# Patient Record
Sex: FEMALE | Race: WHITE | Employment: OTHER | URBAN - METROPOLITAN AREA
[De-identification: names, ages, dates, MRNs, and addresses within clinical notes are randomized per-mention and may not be internally consistent; named-entity substitution may affect disease eponyms.]

---

## 2017-03-14 DIAGNOSIS — G20.A1 PARKINSON DISEASE (H): ICD-10-CM

## 2017-03-14 RX ORDER — CARBIDOPA AND LEVODOPA 50; 200 MG/1; MG/1
TABLET, EXTENDED RELEASE ORAL
Qty: 450 TABLET | Refills: 0 | Status: SHIPPED | OUTPATIENT
Start: 2017-03-14 | End: 2017-04-24

## 2017-03-14 NOTE — TELEPHONE ENCOUNTER
Signed Prescriptions:                        Disp   Refills    carbidopa-levodopa (SINEMET CR)  MG *450 ta*0        Sig: Increase from 1 tab 4/day  At 7am, 11am, 5pm, 9:30pm           to 1 tab 5 times per day  Authorizing Provider: COLE CASAS  Ordering User: SEVERIN-BROWN, DARLA Ok until follow up next month  Darla Severin-Brown, LPN

## 2017-04-24 ENCOUNTER — OFFICE VISIT (OUTPATIENT)
Dept: NEUROLOGY | Facility: CLINIC | Age: 77
End: 2017-04-24
Payer: MEDICARE

## 2017-04-24 VITALS
WEIGHT: 193.2 LBS | DIASTOLIC BLOOD PRESSURE: 75 MMHG | OXYGEN SATURATION: 97 % | BODY MASS INDEX: 34.02 KG/M2 | SYSTOLIC BLOOD PRESSURE: 139 MMHG | HEART RATE: 65 BPM

## 2017-04-24 DIAGNOSIS — N31.9 NEUROGENIC BLADDER: Primary | ICD-10-CM

## 2017-04-24 DIAGNOSIS — G20.A1 PARALYSIS AGITANS (H): ICD-10-CM

## 2017-04-24 DIAGNOSIS — G20.A1 PARKINSON DISEASE (H): ICD-10-CM

## 2017-04-24 PROBLEM — M85.80 OSTEOPENIA: Status: ACTIVE | Noted: 2017-04-24

## 2017-04-24 PROBLEM — R32 INCONTINENCE: Status: ACTIVE | Noted: 2017-04-24

## 2017-04-24 PROBLEM — K75.81 NONALCOHOLIC STEATOHEPATITIS (NASH): Status: ACTIVE | Noted: 2017-04-24

## 2017-04-24 PROCEDURE — 99214 OFFICE O/P EST MOD 30 MIN: CPT | Performed by: PSYCHIATRY & NEUROLOGY

## 2017-04-24 RX ORDER — CARBIDOPA AND LEVODOPA 50; 200 MG/1; MG/1
TABLET, EXTENDED RELEASE ORAL
Qty: 450 TABLET | Refills: 0 | Status: SHIPPED | OUTPATIENT
Start: 2017-04-24 | End: 2017-07-03

## 2017-04-24 RX ORDER — CARBIDOPA AND LEVODOPA 25; 100 MG/1; MG/1
TABLET ORAL
Qty: 450 TABLET | Refills: 3 | Status: SHIPPED | OUTPATIENT
Start: 2017-04-24 | End: 2017-07-20

## 2017-04-24 ASSESSMENT — PAIN SCALES - GENERAL: PAINLEVEL: NO PAIN (0)

## 2017-04-24 NOTE — PROGRESS NOTES
Diagnosis/Summary/Recommendations:    PATIENT: Loan Anderson    : 1940    PATRICE: 2017    Parkinson  Having more mobility issues. She has pain on moving - right leg is worse and has pain in both legs.   She has pain in her right hip down to the knee and may go down to the right ankle. It is a shooting pain. It may be a nerve. When she goes down a step she has the pain. She has problems walking on level ground. If she stands too long it is difficult as well. It is not necessarily in the back. She has not sought out anyone for this.     Sinemet 50/200 CR 1 tab 4/day   Sinemet 25/100 1 tab 4/day   rotigotine 4mg/day  coQ10    myrbetriq - off this - did not help.   She has prominent nocturia.   She has seen a urologist in the past.     aricept  Rivastigmine    Colace  Synthroid    Asking about medical marijuana    Miralax.    She is able to walk relatively well today - she has taken her sinemet and feels relatively good.  She is managing her symptoms with acetaminophen    She brought up a new treatment for knee pain.    Not sure if it the following information    The US Food and Drug Administration (FDA) has approved an expanded efficacy claim - out to 26 weeks - for [employer]'s hyaluronic acid injection product (Gel-One  Hyaluronate, [employer], [place], [place]) for the treatment of osteoarthritis (OA) knee pain.1  Gel-One is a single, 3mL injection provided in-office by treating physicians and is recommended for the treatment of OA knee pain when patients have not responded to therapy with nonsteroidal anti-inflammatory drugs or simple analgesics.  Hyaluronic acid treatments like this aim to supplement the natural hyaluronic acid in knee joints to increase available cushioning and lubrication.   The FDA had previously approved an efficacy claim up to 13 weeks for the product.    She is traveling around [place] this summer. She returned back from [place].     PLAN  Conservative management of  her symptoms.    Consider physical therapy and heated pool therapy    May need to see orthopedics for further evaluation of her back and joints. She did not have a melissa's sign and therefore not clear she has a right hip issue but could. She is convinced she has ongoing knee problems. She did not have an obvious straight leg raise but has bilateral hamstring tightness. Discussed the differential diagnosis of this being related to parkinson and arthritis.     Elevating legs for one hour before bedtime before going to bed may reduce night time awakenings    Use as needed 25/100 sinemet during the night and this may reduce urinary frequency at night.   May see urology for an evaluation and management. She has not had significant studies done.  She had seen Dr. Warner, urologist for this in the past and has been on myrbetriq but not on other agents. She does have underlying memory problems.   She has prominent nocturia presently 5/night.     She has not tried medication for her memory - discussed rivastigmine (exelon) and donepezil (aricept) and the possible interaction between memory  Medications and bladder etc.  She is not sure she will try these. Her memory may be better off myrbetriq.    Return back in  6months.     Pedro Pitts MD     ______________________________________    Cc: 76 year old female   Issues discussed today April 24, 2017      parkinson      Over 50% of this visit was spent in patient care and care coordination.     History obtained from patient and family    Total visit time was 25 minutes      Pedro Pitts MD     ______________________________________    Last visit date and details:     Parkinson's disease  Has dyskinesias but are not troublesome but have increased from before.  Has had more tremor than before.   She has wearing off and she is shakey when she has wearing off.   She is taking   Sinemet 25/100 1 tablet 4 times per day  Sinemet 50/200 CR 1 tab 4 times per day to 5 times per day  She  is taking neupro 4mg patch daily  She has had protein interaction with sinemet.   She is fairly reliable in taking her dose of medication.   No falls  Has had problems with thinking abilities but has not changed.      Will be going to [place] in October/november. Last doctor appointment is on the 20th up [place].     Been on mirabegron 50mg for one month; she may have Combination of stress urinary incontinence and a neurogenic bladder. Dr. Warner has given her the mirabegron - not sure after one month if helped. She has not kept a bladder diary.      Discussed rivastigmine patch (exelon patch) and its pros and cons.  She is not taking donepezil and has not taken it yet.     Discussed nilotinib research.     Discussion about probiotics - daughter will look into probiotics.     PLAN  Ongoing discussion about memory medication     She will continue to review the response to mirabegron for bladder     She will need ongoing monitor of bladder function as she has had recurrent uti's  She will have family review her probiotics as she has had antibiotics for her UTI.     She will return in 6 months.     Pedro Pitts MD           ______________________________________      Patient was asked about 14 Review of systems including changes in vision (dry eyes, double vision), hearing, heart, lungs, musculoskeletal, depression, anxiety, snoring, RBD, insomnia, urinary frequency, urinary urgency, constipation, swallowing problems, hematological, ID, allergies, skin problems: seborrhea, endocrinological: thyroid, diabetes, cholesterol; balance, weight changes, and other neurological problems and these were not significant at this time except for   Patient Active Problem List   Diagnosis     Parkinson disease (H)     Constipation     Hypothyroid     Neurogenic bladder     Convergence insufficiency     Wears glasses     Diplopia     Hot flashes, menopausal     REM sleep behavior disorder     Sebaceous cyst     Hypercholesterolemia      Dysthymic disorder     Hyperglyceridemia     Hypothyroidism     Urinary incontinence     Chronic nonalcoholic liver disease     Obstructive sleep apnea     Disorder of bone and cartilage     Benign neoplasm          Allergies   Allergen Reactions     Mirapex [Pramipexole Dihydrochloride Monohydrate]      Leg swelling     Requip [Ropinirole Hydrochloride]      Leg swelling     Epinephrine Palpitations     Past Surgical History:   Procedure Laterality Date     ------------OTHER-------------  november ? 2015    lithrotripsy for renal stone     ------------OTHER-------------  november 2015    had sepsis from uti and hospitalized     BRAIN SURGERY  12 or 14 oct 2011    gene therapy enrolled study at [facility]     Past Medical History:   Diagnosis Date     Constipation 7/15/2011     Convergence insufficiency 7/15/2011     Depression 7/15/2011     Diplopia 7/18/2011     Hot flashes, menopausal 7/18/2011     Hypercholesterolemia 7/18/2011     Hypothyroid 7/15/2011     Neurogenic bladder 7/15/2011     Parkinson disease (H) 7/15/2011     REM sleep behavior disorder 7/18/2011     Sebaceous cyst 7/18/2011     Wears glasses 7/18/2011     Social History     Social History     Marital status:      Spouse name: N/A     Number of children: N/A     Years of education: N/A     Occupational History     Not on file.     Social History Main Topics     Smoking status: Never Smoker     Smokeless tobacco: Never Used     Alcohol use Yes      Comment: rare     Drug use: Not on file     Sexual activity: Not on file     Other Topics Concern     Not on file     Social History Narrative    Spouse is serenity       Drug and lactation database from the [place] National Library of Medicine:  http://toxnet.nlm.nih.gov/cgi-bin/sis/htmlgen?LACT      B/P: Data Unavailable, T: Data Unavailable, P: Data Unavailable, R: Data Unavailable 0 lbs 0 oz  There were no vitals taken for this visit., There is no height or weight on file to calculate  BMI.  Medications and Vitals not listed are documented in the cart and reviewed by me.     Current Outpatient Prescriptions   Medication Sig Dispense Refill     carbidopa-levodopa (SINEMET CR)  MG per CR tablet Increase from 1 tab 4/day  At 7am, 11am, 5pm, 9:30pm to 1 tab 5 times per day 450 tablet 0     venlafaxine (EFFEXOR-XR) 75 MG 24 hr capsule TAKE 1 TABLET(75 MG) BY MOUTH DAILY 90 capsule 11     red yeast rice 600 MG CAPS Take 600 mg by mouth daily       mirabegron (MYRBETRIQ) 50 MG 24 hr tablet Take 1 tablet (50 mg) by mouth daily       rivastigmine (EXELON) 13.3 MG/24HR Place 1 patch onto the skin daily Apply daily for one month at 4.6mg/day then increase to 9.5mg daily for one month then increase to 13.3mg daily 30 patch 11     rivastigmine (EXELON) 4.6 MG/24HR Apply daily for one month at 4.6mg/day then increase to 9.5mg daily for one month then increase to 13.3mg daily 30 patch 11     rivastigmine (EXELON) 9.5 MG/24HR Apply daily for one month at 4.6mg/day then increase to 9.5mg daily for one month then increase to 13.3mg daily 30 patch 11     polyethylene glycol (MIRALAX) powder Take 17 g (1 capful) by mouth daily as needed 119 g      Inulin 2 G CHEW 6 x 6grams per day with natur made adult gummies fiber. 30 tablet      donepezil (ARICEPT) 5 MG tablet 5mg at night for 4 weeks then 10mg at night. 60 tablet 11     vitamin  B complex with vitamin C (VITAMIN  B COMPLEX) TABS Take 1 tablet by mouth daily       rotigotine (NEUPRO) 4 MG/24HR Place 1 patch onto the skin daily 90 patch 3     carbidopa-levodopa (SINEMET)  MG per tablet Increase from 1 tab 4/day  At 7am, 11am, 5pm, 9:30pm to 1 tab 5 times per day = 5/day 450 tablet 3     levothyroxine (SYNTHROID,LEVOTHROID) 100 MCG tablet 1 tablet @ 7am 90 tablet 3     docusate sodium (COLACE) 100 MG capsule Take 1 capsule (100 mg) by mouth 2 times daily 60 capsule      Calcium-Vitamin D 600-200 MG-UNIT TABS 1 tablet at 5pm 30 tablet      atorvastatin  (LIPITOR) 10 MG tablet Take 1 tablet (10 mg) by mouth daily 30 tablet 1     NONFORMULARY macugels 2 softgels daily at 920pm       OMEGA-3 KRILL  MG CAPS Take 1 tablet by mouth daily.       Co-Enzyme Q-10 100 MG CAPS Take 400 mg by mouth daily One at 7:30AM, 12:30PM and 10:00PM       TYLENOL EXTRA STRENGTH 500 MG PO TABS 1 TABLET daily  AS NEEDED 90 Tab 3     ASPIRIN 81 MG PO TABS 1 TABLET DAILY           Pedro Pitts MD

## 2017-04-24 NOTE — LETTER
2017      RE: Loan Anderson  [address]  [place] [address]       Diagnosis/Summary/Recommendations:    PATIENT: Loan Anderson    : 1940    PATRICE: 2017    Parkinson  Having more mobility issues. She has pain on moving - right leg is worse and has pain in both legs.   She has pain in her right hip down to the knee and may go down to the right ankle. It is a shooting pain. It may be a nerve. When she goes down a step she has the pain. She has problems walking on level ground. If she stands too long it is difficult as well. It is not necessarily in the back. She has not sought out anyone for this.     Sinemet 50/200 CR 1 tab 4/day   Sinemet 25/100 1 tab 4/day   rotigotine 4mg/day  coQ10    myrbetriq - off this - did not help.   She has prominent nocturia.   She has seen a urologist in the past.     aricept  Rivastigmine    Colace  Synthroid    Asking about medical marijuana    Miralax.    She is able to walk relatively well today - she has taken her sinemet and feels relatively good.  She is managing her symptoms with acetaminophen    She brought up a new treatment for knee pain.    Not sure if it the following information    The US Food and Drug Administration (FDA) has approved an expanded efficacy claim - out to 26 weeks - for [employer]'s hyaluronic acid injection product (Gel-One  Hyaluronate, [employer], [place], [place]) for the treatment of osteoarthritis (OA) knee pain.1  Gel-One is a single, 3mL injection provided in-office by treating physicians and is recommended for the treatment of OA knee pain when patients have not responded to therapy with nonsteroidal anti-inflammatory drugs or simple analgesics.  Hyaluronic acid treatments like this aim to supplement the natural hyaluronic acid in knee joints to increase available cushioning and lubrication.   The FDA had previously approved an efficacy claim up to 13 weeks for the product.    She is traveling around  [place] this summer. She returned back from [place].     PLAN  Conservative management of her symptoms.    Consider physical therapy and heated pool therapy    May need to see orthopedics for further evaluation of her back and joints. She did not have a melissa's sign and therefore not clear she has a right hip issue but could. She is convinced she has ongoing knee problems. She did not have an obvious straight leg raise but has bilateral hamstring tightness. Discussed the differential diagnosis of this being related to parkinson and arthritis.     Elevating legs for one hour before bedtime before going to bed may reduce night time awakenings    Use as needed 25/100 sinemet during the night and this may reduce urinary frequency at night.   May see urology for an evaluation and management. She has not had significant studies done.  She had seen Dr. Warner, urologist for this in the past and has been on myrbetriq but not on other agents. She does have underlying memory problems.   She has prominent nocturia presently 5/night.     She has not tried medication for her memory - discussed rivastigmine (exelon) and donepezil (aricept) and the possible interaction between memory  Medications and bladder etc.  She is not sure she will try these. Her memory may be better off myrbetriq.    Return back in  6months.     Pedro Pitts MD     ______________________________________    Cc: 76 year old female   Issues discussed today April 24, 2017      parkinson      Over 50% of this visit was spent in patient care and care coordination.     History obtained from patient and family    Total visit time was 25 minutes      Pedro Pitts MD     ______________________________________    Last visit date and details:     Parkinson's disease  Has dyskinesias but are not troublesome but have increased from before.  Has had more tremor than before.   She has wearing off and she is shakey when she has wearing off.   She is taking   Sinemet  25/100 1 tablet 4 times per day  Sinemet 50/200 CR 1 tab 4 times per day to 5 times per day  She is taking neupro 4mg patch daily  She has had protein interaction with sinemet.   She is fairly reliable in taking her dose of medication.   No falls  Has had problems with thinking abilities but has not changed.      Will be going to [place] in October/november. Last doctor appointment is on the 20th up [place].     Been on mirabegron 50mg for one month; she may have Combination of stress urinary incontinence and a neurogenic bladder. Dr. Warner has given her the mirabegron - not sure after one month if helped. She has not kept a bladder diary.      Discussed rivastigmine patch (exelon patch) and its pros and cons.  She is not taking donepezil and has not taken it yet.     Discussed nilotinib research.     Discussion about probiotics - daughter will look into probiotics.     PLAN  Ongoing discussion about memory medication     She will continue to review the response to mirabegron for bladder     She will need ongoing monitor of bladder function as she has had recurrent uti's  She will have family review her probiotics as she has had antibiotics for her UTI.     She will return in 6 months.     Pedro Pitts MD           ______________________________________      Patient was asked about 14 Review of systems including changes in vision (dry eyes, double vision), hearing, heart, lungs, musculoskeletal, depression, anxiety, snoring, RBD, insomnia, urinary frequency, urinary urgency, constipation, swallowing problems, hematological, ID, allergies, skin problems: seborrhea, endocrinological: thyroid, diabetes, cholesterol; balance, weight changes, and other neurological problems and these were not significant at this time except for   Patient Active Problem List   Diagnosis     Parkinson disease (H)     Constipation     Hypothyroid     Neurogenic bladder     Convergence insufficiency     Wears glasses     Diplopia     Hot  flashes, menopausal     REM sleep behavior disorder     Sebaceous cyst     Hypercholesterolemia     Dysthymic disorder     Hyperglyceridemia     Hypothyroidism     Urinary incontinence     Chronic nonalcoholic liver disease     Obstructive sleep apnea     Disorder of bone and cartilage     Benign neoplasm          Allergies   Allergen Reactions     Mirapex [Pramipexole Dihydrochloride Monohydrate]      Leg swelling     Requip [Ropinirole Hydrochloride]      Leg swelling     Epinephrine Palpitations     Past Surgical History:   Procedure Laterality Date     ------------OTHER-------------  november ? 2015    lithrotripsy for renal stone     ------------OTHER-------------  november 2015    had sepsis from uti and hospitalized     BRAIN SURGERY  12 or 14 oct 2011    gene therapy enrolled study at [facility]     Past Medical History:   Diagnosis Date     Constipation 7/15/2011     Convergence insufficiency 7/15/2011     Depression 7/15/2011     Diplopia 7/18/2011     Hot flashes, menopausal 7/18/2011     Hypercholesterolemia 7/18/2011     Hypothyroid 7/15/2011     Neurogenic bladder 7/15/2011     Parkinson disease (H) 7/15/2011     REM sleep behavior disorder 7/18/2011     Sebaceous cyst 7/18/2011     Wears glasses 7/18/2011     Social History     Social History     Marital status:      Spouse name: N/A     Number of children: N/A     Years of education: N/A     Occupational History     Not on file.     Social History Main Topics     Smoking status: Never Smoker     Smokeless tobacco: Never Used     Alcohol use Yes      Comment: rare     Drug use: Not on file     Sexual activity: Not on file     Other Topics Concern     Not on file     Social History Narrative    Spouse is serenity       Drug and lactation database from the [place] [employer] of Medicine:  http://toxnet.nlm.nih.gov/cgi-bin/sis/htmlgen?LACT      B/P: Data Unavailable, T: Data Unavailable, P: Data Unavailable, R: Data Unavailable 0 lbs 0  oz  There were no vitals taken for this visit., There is no height or weight on file to calculate BMI.  Medications and Vitals not listed are documented in the cart and reviewed by me.     Current Outpatient Prescriptions   Medication Sig Dispense Refill     carbidopa-levodopa (SINEMET CR)  MG per CR tablet Increase from 1 tab 4/day  At 7am, 11am, 5pm, 9:30pm to 1 tab 5 times per day 450 tablet 0     venlafaxine (EFFEXOR-XR) 75 MG 24 hr capsule TAKE 1 TABLET(75 MG) BY MOUTH DAILY 90 capsule 11     red yeast rice 600 MG CAPS Take 600 mg by mouth daily       mirabegron (MYRBETRIQ) 50 MG 24 hr tablet Take 1 tablet (50 mg) by mouth daily       rivastigmine (EXELON) 13.3 MG/24HR Place 1 patch onto the skin daily Apply daily for one month at 4.6mg/day then increase to 9.5mg daily for one month then increase to 13.3mg daily 30 patch 11     rivastigmine (EXELON) 4.6 MG/24HR Apply daily for one month at 4.6mg/day then increase to 9.5mg daily for one month then increase to 13.3mg daily 30 patch 11     rivastigmine (EXELON) 9.5 MG/24HR Apply daily for one month at 4.6mg/day then increase to 9.5mg daily for one month then increase to 13.3mg daily 30 patch 11     polyethylene glycol (MIRALAX) powder Take 17 g (1 capful) by mouth daily as needed 119 g      Inulin 2 G CHEW 6 x 6grams per day with natur made adult gummies fiber. 30 tablet      donepezil (ARICEPT) 5 MG tablet 5mg at night for 4 weeks then 10mg at night. 60 tablet 11     vitamin  B complex with vitamin C (VITAMIN  B COMPLEX) TABS Take 1 tablet by mouth daily       rotigotine (NEUPRO) 4 MG/24HR Place 1 patch onto the skin daily 90 patch 3     carbidopa-levodopa (SINEMET)  MG per tablet Increase from 1 tab 4/day  At 7am, 11am, 5pm, 9:30pm to 1 tab 5 times per day = 5/day 450 tablet 3     levothyroxine (SYNTHROID,LEVOTHROID) 100 MCG tablet 1 tablet @ 7am 90 tablet 3     docusate sodium (COLACE) 100 MG capsule Take 1 capsule (100 mg) by mouth 2 times daily 60  capsule      Calcium-Vitamin D 600-200 MG-UNIT TABS 1 tablet at 5pm 30 tablet      atorvastatin (LIPITOR) 10 MG tablet Take 1 tablet (10 mg) by mouth daily 30 tablet 1     NONFORMULARY macugels 2 softgels daily at 920pm       OMEGA-3 KRILL  MG CAPS Take 1 tablet by mouth daily.       Co-Enzyme Q-10 100 MG CAPS Take 400 mg by mouth daily One at 7:30AM, 12:30PM and 10:00PM       TYLENOL EXTRA STRENGTH 500 MG PO TABS 1 TABLET daily  AS NEEDED 90 Tab 3     ASPIRIN 81 MG PO TABS 1 TABLET DAILY           Pedro Pitts MD

## 2017-04-24 NOTE — NURSING NOTE
"Loan Anderson's goals for this visit include: return  She requests these members of her care team be copied on today's visit information:     PCP: Opal Cardenas    Referring Provider:  No referring provider defined for this encounter.    Chief Complaint   Patient presents with     RECHECK       Initial /75 (BP Location: Left arm, Cuff Size: Adult Regular)  Pulse 65  Wt 87.6 kg (193 lb 3.2 oz)  SpO2 97%  BMI 34.02 kg/m2 Estimated body mass index is 34.02 kg/(m^2) as calculated from the following:    Height as of 10/3/16: 1.605 m (5' 3.19\").    Weight as of this encounter: 87.6 kg (193 lb 3.2 oz).  Medication Reconciliation: complete    Do you need any medication refills at today's visit? y  "

## 2017-04-24 NOTE — PATIENT INSTRUCTIONS
Diagnosis/Summary/Recommendations:    PATIENT: Loan Anderson    : 1940    PATRICE: 2017    Parkinson  Having more mobility issues. She has pain on moving - right leg is worse and has pain in both legs.   She has pain in her right hip down to the knee and may go down to the right ankle. It is a shooting pain. It may be a nerve. When she goes down a step she has the pain. She has problems walking on level ground. If she stands too long it is difficult as well. It is not necessarily in the back. She has not sought out anyone for this.     Sinemet 50/200 CR 1 tab 4/day   Sinemet 25/100 1 tab 4/day   rotigotine 4mg/day  coQ10    myrbetriq - off this - did not help.   She has prominent nocturia.   She has seen a urologist in the past.     aricept  Rivastigmine    Colace  Synthroid    Asking about medical marijuana    Miralax.    She is able to walk relatively well today - she has taken her sinemet and feels relatively good.  She is managing her symptoms with acetaminophen    She brought up a new treatment for knee pain.    Not sure if it the following information    The US Food and Drug Administration (FDA) has approved an expanded efficacy claim - out to 26 weeks - for [employer]'s hyaluronic acid injection product (Gel-One  Hyaluronate, [employer], [place], [place]) for the treatment of osteoarthritis (OA) knee pain.1  Gel-One is a single, 3mL injection provided in-office by treating physicians and is recommended for the treatment of OA knee pain when patients have not responded to therapy with nonsteroidal anti-inflammatory drugs or simple analgesics.  Hyaluronic acid treatments like this aim to supplement the natural hyaluronic acid in knee joints to increase available cushioning and lubrication.   The FDA had previously approved an efficacy claim up to 13 weeks for the product.    She is traveling around [place] this summer. She returned back from [place].     PLAN  Conservative management of  her symptoms.    Consider physical therapy and heated pool therapy    May need to see orthopedics for further evaluation of her back and joints. She did not have a melissa's sign and therefore not clear she has a right hip issue but could. She is convinced she has ongoing knee problems. She did not have an obvious straight leg raise but has bilateral hamstring tightness. Discussed the differential diagnosis of this being related to parkinson and arthritis.     Elevating legs for one hour before bedtime before going to bed may reduce night time awakenings    Use as needed 25/100 sinemet during the night and this may reduce urinary frequency at night.   May see urology for an evaluation and management. She has not had significant studies done.  She had seen Dr. Warner, urologist for this in the past and has been on myrbetriq but not on other agents. She does have underlying memory problems.   She has prominent nocturia presently 5/night.     She has not tried medication for her memory - discussed rivastigmine (exelon) and donepezil (aricept) and the possible interaction between memory  Medications and bladder etc.  She is not sure she will try these. Her memory may be better off myrbetriq.    Return back in  6months.     Pedro Pitts MD

## 2017-04-24 NOTE — MR AVS SNAPSHOT
After Visit Summary   2017    Loan Anderson    MRN: [id number]           Patient Information     Date Of Birth          1940        Visit Information        Provider Department      2017 11:00 AM Pedro Pitts MD [facility]        Today's Diagnoses     Neurogenic bladder    -  1    Parkinson disease (H)        Paralysis agitans (H)          Care Instructions    Diagnosis/Summary/Recommendations:    PATIENT: Loan Anderson    : 1940    PATRICE: 2017    Parkinson  Having more mobility issues. She has pain on moving - right leg is worse and has pain in both legs.   She has pain in her right hip down to the knee and may go down to the right ankle. It is a shooting pain. It may be a nerve. When she goes down a step she has the pain. She has problems walking on level ground. If she stands too long it is difficult as well. It is not necessarily in the back. She has not sought out anyone for this.     Sinemet 50/200 CR 1 tab 4/day   Sinemet 25/100 1 tab 4/day   rotigotine 4mg/day  coQ10    myrbetriq - off this - did not help.   She has prominent nocturia.   She has seen a urologist in the past.     aricept  Rivastigmine    Colace  Synthroid    Asking about medical marijuana    Miralax.    She is able to walk relatively well today - she has taken her sinemet and feels relatively good.  She is managing her symptoms with acetaminophen    She brought up a new treatment for knee pain.    Not sure if it the following information    The US Food and Drug Administration (FDA) has approved an expanded efficacy claim - out to 26 weeks - for [employer]'s hyaluronic acid injection product (Gel-One  Hyaluronate, [employer], [place], [place]) for the treatment of osteoarthritis (OA) knee pain.1  Gel-One is a single, 3mL injection provided in-office by treating physicians and is recommended for the treatment of OA knee pain when patients have not responded to  therapy with nonsteroidal anti-inflammatory drugs or simple analgesics.  Hyaluronic acid treatments like this aim to supplement the natural hyaluronic acid in knee joints to increase available cushioning and lubrication.   The FDA had previously approved an efficacy claim up to 13 weeks for the product.    She is traveling around [place] this summer. She returned back from [place].     PLAN  Conservative management of her symptoms.    Consider physical therapy and heated pool therapy    May need to see orthopedics for further evaluation of her back and joints. She did not have a melissa's sign and therefore not clear she has a right hip issue but could. She is convinced she has ongoing knee problems. She did not have an obvious straight leg raise but has bilateral hamstring tightness. Discussed the differential diagnosis of this being related to parkinson and arthritis.     Elevating legs for one hour before bedtime before going to bed may reduce night time awakenings    Use as needed 25/100 sinemet during the night and this may reduce urinary frequency at night.   May see urology for an evaluation and management. She has not had significant studies done.  She had seen Dr. Warner, urologist for this in the past and has been on myrbetriq but not on other agents. She does have underlying memory problems.   She has prominent nocturia presently 5/night.     She has not tried medication for her memory - discussed rivastigmine (exelon) and donepezil (aricept) and the possible interaction between memory  Medications and bladder etc.  She is not sure she will try these. Her memory may be better off myrbetriq.    Return back in  6months.     Pedro Pitts MD         Follow-ups after your visit        Additional Services     UROLOGY ADULT REFERRAL       Your provider has referred you to: urology  Dr. Thibodeaux    Please be aware that coverage of these services is subject to the terms and limitations of your health insurance plan.   Call member services at your health plan with any benefit or coverage questions.      Please bring the following with you to your appointment:    (1) Any X-Rays, CTs or MRIs which have been performed.  Contact the facility where they were done to arrange for  prior to your scheduled appointment.    (2) List of current medications  (3) This referral request   (4) Any documents/labs given to you for this referral                  Follow-up notes from your care team     Return in about 6 months (around 10/24/2017).      Your next 10 appointments already scheduled     Jun 19, 2017  9:00 AM CDT   [facility]    [address] [place] [address] [id number] [phone]            Oct 30, 2017  9:30 AM CDT   Return Visit with Pedro Pitts MD   [facility]    [address] [place] [address] [id number] [phone]              Who to contact     If you have questions or need follow up information about today's clinic visit or your schedule please contact [facility] directly at [phone].  Normal or non-critical lab and imaging results will be communicated to you by MyChart, letter or phone within 4 business days after the clinic has received the results. If you do not hear from us within 7 days, please contact the clinic through [employer] or phone. If you have a critical or abnormal lab result, we will notify you by phone as soon as possible.  Submit refill requests through [employer] or call your pharmacy and they will forward the refill request to us. Please allow 3 business days for your refill to be completed.          Additional Information About Your Visit        [employer] Information     [employer] gives you secure access to your electronic health record. If you see a primary care provider, you can also send messages to your care team and make  appointments. If you have questions, please call your primary care clinic.  If you do not have a primary care provider, please call [phone] and they will assist you.      [employer] is an electronic gateway that provides easy, online access to your medical records. With [employer], you can request a clinic appointment, read your test results, renew a prescription or communicate with your care team.     To access your existing account, please contact your [facility] or call [phone] for assistance.        Care EveryWhere ID     This is your Care EveryWhere ID. This could be used by other organizations to access your [facility] medical records  [id number]        Your Vitals Were     Pulse Pulse Oximetry BMI (Body Mass Index)             65 97% 34.02 kg/m2          Blood Pressure from Last 3 Encounters:   04/24/17 139/75   10/03/16 121/67   04/25/16 136/80    Weight from Last 3 Encounters:   04/24/17 87.6 kg (193 lb 3.2 oz)   10/03/16 85.3 kg (188 lb 1.6 oz)   04/25/16 82.6 kg (182 lb)              We Performed the Following     UROLOGY ADULT REFERRAL          Today's Medication Changes          These changes are accurate as of: 4/24/17 11:48 AM.  If you have any questions, ask your nurse or doctor.               These medicines have changed or have updated prescriptions.        Dose/Directions    * carbidopa-levodopa  MG per CR tablet   Commonly known as:  SINEMET CR   This may have changed:  additional instructions   Used for:  Parkinson disease (H)   Changed by:  Pedro Pitts MD        1 tab 4/day  At 7am, 11am, 5pm, 9:30pm and may take 5th if needed   Quantity:  450 tablet   Refills:  0       * carbidopa-levodopa  MG per tablet   Commonly known as:  SINEMET   This may have changed:  additional instructions   Used for:  Paralysis agitans (H)   Changed by:  Pedro Pitts MD        Increase from 1 tab 4/day  At 7am, 11am, 5pm, 9:30pm and may take an extra 5th  tablet   Quantity:  450 tablet   Refills:  3       * Notice:  This list has 2 medication(s) that are the same as other medications prescribed for you. Read the directions carefully, and ask your doctor or other care provider to review them with you.      Stop taking these medicines if you haven't already. Please contact your care team if you have questions.     mirabegron 50 MG 24 hr tablet   Commonly known as:  MYRBETRIQ   Stopped by:  Pedro Pitts MD                Where to get your medicines      These medications were sent to [facility] - [place] [address] [address]  AT [facility]  [address] [facility] [address]     Phone:  [phone]     carbidopa-levodopa  MG per tablet    carbidopa-levodopa  MG per CR tablet    rotigotine 4 MG/24HR 24 hr patch                Primary Care Provider Office Phone # Fax #    Opal Cardenas [phone]       [facility] [address] [facility] [address]        Thank you!     Thank you for choosing [facility]  for your care. Our goal is always to provide you with excellent care. Hearing back from our patients is one way we can continue to improve our services. Please take a few minutes to complete the written survey that you may receive in the mail after your visit with us. Thank you!             Your Updated Medication List - Protect others around you: Learn how to safely use, store and throw away your medicines at www.disposemymeds.org.          This list is accurate as of: 4/24/17 11:48 AM.  Always use your most recent med list.                   Brand Name Dispense Instructions for use    aspirin 81 MG tablet      1 TABLET DAILY       atorvastatin 10 MG tablet    LIPITOR    30 tablet    Take 1 tablet (10 mg) by mouth daily       Calcium-Vitamin D 600-200 MG-UNIT Tabs     30 tablet    1 tablet at 5pm       * carbidopa-levodopa  MG per CR tablet    SINEMET CR     450 tablet    1 tab 4/day  At 7am, 11am, 5pm, 9:30pm and may take 5th if needed       * carbidopa-levodopa  MG per tablet    SINEMET    450 tablet    Increase from 1 tab 4/day  At 7am, 11am, 5pm, 9:30pm and may take an extra 5th tablet       docusate sodium 100 MG capsule    COLACE    60 capsule    Take 1 capsule (100 mg) by mouth 2 times daily       Inulin 2 G Chew     30 tablet    6 x 6grams per day with natur made adult gummies fiber.       levothyroxine 100 MCG tablet    SYNTHROID/LEVOTHROID    90 tablet    1 tablet @ 7am       MIRALAX powder   Generic drug:  polyethylene glycol     119 g    Take 17 g (1 capful) by mouth daily as needed       NONFORMULARY      macugels 2 softgels daily at 920pm       Omega-3 Krill Oil 300 MG Caps      Take 1 tablet by mouth daily.       rotigotine 4 MG/24HR 24 hr patch    NEUPRO    90 patch    Place 1 patch onto the skin daily       TYLENOL 500 MG tablet   Generic drug:  acetaminophen     90 Tab    1 TABLET daily  AS NEEDED       venlafaxine 75 MG 24 hr capsule    EFFEXOR-XR    90 capsule    TAKE 1 TABLET(75 MG) BY MOUTH DAILY       vitamin B complex with vitamin C Tabs tablet      Take 1 tablet by mouth daily       * Notice:  This list has 2 medication(s) that are the same as other medications prescribed for you. Read the directions carefully, and ask your doctor or other care provider to review them with you.

## 2017-06-15 ENCOUNTER — PRE VISIT (OUTPATIENT)
Dept: UROLOGY | Facility: CLINIC | Age: 77
End: 2017-06-15

## 2017-06-15 NOTE — TELEPHONE ENCOUNTER
PREVISIT INFORMATION                                                    Loan Anderson scheduled for future visit at [facility] specialty clinics.    //Patient is scheduled to see Nakib on   Reason for visit: neurogenic bladder  Referring provider: Pedro Pitts MD  Has patient seen previous specialist? No  Medical Records:  Available in chart.  Patient was previously seen at a [place] or [facility] facility.     REVIEW                                                      New patient packet mailed to patient: No  Medication reconciliation complete: No      PLAN/FOLLOW-UP NEEDED                                                      Patient Reminders Given:    Informed patient to bring an updated list of allergies, medications, pharmacy details and insurance information. Directed patient to come to the 2nd floor, check-in #4 for their appointment. Informed patient to call back if appointment needs to be cancelled or rescheduled at [phone].    Reminded patient to bring any outside records regarding this appointment or have them faxed to clinic at [phone].    Reminded patient to complete and bring in urology questionnaire. Also for patient to please come with a full bladder and to ask , if early to get staff member for sample.

## 2017-07-03 DIAGNOSIS — G20.A1 PARKINSON DISEASE (H): ICD-10-CM

## 2017-07-03 RX ORDER — CARBIDOPA AND LEVODOPA 50; 200 MG/1; MG/1
TABLET, EXTENDED RELEASE ORAL
Qty: 450 TABLET | Refills: 0 | Status: SHIPPED | OUTPATIENT
Start: 2017-07-03 | End: 2017-11-06 | Stop reason: DRUGHIGH

## 2017-07-03 NOTE — TELEPHONE ENCOUNTER
Signed Prescriptions:                        Disp   Refills    carbidopa-levodopa (SINEMET CR)  MG *450 ta*0        Si tab 4/day  At 7am, 11am, 5pm, 9:30pm and may take           5th if needed  Authorizing Provider: COLE CASAS  Ordering User: SEVERIN-BROWN, DARLA Ok per refill protocol    Darla Severin-Brown, LPN

## 2017-07-17 ENCOUNTER — OFFICE VISIT (OUTPATIENT)
Dept: UROLOGY | Facility: CLINIC | Age: 77
End: 2017-07-17
Attending: PSYCHIATRY & NEUROLOGY
Payer: MEDICARE

## 2017-07-17 DIAGNOSIS — N31.9 NEUROGENIC BLADDER: Primary | ICD-10-CM

## 2017-07-17 DIAGNOSIS — R82.90 NONSPECIFIC FINDING ON EXAMINATION OF URINE: ICD-10-CM

## 2017-07-17 DIAGNOSIS — N95.2 ATROPHIC VAGINITIS: ICD-10-CM

## 2017-07-17 DIAGNOSIS — N39.3 STRESS INCONTINENCE: ICD-10-CM

## 2017-07-17 LAB
ALBUMIN UR-MCNC: 30 MG/DL
APPEARANCE UR: ABNORMAL
BACTERIA #/AREA URNS HPF: ABNORMAL /HPF
BILIRUB UR QL STRIP: NEGATIVE
CAOX CRY #/AREA URNS HPF: ABNORMAL /HPF
COLOR UR AUTO: ABNORMAL
GLUCOSE UR STRIP-MCNC: NEGATIVE MG/DL
HGB UR QL STRIP: NEGATIVE
HYALINE CASTS #/AREA URNS LPF: ABNORMAL /LPF (ref 0–2)
KETONES UR STRIP-MCNC: 10 MG/DL
LEUKOCYTE ESTERASE UR QL STRIP: ABNORMAL
MUCOUS THREADS #/AREA URNS LPF: PRESENT /LPF
NITRATE UR QL: NEGATIVE
NON-SQ EPI CELLS #/AREA URNS LPF: ABNORMAL /LPF
PH UR STRIP: 5.5 PH (ref 5–7)
RBC #/AREA URNS AUTO: ABNORMAL /HPF (ref 0–2)
SP GR UR STRIP: 1.02 (ref 1–1.03)
URN SPEC COLLECT METH UR: ABNORMAL
UROBILINOGEN UR STRIP-MCNC: NORMAL MG/DL (ref 0–2)
WBC #/AREA URNS AUTO: ABNORMAL /HPF (ref 0–2)

## 2017-07-17 PROCEDURE — 99204 OFFICE O/P NEW MOD 45 MIN: CPT | Mod: 25 | Performed by: UROLOGY

## 2017-07-17 PROCEDURE — 87086 URINE CULTURE/COLONY COUNT: CPT | Performed by: UROLOGY

## 2017-07-17 PROCEDURE — 81001 URINALYSIS AUTO W/SCOPE: CPT | Performed by: UROLOGY

## 2017-07-17 PROCEDURE — 51798 US URINE CAPACITY MEASURE: CPT | Performed by: UROLOGY

## 2017-07-17 NOTE — LETTER
Patient:  Loan Anderson  :   1940  MRN:     [id number]    Ms.Patricia TAYLOR Anderson  [address] [facility] [address]    2017  Dear ,  We are writing to inform you of your test results that are within normal results.     Resulted Orders   UA reflex to Microscopic and Culture   Result Value Ref Range    Color Urine Dark Yellow     Appearance Urine Slightly Cloudy     Glucose Urine Negative NEG mg/dL    Bilirubin Urine Negative NEG    Ketones Urine 10 (A) NEG mg/dL    Specific Gravity Urine 1.021 1.003 - 1.035    Blood Urine Negative NEG    pH Urine 5.5 5.0 - 7.0 pH    Protein Albumin Urine 30 (A) NEG mg/dL    Urobilinogen mg/dL Normal 0.0 - 2.0 mg/dL    Nitrite Urine Negative NEG    Leukocyte Esterase Urine Moderate (A) NEG    Source Midstream Urine    Urine Culture Aerobic Bacterial   Result Value Ref Range    Specimen Description Midstream Urine     Culture Micro       10,000 to 50,000 colonies/mL mixed urogenital abhijeet Susceptibility testing not   routinely done      Micro Report Status FINAL 2017    Urine Microscopic   Result Value Ref Range    WBC Urine 2-5 (A) 0 - 2 /HPF    RBC Urine O - 2 0 - 2 /HPF    Bacteria Urine Moderate (A) NEG /HPF    Squamous Epithelial /LPF Urine Few FEW /LPF    Mucous Urine Present (A) NEG /LPF    Hyaline Casts O - 2 0 - 2 /LPF    Calcium Oxalate Moderate (A) NEG /HPF       Dr. Viveros's office

## 2017-07-17 NOTE — MR AVS SNAPSHOT
After Visit Summary   7/17/2017    Loan Anderson    MRN: [id number]           Patient Information     Date Of Birth          1940        Visit Information        Provider Department      7/17/2017 9:30 AM Ivania Viveros MD [facility]        Today's Diagnoses     Neurogenic bladder    -  1    Nonspecific finding on examination of urine        Stress incontinence        Atrophic vaginitis           Follow-ups after your visit        Follow-up notes from your care team     Return for UDS and cysto on the same day..      Your next 10 appointments already scheduled     Oct 04, 2017  7:30 AM CDT   (Arrive by 7:15 AM)   Urodynamics with Keisha Beatty PA-C   [facility] ([facility])    [address] [place] [id number] [phone]            Oct 04, 2017 10:45 AM CDT   (Arrive by 10:30 AM)   Cystoscopy with Ivania Viveros MD   [facility] ([facility])    [address] [place] [id number] [phone]            Oct 30, 2017  9:30 AM CDT   [facility]    [id number] [place] [id number] [phone]              Who to contact     If you have questions or need follow up information about today's clinic visit or your schedule please contact [facility] [[email or web address]] directly at [phone].  Normal or non-critical lab and imaging results will be communicated to you by MyChart, letter or phone within 4 business days after the clinic has received the results. If you do not hear from us within 7 days, please contact the clinic through MyChart or phone. If you have a critical or abnormal lab result, we will notify you by phone as soon as possible.  Submit refill  requests through [employer] or call your pharmacy and they will forward the refill request to us. Please allow 3 business days for your refill to be completed.          Additional Information About Your Visit        [employer] Information     [employer] gives you secure access to your electronic health record. If you see a primary care provider, you can also send messages to your care team and make appointments. If you have questions, please call your primary care clinic.  If you do not have a primary care provider, please call [phone] and they will assist you.      [employer] is an electronic gateway that provides easy, online access to your medical records. With [employer], you can request a clinic appointment, read your test results, renew a prescription or communicate with your care team.     To access your existing account, please contact your [facility] or call [phone] for assistance.        Care EveryWhere ID     This is your Care EveryWhere ID. This could be used by other organizations to access your [facility] medical records  [id number]         Blood Pressure from Last 3 Encounters:   04/24/17 139/75   10/03/16 121/67   04/25/16 136/80    Weight from Last 3 Encounters:   04/24/17 87.6 kg (193 lb 3.2 oz)   10/03/16 85.3 kg (188 lb 1.6 oz)   04/25/16 82.6 kg (182 lb)              We Performed the Following     MEASURE POST-VOID RESIDUAL URINE/BLADDER CAPACITY, US NON-IMAGING     UA reflex to Microscopic and Culture     Urine Culture Aerobic Bacterial     Urine Microscopic          Today's Medication Changes          These changes are accurate as of: 7/17/17 10:29 AM.  If you have any questions, ask your nurse or doctor.               Start taking these medicines.        Dose/Directions    COMPOUND - PHARMACY TO MIX COMPOUNDED MEDICATION   Commonly known as:  CMPD RX   Used for:  Atrophic vaginitis   Started by:  Ivania Viveros MD        Estriol 1mg/1gram. Place 1 gram vaginally  daily for 2 weeks, then vaginally twice weekly after.   Quantity:  30 g   Refills:  6            Where to get your medicines      Some of these will need a paper prescription and others can be bought over the counter.  Ask your nurse if you have questions.     Bring a paper prescription for each of these medications     COMPOUND - PHARMACY TO MIX COMPOUNDED MEDICATION                Primary Care Provider Office Phone # Fax #    Opal Cardenas [phone]       [facility] [address]  [facility] [address]        Equal Access to Services     OVI WILLIS : Hadii aad ku hadasho Soomaali, waaxda luqadaha, qaybta kaalmada adeegyada, waxay idiin hayaan adesimin khjama laomi . [facility] [place] [phone].    ATENCIÓN: Si habla español, tiene a adam disposición servicios gratuitos de asistencia lingüística. [facility] [phone].    We comply with applicable federal civil rights laws and [place] laws. We do not discriminate on the basis of race, color, national origin, age, disability sex, sexual orientation or gender identity.            Thank you!     Thank you for choosing [facility]  for your care. Our goal is always to provide you with excellent care. Hearing back from our patients is one way we can continue to improve our services. Please take a few minutes to complete the written survey that you may receive in the mail after your visit with us. Thank you!             Your Updated Medication List - Protect others around you: Learn how to safely use, store and throw away your medicines at www.disposemymeds.org.          This list is accurate as of: 7/17/17 10:29 AM.  Always use your most recent med list.                   Brand Name Dispense Instructions for use Diagnosis    aspirin 81 MG tablet      1 TABLET DAILY        atorvastatin 10 MG tablet    LIPITOR    30 tablet    Take 1 tablet (10 mg) by mouth daily        Calcium-Vitamin D 600-200 MG-UNIT Tabs     30 tablet    1  tablet at 5pm        * carbidopa-levodopa  MG per tablet    SINEMET    450 tablet    Increase from 1 tab 4/day  At 7am, 11am, 5pm, 9:30pm and may take an extra 5th tablet    Paralysis agitans (H)       * carbidopa-levodopa  MG per CR tablet    SINEMET CR    450 tablet    1 tab 4/day  At 7am, 11am, 5pm, 9:30pm and may take 5th if needed    Parkinson disease (H)       COMPOUND - PHARMACY TO MIX COMPOUNDED MEDICATION    CMPD RX    30 g    Estriol 1mg/1gram. Place 1 gram vaginally daily for 2 weeks, then vaginally twice weekly after.    Atrophic vaginitis       docusate sodium 100 MG capsule    COLACE    60 capsule    Take 1 capsule (100 mg) by mouth 2 times daily        Inulin 2 G Chew     30 tablet    6 x 6grams per day with natur made adult gummies fiber.        levothyroxine 100 MCG tablet    SYNTHROID/LEVOTHROID    90 tablet    1 tablet @ 7am        MIRALAX powder   Generic drug:  polyethylene glycol     119 g    Take 17 g (1 capful) by mouth daily as needed        NONFORMULARY      macugels 2 softgels daily at 920pm        Omega-3 Krill Oil 300 MG Caps      Take 1 tablet by mouth daily.        rotigotine 4 MG/24HR 24 hr patch    NEUPRO    90 patch    Place 1 patch onto the skin daily    Paralysis agitans (H)       TYLENOL 500 MG tablet   Generic drug:  acetaminophen     90 Tab    1 TABLET daily  AS NEEDED    Parkinson's disease (H)       venlafaxine 75 MG 24 hr capsule    EFFEXOR-XR    90 capsule    TAKE 1 TABLET(75 MG) BY MOUTH DAILY    Paralysis agitans (H)       vitamin B complex with vitamin C Tabs tablet      Take 1 tablet by mouth daily        * Notice:  This list has 2 medication(s) that are the same as other medications prescribed for you. Read the directions carefully, and ask your doctor or other care provider to review them with you.

## 2017-07-17 NOTE — NURSING NOTE
"Loan Anderson's goals for this visit include:   Chief Complaint   Patient presents with     Consult     neurogenic bladder      She requests these members of her care team be copied on today's visit information: YES -     Referring Provider:  Pedro Pitts MD  [id number]  [place] [address]    Initial There were no vitals taken for this visit. Estimated body mass index is 34.02 kg/(m^2) as calculated from the following:    Height as of 10/3/16: 1.605 m (5' 3.19\").    Weight as of 4/24/17: 87.6 kg (193 lb 3.2 oz).  BP completed using cuff size: not taken    post void residual - 74cc  "

## 2017-07-17 NOTE — PATIENT INSTRUCTIONS
URODYNAMIC TESTING      Where should I go for this test?  o The procedure is performed at:     [facility]   [address] [place] [id number]   [address]    o If you have questions about your test, please call our nurse triage line at [phone], option #2. If you need to cancel or reschedule your test for any reason, please notify us as soon as possible.    o Please check in approximately 15 minutes prior to your procedure time.        What is urodynamic testing?   o Urodynamic testing refers to a group of tests used to assess bladder function by measuring various aspects of urine storage and emptying. The test takes about 75 minutes. For most patients, the test is not painful.       How should I get ready for this test?  o Please do not drink anything for 2 hours prior to your test. If you need to take medication it is okay to have sips of water with your medicines.    o Please try to arrive with a comfortably full bladder if possible because you will be asked to try and urinate prior to your study.  o If you received a bladder diary, please complete this prior to your urodynamic test and bring it with you to your appointment. A bladder diary measures how much fluid you are drinking and how often and how much you are urinating. You can also record any urinary leakage that may have occurred and what you were doing when you leaked.    o If you have chronic constipation, please take stool softeners for two days before your test.      What happens during the test?  o You will first be asked to empty your bladder in a private restroom into a special toilet called a uroflow machine which measures the rate of urine flow.  o A nurse will then place a very small tube (called a catheter) into your bladder. This drains any urine left over after urinating and also measures the pressures inside of your bladder during your test. Another small catheter will then be  placed into your rectum to measure abdominal pressures. Two small sticky patches will be placed on the skin near your anus to measure pelvic floor function.   o We will then instill contrast dye into your bladder through the bladder catheter. The contrast is very dense and will allow us to take x-ray pictures of your bladder intermittently during your test.  o You will be asked to tell us when you first start to feel like your bladder is filling up, when you have moderate urgency to urinate, when you have very strong urgency to urinate and finally when you feel that your bladder is full. Once you feel full you will be asked to try and urinate.    o You may be asked to cough or bear down several times during your procedure. The provider running your study will be looking for urine leakage during this time.        What happens after the test?  o The provider running your urodynamic study will share the results of your test on the day of your procedure or very soon after.  o After your test, you may go about your day as normal. You may notice some blood in your urine for a couple of days which should clear up on its own. You may also feel a more urgent need to use the toilet or you may need to go more often - this is due to having a catheter placed and should resolve on its own in a few days.    Cystoscopy    What is a Cystoscopy?  This is a procedure done to check for problems inside the bladder. Problems may include polyps (growths), tumors, inflammation (swelling and redness) and other concerns.    The doctor inserts a thin tube (called a cystoscope) into the bladder. The tube is about the size of a pencil. We will clean the area with special soap to remove bacteria and prevent post-procedure infection. We will give you numbing medicine (Lidocaine jelly) to reduce the pain or discomfort you may feel.    The tube allows the doctor to:  The doctor will be able to see inside the bladder by filling the bladder with  water. The water makes it easier to see any problems that may be present.    If needed, the doctor may use the tube to:  The doctor is able to take tissue samples (biopsies). Samples are sent to the lab for testing.  The doctor can also burn off any small growths or tumors that are found. This is call fulguration.    How should I get ready for the exam?  There is no special preparation you need to do for this exam. Staff may ask for a urine sample prior to rooming you. You may eat and drink a normal diet before and after the exam. Medications may be taken as usual unless otherwise directed by the physician.    Please tell your doctor if:  You have a history of urinary tract infections.  You know that you have a tumor in your bladder.  You have bleeding problems.  You have any allergies.  You are or may be pregnant.    What happens after the exam?  You may go back to your normal diet and activity as you feel ready, unless your doctor tells you not to.    For the next two days, you may notice:  Some blood in your urine.  Some burning when you urinate (use the toilet).  An urge to urinate more often.  Bladder spasms.    These are normal after the procedure. They should go away on their own after a day or two.      You can help to relieve the above listed symptoms by:  Drinking 6 to 8 large glasses of water each day (includes drinks at meals).  This will help clear the urine.  Take warm baths to relieve pain and bladder spasms.  Do not add anything to the bath water.  Your doctor may prescribe pain medicine.  You may also take Tylenol (acetaminophen) for pain.    When should I call my doctor?  A fever over 100.0 F (38 C) for more than a day.  (Before you call the doctor, check your temperature under your tongue.)  Chills.  Failure to urinate: No urine comes out when you try to use the toilet.  (Try soaking in a bathtub full of warm water.  If still no urine, call your doctor.)  A lot of blood in the urine or blood clots  larger than a nickel.  Pain in the back or abdomen (belly / stomach area).  Pain or spasms that are not relieved by warm tub baths and pain medicine.  Severe pain, burning or other problems while passing urine.  Pain that gets worse after two days.

## 2017-07-17 NOTE — PROGRESS NOTES
CC: Urinary incontinence.     HPI:  Loan Anderson is a 76 year old female asked to be seen in consultation by Dr. Pitts for the above.  This problem has been going on for several years and has been getting worse.  The patient has Parkinson's disease and her symptoms are  associated with both urgency and straining but reports the the stress incontinence is worse.  She has about 3 episodes of incontinence per day and has been using pads pads daily just as a precaution.  She has tried vesicare and myrbetric for at least 6 months to a year but they did not help and it affected her memory.  The patient voids qfew hours, 4-5 X, nocturia X 3-4.  She drinks mainly water and some cranberry juice.  She denies any dysuria, hematuria, hesitancy, intermittency, feeling of incomplete emptying, or any recent hx of UTI's or stones.  But her last one was in  of  and before that she was having several infections.  Also she has had lithotripsy for stones in .    The patient has some constipation but now that she takes fiber supplements.  She is sexually active and denies any dyspareunia or pelvic pain.   She denies any vaginal bulge.  She has lost her sense of smell, she has some freezing and shuffling from her Parkinson's.       Obstetric Hx:  She is .  The weight of her largest baby was 7 lbs 2oz.  Babies were delivered vaginally.  Menopause after total hysterctomy at age 50 for endometriosis and heavy bleeding, HRT: for 6 months.    Past Medical History:   Diagnosis Date     Constipation 7/15/2011     Convergence insufficiency 7/15/2011     Depression 7/15/2011     Diplopia 2011     Hot flashes, menopausal 2011     Hypercholesterolemia 2011     Hypothyroid 7/15/2011     Neurogenic bladder 7/15/2011     Parkinson disease (H) 7/15/2011     REM sleep behavior disorder 2011     Sebaceous cyst 2011     Wears glasses 2011       Past Surgical History:   Procedure Laterality Date      ------------OTHER-------------  november ? 2015    lithrotripsy for renal stone     ------------OTHER-------------  november 2015    had sepsis from uti and hospitalized     BRAIN SURGERY  12 or 14 oct 2011    gene therapy enrolled study at [place]       Meds, Allergies, FHx and SHx reviewed per nurse's intake note.    ROS:.  All other positive and pertinent information is mentioned in the HPI ow negative on remainder of the 12 point scale.    PEx:   There were no vitals taken for this visit.  Data Unavailable, There is no height or weight on file to calculate BMI., 0 lbs 0 oz  Gen appearance:  Well groomed  HEENT:  EOMI, AT NC  Psych:  Normal Affect  Neuro:  A/O X 3  Skin:  Warm to touch  Resp:  No increased respiratory effort  Vasc:  RRR  lymph:  No LE edema  Abd:  Soft/NT, ND, no palpable masses  Musk:  Full ROM in extremities  :  deferred    RU: 74 cc on bladder scan by nursing.    UA:  30 protein, 10 ketones, and moderate LE    Historic Results on 05/31/2005   Component Date Value Ref Range Status     Specimen Description 05/31/2005 Midstream Urine   Final     Culture Micro 05/31/2005 10 to 50,000 colonies/mL Mixed gram positive abhijeet   Final    Multiple species present, probable perineal contamination.     Micro Report Status 05/31/2005 FINAL [id number]   Final     WBC Urine 05/31/2005 <1  0 - 2 /HPF Final     RBC Urine 05/31/2005 1  0 - 2 /HPF Final     Squamous Epithelial /HPF Urine 05/31/2005 4* 0 - 1 /HPF Final     Bacteria Urine 05/31/2005 Moderate* NEG /HPF Final     Source 05/31/2005 Unspecified Urine   Final     Color Urine 05/31/2005 Yellow   Final     Appearance Urine 05/31/2005 Slightly Cloudy   Final     Glucose Urine 05/31/2005 Negative  NEG mg/dL Final     Bilirubin Urine 05/31/2005 Negative  NEG Final     Ketones Urine 05/31/2005 Negative  NEG mg/dL Final     Specific Gravity Urine 05/31/2005 1.017  1.001 - 1.035 Final     Blood Urine 05/31/2005 Negative  NEG Final     pH Urine 05/31/2005 5.0   5.0 - 7.0 pH Final     Protein Albumin Urine 05/31/2005 Negative  NEG mg/dL Final     Urobilinogen Urine 05/31/2005 0.2  0.2 - 1.0 EU/dL Final     Nitrite Urine 05/31/2005 Negative  NEG Final     Leukocyte Esterase Urine 05/31/2005 Negative  NEG Final       ASSESSMENT and PLAN:  This is a 76 year old female with what appears to be mixed incontinence.  Different management options were discussed with the patient including observation, other medication, and surgery, however we really should proceed first with further testing.     -schedule UDS and f/u with me on the same day for cystoscopy.    Thank you for allowing me to participate in Ms. Anderson's care.  I will keep you updated on her progress.    Ivania Viveros MD

## 2017-07-19 LAB
BACTERIA SPEC CULT: NORMAL
MICRO REPORT STATUS: NORMAL
SPECIMEN SOURCE: NORMAL

## 2017-07-21 ENCOUNTER — TELEPHONE (OUTPATIENT)
Dept: NEUROLOGY | Facility: CLINIC | Age: 77
End: 2017-07-21

## 2017-07-21 NOTE — TELEPHONE ENCOUNTER
[facility] Call Center    Phone Message    Name of Caller: Surjit    Phone Number: [phone]  Best time to return call: Any    May a detailed message be left on voicemail: yes    Relation to patient: Other Name: Surjit  Relationship: Spouse  Is there legal documentation in chart to discuss information with this person: Yes 7/11/17      Reason for Call: Surjit called to confirm that he is supposed to be cutting the carbidopa-levodopa (SINEMET CR)  MG per CR tablet in half.  Thank you.    Action Taken: Message routed to:  Adult Clinics: Neurology p [phone]

## 2017-07-21 NOTE — TELEPHONE ENCOUNTER
Writer clarified that he should not be cutting the SInemet CR 50-200mg tabs. She should be taking 1.5 tabs four times daily of the regular release Sinemet 50-100mg. He verbalized understanding and had no further questions.  Kimberly Hubbard RN

## 2017-09-13 DIAGNOSIS — G20.A1 PARKINSON DISEASE (H): ICD-10-CM

## 2017-09-14 RX ORDER — CARBIDOPA AND LEVODOPA 50; 200 MG/1; MG/1
TABLET, EXTENDED RELEASE ORAL
Qty: 450 TABLET | Refills: 0 | Status: CANCELLED | OUTPATIENT
Start: 2017-09-14

## 2017-09-14 NOTE — TELEPHONE ENCOUNTER
SUBJECTIVE/OBJECTIVE:                                                    Refill request receive for: Carbidopa/levodopa    Last refill per Epic: 7/20/17 new script sent- pt says dose has increased.   Last refill per fax: 7/7/17  Date of LOV r/t Medication: 4/24/17  Future appt scheduled? Yes: 10/30/17   Date faxed to clinic: 9/12/17    RECENT LABS/VITALS                                                      No results for input(s): CHOL, HDL, LDL, TRIG, CHOLHDLRATIO in the last 44051 hours.  No results found for: AST  No results found for: ALT  No results found for: A1C  No results found for: CR]  No results found for: POTASSIUM]  No results found for: TSH  BP Readings from Last 4 Encounters:   04/24/17 139/75   10/03/16 121/67   04/25/16 136/80   10/19/15 94/53       PLAN:                                                      Medication refilled per protocol : Routing to pool to be assessed by lpn or RN.     Amnadeem Lan, CMA

## 2017-09-14 NOTE — TELEPHONE ENCOUNTER
Denied refill request for pt. Spoke with [facility] in [place] and they still have refills on file for pt. 1 year supply of Sinemet  sent July 2017. Felicita Rogers RN

## 2017-09-21 ENCOUNTER — PRE VISIT (OUTPATIENT)
Dept: UROLOGY | Facility: CLINIC | Age: 77
End: 2017-09-21

## 2017-09-26 ENCOUNTER — PRE VISIT (OUTPATIENT)
Dept: UROLOGY | Facility: CLINIC | Age: 77
End: 2017-09-26

## 2017-09-26 NOTE — TELEPHONE ENCOUNTER
Patient with history of mixed incontinence coming in for cystoscopy and UDS review. Patient chart reviewed, no need for call, all records available and ready for appointment.

## 2017-10-21 ENCOUNTER — OFFICE VISIT (OUTPATIENT)
Dept: URGENT CARE | Facility: URGENT CARE | Age: 77
End: 2017-10-21
Payer: MEDICARE

## 2017-10-21 ENCOUNTER — HOSPITAL ENCOUNTER (INPATIENT)
Facility: CLINIC | Age: 77
LOS: 6 days | Discharge: NURSING, REHAB OR LONG-TERM CARE | DRG: 871 | End: 2017-10-27
Attending: EMERGENCY MEDICINE | Admitting: INTERNAL MEDICINE
Payer: MEDICARE

## 2017-10-21 VITALS
SYSTOLIC BLOOD PRESSURE: 115 MMHG | RESPIRATION RATE: 20 BRPM | BODY MASS INDEX: 34.02 KG/M2 | WEIGHT: 193.2 LBS | DIASTOLIC BLOOD PRESSURE: 64 MMHG | OXYGEN SATURATION: 95 % | TEMPERATURE: 97.8 F | HEART RATE: 72 BPM

## 2017-10-21 DIAGNOSIS — R50.9 FEVER AND CHILLS: Primary | ICD-10-CM

## 2017-10-21 DIAGNOSIS — R41.82 ALTERED MENTAL STATUS, UNSPECIFIED ALTERED MENTAL STATUS TYPE: ICD-10-CM

## 2017-10-21 DIAGNOSIS — D72.829 LEUKOCYTOSIS, UNSPECIFIED TYPE: ICD-10-CM

## 2017-10-21 DIAGNOSIS — N17.9 ACUTE RENAL FAILURE, UNSPECIFIED ACUTE RENAL FAILURE TYPE (H): ICD-10-CM

## 2017-10-21 DIAGNOSIS — N10 ACUTE PYELONEPHRITIS: ICD-10-CM

## 2017-10-21 DIAGNOSIS — I10 BENIGN ESSENTIAL HYPERTENSION: Primary | ICD-10-CM

## 2017-10-21 DIAGNOSIS — E87.6 HYPOKALEMIA: ICD-10-CM

## 2017-10-21 LAB
ALBUMIN SERPL-MCNC: 2.9 G/DL (ref 3.4–5)
ALBUMIN UR-MCNC: 100 MG/DL
ALP SERPL-CCNC: 258 U/L (ref 40–150)
ALT SERPL W P-5'-P-CCNC: 13 U/L (ref 0–50)
ANION GAP SERPL CALCULATED.3IONS-SCNC: 11 MMOL/L (ref 3–14)
ANION GAP SERPL CALCULATED.3IONS-SCNC: 14 MMOL/L (ref 3–14)
APPEARANCE UR: ABNORMAL
AST SERPL W P-5'-P-CCNC: 61 U/L (ref 0–45)
BACTERIA #/AREA URNS HPF: ABNORMAL /HPF
BASOPHILS # BLD AUTO: 0 10E9/L (ref 0–0.2)
BASOPHILS NFR BLD AUTO: 0.1 %
BILIRUB SERPL-MCNC: 0.8 MG/DL (ref 0.2–1.3)
BILIRUB UR QL STRIP: NEGATIVE
BUN SERPL-MCNC: 51 MG/DL (ref 7–30)
BUN SERPL-MCNC: 51 MG/DL (ref 7–30)
CALCIUM SERPL-MCNC: 8.1 MG/DL (ref 8.5–10.1)
CALCIUM SERPL-MCNC: 8.4 MG/DL (ref 8.5–10.1)
CHLORIDE SERPL-SCNC: 97 MMOL/L (ref 94–109)
CHLORIDE SERPL-SCNC: 97 MMOL/L (ref 94–109)
CO2 SERPL-SCNC: 23 MMOL/L (ref 20–32)
CO2 SERPL-SCNC: 24 MMOL/L (ref 20–32)
COLOR UR AUTO: YELLOW
CREAT SERPL-MCNC: 2.74 MG/DL (ref 0.52–1.04)
CREAT SERPL-MCNC: 2.82 MG/DL (ref 0.52–1.04)
DIFFERENTIAL METHOD BLD: ABNORMAL
EOSINOPHIL # BLD AUTO: 0.1 10E9/L (ref 0–0.7)
EOSINOPHIL NFR BLD AUTO: 0.8 %
ERYTHROCYTE [DISTWIDTH] IN BLOOD BY AUTOMATED COUNT: 14.5 % (ref 10–15)
GFR SERPL CREATININE-BSD FRML MDRD: 16 ML/MIN/1.7M2
GFR SERPL CREATININE-BSD FRML MDRD: 17 ML/MIN/1.7M2
GLUCOSE SERPL-MCNC: 137 MG/DL (ref 70–99)
GLUCOSE SERPL-MCNC: 140 MG/DL (ref 70–99)
GLUCOSE UR STRIP-MCNC: NEGATIVE MG/DL
HCT VFR BLD AUTO: 41.5 % (ref 35–47)
HGB BLD-MCNC: 14.4 G/DL (ref 11.7–15.7)
HGB UR QL STRIP: ABNORMAL
KETONES UR STRIP-MCNC: NEGATIVE MG/DL
LEUKOCYTE ESTERASE UR QL STRIP: ABNORMAL
LYMPHOCYTES # BLD AUTO: 0.7 10E9/L (ref 0.8–5.3)
LYMPHOCYTES NFR BLD AUTO: 4.7 %
MCH RBC QN AUTO: 31.4 PG (ref 26.5–33)
MCHC RBC AUTO-ENTMCNC: 34.7 G/DL (ref 31.5–36.5)
MCV RBC AUTO: 91 FL (ref 78–100)
MONOCYTES # BLD AUTO: 0.7 10E9/L (ref 0–1.3)
MONOCYTES NFR BLD AUTO: 4.4 %
NEUTROPHILS # BLD AUTO: 13.9 10E9/L (ref 1.6–8.3)
NEUTROPHILS NFR BLD AUTO: 90 %
NITRATE UR QL: NEGATIVE
PH UR STRIP: 6 PH (ref 5–7)
PLATELET # BLD AUTO: 152 10E9/L (ref 150–450)
POTASSIUM SERPL-SCNC: 2.7 MMOL/L (ref 3.4–5.3)
POTASSIUM SERPL-SCNC: 3 MMOL/L (ref 3.4–5.3)
PROT SERPL-MCNC: 7.7 G/DL (ref 6.8–8.8)
RBC # BLD AUTO: 4.58 10E12/L (ref 3.8–5.2)
RBC #/AREA URNS AUTO: 6 /HPF (ref 0–2)
SODIUM SERPL-SCNC: 131 MMOL/L (ref 133–144)
SODIUM SERPL-SCNC: 135 MMOL/L (ref 133–144)
SOURCE: ABNORMAL
SP GR UR STRIP: 1.01 (ref 1–1.03)
UROBILINOGEN UR STRIP-MCNC: 2 MG/DL (ref 0–2)
WBC # BLD AUTO: 15.4 10E9/L (ref 4–11)
WBC #/AREA URNS AUTO: >182 /HPF (ref 0–2)

## 2017-10-21 PROCEDURE — 36415 COLL VENOUS BLD VENIPUNCTURE: CPT | Performed by: FAMILY MEDICINE

## 2017-10-21 PROCEDURE — 25000132 ZZH RX MED GY IP 250 OP 250 PS 637: Mod: GY | Performed by: EMERGENCY MEDICINE

## 2017-10-21 PROCEDURE — 25000128 H RX IP 250 OP 636

## 2017-10-21 PROCEDURE — 94640 AIRWAY INHALATION TREATMENT: CPT

## 2017-10-21 PROCEDURE — 87088 URINE BACTERIA CULTURE: CPT | Performed by: EMERGENCY MEDICINE

## 2017-10-21 PROCEDURE — 99285 EMERGENCY DEPT VISIT HI MDM: CPT | Mod: 25

## 2017-10-21 PROCEDURE — 25000125 ZZHC RX 250: Performed by: EMERGENCY MEDICINE

## 2017-10-21 PROCEDURE — 25000128 H RX IP 250 OP 636: Performed by: EMERGENCY MEDICINE

## 2017-10-21 PROCEDURE — 99223 1ST HOSP IP/OBS HIGH 75: CPT | Mod: AI | Performed by: INTERNAL MEDICINE

## 2017-10-21 PROCEDURE — 25000128 H RX IP 250 OP 636: Performed by: INTERNAL MEDICINE

## 2017-10-21 PROCEDURE — 84300 ASSAY OF URINE SODIUM: CPT | Performed by: INTERNAL MEDICINE

## 2017-10-21 PROCEDURE — 99214 OFFICE O/P EST MOD 30 MIN: CPT | Performed by: FAMILY MEDICINE

## 2017-10-21 PROCEDURE — 85025 COMPLETE CBC W/AUTO DIFF WBC: CPT | Performed by: FAMILY MEDICINE

## 2017-10-21 PROCEDURE — 87077 CULTURE AEROBIC IDENTIFY: CPT | Performed by: EMERGENCY MEDICINE

## 2017-10-21 PROCEDURE — 87186 SC STD MICRODIL/AGAR DIL: CPT | Performed by: EMERGENCY MEDICINE

## 2017-10-21 PROCEDURE — 82570 ASSAY OF URINE CREATININE: CPT | Performed by: INTERNAL MEDICINE

## 2017-10-21 PROCEDURE — 96365 THER/PROPH/DIAG IV INF INIT: CPT

## 2017-10-21 PROCEDURE — 36415 COLL VENOUS BLD VENIPUNCTURE: CPT

## 2017-10-21 PROCEDURE — 96367 TX/PROPH/DG ADDL SEQ IV INF: CPT

## 2017-10-21 PROCEDURE — 87086 URINE CULTURE/COLONY COUNT: CPT | Performed by: EMERGENCY MEDICINE

## 2017-10-21 PROCEDURE — 87800 DETECT AGNT MULT DNA DIREC: CPT | Performed by: EMERGENCY MEDICINE

## 2017-10-21 PROCEDURE — 80048 BASIC METABOLIC PNL TOTAL CA: CPT | Performed by: EMERGENCY MEDICINE

## 2017-10-21 PROCEDURE — 96375 TX/PRO/DX INJ NEW DRUG ADDON: CPT

## 2017-10-21 PROCEDURE — 87040 BLOOD CULTURE FOR BACTERIA: CPT | Performed by: EMERGENCY MEDICINE

## 2017-10-21 PROCEDURE — 80053 COMPREHEN METABOLIC PANEL: CPT | Performed by: FAMILY MEDICINE

## 2017-10-21 PROCEDURE — 12000000 ZZH R&B MED SURG/OB

## 2017-10-21 PROCEDURE — A9270 NON-COVERED ITEM OR SERVICE: HCPCS | Mod: GY | Performed by: EMERGENCY MEDICINE

## 2017-10-21 PROCEDURE — 81001 URINALYSIS AUTO W/SCOPE: CPT | Performed by: EMERGENCY MEDICINE

## 2017-10-21 RX ORDER — METHYLPREDNISOLONE SODIUM SUCCINATE 125 MG/2ML
125 INJECTION, POWDER, LYOPHILIZED, FOR SOLUTION INTRAMUSCULAR; INTRAVENOUS ONCE
Status: COMPLETED | OUTPATIENT
Start: 2017-10-21 | End: 2017-10-21

## 2017-10-21 RX ORDER — CEFTRIAXONE 2 G/1
2 INJECTION, POWDER, FOR SOLUTION INTRAMUSCULAR; INTRAVENOUS ONCE
Status: COMPLETED | OUTPATIENT
Start: 2017-10-21 | End: 2017-10-21

## 2017-10-21 RX ORDER — IPRATROPIUM BROMIDE AND ALBUTEROL SULFATE 2.5; .5 MG/3ML; MG/3ML
3 SOLUTION RESPIRATORY (INHALATION) ONCE
Status: COMPLETED | OUTPATIENT
Start: 2017-10-21 | End: 2017-10-21

## 2017-10-21 RX ORDER — POTASSIUM CHLORIDE 1.5 G/1.58G
40 POWDER, FOR SOLUTION ORAL ONCE
Status: COMPLETED | OUTPATIENT
Start: 2017-10-21 | End: 2017-10-21

## 2017-10-21 RX ORDER — SODIUM CHLORIDE AND POTASSIUM CHLORIDE 150; 900 MG/100ML; MG/100ML
INJECTION, SOLUTION INTRAVENOUS CONTINUOUS
Status: CANCELLED | OUTPATIENT
Start: 2017-10-21

## 2017-10-21 RX ORDER — DIPHENHYDRAMINE HYDROCHLORIDE 50 MG/ML
INJECTION INTRAMUSCULAR; INTRAVENOUS
Status: COMPLETED
Start: 2017-10-21 | End: 2017-10-21

## 2017-10-21 RX ORDER — POTASSIUM CHLORIDE 7.45 MG/ML
10 INJECTION INTRAVENOUS
Status: CANCELLED | OUTPATIENT
Start: 2017-10-21

## 2017-10-21 RX ORDER — POTASSIUM CHLORIDE 29.8 MG/ML
20 INJECTION INTRAVENOUS
Status: CANCELLED | OUTPATIENT
Start: 2017-10-21

## 2017-10-21 RX ORDER — POTASSIUM CL/LIDO/0.9 % NACL 10MEQ/0.1L
10 INTRAVENOUS SOLUTION, PIGGYBACK (ML) INTRAVENOUS
Status: CANCELLED | OUTPATIENT
Start: 2017-10-21

## 2017-10-21 RX ORDER — POTASSIUM CHLORIDE 7.45 MG/ML
10 INJECTION INTRAVENOUS ONCE
Status: COMPLETED | OUTPATIENT
Start: 2017-10-21 | End: 2017-10-21

## 2017-10-21 RX ORDER — SODIUM CHLORIDE 9 MG/ML
INJECTION, SOLUTION INTRAVENOUS CONTINUOUS
Status: DISCONTINUED | OUTPATIENT
Start: 2017-10-21 | End: 2017-10-21

## 2017-10-21 RX ADMIN — SODIUM CHLORIDE: 9 INJECTION, SOLUTION INTRAVENOUS at 23:56

## 2017-10-21 RX ADMIN — METHYLPREDNISOLONE SODIUM SUCCINATE 125 MG: 125 INJECTION, POWDER, FOR SOLUTION INTRAMUSCULAR; INTRAVENOUS at 23:04

## 2017-10-21 RX ADMIN — DIPHENHYDRAMINE HYDROCHLORIDE 25 MG: 50 INJECTION, SOLUTION INTRAMUSCULAR; INTRAVENOUS at 22:14

## 2017-10-21 RX ADMIN — IPRATROPIUM BROMIDE AND ALBUTEROL SULFATE 3 ML: .5; 3 SOLUTION RESPIRATORY (INHALATION) at 23:00

## 2017-10-21 RX ADMIN — POTASSIUM CHLORIDE 40 MEQ: 1.5 POWDER, FOR SOLUTION ORAL at 21:30

## 2017-10-21 RX ADMIN — CEFTRIAXONE 2 G: 2 INJECTION, POWDER, FOR SOLUTION INTRAMUSCULAR; INTRAVENOUS at 23:11

## 2017-10-21 RX ADMIN — SODIUM CHLORIDE 500 ML: 9 INJECTION, SOLUTION INTRAVENOUS at 21:33

## 2017-10-21 RX ADMIN — POTASSIUM CHLORIDE 10 MEQ: 7.46 INJECTION, SOLUTION INTRAVENOUS at 21:38

## 2017-10-21 ASSESSMENT — ENCOUNTER SYMPTOMS
FEVER: 1
ABDOMINAL PAIN: 0
CONFUSION: 1
DIARRHEA: 0
VOMITING: 0
SHORTNESS OF BREATH: 1
NUMBNESS: 0

## 2017-10-21 NOTE — IP AVS SNAPSHOT
"` `           [place] [address] ONCOLOGY: [phone]                                              INTERAGENCY TRANSFER FORM - NURSING   10/21/2017                    Hospital Admission Date: 10/21/2017  PIYUSH MONTENEGRO   : 1940  Sex: Female        Attending Provider: Maxx Hopper III, MD     Allergies:  Mirapex [Pramipexole Dihydrochloride Monohydrate], Potassium Chloride, Requip [Ropinirole Hydrochloride], Epinephrine    Infection:  None   Service:  HOSPITALIST    Ht:  1.6 m (5' 3\")   Wt:  91.6 kg (201 lb 14.4 oz)   Admission Wt:  87.5 kg (193 lb)    BMI:  35.76 kg/m 2   BSA:  2.02 m 2            Patient PCP Information     Provider PCP Type    [facility]als[facility]      Current Code Status     Date Active Code Status Order ID Comments User Context       10/22/2017 12:27 AM Full Code [id number]  Maxx Hopper III, MD Inpatient       Code Status History     Date Active Date Inactive Code Status Order ID Comments User Context    This patient has a current code status but no historical code status.      Advance Directives        Does patient have a scanned Advance Directive/ACP document in EPIC?           No        Hospital Problems as of 10/27/2017              Priority Class Noted POA    Parkinson disease (H) Medium  7/15/2011 Yes    Constipation Medium  7/15/2011 Yes    Hypothyroid Medium  7/15/2011 Yes    Hypercholesterolemia Medium  2011 Yes    Obstructive sleep apnea Medium  2014 Yes    Chronic nonalcoholic liver disease Medium  2015 Yes    E coli bacteremia High Acute 10/22/2017 Yes    Acute kidney failure (H) High Acute 10/22/2017 Yes    Acute pyelonephritis High Acute 10/22/2017 Yes    * (Principal)Sepsis (H) Medium  10/22/2017 Yes    Hyponatremia Medium  10/22/2017 Yes    Hypokalemia Medium  10/22/2017 Yes      Non-Hospital Problems as of 10/27/2017              Priority Class Noted    Neurogenic bladder Medium  7/15/2011    Convergence " insufficiency Medium  7/15/2011    Wears glasses Medium  7/18/2011    Diplopia Medium  7/18/2011    Hot flashes, menopausal Medium  7/18/2011    REM sleep behavior disorder Medium  7/18/2011    Sebaceous cyst Medium  7/18/2011    Dysthymic disorder Medium  6/26/2013    Obstructive sleep apnea syndrome Medium  6/30/2014    Hyperglyceridemia Medium  4/27/2015    Hypothyroidism Medium  4/27/2015    Urinary incontinence Medium  4/27/2015    Disorder of bone and cartilage Medium  4/27/2015    Benign neoplasm Medium  4/27/2015    Systemic infection (H) Medium  11/16/2015    Incontinence Medium  4/24/2017    Nonalcoholic steatohepatitis (COHEN) Medium  4/24/2017    Osteopenia Medium  4/24/2017    Parkinson's disease (H) Medium  4/24/2017      Immunizations     None         END      ASSESSMENT     Discharge Profile Flowsheet     EXPECTED DISCHARGE     Inspection of bony prominences  Full 10/27/17 0028    Expected Discharge Date  10/27/17 (tcu) 10/26/17 1154   Skin WDL  ex 10/27/17 0844    DISCHARGE NEEDS ASSESSMENT     Skin Color/Characteristics  bruised (ecchymotic) 10/27/17 0844    Equipment Currently Used at Home  walker, rolling;shower chair;raised toilet 10/23/17 0832   Skin Temperature  warm 10/27/17 0028    Transportation Available  family or friend will provide 10/23/17 0832   Skin Moisture  moist 10/27/17 0028    GASTROINTESTINAL (ADULT,PEDIATRIC,OB)     Skin Integrity  bruise(s) 10/27/17 0028    GI WDL  WDL 10/27/17 0841   Inspection under devices  Full 10/27/17 0028    Last Bowel Movement  10/26/17 10/27/17 0028   Skin Elasticity  quick return to original state 10/26/17 1852    Passing flatus  yes 10/27/17 0028   SAFETY      COMMUNICATION ASSESSMENT     Safety WDL  WDL 10/27/17 0844    Patient's communication style  spoken language (English or Bilingual) 10/21/17 2030   All Alarms  alarm(s) activated and audible 10/27/17 0028    SKIN                        Assessment WDL (Within Defined Limits) Definitions          "  Safety WDL     Effective: 09/28/15    Row Information: <b>WDL Definition:</b> Bed in low position, wheels locked; call light in reach; upper side rails up x 2; ID band on<br> <font color=\"gray\"><i>Item=AS safety wdl>>List=AS safety wdl>>Version=F14</i></font>      Skin WDL     Effective: 09/28/15    Row Information: <b>WDL Definition:</b> Warm; dry; intact; elastic; without discoloration; pressure points without redness<br> <font color=\"gray\"><i>Item=AS skin wdl>>List=AS skin wdl>>Version=F14</i></font>      Vitals     Vital Signs Flowsheet     VITAL SIGNS     Weight Method  Bed scale 10/25/17 0635    Temp  98  F (36.7  C) 10/27/17 0826   Bed Scale  Standard (fitted sheet, draw sheet/ pad, cover/flat sheet, blanket, two pillows);Pillow (add comment for number);Draw sheet/ pad (add comment for number);Cover/flat sheet (add comment for number);[place] (add comment for number) 10/25/17 0635    Temp src  Oral 10/27/17 0826   BSA (Calculated - sq m)  1.97 10/21/17 2036    Resp  16 10/27/17 0826   BMI (Calculated)  34.26 10/21/17 2036    Pulse  64 10/26/17 0804   POSITIONING      Heart Rate  65 10/27/17 0826   Body Position  independently positioning 10/27/17 0844    Pulse/Heart Rate Source  Monitor 10/27/17 0826   Head of Bed (HOB)  HOB at 20-30 degrees 10/27/17 0203    BP  125/70 10/27/17 0811   Positioning/Transfer Devices  pillows 10/27/17 0203    BP Location  Left arm 10/26/17 2346   DAILY CARE      OXYGEN THERAPY     Activity Management  bedrest with commode;up in chair 10/27/17 0844    SpO2  92 % 10/27/17 0826   Activity Assistance Provided  assistance, 1 person 10/27/17 0844    O2 Device  None (Room air) 10/27/17 0826   EKG MONITORING      Oxygen Delivery  2 LPM 10/22/17 0028   Cardiac Regularity  Regular 10/21/17 2350    PAIN/COMFORT     Cardiac Rhythm  NSR 10/21/17 2350    Patient Currently in Pain  denies 10/27/17 1109   DANDRE COMA SCALE      0-10 Pain Scale  0 10/25/17 2002   Best Eye Response  " "3-->(E3) to speech 10/22/17 0005    HEIGHT AND WEIGHT     Best Motor Response  6-->(M6) obeys commands 10/22/17 0005    Height  1.6 m (5' 3\") 10/21/17 2036   Best Verbal Response  4-->(V4) confused 10/22/17 0005    Weight  91.6 kg (201 lb 14.4 oz) 10/26/17 0538   [place] Coma Scale Score  13 10/22/17 0005            Patient Lines/Drains/Airways Status    Active LINES/DRAINS/AIRWAYS     None            Patient Lines/Drains/Airways Status    Active PICC/CVC     None            Intake/Output Detail Report     Date Intake     Output Net    Shift P.O. I.V. IV Piggyback Total Urine Total       Noc 10/25/17 2300 - 10/26/17 0659 -- -- -- -- 625 625 -625    Day 10/26/17 0700 - 10/26/17 1459 120 -- -- 120 900 900 -780    Jayda 10/26/17 1500 - 10/26/17 2259 -- -- -- -- 900 900 -900    Noc 10/26/17 2300 - 10/27/17 0659 -- -- -- -- 800 800 -800    Day 10/27/17 0700 - 10/27/17 1459 240 -- -- 240 200 200 40      Last Void/BM       Most Recent Value    Urine Occurrence 1 at 10/27/2017 0323    Stool Occurrence 1 at 10/26/2017 2016      Case Management/Discharge Planning     Case Management/Discharge Planning Flowsheet     REFERRAL INFORMATION     Spouse's Name  -- (Surjit) 10/22/17 1542    Did the Initial Social Work Assessment result in a Social Work Case?  Yes 10/22/17 1542   Description of Support System  Supportive;Involved 10/22/17 1542    Admission Type  inpatient 10/22/17 1542   Support Assessment  Adequate family and caregiver support;Adequate social supports;Caregiver experiencing high stress 10/22/17 1542    Arrived From  admitted as an inpatient 10/22/17 1542   Quality of Family Relationships  supportive;involved;evident 10/22/17 1542    Referral Source  family 10/22/17 1542   COPING/STRESS      Reason For Consult  discharge planning 10/22/17 1542   Major Change/Loss/Stressor  none 10/22/17 0201    Record Reviewed  clinical discipline documentation;history and physical;medical record;patient profile;plan of care 10/22/17 " 1542   EXPECTED DISCHARGE      CTS Assigned to Case  -- (Anupama) 10/22/17 1542   Expected Discharge Date  10/27/17 (tcu) 10/26/17 1154    Primary Care MD Name  -- (Opal Cardenas) 10/22/17 1542   DISCHARGE PLANNING      LIVING ENVIRONMENT     Transportation Available  family or friend will provide 10/23/17 0832    Lives With  spouse 10/23/17 0832   FINAL RESOURCES      Living Arrangements  house 10/23/17 0832   Equipment Currently Used at Home  walker, rolling;shower chair;raised toilet 10/23/17 0832    Provides Primary Care For  no one, unable/limited ability to care for self 10/22/17 1542   ABUSE RISK SCREEN      Primary Care Provided By  spouse/significant other 10/22/17 1542   QUESTION TO PATIENT:  Has a member of your family or a partner(now or in the past) intimidated, hurt, manipulated, or controlled you in any way?  no 10/21/17 2031    Caregiving Concerns  -- (High stress) 10/22/17 1542   QUESTION TO PATIENT: Do you feel safe going back to the place where you are living?  yes 10/21/17 2031    Quality Of Family Relationships  supportive;helpful;involved 10/22/17 1542   OBSERVATION: Is there reason to believe there has been maltreatment of a vulnerable adult (ie. Physical/Sexual/Emotional abuse, self neglect, lack of adequate food, shelter, medical care, or financial exploitation)?  no 10/21/17 2031    Able to Return to Prior Living Arrangements  no 10/22/17 1542   (R) MENTAL HEALTH SUICIDE RISK      Living Arrangement Comments  -- (question need for higher level of care) 10/22/17 1542   Are you depressed or being treated for depression?  Yes 10/22/17 0205    ASSESSMENT OF FAMILY/SOCIAL SUPPORT     HOMICIDE RISK      Marital Status   10/22/17 1542   Feels Like Hurting Others  no 10/21/17 2031    Who is your support system?  Wife;Children 10/22/17 1542

## 2017-10-21 NOTE — IP AVS SNAPSHOT
"[place] [address] ONCOLOGY: [phone]                                              INTERAGENCY TRANSFER FORM - PHYSICIAN ORDERS   10/21/2017                    Hospital Admission Date: 10/21/2017  PIYUSH MONTENEGRO   : 1940  Sex: Female        Attending Provider: Maxx Hopper III, MD     Allergies:  Mirapex [Pramipexole Dihydrochloride Monohydrate], Potassium Chloride, Requip [Ropinirole Hydrochloride], Epinephrine    Infection:  None   Service:  HOSPITALIST    Ht:  1.6 m (5' 3\")   Wt:  91.6 kg (201 lb 14.4 oz)   Admission Wt:  87.5 kg (193 lb)    BMI:  35.76 kg/m 2   BSA:  2.02 m 2            Patient PCP Information     Provider PCP Type    [facility]      ED Clinical Impression     Diagnosis Description Comment Added By Time Added    Acute pyelonephritis [N10] Acute pyelonephritis [N10]  Angelo Cormier MD 10/21/2017 10:25 PM    Altered mental status, unspecified altered mental status type [R41.82] Altered mental status, unspecified altered mental status type [R41.82]  Angelo Cormier MD 10/21/2017 10:26 PM      Hospital Problems as of 10/27/2017              Priority Class Noted POA    Parkinson disease (H) Medium  7/15/2011 Yes    Constipation Medium  7/15/2011 Yes    Hypothyroid Medium  7/15/2011 Yes    Hypercholesterolemia Medium  2011 Yes    Obstructive sleep apnea Medium  2014 Yes    Chronic nonalcoholic liver disease Medium  2015 Yes    E coli bacteremia High Acute 10/22/2017 Yes    Acute kidney failure (H) High Acute 10/22/2017 Yes    Acute pyelonephritis High Acute 10/22/2017 Yes    * (Principal)Sepsis (H) Medium  10/22/2017 Yes    Hyponatremia Medium  10/22/2017 Yes    Hypokalemia Medium  10/22/2017 Yes      Non-Hospital Problems as of 10/27/2017              Priority Class Noted    Neurogenic bladder Medium  7/15/2011    Convergence insufficiency Medium  7/15/2011    Wears glasses Medium  " 7/18/2011    Diplopia Medium  7/18/2011    Hot flashes, menopausal Medium  7/18/2011    REM sleep behavior disorder Medium  7/18/2011    Sebaceous cyst Medium  7/18/2011    Dysthymic disorder Medium  6/26/2013    Obstructive sleep apnea syndrome Medium  6/30/2014    Hyperglyceridemia Medium  4/27/2015    Hypothyroidism Medium  4/27/2015    Urinary incontinence Medium  4/27/2015    Disorder of bone and cartilage Medium  4/27/2015    Benign neoplasm Medium  4/27/2015    Systemic infection (H) Medium  11/16/2015    Incontinence Medium  4/24/2017    Nonalcoholic steatohepatitis (COHEN) Medium  4/24/2017    Osteopenia Medium  4/24/2017    Parkinson's disease (H) Medium  4/24/2017      Code Status History     Date Active Date Inactive Code Status Order ID Comments User Context    This patient has a current code status but no historical code status.         Medication Review      START taking        Dose / Directions Comments    amLODIPine 5 MG tablet   Commonly known as:  NORVASC   Used for:  Benign essential hypertension        Dose:  5 mg   Take 1 tablet (5 mg) by mouth daily   Quantity:  30 tablet   Refills:  0        ciprofloxacin 500 MG tablet   Commonly known as:  CIPRO   Indication:  Urinary Tract Infection        Dose:  500 mg   Take 1 tablet (500 mg) by mouth every 12 hours for 7 days   Quantity:  14 tablet   Refills:  0          CONTINUE these medications which have NOT CHANGED        Dose / Directions Comments    aspirin 81 MG tablet        1 TABLET DAILY   Refills:  0        ATORVASTATIN CALCIUM PO        Dose:  20 mg   Take 20 mg by mouth daily   Refills:  0        Calcium-Vitamin D 600-200 MG-UNIT Tabs        1 tablet at 5pm   Quantity:  30 tablet   Refills:  0        * carbidopa-levodopa  MG per CR tablet   Commonly known as:  SINEMET CR   Used for:  Parkinson disease (H)        1 tab 4/day  At 7am, 11am, 5pm, 9:30pm and may take 5th if needed   Quantity:  450 tablet   Refills:  0        *  carbidopa-levodopa  MG per tablet   Commonly known as:  SINEMET   Used for:  Paralysis agitans (H)        Increase to 1.5 tabs 4/day @ 7am, 11am, 5pm, 9pm   Quantity:  540 tablet   Refills:  3        COMPOUNDED NON-CONTROLLED SUBSTANCE - PHARMACY TO MIX COMPOUNDED MEDICATION   Commonly known as:  CMPD RX   Used for:  Atrophic vaginitis        Estriol 1mg/1gram. Place 1 gram vaginally daily for 2 weeks, then vaginally twice weekly after.   Quantity:  30 g   Refills:  6        docusate sodium 100 MG capsule   Commonly known as:  COLACE        Dose:  100 mg   Take 1 capsule (100 mg) by mouth 2 times daily   Quantity:  60 capsule   Refills:  0        Inulin 2 G Chew        6 x 6grams per day as needed with natur made adult gummies fiber.   Quantity:  30 tablet   Refills:  0        LEVOTHYROXINE SODIUM PO        Dose:  112 mcg   Take 112 mcg by mouth daily   Refills:  0        MIRALAX powder   Generic drug:  polyethylene glycol        Dose:  1 capful   Take 17 g (1 capful) by mouth daily as needed   Quantity:  119 g   Refills:  0        NONFORMULARY        macugels 2 softgels daily at 920pm   Refills:  0        Omega-3 Krill Oil 300 MG Caps        Dose:  1 tablet   Take 1 tablet by mouth daily.   Refills:  0        rotigotine 4 MG/24HR 24 hr patch   Commonly known as:  NEUPRO   Used for:  Paralysis agitans (H)        Dose:  1 patch   Place 1 patch onto the skin daily   Quantity:  90 patch   Refills:  3        TYLENOL 500 MG tablet   Used for:  Parkinson's disease (H)   Generic drug:  acetaminophen        1 TABLET daily  AS NEEDED   Quantity:  90 Tab   Refills:  3        venlafaxine 75 MG 24 hr capsule   Commonly known as:  EFFEXOR-XR   Used for:  Paralysis agitans (H)        TAKE 1 TABLET(75 MG) BY MOUTH DAILY   Quantity:  90 capsule   Refills:  11        vitamin B complex with vitamin C Tabs tablet        Dose:  1 tablet   Take 1 tablet by mouth daily   Refills:  0        * Notice:  This list has 2 medication(s)  that are the same as other medications prescribed for you. Read the directions carefully, and ask your doctor or other care provider to review them with you.            Summary of Visit     Reason for your hospital stay       Sepsis due to UTI             After Care     Activity - Up with nursing assistance           Advance Diet as Tolerated       Follow this diet upon discharge: Orders Placed This Encounter      Combination Diet Regular Diet Adult         Fall precautions           General info for SNF       Length of Stay Estimate: Short Term Care: Estimated # of Days <30  Condition at Discharge: Improving  Level of care:skilled   Rehabilitation Potential: Good  Admission H&P remains valid and up-to-date: Yes  Recent Chemotherapy: N/A  Use Nursing Home Standing Orders: N/A       Mantoux instructions       Give two-step Mantoux (PPD) Per Facility Policy Yes             Referrals     Occupational Therapy Adult Consult       Evaluate and treat as clinically indicated.    Reason:       Physical Therapy Adult Consult       Evaluate and treat as clinically indicated.    Reason:             Your next 10 appointments already scheduled     Oct 30, 2017  9:30 AM CDT   [facility]    [address] [place] [id number]   [phone]            Nov 15, 2017  7:30 AM CST   (Arrive by 7:15 AM)   Urodynamics with Keisha Beatty PA-C   [facility] ([facility])    [address] [place] [id number] [phone]            Nov 15, 2017  9:45 AM CST   (Arrive by 9:30 AM)   Cystoscopy with Ivania Viveros MD   [facility] ([facility])    [address] [place] [id number] [phone]              Follow-Up Appointment Instructions      Future Labs/Procedures    Follow Up and recommended labs and tests     Comments:    Follow up with [facility] physician.  The following labs/tests are recommended:      Follow-Up Appointment Instructions     Follow Up and recommended labs and tests       Follow up with [facility] physician.  The following labs/tests are recommended:             Statement of Approval     Ordered          10/27/17 1157  I have reviewed and agree with all the recommendations and orders detailed in this document.  EFFECTIVE NOW     Approved and electronically signed by:  Juan Antonio Vu MD

## 2017-10-21 NOTE — NURSING NOTE
"Chief Complaint   Patient presents with     URI     difficulty breathing, low grade fever x last night. Pt states that similar symptoms happened few weeks ago.        Initial /64  Pulse 72  Temp 97.8  F (36.6  C) (Oral)  Resp 20  Wt 193 lb 3.2 oz (87.6 kg)  SpO2 95%  BMI 34.02 kg/m2 Estimated body mass index is 34.02 kg/(m^2) as calculated from the following:    Height as of 10/3/16: 5' 3.19\" (1.605 m).    Weight as of this encounter: 193 lb 3.2 oz (87.6 kg).  Medication Reconciliation: complete    "

## 2017-10-21 NOTE — IP AVS SNAPSHOT
"    [place] [address] ONCOLOGY: [phone]                                              INTERAGENCY TRANSFER FORM - LAB / IMAGING / EKG / EMG RESULTS   10/21/2017                    Hospital Admission Date: 10/21/2017  PIYUSH MONTENEGRO   : 1940  Sex: Female        Attending Provider: Maxx Hopper III, MD     Allergies:  Mirapex [Pramipexole Dihydrochloride Monohydrate], Potassium Chloride, Requip [Ropinirole Hydrochloride], Epinephrine    Infection:  None   Service:  HOSPITALIST    Ht:  1.6 m (5' 3\")   Wt:  91.6 kg (201 lb 14.4 oz)   Admission Wt:  87.5 kg (193 lb)    BMI:  35.76 kg/m 2   BSA:  2.02 m 2            Patient PCP Information     Provider PCP Type    [facility]         Lab Results - 3 Days      Blood culture [[id number]]  Resulted: 10/27/17 1035, Result status: Preliminary result    Ordering provider: Glo Ortiz MD  10/23/17 0000 Resulting lab: [facility]    Specimen Information    Type Source Collected On   Blood  10/23/17 0745   Comment:  Right Hand          Components       Value Reference Range Flag Lab   Specimen Description Blood Right Hand      Culture Micro No growth after 4 days   225            Blood culture [[id number]]  Resulted: 10/27/17 1035, Result status: Preliminary result    Ordering provider: Glo Ortiz MD  10/23/17 0000 Resulting lab: [facility]    Specimen Information    Type Source Collected On   Blood  10/23/17 0653   Comment:  Right Hand          Components       Value Reference Range Flag Lab   Specimen Description Blood Right Hand      Culture Micro No growth after 4 days   225            Magnesium [[id number]]  Resulted: 10/27/17 0736, Result status: Final result    Ordering provider: Juan Antonio Vu MD  10/27/17 0000 Resulting lab: [facility]    Specimen Information    Type Source Collected On   Blood  10/27/17 0654          Components       Value " Reference Range Flag Lab   Magnesium 2.1 1.6 - 2.3 mg/dL  FrStHsLb            Basic metabolic panel [[id number]] (Abnormal)  Resulted: 10/27/17 0736, Result status: Final result    Ordering provider: Juan Antonio Vu MD  10/27/17 0000 Resulting lab: [facility]    Specimen Information    Type Source Collected On   Blood  10/27/17 0654          Components       Value Reference Range Flag Lab   Sodium 139 133 - 144 mmol/L  FrStHsLb   Potassium 3.5 3.4 - 5.3 mmol/L  FrStHsLb   Chloride 102 94 - 109 mmol/L  FrStHsLb   Carbon Dioxide 30 20 - 32 mmol/L  FrStHsLb   Anion Gap 7 3 - 14 mmol/L  FrStHsLb   Glucose 121 70 - 99 mg/dL H FrStHsLb   Urea Nitrogen 15 7 - 30 mg/dL  FrStHsLb   Creatinine 1.02 0.52 - 1.04 mg/dL  FrStHsLb   GFR Estimate 53 >60 mL/min/1.7m2 L FrStHsLb   Comment:  Non  GFR Calc   GFR Estimate If Black 64 >60 mL/min/1.7m2  FrStHsLb   Comment:  African American GFR Calc   Calcium 8.1 8.5 - 10.1 mg/dL L FrStHsLb            CBC with platelets [[id number]] (Abnormal)  Resulted: 10/27/17 0723, Result status: Final result    Ordering provider: Juan Antonio Vu MD  10/27/17 0000 Resulting lab: [facility]    Specimen Information    Type Source Collected On   Blood  10/27/17 0654          Components       Value Reference Range Flag Lab   WBC 15.0 4.0 - 11.0 10e9/L H FrStHsLb   RBC Count 3.80 3.8 - 5.2 10e12/L  FrStHsLb   Hemoglobin 11.8 11.7 - 15.7 g/dL  FrStHsLb   Hematocrit 34.7 35.0 - 47.0 % L FrStHsLb   MCV 91 78 - 100 fl  FrStHsLb   MCH 31.1 26.5 - 33.0 pg  FrStHsLb   MCHC 34.0 31.5 - 36.5 g/dL  FrStHsLb   RDW 15.0 10.0 - 15.0 %  FrStHsLb   Platelet Count 304 150 - 450 10e9/L  FrStHsLb            Potassium [[id number]]  Resulted: 10/26/17 0717, Result status: Final result    Ordering provider: Juan Antonio Vu MD  10/26/17 0001 Resulting lab: [facility]    Specimen Information    Type Source Collected On   Blood  10/26/17 0658           Components       Value Reference Range Flag Lab   Potassium 3.4 3.4 - 5.3 mmol/L  FrStHsLb            Magnesium [[id number]]  Resulted: 10/26/17 0717, Result status: Final result    Ordering provider: Juan Antonio Vu MD  10/26/17 0001 Resulting lab: [facility]    Specimen Information    Type Source Collected On   Blood  10/26/17 0658          Components       Value Reference Range Flag Lab   Magnesium 2.0 1.6 - 2.3 mg/dL  FrStHsLb            Potassium [[id number]]  Resulted: 10/25/17 1500, Result status: Final result    Ordering provider: Juan Antonio Vu MD  10/25/17 1021 Resulting lab: [facility]    Specimen Information    Type Source Collected On   Blood  10/25/17 1420          Components       Value Reference Range Flag Lab   Potassium 4.0 3.4 - 5.3 mmol/L  FrStHsLb            Basic metabolic panel [[id number]] (Abnormal)  Resulted: 10/25/17 0745, Result status: Final result    Ordering provider: Mariia Garza PA-C  10/25/17 0001 Resulting lab: [facility]    Specimen Information    Type Source Collected On   Blood  10/25/17 0710          Components       Value Reference Range Flag Lab   Sodium 140 133 - 144 mmol/L  FrStHsLb   Potassium 3.2 3.4 - 5.3 mmol/L L FrStHsLb   Chloride 105 94 - 109 mmol/L  FrStHsLb   Carbon Dioxide 25 20 - 32 mmol/L  FrStHsLb   Anion Gap 10 3 - 14 mmol/L  FrStHsLb   Glucose 116 70 - 99 mg/dL H FrStHsLb   Urea Nitrogen 24 7 - 30 mg/dL  FrStHsLb   Creatinine 1.24 0.52 - 1.04 mg/dL H FrStHsLb   GFR Estimate 42 >60 mL/min/1.7m2 L FrStHsLb   Comment:  Non  GFR Calc   GFR Estimate If Black 51 >60 mL/min/1.7m2 L FrStHsLb   Comment:  African American GFR Calc   Calcium 8.1 8.5 - 10.1 mg/dL L FrStHsLb            Phosphorus [[id number]]  Resulted: 10/25/17 0745, Result status: Final result    Ordering provider: Mariia Garza PA-C  10/25/17 0001 Resulting lab: [facility]    Specimen Information     Type Source Collected On   Blood  10/25/17 0710          Components       Value Reference Range Flag Lab   Phosphorus 2.9 2.5 - 4.5 mg/dL  FrStHsLb            Magnesium [[id number]]  Resulted: 10/25/17 0745, Result status: Final result    Ordering provider: Mariia Garza PA-C  10/25/17 0710 Resulting lab: [facility]    Specimen Information    Type Source Collected On     10/25/17 0710          Components       Value Reference Range Flag Lab   Magnesium 1.8 1.6 - 2.3 mg/dL  FrStHsLb            CBC with platelets differential [[id number]] (Abnormal)  Resulted: 10/25/17 0723, Result status: Final result    Ordering provider: Mariia Garza PA-C  10/25/17 0001 Resulting lab: [facility]    Specimen Information    Type Source Collected On   Blood  10/25/17 0710          Components       Value Reference Range Flag Lab   WBC 15.4 4.0 - 11.0 10e9/L H FrStHsLb   RBC Count 3.89 3.8 - 5.2 10e12/L  FrStHsLb   Hemoglobin 12.0 11.7 - 15.7 g/dL  FrStHsLb   Hematocrit 34.5 35.0 - 47.0 % L FrStHsLb   MCV 89 78 - 100 fl  FrStHsLb   MCH 30.8 26.5 - 33.0 pg  FrStHsLb   MCHC 34.8 31.5 - 36.5 g/dL  FrStHsLb   RDW 15.0 10.0 - 15.0 %  FrStHsLb   Platelet Count 229 150 - 450 10e9/L  FrStHsLb   Diff Method Automated Method   FrStHsLb   % Neutrophils 75.6 %  FrStHsLb   % Lymphocytes 10.5 %  FrStHsLb   % Monocytes 6.8 %  FrStHsLb   % Eosinophils 2.8 %  FrStHsLb   % Basophils 0.1 %  FrStHsLb   % Immature Granulocytes 4.2 %  FrStHsLb   Nucleated RBCs 0 0 /100  FrStHsLb   Absolute Neutrophil 11.6 1.6 - 8.3 10e9/L H FrStHsLb   Absolute Lymphocytes 1.6 0.8 - 5.3 10e9/L  FrStHsLb   Absolute Monocytes 1.0 0.0 - 1.3 10e9/L  FrStHsLb   Absolute Eosinophils 0.4 0.0 - 0.7 10e9/L  FrStHsLb   Absolute Basophils 0.0 0.0 - 0.2 10e9/L  FrStHsLb   Abs Immature Granulocytes 0.7 0 - 0.4 10e9/L H FrStHsLb   Absolute Nucleated RBC 0.0   FrStHsLb            Blood culture [[id number]] (Abnormal)  Resulted: 10/24/17 0749,  Result status: Final result    Ordering provider: Angelo Cormier MD  10/21/17 2227 Resulting lab: [facility]    Specimen Information    Type Source Collected On   Blood Arm, Left 10/21/17 2245   Comment:  Left Arm          Components       Value Reference Range Flag Lab   Specimen Description Blood Left Arm      Special Requests Aerobic and anaerobic bottles received   FrStHsLb   Culture Micro --  A 225   Result:         Cultured on the 1st day of incubation:  Escherichia coli     Culture Micro --   225   Result:         Critical Value/Significant Value, preliminary result only, called to and read back by  Eloisa Cheng RN SH88 @ 1132.cg 10/22/17     Culture Micro --   225   Result:         (Note)  POSITIVE for E. COLI by Verigene multiplex nucleic acid test. Final  identification and antimicrobial susceptibility testing will be  verified by standard methods.    Specimen tested with Verigene multiplex, gram-negative blood culture  nucleic acid test for the following targets: Acinetobacter sp.,  Citrobacter sp., Enterobacter sp., Proteus sp., E. coli, K.  pneumoniae/oxytoca, P. aeruginosa, and the following resistance  markers: CTXM, KPC, NDM, VIM, IMP and OXA.    Critical Value/Significant Value called to and read back by Eloisa Cheng RN SH88 @ 1350.cg 10/22/17                Renal panel [[id number]] (Abnormal)  Resulted: 10/24/17 0749, Result status: Final result    Ordering provider: Gregg Melendez MD  10/24/17 0000 Resulting lab: [facility] [place]    Specimen Information    Type Source Collected On   Blood  10/24/17 0700          Components       Value Reference Range Flag Lab   Sodium 142 133 - 144 mmol/L  FrStHsLb   Potassium 3.5 3.4 - 5.3 mmol/L  FrStHsLb   Chloride 112 94 - 109 mmol/L H FrStHsLb   Carbon Dioxide 21 20 - 32 mmol/L  FrStHsLb   Anion Gap 9 3 - 14 mmol/L  FrStHsLb   Glucose 111 70 - 99 mg/dL H FrStHsLb   Urea Nitrogen 30 7 - 30  mg/dL  FrStHsLb   Creatinine 1.50 0.52 - 1.04 mg/dL H FrStHsLb   GFR Estimate 34 >60 mL/min/1.7m2 L FrStHsLb   Comment:  Non  GFR Calc   GFR Estimate If Black 41 >60 mL/min/1.7m2 L FrStHsLb   Comment:  African American GFR Calc   Calcium 8.1 8.5 - 10.1 mg/dL L FrStHsLb   Phosphorus 2.4 2.5 - 4.5 mg/dL L FrStHsLb   Albumin 2.3 3.4 - 5.0 g/dL L FrStHsLb            Blood culture [[id number]] (Abnormal)  Resulted: 10/24/17 0747, Result status: Final result    Ordering provider: Angelo Cormier MD  10/21/17 2227 Resulting lab: [facility]    Specimen Information    Type Source Collected On   Blood Arm, Left 10/21/17 2225   Comment:  Left Arm          Components       Value Reference Range Flag Lab   Specimen Description Blood Left Arm      Special Requests Aerobic and anaerobic bottles received   75   Culture Micro --  A 75   Result:         Cultured on the 1st day of incubation:  Escherichia coli  Susceptibility testing done on previous specimen     Culture Micro --   75   Result:         Critical Value/Significant Value, preliminary result only, called to and read back by  Eloisa Cheng RN SH88 @ 1226.cg 10/22/17              WBC and differential [[id number]] (Abnormal)  Resulted: 10/24/17 0722, Result status: Final result    Ordering provider: Gregg Melendez MD  10/24/17 0000 Resulting lab: [facility]    Specimen Information    Type Source Collected On   Blood  10/24/17 0700          Components       Value Reference Range Flag Lab   WBC 13.7 4.0 - 11.0 10e9/L H FrStHsLb   Diff Method Automated Method   FrStHsLb   % Neutrophils 77.8 %  FrStHsLb   % Lymphocytes 9.7 %  FrStHsLb   % Monocytes 8.8 %  FrStHsLb   % Eosinophils 1.9 %  FrStHsLb   % Basophils 0.1 %  FrStHsLb   % Immature Granulocytes 1.7 %  FrStHsLb   Nucleated RBCs 0 0 /100  FrStHsLb   Absolute Neutrophil 10.6 1.6 - 8.3 10e9/L H FrStHsLb   Absolute Lymphocytes 1.3 0.8 - 5.3  "10e9/L  FrStHsLb   Absolute Monocytes 1.2 0.0 - 1.3 10e9/L  FrStHsLb   Absolute Eosinophils 0.3 0.0 - 0.7 10e9/L  FrStHsLb   Absolute Basophils 0.0 0.0 - 0.2 10e9/L  FrStHsLb   Abs Immature Granulocytes 0.2 0 - 0.4 10e9/L  FrStHsLb   Absolute Nucleated RBC 0.0   FrStHsLb            Glucose by meter [[id number]]  Resulted: 10/24/17 0621, Result status: Final result    Ordering provider: Maxx Hopper III, MD  10/24/17 0551 Resulting lab: POINT OF CARE TEST, GLUCOSE    Specimen Information    Type Source Collected On     10/24/17 0551          Components       Value Reference Range Flag Lab   Glucose 87 70 - 99 mg/dL  170            Testing Performed By     Lab - Abbreviation Name Director Address Valid Date Range    14 - FrStHsLb [facility] Unknown [address]  [facility] [address] 05/08/15 1057 - Present    75 - Unknown [facility] Unknown [address] [facility] [address] 01/15/15 1019 - Present    170 - Unknown POINT OF CARE TEST, GLUCOSE Unknown Unknown 10/31/11 1114 - Present    225 - Unknown [facility] Unknown [address] [facility] [address] 12/19/14 0954 - Present            Unresulted Labs (24h ago through future)    Start       Ordered    Unscheduled  Potassium  (Potassium Replacement - \"Standard\" - For K levels less than 3.4 mmol/L - UU,UR,UA,RH,SH,PH,WY )  CONDITIONAL (SPECIFY),   Routine     Comments:  Obtain Potassium Level for these conditions:  *IF no potassium result within 24 hours before initiation of order set, draw potassium level with next lab collect.    *2 HOURS AFTER last IV potassium replacement dose and 4 hours after an oral replacement dose.  *Next morning after potassium dose.     Repeat Potassium Replacement if necessary.    10/22/17 0855    Unscheduled  Magnesium  (Magnesium Replacement - Adult - \"High\" - Replacement for all levels less than or equal to 2 mg/dL)  CONDITIONAL (SPECIFY),   " "Routine     Comments:  Obtain Magnesium Level for these conditions:  *IF no magnesium result within 24 hrs before initiation of order set, draw magnesium level with next lab collect.    *2 HOURS AFTER last magnesium replacement dose when magnesium replacement given for level less than 1.6  *Next morning after magnesium dose.     Repeat Magnesium Replacement if necessary.    10/22/17 0855    Unscheduled  Phosphorus  (or    SODIUM Phosphate - \"Standard\" - Replacement for levels less than or equal to 2.4 mg/dL )  CONDITIONAL (SPECIFY),   Routine     Comments:  *IF no phosphorus result within 24 hours before initiation of order set, draw phosphorus level with next lab collect.    *2 HOURS AFTER last phosphorus replacement dose for levels less than 2.0.  *Next morning after phosphorus dose.     Repeat Phosphorus Replacement if necessary.    10/24/17 1623         Imaging Results - 3 Days      XR Chest Port 1 View [[id number]]  Resulted: 10/27/17 0950, Result status: Final result    Ordering provider: Juan Antonio Vu MD  10/27/17 0918 Resulted by: Jolanta Graves MD    Performed: 10/27/17 0922 - 10/27/17 0940 Resulting lab: RADIOLOGY RESULTS    Narrative:       XR CHEST PORT 1 VW 10/27/2017 9:40 AM    COMPARISON: None.    HISTORY: Cough.      Impression:       IMPRESSION: Mildly enlarged cardiac silhouette. Mild bibasilar  atelectasis. No pleural effusion or pneumothorax.    JOLANTA GRAVES MD      CT Abdomen Pelvis w/o Contrast [[id number]]  Resulted: 10/24/17 1525, Result status: Final result    Ordering provider: Mariia Garza PA-C  10/24/17 1426 Resulted by: Gurpreet Kapoor MD    Performed: 10/24/17 1445 - 10/24/17 1456 Resulting lab: RADIOLOGY RESULTS    Narrative:       CT ABDOMEN AND PELVIS WITHOUT CONTRAST   10/24/2017 2:56 PM     HISTORY: Look for kidney stone.    TECHNIQUe: Noncontrast images of the abdomen and pelvis. Radiation  dose for this scan was reduced using automated exposure " control,  adjustment of the mA and/or kV according to patient size, or iterative  reconstruction technique.    COMPARISON: None.    FINDINGS: There is no hydronephrosis or hydroureter. In the posterior  interpolar right kidney, there is a nonobstructing 5 mm calculus.  Another nonobstructing 1 mm calculus is noted at the upper pole of the  right kidney. A nonobstructing 3 mm calcification is noted in the  anterior left kidney. There is a lobular contour to the relatively  atrophic left kidney.    Mild bilateral pleural thickening is noted. Within the limits of an  unenhanced examination, the liver, gallbladder, spleen, pancreas, and  adrenal glands are unremarkable. No abdominal or retroperitoneal  adenopathy. No evidence of bowel obstruction, free air, or ascites.  The uterus has been removed. No pelvic adenopathy or mass.      Impression:       IMPRESSION:  1. Nonobstructing nephrolithiasis.  2. Lobular contour of the left kidney may be related to prior  infection.  3. Mild bilateral pleural thickening noted at the lung bases.    ZACK ESPOSITO MD      Testing Performed By     Lab - Abbreviation Name Director Address Valid Date Range    104 - Rad Rslts RADIOLOGY RESULTS Unknown Unknown 02/16/05 1553 - Present            Encounter-Level Documents:     There are no encounter-level documents.      Order-Level Documents:     There are no order-level documents.

## 2017-10-21 NOTE — MR AVS SNAPSHOT
After Visit Summary   10/21/2017    Loan Anderson    MRN: [id number]           Patient Information     Date Of Birth          1940        Visit Information        Provider Department      10/21/2017 5:15 PM Abraham Portillo DO [facility]        Today's Diagnoses     Fever and chills    -  1    Hypokalemia        Acute renal failure, unspecified acute renal failure type (H)        Leukocytosis, unspecified type           Follow-ups after your visit        Your next 10 appointments already scheduled     Oct 30, 2017  9:30 AM CDT   [facility] with Pedro Pitts MD   [facility]    [address] [place] [id number]   [phone]            Nov 15, 2017  7:30 AM CST   (Arrive by 7:15 AM)   Urodynamics with Keisha Beatty PA-C   [facility] ([facility])    [address] [place] [address] [phone]            Nov 15, 2017  9:45 AM CST   (Arrive by 9:30 AM)   Cystoscopy with Ivania Viverso MD   [facility] ([facility])    [address] [place] [address] [phone]              Who to contact     If you have questions or need follow up information about today's clinic visit or your schedule please contact [facility] directly at [phone].  Normal or non-critical lab and imaging results will be communicated to you by MyChart, letter or phone within 4 business days after the clinic has received the results. If you do not hear from us within 7 days, please contact the clinic through MyChart or phone. If you have a critical or abnormal lab result, we will notify you by phone as soon as possible.  Submit refill requests through [employer] or call your pharmacy and  they will forward the refill request to us. Please allow 3 business days for your refill to be completed.          Additional Information About Your Visit        [employer] Information     [employer] gives you secure access to your electronic health record. If you see a primary care provider, you can also send messages to your care team and make appointments. If you have questions, please call your primary care clinic.  If you do not have a primary care provider, please call [phone] and they will assist you.        Care EveryWhere ID     This is your Care EveryWhere ID. This could be used by other organizations to access your [place] medical records  [id number]        Your Vitals Were     Pulse Temperature Respirations Pulse Oximetry BMI (Body Mass Index)       72 97.8  F (36.6  C) (Oral) 20 95% 34.02 kg/m2        Blood Pressure from Last 3 Encounters:   10/21/17 115/64   04/24/17 139/75   10/03/16 121/67    Weight from Last 3 Encounters:   10/21/17 193 lb 3.2 oz (87.6 kg)   04/24/17 193 lb 3.2 oz (87.6 kg)   10/03/16 188 lb 1.6 oz (85.3 kg)              We Performed the Following     CBC with platelets differential     Comprehensive metabolic panel     UA with Microscopic        Primary Care Provider Office Phone # Fax #    Opal Cardenas [phone]       [facility] [address]  [place] [address]        Equal Access to Services     SARAH BETH WILLIS : Hadii nyla Nunez, waaxda luneida, qaybta kaalmargy crawford, amber mcdonald . So [facility] [phone].    ATENCIÓN: Si habla español, tiene a adam disposición servicios gratuitos de asistencia lingüística. Ross al [phone].    We comply with applicable federal civil rights laws and [place] laws. We do not discriminate on the basis of race, color, national origin, age, disability, sex, sexual orientation, or gender identity.            Thank you!     Thank you for choosing [facility]  [facility]  for your care. Our goal is always to provide you with excellent care. Hearing back from our patients is one way we can continue to improve our services. Please take a few minutes to complete the written survey that you may receive in the mail after your visit with us. Thank you!             Your Updated Medication List - Protect others around you: Learn how to safely use, store and throw away your medicines at www.disposemymeds.org.          This list is accurate as of: 10/21/17  7:50 PM.  Always use your most recent med list.                   Brand Name Dispense Instructions for use Diagnosis    aspirin 81 MG tablet      1 TABLET DAILY        atorvastatin 10 MG tablet    LIPITOR    30 tablet    Take 1 tablet (10 mg) by mouth daily        Calcium-Vitamin D 600-200 MG-UNIT Tabs     30 tablet    1 tablet at 5pm        * carbidopa-levodopa  MG per CR tablet    SINEMET CR    450 tablet    1 tab 4/day  At 7am, 11am, 5pm, 9:30pm and may take 5th if needed    Parkinson disease (H)       * carbidopa-levodopa  MG per tablet    SINEMET    540 tablet    Increase to 1.5 tabs 4/day @ 7am, 11am, 5pm, 9pm    Paralysis agitans (H)       COMPOUNDED NON-CONTROLLED SUBSTANCE - PHARMACY TO MIX COMPOUNDED MEDICATION    CMPD RX    30 g    Estriol 1mg/1gram. Place 1 gram vaginally daily for 2 weeks, then vaginally twice weekly after.    Atrophic vaginitis       docusate sodium 100 MG capsule    COLACE    60 capsule    Take 1 capsule (100 mg) by mouth 2 times daily        Inulin 2 G Chew     30 tablet    6 x 6grams per day with natur made adult gummies fiber.        levothyroxine 100 MCG tablet    SYNTHROID/LEVOTHROID    90 tablet    1 tablet @ 7am        MIRALAX powder   Generic drug:  polyethylene glycol     119 g    Take 17 g (1 capful) by mouth daily as needed        NONFORMULARY      macugels 2 softgels daily at 920pm        Omega-3 Krill Oil 300 MG Caps      Take 1 tablet by mouth daily.         rotigotine 4 MG/24HR 24 hr patch    NEUPRO    90 patch    Place 1 patch onto the skin daily    Paralysis agitans (H)       TYLENOL 500 MG tablet   Generic drug:  acetaminophen     90 Tab    1 TABLET daily  AS NEEDED    Parkinson's disease (H)       venlafaxine 75 MG 24 hr capsule    EFFEXOR-XR    90 capsule    TAKE 1 TABLET(75 MG) BY MOUTH DAILY    Paralysis agitans (H)       vitamin B complex with vitamin C Tabs tablet      Take 1 tablet by mouth daily        * Notice:  This list has 2 medication(s) that are the same as other medications prescribed for you. Read the directions carefully, and ask your doctor or other care provider to review them with you.

## 2017-10-21 NOTE — IP AVS SNAPSHOT
` ` Patient Information     Patient Name Sex     Loan Anderson ([id number]) Female 1940       Room Bed    [employer] 8804-      Patient Demographics     Address Phone E-mail Address    [address] [place] [address] [phone] (Home)  [phone] (Mobile) *Preferred* [email or web address]      Patient Ethnicity & Race     Ethnic Group Patient Race    American White      Emergency Contact(s)     Name Relation Home Work Mobile    Surjit Anderson Spouse [phone]     Lara Saxena Daughter [phone]    Therese Monroy Daughter [phone]      Documents on File        Status Date Received Description       Documents for the Patient    Insurance Card Received 05/17/10     Face Sheet Received () 05/17/10     External Medication Information Consent Accepted () 05/17/10     Privacy Notice - [place] Received 05/17/10     Patient ID Received () 14     Consent for Services - Hospital/Clinic Received () 11     Insurance Card Received 11     Insurance Card Received 11     Insurance Card Received 11     Insurance Card Received 11     Other Received 11 bcbs cob    External Medication Information Consent Accepted () 11     HIM INDIGO Authorization - File Only   11 [facility] Released Records to [facility] Dept. of Neurology     Insurance Card Received 12 Medicare    External Medication Information Consent Accepted () 12     Privacy Notice - [place] Received 12     Consent for Services - Hospital/Clinic Received () 12     Consent for EHR Access  13 Copied from existing Consent for services - C/HOD collected on 2012    [facility] Specified Other       Insurance Card Received 13 bcbs    Insurance Card Received 13 medicare    Consent for Services - Hospital/Clinic Received () 13     External  Medication Information Consent Accepted 13     Insurance Card   BCBS    Insurance Card Received 10/06/14 BCBS    Consent for Services - Hospital/Clinic Received () 10/06/14     HIM INDIGO Authorization - File Only  04/28/15 MYCHART ACCESS 4/27/15    Consent for Services - Hospital/Clinic Received () 10/19/15     Consent for Services/Privacy Notice - Hospital/Clinic Received () 16     Insurance Card Received 10/03/16 bc supplement    Insurance Card Received 10/03/16 medicare    Patient ID Scan Refused 10/03/16     Consent to Communicate  17 AUTHORIZATION TO DISCUSS PROTECTED HEALTH INFORMATION    HIM INDIGO Authorization - File Only  17 [facility]    Patient ID Scan Refused 17     Consent for Services/Privacy Notice - Hospital/Clinic Received 17     HIM INDIGO Authorization - File Only  17 [facility]    Care Everywhere Prospective Auth Received 10/21/17     Patient ID Received 10/21/17     Insurance Card Received (Deleted) 17        Documents for the Encounter    CMS IM for Patient Signature Received 10/23/17 1MM    CMS IM for Patient Signature Received 10/27/17 2MM      Admission Information     Attending Provider Admitting Provider Admission Type Admission Date/Time    Maxx Hopper III, MD Heckert, George William III, MD Emergency 10/21/17  2032    Discharge Date Hospital Service Auth/Cert Status Service Area     Hospitalist Incomplete [facility] SERVICES    Unit Room/Bed Admission Status        88 ONCOLOGY 8804/8804-01 Admission (Confirmed)       Admission     Complaint    Sepsis (H)      Hospital Account     Name Acct ID Class Status Primary Coverage    Loan Anderson [id number] Inpatient Open MEDICARE - MEDICARE            Guarantor Account (for Hospital Account #[id number])     Name Relation to Pt Service Area Active? Acct Type    Loan Anderson  FCS Yes Personal/Family    Address Phone          [address]  [place] [address] [phone](H)              Coverage Information (for Hospital Account #[id number])     1. MEDICARE/MEDICARE     F/O Payor/Plan Precert #    MEDICARE/MEDICARE     Subscriber Subscriber #    Loan Anderson [id number]    Address Phone    [employer]  [address]  [place] [address] [phone]          2. [place]     F/O Payor/Plan Precert #    [place]     Subscriber Subscriber #    Loan Anderson [id number]    Address Phone    [address] [address]  [place] [address] [phone]

## 2017-10-21 NOTE — IP AVS SNAPSHOT
` [place] [address] ONCOLOGY: [phone]            Medication Administration Report for Loan Anderson as of 10/27/17 1445   Legend:    Given Hold Not Given Due Canceled Entry Other Actions    Time Time (Time) Time  Time-Action       Inactive    Active    Linked        Medications 10/21/17 10/22/17 10/23/17 10/24/17 10/25/17 10/26/17 10/27/17    acetaminophen (TYLENOL) tablet 650 mg  Dose: 650 mg Freq: EVERY 4 HOURS PRN Route: PO  PRN Reason: mild pain  Start: 10/22/17 0027   Admin Instructions: Alternate ibuprofen (if ordered) with acetaminophen.  Maximum acetaminophen dose from all sources = 75 mg/kg/day not to exceed 4 grams/day.       1619 (650 mg)-Given               amLODIPine (NORVASC) tablet 5 mg  Dose: 5 mg Freq: DAILY Route: PO  Start: 10/24/17 1430   Admin Instructions: Hold for SBP < 100        1801 (5 mg)-Given        0817 (5 mg)-Given        0838 (5 mg)-Given        0808 (5 mg)-Given           aspirin EC EC tablet 81 mg  Dose: 81 mg Freq: DAILY Route: PO  Start: 10/22/17 0900     0912 (81 mg)-Given [C]        0637 (81 mg)-Given               0709 (81 mg)-Given               0725 (81 mg)-Given               0839 (81 mg)-Given        (0809)-Not Given           atorvastatin (LIPITOR) tablet 20 mg  Dose: 20 mg Freq: DAILY Route: PO  Start: 10/23/17 0900      0637 (20 mg)-Given               0707 (20 mg)-Given               0725 (20 mg)-Given               0707 (20 mg)-Given               0700 (20 mg)-Given           carbidopa-levodopa (SINEMET CR)  MG per CR tablet 1 tablet  Dose: 1 tablet Freq: 4 times per day Route: PO  Start: 10/22/17 1100   Admin Instructions: DO NOT CRUSH.      1149 (1 tablet)-Given [C]       1700 (1 tablet)-Given       2129 (1 tablet)-Given        0636 (1 tablet)-Given       1104 (1 tablet)-Given       1621 (1 tablet)-Given       2158 (1 tablet)-Given        0708 (1 tablet)-Given       1130 (1 tablet)-Given       1717 (1 tablet)-Given       2129 (1  tablet)-Given        0724 (1 tablet)-Given       1107 (1 tablet)-Given       1715 (1 tablet)-Given       2135 (1 tablet)-Given        0706 (1 tablet)-Given       1124 (1 tablet)-Given       1816 (1 tablet)-Given       2121 (1 tablet)-Given        0700 (1 tablet)-Given [C]       1209 (1 tablet)-Given       [ ] 1700       [ ] 2130           carbidopa-levodopa (SINEMET)  MG per tablet 1.5 tablet  Dose: 1.5 tablet Freq: 4 TIMES DAILY Route: PO  Start: 10/22/17 0700   Admin Instructions: OK to use home meds      0650 (1.5 tablet)-Given by Patient/Family [C]       1149 (1.5 tablet)-Given [C]       1700 (1.5 tablet)-Given       2129 (1.5 tablet)-Given        0636 (1.5 tablet)-Given       1104 (1.5 tablet)-Given       1622 (1.5 tablet)-Given       2158 (1.5 tablet)-Given        0708 (1.5 tablet)-Given       1130 (1.5 tablet)-Given       1717 (1.5 tablet)-Given       2129 (1.5 tablet)-Given        0724 (1.5 tablet)-Given       1107 (1.5 tablet)-Given       1715 (1.5 tablet)-Given       2135 (1.5 tablet)-Given        0706 (1.5 tablet)-Given       1124 (1.5 tablet)-Given       1816 (1.5 tablet)-Given       2121 (1.5 tablet)-Given        0700 (1.5 tablet)-Given       1100 (1.5 tablet)-Given       [ ] 1700       [ ] 2100           ciprofloxacin (CIPRO) tablet 500 mg  Dose: 500 mg Freq: EVERY 12 HOURS SCHEDULED Route: PO  Indications of Use: URINARY TRACT INFECTION  Start: 10/26/17 0800         1013 (500 mg)-Given       2012 (500 mg)-Given        0808 (500 mg)-Given       [ ] 2000           docusate sodium (COLACE) capsule 100 mg  Dose: 100 mg Freq: 2 TIMES DAILY Route: PO  Start: 10/22/17 0900     (0911)-Not Given       2131 (100 mg)-Negative [C]        1104 (100 mg)-Given       2207 (100 mg)-Given        1132 (100 mg)-Given [C]       2228 (100 mg)-Given [C]        1107 (100 mg)-Given       2136 (100 mg)-Given        (1449)-Not Given       2123 (100 mg)-Given        (1341)-Not Given       [ ] 2200           hydrALAZINE  (APRESOLINE) injection 10 mg  Dose: 10 mg Freq: EVERY 4 HOURS PRN Route: IV  PRN Reason: high blood pressure  PRN Comment: give for SBP > 180  Start: 10/24/17 1423   Admin Instructions: Give IV Push undiluted over 1 minute up to 40 mg.               ipratropium - albuterol 0.5 mg/2.5 mg/3 mL (DUONEB) neb solution 3 mL  Dose: 3 mL Freq: EVERY 4 HOURS PRN Route: NEBULIZATION  PRN Reasons: wheezing,shortness of breath / dyspnea  Start: 10/22/17 0027      1350 (3 mL)-Given               levothyroxine (SYNTHROID/LEVOTHROID) tablet 112 mcg  Dose: 112 mcg Freq: EVERY MORNING BEFORE BREAKFAST Route: PO  Start: 10/23/17 0730      0636 (112 mcg)-Given        0709 (112 mcg)-Given        0724 (112 mcg)-Given        0707 (112 mcg)-Given        0700 (112 mcg)-Given           magnesium sulfate 2 g in NS intermittent infusion (PharMEDium or [facility] Cmpd)  Dose: 2 g Freq: DAILY PRN Route: IV  PRN Reason: magnesium supplementation  Start: 10/22/17 0854   Admin Instructions: For Serum Mg++ 1.6 - 2 mg/dL  Give 2 g and recheck magnesium level next AM.         0959 (2 g)-New Bag        1013 (2 g)-New Bag            magnesium sulfate 4 g in 100 mL sterile water (premade)  Dose: 4 g Freq: EVERY 4 HOURS PRN Route: IV  PRN Reason: magnesium supplementation  Start: 10/22/17 0854   Admin Instructions: For serum Mg++ less than 1.6 mg/dL  Give 4 g and recheck magnesium level 2 hours after dose, and next AM.               naloxone (NARCAN) injection 0.1-0.4 mg  Dose: 0.1-0.4 mg Freq: EVERY 2 MIN PRN Route: IV  PRN Reason: opioid reversal  Start: 10/22/17 0027   Admin Instructions: For respiratory rate LESS than or EQUAL to 8.  Partial reversal dose:  0.1 mg titrated q 2 minutes for Analgesia Side Effects Monitoring Sedation Level of 3 (frequently drowsy, arousable, drifts to sleep during conversation).Full reversal dose:  0.4 mg bolus for Analgesia Side Effects Monitoring Sedation Level of 4 (somnolent, minimal or no response to stimulation).  Up to  2mg may be given IV Push undiluted each 0.4mg over 15 seconds in emergency situations. For non-emergent situations further dilute in 9mL of NS to facilitate titration of response.               ondansetron (ZOFRAN-ODT) ODT tab 4 mg  Dose: 4 mg Freq: EVERY 6 HOURS PRN Route: PO  PRN Reasons: nausea,vomiting  Start: 10/22/17 0027   Admin Instructions: This is Step 1 of nausea and vomiting management.  If nausea not resolved in 15 minutes, go to Step 2 prochlorperazine (COMPAZINE). Do not push through foil backing. Peel back foil and gently remove. Place on tongue immediately. Administration with liquid unnecessary              Or  ondansetron (ZOFRAN) injection 4 mg  Dose: 4 mg Freq: EVERY 6 HOURS PRN Route: IV  PRN Reasons: nausea,vomiting  Start: 10/22/17 0027   Admin Instructions: This is Step 1 of nausea and vomiting management.  If nausea not resolved in 15 minutes, go to Step 2 prochlorperazine (COMPAZINE).  Irritant. Up to 4 mg may be given IV Push undiluted over 2-5 minutes.               Patient is already receiving mechanical prophylaxis  Freq: CONTINUOUS PRN Route: XX  Start: 10/22/17 0027              polyethylene glycol (MIRALAX/GLYCOLAX) Packet 17 g  Dose: 17 g Freq: DAILY PRN Route: PO  PRN Reason: constipation  Start: 10/22/17 0038   Admin Instructions: 1 Packet = 17 grams. Mixed prescribed dose in 8 ounces of water.  1 Packet = 17 grams. Mixed prescribed dose in 8 ounces of water. Follow with 8 oz. of water.               potassium chloride (KLOR-CON) Packet 20-40 mEq  Dose: 20-40 mEq Freq: EVERY 2 HOURS PRN Route: ORAL OR FEED  PRN Reason: potassium supplementation  Start: 10/22/17 0854   Admin Instructions: Use if unable to tolerate tablets.  If Serum K+ 3.0-3.3, dose = 60 mEq po total dose (40 mEq x1 followed in 2 hours by 20 mEq x1). Recheck K+ level 4 hours after dose and the next AM.  If Serum K+ 2.5-2.9, dose = 80 mEq po total dose (40 mEq Q2H x2). Recheck K+ level 4 hours after dose and the  next AM.  If Serum K+ less than 2.5, See IV order.  Dissolve packet contents in 4-8 ounces of cold water or juice.      0919 (40 mEq)-Given       1114 (20 mEq)-Given          0817 (40 mEq)-Given       0959 (20 mEq)-Given             potassium chloride SA (K-DUR/KLOR-CON M) CR tablet 20-40 mEq  Dose: 20-40 mEq Freq: EVERY 2 HOURS PRN Route: PO  PRN Reason: potassium supplementation  Start: 10/22/17 0854   Admin Instructions: Use if able to take PO.   If Serum K+ 3.0-3.3, dose = 60 mEq po total dose (40 mEq x1 followed in 2 hours by 20 mEq x1). Recheck K+ level 4 hours after dose and the next AM.  If Serum K+ 2.5-2.9, dose = 80 mEq po total dose (40 mEq Q2H x2). Recheck K+ level 4 hours after dose and the next AM.  If Serum K+ less than 2.5, See IV order.  DO NOT CRUSH               QUEtiapine (SEROquel) half-tab 12.5 mg  Dose: 12.5 mg Freq: EVERY 6 HOURS PRN Route: PO  PRN Comment: Agitation  Start: 10/22/17 0027              rotigotine (NEUPRO) 4 MG/24HR 24 hr patch 1 patch  Dose: 1 patch Freq: DAILY Route: TD  Start: 10/22/17 0900     (0913)-Not Given [C]       2133 (1 patch)-Given               2158 (1 patch)-Given        2129 (1 patch)-Given [C]        2136 (1 patch)-Given [C]        2121 (1 patch)-Given [C]        [ ] 2100           rotigotine (NEUPRO) Patch in Place  Freq: EVERY 8 HOURS Route: TD  Start: 10/22/17 0900   Admin Instructions: Chart every shift, confirming that patch is still in place on patient (no barode scan needed). See patch order for dose information.      0913 ( )-Patch Removed       1615 ( )-Patch Removed [C]        0048 ( )-Patch in Place [C]       0916 ( )-Patch in Place       1624 ( )-Patch in Place        0234 ( )-Patch in Place              1717 ( )-Patch in Place        0820 ( )-Patch in Place       0900 ( )-Patch in Place       1715 ( )-Patch in Place        0108 ( )-Patch in Place       1010 ( )-Patch in Place       1816 ( )-Patch in Place        0029 ( )-Patch in Place       0808 (  )-Patch in Place       [ ] 1700           rotigotine (NEUPRO) patch REMOVAL  Freq: DAILY Route: TD  Start: 10/22/17 0056   Admin Instructions: Remove patch when new patch is applied or patch is discontinued.      0913 ( )-Patch Removed               2159 ( )-Patch Removed        2131 ( )-Patch Removed        2136 ( )-Patch Removed        2123 ( )-Patch Removed        [ ] 2100           senna-docusate (SENOKOT-S;PERICOLACE) 8.6-50 MG per tablet 1-2 tablet  Dose: 1-2 tablet Freq: 2 TIMES DAILY PRN Route: PO  PRN Comment: constipation   Start: 10/22/17 0027   Admin Instructions: If no bowel movement in 24 hours, increase to 2 tablets PO BID.  Hold for loose stools.   This is the first step of a three step constipation treatment protocol.               venlafaxine (EFFEXOR-XR) 24 hr capsule 75 mg  Dose: 75 mg Freq: DAILY WITH BREAKFAST Route: PO  Start: 10/22/17 0900   Admin Instructions: DO NOT CRUSH.      0910 (75 mg)-Given [C]        0637 (75 mg)-Given               0708 (75 mg)-Given               0725 (75 mg)-Given               0707 (75 mg)-Given               0808 (75 mg)-Given          Completed Medications  Medications 10/21/17 10/22/17 10/23/17 10/24/17 10/25/17 10/26/17 10/27/17         Dose: 2 g Freq: EVERY 24 HOURS Route: IV  Indications of Use: PYELONEPHRITIS,SEPSIS,URINARY TRACT INFECTION  Start: 10/25/17 2315   End: 10/25/17 2255        2225 (2 g)-New Bag               Dose: 500 mg Freq: ONCE Route: PO  Start: 10/24/17 1800   End: 10/24/17 1802   Admin Instructions: 250 mg phosphorus per tablet.        1802 (500 mg)-Given             Discontinued Medications  Medications 10/21/17 10/22/17 10/23/17 10/24/17 10/25/17 10/26/17 10/27/17         Rate: 50 mL/hr Freq: CONTINUOUS Route: IV  Last Dose: Stopped (10/24/17 0949)  Start: 10/22/17 0030   End: 10/24/17 1624     0043 ( )-New Bag       0627 ( )-New Bag       1401 ( )-Rate/Dose Change       1428 ( )-New Bag        0257 ( )-New Bag       1502 ( )-Rate/Dose  Change        0949-Stopped       1624-Med Discontinued            Dose: 2 g Freq: EVERY 24 HOURS Route: IV  Indications of Use: PYELONEPHRITIS,SEPSIS,URINARY TRACT INFECTION  Start: 10/22/17 2313   End: 10/25/17 1437     2328 (2 g)-New Bag        2207 (2 g)-New Bag               2227 (2 g)-New Bag        1437-Med Discontinued           Dose: 15 mmol Freq: DAILY PRN Route: IV  PRN Reason: phosphorous supplementation  Start: 10/24/17 1623   End: 10/24/17 1725   Admin Instructions: For serum phosphorus level 2-2.4  Do not infuse Phosphorus in the same line as TPN.   Give 15 mmol and recheck phosphorus level next AM.        1725-Med Discontinued            Dose: 20 mmol Freq: EVERY 6 HOURS PRN Route: IV  PRN Reason: phosphorous supplementation  Start: 10/24/17 1623   End: 10/24/17 1725   Admin Instructions: For serum phosphorus level 1.1-1.9  Do not infuse Phosphorus in the same line as TPN.   Give 20 mmol and recheck phosphorus level 2 hours after last dose and next AM.        1725-Med Discontinued            Dose: 25 mmol Freq: EVERY 8 HOURS PRN Route: IV  PRN Reason: phosphorous supplementation  Start: 10/24/17 1623   End: 10/24/17 1725   Admin Instructions: For serum phosphorus level less than 1.1  Do not infuse Phosphorus in the same line as TPN.   Give 25 mmol and recheck phosphorus level 2 hours after last dose and next AM.        1725-Med Discontinued       Medications 10/21/17 10/22/17 10/23/17 10/24/17 10/25/17 10/26/17 10/27/17

## 2017-10-21 NOTE — IP AVS SNAPSHOT
MRN:[id number]                      After Visit Summary   10/21/2017    Loan Anderson    MRN: [id number]           Thank you!     Thank you for choosing [facility] for your care. Our goal is always to provide you with excellent care. Hearing back from our patients is one way we can continue to improve our services. Please take a few minutes to complete the written survey that you may receive in the mail after you visit with us. Thank you!        Patient Information     Date Of Birth          1940        Designated Caregiver       Most Recent Value    Caregiver    Will someone help with your care after discharge? yes    Name of designated caregiver Surjit    Phone number of caregiver [phone]    Caregiver address [address] [place] [address]      About your hospital stay     You were admitted on:  October 21, 2017 You last received care in the:  [facility] [address] [facility]    You were discharged on:  October 27, 2017        Reason for your hospital stay       Sepsis due to UTI                  Who to Call     For medical emergencies, please call 911.  For non-urgent questions about your medical care, please call your primary care provider or clinic, [phone]          Attending Provider     Provider Specialty    Angelo Cormier MD Emergency Medicine    [facility]       Primary Care Provider Office Phone # Fax #    Opal Cardenas [phone]      After Care Instructions     Activity - Up with nursing assistance           Advance Diet as Tolerated       Follow this diet upon discharge: Orders Placed This Encounter      Combination Diet Regular Diet Adult              Fall precautions           General info for SNF       Length of Stay Estimate: Short Term Care: Estimated # of Days <30  Condition at Discharge: Improving  Level of care:skilled   Rehabilitation Potential: Good  Admission H&P remains  valid and up-to-date: Yes  Recent Chemotherapy: N/A  Use Nursing Home Standing Orders: N/A            Mantoux instructions       Give two-step Mantoux (PPD) Per Facility Policy Yes                  Follow-up Appointments     Follow Up and recommended labs and tests       Follow up with detention physician.  The following labs/tests are recommended:                  Your next 10 appointments already scheduled     Oct 30, 2017  9:30 AM CDT   Return Visit with Pedro Pitts MD   [facility]    [address] [place] [id number]   [phone]            Nov 15, 2017  7:30 AM CST   (Arrive by 7:15 AM)   Urodynamics with Keisha Beatty PA-C   [facility] ([facility])    [address] [place] [id number] [phone]            Nov 15, 2017  9:45 AM CST   (Arrive by 9:30 AM)   Cystoscopy with Ivania Viveros MD   [facility] ([facility])    [address] [place] [id number] [phone]              Additional Services     Occupational Therapy Adult Consult       Evaluate and treat as clinically indicated.    Reason:            Physical Therapy Adult Consult       Evaluate and treat as clinically indicated.    Reason:                  Pending Results     Date and Time Order Name Status Description    10/23/2017 0000 Blood culture Preliminary     10/23/2017 0000 Blood culture Preliminary             Statement of Approval     Ordered          10/27/17 1157  I have reviewed and agree with all the recommendations and orders detailed in this document.  EFFECTIVE NOW     Approved and electronically signed by:  Juan Antonio Vu MD             Admission Information     Date & Time Provider Department Dept. Phone    10/21/2017 Maxx Hopper III, MD  "[place] [address] [facility] [phone]      Your Vitals Were     Blood Pressure Pulse Temperature Respirations Height Weight    125/70 64 98  F (36.7  C) (Oral) 16 1.6 m (5' 3\") 91.6 kg (201 lb 14.4 oz)    Pulse Oximetry BMI (Body Mass Index)                92% 35.76 kg/m2          [employer] Information     [employer] gives you secure access to your electronic health record. If you see a primary care provider, you can also send messages to your care team and make appointments. If you have questions, please call your primary care clinic.  If you do not have a primary care provider, please call [phone] and they will assist you.        Care EveryWhere ID     This is your Care EveryWhere ID. This could be used by other organizations to access your [facility] medical records  [id number]        Equal Access to Services     SARAH BETH WILLIS : Karolina Nunez, meagan shen, amber noriega. So [facility] [phone].    ATENCIÓN: Si habla español, tiene a adam disposición servicios gratuitos de asistencia lingüística. Ross al [phone].    We comply with applicable federal civil rights laws and [place] laws. We do not discriminate on the basis of race, color, national origin, age, disability, sex, sexual orientation, or gender identity.               Review of your medicines      START taking        Dose / Directions    amLODIPine 5 MG tablet   Commonly known as:  NORVASC   Used for:  Benign essential hypertension        Dose:  5 mg   Take 1 tablet (5 mg) by mouth daily   Quantity:  30 tablet   Refills:  0       ciprofloxacin 500 MG tablet   Commonly known as:  CIPRO   Indication:  Urinary Tract Infection        Dose:  500 mg   Take 1 tablet (500 mg) by mouth every 12 hours for 7 days   Quantity:  14 tablet   Refills:  0         CONTINUE these medicines which have NOT CHANGED        Dose / Directions    aspirin 81 MG tablet        1 TABLET DAILY "   Refills:  0       ATORVASTATIN CALCIUM PO        Dose:  20 mg   Take 20 mg by mouth daily   Refills:  0       Calcium-Vitamin D 600-200 MG-UNIT Tabs        1 tablet at 5pm   Quantity:  30 tablet   Refills:  0       * carbidopa-levodopa  MG per CR tablet   Commonly known as:  SINEMET CR   Used for:  Parkinson disease (H)        1 tab 4/day  At 7am, 11am, 5pm, 9:30pm and may take 5th if needed   Quantity:  450 tablet   Refills:  0       * carbidopa-levodopa  MG per tablet   Commonly known as:  SINEMET   Used for:  Paralysis agitans (H)        Increase to 1.5 tabs 4/day @ 7am, 11am, 5pm, 9pm   Quantity:  540 tablet   Refills:  3       COMPOUNDED NON-CONTROLLED SUBSTANCE - PHARMACY TO MIX COMPOUNDED MEDICATION   Commonly known as:  CMPD RX   Used for:  Atrophic vaginitis        Estriol 1mg/1gram. Place 1 gram vaginally daily for 2 weeks, then vaginally twice weekly after.   Quantity:  30 g   Refills:  6       docusate sodium 100 MG capsule   Commonly known as:  COLACE        Dose:  100 mg   Take 1 capsule (100 mg) by mouth 2 times daily   Quantity:  60 capsule   Refills:  0       Inulin 2 G Chew        6 x 6grams per day as needed with natur made adult gummies fiber.   Quantity:  30 tablet   Refills:  0       LEVOTHYROXINE SODIUM PO        Dose:  112 mcg   Take 112 mcg by mouth daily   Refills:  0       MIRALAX powder   Generic drug:  polyethylene glycol        Dose:  1 capful   Take 17 g (1 capful) by mouth daily as needed   Quantity:  119 g   Refills:  0       NONFORMULARY        macugels 2 softgels daily at 920pm   Refills:  0       Omega-3 Krill Oil 300 MG Caps        Dose:  1 tablet   Take 1 tablet by mouth daily.   Refills:  0       rotigotine 4 MG/24HR 24 hr patch   Commonly known as:  NEUPRO   Used for:  Paralysis agitans (H)        Dose:  1 patch   Place 1 patch onto the skin daily   Quantity:  90 patch   Refills:  3       TYLENOL 500 MG tablet   Used for:  Parkinson's disease (H)   Generic drug:   acetaminophen        1 TABLET daily  AS NEEDED   Quantity:  90 Tab   Refills:  3       venlafaxine 75 MG 24 hr capsule   Commonly known as:  EFFEXOR-XR   Used for:  Paralysis agitans (H)        TAKE 1 TABLET(75 MG) BY MOUTH DAILY   Quantity:  90 capsule   Refills:  11       vitamin B complex with vitamin C Tabs tablet        Dose:  1 tablet   Take 1 tablet by mouth daily   Refills:  0       * Notice:  This list has 2 medication(s) that are the same as other medications prescribed for you. Read the directions carefully, and ask your doctor or other care provider to review them with you.         Where to get your medicines      Some of these will need a paper prescription and others can be bought over the counter. Ask your nurse if you have questions.     You don't need a prescription for these medications     amLODIPine 5 MG tablet    ciprofloxacin 500 MG tablet               ANTIBIOTIC INSTRUCTION     You've Been Prescribed an Antibiotic - Now What?  Your healthcare team thinks that you or your loved one might have an infection. Some infections can be treated with antibiotics, which are powerful, life-saving drugs. Like all medications, antibiotics have side effects and should only be used when necessary. There are some important things you should know about your antibiotic treatment.      Your healthcare team may run tests before you start taking an antibiotic.    Your team may take samples (e.g., from your blood, urine or other areas) to run tests to look for bacteria. These test can be important to determine if you need an antibiotic at all and, if you do, which antibiotic will work best.      Within a few days, your healthcare team might change or even stop your antibiotic.    Your team may start you on an antibiotic while they are working to find out what is making you sick.    Your team might change your antibiotic because test results show that a different antibiotic would be better to treat your  infection.    In some cases, once your team has more information, they learn that you do not need an antibiotic at all. They may find out that you don't have an infection, or that the antibiotic you're taking won't work against your infection. For example, an infection caused by a virus can't be treated with antibiotics. Staying on an antibiotic when you don't need it is more likely to be harmful than helpful.      You may experience side effects from your antibiotic.    Like all medications, antibiotics have side effects. Some of these can be serious.    Let you healthcare team know if you have any known allergies when you are admitted to the hospital.    One significant side effect of nearly all antibiotics is the risk of severe and sometimes deadly diarrhea caused by Clostridium difficile (C. Difficile). This occurs when a person takes antibiotics because some good germs are destroyed. Antibiotic use allows C. diificile to take over, putting patients at high risk for this serious infection.    As a patient or caregiver, it is important to understand your or your loved one's antibiotic treatment. It is especially important for caregivers to speak up when patients can't speak for themselves. Here are some important questions to ask your healthcare team.    What infection is this antibiotic treating and how do you know I have that infection?    What side effects might occur from this antibiotic?    How long will I need to take this antibiotic?    Is it safe to take this antibiotic with other medications or supplements (e.g., vitamins) that I am taking?     Are there any special directions I need to know about taking this antibiotic? For example, should I take it with food?    How will I be monitored to know whether my infection is responding to the antibiotic?    What tests may help to make sure the right antibiotic is prescribed for me?      Information provided by:  www.cdc.gov/getsmart  U.S. Department of Health  and Human Services  Centers for disease Control and Prevention  National Center for Emerging and Zoonotic Infectious Diseases  Division of Healthcare Quality Promotion         Protect others around you: Learn how to safely use, store and throw away your medicines at www.disposemymeds.org.             Medication List: This is a list of all your medications and when to take them. Check marks below indicate your daily home schedule. Keep this list as a reference.      Medications           Morning Afternoon Evening Bedtime As Needed    amLODIPine 5 MG tablet   Commonly known as:  NORVASC   Take 1 tablet (5 mg) by mouth daily   Last time this was given:  5 mg on 10/27/2017  8:08 AM                                aspirin 81 MG tablet   1 TABLET DAILY                                ATORVASTATIN CALCIUM PO   Take 20 mg by mouth daily   Last time this was given:  20 mg on 10/27/2017  7:00 AM                                Calcium-Vitamin D 600-200 MG-UNIT Tabs   1 tablet at 5pm                                * carbidopa-levodopa  MG per CR tablet   Commonly known as:  SINEMET CR   1 tab 4/day  At 7am, 11am, 5pm, 9:30pm and may take 5th if needed   Last time this was given:  1 tablet on 10/27/2017 12:09 PM                                * carbidopa-levodopa  MG per tablet   Commonly known as:  SINEMET   Increase to 1.5 tabs 4/day @ 7am, 11am, 5pm, 9pm   Last time this was given:  1.5 tablets on 10/27/2017 11:00 AM                                ciprofloxacin 500 MG tablet   Commonly known as:  CIPRO   Take 1 tablet (500 mg) by mouth every 12 hours for 7 days   Last time this was given:  500 mg on 10/27/2017  8:08 AM                                COMPOUNDED NON-CONTROLLED SUBSTANCE - PHARMACY TO MIX COMPOUNDED MEDICATION   Commonly known as:  CMPD RX   Estriol 1mg/1gram. Place 1 gram vaginally daily for 2 weeks, then vaginally twice weekly after.                                docusate sodium 100 MG capsule    Commonly known as:  COLACE   Take 1 capsule (100 mg) by mouth 2 times daily   Last time this was given:  100 mg on 10/26/2017  9:23 PM                                Inulin 2 G Chew   6 x 6grams per day as needed with natur made adult gummies fiber.                                LEVOTHYROXINE SODIUM PO   Take 112 mcg by mouth daily   Last time this was given:  112 mcg on 10/27/2017  7:00 AM                                MIRALAX powder   Take 17 g (1 capful) by mouth daily as needed   Generic drug:  polyethylene glycol                                NONFORMULARY   macugels 2 softgels daily at 920pm                                Omega-3 Krill Oil 300 MG Caps   Take 1 tablet by mouth daily.                                rotigotine 4 MG/24HR 24 hr patch   Commonly known as:  NEUPRO   Place 1 patch onto the skin daily   Last time this was given:  1 patch on 10/26/2017  9:21 PM                                TYLENOL 500 MG tablet   1 TABLET daily  AS NEEDED   Last time this was given:  650 mg on 10/23/2017  4:19 PM   Generic drug:  acetaminophen                                venlafaxine 75 MG 24 hr capsule   Commonly known as:  EFFEXOR-XR   TAKE 1 TABLET(75 MG) BY MOUTH DAILY   Last time this was given:  75 mg on 10/27/2017  8:08 AM                                vitamin B complex with vitamin C Tabs tablet   Take 1 tablet by mouth daily                                * Notice:  This list has 2 medication(s) that are the same as other medications prescribed for you. Read the directions carefully, and ask your doctor or other care provider to review them with you.

## 2017-10-21 NOTE — IP AVS SNAPSHOT
[address], [address]    [facility] [address]    Phone:  [phone]                                       After Visit Summary   10/21/2017    Loan Anderson    MRN: [id number]           After Visit Summary Signature Page     I have received my discharge instructions, and my questions have been answered. I have discussed any challenges I see with this plan with the nurse or doctor.    ..........................................................................................................................................  Patient/Patient Representative Signature      ..........................................................................................................................................  Patient Representative Print Name and Relationship to Patient    ..................................................               ................................................  Date                                            Time    ..........................................................................................................................................  Reviewed by Signature/Title    ...................................................              ..............................................  Date                                                            Time

## 2017-10-21 NOTE — IP AVS SNAPSHOT
Loan Anderson #[id number] (CSN: [id number])  (77 year old F)  (Adm: 10/21/17)     [id number]               [place] [address] ONCOLOGY: [phone]            Patient Demographics     Patient Name Sex          Age SSN Address Phone    Angela Andersonrictiffanie VAZQUEZ Female 1940 (77 year old) [id number] [facility] [address] [phone] (Home)  [phone] (Mobile) *Preferred*      Emergency Contact(s)     Name Relation Home Work Mobile    Surjit Anderson Spouse [phone]     Lara Saxena Daughter [phone]    Therese Monroy Daughter [phone]      Admission Information     Attending Provider Admitting Provider Admission Type Admission Date/Time    Maxx Hopper III, MD Heckert, George William III, MD Emergency 10/21/17  2032    Discharge Date Hospital Service Auth/Cert Status Service Area     Hospitalist [facility]    Unit Room/Bed Admission Status       [facility] ONCOLOGY [id number]/[id number] Admission (Confirmed)       Admission     Complaint    Sepsis (H)      Hospital Account     Name Acct ID Class Status Primary Coverage    Loan Andesron [id number] Inpatient Open MEDICARE - MEDICARE            Guarantor Account (for Hospital Account #[id number])     Name Relation to Pt Service Area Active? Acct Type    Loan Anderson  FCS Yes Personal/Family    Address Phone          [address] [place] [id number] [phone](H)              Coverage Information (for Hospital Account #[id number])     1. MEDICARE/MEDICARE     F/O Payor/Plan Precert #    MEDICARE/MEDICARE     Subscriber Subscriber #    Loan Anderson [id number]    Address Phone    [employer] CLAIM[employer]  [address]  [place] [address] [phone]          2. [place]     F/[place] Payor/Plan Precert #    [place]     Subscriber Subscriber #    Loan Anderson [id number]    Address Phone  "   [address] [address]  [place] [address] [phone]                                                      INTERAGENCY TRANSFER FORM - PHYSICIAN ORDERS   10/21/2017                       [place] [address] ONCOLOGY: [phone]            Attending Provider: Maxx Hopper III, MD     Allergies:  Mirapex [Pramipexole Dihydrochloride Monohydrate], Potassium Chloride, Requip [Ropinirole Hydrochloride], Epinephrine    Infection:  None   Service:  HOSPITALIST    Ht:  1.6 m (5' 3\")   Wt:  91.6 kg (201 lb 14.4 oz)   Admission Wt:  87.5 kg (193 lb)    BMI:  35.76 kg/m 2   BSA:  2.02 m 2            ED Clinical Impression     Diagnosis Description Comment Added By Time Added    Acute pyelonephritis [N10] Acute pyelonephritis [N10]  Angelo Cormier MD 10/21/2017 10:25 PM    Altered mental status, unspecified altered mental status type [R41.82] Altered mental status, unspecified altered mental status type [R41.82]  Angelo Cormier MD 10/21/2017 10:26 PM      Hospital Problems as of 10/27/2017              Priority Class Noted POA    Parkinson disease (H) Medium  7/15/2011 Yes    Constipation Medium  7/15/2011 Yes    Hypothyroid Medium  7/15/2011 Yes    Hypercholesterolemia Medium  7/18/2011 Yes    Obstructive sleep apnea Medium  6/30/2014 Yes    Chronic nonalcoholic liver disease Medium  4/27/2015 Yes    E coli bacteremia High Acute 10/22/2017 Yes    Acute kidney failure (H) High Acute 10/22/2017 Yes    Acute pyelonephritis High Acute 10/22/2017 Yes    * (Principal)Sepsis (H) Medium  10/22/2017 Yes    Hyponatremia Medium  10/22/2017 Yes    Hypokalemia Medium  10/22/2017 Yes      Non-Hospital Problems as of 10/27/2017              Priority Class Noted    Neurogenic bladder Medium  7/15/2011    Convergence insufficiency Medium  7/15/2011    Wears glasses Medium  7/18/2011    Diplopia Medium  7/18/2011    Hot flashes, menopausal Medium  7/18/2011    REM sleep behavior disorder " Medium  7/18/2011    Sebaceous cyst Medium  7/18/2011    Dysthymic disorder Medium  6/26/2013    Obstructive sleep apnea syndrome Medium  6/30/2014    Hyperglyceridemia Medium  4/27/2015    Hypothyroidism Medium  4/27/2015    Urinary incontinence Medium  4/27/2015    Disorder of bone and cartilage Medium  4/27/2015    Benign neoplasm Medium  4/27/2015    Systemic infection (H) Medium  11/16/2015    Incontinence Medium  4/24/2017    Nonalcoholic steatohepatitis (COHEN) Medium  4/24/2017    Osteopenia Medium  4/24/2017    Parkinson's disease (H) Medium  4/24/2017      Code Status History     Date Active Date Inactive Code Status Order ID Comments User Context    This patient has a current code status but no historical code status.      Current Code Status     Date Active Code Status Order ID Comments User Context       10/22/2017 12:27 AM Full Code [id number]  Maxx Hopper III, MD Inpatient       Summary of Visit     Reason for your hospital stay       Sepsis due to UTI                Medication Review      START taking        Dose / Directions Comments    amLODIPine 5 MG tablet   Commonly known as:  NORVASC   Used for:  Benign essential hypertension        Dose:  5 mg   Take 1 tablet (5 mg) by mouth daily   Quantity:  30 tablet   Refills:  0        ciprofloxacin 500 MG tablet   Commonly known as:  CIPRO   Indication:  Urinary Tract Infection        Dose:  500 mg   Take 1 tablet (500 mg) by mouth every 12 hours for 7 days   Quantity:  14 tablet   Refills:  0          CONTINUE these medications which have NOT CHANGED        Dose / Directions Comments    aspirin 81 MG tablet        1 TABLET DAILY   Refills:  0        ATORVASTATIN CALCIUM PO        Dose:  20 mg   Take 20 mg by mouth daily   Refills:  0        Calcium-Vitamin D 600-200 MG-UNIT Tabs        1 tablet at 5pm   Quantity:  30 tablet   Refills:  0        * carbidopa-levodopa  MG per CR tablet   Commonly known as:  SINEMET CR   Used for:  Parkinson  disease (H)        1 tab 4/day  At 7am, 11am, 5pm, 9:30pm and may take 5th if needed   Quantity:  450 tablet   Refills:  0        * carbidopa-levodopa  MG per tablet   Commonly known as:  SINEMET   Used for:  Paralysis agitans (H)        Increase to 1.5 tabs 4/day @ 7am, 11am, 5pm, 9pm   Quantity:  540 tablet   Refills:  3        COMPOUNDED NON-CONTROLLED SUBSTANCE - PHARMACY TO MIX COMPOUNDED MEDICATION   Commonly known as:  CMPD RX   Used for:  Atrophic vaginitis        Estriol 1mg/1gram. Place 1 gram vaginally daily for 2 weeks, then vaginally twice weekly after.   Quantity:  30 g   Refills:  6        docusate sodium 100 MG capsule   Commonly known as:  COLACE        Dose:  100 mg   Take 1 capsule (100 mg) by mouth 2 times daily   Quantity:  60 capsule   Refills:  0        Inulin 2 G Chew        6 x 6grams per day as needed with natur made adult gummies fiber.   Quantity:  30 tablet   Refills:  0        LEVOTHYROXINE SODIUM PO        Dose:  112 mcg   Take 112 mcg by mouth daily   Refills:  0        MIRALAX powder   Generic drug:  polyethylene glycol        Dose:  1 capful   Take 17 g (1 capful) by mouth daily as needed   Quantity:  119 g   Refills:  0        NONFORMULARY        macugels 2 softgels daily at 920pm   Refills:  0        Omega-3 Krill Oil 300 MG Caps        Dose:  1 tablet   Take 1 tablet by mouth daily.   Refills:  0        rotigotine 4 MG/24HR 24 hr patch   Commonly known as:  NEUPRO   Used for:  Paralysis agitans (H)        Dose:  1 patch   Place 1 patch onto the skin daily   Quantity:  90 patch   Refills:  3        TYLENOL 500 MG tablet   Used for:  Parkinson's disease (H)   Generic drug:  acetaminophen        1 TABLET daily  AS NEEDED   Quantity:  90 Tab   Refills:  3        venlafaxine 75 MG 24 hr capsule   Commonly known as:  EFFEXOR-XR   Used for:  Paralysis agitans (H)        TAKE 1 TABLET(75 MG) BY MOUTH DAILY   Quantity:  90 capsule   Refills:  11        vitamin B complex with vitamin  C Tabs tablet        Dose:  1 tablet   Take 1 tablet by mouth daily   Refills:  0        * Notice:  This list has 2 medication(s) that are the same as other medications prescribed for you. Read the directions carefully, and ask your doctor or other care provider to review them with you.            After Care     Activity - Up with nursing assistance           Advance Diet as Tolerated       Follow this diet upon discharge: Orders Placed This Encounter      Combination Diet Regular Diet Adult         Fall precautions           General info for SNF       Length of Stay Estimate: Short Term Care: Estimated # of Days <30  Condition at Discharge: Improving  Level of care:skilled   Rehabilitation Potential: Good  Admission H&P remains valid and up-to-date: Yes  Recent Chemotherapy: N/A  Use Nursing Home Standing Orders: N/A       Mantoux instructions       Give two-step Mantoux (PPD) Per Facility Policy Yes             Referrals     Occupational Therapy Adult Consult       Evaluate and treat as clinically indicated.    Reason:       Physical Therapy Adult Consult       Evaluate and treat as clinically indicated.    Reason:             Follow-Up Appointment Instructions     Follow Up and recommended labs and tests       Follow up with shelter physician.  The following labs/tests are recommended:             Your next 10 appointments already scheduled     Oct 30, 2017  9:30 AM CDT   [facility] with Pedro Pitts MD   [facility]    [address] [place] [id number] [phone]            Nov 15, 2017  7:30 AM CST   (Arrive by 7:15 AM)   Urodynamics with Keisha Beatty PA-C   [facility] ([facility])    [address] [place] [address]  [address] [facility] [id number] [phone]            Nov 15, 2017  9:45 AM CST   (Arrive by 9:30 AM)   Cystoscopy with Ivania Viveros MD  "  [facility] ([facility])    [address] [facility]  4th Floor  [facility] [address] [phone]              Statement of Approval     Ordered          10/27/17 1157  I have reviewed and agree with all the recommendations and orders detailed in this document.  EFFECTIVE NOW     Approved and electronically signed by:  Juan Antonio Vu MD                                                 INTERAGENCY TRANSFER FORM - NURSING   10/21/2017                       [place] [address] ONCOLOGY: [phone]            Attending Provider: Maxx Hopper III, MD     Allergies:  Mirapex [Pramipexole Dihydrochloride Monohydrate], Potassium Chloride, Requip [Ropinirole Hydrochloride], Epinephrine    Infection:  None   Service:  HOSPITALIST    Ht:  1.6 m (5' 3\")   Wt:  91.6 kg (201 lb 14.4 oz)   Admission Wt:  87.5 kg (193 lb)    BMI:  35.76 kg/m 2   BSA:  2.02 m 2            Advance Directives        Does patient have a scanned Advance Directive/ACP document in EPIC?           No        Immunizations     None      ASSESSMENT     Discharge Profile Flowsheet     EXPECTED DISCHARGE     Inspection of bony prominences  Full 10/27/17 0028    Expected Discharge Date  10/27/17 (tcu) 10/26/17 1154   Skin WDL  ex 10/27/17 0844    DISCHARGE NEEDS ASSESSMENT     Skin Color/Characteristics  bruised (ecchymotic) 10/27/17 0844    Equipment Currently Used at Home  walker, rolling;shower chair;raised toilet 10/23/17 0832   Skin Temperature  warm 10/27/17 0028    Transportation Available  family or friend will provide 10/23/17 0832   Skin Moisture  moist 10/27/17 0028    GASTROINTESTINAL (ADULT,PEDIATRIC,OB)     Skin Integrity  bruise(s) 10/27/17 0028    GI WDL  WDL 10/27/17 0841   Inspection under devices  Full 10/27/17 0028    Last Bowel Movement  10/26/17 10/27/17 0028   Skin Elasticity  quick return to original state 10/26/17 1852    Passing flatus  yes " "10/27/17 0028   SAFETY      COMMUNICATION ASSESSMENT     Safety WDL  WDL 10/27/17 0844    Patient's communication style  spoken language (English or Bilingual) 10/21/17 2030   All Alarms  alarm(s) activated and audible 10/27/17 0028    SKIN                        Assessment WDL (Within Defined Limits) Definitions           Safety WDL     Effective: 09/28/15    Row Information: <b>WDL Definition:</b> Bed in low position, wheels locked; call light in reach; upper side rails up x 2; ID band on<br> <font color=\"gray\"><i>Item=AS safety wdl>>List=AS safety wdl>>Version=F14</i></font>      Skin WDL     Effective: 09/28/15    Row Information: <b>WDL Definition:</b> Warm; dry; intact; elastic; without discoloration; pressure points without redness<br> <font color=\"gray\"><i>Item=AS skin wdl>>List=AS skin wdl>>Version=F14</i></font>      Vitals     Vital Signs Flowsheet     VITAL SIGNS     Weight Method  Bed scale 10/25/17 0635    Temp  98  F (36.7  C) 10/27/17 0826   Bed Scale  Standard (fitted sheet, draw sheet/ pad, cover/flat sheet, blanket, two pillows);Pillow (add comment for number);Draw sheet/ pad (add comment for number);Cover/flat sheet (add comment for number);[place] (add comment for number) 10/25/17 0635    Temp src  Oral 10/27/17 0826   BSA (Calculated - sq m)  1.97 10/21/17 2036    Resp  16 10/27/17 0826   BMI (Calculated)  34.26 10/21/17 2036    Pulse  64 10/26/17 0804   POSITIONING      Heart Rate  65 10/27/17 0826   Body Position  independently positioning 10/27/17 0844    Pulse/Heart Rate Source  Monitor 10/27/17 0826   Head of Bed (HOB)  HOB at 20-30 degrees 10/27/17 0203    BP  125/70 10/27/17 0811   Positioning/Transfer Devices  pillows 10/27/17 0203    BP Location  Left arm 10/26/17 0886   DAILY CARE      OXYGEN THERAPY     Activity Management  bedrest with commode;up in chair 10/27/17 0844    SpO2  92 % 10/27/17 0826   Activity Assistance Provided  assistance, 1 person 10/27/17 0844    O2 " "Device  None (Room air) 10/27/17 0826   EKG MONITORING      Oxygen Delivery  2 LPM 10/22/17 0028   Cardiac Regularity  Regular 10/21/17 2350    PAIN/COMFORT     Cardiac Rhythm  NSR 10/21/17 2350    Patient Currently in Pain  denies 10/27/17 1109   DANDRE COMA SCALE      0-10 Pain Scale  0 10/25/17 2002   Best Eye Response  3-->(E3) to speech 10/22/17 0005    HEIGHT AND WEIGHT     Best Motor Response  6-->(M6) obeys commands 10/22/17 0005    Height  1.6 m (5' 3\") 10/21/17 2036   Best Verbal Response  4-->(V4) confused 10/22/17 0005    Weight  91.6 kg (201 lb 14.4 oz) 10/26/17 0538   Dandre Coma Scale Score  13 10/22/17 0005            Patient Lines/Drains/Airways Status    Active LINES/DRAINS/AIRWAYS     None            Patient Lines/Drains/Airways Status    Active PICC/CVC     None            Intake/Output Detail Report     Date Intake     Output Net    Shift P.O. I.V. IV Piggyback Total Urine Total       Noc 10/25/17 2300 - 10/26/17 0659 -- -- -- -- 625 625 -625    Day 10/26/17 0700 - 10/26/17 1459 120 -- -- 120 900 900 -780    Jayda 10/26/17 1500 - 10/26/17 2259 -- -- -- -- 900 900 -900    Noc 10/26/17 2300 - 10/27/17 0659 -- -- -- -- 800 800 -800    Day 10/27/17 0700 - 10/27/17 1459 240 -- -- 240 200 200 40      Last Void/BM       Most Recent Value    Urine Occurrence 1 at 10/27/2017 0323    Stool Occurrence 1 at 10/26/2017 2016      Case Management/Discharge Planning     Case Management/Discharge Planning Flowsheet     REFERRAL INFORMATION     Spouse's Name  -- (Surjit) 10/22/17 1542    Did the Initial Social Work Assessment result in a Social Work Case?  Yes 10/22/17 1542   Description of Support System  Supportive;Involved 10/22/17 1542    Admission Type  inpatient 10/22/17 1542   Support Assessment  Adequate family and caregiver support;Adequate social supports;Caregiver experiencing high stress 10/22/17 1542    Arrived From  admitted as an inpatient 10/22/17 1542   Quality of Family Relationships  " supportive;involved;evident 10/22/17 1542    Referral Source  family 10/22/17 1542   COPING/STRESS      Reason For Consult  discharge planning 10/22/17 1542   Major Change/Loss/Stressor  none 10/22/17 0201    Record Reviewed  clinical discipline documentation;history and physical;medical record;patient profile;plan of care 10/22/17 1542   EXPECTED DISCHARGE      CTS Assigned to Case  -- (Anupama) 10/22/17 1542   Expected Discharge Date  10/27/17 (tcu) 10/26/17 1154    Primary Care MD Name  -- (Opalsherley Cardenas) 10/22/17 1542   DISCHARGE PLANNING      LIVING ENVIRONMENT     Transportation Available  family or friend will provide 10/23/17 0832    Lives With  spouse 10/23/17 0832   FINAL RESOURCES      Living Arrangements  house 10/23/17 0832   Equipment Currently Used at Home  walker, rolling;shower chair;raised toilet 10/23/17 0832    Provides Primary Care For  no one, unable/limited ability to care for self 10/22/17 1542   ABUSE RISK SCREEN      Primary Care Provided By  spouse/significant other 10/22/17 1542   QUESTION TO PATIENT:  Has a member of your family or a partner(now or in the past) intimidated, hurt, manipulated, or controlled you in any way?  no 10/21/17 2031    Caregiving Concerns  -- (High stress) 10/22/17 1542   QUESTION TO PATIENT: Do you feel safe going back to the place where you are living?  yes 10/21/17 2031    Quality Of Family Relationships  supportive;helpful;involved 10/22/17 1542   OBSERVATION: Is there reason to believe there has been maltreatment of a vulnerable adult (ie. Physical/Sexual/Emotional abuse, self neglect, lack of adequate food, shelter, medical care, or financial exploitation)?  no 10/21/17 2031    Able to Return to Prior Living Arrangements  no 10/22/17 1542   (R) MENTAL HEALTH SUICIDE RISK      Living Arrangement Comments  -- (question need for higher level of care) 10/22/17 1542   Are you depressed or being treated for depression?  Yes 10/22/17 0205    ASSESSMENT OF  FAMILY/SOCIAL SUPPORT     HOMICIDE RISK      Marital Status   10/22/17 1542   Feels Like Hurting Others  no 10/21/17 2031    Who is your support system?  Wife;Children 10/22/17 1542                       [place] [address] ONCOLOGY: [phone]            Medication Administration Report for Loan Anderson as of 10/27/17 1445   Legend:    Given Hold Not Given Due Canceled Entry Other Actions    Time Time (Time) Time  Time-Action       Inactive    Active    Linked        Medications 10/21/17 10/22/17 10/23/17 10/24/17 10/25/17 10/26/17 10/27/17    acetaminophen (TYLENOL) tablet 650 mg  Dose: 650 mg Freq: EVERY 4 HOURS PRN Route: PO  PRN Reason: mild pain  Start: 10/22/17 0027   Admin Instructions: Alternate ibuprofen (if ordered) with acetaminophen.  Maximum acetaminophen dose from all sources = 75 mg/kg/day not to exceed 4 grams/day.       1619 (650 mg)-Given               amLODIPine (NORVASC) tablet 5 mg  Dose: 5 mg Freq: DAILY Route: PO  Start: 10/24/17 1430   Admin Instructions: Hold for SBP < 100        1801 (5 mg)-Given        0817 (5 mg)-Given        0838 (5 mg)-Given        0808 (5 mg)-Given           aspirin EC EC tablet 81 mg  Dose: 81 mg Freq: DAILY Route: PO  Start: 10/22/17 0900     0912 (81 mg)-Given [C]        0637 (81 mg)-Given               0709 (81 mg)-Given               0725 (81 mg)-Given               0839 (81 mg)-Given        (0809)-Not Given           atorvastatin (LIPITOR) tablet 20 mg  Dose: 20 mg Freq: DAILY Route: PO  Start: 10/23/17 0900      0637 (20 mg)-Given               0707 (20 mg)-Given               0725 (20 mg)-Given               0707 (20 mg)-Given               0700 (20 mg)-Given           carbidopa-levodopa (SINEMET CR)  MG per CR tablet 1 tablet  Dose: 1 tablet Freq: 4 times per day Route: PO  Start: 10/22/17 1100   Admin Instructions: DO NOT CRUSH.      1149 (1 tablet)-Given [C]       1700 (1 tablet)-Given       2129 (1 tablet)-Given        0636 (1  tablet)-Given       1104 (1 tablet)-Given       1621 (1 tablet)-Given       2158 (1 tablet)-Given        0708 (1 tablet)-Given       1130 (1 tablet)-Given       1717 (1 tablet)-Given       2129 (1 tablet)-Given        0724 (1 tablet)-Given       1107 (1 tablet)-Given       1715 (1 tablet)-Given       2135 (1 tablet)-Given        0706 (1 tablet)-Given       1124 (1 tablet)-Given       1816 (1 tablet)-Given       2121 (1 tablet)-Given        0700 (1 tablet)-Given [C]       1209 (1 tablet)-Given       [ ] 1700       [ ] 2130           carbidopa-levodopa (SINEMET)  MG per tablet 1.5 tablet  Dose: 1.5 tablet Freq: 4 TIMES DAILY Route: PO  Start: 10/22/17 0700   Admin Instructions: OK to use home meds      0650 (1.5 tablet)-Given by Patient/Family [C]       1149 (1.5 tablet)-Given [C]       1700 (1.5 tablet)-Given       2129 (1.5 tablet)-Given        0636 (1.5 tablet)-Given       1104 (1.5 tablet)-Given       1622 (1.5 tablet)-Given       2158 (1.5 tablet)-Given        0708 (1.5 tablet)-Given       1130 (1.5 tablet)-Given       1717 (1.5 tablet)-Given       2129 (1.5 tablet)-Given        0724 (1.5 tablet)-Given       1107 (1.5 tablet)-Given       1715 (1.5 tablet)-Given       2135 (1.5 tablet)-Given        0706 (1.5 tablet)-Given       1124 (1.5 tablet)-Given       1816 (1.5 tablet)-Given       2121 (1.5 tablet)-Given        0700 (1.5 tablet)-Given       1100 (1.5 tablet)-Given       [ ] 1700       [ ] 2100           ciprofloxacin (CIPRO) tablet 500 mg  Dose: 500 mg Freq: EVERY 12 HOURS SCHEDULED Route: PO  Indications of Use: URINARY TRACT INFECTION  Start: 10/26/17 0800         1013 (500 mg)-Given       2012 (500 mg)-Given        0808 (500 mg)-Given       [ ] 2000           docusate sodium (COLACE) capsule 100 mg  Dose: 100 mg Freq: 2 TIMES DAILY Route: PO  Start: 10/22/17 0900     (0911)-Not Given       2131 (100 mg)-Negative [C]        1104 (100 mg)-Given       2207 (100 mg)-Given        1132 (100 mg)-Given  [C]       2228 (100 mg)-Given [C]        1107 (100 mg)-Given       2136 (100 mg)-Given        (1449)-Not Given       2123 (100 mg)-Given        (1341)-Not Given       [ ] 2200           hydrALAZINE (APRESOLINE) injection 10 mg  Dose: 10 mg Freq: EVERY 4 HOURS PRN Route: IV  PRN Reason: high blood pressure  PRN Comment: give for SBP > 180  Start: 10/24/17 1423   Admin Instructions: Give IV Push undiluted over 1 minute up to 40 mg.               ipratropium - albuterol 0.5 mg/2.5 mg/3 mL (DUONEB) neb solution 3 mL  Dose: 3 mL Freq: EVERY 4 HOURS PRN Route: NEBULIZATION  PRN Reasons: wheezing,shortness of breath / dyspnea  Start: 10/22/17 0027      1350 (3 mL)-Given               levothyroxine (SYNTHROID/LEVOTHROID) tablet 112 mcg  Dose: 112 mcg Freq: EVERY MORNING BEFORE BREAKFAST Route: PO  Start: 10/23/17 0730      0636 (112 mcg)-Given        0709 (112 mcg)-Given        0724 (112 mcg)-Given        0707 (112 mcg)-Given        0700 (112 mcg)-Given           magnesium sulfate 2 g in NS intermittent infusion ([facility] or [facility])  Dose: 2 g Freq: DAILY PRN Route: IV  PRN Reason: magnesium supplementation  Start: 10/22/17 0854   Admin Instructions: For Serum Mg++ 1.6 - 2 mg/dL  Give 2 g and recheck magnesium level next AM.         0959 (2 g)-New Bag        1013 (2 g)-New Bag            magnesium sulfate 4 g in 100 mL sterile water (premade)  Dose: 4 g Freq: EVERY 4 HOURS PRN Route: IV  PRN Reason: magnesium supplementation  Start: 10/22/17 0854   Admin Instructions: For serum Mg++ less than 1.6 mg/dL  Give 4 g and recheck magnesium level 2 hours after dose, and next AM.               naloxone (NARCAN) injection 0.1-0.4 mg  Dose: 0.1-0.4 mg Freq: EVERY 2 MIN PRN Route: IV  PRN Reason: opioid reversal  Start: 10/22/17 0027   Admin Instructions: For respiratory rate LESS than or EQUAL to 8.  Partial reversal dose:  0.1 mg titrated q 2 minutes for Analgesia Side Effects Monitoring Sedation Level of 3 (frequently drowsy,  arousable, drifts to sleep during conversation).Full reversal dose:  0.4 mg bolus for Analgesia Side Effects Monitoring Sedation Level of 4 (somnolent, minimal or no response to stimulation).  Up to 2mg may be given IV Push undiluted each 0.4mg over 15 seconds in emergency situations. For non-emergent situations further dilute in 9mL of NS to facilitate titration of response.               ondansetron (ZOFRAN-ODT) ODT tab 4 mg  Dose: 4 mg Freq: EVERY 6 HOURS PRN Route: PO  PRN Reasons: nausea,vomiting  Start: 10/22/17 0027   Admin Instructions: This is Step 1 of nausea and vomiting management.  If nausea not resolved in 15 minutes, go to Step 2 prochlorperazine (COMPAZINE). Do not push through foil backing. Peel back foil and gently remove. Place on tongue immediately. Administration with liquid unnecessary              Or  ondansetron (ZOFRAN) injection 4 mg  Dose: 4 mg Freq: EVERY 6 HOURS PRN Route: IV  PRN Reasons: nausea,vomiting  Start: 10/22/17 0027   Admin Instructions: This is Step 1 of nausea and vomiting management.  If nausea not resolved in 15 minutes, go to Step 2 prochlorperazine (COMPAZINE).  Irritant. Up to 4 mg may be given IV Push undiluted over 2-5 minutes.               Patient is already receiving mechanical prophylaxis  Freq: CONTINUOUS PRN Route: XX  Start: 10/22/17 0027              polyethylene glycol (MIRALAX/GLYCOLAX) Packet 17 g  Dose: 17 g Freq: DAILY PRN Route: PO  PRN Reason: constipation  Start: 10/22/17 0038   Admin Instructions: 1 Packet = 17 grams. Mixed prescribed dose in 8 ounces of water.  1 Packet = 17 grams. Mixed prescribed dose in 8 ounces of water. Follow with 8 oz. of water.               potassium chloride (KLOR-CON) Packet 20-40 mEq  Dose: 20-40 mEq Freq: EVERY 2 HOURS PRN Route: ORAL OR FEED  PRN Reason: potassium supplementation  Start: 10/22/17 0854   Admin Instructions: Use if unable to tolerate tablets.  If Serum K+ 3.0-3.3, dose = 60 mEq po total dose (40 mEq x1  followed in 2 hours by 20 mEq x1). Recheck K+ level 4 hours after dose and the next AM.  If Serum K+ 2.5-2.9, dose = 80 mEq po total dose (40 mEq Q2H x2). Recheck K+ level 4 hours after dose and the next AM.  If Serum K+ less than 2.5, See IV order.  Dissolve packet contents in 4-8 ounces of cold water or juice.      0919 (40 mEq)-Given       1114 (20 mEq)-Given          0817 (40 mEq)-Given       0959 (20 mEq)-Given             potassium chloride SA (K-DUR/KLOR-CON M) CR tablet 20-40 mEq  Dose: 20-40 mEq Freq: EVERY 2 HOURS PRN Route: PO  PRN Reason: potassium supplementation  Start: 10/22/17 0854   Admin Instructions: Use if able to take PO.   If Serum K+ 3.0-3.3, dose = 60 mEq po total dose (40 mEq x1 followed in 2 hours by 20 mEq x1). Recheck K+ level 4 hours after dose and the next AM.  If Serum K+ 2.5-2.9, dose = 80 mEq po total dose (40 mEq Q2H x2). Recheck K+ level 4 hours after dose and the next AM.  If Serum K+ less than 2.5, See IV order.  DO NOT CRUSH               QUEtiapine (SEROquel) half-tab 12.5 mg  Dose: 12.5 mg Freq: EVERY 6 HOURS PRN Route: PO  PRN Comment: Agitation  Start: 10/22/17 0027              rotigotine (NEUPRO) 4 MG/24HR 24 hr patch 1 patch  Dose: 1 patch Freq: DAILY Route: TD  Start: 10/22/17 0900     (0913)-Not Given [C]       2133 (1 patch)-Given               2158 (1 patch)-Given        2129 (1 patch)-Given [C]        2136 (1 patch)-Given [C]        2121 (1 patch)-Given [C]        [ ] 2100           rotigotine (NEUPRO) Patch in Place  Freq: EVERY 8 HOURS Route: TD  Start: 10/22/17 0900   Admin Instructions: Chart every shift, confirming that patch is still in place on patient (no barode scan needed). See patch order for dose information.      0913 ( )-Patch Removed       1615 ( )-Patch Removed [C]        0048 ( )-Patch in Place [C]       0916 ( )-Patch in Place       1624 ( )-Patch in Place        0234 ( )-Patch in Place              1717 ( )-Patch in Place        0820 ( )-Patch in  Place       0900 ( )-Patch in Place       1715 ( )-Patch in Place        0108 ( )-Patch in Place       1010 ( )-Patch in Place       1816 ( )-Patch in Place        0029 ( )-Patch in Place       0808 ( )-Patch in Place       [ ] 1700           rotigotine (NEUPRO) patch REMOVAL  Freq: DAILY Route: TD  Start: 10/22/17 0056   Admin Instructions: Remove patch when new patch is applied or patch is discontinued.      0913 ( )-Patch Removed               2159 ( )-Patch Removed        2131 ( )-Patch Removed        2136 ( )-Patch Removed        2123 ( )-Patch Removed        [ ] 2100           senna-docusate (SENOKOT-S;PERICOLACE) 8.6-50 MG per tablet 1-2 tablet  Dose: 1-2 tablet Freq: 2 TIMES DAILY PRN Route: PO  PRN Comment: constipation   Start: 10/22/17 0027   Admin Instructions: If no bowel movement in 24 hours, increase to 2 tablets PO BID.  Hold for loose stools.   This is the first step of a three step constipation treatment protocol.               venlafaxine (EFFEXOR-XR) 24 hr capsule 75 mg  Dose: 75 mg Freq: DAILY WITH BREAKFAST Route: PO  Start: 10/22/17 0900   Admin Instructions: DO NOT CRUSH.      0910 (75 mg)-Given [C]        0637 (75 mg)-Given               0708 (75 mg)-Given               0725 (75 mg)-Given               0707 (75 mg)-Given               0808 (75 mg)-Given          Completed Medications  Medications 10/21/17 10/22/17 10/23/17 10/24/17 10/25/17 10/26/17 10/27/17         Dose: 2 g Freq: EVERY 24 HOURS Route: IV  Indications of Use: PYELONEPHRITIS,SEPSIS,URINARY TRACT INFECTION  Start: 10/25/17 2315   End: 10/25/17 2255        2225 (2 g)-New Bag               Dose: 500 mg Freq: ONCE Route: PO  Start: 10/24/17 1800   End: 10/24/17 1802   Admin Instructions: 250 mg phosphorus per tablet.        1802 (500 mg)-Given             Discontinued Medications  Medications 10/21/17 10/22/17 10/23/17 10/24/17 10/25/17 10/26/17 10/27/17         Rate: 50 mL/hr Freq: CONTINUOUS Route: IV  Last Dose: Stopped  (10/24/17 0949)  Start: 10/22/17 0030   End: 10/24/17 1624     0043 ( )-New Bag       0627 ( )-New Bag       1401 ( )-Rate/Dose Change       1428 ( )-New Bag        0257 ( )-New Bag       1502 ( )-Rate/Dose Change        0949-Stopped       1624-Med Discontinued            Dose: 2 g Freq: EVERY 24 HOURS Route: IV  Indications of Use: PYELONEPHRITIS,SEPSIS,URINARY TRACT INFECTION  Start: 10/22/17 2313   End: 10/25/17 1437     2328 (2 g)-New Bag        2207 (2 g)-New Bag               2227 (2 g)-New Bag        1437-Med Discontinued           Dose: 15 mmol Freq: DAILY PRN Route: IV  PRN Reason: phosphorous supplementation  Start: 10/24/17 1623   End: 10/24/17 1725   Admin Instructions: For serum phosphorus level 2-2.4  Do not infuse Phosphorus in the same line as TPN.   Give 15 mmol and recheck phosphorus level next AM.        1725-Med Discontinued            Dose: 20 mmol Freq: EVERY 6 HOURS PRN Route: IV  PRN Reason: phosphorous supplementation  Start: 10/24/17 1623   End: 10/24/17 1725   Admin Instructions: For serum phosphorus level 1.1-1.9  Do not infuse Phosphorus in the same line as TPN.   Give 20 mmol and recheck phosphorus level 2 hours after last dose and next AM.        1725-Med Discontinued            Dose: 25 mmol Freq: EVERY 8 HOURS PRN Route: IV  PRN Reason: phosphorous supplementation  Start: 10/24/17 1623   End: 10/24/17 1725   Admin Instructions: For serum phosphorus level less than 1.1  Do not infuse Phosphorus in the same line as TPN.   Give 25 mmol and recheck phosphorus level 2 hours after last dose and next AM.        1725-Med Discontinued       Medications 10/21/17 10/22/17 10/23/17 10/24/17 10/25/17 10/26/17 10/27/17               INTERAGENCY TRANSFER FORM - NOTES (H&P, Discharge Summary, Consults, Procedures, Therapies)   10/21/2017                       [place] [address] ONCOLOGY: [phone]               History & Physicals      H&P by Maxx Hopper III, MD at 10/21/2017 11:14  PM     Author:  Maxx Hopper III, MD Service:  Hospitalist Author Type:  Physician    Filed:  10/21/2017 11:33 PM Date of Service:  10/21/2017 11:14 PM Creation Time:  10/21/2017 11:14 PM    Status:  Signed :  Maxx Hopper III, MD (Physician)         [facility]    History and Physical  Hospitalist       Date of Admission:  10/21/2017[GH1.1]    Assessment & Plan[GH1.2]   Loan Anderson is a 77 year old female who presents with confusion.    She has a PMH of Parkinson's Disease, CKD, Hypothyroidism, Depression, Renal Stone s/p lithotripsy and UTI/Sepsis (2015).    According to the patient's daughters, she has been acting a little confused for the last 2-3 days. Yes[place] in [place] the patient was particularly abnormal, she was dyspneic and quite confused and exhibited some flailing movements that bystanders were concerned may be seizures. These did not result in a loss of consciousness, incontinence or tongue biting. She has had some headache today and feels generally ill, with a temperature of 101.4 at home, but denies nausea, vomiting, diarrhea, constipation or dysuria. She did have difficulty voiding in the ED and had to be catheterized for 400cc.    In the ED she had a temp of 99.9 and was awake but a little confused. WBC count was 15k and her UA was grossly positive for infection. Creatinine was significantly elevated at 2.7.    She had a potassium of 2.7 on arrival. She received IV repletion which led to pain at the infusion site followed by flushing of her face, neck and chest. She transiently dropped her BP at this time to the 80's systolic and exhibited some wheeze. This is prior to receiving any antibiotics. Her BP and wheezing improved prior to any intervention and she then received benadryl, albuterol and solumedrol. She reports that previously she had a local reaction to IV potassium which involved significant local swelling, but at  "that time it was felt to be local irritation rather than allergy. She has had oral potassium without issue in the past and again today in the ED.    UTI and Pyelonephritis with Sepsis  - Admit to general medicine  - Continue IVF with Normal Saline  - Ceftriaxone  - f/u culture and sensitivity  - She does have a history of \"neurogenic bladder\" listed but does not usually need catheterization    Reaction to IV Potassium Chloride  - This seems to be related to the IV form of potassium so suspect it has to do with a preservative, though still a real allergy and I would avoid IV potassium products  - OK to continue with oral potassium repletion  - Do not believe she needs to continue steroids beyond initial dose    YASMIN on CKD  - Baseline creatinine looks to be about 1 - 1.3  - Creatinine 2.7 on arrival  - Brisk IV Fluids    Confusion  Flailing/Shaking Movements  Metabolic Encephalopathy  - Given context this seems more akin to metabolic encephalopathy and delirium rather than seizure  - Treat underlying infection    Parkinson's Disease  - Continue Sinemet, Neupro  - Pt will continue her supplements from home, family to provide  - Avoid antihistamines and haldol  - Low dose seroquel PRN if sundowning      DVT Prophylaxis: Pneumatic Compression Devices  Code Status: DNR    Disposition: Expected discharge in 3-5 days[GH1.1]    Maxx Hopper III[GH1.2], MD[GH1.1]    Primary Care Physician   Opal Cardenas    Chief Complaint[GH1.2]   Confusion    History is obtained from the patient, her adult daughters, ED physician and review of records.[GH1.1]    History of Present Illness[GH1.2]   Loan Anderson is a 77 year old female who presents with confusion.    She has a PMH of Parkinson's Disease, CKD, Hypothyroidism, Depression, Renal Stone s/p lithotripsy and UTI/Sepsis (2015).    According to the patient's daughters, she has been acting a little confused for the last 2-3 days. Yesterday in [place] after a [place] in [place] the patient was particularly abnormal, she was dyspneic and quite confused and exhibited some flailing movements that bystanders were concerned may be seizures. These did not result in a loss of consciousness, incontinence or tongue biting. She has had some headache today and feels generally ill, with a temperature of 101.4 at home, but denies nausea, vomiting, diarrhea, constipation or dysuria. She did have difficulty voiding in the ED and had to be catheterized for 400cc.[GH1.1]    Past Medical History[GH1.2]    I have reviewed this patient's medical history and updated it with pertinent information if needed.[GH1.1]   Past Medical History:   Diagnosis Date     Constipation 7/15/2011     Convergence insufficiency 7/15/2011     Depression 7/15/2011     Diplopia 2011     Hot flashes, menopausal 2011     Hypercholesterolemia 2011     Hypothyroid 7/15/2011     Neurogenic bladder 7/15/2011     Parkinson disease (H) 7/15/2011     REM sleep behavior disorder 2011     Sebaceous cyst 2011     Wears glasses 2011[GH1.3]       Past Surgical History[GH1.2]   I have reviewed this patient's surgical history and updated it with pertinent information if needed.[GH1.1]  Past Surgical History:   Procedure Laterality Date     ------------OTHER-------------  november ?     lithrotripsy for renal stone     ------------OTHER-------------  2015    had sepsis from uti and hospitalized     BRAIN SURGERY   or 14 oct 2011    gene therapy enrolled study at [facility][GH1.3]       Prior to Admission Medications   Prior to Admission Medications   Prescriptions Last Dose Informant Patient Reported? Taking?   ASPIRIN 81 MG PO TABS 10/21/2017 at Unknown time Daughter Yes Yes   Si TABLET DAILY   COMPOUND (CMPD RX) - PHARMACY TO MIX COMPOUNDED MEDICATION  Daughter No Yes   Sig: Estriol 1mg/1gram. Place 1 gram vaginally daily for 2 weeks, then vaginally twice weekly after.   Calcium-Vitamin D 600-200  MG-UNIT TABS 10/21/2017 at Unknown time Daughter Yes Yes   Si tablet at 5pm   Inulin 2 G CHEW  Daughter Yes No   Si x 6grams per day with natur made adult gummies fiber.   NONFORMULARY 10/21/2017 at Unknown time Daughter Yes Yes   Sig: macugels 2 softgels daily at 920pm   OMEGA-3 KRILL  MG CAPS 10/21/2017 at Unknown time Daughter Yes Yes   Sig: Take 1 tablet by mouth daily.   TYLENOL EXTRA STRENGTH 500 MG PO TABS  Daughter No Yes   Si TABLET daily  AS NEEDED   atorvastatin (LIPITOR) 10 MG tablet 10/21/2017 at Unknown time Daughter Yes Yes   Sig: Take 1 tablet (10 mg) by mouth daily   carbidopa-levodopa (SINEMET CR)  MG per CR tablet 10/21/2017 at x4 Daughter No Yes   Si tab 4/day  At 7am, 11am, 5pm, 9:30pm and may take 5th if needed   carbidopa-levodopa (SINEMET)  MG per tablet 10/21/2017 at x4 Daughter No Yes   Sig: Increase to 1.5 tabs 4/day @ 7am, 11am, 5pm, 9pm   docusate sodium (COLACE) 100 MG capsule 10/21/2017 at Unknown time Daughter Yes Yes   Sig: Take 1 capsule (100 mg) by mouth 2 times daily   levothyroxine (SYNTHROID,LEVOTHROID) 100 MCG tablet 10/21/2017 at Unknown time Daughter Yes Yes   Si tablet @ 7am   polyethylene glycol (MIRALAX) powder  Daughter Yes Yes   Sig: Take 17 g (1 capful) by mouth daily as needed   rotigotine (NEUPRO) 4 MG/24HR 24 hr patch 10/21/2017 at Unknown time Daughter No Yes   Sig: Place 1 patch onto the skin daily   venlafaxine (EFFEXOR-XR) 75 MG 24 hr capsule 10/21/2017 at Unknown time Daughter No Yes   Sig: TAKE 1 TABLET(75 MG) BY MOUTH DAILY   vitamin  B complex with vitamin C (VITAMIN  B COMPLEX) TABS 10/21/2017 at Unknown time Daughter Yes Yes   Sig: Take 1 tablet by mouth daily      Facility-Administered Medications: None     Allergies   Allergies   Allergen Reactions     Mirapex [Pramipexole Dihydrochloride Monohydrate]      Leg swelling     Requip [Ropinirole Hydrochloride]      Leg swelling     Epinephrine Palpitations       Social  History[GH1.2]   I have reviewed this patient's social history and updated it with pertinent information if needed. Loan Anderson[GH1.1]  reports that she has never smoked. She has never used smokeless tobacco. She reports that she drinks alcohol.[GH1.3]    Family History[GH1.2]   Family history reviewed with patient and is noncontributory.[GH1.1]    Review of Systems[GH1.2]   The 10 point Review of Systems is negative other than noted in the HPI or here.[GH1.1]     Physical Exam   Temp: 99.9  F (37.7  C) Temp src: Oral BP: 120/64 Pulse: 79 Heart Rate: 79 Resp: 19 SpO2: 98 % O2 Device: None (Room air)[GH1.2]    Vital Signs with Ranges[GH1.1]  Temp:  [97.8  F (36.6  C)-99.9  F (37.7  C)] 99.9  F (37.7  C)  Pulse:  [72-79] 79  Heart Rate:  [75-79] 79  Resp:  [15-24] 19  BP: (115-128)/(58-64) 120/64  SpO2:  [95 %-98 %] 98 %  193 lbs 0 oz[GH1.2]    Constitutional: NAD. Comfortable, well-developed. Flushed.  Eyes: Anicteric, no conjunctival injection  ENT: Atraumatic, membranes moist   Neck: Supple, trachea midline  Respiratory: My initial exam had some faint end-expiratory wheeze which resolved prior to the end of my interview  Cardiovascular: S1S2, no edema  GI: Abdomen soft, non-tender  Skin: Warm, pink, dry  Neurologic: Alert and oriented. CN II - XII grossly intact. Some confusion/forgetfulness  Psychiatric: Pleasant, cooperative[GH1.1]    Data[GH1.2]   Data reviewed today:  I personally reviewed no images or EKG's today.[GH1.1]    Recent Labs  Lab 10/21/17  2115 10/21/17  1751   WBC  --  15.4*   HGB  --  14.4   MCV  --  91   PLT  --  152   * 135   POTASSIUM 3.0* 2.7*   CHLORIDE 97 97   CO2 23 24   BUN 51* 51*   CR 2.74* 2.82*   ANIONGAP 11 14   NIKKIE 8.1* 8.4*   * 137*   ALBUMIN  --  2.9*   PROTTOTAL  --  7.7   BILITOTAL  --  0.8   ALKPHOS  --  258*   ALT  --  13   AST  --  61*[GH1.2]       Imaging:[GH1.1]  No results found for this or any previous visit (from the past 24 hour(s)).[GH1.2]      Revision History        User Key Date/Time User Provider Type Action    > GH1.3 10/21/2017 11:33 PM Maxx Hopper III, MD Physician Sign     GH1.2 10/21/2017 11:15 PM Maxx Hopper III, MD Physician      GH1.1 10/21/2017 11:14 PM Maxx Hopper III, MD Physician                   Discharge Summaries     No notes of this type exist for this encounter.         Consult Notes      Consults signed by Roshan Leija MD at 10/27/2017  8:34 AM      Author:  Roshan Leija MD Service:  Urology Author Type:  Physician    Filed:  10/27/2017  8:34 AM Date of Service:  10/26/2017  3:24 PM Creation Time:  10/26/2017  4:19 PM    Status:  Signed :  Roshan Leija MD (Physician)         UROLOGY CONSULTATION      HISTORY OF PRESENT ILLNESS:  It was a pleasure to be asked to see Loan Anderson, a very pleasant 77-year-old lady, in initial consultation today at the request of the inpatient medical service.  She lives close to [place].  She was en route to a wedding in [place] and had stopped here in the [facility] where various family members live.  She had presented with what seemed like a seizure and was seen in the emergency room with what seemed to be some evidence of dyspnea and confusion.  Initially had some difficulty voiding and was catheterized for 400 mL.  Subsequently, she was found to have an elevated white count.  A urinalysis was grossly positive for infection with 182 white cells being seen and the serum creatinine had risen to 2.7.  Furthermore, the temperature before admission had been as high as 101.5.  Subsequently, urine cultures had grown a heavy growth of E. coli and she has been initiated on treatment.  A CT scan had shown evidence of stones; however, the stones were quite small.  There was a 5 mm stone in the lower pole pippa of the right kidney and 3 mm stone in the left kidney and another 1 mm stone in the upper pole of the right kidney;  however, there was no hydronephrosis and no evidence of stones in the ureters.  The only other feature was some irregularity of the left kidney which is possibly consistent with some mild renal atrophy or previous scarring.  She does have a previous history of urinary stone disease and had shock wave treatment 2 years ago in [place] for stones at that time.  The CT scan had shown no other significant features there.  Another thing includes what appears to be a history of Parkinson's disease.  Serum creatinine which was 2.7 at admission after treatment with antibiotics here has now declined such as that today the serum creatinine is 1.24.  The white count, however, is still elevated at 15,400 and she is continuing with antibiotic therapy.      PAST MEDICAL HISTORY:  Hypothyroidism, history of depression, parkinsonism, urinary stone disease, urinary tract infection and  of incontinence.      PAST SURGICAL HISTORY:  Includes lithotripsy for urinary stone disease.      MEDICATIONS:  Prior to admission include the following, carbidopa/levodopa, levothyroxine, Effexor.      ALLERGIES:  Mirapex and Requip, although this is leg swelling and is probably not an allergic reaction.      SOCIAL HISTORY:  The patient, who is 77, retired, never smoked cigarettes.  She does drink alcohol on occasions.  She lives at [place], which is about 12 miles from [place], with her .      FAMILY HISTORY:  There is none related.      SYMPTOM REVIEW:  At this time, other than the symptoms already outlined, there are no other significant symptoms in 10-system review.      PHYSICAL EXAMINATION:   VITAL SIGNS:  At this time, show the following -- She is afebrile with a blood pressure of 128/62 and a pulse rate of 71.   GENERAL:  A pleasant lady who does not appear to be in pain, no distress, is quite conversational, but seems to become a little confused, but is otherwise well oriented in time, place and person.   MENTAL STATUS:   Otherwise is unremarkable.   HEENT:  There is no clinical evidence of jaundice.  The mucous membranes are normal.   SKIN:  Otherwise normal.   RESPIRATORY:  The cycle is normal.  Lungs are normal to both percussion and auscultation.   CARDIOVASCULAR:  Heart sounds normal, no murmurs are heard and no evidence of peripheral edema.   EXTREMITIES:  No other remarkable features.   ABDOMEN:  Soft and nontender.  No hernias are detected.   LYMPHATIC:  Lymphadenopathy not detected.   NEUROLOGIC:  There is evidence of tremor today, although it does not seem typically parkinsonian.  There is no evidence at the moment of dysdiadochokinesis.  There are no other focal abnormal neurologic signs in the central or peripheral nervous systems.      LABORATORY DATA:  Summary of her lab studies was already discussed.  The overall impression therefore is this patient has urinary tract infection with significant symptoms which are now resolving with therapy.  The renal insufficiency is improving, with decline in serum creatinine from 2.7 on admission to 1.24.  CT has shown urinary stones, but these are small and located in calices in the kidney.  They are none in the ureter and there was no hydronephrosis.      ASSESSMENT AND PLAN:  Finally, she does have Parkinson's, does have incontinence and my recommendations therefore will be as follows:   1.  There is no indication to treat the stones in the kidney at this time and there may not be in the future.  I would, however, recommend that she be followed by a urologist in her hometown probably in about 3 months at least with a KUB.   2.  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      I would be happy to see the patient again if  further problems arise.         TAMMI LEIJA MD             D: 10/26/2017 15:24   T: 10/26/2017 16:18   MT: TS      Name:     LOAN MONTENEGRO   MRN:      [id number]        Account:       [id number]   :      1940           Consult Date:  10/26/2017      Document: [id number][JH1.1]         Revision History        User Key Date/Time User Provider Type Action    > JH1.1 10/27/2017  8:34 AM Tammi Leija MD Physician Sign     [N/A] 10/26/2017  4:19 PM Tammi Leija MD Physician Edit                     Progress Notes - Physician (Notes for yesterday and today)      Progress Notes by Juan Antonio Vu MD at 10/26/2017 10:17 AM     Author:  Juan Antonio Vu MD Service:  Hospitalist Author Type:  Physician    Filed:  10/26/2017  2:41 PM Date of Service:  10/26/2017 10:17 AM Creation Time:  10/26/2017 10:17 AM    Status:  Signed :  Juan Antonio Vu MD (Physician)         [facility]    Hospitalist Progress Note[NB1.1]    Assessment & Plan[NB1.2]   Loan Montenegro is a 77 year old female with history of Parkinson's disease, CKD, neurogenic bladder, hypothyroidism, and  nephrolithiasis who was admitted on 10/21/2017 after presenting with confusion.       Severe sepsis secondary to acute pyelonephritis with E. Coli bacteremia.[NB1.1]   -[NB1.3]Patient presented with hypotension, fever, leukocytosis, and acute renal failure.[NB1.1]   -[NB1.3]UA positive for large LE, hematuria, and significant pyuria.[NB1.1]  -Urine and blood culture positive for Escherichia coli   -Surveillance blood culture negative  -C[NB1.3]T shows multiple non-obstructing kidney stones, related to recurrent UTI?  -[NB1.1]Awaiting urology consult  -Received[NB1.3] IV Rocephin,[NB1.1] for five days, transitioned to p.o. Cipro 10/26[NB1.3]    [NB1.1]  Acute[NB1.3] Metabolic encephalopathy, improving.[NB1.1]   -[NB1.3]Suspect secondary to urinary tract infection[NB1.1]/sepsis[NB1.3].[NB1.1]    -[NB1.3]Possible some component of hypercarbia as patient has not used CPAP for quite some time, seems to be worse for a while after she wakes up.[NB1.1]   -Close to baseline[NB1.3]  -avoid benzos, narcotics, anticholinergics  -recommend repeating outpatient sleep study post discharge      Elevated blood pressure.[NB1.1]   -[NB1.3]No hx HTN, normally runs 110s systolic per family and prior clinic notes. She has been persistently hypertensive over the past few days.   -started Norvasc 5mg patricia[NB1.1]y  -Blood pressure slightly better, continue Norvasc[NB1.3]      Acute[NB1.1] kidney injury on[NB1.3] CKD[NB1.1]-III:  -[NB1.3]Baseline Cr 1-1.3.[NB1.1]   -[NB1.3]Initial creatinine elevated 2.74.[NB1.1]   -[NB1.3]Continues to improve daily, back to baseline 1.24.   -avoid nephrotoxic agents   -encourage po intake       Parkinson's disease.  [NB1.1]  -[NB1.3]Continue sinemet and Neupro.[NB1.1]   -[NB1.3]low dose seroquel if needed for agitation       Dysphagia. Family and patient note concerns for aspiration. SLP following and has noted mild-moderate dysphagia. Recommend safe swallow strategies as well as video swallow. Discussed with patient and family[NB1.1];[NB1.3] regardless of the result of a video swallow patient would not be willing to modify her diet at this time. Will not pursue.   -continue regular diet per patient request for now      Chronic/Stable medical conditions include:  HLD  Chronic nonalcoholic liver disease  Hypothyroidism    [NB1.1]  D[NB1.3]/W: RN[NB1.1], pt and daughter at bedside[NB1.3]  DVT Prophylaxis: Pneumatic Compression Devices  Code Status:[NB1.1] Full Code[NB1.2]    Disposition: Expected discharge[NB1.1] 10/27; if no further workup planned by urology[NB1.3]    Juan Antonio Vu[NB1.2], MD[NB1.1]    Interval History[NB1.2]   Afebrile.  Blood pressure better controlled today.  Denies abdominal pain/ nausea or vomiting.  No dysuria    -Data reviewed today: I reviewed all new labs and imaging  results over the last 24 hours. I personally reviewed no images or EKG's today.[NB1.1]    Physical Exam   Temp: 98  F (36.7  C) Temp src: Oral BP: 150/75 (manual cuff) Pulse: 64 Heart Rate: 78 Resp: 18 SpO2: 94 % O2 Device: None (Room air)    Vitals:    10/24/17 0532 10/25/17 0515 10/26/17 0537   Weight: 88.1 kg (194 lb 3.2 oz) 88.8 kg (195 lb 12.8 oz) 91.6 kg (201 lb 14.4 oz)[NB1.2]     Vital Signs with Ranges[NB1.1]  Temp:  [96.2  F (35.7  C)-98.1  F (36.7  C)] 98  F (36.7  C)  Pulse:  [64-81] 64  Heart Rate:  [71-78] 78  Resp:  [16-20] 18  BP: (128-150)/() 150/75  SpO2:  [93 %-95 %] 94 %  I/O last 3 completed shifts:  In: -   Out: 2350 [Urine:2350][NB1.2]    Constitutional: AAOX[NB1.1]3[NB1.3], NAD, Appears comfortable  HEENT: Moist oral mucosa, no oral lesions, No pallor or icterus  Respiratory:  No crackles, No wheezes, CTA B/L, Normal WOB  Cardiovascular: RRR, No murmur  GI: Soft, Non- tender, BS- normoactive, No Guarding/rebound/rigidity  Skin/Integument: Warm and dry, no rashes  MSK: No joint deformity or swelling, no edema  Neuro: CN- grossly intact[NB1.1], tremors+[NB1.3]      Medications     - MEDICATION INSTRUCTIONS -         ciprofloxacin  500 mg Oral Q12H BURKE     amLODIPine  5 mg Oral Daily     aspirin EC  81 mg Oral Daily     docusate sodium  100 mg Oral BID     rotigotine  1 patch Transdermal Daily     rotigotine   Transdermal Daily     rotigotine   Transdermal Q8H     carbidopa-levodopa  1.5 tablet Oral 4x Daily     venlafaxine  75 mg Oral Daily with breakfast     carbidopa-levodopa  1 tablet Oral 4 times per day     levothyroxine (SYNTHROID/LEVOTHROID) tablet 112 mcg  112 mcg Oral QAM AC     atorvastatin (LIPITOR) tablet 20 mg  20 mg Oral Daily       Data     Recent Labs  Lab 10/26/17  0658 10/25/17  1420 10/25/17  0710 10/24/17  0700 10/23/17  0653  10/22/17  0800  10/21/17  2789   WBC  --   --  15.4* 13.7* 18.0*  --  10.7  --  15.4*   HGB  --   --  12.0  --  12.2  --  12.1  --  14.4   MCV   "--   --  89  --  89  --  88  --  91   PLT  --   --  229  --  198  --  157  --  152   NA  --   --  140 142 140  --  138  < > 135   POTASSIUM 3.4 4.0 3.2* 3.5 4.1  < > 3.2*  < > 2.7*   CHLORIDE  --   --  105 112* 112*  --  106  < > 97   CO2  --   --  25 21 20  --  21  < > 24   BUN  --   --  24 30 43*  --  42*  < > 51*   CR  --   --  1.24* 1.50* 1.68*  --  2.18*  < > 2.82*   ANIONGAP  --   --  10 9 8  --  11  < > 14   NIKKIE  --   --  8.1* 8.1* 8.2*  --  8.0*  < > 8.4*   GLC  --   --  116* 111* 182*  --  222*  < > 137*   ALBUMIN  --   --   --  2.3* 2.3*  --   --   --  2.9*   PROTTOTAL  --   --   --   --  6.7*  --   --   --  7.7   BILITOTAL  --   --   --   --  0.4  --   --   --  0.8   ALKPHOS  --   --   --   --  218*  --   --   --  258*   ALT  --   --   --   --  10  --   --   --  13   AST  --   --   --   --  38  --   --   --  61*   < > = values in this interval not displayed.    No results found for this or any previous visit (from the past 24 hour(s)).[NB1.2]     Revision History        User Key Date/Time User Provider Type Action    > NB1.3 10/26/2017  2:41 PM Juan Antonio Vu MD Physician Sign     NB1.2 10/26/2017 10:18 AM Juan Antonio Vu MD Physician      NB1.1 10/26/2017 10:17 AM Juan Antonio Vu MD Physician                   Procedure Notes     No notes of this type exist for this encounter.      Progress Notes - Therapies (Notes from 10/24/17 through 10/27/17)     No notes of this type exist for this encounter.                                          INTERAGENCY TRANSFER FORM - LAB / IMAGING / EKG / EMG RESULTS   10/21/2017                       [place] [address] ONCOLOGY: [phone]            Unresulted Labs (24h ago through future)    Start       Ordered    Unscheduled  Potassium  (Potassium Replacement - \"Standard\" - For K levels less than 3.4 mmol/L - UU,UR,UA,RH,SH,PH,WY )  CONDITIONAL (SPECIFY),   Routine     Comments:  Obtain Potassium Level for these conditions:  *IF no potassium result " "within 24 hours before initiation of order set, draw potassium level with next lab collect.    *2 HOURS AFTER last IV potassium replacement dose and 4 hours after an oral replacement dose.  *Next morning after potassium dose.     Repeat Potassium Replacement if necessary.    10/22/17 0855    Unscheduled  Magnesium  (Magnesium Replacement - Adult - \"High\" - Replacement for all levels less than or equal to 2 mg/dL)  CONDITIONAL (SPECIFY),   Routine     Comments:  Obtain Magnesium Level for these conditions:  *IF no magnesium result within 24 hrs before initiation of order set, draw magnesium level with next lab collect.    *2 HOURS AFTER last magnesium replacement dose when magnesium replacement given for level less than 1.6  *Next morning after magnesium dose.     Repeat Magnesium Replacement if necessary.    10/22/17 0855    Unscheduled  Phosphorus  (or    SODIUM Phosphate - \"Standard\" - Replacement for levels less than or equal to 2.4 mg/dL )  CONDITIONAL (SPECIFY),   Routine     Comments:  *IF no phosphorus result within 24 hours before initiation of order set, draw phosphorus level with next lab collect.    *2 HOURS AFTER last phosphorus replacement dose for levels less than 2.0.  *Next morning after phosphorus dose.     Repeat Phosphorus Replacement if necessary.    10/24/17 1623         Lab Results - 3 Days      Blood culture [[id number]]  Resulted: 10/27/17 1035, Result status: Preliminary result    Ordering provider: Glo Ortiz MD  10/23/17 0000 Resulting lab: [facility]    Specimen Information    Type Source Collected On   Blood  10/23/17 0745   Comment:  Right Hand          Components       Value Reference Range Flag Lab   Specimen Description Blood Right Hand      Culture Micro No growth after 4 days   225            Blood culture [[id number]]  Resulted: 10/27/17 1035, Result status: Preliminary result    Ordering provider: Glo Ortiz MD  10/23/17 0000 Resulting lab: " [facility]    Specimen Information    Type Source Collected On   Blood  10/23/17 0653   Comment:  Right Hand          Components       Value Reference Range Flag Lab   Specimen Description Blood Right Hand      Culture Micro No growth after 4 days   225            Magnesium [[id number]]  Resulted: 10/27/17 0736, Result status: Final result    Ordering provider: Juan Antonio Vu MD  10/27/17 0000 Resulting lab: [facility]    Specimen Information    Type Source Collected On   Blood  10/27/17 0654          Components       Value Reference Range Flag Lab   Magnesium 2.1 1.6 - 2.3 mg/dL  FrStHsLb            Basic metabolic panel [[id number]] (Abnormal)  Resulted: 10/27/17 0736, Result status: Final result    Ordering provider: Juan Antonio Vu MD  10/27/17 0000 Resulting lab: [facility]    Specimen Information    Type Source Collected On   Blood  10/27/17 0654          Components       Value Reference Range Flag Lab   Sodium 139 133 - 144 mmol/L  FrStHsLb   Potassium 3.5 3.4 - 5.3 mmol/L  FrStHsLb   Chloride 102 94 - 109 mmol/L  FrStHsLb   Carbon Dioxide 30 20 - 32 mmol/L  FrStHsLb   Anion Gap 7 3 - 14 mmol/L  FrStHsLb   Glucose 121 70 - 99 mg/dL H FrStHsLb   Urea Nitrogen 15 7 - 30 mg/dL  FrStHsLb   Creatinine 1.02 0.52 - 1.04 mg/dL  FrStHsLb   GFR Estimate 53 >60 mL/min/1.7m2 L FrStHsLb   Comment:  Non  GFR Calc   GFR Estimate If Black 64 >60 mL/min/1.7m2  FrStHsLb   Comment:  African American GFR Calc   Calcium 8.1 8.5 - 10.1 mg/dL L FrStHsLb            CBC with platelets [[id number]] (Abnormal)  Resulted: 10/27/17 0723, Result status: Final result    Ordering provider: Juan Antonio Vu MD  10/27/17 0000 Resulting lab: [facility]    Specimen Information    Type Source Collected On   Blood  10/27/17 0654          Components       Value Reference Range Flag Lab   WBC 15.0 4.0 - 11.0 10e9/L H FrStHsLb   RBC Count 3.80 3.8  - 5.2 10e12/L  FrStHsLb   Hemoglobin 11.8 11.7 - 15.7 g/dL  FrStHsLb   Hematocrit 34.7 35.0 - 47.0 % L FrStHsLb   MCV 91 78 - 100 fl  FrStHsLb   MCH 31.1 26.5 - 33.0 pg  FrStHsLb   MCHC 34.0 31.5 - 36.5 g/dL  FrStHsLb   RDW 15.0 10.0 - 15.0 %  FrStHsLb   Platelet Count 304 150 - 450 10e9/L  FrStHsLb            Potassium [[id number]]  Resulted: 10/26/17 0717, Result status: Final result    Ordering provider: Juan Antonio Vu MD  10/26/17 0001 Resulting lab: [facility]    Specimen Information    Type Source Collected On   Blood  10/26/17 0658          Components       Value Reference Range Flag Lab   Potassium 3.4 3.4 - 5.3 mmol/L  FrStHsLb            Magnesium [[id number]]  Resulted: 10/26/17 0717, Result status: Final result    Ordering provider: Juan Antonio Vu MD  10/26/17 0001 Resulting lab: [facility]    Specimen Information    Type Source Collected On   Blood  10/26/17 0658          Components       Value Reference Range Flag Lab   Magnesium 2.0 1.6 - 2.3 mg/dL  FrStHsLb            Potassium [[id number]]  Resulted: 10/25/17 1500, Result status: Final result    Ordering provider: Juan Antonio Vu MD  10/25/17 1021 Resulting lab: [facility]    Specimen Information    Type Source Collected On   Blood  10/25/17 1420          Components       Value Reference Range Flag Lab   Potassium 4.0 3.4 - 5.3 mmol/L  FrStHsLb            Basic metabolic panel [[id number]] (Abnormal)  Resulted: 10/25/17 0745, Result status: Final result    Ordering provider: Mariia Garza PA-C  10/25/17 0001 Resulting lab: [facility]    Specimen Information    Type Source Collected On   Blood  10/25/17 0710          Components       Value Reference Range Flag Lab   Sodium 140 133 - 144 mmol/L  FrStHsLb   Potassium 3.2 3.4 - 5.3 mmol/L L FrStHsLb   Chloride 105 94 - 109 mmol/L  FrStHsLb   Carbon Dioxide 25 20 - 32 mmol/L  FrStHsLb   Anion Gap 10 3 - 14 mmol/L  FrStHsLb    Glucose 116 70 - 99 mg/dL H FrStHsLb   Urea Nitrogen 24 7 - 30 mg/dL  FrStHsLb   Creatinine 1.24 0.52 - 1.04 mg/dL H FrStHsLb   GFR Estimate 42 >60 mL/min/1.7m2 L FrStHsLb   Comment:  Non  GFR Calc   GFR Estimate If Black 51 >60 mL/min/1.7m2 L FrStHsLb   Comment:  African American GFR Calc   Calcium 8.1 8.5 - 10.1 mg/dL L FrStHsLb            Phosphorus [[id number]]  Resulted: 10/25/17 0745, Result status: Final result    Ordering provider: Mariia Garza PA-C  10/25/17 0001 Resulting lab: [facility]    Specimen Information    Type Source Collected On   Blood  10/25/17 0710          Components       Value Reference Range Flag Lab   Phosphorus 2.9 2.5 - 4.5 mg/dL  FrStHsLb            Magnesium [[id number]]  Resulted: 10/25/17 0745, Result status: Final result    Ordering provider: Mariia Garza PA-C  10/25/17 0710 Resulting lab: [facility]    Specimen Information    Type Source Collected On     10/25/17 0710          Components       Value Reference Range Flag Lab   Magnesium 1.8 1.6 - 2.3 mg/dL  FrStHsLb            CBC with platelets differential [[id number]] (Abnormal)  Resulted: 10/25/17 0723, Result status: Final result    Ordering provider: Mariia Garza PA-C  10/25/17 0001 Resulting lab: [facility]    Specimen Information    Type Source Collected On   Blood  10/25/17 0710          Components       Value Reference Range Flag Lab   WBC 15.4 4.0 - 11.0 10e9/L H FrStHsLb   RBC Count 3.89 3.8 - 5.2 10e12/L  FrStHsLb   Hemoglobin 12.0 11.7 - 15.7 g/dL  FrStHsLb   Hematocrit 34.5 35.0 - 47.0 % L FrStHsLb   MCV 89 78 - 100 fl  FrStHsLb   MCH 30.8 26.5 - 33.0 pg  FrStHsLb   MCHC 34.8 31.5 - 36.5 g/dL  FrStHsLb   RDW 15.0 10.0 - 15.0 %  FrStHsLb   Platelet Count 229 150 - 450 10e9/L  FrStHsLb   Diff Method Automated Method   FrStHsLb   % Neutrophils 75.6 %  FrStHsLb   % Lymphocytes 10.5 %  FrStHsLb   % Monocytes 6.8 %  FrStHsLb   %  Eosinophils 2.8 %  FrStHsLb   % Basophils 0.1 %  FrStHsLb   % Immature Granulocytes 4.2 %  FrStHsLb   Nucleated RBCs 0 0 /100  FrStHsLb   Absolute Neutrophil 11.6 1.6 - 8.3 10e9/L H FrStHsLb   Absolute Lymphocytes 1.6 0.8 - 5.3 10e9/L  FrStHsLb   Absolute Monocytes 1.0 0.0 - 1.3 10e9/L  FrStHsLb   Absolute Eosinophils 0.4 0.0 - 0.7 10e9/L  FrStHsLb   Absolute Basophils 0.0 0.0 - 0.2 10e9/L  FrStHsLb   Abs Immature Granulocytes 0.7 0 - 0.4 10e9/L H FrStHsLb   Absolute Nucleated RBC 0.0   FrStHsLb            Blood culture [[id number]] (Abnormal)  Resulted: 10/24/17 0749, Result status: Final result    Ordering provider: Angelo Cormier MD  10/21/17 222 Resulting lab: [facility]    Specimen Information    Type Source Collected On   Blood Arm, Left 10/21/17 2246   Comment:  Left Arm          Components       Value Reference Range Flag Lab   Specimen Description Blood Left Arm      Special Requests Aerobic and anaerobic bottles received   FrStHsLb   Culture Micro --  A 225   Result:         Cultured on the 1st day of incubation:  Escherichia coli     Culture Micro --   225   Result:         Critical Value/Significant Value, preliminary result only, called to and read back by  Eloisa Cheng RN SH88 @ 1132.cg 10/22/17     Culture Micro --   225   Result:         (Note)  POSITIVE for E. COLI by Verigene multiplex nucleic acid test. Final  identification and antimicrobial susceptibility testing will be  verified by standard methods.    Specimen tested with Verigene multiplex, gram-negative blood culture  nucleic acid test for the following targets: Acinetobacter sp.,  Citrobacter sp., Enterobacter sp., Proteus sp., E. coli, K.  pneumoniae/oxytoca, P. aeruginosa, and the following resistance  markers: CTXM, KPC, NDM, VIM, IMP and OXA.    Critical Value/Significant Value called to and read back by Eloisa Cheng RN SH88 @ 1350.cg 10/22/17                Renal panel  [[id number]] (Abnormal)  Resulted: 10/24/17 0749, Result status: Final result    Ordering provider: Gregg Melendez MD  10/24/17 0000 Resulting lab: [facility]    Specimen Information    Type Source Collected On   Blood  10/24/17 0700          Components       Value Reference Range Flag Lab   Sodium 142 133 - 144 mmol/L  FrStHsLb   Potassium 3.5 3.4 - 5.3 mmol/L  FrStHsLb   Chloride 112 94 - 109 mmol/L H FrStHsLb   Carbon Dioxide 21 20 - 32 mmol/L  FrStHsLb   Anion Gap 9 3 - 14 mmol/L  FrStHsLb   Glucose 111 70 - 99 mg/dL H FrStHsLb   Urea Nitrogen 30 7 - 30 mg/dL  FrStHsLb   Creatinine 1.50 0.52 - 1.04 mg/dL H FrStHsLb   GFR Estimate 34 >60 mL/min/1.7m2 L FrStHsLb   Comment:  Non  GFR Calc   GFR Estimate If Black 41 >60 mL/min/1.7m2 L FrStHsLb   Comment:  African American GFR Calc   Calcium 8.1 8.5 - 10.1 mg/dL L FrStHsLb   Phosphorus 2.4 2.5 - 4.5 mg/dL L FrStHsLb   Albumin 2.3 3.4 - 5.0 g/dL L FrStHsLb            Blood culture [[id number]] (Abnormal)  Resulted: 10/24/17 0747, Result status: Final result    Ordering provider: Angelo Cormier MD  10/21/17 2227 Resulting lab: [facility]    Specimen Information    Type Source Collected On   Blood Arm, Left 10/21/17 2225   Comment:  Left Arm          Components       Value Reference Range Flag Lab   Specimen Description Blood Left Arm      Special Requests Aerobic and anaerobic bottles received   75   Culture Micro --  A 75   Result:         Cultured on the 1st day of incubation:  Escherichia coli  Susceptibility testing done on previous specimen     Culture Micro --   75   Result:         Critical Value/Significant Value, preliminary result only, called to and read back by  Eloisa Cheng RN SH88 @ 1226.cg 10/22/17              WBC and differential [[id number]] (Abnormal)  Resulted: 10/24/17 0722, Result status: Final result    Ordering provider: Gregg Melendez MD  10/24/17 0000  Resulting lab: [facility]    Specimen Information    Type Source Collected On   Blood  10/24/17 0700          Components       Value Reference Range Flag Lab   WBC 13.7 4.0 - 11.0 10e9/L H FrStHsLb   Diff Method Automated Method   FrStHsLb   % Neutrophils 77.8 %  FrStHsLb   % Lymphocytes 9.7 %  FrStHsLb   % Monocytes 8.8 %  FrStHsLb   % Eosinophils 1.9 %  FrStHsLb   % Basophils 0.1 %  FrStHsLb   % Immature Granulocytes 1.7 %  FrStHsLb   Nucleated RBCs 0 0 /100  FrStHsLb   Absolute Neutrophil 10.6 1.6 - 8.3 10e9/L H FrStHsLb   Absolute Lymphocytes 1.3 0.8 - 5.3 10e9/L  FrStHsLb   Absolute Monocytes 1.2 0.0 - 1.3 10e9/L  FrStHsLb   Absolute Eosinophils 0.3 0.0 - 0.7 10e9/L  FrStHsLb   Absolute Basophils 0.0 0.0 - 0.2 10e9/L  FrStHsLb   Abs Immature Granulocytes 0.2 0 - 0.4 10e9/L  FrStHsLb   Absolute Nucleated RBC 0.0   FrStHsLb            Glucose by meter [[id number]]  Resulted: 10/24/17 0621, Result status: Final result    Ordering provider: Maxx Hopper III, MD  10/24/17 0551 Resulting lab: POINT OF CARE TEST, GLUCOSE    Specimen Information    Type Source Collected On     10/24/17 0551          Components       Value Reference Range Flag Lab   Glucose 87 70 - 99 mg/dL  170            Testing Performed By     Lab - Abbreviation Name Director Address Valid Date Range    14 - FrStHsLb [facility] Unknown [address] [place] [address] 05/08/15 1057 - Present    75 - Unknown [facility] Unknown [address] [facility] [address] 01/15/15 1019 - Present    170 - Unknown POINT OF CARE TEST, GLUCOSE Unknown Unknown 10/31/11 1114 - Present    225 - Unknown [facility] Unknown [address] [facility] [address] 12/19/14 0954 - Present               Imaging Results - 3 Days      XR Chest Port 1 View [[id number]]  Resulted: 10/27/17 0950, Result status: Final result    Ordering provider: Juan Antonio Vu MD   10/27/17 0918 Resulted by: Roger Marley MD    Performed: 10/27/17 0922 - 10/27/17 0940 Resulting lab: RADIOLOGY RESULTS    Narrative:       XR CHEST PORT 1 VW 10/27/2017 9:40 AM    COMPARISON: None.    HISTORY: Cough.      Impression:       IMPRESSION: Mildly enlarged cardiac silhouette. Mild bibasilar  atelectasis. No pleural effusion or pneumothorax.    ROGER MARLEY MD      CT Abdomen Pelvis w/o Contrast [[id number]]  Resulted: 10/24/17 1525, Result status: Final result    Ordering provider: Mariia Garza PA-C  10/24/17 1426 Resulted by: Gurpreet Kapoor MD    Performed: 10/24/17 1445 - 10/24/17 1456 Resulting lab: RADIOLOGY RESULTS    Narrative:       CT ABDOMEN AND PELVIS WITHOUT CONTRAST   10/24/2017 2:56 PM     HISTORY: Look for kidney stone.    TECHNIQUe: Noncontrast images of the abdomen and pelvis. Radiation  dose for this scan was reduced using automated exposure control,  adjustment of the mA and/or kV according to patient size, or iterative  reconstruction technique.    COMPARISON: None.    FINDINGS: There is no hydronephrosis or hydroureter. In the posterior  interpolar right kidney, there is a nonobstructing 5 mm calculus.  Another nonobstructing 1 mm calculus is noted at the upper pole of the  right kidney. A nonobstructing 3 mm calcification is noted in the  anterior left kidney. There is a lobular contour to the relatively  atrophic left kidney.    Mild bilateral pleural thickening is noted. Within the limits of an  unenhanced examination, the liver, gallbladder, spleen, pancreas, and  adrenal glands are unremarkable. No abdominal or retroperitoneal  adenopathy. No evidence of bowel obstruction, free air, or ascites.  The uterus has been removed. No pelvic adenopathy or mass.      Impression:       IMPRESSION:  1. Nonobstructing nephrolithiasis.  2. Lobular contour of the left kidney may be related to prior  infection.  3. Mild bilateral pleural thickening noted at the lung  bases.    ZACK ESPOSITO MD      Testing Performed By     Lab - Abbreviation Name Director Address Valid Date Range    104 - Rad Rslts RADIOLOGY RESULTS Unknown Unknown 02/16/05 1553 - Present            Encounter-Level Documents:     There are no encounter-level documents.      Order-Level Documents:     There are no order-level documents.

## 2017-10-21 NOTE — IP AVS SNAPSHOT
` `     [place] [address] ONCOLOGY: [phone]                 INTERAGENCY TRANSFER FORM - NOTES (H&P, Discharge Summary, Consults, Procedures, Therapies)   10/21/2017                    Hospital Admission Date: 10/21/2017  LOAN MONTENEGRO   : 1940  Sex: Female        Patient PCP Information     Provider PCP Type    [facility]         History & Physicals      H&P by Maxx Hopper III, MD at 10/21/2017 11:14 PM     Author:  Maxx Hopper III, MD Service:  Hospitalist Author Type:  Physician    Filed:  10/21/2017 11:33 PM Date of Service:  10/21/2017 11:14 PM Creation Time:  10/21/2017 11:14 PM    Status:  Signed :  Maxx Hopper III, MD (Physician)         [facility]    History and Physical  Hospitalist       Date of Admission:  10/21/2017[GH1.1]    Assessment & Plan[GH1.2]   Loan Montenegro is a 77 year old female who presents with confusion.    She has a PMH of Parkinson's Disease, CKD, Hypothyroidism, Depression, Renal Stone s/p lithotripsy and UTI/Sepsis ().    According to the patient's daughters, she has been acting a little confused for the last 2-3 days. Yesterday in [place] the patient was particularly abnormal, she was dyspneic and quite confused and exhibited some flailing movements that bystanders were concerned may be seizures. These did not result in a loss of consciousness, incontinence or tongue biting. She has had some headache today and feels generally ill, with a temperature of 101.4 at home, but denies nausea, vomiting, diarrhea, constipation or dysuria. She did have difficulty voiding in the ED and had to be catheterized for 400cc.    In the ED she had a temp of 99.9 and was awake but a little confused. WBC count was 15k and her UA was grossly positive for infection. Creatinine was significantly elevated at 2.7.    She had a potassium of 2.7 on arrival. She received IV repletion  "which led to pain at the infusion site followed by flushing of her face, neck and chest. She transiently dropped her BP at this time to the 80's systolic and exhibited some wheeze. This is prior to receiving any antibiotics. Her BP and wheezing improved prior to any intervention and she then received benadryl, albuterol and solumedrol. She reports that previously she had a local reaction to IV potassium which involved significant local swelling, but at that time it was felt to be local irritation rather than allergy. She has had oral potassium without issue in the past and again today in the ED.    UTI and Pyelonephritis with Sepsis  - Admit to general medicine  - Continue IVF with Normal Saline  - Ceftriaxone  - f/u culture and sensitivity  - She does have a history of \"neurogenic bladder\" listed but does not usually need catheterization    Reaction to IV Potassium Chloride  - This seems to be related to the IV form of potassium so suspect it has to do with a preservative, though still a real allergy and I would avoid IV potassium products  - OK to continue with oral potassium repletion  - Do not believe she needs to continue steroids beyond initial dose    YASMIN on CKD  - Baseline creatinine looks to be about 1 - 1.3  - Creatinine 2.7 on arrival  - Brisk IV Fluids    Confusion  Flailing/Shaking Movements  Metabolic Encephalopathy  - Given context this seems more akin to metabolic encephalopathy and delirium rather than seizure  - Treat underlying infection    Parkinson's Disease  - Continue Sinemet, Neupro  - Pt will continue her supplements from home, family to provide  - Avoid antihistamines and haldol  - Low dose seroquel PRN if sundowning      DVT Prophylaxis: Pneumatic Compression Devices  Code Status: DNR    Disposition: Expected discharge in 3-5 days[GH1.1]    Maxx Hopper III[GH1.2], MD[GH1.1]    Primary Care Physician   pOal Cardenas    Chief Complaint[GH1.2]   Confusion    History is obtained " from the patient, her adult daughters, ED physician and review of records.[GH1.1]    History of Present Illness[GH1.2]   Loan Anderson is a 77 year old female who presents with confusion.    She has a PMH of Parkinson's Disease, CKD, Hypothyroidism, Depression, Renal Stone s/p lithotripsy and UTI/Sepsis (2015).    According to the patient's daughters, she has been acting a little confused for the last 2-3 days. Yesterday in [place] the patient was particularly abnormal, she was dyspneic and quite confused and exhibited some flailing movements that bystanders were concerned may be seizures. These did not result in a loss of consciousness, incontinence or tongue biting. She has had some headache today and feels generally ill, with a temperature of 101.4 at home, but denies nausea, vomiting, diarrhea, constipation or dysuria. She did have difficulty voiding in the ED and had to be catheterized for 400cc.[GH1.1]    Past Medical History[GH1.2]    I have reviewed this patient's medical history and updated it with pertinent information if needed.[GH1.1]   Past Medical History:   Diagnosis Date     Constipation 7/15/2011     Convergence insufficiency 7/15/2011     Depression 7/15/2011     Diplopia 7/18/2011     Hot flashes, menopausal 7/18/2011     Hypercholesterolemia 7/18/2011     Hypothyroid 7/15/2011     Neurogenic bladder 7/15/2011     Parkinson disease (H) 7/15/2011     REM sleep behavior disorder 7/18/2011     Sebaceous cyst 7/18/2011     Wears glasses 7/18/2011[GH1.3]       Past Surgical History[GH1.2]   I have reviewed this patient's surgical history and updated it with pertinent information if needed.[GH1.1]  Past Surgical History:   Procedure Laterality Date     ------------OTHER-------------  november ? 2015    lithrotripsy for renal stone     ------------OTHER-------------  november 2015    had sepsis from uti and hospitalized     BRAIN SURGERY  12 or 14 oct 2011    gene therapy  enrolled study at [facility][GH1.3]       Prior to Admission Medications   Prior to Admission Medications   Prescriptions Last Dose Informant Patient Reported? Taking?   ASPIRIN 81 MG PO TABS 10/21/2017 at Unknown time Daughter Yes Yes   Si TABLET DAILY   COMPOUND (CMPD RX) - PHARMACY TO MIX COMPOUNDED MEDICATION  Daughter No Yes   Sig: Estriol 1mg/1gram. Place 1 gram vaginally daily for 2 weeks, then vaginally twice weekly after.   Calcium-Vitamin D 600-200 MG-UNIT TABS 10/21/2017 at Unknown time Daughter Yes Yes   Si tablet at 5pm   Inulin 2 G CHEW  Daughter Yes No   Si x 6grams per day with natur made adult gummies fiber.   NONFORMULARY 10/21/2017 at Unknown time Daughter Yes Yes   Sig: macugels 2 softgels daily at 920pm   OMEGA-3 KRILL  MG CAPS 10/21/2017 at Unknown time Daughter Yes Yes   Sig: Take 1 tablet by mouth daily.   TYLENOL EXTRA STRENGTH 500 MG PO TABS  Daughter No Yes   Si TABLET daily  AS NEEDED   atorvastatin (LIPITOR) 10 MG tablet 10/21/2017 at Unknown time Daughter Yes Yes   Sig: Take 1 tablet (10 mg) by mouth daily   carbidopa-levodopa (SINEMET CR)  MG per CR tablet 10/21/2017 at x4 Daughter No Yes   Si tab 4/day  At 7am, 11am, 5pm, 9:30pm and may take 5th if needed   carbidopa-levodopa (SINEMET)  MG per tablet 10/21/2017 at x4 Daughter No Yes   Sig: Increase to 1.5 tabs 4/day @ 7am, 11am, 5pm, 9pm   docusate sodium (COLACE) 100 MG capsule 10/21/2017 at Unknown time Daughter Yes Yes   Sig: Take 1 capsule (100 mg) by mouth 2 times daily   levothyroxine (SYNTHROID,LEVOTHROID) 100 MCG tablet 10/21/2017 at Unknown time Daughter Yes Yes   Si tablet @ 7am   polyethylene glycol (MIRALAX) powder  Daughter Yes Yes   Sig: Take 17 g (1 capful) by mouth daily as needed   rotigotine (NEUPRO) 4 MG/24HR 24 hr patch 10/21/2017 at Unknown time Daughter No Yes   Sig: Place 1 patch onto the skin daily   venlafaxine (EFFEXOR-XR) 75 MG 24 hr capsule 10/21/2017 at Unknown time  Daughter No Yes   Sig: TAKE 1 TABLET(75 MG) BY MOUTH DAILY   vitamin  B complex with vitamin C (VITAMIN  B COMPLEX) TABS 10/21/2017 at Unknown time Daughter Yes Yes   Sig: Take 1 tablet by mouth daily      Facility-Administered Medications: None     Allergies   Allergies   Allergen Reactions     Mirapex [Pramipexole Dihydrochloride Monohydrate]      Leg swelling     Requip [Ropinirole Hydrochloride]      Leg swelling     Epinephrine Palpitations       Social History[GH1.2]   I have reviewed this patient's social history and updated it with pertinent information if needed. Loan Anderson[GH1.1]  reports that she has never smoked. She has never used smokeless tobacco. She reports that she drinks alcohol.[GH1.3]    Family History[GH1.2]   Family history reviewed with patient and is noncontributory.[GH1.1]    Review of Systems[GH1.2]   The 10 point Review of Systems is negative other than noted in the HPI or here.[GH1.1]     Physical Exam   Temp: 99.9  F (37.7  C) Temp src: Oral BP: 120/64 Pulse: 79 Heart Rate: 79 Resp: 19 SpO2: 98 % O2 Device: None (Room air)[GH1.2]    Vital Signs with Ranges[GH1.1]  Temp:  [97.8  F (36.6  C)-99.9  F (37.7  C)] 99.9  F (37.7  C)  Pulse:  [72-79] 79  Heart Rate:  [75-79] 79  Resp:  [15-24] 19  BP: (115-128)/(58-64) 120/64  SpO2:  [95 %-98 %] 98 %  193 lbs 0 oz[GH1.2]    Constitutional: NAD. Comfortable, well-developed. Flushed.  Eyes: Anicteric, no conjunctival injection  ENT: Atraumatic, membranes moist   Neck: Supple, trachea midline  Respiratory: My initial exam had some faint end-expiratory wheeze which resolved prior to the end of my interview  Cardiovascular: S1S2, no edema  GI: Abdomen soft, non-tender  Skin: Warm, pink, dry  Neurologic: Alert and oriented. CN II - XII grossly intact. Some confusion/forgetfulness  Psychiatric: Pleasant, cooperative[GH1.1]    Data[GH1.2]   Data reviewed today:  I personally reviewed no images or EKG's today.[GH1.1]    Recent Labs  Lab  10/21/17  2115 10/21/17  1751   WBC  --  15.4*   HGB  --  14.4   MCV  --  91   PLT  --  152   * 135   POTASSIUM 3.0* 2.7*   CHLORIDE 97 97   CO2 23 24   BUN 51* 51*   CR 2.74* 2.82*   ANIONGAP 11 14   NIKKIE 8.1* 8.4*   * 137*   ALBUMIN  --  2.9*   PROTTOTAL  --  7.7   BILITOTAL  --  0.8   ALKPHOS  --  258*   ALT  --  13   AST  --  61*[GH1.2]       Imaging:[GH1.1]  No results found for this or any previous visit (from the past 24 hour(s)).[GH1.2]     Revision History        User Key Date/Time User Provider Type Action    > GH1.3 10/21/2017 11:33 PM Maxx Hopper III, MD Physician Sign     GH1.2 10/21/2017 11:15 PM Maxx Hopper III, MD Physician      GH1.1 10/21/2017 11:14 PM Maxx Hopper III, MD Physician                   Discharge Summaries     No notes of this type exist for this encounter.         Consult Notes      Consults signed by Roshan Leija MD at 10/27/2017  8:34 AM      Author:  Roshan Leija MD Service:  Urology Author Type:  Physician    Filed:  10/27/2017  8:34 AM Date of Service:  10/26/2017  3:24 PM Creation Time:  10/26/2017  4:19 PM    Status:  Signed :  Roshan Leija MD (Physician)         UROLOGY CONSULTATION      HISTORY OF PRESENT ILLNESS:  It was a pleasure to be asked to see Loan Anderson, a very pleasant 77-year-old lady, in initial consultation today at the request of the inpatient medical service.  She lives close to [place].  She was en route to a wedding in [place] and had stopped here in the [facility] where various family members live.  She had presented with what seemed like a seizure and was seen in the emergency room with what seemed to be some evidence of dyspnea and confusion.  Initially had some difficulty voiding and was catheterized for 400 mL.  Subsequently, she was found to have an elevated white count.  A urinalysis was grossly positive for infection with 182 white cells being seen and the  serum creatinine had risen to 2.7.  Furthermore, the temperature before admission had been as high as 101.5.  Subsequently, urine cultures had grown a heavy growth of E. coli and she has been initiated on treatment.  A CT scan had shown evidence of stones; however, the stones were quite small.  There was a 5 mm stone in the lower pole pippa of the right kidney and 3 mm stone in the left kidney and another 1 mm stone in the upper pole of the right kidney; however, there was no hydronephrosis and no evidence of stones in the ureters.  The only other feature was some irregularity of the left kidney which is possibly consistent with some mild renal atrophy or previous scarring.  She does have a previous history of urinary stone disease and had shock wave treatment 2 years ago in [place] for stones at that time.  The CT scan had shown no other significant features there.  Another thing includes what appears to be a history of Parkinson's disease.  Serum creatinine which was 2.7 at admission after treatment with antibiotics here has now declined such as that today the serum creatinine is 1.24.  The white count, however, is still elevated at 15,400 and she is continuing with antibiotic therapy.      PAST MEDICAL HISTORY:  Hypothyroidism, history of depression, parkinsonism, urinary stone disease, urinary tract infection and  of incontinence.      PAST SURGICAL HISTORY:  Includes lithotripsy for urinary stone disease.      MEDICATIONS:  Prior to admission include the following, carbidopa/levodopa, levothyroxine, Effexor.      ALLERGIES:  Mirapex and Requip, although this is leg swelling and is probably not an allergic reaction.      SOCIAL HISTORY:  The patient, who is 77, retired, never smoked cigarettes.  She does drink alcohol on occasions.  She lives at [place], which is about 12 miles from [place], with her .      FAMILY HISTORY:  There is none related.      SYMPTOM REVIEW:  At this time, other than the  symptoms already outlined, there are no other significant symptoms in 10-system review.      PHYSICAL EXAMINATION:   VITAL SIGNS:  At this time, show the following -- She is afebrile with a blood pressure of 128/62 and a pulse rate of 71.   GENERAL:  A pleasant lady who does not appear to be in pain, no distress, is quite conversational, but seems to become a little confused, but is otherwise well oriented in time, place and person.   MENTAL STATUS:  Otherwise is unremarkable.   HEENT:  There is no clinical evidence of jaundice.  The mucous membranes are normal.   SKIN:  Otherwise normal.   RESPIRATORY:  The cycle is normal.  Lungs are normal to both percussion and auscultation.   CARDIOVASCULAR:  Heart sounds normal, no murmurs are heard and no evidence of peripheral edema.   EXTREMITIES:  No other remarkable features.   ABDOMEN:  Soft and nontender.  No hernias are detected.   LYMPHATIC:  Lymphadenopathy not detected.   NEUROLOGIC:  There is evidence of tremor today, although it does not seem typically parkinsonian.  There is no evidence at the moment of dysdiadochokinesis.  There are no other focal abnormal neurologic signs in the central or peripheral nervous systems.      LABORATORY DATA:  Summary of her lab studies was already discussed.  The overall impression therefore is this patient has urinary tract infection with significant symptoms which are now resolving with therapy.  The renal insufficiency is improving, with decline in serum creatinine from 2.7 on admission to 1.24.  CT has shown urinary stones, but these are small and located in calices in the kidney.  They are none in the ureter and there was no hydronephrosis.      ASSESSMENT AND PLAN:  Finally, she does have Parkinson's, does have incontinence and my recommendations therefore will be as follows:   1.  There is no indication to treat the stones in the kidney at this time and there may not be in the future.  I would, however, recommend that she  be followed by a urologist in her hometown probably in about 3 months at least with a KUB.   2.  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      I would be happy to see the patient again if further problems arise.         TAMMI LEIJA MD             D: 10/26/2017 15:24   T: 10/26/2017 16:18   MT: TS      Name:     PIYUSH MONTENEGRO   MRN:      [id number]        Account:       [id number]   :      1940           Consult Date:  10/26/2017      Document: [id number][JH1.1]         Revision History        User Key Date/Time User Provider Type Action    > JH1.1 10/27/2017  8:34 AM Tammi Leija MD Physician Sign     [N/A] 10/26/2017  4:19 PM Tammi Leija MD Physician Edit                     Progress Notes - Physician (Notes from 10/24/17 through 10/27/17)      Progress Notes by Flora Marin LSW at 10/27/2017 10:06 AM     Author:  Flora Marin LSW Service:  Social Work Author Type:      Filed:  10/27/2017 11:05 AM Date of Service:  10/27/2017 10:06 AM Creation Time:  10/27/2017 10:06 AM    Status:  Addendum :  Flora Marin LSW ()         SWS PROGRESS NOTE:     I: Pt has been accepted for admission to [facility]. YAMILET sent a text page to Dr. Vu informing him that a bed is available for pt today, if she is medically stable. YAMILET will stand-by for outcome re: pt's DC date.   P: Pt will DC to [facility] once medically stable. YAMILET following.[AM1.1]    Flora Marin[AM1.2], MSW, LGSW [phone][AM1.1]    PAS-RR    D: Per DHS regulation, YAMILET completed and submitted PAS-RR to [place] [employer] Direct Connect via the [employer].  PAS-RR confirmation # is : [id number]    I: SW spoke with  pt's spouse and they are aware a PAS-RR has been submitted.  SW reviewed with pt's spouse that they may be contacted for a follow up appointment within 10 days of hospital discharge if their SNF stay is < 30 days.  Contact information for [employer] was also provided.    A: Pt's spouse verbalized understanding.    P: Further questions may be directed to [employer] Line at #[phone], option #4 for PAS-RR staff.[AM1.3]    UPDATE 1045:   Call out to pt's family to discuss transportation options for DC. Pt has been accepted at [facility] and she is stable for DC. SW will wait for a return call to f/u on transportation for pt.[AM1.4]     UPDATE 1100:   Pt's dtr, Lara, will transport pt to [facility] today around 1330. PAS, orders and scripts (as ordered) will be faxed to facility once completed. Charge nurse and HUC are aware of DC plans.[AM1.5] [facility] was updated on time of DC.[AM1.6]      Revision History        User Key Date/Time User Provider Type Action    > AM1.6 10/27/2017 11:05 AM Flora Marin, NEELW  Addend     AM1.5 10/27/2017 11:03 AM Flora Marin, LSW  Addend     AM1.4 10/27/2017 10:50 AM Flora Marin, LSW  Addend     AM1.3 10/27/2017 10:31 AM Flora Marin, LSW  Addend     AM1.2 10/27/2017 10:08 AM Flora Marin LSW  Sign     AM1.1 10/27/2017 10:06 AM Flora Marin, LSW              Progress Notes by Juan Antonio Vu MD at 10/26/2017 10:17 AM     Author:  Juan Antonio Vu MD Service:  Hospitalist Author Type:  Physician    Filed:  10/26/2017  2:41 PM Date of Service:  10/26/2017 10:17 AM Creation Time:  10/26/2017 10:17 AM    Status:  Signed :  Juan Antonio Vu MD (Physician)         [facility]    Hospitalist Progress Note[NB1.1]    Assessment & Plan[NB1.2]   Loan Anderson is a 77 year old female with history of Parkinson's disease, CKD, neurogenic bladder, hypothyroidism, and   nephrolithiasis who was admitted on 10/21/2017 after presenting with confusion.       Severe sepsis secondary to acute pyelonephritis with E. Coli bacteremia.[NB1.1]   -[NB1.3]Patient presented with hypotension, fever, leukocytosis, and acute renal failure.[NB1.1]   -[NB1.3]UA positive for large LE, hematuria, and significant pyuria.[NB1.1]  -Urine and blood culture positive for Escherichia coli   -Surveillance blood culture negative  -C[NB1.3]T shows multiple non-obstructing kidney stones, related to recurrent UTI?  -[NB1.1]Awaiting urology consult  -Received[NB1.3] IV Rocephin,[NB1.1] for five days, transitioned to p.o. Cipro 10/26[NB1.3]    [NB1.1]  Acute[NB1.3] Metabolic encephalopathy, improving.[NB1.1]   -[NB1.3]Suspect secondary to urinary tract infection[NB1.1]/sepsis[NB1.3].[NB1.1]   -[NB1.3]Possible some component of hypercarbia as patient has not used CPAP for quite some time, seems to be worse for a while after she wakes up.[NB1.1]   -Close to baseline[NB1.3]  -avoid benzos, narcotics, anticholinergics  -recommend repeating outpatient sleep study post discharge      Elevated blood pressure.[NB1.1]   -[NB1.3]No hx HTN, normally runs 110s systolic per family and prior clinic notes. She has been persistently hypertensive over the past few days.   -started Norvasc 5mg patricia[NB1.1]y  -Blood pressure slightly better, continue Norvasc[NB1.3]      Acute[NB1.1] kidney injury on[NB1.3] CKD[NB1.1]-III:  -[NB1.3]Baseline Cr 1-1.3.[NB1.1]   -[NB1.3]Initial creatinine elevated 2.74.[NB1.1]   -[NB1.3]Continues to improve daily, back to baseline 1.24.   -avoid nephrotoxic agents   -encourage po intake       Parkinson's disease.  [NB1.1]  -[NB1.3]Continue sinemet and Neupro.[NB1.1]   -[NB1.3]low dose seroquel if needed for agitation       Dysphagia. Family and patient note concerns for aspiration. SLP following and has noted mild-moderate dysphagia. Recommend safe swallow strategies as well as video swallow. Discussed  with patient and family[NB1.1];[NB1.3] regardless of the result of a video swallow patient would not be willing to modify her diet at this time. Will not pursue.   -continue regular diet per patient request for now      Chronic/Stable medical conditions include:  HLD  Chronic nonalcoholic liver disease  Hypothyroidism    [NB1.1]  D[NB1.3]/W: RN[NB1.1], pt and daughter at bedside[NB1.3]  DVT Prophylaxis: Pneumatic Compression Devices  Code Status:[NB1.1] Full Code[NB1.2]    Disposition: Expected discharge[NB1.1] 10/27; if no further workup planned by urology[NB1.3]    Juan Antonio Vu[NB1.2], MD[NB1.1]    Interval History[NB1.2]   Afebrile.  Blood pressure better controlled today.  Denies abdominal pain/ nausea or vomiting.  No dysuria    -Data reviewed today: I reviewed all new labs and imaging results over the last 24 hours. I personally reviewed no images or EKG's today.[NB1.1]    Physical Exam   Temp: 98  F (36.7  C) Temp src: Oral BP: 150/75 (manual cuff) Pulse: 64 Heart Rate: 78 Resp: 18 SpO2: 94 % O2 Device: None (Room air)    Vitals:    10/24/17 0532 10/25/17 0515 10/26/17 0537   Weight: 88.1 kg (194 lb 3.2 oz) 88.8 kg (195 lb 12.8 oz) 91.6 kg (201 lb 14.4 oz)[NB1.2]     Vital Signs with Ranges[NB1.1]  Temp:  [96.2  F (35.7  C)-98.1  F (36.7  C)] 98  F (36.7  C)  Pulse:  [64-81] 64  Heart Rate:  [71-78] 78  Resp:  [16-20] 18  BP: (128-150)/() 150/75  SpO2:  [93 %-95 %] 94 %  I/O last 3 completed shifts:  In: -   Out: 2350 [Urine:2350][NB1.2]    Constitutional: AAOX[NB1.1]3[NB1.3], NAD, Appears comfortable  HEENT: Moist oral mucosa, no oral lesions, No pallor or icterus  Respiratory:  No crackles, No wheezes, CTA B/L, Normal WOB  Cardiovascular: RRR, No murmur  GI: Soft, Non- tender, BS- normoactive, No Guarding/rebound/rigidity  Skin/Integument: Warm and dry, no rashes  MSK: No joint deformity or swelling, no edema  Neuro: CN- grossly intact[NB1.1], tremors+[NB1.3]      Medications     - MEDICATION  INSTRUCTIONS -         ciprofloxacin  500 mg Oral Q12H BURKE     amLODIPine  5 mg Oral Daily     aspirin EC  81 mg Oral Daily     docusate sodium  100 mg Oral BID     rotigotine  1 patch Transdermal Daily     rotigotine   Transdermal Daily     rotigotine   Transdermal Q8H     carbidopa-levodopa  1.5 tablet Oral 4x Daily     venlafaxine  75 mg Oral Daily with breakfast     carbidopa-levodopa  1 tablet Oral 4 times per day     levothyroxine (SYNTHROID/LEVOTHROID) tablet 112 mcg  112 mcg Oral QAM AC     atorvastatin (LIPITOR) tablet 20 mg  20 mg Oral Daily       Data     Recent Labs  Lab 10/26/17  0658 10/25/17  1420 10/25/17  0710 10/24/17  0700 10/23/17  0653  10/22/17  0800  10/21/17  1751   WBC  --   --  15.4* 13.7* 18.0*  --  10.7  --  15.4*   HGB  --   --  12.0  --  12.2  --  12.1  --  14.4   MCV  --   --  89  --  89  --  88  --  91   PLT  --   --  229  --  198  --  157  --  152   NA  --   --  140 142 140  --  138  < > 135   POTASSIUM 3.4 4.0 3.2* 3.5 4.1  < > 3.2*  < > 2.7*   CHLORIDE  --   --  105 112* 112*  --  106  < > 97   CO2  --   --  25 21 20  --  21  < > 24   BUN  --   --  24 30 43*  --  42*  < > 51*   CR  --   --  1.24* 1.50* 1.68*  --  2.18*  < > 2.82*   ANIONGAP  --   --  10 9 8  --  11  < > 14   NIKKIE  --   --  8.1* 8.1* 8.2*  --  8.0*  < > 8.4*   GLC  --   --  116* 111* 182*  --  222*  < > 137*   ALBUMIN  --   --   --  2.3* 2.3*  --   --   --  2.9*   PROTTOTAL  --   --   --   --  6.7*  --   --   --  7.7   BILITOTAL  --   --   --   --  0.4  --   --   --  0.8   ALKPHOS  --   --   --   --  218*  --   --   --  258*   ALT  --   --   --   --  10  --   --   --  13   AST  --   --   --   --  38  --   --   --  61*   < > = values in this interval not displayed.    No results found for this or any previous visit (from the past 24 hour(s)).[NB1.2]     Revision History        User Key Date/Time User Provider Type Action    > NB1.3 10/26/2017  2:41 PM Juan Antonio Vu MD Physician Sign     NB1.2 10/26/2017 10:18 AM  Juan Antonio Vu MD Physician      NB1.1 10/26/2017 10:17 AM Juan Antonio Vu MD Physician             Progress Notes by Maxine Singh LSW at 10/26/2017 12:27 PM     Author:  Maxine Singh LSW Service:  (none) Author Type:      Filed:  10/26/2017 12:32 PM Date of Service:  10/26/2017 12:27 PM Creation Time:  10/26/2017 12:27 PM    Status:  Signed :  Maxine Singh LSW ()         YAMILET  I: SW spoke with patient, spouse and daughter to discuss d/c planning needs. Patient's family had some concern about the appropriate level of care and felt Memory care was best option. SW discussed memory care TCU's and family agreed they would prefer a referral sent to [facility]. SW discussed insurance coverage and encouraged family to call [facility] to check SNF coverage once Medicare stops covering 100%. Family will contact [facility]. SW will send referral and will update daughter and spouse once assessments have been completed by facilities. Referrals were sent via DOD.    P: SW will continue to follow and assist as needed.    OFELIA Low   *[id number][SM1.1]       Revision History        User Key Date/Time User Provider Type Action    > SM1.1 10/26/2017 12:32 PM Maxine Singh LSW  Sign                  Procedure Notes     No notes of this type exist for this encounter.      Progress Notes - Therapies (Notes from 10/24/17 through 10/27/17)     No notes of this type exist for this encounter.

## 2017-10-22 ENCOUNTER — APPOINTMENT (OUTPATIENT)
Dept: PHYSICAL THERAPY | Facility: CLINIC | Age: 77
DRG: 871 | End: 2017-10-22
Attending: INTERNAL MEDICINE
Payer: MEDICARE

## 2017-10-22 ENCOUNTER — APPOINTMENT (OUTPATIENT)
Dept: SPEECH THERAPY | Facility: CLINIC | Age: 77
DRG: 871 | End: 2017-10-22
Attending: INTERNAL MEDICINE
Payer: MEDICARE

## 2017-10-22 PROBLEM — A41.9 SEPSIS (H): Status: ACTIVE | Noted: 2017-10-22

## 2017-10-22 PROBLEM — E87.1 HYPONATREMIA: Status: ACTIVE | Noted: 2017-10-22

## 2017-10-22 PROBLEM — N17.9 ACUTE KIDNEY FAILURE (H): Status: ACTIVE | Noted: 2017-10-22

## 2017-10-22 PROBLEM — N10 ACUTE PYELONEPHRITIS: Status: ACTIVE | Noted: 2017-10-22

## 2017-10-22 PROBLEM — E87.6 HYPOKALEMIA: Status: ACTIVE | Noted: 2017-10-22

## 2017-10-22 PROBLEM — R78.81 E COLI BACTEREMIA: Status: ACTIVE | Noted: 2017-10-22

## 2017-10-22 PROBLEM — B96.20 E COLI BACTEREMIA: Status: ACTIVE | Noted: 2017-10-22

## 2017-10-22 LAB
ANION GAP SERPL CALCULATED.3IONS-SCNC: 11 MMOL/L (ref 3–14)
BASOPHILS # BLD AUTO: 0 10E9/L (ref 0–0.2)
BASOPHILS NFR BLD AUTO: 0.1 %
BUN SERPL-MCNC: 42 MG/DL (ref 7–30)
CALCIUM SERPL-MCNC: 8 MG/DL (ref 8.5–10.1)
CHLORIDE SERPL-SCNC: 106 MMOL/L (ref 94–109)
CO2 SERPL-SCNC: 21 MMOL/L (ref 20–32)
CREAT SERPL-MCNC: 2.18 MG/DL (ref 0.52–1.04)
CREAT UR-MCNC: 150 MG/DL
DIFFERENTIAL METHOD BLD: ABNORMAL
EOSINOPHIL # BLD AUTO: 0 10E9/L (ref 0–0.7)
EOSINOPHIL NFR BLD AUTO: 0 %
ERYTHROCYTE [DISTWIDTH] IN BLOOD BY AUTOMATED COUNT: 14.3 % (ref 10–15)
FRACT EXCRET NA UR+SERPL-RTO: 0.2 %
GFR SERPL CREATININE-BSD FRML MDRD: 22 ML/MIN/1.7M2
GLUCOSE SERPL-MCNC: 222 MG/DL (ref 70–99)
HCT VFR BLD AUTO: 34.3 % (ref 35–47)
HGB BLD-MCNC: 12.1 G/DL (ref 11.7–15.7)
IMM GRANULOCYTES # BLD: 0.1 10E9/L (ref 0–0.4)
IMM GRANULOCYTES NFR BLD: 0.6 %
LYMPHOCYTES # BLD AUTO: 0.4 10E9/L (ref 0.8–5.3)
LYMPHOCYTES NFR BLD AUTO: 3.7 %
MCH RBC QN AUTO: 31.1 PG (ref 26.5–33)
MCHC RBC AUTO-ENTMCNC: 35.3 G/DL (ref 31.5–36.5)
MCV RBC AUTO: 88 FL (ref 78–100)
MONOCYTES # BLD AUTO: 0.3 10E9/L (ref 0–1.3)
MONOCYTES NFR BLD AUTO: 2.3 %
NEUTROPHILS # BLD AUTO: 10 10E9/L (ref 1.6–8.3)
NEUTROPHILS NFR BLD AUTO: 93.3 %
NRBC # BLD AUTO: 0 10*3/UL
NRBC BLD AUTO-RTO: 0 /100
PLATELET # BLD AUTO: 157 10E9/L (ref 150–450)
POTASSIUM SERPL-SCNC: 3.2 MMOL/L (ref 3.4–5.3)
POTASSIUM SERPL-SCNC: 4 MMOL/L (ref 3.4–5.3)
RBC # BLD AUTO: 3.89 10E12/L (ref 3.8–5.2)
SODIUM SERPL-SCNC: 138 MMOL/L (ref 133–144)
SODIUM UR-SCNC: 16 MMOL/L
WBC # BLD AUTO: 10.7 10E9/L (ref 4–11)

## 2017-10-22 PROCEDURE — 36415 COLL VENOUS BLD VENIPUNCTURE: CPT | Performed by: INTERNAL MEDICINE

## 2017-10-22 PROCEDURE — 92610 EVALUATE SWALLOWING FUNCTION: CPT | Mod: GN | Performed by: SPEECH-LANGUAGE PATHOLOGIST

## 2017-10-22 PROCEDURE — 92526 ORAL FUNCTION THERAPY: CPT | Mod: GN | Performed by: SPEECH-LANGUAGE PATHOLOGIST

## 2017-10-22 PROCEDURE — 99233 SBSQ HOSP IP/OBS HIGH 50: CPT | Performed by: INTERNAL MEDICINE

## 2017-10-22 PROCEDURE — 40000225 ZZH STATISTIC SLP WARD VISIT: Performed by: SPEECH-LANGUAGE PATHOLOGIST

## 2017-10-22 PROCEDURE — 97161 PT EVAL LOW COMPLEX 20 MIN: CPT | Mod: GP | Performed by: PHYSICAL THERAPIST

## 2017-10-22 PROCEDURE — 40000895 ZZH STATISTIC SLP IP EVAL DEFER: Performed by: SPEECH-LANGUAGE PATHOLOGIST

## 2017-10-22 PROCEDURE — 84132 ASSAY OF SERUM POTASSIUM: CPT | Performed by: INTERNAL MEDICINE

## 2017-10-22 PROCEDURE — 12000000 ZZH R&B MED SURG/OB

## 2017-10-22 PROCEDURE — 80048 BASIC METABOLIC PNL TOTAL CA: CPT | Performed by: INTERNAL MEDICINE

## 2017-10-22 PROCEDURE — 40000193 ZZH STATISTIC PT WARD VISIT: Performed by: PHYSICAL THERAPIST

## 2017-10-22 PROCEDURE — 97530 THERAPEUTIC ACTIVITIES: CPT | Mod: GP | Performed by: PHYSICAL THERAPIST

## 2017-10-22 PROCEDURE — 97116 GAIT TRAINING THERAPY: CPT | Mod: GP | Performed by: PHYSICAL THERAPIST

## 2017-10-22 PROCEDURE — 25000128 H RX IP 250 OP 636: Performed by: INTERNAL MEDICINE

## 2017-10-22 PROCEDURE — 85025 COMPLETE CBC W/AUTO DIFF WBC: CPT | Performed by: INTERNAL MEDICINE

## 2017-10-22 PROCEDURE — 25000132 ZZH RX MED GY IP 250 OP 250 PS 637: Mod: GY | Performed by: INTERNAL MEDICINE

## 2017-10-22 PROCEDURE — A9270 NON-COVERED ITEM OR SERVICE: HCPCS | Mod: GY | Performed by: INTERNAL MEDICINE

## 2017-10-22 RX ORDER — CARBIDOPA AND LEVODOPA 25; 100 MG/1; MG/1
1.5 TABLET ORAL 4 TIMES DAILY
Status: DISCONTINUED | OUTPATIENT
Start: 2017-10-22 | End: 2017-10-27 | Stop reason: HOSPADM

## 2017-10-22 RX ORDER — POTASSIUM CHLORIDE 1500 MG/1
20-40 TABLET, EXTENDED RELEASE ORAL
Status: DISCONTINUED | OUTPATIENT
Start: 2017-10-22 | End: 2017-10-27 | Stop reason: HOSPADM

## 2017-10-22 RX ORDER — ATORVASTATIN CALCIUM 10 MG/1
10 TABLET, FILM COATED ORAL DAILY
Status: DISCONTINUED | OUTPATIENT
Start: 2017-10-22 | End: 2017-10-22

## 2017-10-22 RX ORDER — CARBIDOPA AND LEVODOPA 50; 200 MG/1; MG/1
1 TABLET, EXTENDED RELEASE ORAL
Status: DISCONTINUED | OUTPATIENT
Start: 2017-10-22 | End: 2017-10-27 | Stop reason: HOSPADM

## 2017-10-22 RX ORDER — POLYETHYLENE GLYCOL 3350 17 G/17G
17 POWDER, FOR SOLUTION ORAL DAILY PRN
Status: DISCONTINUED | OUTPATIENT
Start: 2017-10-22 | End: 2017-10-27 | Stop reason: HOSPADM

## 2017-10-22 RX ORDER — MAG HYDROX/ALUMINUM HYD/SIMETH 400-400-40
1 SUSPENSION, ORAL (FINAL DOSE FORM) ORAL DAILY
Status: DISCONTINUED | OUTPATIENT
Start: 2017-10-22 | End: 2017-10-22

## 2017-10-22 RX ORDER — ONDANSETRON 2 MG/ML
4 INJECTION INTRAMUSCULAR; INTRAVENOUS EVERY 6 HOURS PRN
Status: DISCONTINUED | OUTPATIENT
Start: 2017-10-22 | End: 2017-10-27 | Stop reason: HOSPADM

## 2017-10-22 RX ORDER — MAGNESIUM SULFATE HEPTAHYDRATE 40 MG/ML
4 INJECTION, SOLUTION INTRAVENOUS EVERY 4 HOURS PRN
Status: DISCONTINUED | OUTPATIENT
Start: 2017-10-22 | End: 2017-10-27 | Stop reason: HOSPADM

## 2017-10-22 RX ORDER — ACETAMINOPHEN 325 MG/1
650 TABLET ORAL EVERY 4 HOURS PRN
Status: DISCONTINUED | OUTPATIENT
Start: 2017-10-22 | End: 2017-10-27 | Stop reason: HOSPADM

## 2017-10-22 RX ORDER — LEVOTHYROXINE SODIUM 112 UG/1
112 TABLET ORAL DAILY
Status: DISCONTINUED | OUTPATIENT
Start: 2017-10-22 | End: 2017-10-22

## 2017-10-22 RX ORDER — ASPIRIN 81 MG/1
81 TABLET ORAL DAILY
Status: DISCONTINUED | OUTPATIENT
Start: 2017-10-22 | End: 2017-10-27 | Stop reason: HOSPADM

## 2017-10-22 RX ORDER — DOCUSATE SODIUM 100 MG/1
100 CAPSULE, LIQUID FILLED ORAL 2 TIMES DAILY
Status: DISCONTINUED | OUTPATIENT
Start: 2017-10-22 | End: 2017-10-27 | Stop reason: HOSPADM

## 2017-10-22 RX ORDER — IPRATROPIUM BROMIDE AND ALBUTEROL SULFATE 2.5; .5 MG/3ML; MG/3ML
3 SOLUTION RESPIRATORY (INHALATION) EVERY 4 HOURS PRN
Status: DISCONTINUED | OUTPATIENT
Start: 2017-10-22 | End: 2017-10-27 | Stop reason: HOSPADM

## 2017-10-22 RX ORDER — VENLAFAXINE HYDROCHLORIDE 75 MG/1
75 CAPSULE, EXTENDED RELEASE ORAL
Status: DISCONTINUED | OUTPATIENT
Start: 2017-10-22 | End: 2017-10-22

## 2017-10-22 RX ORDER — NALOXONE HYDROCHLORIDE 0.4 MG/ML
.1-.4 INJECTION, SOLUTION INTRAMUSCULAR; INTRAVENOUS; SUBCUTANEOUS
Status: DISCONTINUED | OUTPATIENT
Start: 2017-10-22 | End: 2017-10-27 | Stop reason: HOSPADM

## 2017-10-22 RX ORDER — ATORVASTATIN CALCIUM 20 MG/1
20 TABLET, FILM COATED ORAL DAILY
Status: DISCONTINUED | OUTPATIENT
Start: 2017-10-23 | End: 2017-10-27 | Stop reason: HOSPADM

## 2017-10-22 RX ORDER — LEVOTHYROXINE SODIUM 100 UG/1
100 TABLET ORAL
Status: DISCONTINUED | OUTPATIENT
Start: 2017-10-22 | End: 2017-10-22

## 2017-10-22 RX ORDER — AMOXICILLIN 250 MG
1-2 CAPSULE ORAL 2 TIMES DAILY PRN
Status: DISCONTINUED | OUTPATIENT
Start: 2017-10-22 | End: 2017-10-27 | Stop reason: HOSPADM

## 2017-10-22 RX ORDER — LEVOTHYROXINE SODIUM 112 UG/1
112 TABLET ORAL
Status: DISCONTINUED | OUTPATIENT
Start: 2017-10-23 | End: 2017-10-27 | Stop reason: HOSPADM

## 2017-10-22 RX ORDER — CARBIDOPA AND LEVODOPA 25; 100 MG/1; MG/1
1.5 TABLET ORAL 4 TIMES DAILY
Status: DISCONTINUED | OUTPATIENT
Start: 2017-10-22 | End: 2017-10-22

## 2017-10-22 RX ORDER — CEFTRIAXONE 2 G/1
2 INJECTION, POWDER, FOR SOLUTION INTRAMUSCULAR; INTRAVENOUS EVERY 24 HOURS
Status: DISCONTINUED | OUTPATIENT
Start: 2017-10-23 | End: 2017-10-22

## 2017-10-22 RX ORDER — VENLAFAXINE HYDROCHLORIDE 75 MG/1
75 CAPSULE, EXTENDED RELEASE ORAL
Status: DISCONTINUED | OUTPATIENT
Start: 2017-10-22 | End: 2017-10-27 | Stop reason: HOSPADM

## 2017-10-22 RX ORDER — POTASSIUM CHLORIDE 1.5 G/1.58G
20-40 POWDER, FOR SOLUTION ORAL
Status: DISCONTINUED | OUTPATIENT
Start: 2017-10-22 | End: 2017-10-27 | Stop reason: HOSPADM

## 2017-10-22 RX ORDER — CEFTRIAXONE 2 G/1
2 INJECTION, POWDER, FOR SOLUTION INTRAMUSCULAR; INTRAVENOUS EVERY 24 HOURS
Status: DISCONTINUED | OUTPATIENT
Start: 2017-10-22 | End: 2017-10-25

## 2017-10-22 RX ORDER — POTASSIUM CHLORIDE 1500 MG/1
20-40 TABLET, EXTENDED RELEASE ORAL
Status: DISCONTINUED | OUTPATIENT
Start: 2017-10-22 | End: 2017-10-22

## 2017-10-22 RX ORDER — ONDANSETRON 4 MG/1
4 TABLET, ORALLY DISINTEGRATING ORAL EVERY 6 HOURS PRN
Status: DISCONTINUED | OUTPATIENT
Start: 2017-10-22 | End: 2017-10-27 | Stop reason: HOSPADM

## 2017-10-22 RX ORDER — SODIUM CHLORIDE 9 MG/ML
INJECTION, SOLUTION INTRAVENOUS CONTINUOUS
Status: DISCONTINUED | OUTPATIENT
Start: 2017-10-22 | End: 2017-10-24

## 2017-10-22 RX ORDER — POTASSIUM CHLORIDE 1.5 G/1.58G
20-40 POWDER, FOR SOLUTION ORAL
Status: DISCONTINUED | OUTPATIENT
Start: 2017-10-22 | End: 2017-10-22

## 2017-10-22 RX ADMIN — VENLAFAXINE HYDROCHLORIDE 75 MG: 75 CAPSULE, EXTENDED RELEASE ORAL at 09:10

## 2017-10-22 RX ADMIN — ATORVASTATIN CALCIUM 10 MG: 10 TABLET, FILM COATED ORAL at 09:10

## 2017-10-22 RX ADMIN — CEFTRIAXONE 2 G: 2 INJECTION, POWDER, FOR SOLUTION INTRAMUSCULAR; INTRAVENOUS at 23:28

## 2017-10-22 RX ADMIN — SODIUM CHLORIDE: 9 INJECTION, SOLUTION INTRAVENOUS at 00:43

## 2017-10-22 RX ADMIN — POTASSIUM CHLORIDE 20 MEQ: 1.5 POWDER, FOR SOLUTION ORAL at 11:14

## 2017-10-22 RX ADMIN — SODIUM CHLORIDE: 9 INJECTION, SOLUTION INTRAVENOUS at 14:28

## 2017-10-22 RX ADMIN — LEVOTHYROXINE SODIUM 100 MCG: 100 TABLET ORAL at 06:51

## 2017-10-22 RX ADMIN — CARBIDOPA AND LEVODOPA 1 TABLET: 50; 200 TABLET, EXTENDED RELEASE ORAL at 11:49

## 2017-10-22 RX ADMIN — CARBIDOPA AND LEVODOPA 1 TABLET: 50; 200 TABLET, EXTENDED RELEASE ORAL at 21:29

## 2017-10-22 RX ADMIN — ASPIRIN 81 MG: 81 TABLET ORAL at 09:12

## 2017-10-22 RX ADMIN — CARBIDOPA AND LEVODOPA 1.5 TABLET: 25; 100 TABLET ORAL at 21:29

## 2017-10-22 RX ADMIN — DOCUSATE SODIUM 100 MG: 100 CAPSULE, LIQUID FILLED ORAL at 21:31

## 2017-10-22 RX ADMIN — CARBIDOPA AND LEVODOPA 1 TABLET: 50; 200 TABLET, EXTENDED RELEASE ORAL at 17:00

## 2017-10-22 RX ADMIN — CARBIDOPA AND LEVODOPA 1.5 TABLET: 25; 100 TABLET ORAL at 06:50

## 2017-10-22 RX ADMIN — CARBIDOPA AND LEVODOPA 1.5 TABLET: 25; 100 TABLET ORAL at 11:49

## 2017-10-22 RX ADMIN — SODIUM CHLORIDE: 9 INJECTION, SOLUTION INTRAVENOUS at 06:27

## 2017-10-22 RX ADMIN — CARBIDOPA AND LEVODOPA 1.5 TABLET: 25; 100 TABLET ORAL at 17:00

## 2017-10-22 RX ADMIN — POTASSIUM CHLORIDE 40 MEQ: 1.5 POWDER, FOR SOLUTION ORAL at 09:19

## 2017-10-22 ASSESSMENT — ACTIVITIES OF DAILY LIVING (ADL)
DRESS: 1-->ASSISTIVE EQUIPMENT
TRANSFERRING: 1-->ASSISTIVE EQUIPMENT
SWALLOWING: 0-->SWALLOWS FOODS/LIQUIDS WITHOUT DIFFICULTY
COGNITION: 1 - ATTENTION OR MEMORY DEFICITS
RETIRED_COMMUNICATION: 0-->UNDERSTANDS/COMMUNICATES WITHOUT DIFFICULTY
TOILETING: 1-->ASSISTIVE EQUIPMENT
FALL_HISTORY_WITHIN_LAST_SIX_MONTHS: YES
NUMBER_OF_TIMES_PATIENT_HAS_FALLEN_WITHIN_LAST_SIX_MONTHS: 6
BATHING: 1-->ASSISTIVE EQUIPMENT
AMBULATION: 1-->ASSISTIVE EQUIPMENT
RETIRED_EATING: 0-->INDEPENDENT

## 2017-10-22 NOTE — ED NOTES
[facility]  ED Nurse Handoff Report    ED Chief complaint: Fever (pt. had possible seizure activity last night, fever today with UTI symptoms.) and UTI      ED Diagnosis:   Final diagnoses:   Acute pyelonephritis   Altered mental status, unspecified altered mental status type       Code Status: Full Code    Allergies:   Allergies   Allergen Reactions     Mirapex [Pramipexole Dihydrochloride Monohydrate]      Leg swelling     Potassium Chloride      IV infusion only. She tolerates oral potassium chloride     Requip [Ropinirole Hydrochloride]      Leg swelling     Epinephrine Palpitations       Activity level - Baseline/Home:  Independent    Activity Level - Current:   Total Care     Needed?: No    Isolation: No  Infection: Not Applicable    Bariatric?: No    Vital Signs:   Vitals:    10/21/17 2248 10/21/17 2300 10/21/17 2315 10/21/17 2321   BP: (!) 81/72 122/63 (!) 99/92 116/56   Pulse:       Resp: 20 27 20 24   Temp:       TempSrc:       SpO2:  99% 93% 97%   Weight:       Height:           Cardiac Rhythm: ,        Pain level: 0-10 Pain Scale: 0    Is this patient confused?: No    Patient Report: Initial Complaint:Arrived to the ED from Urgent Care with fever, weakness, and UTI symptoms. Possible sepsis. Lactate in progress. Blood cultures drawn. Urine culture sent. Pt hypokalemic. Started KCL IV. Shortly after dose started, pt C/O burning at IV site and face became flushed which radiated down neck to shoulders. Potassium stopped. Given benadryl. Redness decreased to face. 1st dose of rocephin given. Fluids infusing rapidly due to drop in BP to 80's-90s systolic. Working on placing 2nd IV. Given neb, physician heard some wheezing on auscultation.  Focused Assessment: see EPIC  Tests Performed: see EPIC  Abnormal Results: see EPIC  Treatments provided: see EPIC    Family Comments: daughters here    OBS brochure/video discussed/provided to patient: N/A    ED Medications:   Medications   0.9%  sodium chloride BOLUS (500 mLs Intravenous New Bag 10/21/17 2133)   potassium chloride 10 mEq in 100 mL sterile water intermittent infusion (premix) (0 mEq Intravenous Stopped 10/21/17 2212)   potassium chloride (KLOR-CON) Packet 40 mEq (40 mEq Oral Given 10/21/17 2130)   diphenhydrAMINE (BENADRYL) 50 MG/ML injection (25 mg  Given 10/21/17 2214)   cefTRIAXone (ROCEPHIN) 2 g vial to attach to  ml bag for ADULTS or NS 50 ml bag for PEDS (2 g Intravenous New Bag 10/21/17 2311)   ipratropium - albuterol 0.5 mg/2.5 mg/3 mL (DUONEB) neb solution 3 mL (3 mLs Nebulization Given 10/21/17 2300)   methylPREDNISolone sodium succinate (solu-MEDROL) injection 125 mg (125 mg Intravenous Given 10/21/17 2304)       Drips infusing?:  No      ED NURSE PHONE NUMBER: [phone]

## 2017-10-22 NOTE — PROGRESS NOTES
[facility]    Hospitalist Progress Note :     Cumulative Summary: Loan Anderson is a 77 year old female who was admitted on 10/21/2017.  Patient past medical history is significant for Parkinson disease, constipation, hypothyroidism, hypercholesterolemia, Castro and obstructive sleep apnea.  Patient was admitted to the hospital with urinary tract infection and confusion, was found to have acute pyelonephritis Escherichia coli bacteremia and Escherichia coli UTI.  Most likely her confusion was secondary to sepsis related metabolic encephalopathy.  She was also hyponatremic and hypokalemic with significant acute renal failure and creatinine of 2.74.    Assessment & Plan     Principal Problem:    Acute pyelonephritis (10/22/2017)severe Sepsis (H) (10/22/2017): Patient presented with symptoms of severe substance (hypotension, leukocytosis, fever, absolute neutrophilia) and organ dysfunction with metabolic encephalopathy and acute renal failure.  Blood cultures and urine culture was sent and patient was started on IV ceftriaxone and fluid resuscitation.  Patient responded well to it and did not require pressors.    E coli bacteremia (10/22/2017): According to patient and family, patient has issues with recurrent urinary tract infections in the past.  Two out of two blood cultures are positive for Escherichia coli    Acute kidney failure (H) (10/22/2017): Most likely secondary to above baseline creatinine is about 1.3, creatinine is starting to improve  Confusion,Flailing/Shaking Movements and Metabolic Encephalopathy: Most likely secondary to sepsis with UTI.    Hyponatremia (10/22/2017): Most likely secondary to septic picture, improved    Hypokalemia (10/22/2017): Patient was not able to tolerate IV potassium supplement, she is able to tolerate the oral potassium repletion most likely she must have allergy to preservative but should be avoided IV potassium supplement.  Hyperglycemia: Most likely  secondary to combination of sepsis and administration of high-dose Solu-Medrol in the emergency room as she could've allergic reaction to IV potassium supplement, will check hemoglobin A1c    -- Continue to monitor patient closely on telemetry  -- General diet  -- Activity as tolerated  -- We'll decrease her IV fluids to 75 cc per hour as patient blood pressure is improved and she is becoming slightly hypertensive.  -- Continue patient on ceftriaxone 2 g IV q.24 hours  -- Draw two sets of blood cultures tomorrow morning  -- Follow up on results of blood and urine culture, so far growing Escherichia coli.  -- Strict intake and output  -- Check hemoglobin A1c in morning, will not add any insulin at this time as her hyperglycemia is most likely secondary to administration of IV Solu-Medrol in the ER.  -- He does have a history of neurogenic bladder listed but she usually does not need catheterization, she does have issues with recurrent urinary tract infections in the past as per patient and family.    Parkinson disease (H) (7/15/2011)  -- Continue Sinemet, Neupro  -- Pt will continue her supplements from home, family to provide  -- Avoid antihistamines and haldol  -- Low dose seroquel PRN if sundowning  -- Also post to speech therapy, family is concerned about dysphagia secondary to Parkinson's disease.    Constipation (7/15/2011)  -- Bowel regimen    Hypothyroid (7/15/2011)  -- continue patient on home Synthroid dose of 100 mcg    Hypercholesterolemia (7/18/2011)    Chronic nonalcoholic liver disease (4/27/2015)    Obstructive sleep apnea (6/30/2014)    DVT Prophylaxis: Pneumatic Compression Devices    Code Status: Full Code    Disposition: Expected discharge in the next 2-3 days once patient starts to make clinical improvement, the post physical and occupational therapy family and patient are in agreement to go to TCU if needed.    Glo Ortiz MD, FACP  Text Page (7am - 6pm)      Interval History   Patient care was  assumed this morning, patient was seen and examined, patient is now feeling better, much more alert and awake, daughters present in the room told P that her confusion is much more improved, patient does not have any burning or urgency with urination.  Patient and family were updated regarding her labs and vitals.  All the questions were answered.,  she is denying chest discomfort, shortness of breath or abdominal discomfort.    -Data reviewed today: I reviewed all new labs and imaging results over the last 24 hours.    I personally reviewed no images or EKG's today.    Physical Exam   Temp: 98.2  F (36.8  C) Temp src: Oral BP: 152/74 Pulse: 79 Heart Rate: 81 Resp: 20 SpO2: 95 % O2 Device: None (Room air) Oxygen Delivery: 2 LPM  Vitals:    10/21/17 2035   Weight: 87.5 kg (193 lb)     Vital Signs with Ranges  Temp:  [95.5  F (35.3  C)-99.9  F (37.7  C)] 98.2  F (36.8  C)  Pulse:  [72-79] 79  Heart Rate:  [68-85] 81  Resp:  [15-44] 20  BP: ()/(26-93) 152/74  SpO2:  [93 %-99 %] 95 %       GENERAL: Alert , awake and oriented. NAD. Conversational, appropriate.   HEENT: Normocephalic. EOMI. No icterus or injection. Nares normal.   LUNGS: Clear to auscultation. No dyspnea at rest.   HEART: Regular rate. Extremities perfused.   ABDOMEN: Soft, nontender, and nondistended. Positive bowel sounds.   EXTREMITIES: No LE edema noted.   NEUROLOGIC: Moves extremities x4 on command. No acute focal neurologic abnormalities noted.     Medications     - MEDICATION INSTRUCTIONS -       NaCl 125 mL/hr at 10/22/17 0627       [START ON 10/23/2017] cefTRIAXone  2 g Intravenous Q24H     aspirin EC  81 mg Oral Daily     docusate sodium  100 mg Oral BID     Inulin  6 g Oral Daily     levothyroxine  100 mcg Oral QAM AC     rotigotine  1 patch Transdermal Daily     rotigotine   Transdermal Daily     rotigotine   Transdermal Q8H     carbidopa-levodopa  1.5 tablet Oral 4x Daily     venlafaxine  75 mg Oral Daily with breakfast     atorvastatin   10 mg Oral Daily     carbidopa-levodopa  1 tablet Oral 4 times per day       Data     Recent Labs  Lab 10/22/17  0800 10/21/17  2115 10/21/17  1751   WBC 10.7  --  15.4*   HGB 12.1  --  14.4   MCV 88  --  91     --  152    131* 135   POTASSIUM 3.2* 3.0* 2.7*   CHLORIDE 106 97 97   CO2 21 23 24   BUN 42* 51* 51*   CR 2.18* 2.74* 2.82*   ANIONGAP 11 11 14   NIKKIE 8.0* 8.1* 8.4*   * 140* 137*   ALBUMIN  --   --  2.9*   PROTTOTAL  --   --  7.7   BILITOTAL  --   --  0.8   ALKPHOS  --   --  258*   ALT  --   --  13   AST  --   --  61*       Imaging:   No results found for this or any previous visit (from the past 24 hour(s)).

## 2017-10-22 NOTE — ED NOTES
BP noted to be 50s systolic. Not sure of accuracy. BP retaken. 80's systolic. Intensivist here. Fluid bolus off for short time while blood cultures drawn. Fluids resumed. Will monitor BP closely. Will get 2nd IV in place. Redness has diminished to face, neck, shoulders.

## 2017-10-22 NOTE — PLAN OF CARE
Problem: Patient Care Overview  Goal: Plan of Care/Patient Progress Review        Discharge Planner SLP   Patient plan for discharge: Did not state  Current status: A bedside swallow evaluation was completed this pm per family request.  Mild-moderate oral-pharyngeal dysphagia was found at bedside.  Deficits include hx of infrequent coughing with po intake and throat clearing with and without po intake at baseline, increased confusion, SOB, decreased respiratory/swallow coordination, mild decreased elevation, need for multiple swallows, fast rate of eating, delayed swallow reflex, and persistent throat clearing with thin liquids.  The chin tuck decreased frequency of throat clearing, but did not eliminate it.  Recommend increased precautions and soft moist food choices from a regular diet with 1:1 supervision as well as cues to use precautions: NO STRAW, small bites/sips, chin tuck with thin liquids, chew carefully, 2-3 swallows per bite, alternate textures, slow rate, pills with puree/pudding; hold po if increased aspiration signs/respiratory difficulty noted.  Swallow Tx with education was provided after the evaluation.  Discussed possible video swallow study to fully evaluate pharyngeal phase function and aspiration risk.  Daughter would like to wait on swallow study at this time.  Plan to continue Tx to monitor diet tolerance and train swallow precautions/strategies.  Will assess indication for a swallow study during the course of Tx.  Barriers to return to prior living situation: To be determined  Recommendations for discharge: Short term SLP swallow Tx after discharge  Rationale for recommendations: Insure safety with a least restrictive diet       Entered by: Yoli Urrutia 10/22/2017 2:39 PM

## 2017-10-22 NOTE — PROVIDER NOTIFICATION
MD Notification    Notified Person:  MD    Notified Persons Name: Angel    Notification Date/Time: 10/22/17     Notification Interaction:  Talked with Physician (verbal in person)    Purpose of Notification: + blood cultures 10/21 in L arm growing gram - rods    Orders Received: AM blood cultures, continue on abx

## 2017-10-22 NOTE — ED NOTES
Dr Cormier informed pt's BP drops to 90's systolic intermittently. Will give pt 2 full liters of fluid at rapid rate and re-assess BP. See VS flowsheet.

## 2017-10-22 NOTE — PROVIDER NOTIFICATION
MD Notification    Notified Person:  MD    Notified Persons Name: Angel     Notification Date/Time: 10/22/17 1430    Notification Interaction:  Talked with Physician    Purpose of Notification:Blood cultures: e.coli, urine culture: e.coli    Orders Received: Blood cultures a.m.

## 2017-10-22 NOTE — PROGRESS NOTES
SUBJECTIVE: Loan Anderson is a 77 year old female presenting with a chief complaint of possible sz last pm, with not feeling well including some confusion..  Onset of symptoms was 1 day(s) ago.  Course of illness is same.    Severity moderate  Current and Associated symptoms: none  Treatment measures tried include None tried.  Predisposing factors include see problem list.    Past Medical History:   Diagnosis Date     Constipation 7/15/2011     Convergence insufficiency 7/15/2011     Depression 7/15/2011     Diplopia 7/18/2011     Hot flashes, menopausal 7/18/2011     Hypercholesterolemia 7/18/2011     Hypothyroid 7/15/2011     Neurogenic bladder 7/15/2011     Parkinson disease (H) 7/15/2011     REM sleep behavior disorder 7/18/2011     Sebaceous cyst 7/18/2011     Wears glasses 7/18/2011     Allergies   Allergen Reactions     Mirapex [Pramipexole Dihydrochloride Monohydrate]      Leg swelling     Requip [Ropinirole Hydrochloride]      Leg swelling     Epinephrine Palpitations     Social History   Substance Use Topics     Smoking status: Never Smoker     Smokeless tobacco: Never Used     Alcohol use Yes      Comment: rare       ROS:  SKIN: no rash  GI: no vomiting    OBJECTIVE:  /64  Pulse 72  Temp 97.8  F (36.6  C) (Oral)  Resp 20  Wt 193 lb 3.2 oz (87.6 kg)  SpO2 95%  BMI 34.02 kg/[id number] APPEARANCE: healthy, alert and no distress  EYES: EOMI,  PERRL, conjunctiva clear  HENT: ear canals and TM's normal.  Nose and mouth without ulcers, erythema or lesions  NECK: supple, nontender, no lymphadenopathy  RESP: lungs clear to auscultation - no rales, rhonchi or wheezes  CV: regular rates and rhythm, normal S1 S2, no murmur noted  ABDOMEN:  soft, nontender, no HSM or masses and bowel sounds normal  NEURO: Normal strength and tone, sensory exam grossly normal,  normal speech and mentation  SKIN: no suspicious lesions or rashes      ICD-10-CM    1. Fever and chills R50.9 UA with Microscopic     CBC with  platelets differential     Comprehensive metabolic panel   2. Hypokalemia E87.6    3. Acute renal failure, unspecified acute renal failure type (H) N17.9    4. Leukocytosis, unspecified type D72.829      Will go through ED for cont w/u

## 2017-10-22 NOTE — PLAN OF CARE
Problem: Patient Care Overview  Goal: Plan of Care/Patient Progress Review        SLP - RN was consulted this am.  RN reports patient tolerating a regular diet and thin liquids with no signs of difficulty/aspiration.  No current respiratory needs.  Plan to cancel a SLP swallow evaluation at this time per RN report.  Please re-consult if further evaluation felt indicated.

## 2017-10-22 NOTE — PROVIDER NOTIFICATION
MD Notification    Notified Person:  MD    Notified Persons Name: Angel     Notification Date/Time: 10/22/17 0800    Notification Interaction:  Talked with Physician    Purpose of Notification: Potassium 3.2    Orders Received: Oral replacement, No IV

## 2017-10-22 NOTE — PROGRESS NOTES
" 10/22/17 1600   Quick Adds   Type of Visit Initial PT Evaluation   Living Environment   Lives With spouse   Living Arrangements house   Home Accessibility bed and bath on same level;stairs to enter home   Number of Stairs to Enter Home 1   Number of Stairs Within Home 0   Transportation Available family or friend will provide;car  (pt \"rarely\" drives)   Self-Care   Dominant Hand right   Usual Activity Tolerance good   Current Activity Tolerance fair   Regular Exercise yes   Activity/Exercise Type strength training;walking   Exercise Amount/Frequency 2 times/wk  (PT/OT; has been on hold since fracturing L wrist)   Equipment Currently Used at Home walker, rolling;raised toilet   Activity/Exercise/Self-Care Comment Pt's spouse performs majority of IADLs (grocery shopping, laundry)   Functional Level Prior   Ambulation 1-->assistive equipment   Transferring 1-->assistive equipment   Toileting 1-->assistive equipment   Bathing 0-->independent   Dressing 2-->assistive person   Eating 0-->independent   Communication 0-->understands/communicates without difficulty   Swallowing 0-->swallows foods/liquids without difficulty   Cognition 1 - attention or memory deficits   Fall history within last six months yes   Number of times patient has fallen within last six months 4   Which of the above functional risks had a recent onset or change? ambulation;transferring;toileting;bathing   Prior Functional Level Comment Pt's dtr assisting with PLOF information as pt oriented to self only at time of eval. Pt lives in a house with her  with 1 step to enter, all needs met on main level. Pt was Mod I with 4WW for functional mobility, IND with ADLs,  assists with meals, errands, housekeeping, dressing, medications, transportation. Pt fell and broke her L wrist 6 weeks ago, had been seeing PT for knee pain and BIG program prior to that but was on hold 2/2 fracture.   General Information   Onset of Illness/Injury or Date of " "Surgery - Date 10/21/17   Referring Physician Maxx Hopper III, MD   Patient/Family Goals Statement None stated   Pertinent History of Current Problem (include personal factors and/or comorbidities that impact the POC) Pt is a 76yo female admitted for altered mental status and confusion, found to have UTI with acute pyelonephritis, sepsis related metabolic encephalopathy, hyponatremic and hypokalemic with significant acute renal failure. PMH significant for PD.   Precautions/Limitations fall precautions   General Observations Pt sitting up in bed upon arrival, pt with IV, mata   General Info Comments Activity: up with assist   Cognitive Status Examination   Orientation person   Level of Consciousness alert   Follows Commands and Answers Questions 100% of the time;able to follow single-step instructions   Personal Safety and Judgment intact   Memory impaired   Cognitive Comment confused at this time per dtr report, oriented to self only   Pain Assessment   Patient Currently in Pain (\"ringing in my head\")   Posture    Posture Forward head position;Protracted shoulders   Range of Motion (ROM)   ROM Comment BLE WNL for functional mobility   Strength   Strength Comments Pt appears generally weak and deconditioned, WFL for mobility with Ax2 and AD   Bed Mobility   Bed Mobility Comments modA of 1 supine>sit with HOB elevated   Transfer Skills   Transfer Comments Richie of 1 plus CGA of 1 for sit>stand to 4WW   Gait   Gait Comments 10' with 4WW and Richie of 1 plus CGA of 1, short shuffled steps initially, festinating/freezing in nature   Balance   Balance Comments Fairly good sitting balance, impaired static and dynamic standign balance with posterior lean   Sensory Examination   Sensory Perception Comments Denies N/T   General Therapy Interventions   Planned Therapy Interventions balance training;bed mobility training;gait training;neuromuscular re-education;ROM;strengthening;stretching;transfer training;home " "program guidelines;progressive activity/exercise   Clinical Impression   Criteria for Skilled Therapeutic Intervention yes, treatment indicated   PT Diagnosis Difficulty with gait   Influenced by the following impairments Weakness, confusion, impaired balance, decreased activity tolerance, pain   Functional limitations due to impairments Decreased safety and independence with functional transfers, gait stair   Clinical Presentation Stable/Uncomplicated   Clinical Decision Making (Complexity) Low complexity   Therapy Frequency` daily   Predicted Duration of Therapy Intervention (days/wks) 5 days   Anticipated Discharge Disposition Transitional Care Facility   Risk & Benefits of therapy have been explained Yes   Patient, Family & other staff in agreement with plan of care Yes   [facility] TM \"6 Clicks\"   2016, Trustees of [facility], under license to "[employer].  All rights reserved.   6 Clicks Short Forms Basic Mobility Inpatient Short Form   [facility]  \"6 Clicks\" V.2 Basic Mobility Inpatient Short Form   1. Turning from your back to your side while in a flat bed without using bedrails? 3 - A Little   2. Moving from lying on your back to sitting on the side of a flat bed without using bedrails? 2 - A Lot   3. Moving to and from a bed to a chair (including a wheelchair)? 2 - A Lot   4. Standing up from a chair using your arms (e.g., wheelchair, or bedside chair)? 2 - A Lot   5. To walk in hospital room? 2 - A Lot   6. Climbing 3-5 steps with a railing? 2 - A Lot   Basic Mobility Raw Score (Score out of 24.Lower scores equate to lower levels of function) 13   Total Evaluation Time   Total Evaluation Time (Minutes) 10     "

## 2017-10-22 NOTE — PLAN OF CARE
"Problem: Patient Care Overview  Goal: Plan of Care/Patient Progress Review  PT: PT orders received, evaluation completed, treatment initiated. Pt is a 76yo female admitted for altered mental status and confusion, found to have UTI with acute pyelonephritis, sepsis related metabolic encephalopathy, hyponatremic and hypokalemic with significant acute renal failure. PMH significant for PD. Pt's dtr assisting with PLOF information as pt oriented to self only at time of eval. Pt lives in a house with her  with 1 step to enter, all needs met on main level. Pt was Mod I with 4WW for functional mobility, IND with ADLs,  assists with meals, errands, housekeeping, dressing, medications, transportation. Pt fell and broke her L wrist 6 weeks ago, had been seeing PT for knee pain and BIG program prior to that but was on hold 2/2 fracture.     Discharge Planner PT   Patient plan for discharge: None stated. Pt's dtr hopeful for TCU  Current status: Pt alert and oriented to self only at time of eval. Pt requires modA of 1 for supine>sit with HOB elevated. Pt transfers sit<>stand to 4WW with modA of 1 plus CGA of 1 for safety. Pt ambulates 100' with 4WW and Richie of 1 plus CGA of 1 for safety/equipment, unsteady at times especially with turns. Pt demonstrates festinating/freezing gait at times, improves with cues for \"big steps\". Pt tends to lean posteriorly, difficulty with retro steps with return to bed.  Barriers to return to prior living situation: Weakness, impaired balance and fall risk, decreased activity tolerance, confusion  Recommendations for discharge: TCU  Rationale for recommendations: Pt would benefit from continued skilled PT while IP and then at TCU to progress functional strength, balance, activity tolerance, safety and independence with functional mobility prior to return home. Per discussion with pt's dtr, may be looking into more supportive living environment for pt/spouse.       Entered by: Terrie " DENNIS Valdes 10/22/2017 4:46 PM

## 2017-10-22 NOTE — PROGRESS NOTES
10/22/17 1400   General Information   Onset Date 10/21/17   Start of Care Date 10/22/17   Referring Physician Dr. Glo Ortiz   Patient Profile Review/OT: Additional Occupational Profile Info See Profile for full history and prior level of function   Patient/Family Goals Statement To increase safety with a least restrictive diet.     Swallowing Evaluation Bedside swallow evaluation   Behaviorial Observations Alert  (Confused)   Mode of current nutrition Oral diet   Type of oral diet Regular;Thin liquid   Respiratory Status Room air  (SOB)   Comments Per MD note: Loan Anderson is a 77 year old female who presents with confusion.  She has a PMH of Parkinson's Disease, CKD, Hypothyroidism, Depression, Renal Stone s/p lithotripsy and UTI/Sepsis (2015).  According to the patient's daughters, she has been acting a little confused for the last 2-3 days. [place] in [place] the patient was particularly abnormal, she was dyspneic and quite confused and exhibited some flailing movements that bystanders were concerned may be seizures. These did not result in a loss of consciousness, incontinence or tongue biting. She has had some headache today and feels generally ill, with a temperature of 101.4 at home, but denies nausea, vomiting, diarrhea, constipation or dysuria. She did have difficulty voiding in the ED and had to be catheterized for 400cc.    Dx: UTI and Pyelonephritis with Sepsis.      Hx of infrequent coughing with po intake and throat clearing with and without po intake at baseline per patient/daughter report.  No pneumonia hx.  Daughter reports patient still confused currently.  SOB still present but better than 10/21 per report of daughter.  Dystonia affects swallow coordination.   Clinical Swallow Evaluation   Oral Musculature generally intact   Dentition present and adequate   Laryngeal Function Cough;Throat clear;Swallow;Voicing initiated;Dry swallow palpated  (Mild hoarse voice,  mild decreased elevation)   Clinical Swallow Eval: Thin Liquid Texture Trial   Mode of Presentation, Thin Liquids cup   Volume of Liquid or Food Presented sips x 7, sips x 2 with chin tuck   Oral Phase of Swallow Premature pharyngeal entry   Pharyngeal Phase of Swallow reduction in laryngeal movement;repeated swallows  (delay)   Diagnostic Statement throat clearing x 3, throat clearing x 2 with trials with chin tuck; baseline throat clearing reported   Clinical Swallow Eval: Solid Food Texture Trial   Mode of Presentation, Solid self-fed   Volume of Solid Food Presented bites x 7 from lunch, bites x 4 of select trials   Oral Phase, Solid Premature pharyngeal entry;Residue in oral cavity   Pharyngeal Phase, Solid repeated swallows;reduction in laryngeal movement  (delay)   Diagnostic Statement decreased coordination of swallow/breathing with bites from lunch; better coordination with solid cracker trials; no overt signs of aspiration    Swallow Compensations   Swallow Compensations Effortful swallow;Chin tuck;Pacing;Reduce amounts;Multiple swallow;Alternate viscosity of consistencies   Esophageal Phase of Swallow   Patient reports or presents with symptoms of esophageal dysphagia No   Swallow Eval: Clinical Impressions   Skilled Criteria for Therapy Intervention Skilled criteria met.  Treatment indicated.   Functional Assessment Scale (FAS) 4   Treatment Diagnosis mild-moderate oral-pharyngeal dysphagia   Diet texture recommendations Regular diet;Thin liquids  (Moist soft food selection)   Recommended Feeding/Eating Techniques (see below)   Therapy Frequency daily   Predicted Duration of Therapy Intervention (days/wks) 1 week   Anticipated Discharge Disposition (to be determined)   Risks and Benefits of Treatment have been explained. Yes   Patient, family and/or staff in agreement with Plan of Care Yes   Clinical Impression Comments Mild-moderate oral-pharyngeal dysphagia was found at bedside.  Deficits include hx of  infrequent coughing with po intake and throat clearing with and without po intake at baseline, increased confusion, SOB, decreased respiratory/swallow coordination, mild decreased elevation, need for multiple swallows, fast rate of eating, delayed swallow reflex, and persistent throat clearing with thin liquids.  The chin tuck decreased frequency of throat clearing, but did not eliminate it.  Recommend increased precautions and soft moist food choices from a regular diet with 1:1 supervision as well as cues to use precautions: NO STRAW, small bites/sips, chin tuck with thin liquids, chew carefully, 2-3 swallows per bite, alternate textures, slow rate, pills with puree/pudding; hold po if increased aspiration signs/respiratory difficulty noted.  Swallow Tx with education was provided after the evaluation.  Discussed possible video swallow study to fully evaluate pharyngeal phase function and aspiration risk.  Daughter would like to wait on swallow study at this time.  Plan to continue Tx to monitor diet tolerance and train swallow precautions/strategies.  Will assess indication for a swallow study during the course of Tx.   Total Evaluation Time   Total Evaluation Time (Minutes) 20

## 2017-10-22 NOTE — PLAN OF CARE
Problem: Patient Care Overview  Goal: Plan of Care/Patient Progress Review  Outcome: No Change  Arrived to floor around around 0030 from ED. Disoriented to time and sometimes situation. Parkinson's gait, up with assist of 1-2 to bedside commode. Incontinent of urine at times. Daughter at bedside. Discharge pending. Continue to monitor.

## 2017-10-22 NOTE — PLAN OF CARE
Problem: Patient Care Overview  Goal: Plan of Care/Patient Progress Review  Outcome: No Change  AOx3, disoriented to time. BP elevated, MD aware, other VSS. Regular diet, no straws, monitor for cough/throat clearing. K+ 3.2 (reaction to IV potassium in ED, can only have oral) replaced, lab recheck 4.0. PIV in L arm infusing NS@75 R PIV SL. Blood cultures +, urine culture, MD aware. Parkinson's gait, up with assist of 1-2 to bedside commode. Incontinent of urine at times, adequate output. BM today. Reddened area on buttocks, barrier cream applied. Daughter at bedside. Discharge pending. Continue to monitor.

## 2017-10-22 NOTE — ED NOTES
Patient c/o burning at site while receiving potassium.IV was stopped and MD notified.Benadryl ordered

## 2017-10-22 NOTE — PROGRESS NOTES
Social Work: Assessment with Discharge Plan    Patient Name: Loan Anderson  : 1940  Age: 77 year old  MRN: [id number]  Completed assessment with: pt's daughter    Presenting Information   Reason for Referral: DC planning  Date of intake: 2017  Referral Source: Family quest  Decision Maker: Patient  Alternate Decision Maker:  Surjit  Health Care Directive: Declined completing  Living Situation: House with   Previous Functional Status: Dependent on care.  helped with meals, dressing, driving, medication  Patient and family understanding of hospitalization: full understanding  Cultural/Language/Spiritual Considerations: none   Adjustment to Illness: coping appropriately.     Physical Health  Reason for admission:   1. Acute pyelonephritis    2. Altered mental status, unspecified altered mental status type      Services Needed/Recommended: TCU    Mental Health/Chemical Dependency:   None noted per chart    Support System  Significant Relationship at Present time:   and children  Family of Origin is available for support: Yes,  and daughters are supportive   Other support available:  unknown  Current in home services: None,  provides care  Gaps in Support System: Husbanding getting burned out with care levels, needs additional help    Provider Information   Primary Care Physician:Opal Cardenas      Clinic: [facility]      : none noted    Financial   Income Source: Social Security, savings  Financial Concerns:  None noted  Insurance: Medicare and BCBS      Discharge Plan   Patient and family discharge goal: Family looking into TCU, possible change in Housing LT, AL VS. SNF  Provided Education on discharge plan: YES  Patient agreeable to discharge plan:  YES  A list of Medicare Certified Facilities was provided to the patient and/or family to encourage patient choice. Patient's choices for facility are: List provided for TCU  placement  Will NH provide Skilled rehabilitation or complex medical:  YES  General information regarding anticipated insurance coverage and possible out of pocket cost was discussed. Patient and patient's family are aware patient may incur the cost of transportation to the facility, pending insurance payment: YES  Barriers to discharge: Bed availability    Discharge Recommendations   Disposition: TCU  Transportation Needs: Unknown at this time  Name of Transportation Company and Phone: n/a    Additional comments   Spoke with the pt's daughter who shared that the pt's  has been having a hard time caring for there pt as her care needs have increased. We discussed alternative options for placement including AL, Care Suites  And private duty RN at home. The family shared that the pt's spouse Surjit is physically doing well, but finding it hard to care for the pt. Surjit is very active in the community, but is limited to what he can do as he can not leave the pt for long. Surjit has expressed that he is not sure if he will be able to continue to care for the pt and they would like to consider looking into support for the pt and possible move to AL or care in the home. At this time they do feel that TCU is the best DC plan for the pt. SNF list given to the family to identify TCU's they would be interested in for DC.    ELLE Thomson, [facility], [place]  Phone [phone]

## 2017-10-22 NOTE — H&P
[facility]    History and Physical  Hospitalist       Date of Admission:  10/21/2017    Assessment & Plan   Loan Anderson is a 77 year old female who presents with confusion.    She has a PMH of Parkinson's Disease, CKD, Hypothyroidism, Depression, Renal Stone s/p lithotripsy and UTI/Sepsis (2015).    According to the patient's daughters, she has been acting a little confused for the last 2-3 days. Yesterday in [place] the patient was particularly abnormal, she was dyspneic and quite confused and exhibited some flailing movements that bystanders were concerned may be seizures. These did not result in a loss of consciousness, incontinence or tongue biting. She has had some headache today and feels generally ill, with a temperature of 101.4 at home, but denies nausea, vomiting, diarrhea, constipation or dysuria. She did have difficulty voiding in the ED and had to be catheterized for 400cc.    In the ED she had a temp of 99.9 and was awake but a little confused. WBC count was 15k and her UA was grossly positive for infection. Creatinine was significantly elevated at 2.7.    She had a potassium of 2.7 on arrival. She received IV repletion which led to pain at the infusion site followed by flushing of her face, neck and chest. She transiently dropped her BP at this time to the 80's systolic and exhibited some wheeze. This is prior to receiving any antibiotics. Her BP and wheezing improved prior to any intervention and she then received benadryl, albuterol and solumedrol. She reports that previously she had a local reaction to IV potassium which involved significant local swelling, but at that time it was felt to be local irritation rather than allergy. She has had oral potassium without issue in the past and again today in the ED.    UTI and Pyelonephritis with Sepsis  - Admit to general medicine  - Continue IVF with Normal Saline  - Ceftriaxone  - f/u culture and  "sensitivity  - She does have a history of \"neurogenic bladder\" listed but does not usually need catheterization    Reaction to IV Potassium Chloride  - This seems to be related to the IV form of potassium so suspect it has to do with a preservative, though still a real allergy and I would avoid IV potassium products  - OK to continue with oral potassium repletion  - Do not believe she needs to continue steroids beyond initial dose    YASMIN on CKD  - Baseline creatinine looks to be about 1 - 1.3  - Creatinine 2.7 on arrival  - Brisk IV Fluids    Confusion  Flailing/Shaking Movements  Metabolic Encephalopathy  - Given context this seems more akin to metabolic encephalopathy and delirium rather than seizure  - Treat underlying infection    Parkinson's Disease  - Continue Sinemet, Neupro  - Pt will continue her supplements from home, family to provide  - Avoid antihistamines and haldol  - Low dose seroquel PRN if sundowning      DVT Prophylaxis: Pneumatic Compression Devices  Code Status: DNR    Disposition: Expected discharge in 3-5 days    Maxx Hopper III, MD    Primary Care Physician   Opal Cardenas    Chief Complaint   Confusion    History is obtained from the patient, her adult daughters, ED physician and review of records.    History of Present Illness   Loan Anderson is a 77 year old female who presents with confusion.    She has a PMH of Parkinson's Disease, CKD, Hypothyroidism, Depression, Renal Stone s/p lithotripsy and UTI/Sepsis (2015).    According to the patient's daughters, she has been acting a little confused for the last 2-3 days. Yesterday in [place] the patient was particularly abnormal, she was dyspneic and quite confused and exhibited some flailing movements that bystanders were concerned may be seizures. These did not result in a loss of consciousness, incontinence or tongue biting. She has had some headache today and feels generally ill, with a temperature of " 101.4 at home, but denies nausea, vomiting, diarrhea, constipation or dysuria. She did have difficulty voiding in the ED and had to be catheterized for 400cc.    Past Medical History    I have reviewed this patient's medical history and updated it with pertinent information if needed.   Past Medical History:   Diagnosis Date     Constipation 7/15/2011     Convergence insufficiency 7/15/2011     Depression 7/15/2011     Diplopia 2011     Hot flashes, menopausal 2011     Hypercholesterolemia 2011     Hypothyroid 7/15/2011     Neurogenic bladder 7/15/2011     Parkinson disease (H) 7/15/2011     REM sleep behavior disorder 2011     Sebaceous cyst 2011     Wears glasses 2011       Past Surgical History   I have reviewed this patient's surgical history and updated it with pertinent information if needed.  Past Surgical History:   Procedure Laterality Date     ------------OTHER-------------  november ?     lithrotripsy for renal stone     ------------OTHER-------------  2015    had sepsis from uti and hospitalized     BRAIN SURGERY   or 14 oct 2011    gene therapy enrolled study at [place]       Prior to Admission Medications   Prior to Admission Medications   Prescriptions Last Dose Informant Patient Reported? Taking?   ASPIRIN 81 MG PO TABS 10/21/2017 at Unknown time Daughter Yes Yes   Si TABLET DAILY   COMPOUND (CMPD RX) - PHARMACY TO MIX COMPOUNDED MEDICATION  Daughter No Yes   Sig: Estriol 1mg/1gram. Place 1 gram vaginally daily for 2 weeks, then vaginally twice weekly after.   Calcium-Vitamin D 600-200 MG-UNIT TABS 10/21/2017 at Unknown time Daughter Yes Yes   Si tablet at 5pm   Inulin 2 G CHEW  Daughter Yes No   Si x 6grams per day with natur made adult gummies fiber.   NONFORMULARY 10/21/2017 at Unknown time Daughter Yes Yes   Sig: macugels 2 softgels daily at 920pm   OMEGA-3 KRILL  MG CAPS 10/21/2017 at Unknown time Daughter Yes Yes   Sig: Take 1 tablet  by mouth daily.   TYLENOL EXTRA STRENGTH 500 MG PO TABS  Daughter No Yes   Si TABLET daily  AS NEEDED   atorvastatin (LIPITOR) 10 MG tablet 10/21/2017 at Unknown time Daughter Yes Yes   Sig: Take 1 tablet (10 mg) by mouth daily   carbidopa-levodopa (SINEMET CR)  MG per CR tablet 10/21/2017 at x4 Daughter No Yes   Si tab 4/day  At 7am, 11am, 5pm, 9:30pm and may take 5th if needed   carbidopa-levodopa (SINEMET)  MG per tablet 10/21/2017 at x4 Daughter No Yes   Sig: Increase to 1.5 tabs 4/day @ 7am, 11am, 5pm, 9pm   docusate sodium (COLACE) 100 MG capsule 10/21/2017 at Unknown time Daughter Yes Yes   Sig: Take 1 capsule (100 mg) by mouth 2 times daily   levothyroxine (SYNTHROID,LEVOTHROID) 100 MCG tablet 10/21/2017 at Unknown time Daughter Yes Yes   Si tablet @ 7am   polyethylene glycol (MIRALAX) powder  Daughter Yes Yes   Sig: Take 17 g (1 capful) by mouth daily as needed   rotigotine (NEUPRO) 4 MG/24HR 24 hr patch 10/21/2017 at Unknown time Daughter No Yes   Sig: Place 1 patch onto the skin daily   venlafaxine (EFFEXOR-XR) 75 MG 24 hr capsule 10/21/2017 at Unknown time Daughter No Yes   Sig: TAKE 1 TABLET(75 MG) BY MOUTH DAILY   vitamin  B complex with vitamin C (VITAMIN  B COMPLEX) TABS 10/21/2017 at Unknown time Daughter Yes Yes   Sig: Take 1 tablet by mouth daily      Facility-Administered Medications: None     Allergies   Allergies   Allergen Reactions     Mirapex [Pramipexole Dihydrochloride Monohydrate]      Leg swelling     Requip [Ropinirole Hydrochloride]      Leg swelling     Epinephrine Palpitations       Social History   I have reviewed this patient's social history and updated it with pertinent information if needed. Loan Anderson  reports that she has never smoked. She has never used smokeless tobacco. She reports that she drinks alcohol.    Family History   Family history reviewed with patient and is noncontributory.    Review of Systems   The 10 point Review of Systems is  negative other than noted in the HPI or here.     Physical Exam   Temp: 99.9  F (37.7  C) Temp src: Oral BP: 120/64 Pulse: 79 Heart Rate: 79 Resp: 19 SpO2: 98 % O2 Device: None (Room air)    Vital Signs with Ranges  Temp:  [97.8  F (36.6  C)-99.9  F (37.7  C)] 99.9  F (37.7  C)  Pulse:  [72-79] 79  Heart Rate:  [75-79] 79  Resp:  [15-24] 19  BP: (115-128)/(58-64) 120/64  SpO2:  [95 %-98 %] 98 %  193 lbs 0 oz    Constitutional: NAD. Comfortable, well-developed. Flushed.  Eyes: Anicteric, no conjunctival injection  ENT: Atraumatic, membranes moist   Neck: Supple, trachea midline  Respiratory: My initial exam had some faint end-expiratory wheeze which resolved prior to the end of my interview  Cardiovascular: S1S2, no edema  GI: Abdomen soft, non-tender  Skin: Warm, pink, dry  Neurologic: Alert and oriented. CN II - XII grossly intact. Some confusion/forgetfulness  Psychiatric: Pleasant, cooperative    Data   Data reviewed today:  I personally reviewed no images or EKG's today.    Recent Labs  Lab 10/21/17  2115 10/21/17  1751   WBC  --  15.4*   HGB  --  14.4   MCV  --  91   PLT  --  152   * 135   POTASSIUM 3.0* 2.7*   CHLORIDE 97 97   CO2 23 24   BUN 51* 51*   CR 2.74* 2.82*   ANIONGAP 11 14   NIKKIE 8.1* 8.4*   * 137*   ALBUMIN  --  2.9*   PROTTOTAL  --  7.7   BILITOTAL  --  0.8   ALKPHOS  --  258*   ALT  --  13   AST  --  61*       Imaging:  No results found for this or any previous visit (from the past 24 hour(s)).

## 2017-10-22 NOTE — ED PROVIDER NOTES
"  History     Chief Complaint:  Fever    HPI   Loan Anderson is a 77 year old female with a history of Parkinson's disease and who presents with a fever and altered state yesterday evening. The patient's daughter report that she has been experiencing episodes of confusion the last several days. While in a hotel following a wedding yesterday, she experienced an episode of confusion that ended with her stuck in an awkward position in bed where she could not breath and was flailing in her limbs. Pt remembers event, no rigidity or seizure like movements reported.      This morning she had a fever measured at 101.4 at home and took Acetaminophen piror to being seen at Urgent Care, had abnormal labs and told to go to the ED \"to be admitted\". While there her fever had subsided. Here in the ED this evening she denies any vomiting, diarrhea, abdominal pain, or any other pain.    Allergies:  Mirapex  Requip  Epinephrine     Medications:    Sinemet  Neupro  Effexor  Miralax  Inulin  Synthroid  Colace  Lipitor  Aspirin     Past Medical History:    Constipation  Convergence insufficiency  Depression  Diplopia  Hot flashes  Hypothyroid  Neurogenic bladder  Parkinson's disease  REM sleep behavior disorder  Sebaceous cyst    Past Surgical History:    Lithotripsy  Brain surgery    Family History:    Parkinson's disease  Eye disorder  Cerebrovascular disease  Heart disease    Social History:  The patient was accompanied to the ED by her daughters.  Smoking Status: No  Smokeless Tobacco: No  Alcohol Use: Yes  Marital Status:       Review of Systems   Constitutional: Positive for fever.   Respiratory: Positive for shortness of breath.    Gastrointestinal: Negative for abdominal pain, diarrhea and vomiting.   Neurological: Negative for numbness.   Psychiatric/Behavioral: Positive for confusion.   All other systems reviewed and are negative.    Physical Exam   Vitals:  Patient Vitals for the past 24 hrs:   BP Temp Temp src " "Pulse Heart Rate Resp SpO2 Height Weight   10/21/17 2210 120/64 - - - 79 19 98 % - -   10/21/17 2200 124/63 - - - 77 15 96 % - -   10/21/17 2149 118/59 - - - 75 15 - - -   10/21/17 2035 128/58 99.9  F (37.7  C) Oral 79 - 24 96 % 1.6 m (5' 3\") 87.5 kg (193 lb)     Physical Exam  General: Pt seen on [facility], cooperative, and alert to conversation, non toxic, non diaphoretic  Eyes: Tracking well, clear conj BL  ENT: MMM, neck supple with no TTP  CV: RRR with no murmurs and no JVD noted  Respiratory:  No tachypnea, no accessory m use, speaking in full sentences.  Abd: + mild suprapubic discomfort to palp, but no GR, neg murpheys.   Skin: No skin changes, no edema or rash present to extremities  Neuro: Clear speech and no facial droop.  BUE and BLE with SILT and motor in tact  Psych: Not RIS, no e/o AH/VH    Emergency Department Course     Laboratory:  Laboratory findings were communicated with the patient who voiced understanding of the findings.  UA: Blood: Moderate  (A), Protein albumin: 100 (A), Leukocyte esterase: Large (A), WBC: >182 (H), RBC: 6 (H), Bacteria: Many (A)  BMP: NA: 131 (L), Potassium: 3.0 (L), Glucose: 140 (H), BUN: 51 (H), Creat: 2.74 (H), GFR: 17 (L), Calcium: 8.1 (L) o/w WNL  Blood culture: Pending  Urine culture: Pending    Interventions:  2130 Klor-Con, 40 mEq, PO  2133  Normal Saline 500 mL IV  2138 Potassium chloride 10 mEq, IV  2214 Benadryl, 50 mg, IV  2300 Duobneb, 3 mL, nebulization  Medications   cefTRIAXone (ROCEPHIN) 2 g vial to attach to  ml bag for ADULTS or NS 50 ml bag for PEDS (not administered)   methylPREDNISolone sodium succinate (solu-MEDROL) injection 125 mg (not administered)   0.9% sodium chloride BOLUS (500 mLs Intravenous New Bag 10/21/17 2133)   potassium chloride 10 mEq in 100 mL sterile water intermittent infusion (premix) (0 mEq Intravenous Stopped 10/21/17 2212)   potassium chloride (KLOR-CON) Packet 40 mEq (40 mEq Oral Given 10/21/17 2130) "   diphenhydrAMINE (BENADRYL) 50 MG/ML injection (25 mg  Given 10/21/17 2214)   ipratropium - albuterol 0.5 mg/2.5 mg/3 mL (DUONEB) neb solution 3 mL (3 mLs Nebulization Given 10/21/17 2300)      Emergency Department Course:  Nursing notes and vitals reviewed.  2043 I examined the patient.  Plan of care discussed.  Patient agrees with this plan.  I performed an exam of the patient as documented above.   IV was inserted and blood was drawn for laboratory testing, results above.  The patient provided a urine sample here in the emergency department. This was sent for laboratory testing, findings above.  2235 I spoke with Dr. Hopper regarding this patient.    I discussed the treatment plan with the patient. They expressed understanding of this plan and consented to admission. I discussed the patient with Dr. Hopper, who will admit the patient to a monitored bed for further evaluation and treatment.    Impression & Plan      Medical Decision Making:  Loan Anderson is a 77 year old female who presents to the emergency department today with what appears to be a UTI/pyelonephritis leading to altered mental status. The patient has a vulnerable CNS axis given her Parkinson's Disease and I do believe last night's episode of what may be described as delirium vs altered status (I do not feel seizure given no loss of consciousness, no rigidity, and patient remembering the event). Here in the ED the patient does appear to have a robust urinary infection, and strong leukocytosis. Given her age and co morbidities, I do not feel comfortable sending her home on PO antibiotics with instructions to follow up with her primary care physicians next week, and in yoder of that I do favor admitting her to a monitored setting until she has cleared some of her CNS symptoms. She has a normal neurologic exam at this point and is not slurring speech, so I do not feel CT head is indicated. We will admit for a diagnosis of sepsis, also would  benefit from evaluation for what appears to be chronic kidney disease. Lastly, hypokalemia noted and patient did have a reaction during IV potassium chloride. With red skin, potassium was stopped in the IV, but was given oral and tolerated very well. We'll monitor very carefully until bed available. I spoke with Dr. Hopper of the hospitalist service who generously agreed to accept care of the patient.     11:54 PM  Second IV started, receiving IVF, normal BP and mentation.  Awaiting bed, no other issues noted.     Diagnosis:    ICD-10-CM    1. Acute pyelonephritis N10 Blood culture     Blood culture   2. Altered mental status, unspecified altered mental status type R41.82      Disposition:   Admission    Scribe Disclosure:  I, Dioni Hinojosa, am serving as a scribe at 8:50 PM on 10/21/2017 to document services personally performed by Angelo Cormier*, based on my observations and the provider's statements to me.  10/21/2017    EMERGENCY DEPARTMENT       Angelo Cormier MD  10/21/17 [id number]

## 2017-10-22 NOTE — ED NOTES
Pt's face became flushed which spread down to neck and shoulders. Denied any respiratory distress. Potassium was stopped.

## 2017-10-23 ENCOUNTER — APPOINTMENT (OUTPATIENT)
Dept: OCCUPATIONAL THERAPY | Facility: CLINIC | Age: 77
DRG: 871 | End: 2017-10-23
Attending: INTERNAL MEDICINE
Payer: MEDICARE

## 2017-10-23 ENCOUNTER — APPOINTMENT (OUTPATIENT)
Dept: SPEECH THERAPY | Facility: CLINIC | Age: 77
DRG: 871 | End: 2017-10-23
Payer: MEDICARE

## 2017-10-23 ENCOUNTER — APPOINTMENT (OUTPATIENT)
Dept: PHYSICAL THERAPY | Facility: CLINIC | Age: 77
DRG: 871 | End: 2017-10-23
Payer: MEDICARE

## 2017-10-23 LAB
ALBUMIN SERPL-MCNC: 2.3 G/DL (ref 3.4–5)
ALP SERPL-CCNC: 218 U/L (ref 40–150)
ALT SERPL W P-5'-P-CCNC: 10 U/L (ref 0–50)
ANION GAP SERPL CALCULATED.3IONS-SCNC: 8 MMOL/L (ref 3–14)
AST SERPL W P-5'-P-CCNC: 38 U/L (ref 0–45)
BACTERIA SPEC CULT: ABNORMAL
BILIRUB SERPL-MCNC: 0.4 MG/DL (ref 0.2–1.3)
BUN SERPL-MCNC: 43 MG/DL (ref 7–30)
CALCIUM SERPL-MCNC: 8.2 MG/DL (ref 8.5–10.1)
CHLORIDE SERPL-SCNC: 112 MMOL/L (ref 94–109)
CO2 SERPL-SCNC: 20 MMOL/L (ref 20–32)
CREAT SERPL-MCNC: 1.68 MG/DL (ref 0.52–1.04)
ERYTHROCYTE [DISTWIDTH] IN BLOOD BY AUTOMATED COUNT: 14.9 % (ref 10–15)
GFR SERPL CREATININE-BSD FRML MDRD: 30 ML/MIN/1.7M2
GLUCOSE SERPL-MCNC: 182 MG/DL (ref 70–99)
HBA1C MFR BLD: 6.6 % (ref 4.3–6)
HCT VFR BLD AUTO: 34.4 % (ref 35–47)
HGB BLD-MCNC: 12.2 G/DL (ref 11.7–15.7)
LACTATE BLD-SCNC: 1.9 MMOL/L (ref 0.7–2)
Lab: ABNORMAL
MAGNESIUM SERPL-MCNC: 2.3 MG/DL (ref 1.6–2.3)
MCH RBC QN AUTO: 31.4 PG (ref 26.5–33)
MCHC RBC AUTO-ENTMCNC: 35.5 G/DL (ref 31.5–36.5)
MCV RBC AUTO: 89 FL (ref 78–100)
PLATELET # BLD AUTO: 198 10E9/L (ref 150–450)
POTASSIUM SERPL-SCNC: 4.1 MMOL/L (ref 3.4–5.3)
PROT SERPL-MCNC: 6.7 G/DL (ref 6.8–8.8)
RBC # BLD AUTO: 3.88 10E12/L (ref 3.8–5.2)
SODIUM SERPL-SCNC: 140 MMOL/L (ref 133–144)
SPECIMEN SOURCE: ABNORMAL
WBC # BLD AUTO: 18 10E9/L (ref 4–11)

## 2017-10-23 PROCEDURE — A9270 NON-COVERED ITEM OR SERVICE: HCPCS | Mod: GY | Performed by: INTERNAL MEDICINE

## 2017-10-23 PROCEDURE — 85027 COMPLETE CBC AUTOMATED: CPT | Performed by: INTERNAL MEDICINE

## 2017-10-23 PROCEDURE — 40000193 ZZH STATISTIC PT WARD VISIT: Performed by: PHYSICAL THERAPIST

## 2017-10-23 PROCEDURE — 80053 COMPREHEN METABOLIC PANEL: CPT | Performed by: INTERNAL MEDICINE

## 2017-10-23 PROCEDURE — 97116 GAIT TRAINING THERAPY: CPT | Mod: GP | Performed by: PHYSICAL THERAPIST

## 2017-10-23 PROCEDURE — 25000132 ZZH RX MED GY IP 250 OP 250 PS 637: Mod: GY | Performed by: INTERNAL MEDICINE

## 2017-10-23 PROCEDURE — 83605 ASSAY OF LACTIC ACID: CPT | Performed by: INTERNAL MEDICINE

## 2017-10-23 PROCEDURE — 83036 HEMOGLOBIN GLYCOSYLATED A1C: CPT | Performed by: INTERNAL MEDICINE

## 2017-10-23 PROCEDURE — 87040 BLOOD CULTURE FOR BACTERIA: CPT | Performed by: INTERNAL MEDICINE

## 2017-10-23 PROCEDURE — 40000275 ZZH STATISTIC RCP TIME EA 10 MIN

## 2017-10-23 PROCEDURE — 36415 COLL VENOUS BLD VENIPUNCTURE: CPT | Performed by: INTERNAL MEDICINE

## 2017-10-23 PROCEDURE — 40000225 ZZH STATISTIC SLP WARD VISIT: Performed by: SPEECH-LANGUAGE PATHOLOGIST

## 2017-10-23 PROCEDURE — 99232 SBSQ HOSP IP/OBS MODERATE 35: CPT | Performed by: INTERNAL MEDICINE

## 2017-10-23 PROCEDURE — 83735 ASSAY OF MAGNESIUM: CPT | Performed by: INTERNAL MEDICINE

## 2017-10-23 PROCEDURE — 94640 AIRWAY INHALATION TREATMENT: CPT

## 2017-10-23 PROCEDURE — 97166 OT EVAL MOD COMPLEX 45 MIN: CPT | Mod: GO | Performed by: OCCUPATIONAL THERAPIST

## 2017-10-23 PROCEDURE — 25000128 H RX IP 250 OP 636: Performed by: INTERNAL MEDICINE

## 2017-10-23 PROCEDURE — 97535 SELF CARE MNGMENT TRAINING: CPT | Mod: GO | Performed by: OCCUPATIONAL THERAPIST

## 2017-10-23 PROCEDURE — 25000125 ZZHC RX 250: Performed by: INTERNAL MEDICINE

## 2017-10-23 PROCEDURE — 97530 THERAPEUTIC ACTIVITIES: CPT | Mod: GO | Performed by: OCCUPATIONAL THERAPIST

## 2017-10-23 PROCEDURE — 12000000 ZZH R&B MED SURG/OB

## 2017-10-23 PROCEDURE — 84132 ASSAY OF SERUM POTASSIUM: CPT | Performed by: INTERNAL MEDICINE

## 2017-10-23 PROCEDURE — 92526 ORAL FUNCTION THERAPY: CPT | Mod: GN | Performed by: SPEECH-LANGUAGE PATHOLOGIST

## 2017-10-23 PROCEDURE — 40000133 ZZH STATISTIC OT WARD VISIT: Performed by: OCCUPATIONAL THERAPIST

## 2017-10-23 PROCEDURE — 97110 THERAPEUTIC EXERCISES: CPT | Mod: GP | Performed by: PHYSICAL THERAPIST

## 2017-10-23 RX ADMIN — VENLAFAXINE HYDROCHLORIDE 75 MG: 75 CAPSULE, EXTENDED RELEASE ORAL at 06:37

## 2017-10-23 RX ADMIN — CARBIDOPA AND LEVODOPA 1 TABLET: 50; 200 TABLET, EXTENDED RELEASE ORAL at 06:36

## 2017-10-23 RX ADMIN — ACETAMINOPHEN 650 MG: 325 TABLET, FILM COATED ORAL at 16:19

## 2017-10-23 RX ADMIN — ATORVASTATIN CALCIUM 20 MG: 20 TABLET, FILM COATED ORAL at 06:37

## 2017-10-23 RX ADMIN — DOCUSATE SODIUM 100 MG: 100 CAPSULE, LIQUID FILLED ORAL at 11:04

## 2017-10-23 RX ADMIN — DOCUSATE SODIUM 100 MG: 100 CAPSULE, LIQUID FILLED ORAL at 22:07

## 2017-10-23 RX ADMIN — CEFTRIAXONE 2 G: 2 INJECTION, POWDER, FOR SOLUTION INTRAMUSCULAR; INTRAVENOUS at 22:07

## 2017-10-23 RX ADMIN — CARBIDOPA AND LEVODOPA 1.5 TABLET: 25; 100 TABLET ORAL at 16:22

## 2017-10-23 RX ADMIN — CARBIDOPA AND LEVODOPA 1.5 TABLET: 25; 100 TABLET ORAL at 06:36

## 2017-10-23 RX ADMIN — CARBIDOPA AND LEVODOPA 1 TABLET: 50; 200 TABLET, EXTENDED RELEASE ORAL at 21:58

## 2017-10-23 RX ADMIN — SODIUM CHLORIDE: 9 INJECTION, SOLUTION INTRAVENOUS at 02:57

## 2017-10-23 RX ADMIN — LEVOTHYROXINE SODIUM 112 MCG: 112 TABLET ORAL at 06:36

## 2017-10-23 RX ADMIN — ASPIRIN 81 MG: 81 TABLET ORAL at 06:37

## 2017-10-23 RX ADMIN — CARBIDOPA AND LEVODOPA 1 TABLET: 50; 200 TABLET, EXTENDED RELEASE ORAL at 16:21

## 2017-10-23 RX ADMIN — IPRATROPIUM BROMIDE AND ALBUTEROL SULFATE 3 ML: .5; 3 SOLUTION RESPIRATORY (INHALATION) at 13:50

## 2017-10-23 RX ADMIN — CARBIDOPA AND LEVODOPA 1.5 TABLET: 25; 100 TABLET ORAL at 11:04

## 2017-10-23 RX ADMIN — CARBIDOPA AND LEVODOPA 1.5 TABLET: 25; 100 TABLET ORAL at 21:58

## 2017-10-23 RX ADMIN — CARBIDOPA AND LEVODOPA 1 TABLET: 50; 200 TABLET, EXTENDED RELEASE ORAL at 11:04

## 2017-10-23 NOTE — PLAN OF CARE
Problem: Patient Care Overview  Goal: Plan of Care/Patient Progress Review        Discharge Planner SLP   Patient plan for discharge: TCU  Current status: Swallow Tx was provided this am.  Family reports patient coughed/choked with supper and some increased wheezing was observed last night.  Coughing was noted with thin liquids at the start of today's session.  Patient demonstrated increased throat clearing as well with thin liquids.  Mod cues were given to use a chin tuck with thin liquids which reduced throat clearing and no coughing was observed.  Mild oral residue, but no signs of aspiration were observed with solids.  Mod-max cues to use strategies cleared residue.  Patient continues to present with delayed swallows, need for multiple swallows, and oral residue as well as intermittent signs of aspiration if strategies are not used.  Discussed completion of a video swallow study; however, family declined study due to patient likely unwilling to modify diet/liquids or use strategies consistently.  Recommend supervision/reminders to use strategies/precautions with a continued regular diet and thin liquids per patient/family wishes.  Strategies/precautions include:  NO STRAW, small bites/sips, chin tuck with thin liquids, chew carefully, 2-3 swallows per bite, alternate textures, slow rate, pills with puree/pudding; hold po if increased aspiration signs/respiratory difficulty noted.  Plan to continue Tx to monitor diet tolerance and train swallow precautions/strategies.   Barriers to return to prior living situation: Confusion, level of assist  Recommendations for discharge: TCU with short term SLP swallow Tx  Rationale for recommendations: Insure safety with a least restrictive diet       Entered by: Yoli Urrutia 10/23/2017 8:51 AM

## 2017-10-23 NOTE — PLAN OF CARE
Problem: Patient Care Overview  Goal: Plan of Care/Patient Progress Review  OT: Eval completed and treatment initiated. Pt is a 76yo female admitted for altered mental status and confusion, found to have UTI with acute pyelonephritis, sepsis related metabolic encephalopathy, hyponatremic and hypokalemic with significant acute renal failure. PMH significant for PD. Pt lives in home with tub/shower, RTS, and spouse who was assisting with dressing, bathing, and all IADLs.   Discharge Planner OT   Patient plan for discharge: None stated  Current status: Pt oriented to self and setting. Pt ambulated to and from BR with 4WW and CGA. Pt completed toileting with 4WW, grab bars, and min A for clothing management. Pt completed grooming/hygiene while sitting in bedside chair with set-up.   Barriers to return to prior living situation: Cognition, poor balance, low activity tolerance, increased need for assist, spouse unsure if he can continue to support pt at home.   Recommendations for discharge: TCU  Rationale for recommendations: Pt would benefit from continued daily skilled therapy services to increase I and safety with ADL/IADLs and functional mobility.        Entered by: Radny Marcial 10/23/2017 9:00 AM

## 2017-10-23 NOTE — PLAN OF CARE
Problem: Patient Care Overview  Goal: Plan of Care/Patient Progress Review  Outcome: No Change  Disoriented to time and sometimes situation. Up with A2 to bedside commode. Parkinson's gait and tremor. Incontinent of urine. Daughter at bedside. Plan for TCU at discharge once stable. Continue to monitor.

## 2017-10-23 NOTE — PLAN OF CARE
Problem: Patient Care Overview  Goal: Plan of Care/Patient Progress Review  Outcome: No Change  VSS ex HTN, MD aware. Poor appetite and generalized malaise noted. IVF infusing. Frequent/urgent urination. Up to BR with A1-2, GB and walker. LARSON noted with wheezing, PRN neb given. Continue IV antbx. Lactic acid 1.9. Daughters at bedside and aware of POC. Therapy recs TCU at [place], 2-3 days

## 2017-10-23 NOTE — PROGRESS NOTES
[facility]    Hospitalist Progress Note    Date of Service (when I saw the patient): 10/23/2017    Assessment & Plan   Loan Anderson is a 77 year old female who was admitted on 10/21/2017.  With confusion  Summary: UTI with sepsis, e coli, both in urine and blood cultures, almost pan sensitive and the ceftriaxone is appropriate  Delirium due to the sepsis and also the Parkinson's  Parkinson's, treated  Acute renal injury, better  Hypothyroidism  Depression  History of flailing movements, possibly related to upper airway compromise that was corrected by positioning        What I did today: reviewed the chart, did a history and exam and discussed the situation with her daughters and answered their questions and concerns. Will recheck the WBC and diff and  Renal panel in the AM.   -    Addendum: Her daughter says she is not eating well. But would like the IV rate reduced as she feels it is causing her to urinate excessively. Renal function is reduced but improving and clinically she is just a bit dry. Will reduce the IV rate from 75 to 50 ccs/hr.    -    -      DVT Prophylaxis: Low Risk/Ambulatory with no VTE prophylaxis indicated  Code Status: Full Code    Disposition: Expected discharge in 2 days once fever is down and weakness and headache are improved.    Gregg Melendez MD    Interval History   Mrs. Anderson is feeling a bit better. Her headache is less and her myalgias are not as intense. Her daughter describes sweats and chills and is concerned about the rise in her WBC.  Also has questions about her renal function. Mrs. Anderson last had an UTI about a year ago.  She denies dysuria, but is voiding more often (but has maintenance IV fluids running.  She feels her breathing is ok, her daughter is concerned about raspiness in her throat.  She is not very hungry, but is able to eat, no nausea or vomiting. No chest or abdominal pains. Tends to be constipated, last BM yesterday per the  nurses.  Mrs. Anderson doesn't feel uncomfortable in her abdomen and no back pain  She had a night mare last night (seemed to project into her awake time, ie delirium).    -Data reviewed today: I reviewed all new labs and imaging results over the last 24 hours. I personally reviewed no images or EKG's today.    Physical Exam   Temp: 97.2  F (36.2  C) Temp src: Oral BP: 175/82 Pulse: 76 Heart Rate: 69 Resp: 22 SpO2: 95 % O2 Device: None (Room air)    Vitals:    10/21/17 2035   Weight: 87.5 kg (193 lb)     Vital Signs with Ranges  Temp:  [96.1  F (35.6  C)-98.9  F (37.2  C)] 97.2  F (36.2  C)  Pulse:  [72-76] 76  Heart Rate:  [65-79] 69  Resp:  [16-22] 22  BP: (152-175)/(61-82) 175/82  SpO2:  [92 %-95 %] 95 %  I/O last 3 completed shifts:  In: -   Out: 1450 [Urine:1450]    Constitutional: Alert, cooperative, knows she is in a hospital but not the day or date or floor or name of the hospital  Respiratory: Lungs are clear to A&P, voice is a bit raspy  Cardiovascular: Regular rhythm, normal S1 and S2, no S3 or murmurs, no edema. Calves not tender  GI: normal bowel movements, not tender, no CVA tenderness  Skin/Integumen: no rashes  Other:      Medications     - MEDICATION INSTRUCTIONS -       NaCl 75 mL/hr at 10/23/17 0257       aspirin EC  81 mg Oral Daily     docusate sodium  100 mg Oral BID     rotigotine  1 patch Transdermal Daily     rotigotine   Transdermal Daily     rotigotine   Transdermal Q8H     carbidopa-levodopa  1.5 tablet Oral 4x Daily     venlafaxine  75 mg Oral Daily with breakfast     carbidopa-levodopa  1 tablet Oral 4 times per day     levothyroxine (SYNTHROID/LEVOTHROID) tablet 112 mcg  112 mcg Oral QAM AC     atorvastatin (LIPITOR) tablet 20 mg  20 mg Oral Daily     cefTRIAXone  2 g Intravenous Q24H       Data     Recent Labs  Lab 10/23/17  0653 10/22/17  1546 10/22/17  0800 10/21/17  2115 10/21/17  1751   WBC 18.0*  --  10.7  --  15.4*   HGB 12.2  --  12.1  --  14.4   MCV 89  --  88  --  91   PLT  198  --  157  --  152     --  138 131* 135   POTASSIUM 4.1 4.0 3.2* 3.0* 2.7*   CHLORIDE 112*  --  106 97 97   CO2 20  --  21 23 24   BUN 43*  --  42* 51* 51*   CR 1.68*  --  2.18* 2.74* 2.82*   ANIONGAP 8  --  11 11 14   NIKKIE 8.2*  --  8.0* 8.1* 8.4*   *  --  222* 140* 137*   ALBUMIN 2.3*  --   --   --  2.9*   PROTTOTAL 6.7*  --   --   --  7.7   BILITOTAL 0.4  --   --   --  0.8   ALKPHOS 218*  --   --   --  258*   ALT 10  --   --   --  13   AST 38  --   --   --  61*       Imaging:  No results found for this or any previous visit (from the past 24 hour(s)).

## 2017-10-23 NOTE — PROGRESS NOTES
10/23/17 0744   Quick Adds   Type of Visit Initial Occupational Therapy Evaluation   Living Environment   Lives With spouse   Living Arrangements house   Home Accessibility bed and bath on same level;stairs to enter home;tub/shower is not walk in   Number of Stairs to Enter Home 1   Number of Stairs Within Home 0   Transportation Available family or friend will provide   Self-Care   Dominant Hand right   Usual Activity Tolerance good   Current Activity Tolerance poor   Equipment Currently Used at Home walker, rolling;shower chair;raised toilet   Activity/Exercise/Self-Care Comment Spouse assists with dressing, bathing and all IADLs   Functional Level Prior   Ambulation 1-->assistive equipment   Transferring 1-->assistive equipment   Toileting 1-->assistive equipment   Bathing 3-->assistive equipment and person   Dressing 2-->assistive person   Eating 2-->assistive person   Communication 0-->understands/communicates without difficulty   Swallowing 0-->swallows foods/liquids without difficulty   Cognition (Pt reports a change in memory/thinking in past 6 months)   Fall history within last six months yes   Number of times patient has fallen within last six months (a few times)   Which of the above functional risks had a recent onset or change? cognition;dressing   General Information   Onset of Illness/Injury or Date of Surgery - Date 10/21/17   Referring Physician Glo Ortiz MD   Patient/Family Goals Statement Not sure   Additional Occupational Profile Info/Pertinent History of Current Problem Pt is a 76yo female admitted for altered mental status and confusion, found to have UTI with acute pyelonephritis, sepsis related metabolic encephalopathy, hyponatremic and hypokalemic with significant acute renal failure. PMH significant for PD.   Precautions/Limitations fall precautions   Cognitive Status Examination   Orientation person   Level of Consciousness alert   Able to Follow Commands WNL/WFL   Personal Safety  (Cognitive) good awareness, safety precautions;impulsive  (Simultaneous filing. User may not have seen previous data.)   Memory impaired   Attention No deficits were identified   Cognitive Comment Pt oriented to self, month, and setting, but incorrect day and year. Pt attempted to stand x 2 without VC from therapist. Pt demonstrated good safety awareness with 4WW.   Visual Perception   Visual Perception Wears glasses   Visual Perception Comments Glasses help some of the time   Pain Assessment   Patient Currently in Pain Yes, see Vital Sign flowsheet  (1/10 headache)   Range of Motion (ROM)   ROM Comment BUE WNL/WFL   Strength   Strength Comments BUE WNL/WFL   Hand Strength   Hand Strength Comments MMT 4+/5 B  strength   Coordination   Coordination Comments Pt coordination impaired due to action and resting tremors related to Parkinson's   Bed Mobility Skill: Supine to Sit   Level of [place]: Supine/Sit minimum assist (75% patients effort)   Physical Assist/Nonphysical Assist: Supine/Sit 2 persons;verbal cues   Assistive Device: Supine/Sit bedrail   Transfer Skill: Bed to Chair/Chair to Bed   Level of [place]: Bed to Chair contact guard   Physical Assist/Nonphysical Assist: Bed to Chair 1 person assist;verbal cues   Weight-Bearing Restrictions full weight-bearing   Assistive Device - Transfer Skill Bed to Chair Chair to Bed Rehab Eval rolling walker   Transfer Skill: Sit to Stand   Level of [place]: Sit/Stand contact guard   Physical Assist/Nonphysical Assist: Sit/Stand 1 person assist;verbal cues   Transfer Skill: Sit to Stand full weight-bearing   Assistive Device for Transfer: Sit/Stand rolling walker   Transfer Skill: Toilet Transfer   Level of [place]: Toilet contact guard   Physical Assist/Nonphysical Assist: Toilet 1 person assist;verbal cues   Weight-Bearing Restrictions: Toilet full weight-bearing   Assistive Device grab bars;rolling walker   Balance   Balance Comments Pt demonstrated  fair sitting balance while completing UE ROM while sitting EOB and required close SBA/CGA. Pt required CGA and 4WW for ambulation and functional transfers   Lower Body Dressing   Level of [place]: Dress Lower Body moderate assist (50% patients effort)   Physical Assist/Nonphysical Assist: Dress Lower Body 1 person assist;verbal cues   Toileting   Level of [place]: Toilet minimum assist (75% patients effort)   Physical Assist/Nonphysical Assist: Toilet 1 person assist;set-up required;verbal cues   Grooming   Level of [place]: Grooming stand-by assist  (While sitting in bedside chair)   Physical Assist/Nonphysical Assist: Grooming set-up required;1 person assist;verbal cues   Instrumental Activities of Daily Living (IADL)   IADL Comments Pt's spouse was assisting and completing all IADLs   Activities of Daily Living Analysis   Impairments Contributing to Impaired Activities of Daily Living cognition impaired;motor control impaired;coordination impaired;balance impaired  (decreased activity tolerance)   General Therapy Interventions   Planned Therapy Interventions ADL retraining;transfer training;cognition;progressive activity/exercise   Clinical Impression   Criteria for Skilled Therapeutic Interventions Met yes, treatment indicated   OT Diagnosis Decreased I and safety with ADL/IADLs and functional mobility   Influenced by the following impairments cognition, balance, motor control, low activity tolerance   Assessment of Occupational Performance 3-5 Performance Deficits   Identified Performance Deficits ADLs (dressing, toileting, grooming/hygiene) and all IADLs   Clinical Decision Making (Complexity) Moderate complexity   Therapy Frequency daily   Predicted Duration of Therapy Intervention (days/wks) 4 days   Anticipated Discharge Disposition Transitional Care Facility   Risks and Benefits of Treatment have been explained. Yes   Patient, Family & other staff in agreement with plan of care Yes   Seng  "[facility] \"6 Clicks\"   2016, Trustees of [facility], under license to [employer].  All rights reserved.   6 Clicks Short Forms Daily Activity Inpatient Short Form   [facility]  \"6 Clicks\" Daily Activity Inpatient Short Form   1. Putting on and taking off regular lower body clothing? 2 - A Lot   2. Bathing (including washing, rinsing, drying)? 2 - A Lot   3. Toileting, which includes using toilet, bedpan or urinal? 2 - A Lot   4. Putting on and taking off regular upper body clothing? 3 - A Little   5. Taking care of personal grooming such as brushing teeth? 3 - A Little   6. Eating meals? 3 - A Little   Daily Activity Raw Score (Score out of 24.Lower scores equate to lower levels of function) 15   Total Evaluation Time   Total Evaluation Time (Minutes) 15     "

## 2017-10-23 NOTE — PLAN OF CARE
Problem: Patient Care Overview  Goal: Plan of Care/Patient Progress Review  Discharge Planner PT   Patient plan for discharge: None stated.     Current status: Pt alert to self only. Pt required SBA for supine<>sit to EOB. Pt demonstrates posterior lean and slides towards EOB, required cues to scoot back. Pt CGA for sit<>stand to 4WW. Pt ambulated 120' with 4WW and CGA, additional assist for IV pole management. Pt demonstrates fast lexy, unsteady with turns. Pt participates well with standing exercises, fatigued with activity.     Barriers to return to prior living situation: Level of assist for mobility, falls risk, impaired balance, confusion     Recommendations for discharge: TCU     Rationale for recommendations: Pt would benefit from continued PT services at TCU to improve safety and tolerance of functional mobility prior to returning home.       Entered by: Lisbet Plaza 10/23/2017 4:04 PM

## 2017-10-23 NOTE — PLAN OF CARE
Problem: Patient Care Overview  Goal: Plan of Care/Patient Progress Review     Patient is forgetful. Up with two assist, a T-belt and a walker to [facility]. Denies pain. Daughter is at bedside -- supportive. Will cont to monitor.

## 2017-10-24 ENCOUNTER — APPOINTMENT (OUTPATIENT)
Dept: CT IMAGING | Facility: CLINIC | Age: 77
DRG: 871 | End: 2017-10-24
Attending: PHYSICIAN ASSISTANT
Payer: MEDICARE

## 2017-10-24 LAB
ALBUMIN SERPL-MCNC: 2.3 G/DL (ref 3.4–5)
ANION GAP SERPL CALCULATED.3IONS-SCNC: 9 MMOL/L (ref 3–14)
BACTERIA SPEC CULT: ABNORMAL
BASOPHILS # BLD AUTO: 0 10E9/L (ref 0–0.2)
BASOPHILS NFR BLD AUTO: 0.1 %
BUN SERPL-MCNC: 30 MG/DL (ref 7–30)
CALCIUM SERPL-MCNC: 8.1 MG/DL (ref 8.5–10.1)
CHLORIDE SERPL-SCNC: 112 MMOL/L (ref 94–109)
CO2 SERPL-SCNC: 21 MMOL/L (ref 20–32)
CREAT SERPL-MCNC: 1.5 MG/DL (ref 0.52–1.04)
DIFFERENTIAL METHOD BLD: ABNORMAL
EOSINOPHIL # BLD AUTO: 0.3 10E9/L (ref 0–0.7)
EOSINOPHIL NFR BLD AUTO: 1.9 %
GFR SERPL CREATININE-BSD FRML MDRD: 34 ML/MIN/1.7M2
GLUCOSE BLDC GLUCOMTR-MCNC: 87 MG/DL (ref 70–99)
GLUCOSE SERPL-MCNC: 111 MG/DL (ref 70–99)
IMM GRANULOCYTES # BLD: 0.2 10E9/L (ref 0–0.4)
IMM GRANULOCYTES NFR BLD: 1.7 %
LYMPHOCYTES # BLD AUTO: 1.3 10E9/L (ref 0.8–5.3)
LYMPHOCYTES NFR BLD AUTO: 9.7 %
Lab: ABNORMAL
Lab: ABNORMAL
MONOCYTES # BLD AUTO: 1.2 10E9/L (ref 0–1.3)
MONOCYTES NFR BLD AUTO: 8.8 %
NEUTROPHILS # BLD AUTO: 10.6 10E9/L (ref 1.6–8.3)
NEUTROPHILS NFR BLD AUTO: 77.8 %
NRBC # BLD AUTO: 0 10*3/UL
NRBC BLD AUTO-RTO: 0 /100
PHOSPHATE SERPL-MCNC: 2.4 MG/DL (ref 2.5–4.5)
POTASSIUM SERPL-SCNC: 3.5 MMOL/L (ref 3.4–5.3)
SODIUM SERPL-SCNC: 142 MMOL/L (ref 133–144)
SPECIMEN SOURCE: ABNORMAL
SPECIMEN SOURCE: ABNORMAL
WBC # BLD AUTO: 13.7 10E9/L (ref 4–11)

## 2017-10-24 PROCEDURE — 80069 RENAL FUNCTION PANEL: CPT | Performed by: INTERNAL MEDICINE

## 2017-10-24 PROCEDURE — 99233 SBSQ HOSP IP/OBS HIGH 50: CPT | Performed by: PHYSICIAN ASSISTANT

## 2017-10-24 PROCEDURE — A9270 NON-COVERED ITEM OR SERVICE: HCPCS | Mod: GY | Performed by: PHYSICIAN ASSISTANT

## 2017-10-24 PROCEDURE — 12000000 ZZH R&B MED SURG/OB

## 2017-10-24 PROCEDURE — 85004 AUTOMATED DIFF WBC COUNT: CPT | Performed by: INTERNAL MEDICINE

## 2017-10-24 PROCEDURE — 00000146 ZZHCL STATISTIC GLUCOSE BY METER IP

## 2017-10-24 PROCEDURE — 25000128 H RX IP 250 OP 636: Performed by: INTERNAL MEDICINE

## 2017-10-24 PROCEDURE — 36415 COLL VENOUS BLD VENIPUNCTURE: CPT | Performed by: INTERNAL MEDICINE

## 2017-10-24 PROCEDURE — 85048 AUTOMATED LEUKOCYTE COUNT: CPT | Performed by: INTERNAL MEDICINE

## 2017-10-24 PROCEDURE — 25000132 ZZH RX MED GY IP 250 OP 250 PS 637: Mod: GY | Performed by: PHYSICIAN ASSISTANT

## 2017-10-24 PROCEDURE — 74176 CT ABD & PELVIS W/O CONTRAST: CPT

## 2017-10-24 RX ORDER — AMLODIPINE BESYLATE 5 MG/1
5 TABLET ORAL DAILY
Status: DISCONTINUED | OUTPATIENT
Start: 2017-10-24 | End: 2017-10-27 | Stop reason: HOSPADM

## 2017-10-24 RX ORDER — HYDRALAZINE HYDROCHLORIDE 20 MG/ML
10 INJECTION INTRAMUSCULAR; INTRAVENOUS EVERY 4 HOURS PRN
Status: DISCONTINUED | OUTPATIENT
Start: 2017-10-24 | End: 2017-10-27 | Stop reason: HOSPADM

## 2017-10-24 RX ADMIN — CARBIDOPA AND LEVODOPA 1.5 TABLET: 25; 100 TABLET ORAL at 11:30

## 2017-10-24 RX ADMIN — CARBIDOPA AND LEVODOPA 1 TABLET: 50; 200 TABLET, EXTENDED RELEASE ORAL at 17:17

## 2017-10-24 RX ADMIN — VENLAFAXINE HYDROCHLORIDE 75 MG: 75 CAPSULE, EXTENDED RELEASE ORAL at 07:08

## 2017-10-24 RX ADMIN — CARBIDOPA AND LEVODOPA 1.5 TABLET: 25; 100 TABLET ORAL at 21:29

## 2017-10-24 RX ADMIN — DOCUSATE SODIUM 100 MG: 100 CAPSULE, LIQUID FILLED ORAL at 22:28

## 2017-10-24 RX ADMIN — CEFTRIAXONE 2 G: 2 INJECTION, POWDER, FOR SOLUTION INTRAMUSCULAR; INTRAVENOUS at 22:27

## 2017-10-24 RX ADMIN — CARBIDOPA AND LEVODOPA 1 TABLET: 50; 200 TABLET, EXTENDED RELEASE ORAL at 21:29

## 2017-10-24 RX ADMIN — CARBIDOPA AND LEVODOPA 1.5 TABLET: 25; 100 TABLET ORAL at 07:08

## 2017-10-24 RX ADMIN — LEVOTHYROXINE SODIUM 112 MCG: 112 TABLET ORAL at 07:09

## 2017-10-24 RX ADMIN — ATORVASTATIN CALCIUM 20 MG: 20 TABLET, FILM COATED ORAL at 07:07

## 2017-10-24 RX ADMIN — DIBASIC SODIUM PHOSPHATE, MONOBASIC POTASSIUM PHOSPHATE AND MONOBASIC SODIUM PHOSPHATE 500 MG: 852; 155; 130 TABLET ORAL at 18:02

## 2017-10-24 RX ADMIN — CARBIDOPA AND LEVODOPA 1.5 TABLET: 25; 100 TABLET ORAL at 17:17

## 2017-10-24 RX ADMIN — CARBIDOPA AND LEVODOPA 1 TABLET: 50; 200 TABLET, EXTENDED RELEASE ORAL at 11:30

## 2017-10-24 RX ADMIN — AMLODIPINE BESYLATE 5 MG: 5 TABLET ORAL at 18:01

## 2017-10-24 RX ADMIN — DOCUSATE SODIUM 100 MG: 100 CAPSULE, LIQUID FILLED ORAL at 11:32

## 2017-10-24 RX ADMIN — ASPIRIN 81 MG: 81 TABLET ORAL at 07:09

## 2017-10-24 RX ADMIN — CARBIDOPA AND LEVODOPA 1 TABLET: 50; 200 TABLET, EXTENDED RELEASE ORAL at 07:08

## 2017-10-24 NOTE — PLAN OF CARE
Problem: Patient Care Overview  Goal: Plan of Care/Patient Progress Review  PT-  PT deferred this AM per discussion with OT as pt's BP is running high at this time.

## 2017-10-24 NOTE — PLAN OF CARE
Problem: Patient Care Overview  Goal: Plan of Care/Patient Progress Review        SLP - Attempted to see patient this pm; however, she declined po trials during attempt due to need to use the commode.  Discussed diet tolerance and POC with patient and daughter briefly.  Patient plans to speak with her family further this pm if a video swallow study is desired prior to discharge.  Patient reports continued coughing/choking at times during meals and decreased recall of recommended precautions.  Precaution education was provided. Plan to continue Tx 10/25 am to observe po tolerance, train strategies, and discuss patient/family wishes for diet/liquid modification and/or video swallow study.

## 2017-10-24 NOTE — PLAN OF CARE
Problem: Patient Care Overview  Goal: Plan of Care/Patient Progress Review  VSS ex HTN, MD aware. Poor appetite and generalized malaise noted. IVF infusing L PIV. Frequent/urgent urination. Up with Ax2 GB and walker.  LARSON and ex wheezes noted in upper lung fields after activity.  Resolved with rest.  Daughter at bedside until 0430, then VPM at [facility] while family not at bedside.   Will continue to monitor.

## 2017-10-24 NOTE — PLAN OF CARE
Problem: Patient Care Overview  Goal: Plan of Care/Patient Progress Review  Outcome: No Change  VSS ex HTN, MD aware. Poor appetite and generalized malaise noted. IVF infusing. Frequent/urgent urination. Up with A1-2 GB and walker.  LARSON and ex wheezes noted in upper lung fields after activity.  Resolved with rest.  Daughters at bedside, and sometimes may increase patients anxiety.  VPM at NOC while family not at bedside.  Appears comfortable at this time.  Will continue to monitor.

## 2017-10-24 NOTE — PLAN OF CARE
Problem: Patient Care Overview  Goal: Plan of Care/Patient Progress Review  OT: Attempted intervention. Pt just finished with nursing cares reporting fatigue. Hypertension at rest (FD=368/82) with RN notified. Will wait with OT intervention at this time.

## 2017-10-24 NOTE — PLAN OF CARE
Problem: Patient Care Overview  Goal: Plan of Care/Patient Progress Review  Outcome: Improving  VSS ex HTN, MD aware, new orders for PRN and scheduled bp meds. Poor appetite and generalized malaise noted. IVF d/c'd.  Frequent/urgent urination. Up with Ax2 GB and walker. MIld  LARSON and ex wheezes noted in upper lung fields after activity.  Resolved with rest.  Daughter at bedside most of day, then VPM while family not at bedside.  CT today to eval kidney stones. Tremulous d/t parkinsons. Will continue to monitor.

## 2017-10-24 NOTE — PROGRESS NOTES
[facility]    Hospitalist Progress Note      Assessment & Plan   Loan Anderson is a 77 year old female with history of Parkinson's disease, CKD, neurogenic bladder, hypothyroidism, and hx nephrolithiasis who was admitted on 10/21/2017 after presenting with confusion.     Severe sepsis secondary to acute pyelonephritis with E. Coli bacteremia. Patient presented with hypotension, fever, leukocytosis, and acute renal failure. UA positive for large LE, hematuria, and significant pyuria. Culture shows >100,000 colonies E. Coli. Initial blood cultures from 10/21 also positive for E. Coli 2/2. Repeat cultures from 10/23 NGTD. She has been receiving IV Rocephin. Leukocytosis initially improved, then spiked to 18k on 10/23. Down trending to 13.7. She has been afebrile for >24 hours.   --CT this afternoon to r/u infected kidney stone  -- continue IV Rocephin, day 4 of 14  -- d/c fluids in light of adequate po intake     Metabolic encephalopathy, improving. Suspect secondary to urinary tract infection. Possible some component of hypercarbia as patient has not used CPAP for quite some time, seems to be worse for a while after she wakes up. Nearing her baseline this afternoon per family. Also some level of cognitive decline at baseline per family.   -- continue to monitor  --avoid benzos, narcotics, anticholinergics  --maximize orientation with preserving sleep/wake cycle, open windows, up for meals.   --recommend repeating outpatient sleep study post discharge    Shortness of breath. Patient notes some increased SOB and wheezing this am. Mild improvement after neb tx. No crackles on exam, no LE edema. She is on room air. Better this pm per nursing.   --monitor for now, may be mild fluid overload but will hold of on diuresis for today     Elevated blood pressure. No hx HTN, normally runs 110s systolic per family and prior clinic notes. She has been persistently hypertensive over the past few days.    --start Norvasc 5mg daily, discussed this may be a short term medication and that we will monitor closely as an outpatient  --prn hydralazine     Acute renal failure in the setting of CKD. Baseline Cr 1-1.3. Initial creatinine elevated 2.74. Continues to improve daily, 1.5 today.   --stop IVF in light of adequate po intake  --avoid nephrotoxic agents   --encourage po intake     Parkinson's disease.  Continue sinemet and Neupro.   --avoid antihistamines and haldol  -- low dose seroquel if needed for agitation     Dysphagia. Family and patient note concerns for aspiration. SLP following and has noted mild-moderate dysphagia. Recommend safe swallow strategies as well as video swallow. Family and patient plan to discuss whether they would like to pursue video swallow.   --continue regular diet per patient request for now    Chronic/Stable medical conditions include:  HLD  Chronic nonalcoholic liver disease  Hypothyroidism    Constipation. Continue bowel regimen     DVT Prophylaxis: Pneumatic Compression Devices  Code Status: Full Code    Disposition: Expected discharge in 1-2 days pending continued clinical improvement and safe discharge plan     This patient was seen and examined with Dr. Vu who agrees with the above plan.    Mariia Garza PA-C    Interval History   Patient feeling a little better today. Headache and myalgias improved. Frequent urination with urgency, has had ongoing issues with this PTA. Denies chest pain, abdominal pain, nausea, vomiting, or focal weakness.  Mild shortness of breath, no chest pain. Occasional wheezing. Poor appetite. Appears a bit restless with tremor, happens occasionally due to parkinson's per patient.     -Data reviewed today: I reviewed all new labs and imaging results over the last 24 hours. I personally reviewed no images or EKG's today.    Physical Exam   Temp: 96.4  F (35.8  C) Temp src: Oral BP: 181/82   Heart Rate: 68 Resp: 16 SpO2: 93 % O2 Device: None (Room  air)    Vitals:    10/21/17 2035 10/24/17 0532   Weight: 87.5 kg (193 lb) 88.1 kg (194 lb 3.2 oz)     Vital Signs with Ranges  Temp:  [96.4  F (35.8  C)-99.6  F (37.6  C)] 96.4  F (35.8  C)  Heart Rate:  [68-91] 68  Resp:  [16-22] 16  BP: (137-181)/(64-82) 181/82  SpO2:  [91 %-96 %] 93 %  I/O last 3 completed shifts:  In: 480 [P.O.:480]  Out: 500 [Urine:500]    Constitutional: Alert and oriented to person, family, date. Confused upon waking, but improved after a few minutes. Non-toxic appearing.   ENT: normal cephalic, moist mucous membranes  Respiratory: Lungs with mild bilateral expiratory wheezing. No crackles. No increased work of breathing or crackles appreciated.   Cardiovascular: Regular rate and rhythm, no murmur, no LE edema, distal pulses +2/4. Calves are non-tender.   GI: Normoactive bowel sounds, abdomen soft, non-tender to palpation. No CVA tenderness  Skin/Integumen: warm, dry. No rash  Neuro:  Cranial nerves 2-12 grossly intact. Clear speech. Tremulous     Medications     - MEDICATION INSTRUCTIONS -       NaCl 50 mL/hr at 10/23/17 1502       aspirin EC  81 mg Oral Daily     docusate sodium  100 mg Oral BID     rotigotine  1 patch Transdermal Daily     rotigotine   Transdermal Daily     rotigotine   Transdermal Q8H     carbidopa-levodopa  1.5 tablet Oral 4x Daily     venlafaxine  75 mg Oral Daily with breakfast     carbidopa-levodopa  1 tablet Oral 4 times per day     levothyroxine (SYNTHROID/LEVOTHROID) tablet 112 mcg  112 mcg Oral QAM AC     atorvastatin (LIPITOR) tablet 20 mg  20 mg Oral Daily     cefTRIAXone  2 g Intravenous Q24H       Data     Recent Labs  Lab 10/24/17  0700 10/23/17  0653 10/22/17  1546 10/22/17  0800  10/21/17  1751   WBC 13.7* 18.0*  --  10.7  --  15.4*   HGB  --  12.2  --  12.1  --  14.4   MCV  --  89  --  88  --  91   PLT  --  198  --  157  --  152    140  --  138  < > 135   POTASSIUM 3.5 4.1 4.0 3.2*  < > 2.7*   CHLORIDE 112* 112*  --  106  < > 97   CO2 21 20  --  21   < > 24   BUN 30 43*  --  42*  < > 51*   CR 1.50* 1.68*  --  2.18*  < > 2.82*   ANIONGAP 9 8  --  11  < > 14   NIKKIE 8.1* 8.2*  --  8.0*  < > 8.4*   * 182*  --  222*  < > 137*   ALBUMIN 2.3* 2.3*  --   --   --  2.9*   PROTTOTAL  --  6.7*  --   --   --  7.7   BILITOTAL  --  0.4  --   --   --  0.8   ALKPHOS  --  218*  --   --   --  258*   ALT  --  10  --   --   --  13   AST  --  38  --   --   --  61*   < > = values in this interval not displayed.    No results found for this or any previous visit (from the past 24 hour(s)).

## 2017-10-25 ENCOUNTER — APPOINTMENT (OUTPATIENT)
Dept: SPEECH THERAPY | Facility: CLINIC | Age: 77
DRG: 871 | End: 2017-10-25
Payer: MEDICARE

## 2017-10-25 ENCOUNTER — APPOINTMENT (OUTPATIENT)
Dept: PHYSICAL THERAPY | Facility: CLINIC | Age: 77
DRG: 871 | End: 2017-10-25
Payer: MEDICARE

## 2017-10-25 LAB
ANION GAP SERPL CALCULATED.3IONS-SCNC: 10 MMOL/L (ref 3–14)
BASOPHILS # BLD AUTO: 0 10E9/L (ref 0–0.2)
BASOPHILS NFR BLD AUTO: 0.1 %
BUN SERPL-MCNC: 24 MG/DL (ref 7–30)
CALCIUM SERPL-MCNC: 8.1 MG/DL (ref 8.5–10.1)
CHLORIDE SERPL-SCNC: 105 MMOL/L (ref 94–109)
CO2 SERPL-SCNC: 25 MMOL/L (ref 20–32)
CREAT SERPL-MCNC: 1.24 MG/DL (ref 0.52–1.04)
DIFFERENTIAL METHOD BLD: ABNORMAL
EOSINOPHIL # BLD AUTO: 0.4 10E9/L (ref 0–0.7)
EOSINOPHIL NFR BLD AUTO: 2.8 %
ERYTHROCYTE [DISTWIDTH] IN BLOOD BY AUTOMATED COUNT: 15 % (ref 10–15)
GFR SERPL CREATININE-BSD FRML MDRD: 42 ML/MIN/1.7M2
GLUCOSE SERPL-MCNC: 116 MG/DL (ref 70–99)
HCT VFR BLD AUTO: 34.5 % (ref 35–47)
HGB BLD-MCNC: 12 G/DL (ref 11.7–15.7)
IMM GRANULOCYTES # BLD: 0.7 10E9/L (ref 0–0.4)
IMM GRANULOCYTES NFR BLD: 4.2 %
LYMPHOCYTES # BLD AUTO: 1.6 10E9/L (ref 0.8–5.3)
LYMPHOCYTES NFR BLD AUTO: 10.5 %
MAGNESIUM SERPL-MCNC: 1.8 MG/DL (ref 1.6–2.3)
MCH RBC QN AUTO: 30.8 PG (ref 26.5–33)
MCHC RBC AUTO-ENTMCNC: 34.8 G/DL (ref 31.5–36.5)
MCV RBC AUTO: 89 FL (ref 78–100)
MONOCYTES # BLD AUTO: 1 10E9/L (ref 0–1.3)
MONOCYTES NFR BLD AUTO: 6.8 %
NEUTROPHILS # BLD AUTO: 11.6 10E9/L (ref 1.6–8.3)
NEUTROPHILS NFR BLD AUTO: 75.6 %
NRBC # BLD AUTO: 0 10*3/UL
NRBC BLD AUTO-RTO: 0 /100
PHOSPHATE SERPL-MCNC: 2.9 MG/DL (ref 2.5–4.5)
PLATELET # BLD AUTO: 229 10E9/L (ref 150–450)
POTASSIUM SERPL-SCNC: 3.2 MMOL/L (ref 3.4–5.3)
POTASSIUM SERPL-SCNC: 4 MMOL/L (ref 3.4–5.3)
RBC # BLD AUTO: 3.89 10E12/L (ref 3.8–5.2)
SODIUM SERPL-SCNC: 140 MMOL/L (ref 133–144)
WBC # BLD AUTO: 15.4 10E9/L (ref 4–11)

## 2017-10-25 PROCEDURE — 97116 GAIT TRAINING THERAPY: CPT | Mod: GP

## 2017-10-25 PROCEDURE — 25000128 H RX IP 250 OP 636: Performed by: PHYSICIAN ASSISTANT

## 2017-10-25 PROCEDURE — 25000132 ZZH RX MED GY IP 250 OP 250 PS 637: Mod: GY | Performed by: PHYSICIAN ASSISTANT

## 2017-10-25 PROCEDURE — 99233 SBSQ HOSP IP/OBS HIGH 50: CPT | Performed by: PHYSICIAN ASSISTANT

## 2017-10-25 PROCEDURE — 40000193 ZZH STATISTIC PT WARD VISIT

## 2017-10-25 PROCEDURE — A9270 NON-COVERED ITEM OR SERVICE: HCPCS | Mod: GY | Performed by: PHYSICIAN ASSISTANT

## 2017-10-25 PROCEDURE — 92526 ORAL FUNCTION THERAPY: CPT | Mod: GN | Performed by: SPEECH-LANGUAGE PATHOLOGIST

## 2017-10-25 PROCEDURE — 40000225 ZZH STATISTIC SLP WARD VISIT: Performed by: SPEECH-LANGUAGE PATHOLOGIST

## 2017-10-25 PROCEDURE — 83735 ASSAY OF MAGNESIUM: CPT | Performed by: PHYSICIAN ASSISTANT

## 2017-10-25 PROCEDURE — 36415 COLL VENOUS BLD VENIPUNCTURE: CPT | Performed by: INTERNAL MEDICINE

## 2017-10-25 PROCEDURE — 80048 BASIC METABOLIC PNL TOTAL CA: CPT | Performed by: PHYSICIAN ASSISTANT

## 2017-10-25 PROCEDURE — A9270 NON-COVERED ITEM OR SERVICE: HCPCS | Mod: GY | Performed by: INTERNAL MEDICINE

## 2017-10-25 PROCEDURE — 25000132 ZZH RX MED GY IP 250 OP 250 PS 637: Mod: GY | Performed by: INTERNAL MEDICINE

## 2017-10-25 PROCEDURE — 25000125 ZZHC RX 250: Performed by: INTERNAL MEDICINE

## 2017-10-25 PROCEDURE — 12000000 ZZH R&B MED SURG/OB

## 2017-10-25 PROCEDURE — 85025 COMPLETE CBC W/AUTO DIFF WBC: CPT | Performed by: PHYSICIAN ASSISTANT

## 2017-10-25 PROCEDURE — 84100 ASSAY OF PHOSPHORUS: CPT | Performed by: PHYSICIAN ASSISTANT

## 2017-10-25 PROCEDURE — 36415 COLL VENOUS BLD VENIPUNCTURE: CPT | Performed by: PHYSICIAN ASSISTANT

## 2017-10-25 PROCEDURE — 84132 ASSAY OF SERUM POTASSIUM: CPT | Performed by: INTERNAL MEDICINE

## 2017-10-25 RX ORDER — CEFTRIAXONE 2 G/1
2 INJECTION, POWDER, FOR SOLUTION INTRAMUSCULAR; INTRAVENOUS EVERY 24 HOURS
Status: COMPLETED | OUTPATIENT
Start: 2017-10-25 | End: 2017-10-25

## 2017-10-25 RX ORDER — CIPROFLOXACIN 500 MG/1
500 TABLET, FILM COATED ORAL EVERY 12 HOURS SCHEDULED
Status: DISCONTINUED | OUTPATIENT
Start: 2017-10-26 | End: 2017-10-27 | Stop reason: HOSPADM

## 2017-10-25 RX ADMIN — ASPIRIN 81 MG: 81 TABLET ORAL at 07:25

## 2017-10-25 RX ADMIN — CARBIDOPA AND LEVODOPA 1.5 TABLET: 25; 100 TABLET ORAL at 17:15

## 2017-10-25 RX ADMIN — CARBIDOPA AND LEVODOPA 1.5 TABLET: 25; 100 TABLET ORAL at 07:24

## 2017-10-25 RX ADMIN — CARBIDOPA AND LEVODOPA 1.5 TABLET: 25; 100 TABLET ORAL at 11:07

## 2017-10-25 RX ADMIN — CEFTRIAXONE SODIUM 2 G: 2 INJECTION, POWDER, FOR SOLUTION INTRAMUSCULAR; INTRAVENOUS at 22:25

## 2017-10-25 RX ADMIN — VENLAFAXINE HYDROCHLORIDE 75 MG: 75 CAPSULE, EXTENDED RELEASE ORAL at 07:25

## 2017-10-25 RX ADMIN — LEVOTHYROXINE SODIUM 112 MCG: 112 TABLET ORAL at 07:24

## 2017-10-25 RX ADMIN — ATORVASTATIN CALCIUM 20 MG: 20 TABLET, FILM COATED ORAL at 07:25

## 2017-10-25 RX ADMIN — POTASSIUM CHLORIDE 40 MEQ: 1.5 POWDER, FOR SOLUTION ORAL at 08:17

## 2017-10-25 RX ADMIN — AMLODIPINE BESYLATE 5 MG: 5 TABLET ORAL at 08:17

## 2017-10-25 RX ADMIN — DOCUSATE SODIUM 100 MG: 100 CAPSULE, LIQUID FILLED ORAL at 21:36

## 2017-10-25 RX ADMIN — Medication 2 G: at 09:59

## 2017-10-25 RX ADMIN — CARBIDOPA AND LEVODOPA 1.5 TABLET: 25; 100 TABLET ORAL at 21:35

## 2017-10-25 RX ADMIN — CARBIDOPA AND LEVODOPA 1 TABLET: 50; 200 TABLET, EXTENDED RELEASE ORAL at 21:35

## 2017-10-25 RX ADMIN — CARBIDOPA AND LEVODOPA 1 TABLET: 50; 200 TABLET, EXTENDED RELEASE ORAL at 11:07

## 2017-10-25 RX ADMIN — DOCUSATE SODIUM 100 MG: 100 CAPSULE, LIQUID FILLED ORAL at 11:07

## 2017-10-25 RX ADMIN — POTASSIUM CHLORIDE 20 MEQ: 1.5 POWDER, FOR SOLUTION ORAL at 09:59

## 2017-10-25 RX ADMIN — CARBIDOPA AND LEVODOPA 1 TABLET: 50; 200 TABLET, EXTENDED RELEASE ORAL at 07:24

## 2017-10-25 RX ADMIN — CARBIDOPA AND LEVODOPA 1 TABLET: 50; 200 TABLET, EXTENDED RELEASE ORAL at 17:15

## 2017-10-25 NOTE — PLAN OF CARE
Problem: Patient Care Overview  Goal: Plan of Care/Patient Progress Review  Outcome: Improving    Patient is disoriented time and sometimes situation. Parkinson's gait, tremor.  Anxious at times. Denies pain. HTN noted, MD aware and meds ordered. IVF discontinued today. R arm PIV SL. Frequent/urgent urination. Up with Ax2 GB and walker. Mild  LARSON and ex wheezes noted in upper lung fields after activity.  Resolved with rest. Daughter at bedside this evening and staying overnight. VPM needed when family is not at bedside. CT today to eval kidney stones. Plan: Discharge pending.

## 2017-10-25 NOTE — PLAN OF CARE
Problem: Patient Care Overview  Goal: Plan of Care/Patient Progress Review  Discharge Planner SLP      Patient plan for discharge: Return home with family. Future plans for care center.  Current status: Patient seen for swallowing treatment, demonstrating intermittent throat clearing with thin liquids.  Patient continues to politely refuse video swallow study.  Patient required moderate cueing for swallow strategy implementation. Trained home exercise program for Parkinsons -related dysphagia.   Barriers to return to prior living situation: Weakness, confusion.   Recommendations for discharge: Home with home care vs. TCU, pending progress and other rehabilitation needs.  Rationale for recommendations: Patient will likely require SLP follow up at discharge for dysphagia, pharyngeal strengthening program carryover.        Entered by: Claire Brandt 10/25/2017 3:44 PM

## 2017-10-25 NOTE — PLAN OF CARE
Problem: Patient Care Overview  Goal: Plan of Care/Patient Progress Review  PT attempted to see pt for session with family and MD, will attempt to return as schedule allows, rec pt amb with nursing.

## 2017-10-25 NOTE — PLAN OF CARE
Problem: Patient Care Overview  Goal: Plan of Care/Patient Progress Review  Outcome: No Change  Up to chair and commode with assist. Denies pain. HTN-MD aware. Mg and K replaced per protocol.

## 2017-10-25 NOTE — PLAN OF CARE
Problem: Patient Care Overview  Goal: Plan of Care/Patient Progress Review   Patient is disoriented time and sometimes situation. Parkinson's gait, tremor.  Anxious at times. Denies pain. HTN noted, MD aware and meds ordered. R arm PIV SL. Frequent/urgent urination. Up with Ax2 GB and walker. Mild  LARSON and ex wheezes noted in upper lung fields after activity.  Resolved with rest.  VPM needed when family is not at bedside. Plan: Discharge pending.

## 2017-10-25 NOTE — PROGRESS NOTES
[facility]    Hospitalist Progress Note      Assessment & Plan   Loan Anderson is a 77 year old female with history of Parkinson's disease, CKD, neurogenic bladder, hypothyroidism, and hx nephrolithiasis who was admitted on 10/21/2017 after presenting with confusion.      Severe sepsis secondary to acute pyelonephritis with E. Coli bacteremia. Patient presented with hypotension, fever, leukocytosis, and acute renal failure. UA positive for large LE, hematuria, and significant pyuria. Culture shows >100,000 colonies E. Coli. Initial blood cultures from 10/21 also positive for E. Coli 2/2. Repeat cultures from 10/23 NGTD. She has been receiving IV Rocephin. Leukocytosis initially improved, then spiked to 18k on 10/23. Slight uptrend again 10/25 to 15.  She has been afebrile for >24 hours.   --CT shows multiple non-obstructing kidney stones, related to recurrent UTI?  --Urology consulted, appreciate assistance.   -- continue IV Rocephin, day 5 of 14, transition to po Cipro 10/26     Metabolic encephalopathy, improving. Suspect secondary to urinary tract infection. Possible some component of hypercarbia as patient has not used CPAP for quite some time, seems to be worse for a while after she wakes up. Nearing her baseline this afternoon per family. Also some level of cognitive decline at baseline per family.   -- continue to monitor  --avoid benzos, narcotics, anticholinergics  --maximize orientation with preserving sleep/wake cycle, open window shades, up for meals.   --recommend repeating outpatient sleep study post discharge     Shortness of breath, improved Patient notes some increased SOB and wheezing 10/24. Much better today. Lungs clear on exam. Suspect mild fluid overload.  --monitor      Elevated blood pressure. No hx HTN, normally runs 110s systolic per family and prior clinic notes. She has been persistently hypertensive over the past few days.   --started Norvasc 5mg daily, discussed  this may be a short term medication and that we will monitor closely as an outpatient  --prn hydralazine   -- bariatric cuff should be used for this patient, some question of poor machine function so will check manual BPs tonight      Acute renal failure in the setting of CKD. Baseline Cr 1-1.3. Initial creatinine elevated 2.74. Continues to improve daily, back to baseline 1.24.   --avoid nephrotoxic agents   --encourage po intake      Parkinson's disease.  Continue sinemet and Neupro.   --avoid antihistamines and haldol  -- low dose seroquel if needed for agitation      Dysphagia. Family and patient note concerns for aspiration. SLP following and has noted mild-moderate dysphagia. Recommend safe swallow strategies as well as video swallow. Discussed with patient and family today; regardless of the result of a video swallow patient would not be willing to modify her diet at this time. Will not pursue.   --continue regular diet per patient request for now     Chronic/Stable medical conditions include:  HLD  Chronic nonalcoholic liver disease  Hypothyroidism     Constipation. Continue bowel regimen      DVT Prophylaxis: Pneumatic Compression Devices  Code Status: Full Code     Disposition: Expected discharge in 1-2 days pending continued clinical improvement and safe discharge plan      This patient was seen and examined with Dr. Vu who agrees with the above plan.    Mariia Garza PA-C    Interval History   Patient feeling better today. Muscle aches are better. Denies shortness of breath. Continues to have issues with urge incontinence. Tolerating po. Mental status improved per family.     -Data reviewed today: I reviewed all new labs and imaging results over the last 24 hours. I personally reviewed no images or EKG's today.    Physical Exam   Temp: 98.1  F (36.7  C) Temp src: Oral BP: 130/70 (Manual cuff)   Heart Rate: 71 Resp: 16 SpO2: 95 % O2 Device: None (Room air)    Vitals:    10/21/17 2035 10/24/17  0532 10/25/17 0515   Weight: 87.5 kg (193 lb) 88.1 kg (194 lb 3.2 oz) 88.8 kg (195 lb 12.8 oz)     Vital Signs with Ranges  Temp:  [95.5  F (35.3  C)-98.1  F (36.7  C)] 98.1  F (36.7  C)  Heart Rate:  [64-73] 71  Resp:  [16-20] 16  BP: (130-175)/() 130/70  SpO2:  [95 %-96 %] 95 %  I/O last 3 completed shifts:  In: 360 [P.O.:360]  Out: 2085 [Urine:2085]    Constitutional:  Alert and oriented x4, Non-toxic appearing.   ENT: normal cephalic, moist mucous membranes  Respiratory: Lungs clear to auscultation. No crackles. No increased work of breathing   Cardiovascular: Regular rate and rhythm, no murmur, no LE edema, distal pulses +2/4. Calves are non-tender.   GI: Normoactive bowel sounds, abdomen soft, non-tender to palpation. No CVA tenderness  Skin/Integumen: warm, dry. No rash  Neuro:  Cranial nerves 2-12 grossly intact. Clear speech. Tremulous     Medications     - MEDICATION INSTRUCTIONS -         [START ON 10/26/2017] ciprofloxacin  500 mg Oral Q12H BURKE     cefTRIAXone  2 g Intravenous Q24H     amLODIPine  5 mg Oral Daily     aspirin EC  81 mg Oral Daily     docusate sodium  100 mg Oral BID     rotigotine  1 patch Transdermal Daily     rotigotine   Transdermal Daily     rotigotine   Transdermal Q8H     carbidopa-levodopa  1.5 tablet Oral 4x Daily     venlafaxine  75 mg Oral Daily with breakfast     carbidopa-levodopa  1 tablet Oral 4 times per day     levothyroxine (SYNTHROID/LEVOTHROID) tablet 112 mcg  112 mcg Oral QAM AC     atorvastatin (LIPITOR) tablet 20 mg  20 mg Oral Daily       Data     Recent Labs  Lab 10/25/17  1420 10/25/17  0710 10/24/17  0700 10/23/17  0653  10/22/17  0800  10/21/17  1751   WBC  --  15.4* 13.7* 18.0*  --  10.7  --  15.4*   HGB  --  12.0  --  12.2  --  12.1  --  14.4   MCV  --  89  --  89  --  88  --  91   PLT  --  229  --  198  --  157  --  152   NA  --  140 142 140  --  138  < > 135   POTASSIUM 4.0 3.2* 3.5 4.1  < > 3.2*  < > 2.7*   CHLORIDE  --  105 112* 112*  --  106  < > 97    CO2  --  25 21 20  --  21  < > 24   BUN  --  24 30 43*  --  42*  < > 51*   CR  --  1.24* 1.50* 1.68*  --  2.18*  < > 2.82*   ANIONGAP  --  10 9 8  --  11  < > 14   NIKKIE  --  8.1* 8.1* 8.2*  --  8.0*  < > 8.4*   GLC  --  116* 111* 182*  --  222*  < > 137*   ALBUMIN  --   --  2.3* 2.3*  --   --   --  2.9*   PROTTOTAL  --   --   --  6.7*  --   --   --  7.7   BILITOTAL  --   --   --  0.4  --   --   --  0.8   ALKPHOS  --   --   --  218*  --   --   --  258*   ALT  --   --   --  10  --   --   --  13   AST  --   --   --  38  --   --   --  61*   < > = values in this interval not displayed.    No results found for this or any previous visit (from the past 24 hour(s)).

## 2017-10-25 NOTE — PLAN OF CARE
Problem: Patient Care Overview  Goal: Plan of Care/Patient Progress Review  Patient plan for discharge: None stated.  Current status: Supine to sit with HOB elevated and SBA. Sit to/from stand with 4WW and CGA with cues for safety to lock brakes prior to sitting/standing. Amb 300 ft x 1 with 4WW and CGA. Pt c/o severe L knee pain with activity. Onto BSC at end of session with assist for clothing and CGA at gait belt. Pt needing increased time for success, daughter present and call light left within reach to call for assist when finished.  Barriers to return to prior living situation: Level of assist for mobility, falls risk, impaired balance, confusion  Recommendations for discharge: TCU per the plan established by the Physical Therapist   Rationale for recommendations: Pt would benefit from continued PT services at TCU to improve safety and tolerance of functional mobility prior to returning home.

## 2017-10-25 NOTE — PLAN OF CARE
Problem: Patient Care Overview  Goal: Plan of Care/Patient Progress Review  OT: per pt family pt was sleeping and they are requesting to let pt sleep at this time, family declining OT

## 2017-10-26 ENCOUNTER — APPOINTMENT (OUTPATIENT)
Dept: OCCUPATIONAL THERAPY | Facility: CLINIC | Age: 77
DRG: 871 | End: 2017-10-26
Payer: MEDICARE

## 2017-10-26 ENCOUNTER — APPOINTMENT (OUTPATIENT)
Dept: PHYSICAL THERAPY | Facility: CLINIC | Age: 77
DRG: 871 | End: 2017-10-26
Payer: MEDICARE

## 2017-10-26 ENCOUNTER — APPOINTMENT (OUTPATIENT)
Dept: SPEECH THERAPY | Facility: CLINIC | Age: 77
DRG: 871 | End: 2017-10-26
Payer: MEDICARE

## 2017-10-26 LAB
MAGNESIUM SERPL-MCNC: 2 MG/DL (ref 1.6–2.3)
POTASSIUM SERPL-SCNC: 3.4 MMOL/L (ref 3.4–5.3)

## 2017-10-26 PROCEDURE — 36415 COLL VENOUS BLD VENIPUNCTURE: CPT | Performed by: INTERNAL MEDICINE

## 2017-10-26 PROCEDURE — 99222 1ST HOSP IP/OBS MODERATE 55: CPT | Performed by: UROLOGY

## 2017-10-26 PROCEDURE — 40000193 ZZH STATISTIC PT WARD VISIT

## 2017-10-26 PROCEDURE — 92526 ORAL FUNCTION THERAPY: CPT | Mod: GN | Performed by: SPEECH-LANGUAGE PATHOLOGIST

## 2017-10-26 PROCEDURE — 84132 ASSAY OF SERUM POTASSIUM: CPT | Performed by: INTERNAL MEDICINE

## 2017-10-26 PROCEDURE — 25000125 ZZHC RX 250: Performed by: INTERNAL MEDICINE

## 2017-10-26 PROCEDURE — 40000133 ZZH STATISTIC OT WARD VISIT

## 2017-10-26 PROCEDURE — 83735 ASSAY OF MAGNESIUM: CPT | Performed by: INTERNAL MEDICINE

## 2017-10-26 PROCEDURE — 25000132 ZZH RX MED GY IP 250 OP 250 PS 637: Mod: GY | Performed by: PHYSICIAN ASSISTANT

## 2017-10-26 PROCEDURE — 40000225 ZZH STATISTIC SLP WARD VISIT: Performed by: SPEECH-LANGUAGE PATHOLOGIST

## 2017-10-26 PROCEDURE — 97116 GAIT TRAINING THERAPY: CPT | Mod: GP

## 2017-10-26 PROCEDURE — 12000000 ZZH R&B MED SURG/OB

## 2017-10-26 PROCEDURE — 97535 SELF CARE MNGMENT TRAINING: CPT | Mod: GO

## 2017-10-26 PROCEDURE — A9270 NON-COVERED ITEM OR SERVICE: HCPCS | Mod: GY | Performed by: PHYSICIAN ASSISTANT

## 2017-10-26 PROCEDURE — 99232 SBSQ HOSP IP/OBS MODERATE 35: CPT | Performed by: INTERNAL MEDICINE

## 2017-10-26 RX ADMIN — DOCUSATE SODIUM 100 MG: 100 CAPSULE, LIQUID FILLED ORAL at 21:23

## 2017-10-26 RX ADMIN — ATORVASTATIN CALCIUM 20 MG: 20 TABLET, FILM COATED ORAL at 07:07

## 2017-10-26 RX ADMIN — CARBIDOPA AND LEVODOPA 1.5 TABLET: 25; 100 TABLET ORAL at 11:24

## 2017-10-26 RX ADMIN — CARBIDOPA AND LEVODOPA 1 TABLET: 50; 200 TABLET, EXTENDED RELEASE ORAL at 07:06

## 2017-10-26 RX ADMIN — VENLAFAXINE HYDROCHLORIDE 75 MG: 75 CAPSULE, EXTENDED RELEASE ORAL at 07:07

## 2017-10-26 RX ADMIN — CIPROFLOXACIN HYDROCHLORIDE 500 MG: 500 TABLET, FILM COATED ORAL at 20:12

## 2017-10-26 RX ADMIN — CARBIDOPA AND LEVODOPA 1 TABLET: 50; 200 TABLET, EXTENDED RELEASE ORAL at 21:21

## 2017-10-26 RX ADMIN — LEVOTHYROXINE SODIUM 112 MCG: 112 TABLET ORAL at 07:07

## 2017-10-26 RX ADMIN — AMLODIPINE BESYLATE 5 MG: 5 TABLET ORAL at 08:38

## 2017-10-26 RX ADMIN — ASPIRIN 81 MG: 81 TABLET ORAL at 08:39

## 2017-10-26 RX ADMIN — CARBIDOPA AND LEVODOPA 1 TABLET: 50; 200 TABLET, EXTENDED RELEASE ORAL at 18:16

## 2017-10-26 RX ADMIN — CARBIDOPA AND LEVODOPA 1.5 TABLET: 25; 100 TABLET ORAL at 07:06

## 2017-10-26 RX ADMIN — CARBIDOPA AND LEVODOPA 1.5 TABLET: 25; 100 TABLET ORAL at 21:21

## 2017-10-26 RX ADMIN — CARBIDOPA AND LEVODOPA 1.5 TABLET: 25; 100 TABLET ORAL at 18:16

## 2017-10-26 RX ADMIN — CIPROFLOXACIN HYDROCHLORIDE 500 MG: 500 TABLET, FILM COATED ORAL at 10:13

## 2017-10-26 RX ADMIN — Medication 2 G: at 10:13

## 2017-10-26 RX ADMIN — CARBIDOPA AND LEVODOPA 1 TABLET: 50; 200 TABLET, EXTENDED RELEASE ORAL at 11:24

## 2017-10-26 NOTE — PROGRESS NOTES
SW  I: SW spoke with patient, spouse and daughter to discuss d/c planning needs. Patient's family had some concern about the appropriate level of care and felt Memory care was best option. SW discussed memory care TCU's and family agreed they would prefer a referral sent to [facility] and [facility]. SW discussed insurance coverage and encouraged family to call [employer] to check SNF coverage once Medicare stops covering 100%. Family will contact [employer]. SW will send referral and will update daughter and spouse once assessments have been completed by facilities. Referrals were sent via DOD.    P: SW will continue to follow and assist as needed.    Maxine Singh, OFELIA   *[phone]

## 2017-10-26 NOTE — PLAN OF CARE
Problem: Patient Care Overview  Goal: Plan of Care/Patient Progress Review  Outcome: Improving  A/Ox4 overnight. VSS on RA, denies pain. Daughter slept in room, requested 4 bed rails up. VPM for when family members are not present. Pt up frequently overnight for urinary frequency. Pt has Parkinson's gait and tremor. Regular diet. R/L arm PIV SL. Assist x 1-2 to BSC. Plan: Discharge in 1-2 days pending improvement, likely to TCU. Will continue to monitor.

## 2017-10-26 NOTE — PLAN OF CARE
Discharge Planner OT   Patient plan for discharge: None stated  Current status:  Pt transferred to the bedside chair with minimum assist and verbal cues for walker safety and hand placement. While seated/ standing pt completed lower body dressing with AE. Pt required moderate-maximum assist to thread feet through bottoms and minimum to moderate assist for pulling bottoms up over hips while standing.   Barriers to return to prior living situation: Cognition, poor balance, low activity tolerance, increased need for assist, spouse unsure if he can continue to support pt at home.   Recommendations for discharge: TCU  Rationale for recommendations: Pt would benefit from continued daily skilled therapy services to increase I and safety with ADL/IADLs and functional mobility.        Entered by: Carissa Jaquez 10/26/2017 11:50 AM

## 2017-10-26 NOTE — PLAN OF CARE
"Problem: Patient Care Overview  Goal: Plan of Care/Patient Progress Review  Discharge Planner SLP   Patient plan for discharge: \"[facility]\"  Current status: Guided pt through pharyngeal exercises with mod cues to complete and follow commands. Pt required mod cues throughout to use chin tuck with thin liquids.   Barriers to return to prior living situation: Weakness; cognition  Recommendations for discharge: TCU  Rationale for recommendations: SLP to follow up at discharge for dysphagia, pharyngeal strengthening program carryover.        Entered by: Mayte Rm 10/26/2017 4:17 PM         "

## 2017-10-26 NOTE — PROGRESS NOTES
[facility]    Hospitalist Progress Note    Assessment & Plan   Loan Anderson is a 77 year old female with history of Parkinson's disease, CKD, neurogenic bladder, hypothyroidism, and  nephrolithiasis who was admitted on 10/21/2017 after presenting with confusion.       Severe sepsis secondary to acute pyelonephritis with E. Coli bacteremia.   -Patient presented with hypotension, fever, leukocytosis, and acute renal failure.   -UA positive for large LE, hematuria, and significant pyuria.  -Urine and blood culture positive for Escherichia coli   -Surveillance blood culture negative  -CT shows multiple non-obstructing kidney stones, related to recurrent UTI?  -Awaiting urology consult  -Received IV Rocephin, for five days, transitioned to p.o. Cipro 10/26      Acute Metabolic encephalopathy, improving.   -Suspect secondary to urinary tract infection/sepsis.   -Possible some component of hypercarbia as patient has not used CPAP for quite some time, seems to be worse for a while after she wakes up.   -Close to baseline  -avoid benzos, narcotics, anticholinergics  -recommend repeating outpatient sleep study post discharge      Elevated blood pressure.   -No hx HTN, normally runs 110s systolic per family and prior clinic notes. She has been persistently hypertensive over the past few days.   -started Norvasc 5mg daily  -Blood pressure slightly better, continue Norvasc      Acute kidney injury on CKD-III:  -Baseline Cr 1-1.3.   -Initial creatinine elevated 2.74.   -Continues to improve daily, back to baseline 1.24.   -avoid nephrotoxic agents   -encourage po intake       Parkinson's disease.    -Continue sinemet and Neupro.   -low dose seroquel if needed for agitation       Dysphagia. Family and patient note concerns for aspiration. SLP following and has noted mild-moderate dysphagia. Recommend safe swallow strategies as well as video swallow. Discussed with patient and family; regardless of the  result of a video swallow patient would not be willing to modify her diet at this time. Will not pursue.   -continue regular diet per patient request for now      Chronic/Stable medical conditions include:  HLD  Chronic nonalcoholic liver disease  Hypothyroidism      D/W: RN, pt and daughter at bedside  DVT Prophylaxis: Pneumatic Compression Devices  Code Status: Full Code    Disposition: Expected discharge 10/27; if no further workup planned by urology    Juan Antonio Vu MD    Interval History   Afebrile.  Blood pressure better controlled today.  Denies abdominal pain/ nausea or vomiting.  No dysuria    -Data reviewed today: I reviewed all new labs and imaging results over the last 24 hours. I personally reviewed no images or EKG's today.    Physical Exam   Temp: 98  F (36.7  C) Temp src: Oral BP: 150/75 (manual cuff) Pulse: 64 Heart Rate: 78 Resp: 18 SpO2: 94 % O2 Device: None (Room air)    Vitals:    10/24/17 0532 10/25/17 0515 10/26/17 0537   Weight: 88.1 kg (194 lb 3.2 oz) 88.8 kg (195 lb 12.8 oz) 91.6 kg (201 lb 14.4 oz)     Vital Signs with Ranges  Temp:  [96.2  F (35.7  C)-98.1  F (36.7  C)] 98  F (36.7  C)  Pulse:  [64-81] 64  Heart Rate:  [71-78] 78  Resp:  [16-20] 18  BP: (128-150)/() 150/75  SpO2:  [93 %-95 %] 94 %  I/O last 3 completed shifts:  In: -   Out: 2350 [Urine:2350]    Constitutional: AAOX3, NAD, Appears comfortable  HEENT: Moist oral mucosa, no oral lesions, No pallor or icterus  Respiratory:  No crackles, No wheezes, CTA B/L, Normal WOB  Cardiovascular: RRR, No murmur  GI: Soft, Non- tender, BS- normoactive, No Guarding/rebound/rigidity  Skin/Integument: Warm and dry, no rashes  MSK: No joint deformity or swelling, no edema  Neuro: CN- grossly intact, tremors+      Medications     - MEDICATION INSTRUCTIONS -         ciprofloxacin  500 mg Oral Q12H BURKE     amLODIPine  5 mg Oral Daily     aspirin EC  81 mg Oral Daily     docusate sodium  100 mg Oral BID     rotigotine  1 patch  Transdermal Daily     rotigotine   Transdermal Daily     rotigotine   Transdermal Q8H     carbidopa-levodopa  1.5 tablet Oral 4x Daily     venlafaxine  75 mg Oral Daily with breakfast     carbidopa-levodopa  1 tablet Oral 4 times per day     levothyroxine (SYNTHROID/LEVOTHROID) tablet 112 mcg  112 mcg Oral QAM AC     atorvastatin (LIPITOR) tablet 20 mg  20 mg Oral Daily       Data     Recent Labs  Lab 10/26/17  0658 10/25/17  1420 10/25/17  0710 10/24/17  0700 10/23/17  0653  10/22/17  0800  10/21/17  5701   WBC  --   --  15.4* 13.7* 18.0*  --  10.7  --  15.4*   HGB  --   --  12.0  --  12.2  --  12.1  --  14.4   MCV  --   --  89  --  89  --  88  --  91   PLT  --   --  229  --  198  --  157  --  152   NA  --   --  140 142 140  --  138  < > 135   POTASSIUM 3.4 4.0 3.2* 3.5 4.1  < > 3.2*  < > 2.7*   CHLORIDE  --   --  105 112* 112*  --  106  < > 97   CO2  --   --  25 21 20  --  21  < > 24   BUN  --   --  24 30 43*  --  42*  < > 51*   CR  --   --  1.24* 1.50* 1.68*  --  2.18*  < > 2.82*   ANIONGAP  --   --  10 9 8  --  11  < > 14   NIKKIE  --   --  8.1* 8.1* 8.2*  --  8.0*  < > 8.4*   GLC  --   --  116* 111* 182*  --  222*  < > 137*   ALBUMIN  --   --   --  2.3* 2.3*  --   --   --  2.9*   PROTTOTAL  --   --   --   --  6.7*  --   --   --  7.7   BILITOTAL  --   --   --   --  0.4  --   --   --  0.8   ALKPHOS  --   --   --   --  218*  --   --   --  258*   ALT  --   --   --   --  10  --   --   --  13   AST  --   --   --   --  38  --   --   --  61*   < > = values in this interval not displayed.    No results found for this or any previous visit (from the past 24 hour(s)).

## 2017-10-26 NOTE — CONSULTS
UROLOGY CONSULTATION      HISTORY OF PRESENT ILLNESS:  It was a pleasure to be asked to see Loan Anderson, a very pleasant 77-year-old lady, in initial consultation today at the request of the inpatient medical service.  She lives close to [place].  She was en route to a wedding in [place] and had stopped here in the [facility] where various family members live.  She had presented with what seemed like a seizure and was seen in the emergency room with what seemed to be some evidence of dyspnea and confusion.  Initially had some difficulty voiding and was catheterized for 400 mL.  Subsequently, she was found to have an elevated white count.  A urinalysis was grossly positive for infection with 182 white cells being seen and the serum creatinine had risen to 2.7.  Furthermore, the temperature before admission had been as high as 101.5.  Subsequently, urine cultures had grown a heavy growth of E. coli and she has been initiated on treatment.  A CT scan had shown evidence of stones; however, the stones were quite small.  There was a 5 mm stone in the lower pole pippa of the right kidney and 3 mm stone in the left kidney and another 1 mm stone in the upper pole of the right kidney; however, there was no hydronephrosis and no evidence of stones in the ureters.  The only other feature was some irregularity of the left kidney which is possibly consistent with some mild renal atrophy or previous scarring.  She does have a previous history of urinary stone disease and had shock wave treatment 2 years ago in [place] for stones at that time.  The CT scan had shown no other significant features there.  Another thing includes what appears to be a history of Parkinson's disease.  Serum creatinine which was 2.7 at admission after treatment with antibiotics here has now declined such as that today the serum creatinine is 1.24.  The white count, however, is still elevated at 15,400 and she is continuing with antibiotic  therapy.      PAST MEDICAL HISTORY:  Hypothyroidism, history of depression, parkinsonism, urinary stone disease, urinary tract infection and  of incontinence.      PAST SURGICAL HISTORY:  Includes lithotripsy for urinary stone disease.      MEDICATIONS:  Prior to admission include the following, carbidopa/levodopa, levothyroxine, Effexor.      ALLERGIES:  Mirapex and Requip, although this is leg swelling and is probably not an allergic reaction.      SOCIAL HISTORY:  The patient, who is 77, retired, never smoked cigarettes.  She does drink alcohol on occasions.  She lives at [place], which is about 12 miles from [place], with her .      FAMILY HISTORY:  There is none related.      SYMPTOM REVIEW:  At this time, other than the symptoms already outlined, there are no other significant symptoms in 10-system review.      PHYSICAL EXAMINATION:   VITAL SIGNS:  At this time, show the following -- She is afebrile with a blood pressure of 128/62 and a pulse rate of 71.   GENERAL:  A pleasant lady who does not appear to be in pain, no distress, is quite conversational, but seems to become a little confused, but is otherwise well oriented in time, place and person.   MENTAL STATUS:  Otherwise is unremarkable.   HEENT:  There is no clinical evidence of jaundice.  The mucous membranes are normal.   SKIN:  Otherwise normal.   RESPIRATORY:  The cycle is normal.  Lungs are normal to both percussion and auscultation.   CARDIOVASCULAR:  Heart sounds normal, no murmurs are heard and no evidence of peripheral edema.   EXTREMITIES:  No other remarkable features.   ABDOMEN:  Soft and nontender.  No hernias are detected.   LYMPHATIC:  Lymphadenopathy not detected.   NEUROLOGIC:  There is evidence of tremor today, although it does not seem typically parkinsonian.  There is no evidence at the moment of dysdiadochokinesis.  There are no other focal abnormal neurologic signs in the central or peripheral nervous systems.       LABORATORY DATA:  Summary of her lab studies was already discussed.  The overall impression therefore is this patient has urinary tract infection with significant symptoms which are now resolving with therapy.  The renal insufficiency is improving, with decline in serum creatinine from 2.7 on admission to 1.24.  CT has shown urinary stones, but these are small and located in calices in the kidney.  They are none in the ureter and there was no hydronephrosis.      ASSESSMENT AND PLAN:  Finally, she does have Parkinson's, does have incontinence and my recommendations therefore will be as follows:   1.  There is no indication to treat the stones in the kidney at this time and there may not be in the future.  I would, however, recommend that she be followed by a urologist in her hometown probably in about 3 months at least with a KUB.   2.  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      I would be happy to see the patient again if further problems arise.         TAMMI HORTON MD             D: 10/26/2017 15:24   T: 10/26/2017 16:18   MT: TS      Name:     PIYUSH MONTENEGRO   MRN:      [id number]        Account:       [id number]   :      1940           Consult Date:  10/26/2017      Document: [id number]

## 2017-10-26 NOTE — PLAN OF CARE
Problem: Patient Care Overview  Goal: Plan of Care/Patient Progress Review  Outcome: Improving    Shift Update: Patient is more alert today. Disoriented to time and sometimes situation, Parkinson's gait, tremor.  Anxious at times. Afebrile. Denies pain. VSS. Can be hypertensive at times. Regular diet. R/L arm PIV SL. K+ and Mg+ replaced on day shift. Assist x 1-2 to BSC.  VPM while daughter(s) are not at bedside. Plan: Discharge pending.

## 2017-10-26 NOTE — PLAN OF CARE
"Problem: Patient Care Overview  Goal: Plan of Care/Patient Progress Review  Patient plan for discharge: None stated.  Current status: Supine to sit with HOB elevated and SBA. Sit to/from stand with 4WW and CGA with cues for safety to lock brakes prior to sitting/standing. Amb >600 ft x 1 with 4WW and CGA for balance. Pt c/o L knee pain with activity again today, but states \"it's improving.\" Pt noted to be more tremulous today with mild unsteadiness and quick pace despite cues to slow down to increase stability. Pt sitting in chair at end of session with all needs in reach and chair alarm on.  Barriers to return to prior living situation: Level of assist for mobility, falls risk, impaired balance, confusion  Recommendations for discharge: TCU per the plan established by the Physical Therapist   Rationale for recommendations: Pt would benefit from continued PT services at TCU to improve safety and tolerance of functional mobility prior to returning home.          "

## 2017-10-26 NOTE — PLAN OF CARE
Problem: Patient Care Overview  Goal: Plan of Care/Patient Progress Review  Denies pain. Up with SBA to chair, halls and BSC. Tolerating PO intake. Mg+ replaced. Await Urology consult.

## 2017-10-27 ENCOUNTER — APPOINTMENT (OUTPATIENT)
Dept: GENERAL RADIOLOGY | Facility: CLINIC | Age: 77
DRG: 871 | End: 2017-10-27
Attending: INTERNAL MEDICINE
Payer: MEDICARE

## 2017-10-27 ENCOUNTER — APPOINTMENT (OUTPATIENT)
Dept: OCCUPATIONAL THERAPY | Facility: CLINIC | Age: 77
DRG: 871 | End: 2017-10-27
Payer: MEDICARE

## 2017-10-27 ENCOUNTER — APPOINTMENT (OUTPATIENT)
Dept: PHYSICAL THERAPY | Facility: CLINIC | Age: 77
DRG: 871 | End: 2017-10-27
Payer: MEDICARE

## 2017-10-27 VITALS
HEIGHT: 63 IN | DIASTOLIC BLOOD PRESSURE: 70 MMHG | HEART RATE: 64 BPM | RESPIRATION RATE: 16 BRPM | TEMPERATURE: 98 F | SYSTOLIC BLOOD PRESSURE: 125 MMHG | BODY MASS INDEX: 35.77 KG/M2 | OXYGEN SATURATION: 92 % | WEIGHT: 201.9 LBS

## 2017-10-27 LAB
ANION GAP SERPL CALCULATED.3IONS-SCNC: 7 MMOL/L (ref 3–14)
BUN SERPL-MCNC: 15 MG/DL (ref 7–30)
CALCIUM SERPL-MCNC: 8.1 MG/DL (ref 8.5–10.1)
CHLORIDE SERPL-SCNC: 102 MMOL/L (ref 94–109)
CO2 SERPL-SCNC: 30 MMOL/L (ref 20–32)
CREAT SERPL-MCNC: 1.02 MG/DL (ref 0.52–1.04)
ERYTHROCYTE [DISTWIDTH] IN BLOOD BY AUTOMATED COUNT: 15 % (ref 10–15)
GFR SERPL CREATININE-BSD FRML MDRD: 53 ML/MIN/1.7M2
GLUCOSE SERPL-MCNC: 121 MG/DL (ref 70–99)
HCT VFR BLD AUTO: 34.7 % (ref 35–47)
HGB BLD-MCNC: 11.8 G/DL (ref 11.7–15.7)
MAGNESIUM SERPL-MCNC: 2.1 MG/DL (ref 1.6–2.3)
MCH RBC QN AUTO: 31.1 PG (ref 26.5–33)
MCHC RBC AUTO-ENTMCNC: 34 G/DL (ref 31.5–36.5)
MCV RBC AUTO: 91 FL (ref 78–100)
PLATELET # BLD AUTO: 304 10E9/L (ref 150–450)
POTASSIUM SERPL-SCNC: 3.5 MMOL/L (ref 3.4–5.3)
RBC # BLD AUTO: 3.8 10E12/L (ref 3.8–5.2)
SODIUM SERPL-SCNC: 139 MMOL/L (ref 133–144)
WBC # BLD AUTO: 15 10E9/L (ref 4–11)

## 2017-10-27 PROCEDURE — 36415 COLL VENOUS BLD VENIPUNCTURE: CPT | Performed by: INTERNAL MEDICINE

## 2017-10-27 PROCEDURE — 40000133 ZZH STATISTIC OT WARD VISIT: Performed by: OCCUPATIONAL THERAPY ASSISTANT

## 2017-10-27 PROCEDURE — 83735 ASSAY OF MAGNESIUM: CPT | Performed by: INTERNAL MEDICINE

## 2017-10-27 PROCEDURE — 97530 THERAPEUTIC ACTIVITIES: CPT | Mod: GP

## 2017-10-27 PROCEDURE — 25000132 ZZH RX MED GY IP 250 OP 250 PS 637: Mod: GY | Performed by: PHYSICIAN ASSISTANT

## 2017-10-27 PROCEDURE — A9270 NON-COVERED ITEM OR SERVICE: HCPCS | Mod: GY | Performed by: PHYSICIAN ASSISTANT

## 2017-10-27 PROCEDURE — 71010 XR CHEST PORT 1 VW: CPT

## 2017-10-27 PROCEDURE — 85027 COMPLETE CBC AUTOMATED: CPT | Performed by: INTERNAL MEDICINE

## 2017-10-27 PROCEDURE — 80048 BASIC METABOLIC PNL TOTAL CA: CPT | Performed by: INTERNAL MEDICINE

## 2017-10-27 PROCEDURE — 97116 GAIT TRAINING THERAPY: CPT | Mod: GP

## 2017-10-27 PROCEDURE — 97530 THERAPEUTIC ACTIVITIES: CPT | Mod: GO | Performed by: OCCUPATIONAL THERAPY ASSISTANT

## 2017-10-27 PROCEDURE — 40000193 ZZH STATISTIC PT WARD VISIT

## 2017-10-27 PROCEDURE — 97535 SELF CARE MNGMENT TRAINING: CPT | Mod: GO | Performed by: OCCUPATIONAL THERAPY ASSISTANT

## 2017-10-27 PROCEDURE — 99239 HOSP IP/OBS DSCHRG MGMT >30: CPT | Performed by: INTERNAL MEDICINE

## 2017-10-27 RX ORDER — AMLODIPINE BESYLATE 5 MG/1
5 TABLET ORAL DAILY
Qty: 30 TABLET | DISCHARGE
Start: 2017-10-27 | End: 2017-11-06

## 2017-10-27 RX ORDER — CIPROFLOXACIN 500 MG/1
500 TABLET, FILM COATED ORAL EVERY 12 HOURS
Qty: 14 TABLET | Refills: 0 | DISCHARGE
Start: 2017-10-27 | End: 2017-11-03

## 2017-10-27 RX ADMIN — CARBIDOPA AND LEVODOPA 1.5 TABLET: 25; 100 TABLET ORAL at 07:00

## 2017-10-27 RX ADMIN — LEVOTHYROXINE SODIUM 112 MCG: 112 TABLET ORAL at 07:00

## 2017-10-27 RX ADMIN — CARBIDOPA AND LEVODOPA 1 TABLET: 50; 200 TABLET, EXTENDED RELEASE ORAL at 07:00

## 2017-10-27 RX ADMIN — CIPROFLOXACIN HYDROCHLORIDE 500 MG: 500 TABLET, FILM COATED ORAL at 08:08

## 2017-10-27 RX ADMIN — CARBIDOPA AND LEVODOPA 1.5 TABLET: 25; 100 TABLET ORAL at 11:00

## 2017-10-27 RX ADMIN — AMLODIPINE BESYLATE 5 MG: 5 TABLET ORAL at 08:08

## 2017-10-27 RX ADMIN — VENLAFAXINE HYDROCHLORIDE 75 MG: 75 CAPSULE, EXTENDED RELEASE ORAL at 08:08

## 2017-10-27 RX ADMIN — ATORVASTATIN CALCIUM 20 MG: 20 TABLET, FILM COATED ORAL at 07:00

## 2017-10-27 RX ADMIN — CARBIDOPA AND LEVODOPA 1 TABLET: 50; 200 TABLET, EXTENDED RELEASE ORAL at 12:09

## 2017-10-27 NOTE — DISCHARGE SUMMARY
[facility]    Discharge Summary  Hospitalist    Date of Admission:  10/21/2017  Date of Discharge:  10/27/2017  Discharging Provider: Juan Antonio Vu MD    Discharge Diagnoses     Severe sepsis secondary to acute pyelonephritis with E. Coli bacteremia.   Acute Metabolic encephalopathy  Benign essential HTN   Acute kidney injury on CKD-III  Parkinson's disease.      Dysphagia       Hospital Course   Loan Anderson is a 77 year old female with history of Parkinson's disease, CKD, neurogenic bladder, hypothyroidism, and nephrolithiasis who was admitted on 10/21/2017 after presenting with confusion.       Severe sepsis secondary to acute pyelonephritis with E. Coli bacteremia.   -Patient presented with hypotension, fever, leukocytosis, and acute renal failure.   -UA positive for large LE, hematuria, and significant pyuria.  -Urine and blood culture positive for Escherichia coli   -Surveillance blood culture negative  -CT shows multiple non-obstructing kidney stones, urology consulted; no further recs.  -Received IV Rocephin in the Hospital, transitioned to ciprofloxacin for one more week to complete two weeks total.      Acute Metabolic encephalopathy, improving.   -Secondary to urinary tract infection/sepsis.   -Possible some component of hypercarbia as patient has not used CPAP for quite some time  -Close to baseline  -avoid benzos, narcotics, anticholinergics  -recommend repeating outpatient sleep study post discharge      Elevated blood pressure/HTN.   -No hx HTN, normally runs 110s systolic per family and prior clinic notes. She has been persistently hypertensive over the past few days.   -started Norvasc 5mg daily  -Blood pressure better, continue Norvasc for ongoing needs      Acute kidney injury on CKD-III:  -Baseline Cr 1-1.3.   -Initial creatinine elevated at 2.74.   -improved with IV hydration; back to baseline 1.24.   -encourage po intake       Parkinson's disease.    -Continue  sinemet and Neupro.         Dysphagia. Family and patient note concerns for aspiration. SLP following and has noted mild-moderate dysphagia. Recommend safe swallow strategies as well as video swallow. Discussed with patient and family; regardless of the result of a video swallow patient would not be willing to modify her diet at this time. Will not pursue.   -continue regular diet per patient request for now          Juan Antonio Vu MD    Significant Results and Procedures       Pending Results     Unresulted Labs Ordered in the Past 30 Days of this Admission     Date and Time Order Name Status Description    10/23/2017 0000 Blood culture Preliminary     10/23/2017 0000 Blood culture Preliminary           Code Status   Full Code       Primary Care Physician   Opal Cardenas    Physical Exam   Temp: 98  F (36.7  C) Temp src: Oral BP: 125/70   Heart Rate: 65 Resp: 16 SpO2: 92 % O2 Device: None (Room air)      Constitutional: AAOX3, NAD, Appears comfortable  Neck- Supple, Good ROM, No JVD  Respiratory: CTA B/L, Normal WOB, No crackles or wheezes  Cardiovascular: RRR, No murmur  GI: Soft, Non- tender, BS- normoactive  Skin/Integument: Warm and dry, no rashes  MSK: No joint deformity or swelling, no edema  Neuro: CN- grossly intact, tremors +.     Discharge Disposition   Discharged to short-term care facility  Condition at discharge: Stable    Consultations This Hospital Stay   PHYSICAL THERAPY ADULT IP CONSULT  SPEECH LANGUAGE PATH ADULT IP CONSULT  SPEECH LANGUAGE PATH ADULT IP CONSULT  OCCUPATIONAL THERAPY ADULT IP CONSULT  UROLOGY IP CONSULT  UROLOGY IP CONSULT  PHYSICAL THERAPY ADULT IP CONSULT  OCCUPATIONAL THERAPY ADULT IP CONSULT    Time Spent on this Encounter   IJuan Antonio, personally saw the patient today and spent greater than 30 minutes discharging this patient.    Discharge Orders     General info for SNF   Length of Stay Estimate: Short Term Care: Estimated # of Days <30  Condition at  Discharge: Improving  Level of care:skilled   Rehabilitation Potential: Good  Admission H&P remains valid and up-to-date: Yes  Recent Chemotherapy: N/A  Use Nursing Home Standing Orders: N/A     Mantoux instructions   Give two-step Mantoux (PPD) Per Facility Policy Yes     Follow Up and recommended labs and tests   Follow up with correction physician.  The following labs/tests are recommended:     Activity - Up with nursing assistance     Reason for your hospital stay   Sepsis due to UTI     Physical Therapy Adult Consult   Evaluate and treat as clinically indicated.    Reason:     Occupational Therapy Adult Consult   Evaluate and treat as clinically indicated.    Reason:     Fall precautions     Advance Diet as Tolerated   Follow this diet upon discharge: Orders Placed This Encounter     Combination Diet Regular Diet Adult         Discharge Medications   Current Discharge Medication List      START taking these medications    Details   amLODIPine (NORVASC) 5 MG tablet Take 1 tablet (5 mg) by mouth daily  Qty: 30 tablet    Associated Diagnoses: Benign essential hypertension      ciprofloxacin (CIPRO) 500 MG tablet Take 1 tablet (500 mg) by mouth every 12 hours for 7 days  Qty: 14 tablet, Refills: 0    Associated Diagnoses: Acute pyelonephritis         CONTINUE these medications which have NOT CHANGED    Details   ATORVASTATIN CALCIUM PO Take 20 mg by mouth daily      LEVOTHYROXINE SODIUM PO Take 112 mcg by mouth daily      carbidopa-levodopa (SINEMET)  MG per tablet Increase to 1.5 tabs 4/day @ 7am, 11am, 5pm, 9pm  Qty: 540 tablet, Refills: 3    Associated Diagnoses: Paralysis agitans (H)      COMPOUND (CMPD RX) - PHARMACY TO MIX COMPOUNDED MEDICATION Estriol 1mg/1gram. Place 1 gram vaginally daily for 2 weeks, then vaginally twice weekly after.  Qty: 30 g, Refills: 6    Associated Diagnoses: Atrophic vaginitis      carbidopa-levodopa (SINEMET CR)  MG per CR tablet 1 tab 4/day  At 7am, 11am, 5pm,  9:30pm and may take 5th if needed  Qty: 450 tablet, Refills: 0    Associated Diagnoses: Parkinson disease (H)      rotigotine (NEUPRO) 4 MG/24HR 24 hr patch Place 1 patch onto the skin daily  Qty: 90 patch, Refills: 3    Associated Diagnoses: Paralysis agitans (H)      venlafaxine (EFFEXOR-XR) 75 MG 24 hr capsule TAKE 1 TABLET(75 MG) BY MOUTH DAILY  Qty: 90 capsule, Refills: 11    Associated Diagnoses: Paralysis agitans (H)      polyethylene glycol (MIRALAX) powder Take 17 g (1 capful) by mouth daily as needed  Qty: 119 g      vitamin  B complex with vitamin C (VITAMIN  B COMPLEX) TABS Take 1 tablet by mouth daily      docusate sodium (COLACE) 100 MG capsule Take 1 capsule (100 mg) by mouth 2 times daily  Qty: 60 capsule      Calcium-Vitamin D 600-200 MG-UNIT TABS 1 tablet at 5pm  Qty: 30 tablet      NONFORMULARY macugels 2 softgels daily at 920pm      OMEGA-3 KRILL  MG CAPS Take 1 tablet by mouth daily.      TYLENOL EXTRA STRENGTH 500 MG PO TABS 1 TABLET daily  AS NEEDED  Qty: 90 Tab, Refills: 3    Associated Diagnoses: Parkinson's disease (H)      ASPIRIN 81 MG PO TABS 1 TABLET DAILY      Inulin 2 G CHEW 6 x 6grams per day as needed with natur made adult gummies fiber.  Qty: 30 tablet           Allergies   Allergies   Allergen Reactions     Mirapex [Pramipexole Dihydrochloride Monohydrate]      Leg swelling     Potassium Chloride      IV infusion only. She tolerates oral potassium chloride     Requip [Ropinirole Hydrochloride]      Leg swelling     Epinephrine Palpitations     Data   Most Recent 3 CBC's:  Recent Labs   Lab Test  10/27/17   0654  10/25/17   0710  10/24/17   0700  10/23/17   0653   WBC  15.0*  15.4*  13.7*  18.0*   HGB  11.8  12.0   --   12.2   MCV  91  89   --   89   PLT  304  229   --   198      Most Recent 3 BMP's:  Recent Labs   Lab Test  10/27/17   0654  10/26/17   0658  10/25/17   1420  10/25/17   0710  10/24/17   0700   NA  139   --    --   140  142   POTASSIUM  3.5  3.4  4.0  3.2*   3.5   CHLORIDE  102   --    --   105  112*   CO2  30   --    --   25  21   BUN  15   --    --   24  30   CR  1.02   --    --   1.24*  1.50*   ANIONGAP  7   --    --   10  9   NIKKIE  8.1*   --    --   8.1*  8.1*   GLC  121*   --    --   116*  111*     Most Recent 2 LFT's:  Recent Labs   Lab Test  10/23/17   0653  10/21/17   1751   AST  38  61*   ALT  10  13   ALKPHOS  218*  258*   BILITOTAL  0.4  0.8     Most Recent INR's and Anticoagulation Dosing History:  Anticoagulation Dose History     There is no flowsheet data to display.        Most Recent 3 Troponin's:No lab results found.  Most Recent Cholesterol Panel:No lab results found.  Most Recent 6 Bacteria Isolates From Any Culture (See EPIC Reports for Culture Details):  Recent Labs   Lab Test  10/23/17   0745  10/23/17   0653  10/21/17   2245  10/21/17   2225  10/21/17   2105  07/17/17   0936   CULT  No growth after 3 days  No growth after 3 days  Cultured on the 1st day of incubation:  Escherichia coli  *  Critical Value/Significant Value, preliminary result only, called to and read back by  Eloisa LOOMIS @ 1132. 10/22/17    (Note)  POSITIVE for E. COLI by Whodiniigene multiplex nucleic acid test. Final  identification and antimicrobial susceptibility testing will be  verified by standard methods.    Specimen tested with Verigene multiplex, gram-negative blood culture  nucleic acid test for the following targets: Acinetobacter sp.,  Citrobacter sp., Enterobacter sp., Proteus sp., E. coli, K.  pneumoniae/oxytoca, P. aeruginosa, and the following resistance  markers: CTXM, KPC, NDM, VIM, IMP and OXA.    Critical Value/Significant Value called to and read back by Eloisa LOOMIS @ 1350.cg 10/22/17      Cultured on the 1st day of incubation:  Escherichia coli  Susceptibility testing done on previous specimen  *  Critical Value/Significant Value, preliminary result only, called to and read back by  Eloisa LOOMIS @ 1226.cg 10/22/17    >100,000  colonies/mL  Escherichia coli  *  10,000 to 50,000 colonies/mL mixed urogenital abhijeet Susceptibility testing not   routinely done       Most Recent TSH, T4 and A1c Labs:  Recent Labs   Lab Test  10/23/17   0653   A1C  6.6*       Results for orders placed or performed during the hospital encounter of 10/21/17   CT Abdomen Pelvis w/o Contrast    Narrative    CT ABDOMEN AND PELVIS WITHOUT CONTRAST   10/24/2017 2:56 PM     HISTORY: Look for kidney stone.    TECHNIQUe: Noncontrast images of the abdomen and pelvis. Radiation  dose for this scan was reduced using automated exposure control,  adjustment of the mA and/or kV according to patient size, or iterative  reconstruction technique.    COMPARISON: None.    FINDINGS: There is no hydronephrosis or hydroureter. In the posterior  interpolar right kidney, there is a nonobstructing 5 mm calculus.  Another nonobstructing 1 mm calculus is noted at the upper pole of the  right kidney. A nonobstructing 3 mm calcification is noted in the  anterior left kidney. There is a lobular contour to the relatively  atrophic left kidney.    Mild bilateral pleural thickening is noted. Within the limits of an  unenhanced examination, the liver, gallbladder, spleen, pancreas, and  adrenal glands are unremarkable. No abdominal or retroperitoneal  adenopathy. No evidence of bowel obstruction, free air, or ascites.  The uterus has been removed. No pelvic adenopathy or mass.      Impression    IMPRESSION:  1. Nonobstructing nephrolithiasis.  2. Lobular contour of the left kidney may be related to prior  infection.  3. Mild bilateral pleural thickening noted at the lung bases.    ZACK ESPOSITO MD

## 2017-10-27 NOTE — PLAN OF CARE
Problem: Patient Care Overview  Goal: Plan of Care/Patient Progress Review  Discharge Planner OT   Patient plan for discharge: none stated  Current status: Pt completed supine to sit EOB SBA, CGA sit to stand, with 4WW amb to/from bathroom CGA, standing at sink ADLS CGA, SBA sit to supine pt fatigue  Barriers to return to prior living situation:  Cognition, poor balance, low activity tolerance, increased need for assist  Recommendations for discharge: TCU per plan established by the Occupational Therapist  Rationale for recommendations: Pt would benefit from daily therapy to maximize safe and independence with completing ADLS       Entered by: Jen Arguelles 10/27/2017 8:58 AM

## 2017-10-27 NOTE — PLAN OF CARE
Problem: Patient Care Overview  Goal: Plan of Care/Patient Progress Review  Outcome: Improving    Shift Update: Patient is A&O. Alert and pleasant this evening. Denies pain. VSS. LS diminished.  Regular diet. Using BSC. Stress incontinence at times. Urinary frequency. L arm PIV SL. Mg+ replaced on day shift (Current Mag 2.0). Assist x 1 to BSC. Plan: Discharge in 1-2 days pending, likely to TCU.

## 2017-10-27 NOTE — PLAN OF CARE
Problem: Patient Care Overview  Goal: Plan of Care/Patient Progress Review  Outcome: Improving   Patient is A&O. VSS on RA. BP improving, 112/72 overnight. Denies pain. Up to bedside commode frequently, assist of 1. Stress incontinence at times and urinary frequency. Regular diet. L arm PIV SL. Plan for discharge to TCU today pending placement. Will continue to monitor.

## 2017-10-27 NOTE — PROGRESS NOTES
"SWS PROGRESS NOTE:     I: Pt has been accepted for admission to [facility]. SW sent a text page to Dr. Vu informing him that a bed is available for pt today, if she is medically stable. SW will stand-by for outcome re: pt's DC date.   P: Pt will DC to [facility] TCU once medically stable. SW following.    Flora Marin, MSW, LGSW [phone]    PAS-RR    D: Per DHS regulation, SW completed and submitted PAS-RR to [place] [employer] Direct Connect via the Senior LinkAge Line.  PAS-RR confirmation # is : [id number]    I: SW spoke with pt's spouse and they are aware a PAS-RR has been submitted.  SW reviewed with pt's spouse that they may be contacted for a follow up appointment within 10 days of hospital discharge if their SNF stay is < 30 days.  Contact information for Senior LinkAge Line was also provided.    A: Pt's spouse verbalized understanding.    P: Further questions may be directed to [facility] LinkAge Line at #[phone], option #4 for PAS-RR staff.    UPDATE 1045:   Call out to pt's family to discuss transportation options for DC. Pt has been accepted at [facility] and she is stable for DC. SW will wait for a return call to f/u on transportation for pt.     UPDATE 1100:   Pt's dtr, Lara, will transport pt to [facility] today around 1330. PAS, orders and scripts (as ordered) will be faxed to facility once completed. Charge nurse and HUC are aware of DC plans. [facility] was updated on time of DC.     UPDATE 1430:  SW was asked by pt's dtr, Lara, to meet in pt's room to discuss TCU with pt and her family. SW met with pt, pt's spouse and pt's 2 dtrs Lara and Therese. Everyone is in agreement with DC to [facility]. SW answered questions and provided general information to pt and her family. Pt became tearful and expressed anger and frustration over placement. SW explained TCU to pt and ensured her that LTC was not the placement at this time. Pt stated, \"You're all trying to do something behind my back.\" Lara reminded " "pt that she was present for all DC conversations; pt could not recall. Much conversation was had between family members and pt about future needs of pt including half-way or similar placement. Pt did express \"feeling better\" at the end of the discussion and SW instructed the family to provide consistent reminders to pt about the future and her current needs so that frustration can be maintained, if possible.   Pt's family will transport her to [facility] today for TCU placement. DC orders and PAS have been faxed to facility.     "

## 2017-10-27 NOTE — PLAN OF CARE
Problem: Patient Care Overview  Goal: Plan of Care/Patient Progress Review  Patient plan for discharge: TCU  Current status: Supine to sit with HOB elevated and SBA. Sit to/from stand with 4WW and CGA with cues for safety to lock brakes prior to sitting/standing. Amb 300 ft x 1 with 4WW and CGA for balance. Pt c/o L knee pain with activity again today. Pt ambulated with much slower, safer pace today. Pt needing to use BSC at end of session; onto BSC with CGA/min assist and nursing staff present at end of session for handoff.  Barriers to return to prior living situation: Level of assist for mobility, falls risk, impaired balance, confusion  Recommendations for discharge: TCU per the plan established by the Physical Therapist   Rationale for recommendations: Pt would benefit from continued PT services at TCU to improve safety and tolerance of functional mobility prior to returning home.        Pt discharging to TCU today.    PT goals partially met.

## 2017-10-28 NOTE — PLAN OF CARE
Problem: Patient Care Overview  Goal: Plan of Care/Patient Progress Review  Occupational Therapy Discharge Summary     Reason for therapy discharge:    Discharged to transitional care facility.     Progress towards therapy goal(s). See goals on Care Plan in [facility] electronic health record for goal details.  Goals not met.  Barriers to achieving goals:   discharge from facility.     Therapy recommendation(s):    Continued therapy is recommended.  Rationale/Recommendations:  to maximize safety and indep in all ADLs, IADLs and mobility tasks as well as continued strengthening. .

## 2017-10-29 LAB
BACTERIA SPEC CULT: NO GROWTH
BACTERIA SPEC CULT: NO GROWTH
SPECIMEN SOURCE: NORMAL
SPECIMEN SOURCE: NORMAL

## 2017-10-29 NOTE — PROGRESS NOTES
Diagnosis/Summary/Recommendations:    PATIENT: Loan Anderson  77 year old female     : 1940    PATRICE: 2017    Parkinson    Amlodipine  Aspirin  Atorvastatin  Calcium  Sinemet CR 50/200  Sinemet 25/100   cipro -   Docusate  Inulin  Levothyroxine  macugels  Omega 3  miralax  rotigotine 4mg  Tylenol  Colace  b complex  Fish oil    She is on cipro that will finish on the   She will start on bactrim on the     Her blood pressure was high in the hospital    She has loss of appetite and has lost weight    Her blood pressure was 98/56, 69 is her pulse.  Amlodipine 5mg    She had left knee issues - and was having problems walking. She had problems standing. She had problems getting her socks on and had a fall and had wrist fracture.     She had some freezing of gait.  She reached over for her blouse.     Has had some illusions. She has insight into them.   May actually be illusions - black plastic bags moving in the wind.     Meds                   6a 12p` 5p 10p         25/100                                              1.5 1.5 1.5 1.5           50/200                    1 1 1 1          rotigotine 4mg                          1      venalfaxine 75mg 1           Over 50% of this visit was spent in patient care and care coordination.     History obtained from patient    Total visit time was 25 minutes    Wondering about memory care in [facility]        Discharge Summary  Hospitalist     Date of Admission:  10/21/2017  Date of Discharge:  10/27/2017  Discharging Provider: Juan Antonio Vu MD        Discharge Diagnoses    Severe sepsis secondary to acute pyelonephritis with E. Coli bacteremia.   Acute Metabolic encephalopathy  Benign essential HTN   Acute kidney injury on CKD-III  Parkinson's disease.      Dysphagia       Hospital Course      Loan Anderson is a 77 year old female with history of Parkinson's disease, CKD, neurogenic bladder,  hypothyroidism, and nephrolithiasis who was admitted on 10/21/2017 after presenting with confusion.       Severe sepsis secondary to acute pyelonephritis with E. Coli bacteremia.   -Patient presented with hypotension, fever, leukocytosis, and acute renal failure.   -UA positive for large LE, hematuria, and significant pyuria.  -Urine and blood culture positive for Escherichia coli   -Surveillance blood culture negative  -CT shows multiple non-obstructing kidney stones, urology consulted; no further recs.  -Received IV Rocephin in the Hospital, transitioned to ciprofloxacin for one more week to complete two weeks total.      Acute Metabolic encephalopathy, improving.   -Secondary to urinary tract infection/sepsis.   -Possible some component of hypercarbia as patient has not used CPAP for quite some time  -Close to baseline  -avoid benzos, narcotics, anticholinergics  -recommend repeating outpatient sleep study post discharge      Elevated blood pressure/HTN.   -No hx HTN, normally runs 110s systolic per family and prior clinic notes. She has been persistently hypertensive over the past few days.   -started Norvasc 5mg daily  -Blood pressure better, continue Norvasc for ongoing needs      Acute kidney injury on CKD-III:  -Baseline Cr 1-1.3.   -Initial creatinine elevated at 2.74.   -improved with IV hydration; back to baseline 1.24.   -encourage po intake       Parkinson's disease.    -Continue sinemet and Neupro.          Dysphagia. Family and patient note concerns for aspiration. SLP following and has noted mild-moderate dysphagia. Recommend safe swallow strategies as well as video swallow. Discussed with patient and family; regardless of the result of a video swallow patient would not be willing to modify her diet at this time. Will not pursue.   -continue regular diet per patient request for now          Juan Antonio Vu MD    PLAN  Since she is recovering from sepsis she is now improving with stay at [facility]  They have not decided to go [place] yet as they are waiting for her to improve.    Wait on increasing venlafaxine but may consider a dose increase      Consider rivastigmine in the future. Information provided    Return in 3months            Pedro Pitts MD     ______________________________________    Last visit date and details:     PATIENT: Loan Anderson     : 1940     PATRICE: 2017     Parkinson  Having more mobility issues. She has pain on moving - right leg is worse and has pain in both legs.   She has pain in her right hip down to the knee and may go down to the right ankle. It is a shooting pain. It may be a nerve. When she goes down a step she has the pain. She has problems walking on level ground. If she stands too long it is difficult as well. It is not necessarily in the back. She has not sought out anyone for this.      Sinemet 50/200 CR 1 tab 4/day   Sinemet 25/100 1 tab 4/day   rotigotine 4mg/day  coQ10     myrbetriq - off this - did not help.   She has prominent nocturia.   She has seen a urologist in the past.      aricept  Rivastigmine     Colace  Synthroid     Asking about medical marijuana     Miralax.     She is able to walk relatively well today - she has taken her sinemet and feels relatively good.  She is managing her symptoms with acetaminophen     She brought up a new treatment for knee pain.     Not sure if it the following information     The US Food and Drug Administration (FDA) has approved an expanded efficacy claim - out to 26 weeks - for [employer]'s hyaluronic acid injection product (Gel-One  Hyaluronate, [employer], [place], [place]) for the treatment of osteoarthritis (OA) knee pain.1  Gel-One is a single, 3mL injection provided in-office by treating physicians and is recommended for the treatment of OA knee pain when patients have not responded to therapy with nonsteroidal anti-inflammatory drugs or simple analgesics.  Hyaluronic acid treatments like  this aim to supplement the natural hyaluronic acid in knee joints to increase available cushioning and lubrication.   The FDA had previously approved an efficacy claim up to 13 weeks for the product.     She is traveling around [place] this summer. She returned back from [place].      PLAN  Conservative management of her symptoms.     Consider physical therapy and heated pool therapy     May need to see orthopedics for further evaluation of her back and joints. She did not have a melissa's sign and therefore not clear she has a right hip issue but could. She is convinced she has ongoing knee problems. She did not have an obvious straight leg raise but has bilateral hamstring tightness. Discussed the differential diagnosis of this being related to parkinson and arthritis.      Elevating legs for one hour before bedtime before going to bed may reduce night time awakenings     Use as needed 25/100 sinemet during the night and this may reduce urinary frequency at night.   May see urology for an evaluation and management. She has not had significant studies done.  She had seen Dr. Warner, urologist for this in the past and has been on myrbetriq but not on other agents. She does have underlying memory problems.   She has prominent nocturia presently 5/night.      She has not tried medication for her memory - discussed rivastigmine (exelon) and donepezil (aricept) and the possible interaction between memory  Medications and bladder etc.  She is not sure she will try these. Her memory may be better off myrbetriq.     Return back in  6months.      Pedro Pitts MD         ______________________________________      Patient was asked about 14 Review of systems including changes in vision (dry eyes, double vision), hearing, heart, lungs, musculoskeletal, depression, anxiety, snoring, RBD, insomnia, urinary frequency, urinary urgency, constipation, swallowing problems, hematological, ID, allergies, skin problems: seborrhea,  endocrinological: thyroid, diabetes, cholesterol; balance, weight changes, and other neurological problems and these were not significant at this time except for   Patient Active Problem List   Diagnosis     Parkinson disease (H)     Constipation     Hypothyroid     Neurogenic bladder     Convergence insufficiency     Wears glasses     Diplopia     Hot flashes, menopausal     REM sleep behavior disorder     Sebaceous cyst     Hypercholesterolemia     Dysthymic disorder     Hyperglyceridemia     Hypothyroidism     Urinary incontinence     Chronic nonalcoholic liver disease     Obstructive sleep apnea     Disorder of bone and cartilage     Benign neoplasm     Incontinence in female     Nonalcoholic steatohepatitis (COHEN)     Obstructive sleep apnea syndrome     Osteopenia     Parkinson's disease (H)     Systemic infection (H)     Sepsis (H)     E coli bacteremia     Acute kidney failure (H)     Hyponatremia     Hypokalemia     Acute pyelonephritis     Acute cystitis without hematuria     Kidney stone     Sepsis due to other etiology (H)          Allergies   Allergen Reactions     Mirapex [Pramipexole Dihydrochloride Monohydrate]      Leg swelling     Potassium Chloride      IV infusion only. She tolerates oral potassium chloride     Requip [Ropinirole Hydrochloride]      Leg swelling     Epinephrine Palpitations     Past Surgical History:   Procedure Laterality Date     ------------OTHER-------------  november ? 2015    lithrotripsy for renal stone     ------------OTHER-------------  november 2015    had sepsis from uti and hospitalized     BRAIN SURGERY  12 or 14 oct 2011    gene therapy enrolled study at [place]     Past Medical History:   Diagnosis Date     Constipation 7/15/2011     Convergence insufficiency 7/15/2011     Depression 7/15/2011     Diplopia 7/18/2011     Hot flashes, menopausal 7/18/2011     Hypercholesterolemia 7/18/2011     Hypothyroid 7/15/2011     Neurogenic bladder 7/15/2011     Parkinson disease  (H) 7/15/2011     REM sleep behavior disorder 7/18/2011     Sebaceous cyst 7/18/2011     Wears glasses 7/18/2011     Social History     Social History     Marital status:      Spouse name: N/A     Number of children: N/A     Years of education: N/A     Occupational History     Not on file.     Social History Main Topics     Smoking status: Never Smoker     Smokeless tobacco: Never Used     Alcohol use Yes      Comment: rare     Drug use: Not on file     Sexual activity: Not on file     Other Topics Concern     Not on file     Social History Narrative    Spouse is serenity       Drug and lactation database from the [place] National Library of Medicine:  http://toxnet.nlm.nih.gov/cgi-bin/sis/htmlgen?LACT                  B/P: Data Unavailable, T: Data Unavailable, P: Data Unavailable, R: Data Unavailable 0 lbs 0 oz  There were no vitals taken for this visit., There is no height or weight on file to calculate BMI.  Medications and Vitals not listed above were documented in the cart and reviewed by me.     Current Outpatient Prescriptions   Medication Sig Dispense Refill     amLODIPine (NORVASC) 5 MG tablet Take 1 tablet (5 mg) by mouth daily 30 tablet      ciprofloxacin (CIPRO) 500 MG tablet Take 1 tablet (500 mg) by mouth every 12 hours for 7 days 14 tablet 0     ATORVASTATIN CALCIUM PO Take 20 mg by mouth daily       LEVOTHYROXINE SODIUM PO Take 112 mcg by mouth daily       carbidopa-levodopa (SINEMET)  MG per tablet Increase to 1.5 tabs 4/day @ 7am, 11am, 5pm, 9pm 540 tablet 3     COMPOUND (CMPD RX) - PHARMACY TO MIX COMPOUNDED MEDICATION Estriol 1mg/1gram. Place 1 gram vaginally daily for 2 weeks, then vaginally twice weekly after. 30 g 6     carbidopa-levodopa (SINEMET CR)  MG per CR tablet 1 tab 4/day  At 7am, 11am, 5pm, 9:30pm and may take 5th if needed 450 tablet 0     rotigotine (NEUPRO) 4 MG/24HR 24 hr patch Place 1 patch onto the skin daily 90 patch 3     venlafaxine (EFFEXOR-XR) 75 MG  24 hr capsule TAKE 1 TABLET(75 MG) BY MOUTH DAILY 90 capsule 11     polyethylene glycol (MIRALAX) powder Take 17 g (1 capful) by mouth daily as needed 119 g      Inulin 2 G CHEW 6 x 6grams per day as needed with natur made adult gummies fiber. 30 tablet      vitamin  B complex with vitamin C (VITAMIN  B COMPLEX) TABS Take 1 tablet by mouth daily       docusate sodium (COLACE) 100 MG capsule Take 1 capsule (100 mg) by mouth 2 times daily 60 capsule      Calcium-Vitamin D 600-200 MG-UNIT TABS 1 tablet at 5pm 30 tablet      NONFORMULARY macugels 2 softgels daily at 920pm       OMEGA-3 KRILL  MG CAPS Take 1 tablet by mouth daily.       TYLENOL EXTRA STRENGTH 500 MG PO TABS 1 TABLET daily  AS NEEDED 90 Tab 3     ASPIRIN 81 MG PO TABS 1 TABLET DAILY           Pedro Pitts MD

## 2017-10-30 ENCOUNTER — OFFICE VISIT (OUTPATIENT)
Dept: NEUROLOGY | Facility: CLINIC | Age: 77
End: 2017-10-30
Payer: MEDICARE

## 2017-10-30 ENCOUNTER — TELEPHONE (OUTPATIENT)
Dept: UROLOGY | Facility: CLINIC | Age: 77
End: 2017-10-30

## 2017-10-30 VITALS
WEIGHT: 191 LBS | HEART RATE: 69 BPM | OXYGEN SATURATION: 98 % | SYSTOLIC BLOOD PRESSURE: 98 MMHG | BODY MASS INDEX: 33.83 KG/M2 | DIASTOLIC BLOOD PRESSURE: 56 MMHG

## 2017-10-30 DIAGNOSIS — G20.A1 PARALYSIS AGITANS (H): ICD-10-CM

## 2017-10-30 DIAGNOSIS — R32 URINARY INCONTINENCE: Primary | ICD-10-CM

## 2017-10-30 DIAGNOSIS — G20.A1 PARKINSON DISEASE (H): Primary | ICD-10-CM

## 2017-10-30 PROCEDURE — 99214 OFFICE O/P EST MOD 30 MIN: CPT | Performed by: PSYCHIATRY & NEUROLOGY

## 2017-10-30 RX ORDER — ATORVASTATIN CALCIUM 20 MG/1
20 TABLET, FILM COATED ORAL DAILY
COMMUNITY
Start: 2017-10-30 | End: 2018-02-14

## 2017-10-30 RX ORDER — CARBIDOPA AND LEVODOPA 25; 100 MG/1; MG/1
TABLET ORAL
Qty: 540 TABLET | Refills: 3 | COMMUNITY
Start: 2017-10-30 | End: 2017-11-06 | Stop reason: DRUGHIGH

## 2017-10-30 RX ORDER — LEVOTHYROXINE SODIUM 112 UG/1
112 CAPSULE ORAL DAILY
Qty: 30 CAPSULE | COMMUNITY
Start: 2017-10-30 | End: 2018-02-14 | Stop reason: DRUGHIGH

## 2017-10-30 RX ORDER — SULFAMETHOXAZOLE AND TRIMETHOPRIM 400; 80 MG/1; MG/1
1 TABLET ORAL AT BEDTIME
Qty: 30 TABLET | Refills: 11 | Status: SHIPPED | OUTPATIENT
Start: 2017-10-30 | End: 2017-10-31

## 2017-10-30 RX ORDER — DOCUSATE SODIUM 100 MG/1
200 CAPSULE, LIQUID FILLED ORAL DAILY
Qty: 60 CAPSULE | COMMUNITY
Start: 2017-10-30 | End: 2018-01-01

## 2017-10-30 ASSESSMENT — PAIN SCALES - GENERAL: PAINLEVEL: NO PAIN (0)

## 2017-10-30 NOTE — PATIENT INSTRUCTIONS
Diagnosis/Summary/Recommendations:    PATIENT: Loan Anderson  77 year old female     : 1940    PATRICE: 2017    Parkinson    Amlodipine  Aspirin  Atorvastatin  Calcium  Sinemet CR 50/200  Sinemet 25/100   cipro -   Docusate  Inulin  Levothyroxine  macugels  Omega 3  miralax  rotigotine 4mg  Tylenol  Colace  b complex  Fish oil    She is on cipro that will finish on the   She will start on bactrim on the     Her blood pressure was high in the hospital    She has loss of appetite and has lost weight    Her blood pressure was 98/56, 69 is her pulse.  Amlodipine 5mg    She had left knee issues - and was having problems walking. She had problems standing. She had problems getting her socks on and had a fall and had wrist fracture.     She had some freezing of gait.  She reached over for her blouse.     Has had some illusions. She has insight into them.   May actually be illusions - black plastic bags moving in the wind.     Meds                   6a 12p` 5p 10p         25/100                                              1.5 1.5 1.5 1.5           50/200                    1 1 1 1          rotigotine 4mg                          1      venalfaxine 75mg 1           Over 50% of this visit was spent in patient care and care coordination.     History obtained from patient    Total visit time was 25 minutes    Wondering about memory care in [facility]        Discharge Summary  Hospitalist     Date of Admission:  10/21/2017  Date of Discharge:  10/27/2017  Discharging Provider: Juan Antonio uV MD        Discharge Diagnoses    Severe sepsis secondary to acute pyelonephritis with E. Coli bacteremia.   Acute Metabolic encephalopathy  Benign essential HTN   Acute kidney injury on CKD-III  Parkinson's disease.      Dysphagia       Hospital Course      Loan Anderson is a 77 year old female with history of Parkinson's disease, CKD, neurogenic bladder,  hypothyroidism, and nephrolithiasis who was admitted on 10/21/2017 after presenting with confusion.       Severe sepsis secondary to acute pyelonephritis with E. Coli bacteremia.   -Patient presented with hypotension, fever, leukocytosis, and acute renal failure.   -UA positive for large LE, hematuria, and significant pyuria.  -Urine and blood culture positive for Escherichia coli   -Surveillance blood culture negative  -CT shows multiple non-obstructing kidney stones, urology consulted; no further recs.  -Received IV Rocephin in the Hospital, transitioned to ciprofloxacin for one more week to complete two weeks total.      Acute Metabolic encephalopathy, improving.   -Secondary to urinary tract infection/sepsis.   -Possible some component of hypercarbia as patient has not used CPAP for quite some time  -Close to baseline  -avoid benzos, narcotics, anticholinergics  -recommend repeating outpatient sleep study post discharge      Elevated blood pressure/HTN.   -No hx HTN, normally runs 110s systolic per family and prior clinic notes. She has been persistently hypertensive over the past few days.   -started Norvasc 5mg daily  -Blood pressure better, continue Norvasc for ongoing needs      Acute kidney injury on CKD-III:  -Baseline Cr 1-1.3.   -Initial creatinine elevated at 2.74.   -improved with IV hydration; back to baseline 1.24.   -encourage po intake       Parkinson's disease.    -Continue sinemet and Neupro.          Dysphagia. Family and patient note concerns for aspiration. SLP following and has noted mild-moderate dysphagia. Recommend safe swallow strategies as well as video swallow. Discussed with patient and family; regardless of the result of a video swallow patient would not be willing to modify her diet at this time. Will not pursue.   -continue regular diet per patient request for now          Juan Antonio Vu MD    PLAN  Since she is recovering from sepsis she is now improving with stay at [facility]  They have not decided to go [place] yet as they are waiting for her to improve.    Wait on increasing venlafaxine but may consider a dose increase      Consider rivastigmine in the future. Information provided    Return in 3months            Pedro Pitts MD

## 2017-10-30 NOTE — TELEPHONE ENCOUNTER
Patient in clinic seeing another provider and informed Dr. Viveros that she has a UTI at this time and is being treated with ciprofloxacin. Per Dr. Viveros, patient to start bactrim SS daily at bedtime for UTI prevention. Prescription ordered per Dr. Viveros and printed. Printed paper prescription given to patient. Will close encounter at this time.    Kimberly Jeffery RN, BSN

## 2017-10-30 NOTE — LETTER
10/30/2017      RE: Loan Anderson  [address] [facility] [id number]       Diagnosis/Summary/Recommendations:    PATIENT: Loan Anderson  77 year old female     : 1940    PATRICE: 2017    Parkinson    Amlodipine  Aspirin  Atorvastatin  Calcium  Sinemet CR 50/200  Sinemet 25/100   cipro -   Docusate  Inulin  Levothyroxine  macugels  Omega 3  miralax  rotigotine 4mg  Tylenol  Colace  b complex  Fish oil    She is on cipro that will finish on the   She will start on bactrim on the     Her blood pressure was high in the hospital    She has loss of appetite and has lost weight    Her blood pressure was 98/56, 69 is her pulse.  Amlodipine 5mg    She had left knee issues - and was having problems walking. She had problems standing. She had problems getting her socks on and had a fall and had wrist fracture.     She had some freezing of gait.  She reached over for her blouse.     Has had some illusions. She has insight into them.   May actually be illusions - black plastic bags moving in the wind.     Meds                   6a 12p` 5p 10p         25/100                                              1.5 1.5 1.5 1.5           50/200                    1 1 1 1          rotigotine 4mg                          1      venalfaxine 75mg 1           Over 50% of this visit was spent in patient care and care coordination.     History obtained from patient    Total visit time was 25 minutes    Wondering about memory care in [facility]'s        Discharge Summary  Hospitalist     Date of Admission:  10/21/2017  Date of Discharge:  10/27/2017  Discharging Provider: Juan Antonio Vu MD        Discharge Diagnoses    Severe sepsis secondary to acute pyelonephritis with E. Coli bacteremia.   Acute Metabolic encephalopathy  Benign essential HTN   Acute kidney injury on CKD-III  Parkinson's disease.      Dysphagia       Hospital Course      Loan Anderson is a 77  year old female with history of Parkinson's disease, CKD, neurogenic bladder, hypothyroidism, and nephrolithiasis who was admitted on 10/21/2017 after presenting with confusion.       Severe sepsis secondary to acute pyelonephritis with E. Coli bacteremia.   -Patient presented with hypotension, fever, leukocytosis, and acute renal failure.   -UA positive for large LE, hematuria, and significant pyuria.  -Urine and blood culture positive for Escherichia coli   -Surveillance blood culture negative  -CT shows multiple non-obstructing kidney stones, urology consulted; no further recs.  -Received IV Rocephin in the Hospital, transitioned to ciprofloxacin for one more week to complete two weeks total.      Acute Metabolic encephalopathy, improving.   -Secondary to urinary tract infection/sepsis.   -Possible some component of hypercarbia as patient has not used CPAP for quite some time  -Close to baseline  -avoid benzos, narcotics, anticholinergics  -recommend repeating outpatient sleep study post discharge      Elevated blood pressure/HTN.   -No hx HTN, normally runs 110s systolic per family and prior clinic notes. She has been persistently hypertensive over the past few days.   -started Norvasc 5mg daily  -Blood pressure better, continue Norvasc for ongoing needs      Acute kidney injury on CKD-III:  -Baseline Cr 1-1.3.   -Initial creatinine elevated at 2.74.   -improved with IV hydration; back to baseline 1.24.   -encourage po intake       Parkinson's disease.    -Continue sinemet and Neupro.          Dysphagia. Family and patient note concerns for aspiration. SLP following and has noted mild-moderate dysphagia. Recommend safe swallow strategies as well as video swallow. Discussed with patient and family; regardless of the result of a video swallow patient would not be willing to modify her diet at this time. Will not pursue.   -continue regular diet per patient request for now          Juan Antonio Vu  MD    PLAN  Since she is recovering from sepsis she is now improving with stay at [facility]  They have not decided to go [place] yet as they are waiting for her to improve.    Wait on increasing venlafaxine but may consider a dose increase      Consider rivastigmine in the future. Information provided    Return in 3months            Pedro Pitts MD     ______________________________________    Last visit date and details:     PATIENT: Loan Anderson     : 1940     PATRICE: 2017     Parkinson  Having more mobility issues. She has pain on moving - right leg is worse and has pain in both legs.   She has pain in her right hip down to the knee and may go down to the right ankle. It is a shooting pain. It may be a nerve. When she goes down a step she has the pain. She has problems walking on level ground. If she stands too long it is difficult as well. It is not necessarily in the back. She has not sought out anyone for this.      Sinemet 50/200 CR 1 tab 4/day   Sinemet 25/100 1 tab 4/day   rotigotine 4mg/day  coQ10     myrbetriq - off this - did not help.   She has prominent nocturia.   She has seen a urologist in the past.      aricept  Rivastigmine     Colace  Synthroid     Asking about medical marijuana     Miralax.     She is able to walk relatively well today - she has taken her sinemet and feels relatively good.  She is managing her symptoms with acetaminophen     She brought up a new treatment for knee pain.     Not sure if it the following information     The US Food and Drug Administration (FDA) has approved an expanded efficacy claim - out to 26 weeks - for [employer]'s hyaluronic acid injection product (Gel-One  Hyaluronate, [employer], [place], [place]) for the treatment of osteoarthritis (OA) knee pain.1  Gel-One is a single, 3mL injection provided in-office by treating physicians and is recommended for the treatment of OA knee pain when patients have not responded to therapy with  nonsteroidal anti-inflammatory drugs or simple analgesics.  Hyaluronic acid treatments like this aim to supplement the natural hyaluronic acid in knee joints to increase available cushioning and lubrication.   The FDA had previously approved an efficacy claim up to 13 weeks for the product.     She is traveling around [place] this summer. She returned back from [place].      PLAN  Conservative management of her symptoms.     Consider physical therapy and heated pool therapy     May need to see orthopedics for further evaluation of her back and joints. She did not have a melissa's sign and therefore not clear she has a right hip issue but could. She is convinced she has ongoing knee problems. She did not have an obvious straight leg raise but has bilateral hamstring tightness. Discussed the differential diagnosis of this being related to parkinson and arthritis.      Elevating legs for one hour before bedtime before going to bed may reduce night time awakenings     Use as needed 25/100 sinemet during the night and this may reduce urinary frequency at night.   May see urology for an evaluation and management. She has not had significant studies done.  She had seen Dr. Warner, urologist for this in the past and has been on myrbetriq but not on other agents. She does have underlying memory problems.   She has prominent nocturia presently 5/night.      She has not tried medication for her memory - discussed rivastigmine (exelon) and donepezil (aricept) and the possible interaction between memory  Medications and bladder etc.  She is not sure she will try these. Her memory may be better off myrbetriq.     Return back in  6months.      Pedro Pitts MD         ______________________________________      Patient was asked about 14 Review of systems including changes in vision (dry eyes, double vision), hearing, heart, lungs, musculoskeletal, depression, anxiety, snoring, RBD, insomnia, urinary frequency, urinary urgency,  constipation, swallowing problems, hematological, ID, allergies, skin problems: seborrhea, endocrinological: thyroid, diabetes, cholesterol; balance, weight changes, and other neurological problems and these were not significant at this time except for   Patient Active Problem List   Diagnosis     Parkinson disease (H)     Constipation     Hypothyroid     Neurogenic bladder     Convergence insufficiency     Wears glasses     Diplopia     Hot flashes, menopausal     REM sleep behavior disorder     Sebaceous cyst     Hypercholesterolemia     Dysthymic disorder     Hyperglyceridemia     Hypothyroidism     Urinary incontinence     Chronic nonalcoholic liver disease     Obstructive sleep apnea     Disorder of bone and cartilage     Benign neoplasm     Incontinence in female     Nonalcoholic steatohepatitis (COHEN)     Obstructive sleep apnea syndrome     Osteopenia     Parkinson's disease (H)     Systemic infection (H)     Sepsis (H)     E coli bacteremia     Acute kidney failure (H)     Hyponatremia     Hypokalemia     Acute pyelonephritis     Acute cystitis without hematuria     Kidney stone     Sepsis due to other etiology (H)          Allergies   Allergen Reactions     Mirapex [Pramipexole Dihydrochloride Monohydrate]      Leg swelling     Potassium Chloride      IV infusion only. She tolerates oral potassium chloride     Requip [Ropinirole Hydrochloride]      Leg swelling     Epinephrine Palpitations     Past Surgical History:   Procedure Laterality Date     ------------OTHER-------------  november ? 2015    lithrotripsy for renal stone     ------------OTHER-------------  november 2015    had sepsis from uti and hospitalized     BRAIN SURGERY  12 or 14 oct 2011    gene therapy enrolled study at [facility]     Past Medical History:   Diagnosis Date     Constipation 7/15/2011     Convergence insufficiency 7/15/2011     Depression 7/15/2011     Diplopia 7/18/2011     Hot flashes, menopausal 7/18/2011     Hypercholesterolemia  7/18/2011     Hypothyroid 7/15/2011     Neurogenic bladder 7/15/2011     Parkinson disease (H) 7/15/2011     REM sleep behavior disorder 7/18/2011     Sebaceous cyst 7/18/2011     Wears glasses 7/18/2011     Social History     Social History     Marital status:      Spouse name: N/A     Number of children: N/A     Years of education: N/A     Occupational History     Not on file.     Social History Main Topics     Smoking status: Never Smoker     Smokeless tobacco: Never Used     Alcohol use Yes      Comment: rare     Drug use: Not on file     Sexual activity: Not on file     Other Topics Concern     Not on file     Social History Narrative    Spouse is serenity       Drug and lactation database from the [place] [employer]:  http://toxnet.nlm.nih.gov/cgi-bin/sis/htmlgen?LACT                  B/P: Data Unavailable, T: Data Unavailable, P: Data Unavailable, R: Data Unavailable 0 lbs 0 oz  There were no vitals taken for this visit., There is no height or weight on file to calculate BMI.  Medications and Vitals not listed above were documented in the cart and reviewed by me.     Current Outpatient Prescriptions   Medication Sig Dispense Refill     amLODIPine (NORVASC) 5 MG tablet Take 1 tablet (5 mg) by mouth daily 30 tablet      ciprofloxacin (CIPRO) 500 MG tablet Take 1 tablet (500 mg) by mouth every 12 hours for 7 days 14 tablet 0     ATORVASTATIN CALCIUM PO Take 20 mg by mouth daily       LEVOTHYROXINE SODIUM PO Take 112 mcg by mouth daily       carbidopa-levodopa (SINEMET)  MG per tablet Increase to 1.5 tabs 4/day @ 7am, 11am, 5pm, 9pm 540 tablet 3     COMPOUND (CMPD RX) - PHARMACY TO MIX COMPOUNDED MEDICATION Estriol 1mg/1gram. Place 1 gram vaginally daily for 2 weeks, then vaginally twice weekly after. 30 g 6     carbidopa-levodopa (SINEMET CR)  MG per CR tablet 1 tab 4/day  At 7am, 11am, 5pm, 9:30pm and may take 5th if needed 450 tablet 0     rotigotine (NEUPRO) 4 MG/24HR  24 hr patch Place 1 patch onto the skin daily 90 patch 3     venlafaxine (EFFEXOR-XR) 75 MG 24 hr capsule TAKE 1 TABLET(75 MG) BY MOUTH DAILY 90 capsule 11     polyethylene glycol (MIRALAX) powder Take 17 g (1 capful) by mouth daily as needed 119 g      Inulin 2 G CHEW 6 x 6grams per day as needed with natur made adult gummies fiber. 30 tablet      vitamin  B complex with vitamin C (VITAMIN  B COMPLEX) TABS Take 1 tablet by mouth daily       docusate sodium (COLACE) 100 MG capsule Take 1 capsule (100 mg) by mouth 2 times daily 60 capsule      Calcium-Vitamin D 600-200 MG-UNIT TABS 1 tablet at 5pm 30 tablet      NONFORMULARY macugels 2 softgels daily at 920pm       OMEGA-3 KRILL  MG CAPS Take 1 tablet by mouth daily.       TYLENOL EXTRA STRENGTH 500 MG PO TABS 1 TABLET daily  AS NEEDED 90 Tab 3     ASPIRIN 81 MG PO TABS 1 TABLET DAILY           Pedro Pitts MD

## 2017-10-30 NOTE — MR AVS SNAPSHOT
After Visit Summary   10/30/2017    Loan Anderson    MRN: [id number]           Patient Information     Date Of Birth          1940        Visit Information        Provider Department      10/30/2017 9:30 AM Pedro Pitts MD [facility]        Today's Diagnoses     Parkinson disease (H)    -  1    Paralysis agitans (H)          Care Instructions    Diagnosis/Summary/Recommendations:    PATIENT: Loan Anderson  77 year old female     : 1940    PATRICE: 2017    Parkinson    Amlodipine  Aspirin  Atorvastatin  Calcium  Sinemet CR 50/200  Sinemet 25/100   cipro -   Docusate  Inulin  Levothyroxine  macugels  Omega 3  miralax  rotigotine 4mg  Tylenol  Colace  b complex  Fish oil    She is on cipro that will finish on the   She will start on bactrim on the     Her blood pressure was high in the hospital    She has loss of appetite and has lost weight    Her blood pressure was 98/56, 69 is her pulse.  Amlodipine 5mg    She had left knee issues - and was having problems walking. She had problems standing. She had problems getting her socks on and had a fall and had wrist fracture.     She had some freezing of gait.  She reached over for her blouse.     Has had some illusions. She has insight into them.   May actually be illusions - black plastic bags moving in the wind.     Meds                   6a 12p` 5p 10p         25/100                                              1.5 1.5 1.5 1.5           50/200                    1 1 1 1          rotigotine 4mg                          1      venalfaxine 75mg 1           Over 50% of this visit was spent in patient care and care coordination.     History obtained from patient    Total visit time was 25 minutes    Wondering about memory care in [facility]'s        Discharge Summary  Hospitalist     Date of Admission:  10/21/2017  Date of Discharge:  10/27/2017  Discharging  Provider: Juan Antonio Vu MD        Discharge Diagnoses    Severe sepsis secondary to acute pyelonephritis with E. Coli bacteremia.   Acute Metabolic encephalopathy  Benign essential HTN   Acute kidney injury on CKD-III  Parkinson's disease.      Dysphagia       Hospital Course      Loan Anderson is a 77 year old female with history of Parkinson's disease, CKD, neurogenic bladder, hypothyroidism, and nephrolithiasis who was admitted on 10/21/2017 after presenting with confusion.       Severe sepsis secondary to acute pyelonephritis with E. Coli bacteremia.   -Patient presented with hypotension, fever, leukocytosis, and acute renal failure.   -UA positive for large LE, hematuria, and significant pyuria.  -Urine and blood culture positive for Escherichia coli   -Surveillance blood culture negative  -CT shows multiple non-obstructing kidney stones, urology consulted; no further recs.  -Received IV Rocephin in the Hospital, transitioned to ciprofloxacin for one more week to complete two weeks total.      Acute Metabolic encephalopathy, improving.   -Secondary to urinary tract infection/sepsis.   -Possible some component of hypercarbia as patient has not used CPAP for quite some time  -Close to baseline  -avoid benzos, narcotics, anticholinergics  -recommend repeating outpatient sleep study post discharge      Elevated blood pressure/HTN.   -No hx HTN, normally runs 110s systolic per family and prior clinic notes. She has been persistently hypertensive over the past few days.   -started Norvasc 5mg daily  -Blood pressure better, continue Norvasc for ongoing needs      Acute kidney injury on CKD-III:  -Baseline Cr 1-1.3.   -Initial creatinine elevated at 2.74.   -improved with IV hydration; back to baseline 1.24.   -encourage po intake       Parkinson's disease.    -Continue sinemet and Neupro.          Dysphagia. Family and patient note concerns for aspiration. SLP following and has noted mild-moderate dysphagia.  Recommend safe swallow strategies as well as video swallow. Discussed with patient and family; regardless of the result of a video swallow patient would not be willing to modify her diet at this time. Will not pursue.   -continue regular diet per patient request for now          Juan Antonio Vu MD    PLAN  Since she is recovering from sepsis she is now improving with stay at [facility]  They have not decided to go [place] yet as they are waiting for her to improve.    Wait on increasing venlafaxine but may consider a dose increase      Consider rivastigmine in the future. Information provided    Return in 3months            Pedro Pitts MD           Follow-ups after your visit        Follow-up notes from your care team     Return in about 3 months (around 1/30/2018).      Your next 10 appointments already scheduled     Nov 15, 2017  7:30 AM CST   (Arrive by 7:15 AM)   Urodynamics with Keisha Beatty PA-C   [facility] ([facility])    [address] [place] [id number] [phone]            Nov 15, 2017  9:45 AM CST   (Arrive by 9:30 AM)   Cystoscopy with Ivania Viveros MD   [facility] ([facility])    [address] [place] [id number] [phone]            Feb 26, 2018  9:00 AM CST   [facility] Visit with Pedro Pitts MD   [facility]    [id number] [place] [address] [id number] [phone]              Who to contact     If you have questions or need follow up information about today's clinic visit or your schedule please contact [facility] directly at [phone].  Normal or non-critical lab and imaging results will be communicated to you by MyChart, letter or phone within 4 business days after the clinic has received the  results. If you do not hear from us within 7 days, please contact the clinic through [employer] or phone. If you have a critical or abnormal lab result, we will notify you by phone as soon as possible.  Submit refill requests through [employer] or call your pharmacy and they will forward the refill request to us. Please allow 3 business days for your refill to be completed.          Additional Information About Your Visit        [employer] Information     [employer] gives you secure access to your electronic health record. If you see a primary care provider, you can also send messages to your care team and make appointments. If you have questions, please call your primary care clinic.  If you do not have a primary care provider, please call [phone] and they will assist you.      [employer] is an electronic gateway that provides easy, online access to your medical records. With [employer], you can request a clinic appointment, read your test results, renew a prescription or communicate with your care team.     To access your existing account, please contact your [facility] or call [phone] for assistance.        Care EveryWhere ID     This is your Care EveryWhere ID. This could be used by other organizations to access your [facility] medical records  [id number]        Your Vitals Were     Pulse Pulse Oximetry BMI (Body Mass Index)             69 98% 33.83 kg/m2          Blood Pressure from Last 3 Encounters:   10/30/17 98/56   10/27/17 125/70   10/21/17 115/64    Weight from Last 3 Encounters:   10/30/17 86.6 kg (191 lb)   10/26/17 91.6 kg (201 lb 14.4 oz)   10/21/17 87.6 kg (193 lb 3.2 oz)              Today, you had the following     No orders found for display         Today's Medication Changes          These changes are accurate as of: 10/30/17 10:13 AM.  If you have any questions, ask your nurse or doctor.               Start taking these medicines.        Dose/Directions     sulfamethoxazole-trimethoprim 400-80 MG per tablet   Commonly known as:  BACTRIM/SEPTRA   Used for:  Urinary incontinence   Started by:  Ivania Viveros MD        Dose:  1 tablet   Take 1 tablet by mouth At Bedtime   Quantity:  30 tablet   Refills:  11         These medicines have changed or have updated prescriptions.        Dose/Directions    atorvastatin 20 MG tablet   Commonly known as:  LIPITOR   This may have changed:  medication strength   Changed by:  Pedro Pitts MD        Dose:  10 mg   Take 0.5 tablets (10 mg) by mouth daily   Refills:  0       * carbidopa-levodopa  MG per CR tablet   Commonly known as:  SINEMET CR   This may have changed:  Another medication with the same name was changed. Make sure you understand how and when to take each.   Used for:  Parkinson disease (H)   Changed by:  Pedro Pitts MD        1 tab 4/day  At 7am, 11am, 5pm, 9:30pm and may take 5th if needed   Quantity:  450 tablet   Refills:  0       * carbidopa-levodopa  MG per tablet   Commonly known as:  SINEMET   This may have changed:  additional instructions   Used for:  Paralysis agitans (H)   Changed by:  Pedro Pitts MD        Increase to 1.5 tabs 4/day @ 6am, 12noon, 5pm, 10pm   Quantity:  540 tablet   Refills:  3       docusate sodium 100 MG capsule   Commonly known as:  COLACE   This may have changed:  when to take this   Changed by:  Pedro Pitts MD        Dose:  100 mg   Take 1 capsule (100 mg) by mouth 2 times daily   Quantity:  60 capsule   Refills:  0       Levothyroxine Sodium 112 MCG Caps   This may have changed:  medication strength   Changed by:  Pedro Pitts MD        Dose:  112 mcg   Take 112 mcg by mouth daily   Quantity:  30 capsule   Refills:  0       * Notice:  This list has 2 medication(s) that are the same as other medications prescribed for you. Read the directions carefully, and ask your doctor or other care provider to review them with you.         Where  to get your medicines      Some of these will need a paper prescription and others can be bought over the counter.  Ask your nurse if you have questions.     Bring a paper prescription for each of these medications     sulfamethoxazole-trimethoprim 400-80 MG per tablet                Primary Care Provider Office Phone # Fax #    Opal Cardenas [phone]       [facility] [address]  [place] [address]        Equal Access to Services     SARAH BETH WILLIS : Hadii aad ku hadasho Soomaali, waaxda luqadaha, qaybta kaalmada adeegyada, waxay idiin hayaan adesimin khkatiesh laomi ah. So [facility] [phone].    ATENCIÓN: Si habla espmars, tiene a adam disposición servicios gratuitos de asistencia lingüística. Llame al [phone].    We comply with applicable federal civil rights laws and [place] laws. We do not discriminate on the basis of race, color, national origin, age, disability, sex, sexual orientation, or gender identity.            Thank you!     Thank you for choosing [facility]  for your care. Our goal is always to provide you with excellent care. Hearing back from our patients is one way we can continue to improve our services. Please take a few minutes to complete the written survey that you may receive in the mail after your visit with us. Thank you!             Your Updated Medication List - Protect others around you: Learn how to safely use, store and throw away your medicines at www.disposemymeds.org.          This list is accurate as of: 10/30/17 10:13 AM.  Always use your most recent med list.                   Brand Name Dispense Instructions for use Diagnosis    amLODIPine 5 MG tablet    NORVASC    30 tablet    Take 1 tablet (5 mg) by mouth daily    Benign essential hypertension       aspirin 81 MG tablet      1 TABLET DAILY        atorvastatin 20 MG tablet    LIPITOR     Take 0.5 tablets (10 mg) by mouth daily        Calcium-Vitamin D 600-200 MG-UNIT Tabs      30 tablet    1 tablet at 5pm        * carbidopa-levodopa  MG per CR tablet    SINEMET CR    450 tablet    1 tab 4/day  At 7am, 11am, 5pm, 9:30pm and may take 5th if needed    Parkinson disease (H)       * carbidopa-levodopa  MG per tablet    SINEMET    540 tablet    Increase to 1.5 tabs 4/day @ 6am, 12noon, 5pm, 10pm    Paralysis agitans (H)       ciprofloxacin 500 MG tablet    CIPRO    14 tablet    Take 1 tablet (500 mg) by mouth every 12 hours for 7 days    Acute pyelonephritis       COMPOUNDED NON-CONTROLLED SUBSTANCE - PHARMACY TO MIX COMPOUNDED MEDICATION    CMPD RX    30 g    Estriol 1mg/1gram. Place 1 gram vaginally daily for 2 weeks, then vaginally twice weekly after.    Atrophic vaginitis       docusate sodium 100 MG capsule    COLACE    60 capsule    Take 1 capsule (100 mg) by mouth 2 times daily        Inulin 2 G Chew     30 tablet    6 x 6grams per day as needed with natur made adult gummies fiber.        Levothyroxine Sodium 112 MCG Caps     30 capsule    Take 112 mcg by mouth daily        MIRALAX powder   Generic drug:  polyethylene glycol     119 g    Take 17 g (1 capful) by mouth daily as needed        NONFORMULARY      macugels 2 softgels daily at 920pm        Omega-3 Krill Oil 300 MG Caps      Take 1 tablet by mouth daily.        rotigotine 4 MG/24HR 24 hr patch    NEUPRO    90 patch    Place 1 patch onto the skin daily    Paralysis agitans (H)       sulfamethoxazole-trimethoprim 400-80 MG per tablet    BACTRIM/SEPTRA    30 tablet    Take 1 tablet by mouth At Bedtime    Urinary incontinence       TYLENOL 500 MG tablet   Generic drug:  acetaminophen     90 Tab    1 TABLET daily  AS NEEDED    Parkinson's disease (H)       venlafaxine 75 MG 24 hr capsule    EFFEXOR-XR    90 capsule    TAKE 1 TABLET(75 MG) BY MOUTH DAILY    Paralysis agitans (H)       vitamin B complex with vitamin C Tabs tablet      Take 1 tablet by mouth daily        * Notice:  This list has 2 medication(s) that are the  same as other medications prescribed for you. Read the directions carefully, and ask your doctor or other care provider to review them with you.

## 2017-10-30 NOTE — NURSING NOTE
"Loan Anderson's goals for this visit include: return  She requests these members of her care team be copied on today's visit information:     PCP: Opal Cardenas    Referring Provider:  No referring provider defined for this encounter.    Chief Complaint   Patient presents with     RECHECK       Initial BP 98/56  Pulse 69  Wt 86.6 kg (191 lb)  SpO2 98%  BMI 33.83 kg/m2 Estimated body mass index is 33.83 kg/(m^2) as calculated from the following:    Height as of 10/21/17: 1.6 m (5' 3\").    Weight as of this encounter: 86.6 kg (191 lb).  Medication Reconciliation: complete    Do you need any medication refills at today's visit? n  "

## 2017-10-31 ENCOUNTER — NURSING HOME VISIT (OUTPATIENT)
Dept: GERIATRICS | Facility: CLINIC | Age: 77
End: 2017-10-31
Payer: MEDICARE

## 2017-10-31 VITALS
HEIGHT: 63 IN | OXYGEN SATURATION: 98 % | BODY MASS INDEX: 34.02 KG/M2 | RESPIRATION RATE: 21 BRPM | DIASTOLIC BLOOD PRESSURE: 70 MMHG | TEMPERATURE: 99 F | WEIGHT: 192 LBS | SYSTOLIC BLOOD PRESSURE: 143 MMHG | HEART RATE: 70 BPM

## 2017-10-31 DIAGNOSIS — R13.10 DYSPHAGIA, UNSPECIFIED TYPE: ICD-10-CM

## 2017-10-31 DIAGNOSIS — M17.0 PRIMARY OSTEOARTHRITIS OF BOTH KNEES: ICD-10-CM

## 2017-10-31 DIAGNOSIS — E78.1 HYPERGLYCERIDEMIA: ICD-10-CM

## 2017-10-31 DIAGNOSIS — G93.41 ACUTE METABOLIC ENCEPHALOPATHY: ICD-10-CM

## 2017-10-31 DIAGNOSIS — E03.9 HYPOTHYROIDISM, UNSPECIFIED TYPE: ICD-10-CM

## 2017-10-31 DIAGNOSIS — F34.1 DYSTHYMIC DISORDER: ICD-10-CM

## 2017-10-31 DIAGNOSIS — N17.9 ACUTE RENAL FAILURE, UNSPECIFIED ACUTE RENAL FAILURE TYPE (H): ICD-10-CM

## 2017-10-31 DIAGNOSIS — A41.51 SEPSIS DUE TO ESCHERICHIA COLI (H): ICD-10-CM

## 2017-10-31 DIAGNOSIS — I15.9 SECONDARY HYPERTENSION: ICD-10-CM

## 2017-10-31 DIAGNOSIS — N10 ACUTE PYELONEPHRITIS: ICD-10-CM

## 2017-10-31 DIAGNOSIS — G20.A1 PARKINSON DISEASE (H): Primary | ICD-10-CM

## 2017-10-31 DIAGNOSIS — N18.30 CKD (CHRONIC KIDNEY DISEASE) STAGE 3, GFR 30-59 ML/MIN (H): ICD-10-CM

## 2017-10-31 DIAGNOSIS — K59.01 SLOW TRANSIT CONSTIPATION: ICD-10-CM

## 2017-10-31 PROCEDURE — 99309 SBSQ NF CARE MODERATE MDM 30: CPT | Performed by: NURSE PRACTITIONER

## 2017-10-31 NOTE — PROGRESS NOTES
[place] [facility]  PRIMARY CARE PROVIDER AND CLINIC:  Opal Cardenas [facility] [address] / [facility] [address]  Chief Complaint   Patient presents with     Hospital F/U       HPI:    Loan Anderson is a 77 year old  (1940),admitted to the [facility] from [facility].  Hospital stay 10/21/17 through 10/27/17.  Admitted to this facility for  rehab, medical management and nursing care.  HPI information obtained from: facility chart records, facility staff, patient report and [facility] chart review.    Hospital Course      Loan Anderson is a 77 year old female with history of Parkinson's disease, CKD, neurogenic bladder, hypothyroidism, and nephrolithiasis who was admitted on 10/21/2017 after presenting with confusion.       Severe sepsis secondary to acute pyelonephritis with E. Coli bacteremia.   -Patient presented with hypotension, fever, leukocytosis, and acute renal failure.   -UA positive for large LE, hematuria, and significant pyuria.  -Urine and blood culture positive for Escherichia coli   -Surveillance blood culture negative  -CT shows multiple non-obstructing kidney stones, urology consulted; no further recs.  -Received IV Rocephin in the Hospital, transitioned to ciprofloxacin for one more week to complete two weeks total.      Acute Metabolic encephalopathy, improving.   -Secondary to urinary tract infection/sepsis.   -Possible some component of hypercarbia as patient has not used CPAP for quite some time  -Close to baseline  -avoid benzos, narcotics, anticholinergics  -recommend repeating outpatient sleep study post discharge      Elevated blood pressure/HTN.   -No hx HTN, normally runs 110s systolic per family and prior clinic notes. She has been persistently hypertensive over the past few days.   -started Norvasc 5mg daily  -Blood pressure better, continue Norvasc for ongoing needs      Acute kidney  injury on CKD-III:  -Baseline Cr 1-1.3.   -Initial creatinine elevated at 2.74.   -improved with IV hydration; back to baseline 1.24.   -encourage po intake       Parkinson's disease.    -Continue sinemet and Neupro.          Dysphagia. Family and patient note concerns for aspiration. SLP following and has noted mild-moderate dysphagia. Recommend safe swallow strategies as well as video swallow. Discussed with patient and family; regardless of the result of a video swallow patient would not be willing to modify her diet at this time. Will not pursue.   -continue regular diet per patient request for now  ___________________________________________________________  Current issues are:      Parkinson disease (H)  Chronic as noted above  Mild baseline tremor present during visit  Sinement and Neupro    Acute renal failure, unspecified acute renal failure type (H)  CKD (chronic kidney disease) stage 3, GFR 30-59 ml/min  BMP as below  Voiding without difficulty at this time  Has been having good PO intake since arrival per patient and staff    Sepsis due to Escherichia coli (H)  Acute pyelonephritis  As noted above  Discharged on regimen of Cipro - will complete on 11/3  Denies ongoing flank pain, dysuria, fever/chills    Acute metabolic encephalopathy  As noted above  Patient appropriate in conversation - is ready to go home, but understands that she needs to get stronger beforehand  OT following    Secondary hypertension  As noted above  On newly prescribed regimen of Norvasc  Last 3 BPs:143/70, 146/78, 146/87 mmHg    Dysphagia, unspecified type  As noted above  Tolerating regular diet with thin liquids without noted difficulties    Slow transit constipation  Denies difficulties with bowel movements  On regimen of Colace and Miralax    Hypothyroidism, unspecified type  On regimen of Synthroid 112mcg/day  No results found for: TSH]    Hyperglyceridemia  Chronic  On regimen of Lipitor  No results found for: CHOL  No  results found for: HDL  No results found for: LDL  No results found for: TRIG  No results found for: CHOLHDLRATIO    Dysthymic disorder  Chronic  On regimen of Effexor  Admit PHQ9 not yet completed.  Denies overt feelings of depression - would simply like to get back home as soon as possible safely.    Primary osteoarthritis of both knees  Chronic  Has undergone steroid injections in the past, most recently within the past 2 weeks per her report - this was ineffective. She notes pain 0/10 at rest and 6-7/10 when ambulating. She currently has Tylenol ordered - she notes minimal pain relief with the use of this - would like to try something stronger but worries about side effects.    Admission Weight: 192.0 lbs  Current Weight: 192.0 lbs  HR Range: 61-70 bpm      CODE STATUS/ADVANCE DIRECTIVES DISCUSSION:   CPR/Full code   Patient's living condition: lives with spouse    ALLERGIES:Mirapex [pramipexole dihydrochloride monohydrate]; Potassium chloride; Requip [ropinirole hydrochloride]; and Epinephrine  PAST MEDICAL HISTORY:  has a past medical history of Constipation (7/15/2011); Convergence insufficiency (7/15/2011); Depression (7/15/2011); Diplopia (7/18/2011); Hot flashes, menopausal (7/18/2011); Hypercholesterolemia (7/18/2011); Hypothyroid (7/15/2011); Neurogenic bladder (7/15/2011); Parkinson disease (H) (7/15/2011); REM sleep behavior disorder (7/18/2011); Sebaceous cyst (7/18/2011); and Wears glasses (7/18/2011).  PAST SURGICAL HISTORY:  has a past surgical history that includes brain surgery (12 or 14 oct 2011); ------------other------------- (november ? 2015); and ------------other------------- (november 2015).  FAMILY HISTORY: family history includes CEREBROVASCULAR DISEASE in her father; Eye Disorder in her mother; HEART DISEASE in her father; Neurologic Disorder in her mother.  SOCIAL HISTORY:  reports that she has never smoked. She has never used smokeless tobacco. She reports that she drinks  alcohol.    Post Discharge Medication Reconciliation Status: discharge medications reconciled, continue medications without change.  Current Outpatient Prescriptions   Medication Sig Dispense Refill     carbidopa-levodopa (SINEMET)  MG per tablet Increase to 1.5 tabs 4/day @ 6am, 12noon, 5pm, 10pm 540 tablet 3     atorvastatin (LIPITOR) 20 MG tablet Take 20 mg by mouth daily        docusate sodium (COLACE) 100 MG capsule Take 1 capsule (100 mg) by mouth 2 times daily 60 capsule      Levothyroxine Sodium 112 MCG CAPS Take 112 mcg by mouth daily 30 capsule      amLODIPine (NORVASC) 5 MG tablet Take 1 tablet (5 mg) by mouth daily 30 tablet      ciprofloxacin (CIPRO) 500 MG tablet Take 1 tablet (500 mg) by mouth every 12 hours for 7 days 14 tablet 0     COMPOUND (CMPD RX) - PHARMACY TO MIX COMPOUNDED MEDICATION Estriol 1mg/1gram. Place 1 gram vaginally daily for 2 weeks, then vaginally twice weekly after. 30 g 6     carbidopa-levodopa (SINEMET CR)  MG per CR tablet 1 tab 4/day  At 7am, 11am, 5pm, 9:30pm and may take 5th if needed 450 tablet 0     rotigotine (NEUPRO) 4 MG/24HR 24 hr patch Place 1 patch onto the skin daily 90 patch 3     venlafaxine (EFFEXOR-XR) 75 MG 24 hr capsule TAKE 1 TABLET(75 MG) BY MOUTH DAILY 90 capsule 11     polyethylene glycol (MIRALAX) powder Take 17 g (1 capful) by mouth daily as needed 119 g      Inulin 2 G CHEW 6 x 6grams per day as needed with natur made adult gummies fiber. 30 tablet      vitamin  B complex with vitamin C (VITAMIN  B COMPLEX) TABS Take 1 tablet by mouth daily       Calcium-Vitamin D 600-200 MG-UNIT TABS 1 tablet at 5pm 30 tablet      OMEGA-3 KRILL  MG CAPS Take 1 tablet by mouth daily.       TYLENOL EXTRA STRENGTH 500 MG PO TABS 1 TABLET daily  AS NEEDED 90 Tab 3     ASPIRIN 81 MG PO TABS 1 TABLET DAILY         ROS:  10 point ROS of systems including Constitutional, Eyes, Respiratory, Cardiovascular, Gastroenterology, Genitourinary, Integumentary,  "Muscularskeletal, Psychiatric were all negative except for pertinent positives noted in my HPI.    Exam:  /70  Pulse 70  Temp 99  F (37.2  C)  Resp 21  Ht 5' 3\" (1.6 m)  Wt 192 lb (87.1 kg)  SpO2 98%  BMI 34.01 kg/m2  GENERAL APPEARANCE:  Alert, in no distress, appears healthy, oriented, cooperative, elderly woman sitting on the edge of her bed in her room  ENT:  Mouth and posterior oropharynx normal, moist mucous membranes, normal hearing acuity  EYES:  EOM, conjunctivae, lids, pupils and irises normal  NECK:  No adenopathy,masses or thyromegaly  RESP:  respiratory effort and palpation of chest normal, lungs clear to auscultation , no respiratory distress  CV:  Palpation and auscultation of heart done , regular rate and rhythm, no murmur, rub, or gallop, no edema, +2 pedal pulses  ABDOMEN:  normal bowel sounds, soft, nontender, no hepatosplenomegaly or other masses  M/S:   Gait and station abnormal - SHELLIE 2/2 resident sitting on bed; has been using FWW with therapies for ambulation  Digits and nails abnormal - arthritic changes present  SKIN:  Inspection of skin and subcutaneous tissue baseline, Palpation of skin and subcutaneous tissue baseline, skin thin and dry  NEURO:   Cranial nerves 2-12 are normal tested and grossly at patient's baseline, baseline PD tremor present    Lab/Diagnostic data:  CBC RESULTS:   Recent Labs   Lab Test  10/27/17   0654  10/25/17   0710   WBC  15.0*  15.4*   RBC  3.80  3.89   HGB  11.8  12.0   HCT  34.7*  34.5*   MCV  91  89   MCH  31.1  30.8   MCHC  34.0  34.8   RDW  15.0  15.0   PLT  304  229       Last Basic Metabolic Panel:  Recent Labs   Lab Test  10/27/17   0654  10/26/17   0658   10/25/17   0710   NA  139   --    --   140   POTASSIUM  3.5  3.4   < >  3.2*   CHLORIDE  102   --    --   105   NIKKIE  8.1*   --    --   8.1*   CO2  30   --    --   25   BUN  15   --    --   24   CR  1.02   --    --   1.24*   GLC  121*   --    --   116*    < > = values in this interval not " displayed.       Liver Function Studies -   Recent Labs   Lab Test  10/24/17   0700  10/23/17   0653  10/21/17   1751   PROTTOTAL   --   6.7*  7.7   ALBUMIN  2.3*  2.3*  2.9*   BILITOTAL   --   0.4  0.8   ALKPHOS   --   218*  258*   AST   --   38  61*   ALT   --   10  13     Lab Results   Component Value Date    A1C 6.6 10/23/2017       ASSESSMENT/PLAN:   Diagnosis Comments   1. Parkinson disease (H)  Stable on current regimen; continue medications as currently ordered.   2. Acute renal failure, unspecified acute renal failure type (H)   CKD (chronic kidney disease) stage 3, GFR 30-59 ml/min  BMP 11/1  Medication adjustments as needed   3. Sepsis due to Escherichia coli (H)   Acute pyelonephritis  Resolving  CBC 11/1   4. Dysphagia, unspecified type  No noted impairment at this time  Monitor   5. Acute metabolic encephalopathy  Resolving  BMP as above   6. Secondary hypertension  Stable on current regimen; continue medications as currently ordered  Monitor for ongoing BP needs   7. Primary osteoarthritis of both knees  Will trial Tramadol 25mg QID PRN and f/u with patient re:effectiveness   8. Dysthymic disorder Stable on current regimen; continue medications as currently ordered.   9. Slow transit constipation  Stable on current regimen; continue medications as currently ordered.   10. Hypothyroidism, unspecified type  Stable on current regimen; continue medications as currently ordered.   11. Hyperglyceridemia  Stable on current regimen; continue medications as currently ordered.       Electronically signed by:  ROSHNI Feliz CNP

## 2017-11-01 ENCOUNTER — TRANSFERRED RECORDS (OUTPATIENT)
Dept: HEALTH INFORMATION MANAGEMENT | Facility: CLINIC | Age: 77
End: 2017-11-01

## 2017-11-01 ENCOUNTER — PRE VISIT (OUTPATIENT)
Dept: UROLOGY | Facility: CLINIC | Age: 77
End: 2017-11-01

## 2017-11-01 VITALS
TEMPERATURE: 97.9 F | RESPIRATION RATE: 19 BRPM | HEART RATE: 71 BPM | BODY MASS INDEX: 33.62 KG/M2 | DIASTOLIC BLOOD PRESSURE: 61 MMHG | SYSTOLIC BLOOD PRESSURE: 104 MMHG | WEIGHT: 189.8 LBS | OXYGEN SATURATION: 97 %

## 2017-11-01 LAB
ANION GAP SERPL CALCULATED.3IONS-SCNC: 11 MMOL/L (ref 5–18)
BUN SERPL-MCNC: 16 MG/DL (ref 8–28)
CALCIUM SERPL-MCNC: 9.1 MG/DL (ref 8.5–10.5)
CHLORIDE SERPLBLD-SCNC: 100 MMOL/L (ref 98–107)
CO2 SERPL-SCNC: 27 MMOL/L (ref 22–31)
CREAT SERPL-MCNC: 1.25 MG/DL (ref 0.6–1.1)
ERYTHROCYTE [DISTWIDTH] IN BLOOD BY AUTOMATED COUNT: 14.9 % (ref 11–14.5)
GFR SERPL CREATININE-BSD FRML MDRD: 42 ML/MIN/1.73M2
GLUCOSE SERPL-MCNC: 188 MG/DL (ref 70–125)
HCT VFR BLD AUTO: 38.8 % (ref 35–47)
HEMOGLOBIN: 12.5 G/DL (ref 12–16)
MCH RBC QN AUTO: 31 PG (ref 27–34)
MCHC RBC AUTO-ENTMCNC: 32.2 G/DL (ref 32–36)
MCV RBC AUTO: 96 FL (ref 80–100)
PLATELET # BLD AUTO: 435 THOU/UL (ref 140–440)
POTASSIUM SERPL-SCNC: 3.4 MMOL/L (ref 3.5–5)
RBC # BLD AUTO: 4.03 MILL/UL (ref 3.8–5.4)
SODIUM SERPL-SCNC: 138 MMOL/L (ref 136–145)
WBC # BLD AUTO: 10.8 THOU/UL (ref 4–11)

## 2017-11-01 RX ORDER — ACETAMINOPHEN 500 MG
500 TABLET ORAL PRN
COMMUNITY
End: 2018-01-01

## 2017-11-02 ENCOUNTER — NURSING HOME VISIT (OUTPATIENT)
Dept: GERIATRICS | Facility: CLINIC | Age: 77
End: 2017-11-02
Payer: MEDICARE

## 2017-11-02 DIAGNOSIS — E03.9 HYPOTHYROIDISM, UNSPECIFIED TYPE: ICD-10-CM

## 2017-11-02 DIAGNOSIS — I10 BENIGN ESSENTIAL HYPERTENSION: ICD-10-CM

## 2017-11-02 DIAGNOSIS — G92.9 TOXIC ENCEPHALOPATHY: ICD-10-CM

## 2017-11-02 DIAGNOSIS — K59.01 SLOW TRANSIT CONSTIPATION: ICD-10-CM

## 2017-11-02 DIAGNOSIS — F41.8 DEPRESSION WITH ANXIETY: ICD-10-CM

## 2017-11-02 DIAGNOSIS — A41.51 SEPSIS DUE TO ESCHERICHIA COLI (H): Primary | ICD-10-CM

## 2017-11-02 DIAGNOSIS — N18.30 CHRONIC KIDNEY DISEASE, STAGE 3 (MODERATE): ICD-10-CM

## 2017-11-02 DIAGNOSIS — E78.00 PURE HYPERCHOLESTEROLEMIA: ICD-10-CM

## 2017-11-02 DIAGNOSIS — R53.81 PHYSICAL DECONDITIONING: ICD-10-CM

## 2017-11-02 DIAGNOSIS — N10 ACUTE PYELONEPHRITIS: ICD-10-CM

## 2017-11-02 DIAGNOSIS — G20.A1 PARKINSON'S DISEASE (H): ICD-10-CM

## 2017-11-02 DIAGNOSIS — N31.9 NEUROGENIC BLADDER: ICD-10-CM

## 2017-11-02 DIAGNOSIS — R13.10 DYSPHAGIA, UNSPECIFIED TYPE: ICD-10-CM

## 2017-11-02 PROCEDURE — 99207 ZZC CDG-CORRECTLY CODED, REVIEWED AND AGREE: CPT | Performed by: INTERNAL MEDICINE

## 2017-11-02 PROCEDURE — 99306 1ST NF CARE HIGH MDM 50: CPT | Performed by: INTERNAL MEDICINE

## 2017-11-02 NOTE — PROGRESS NOTES
"[place] [facility]  INITIAL VISIT NOTE  November 2, 2017    PRIMARY CARE PROVIDER AND CLINIC:  Opal Cardenas [facility] [address] / [facility] [address]    Chief Complaint   Patient presents with     Hospital F/U       HPI:    Loan Anderson is a 77 year old  (1940) female who [facility], 2017 for an initial visit. Medical history is notable for Parkinson's disease, neurogenic bladder, HTN, hypothyroidism and CKD III. She was hospitalized at [facility] from 10/21/17 to 10/27/17 where she presented with confusion and was found to have severe sepsis secondary to acute pyelonephritis from E. Coli. Hospital course complicated by acute on chronic renal failure as well as acute toxic/metabolic encephalopathy. CT abdomen/pelvis with non-obstructing kidney stones for which no intervention was recommended. She was discharged to complete a course of antibiotics. SLP was consulted dut to concerns for dysphagia; however, Ms. Anderson does not wish to modify her diet and thus no further workup was pursued. She was admitted to this facility for medical management and rehab.     Today, Ms. Anderson is seen in her room with her spouse. Main concern is knee pain, otherwise feels \"OK\". Would like to know when she can go home. No chest pain or dyspnea. No abdominal pain. Spouse without questions. Working with therapies.     CODE STATUS:   CPR/Full code     ALLERGIES:     Allergies   Allergen Reactions     Mirapex [Pramipexole Dihydrochloride Monohydrate]      Leg swelling     Potassium Chloride      IV infusion only. She tolerates oral potassium chloride     Requip [Ropinirole Hydrochloride]      Leg swelling     Epinephrine Palpitations       PAST MEDICAL HISTORY:   Past Medical History:   Diagnosis Date     Constipation 7/15/2011     Convergence insufficiency 7/15/2011     Depression 7/15/2011     Diplopia 7/18/2011     Hot flashes, menopausal " 7/18/2011     Hypercholesterolemia 7/18/2011     Hypothyroid 7/15/2011     Neurogenic bladder 7/15/2011     Parkinson disease (H) 7/15/2011     REM sleep behavior disorder 7/18/2011     Sebaceous cyst 7/18/2011     Wears glasses 7/18/2011       PAST SURGICAL HISTORY:   Past Surgical History:   Procedure Laterality Date     ------------OTHER-------------  november ? 2015    lithrotripsy for renal stone     ------------OTHER-------------  november 2015    had sepsis from uti and hospitalized     BRAIN SURGERY  12 or 14 oct 2011    gene therapy enrolled study at [place]       FAMILY HISTORY:   Family History   Problem Relation Age of Onset     Neurologic Disorder Mother      parkinson disease ? was on sinemet. She had micrographia     Eye Disorder Mother      CEREBROVASCULAR DISEASE Father      stroke age 47     HEART DISEASE Father      heart attack age 57       SOCIAL HISTORY:   Lives with spouse     MEDICATIONS:  Current Outpatient Prescriptions   Medication Sig Dispense Refill     traMADol (ULTRAM) 25 mg TABS half-tab Take 25 mg by mouth 4 times daily as needed       Estriol 10 % CREA Place 1 g vaginally At Bedtime for atrophic vagintis until 11/10/2017       acetaminophen (TYLENOL) 500 MG tablet Take 500 mg by mouth as needed for parkinson disease       carbidopa-levodopa (SINEMET)  MG per tablet Increase to 1.5 tabs 4/day @ 6am, 12noon, 5pm, 10pm 540 tablet 3     atorvastatin (LIPITOR) 20 MG tablet Take 20 mg by mouth daily        docusate sodium (COLACE) 100 MG capsule Take 1 capsule (100 mg) by mouth 2 times daily 60 capsule      Levothyroxine Sodium 112 MCG CAPS Take 112 mcg by mouth daily 30 capsule      amLODIPine (NORVASC) 5 MG tablet Take 1 tablet (5 mg) by mouth daily 30 tablet      ciprofloxacin (CIPRO) 500 MG tablet Take 1 tablet (500 mg) by mouth every 12 hours for 7 days 14 tablet 0     carbidopa-levodopa (SINEMET CR)  MG per CR tablet 1 tab 4/day  At 7am, 11am, 5pm, 9:30pm and may take 5th  if needed 450 tablet 0     rotigotine (NEUPRO) 4 MG/24HR 24 hr patch Place 1 patch onto the skin daily 90 patch 3     venlafaxine (EFFEXOR-XR) 75 MG 24 hr capsule TAKE 1 TABLET(75 MG) BY MOUTH DAILY 90 capsule 11     polyethylene glycol (MIRALAX) powder Take 17 g (1 capful) by mouth daily as needed 119 g      Inulin 2 G CHEW Take 1 tablet by mouth as needed  30 tablet      vitamin  B complex with vitamin C (VITAMIN  B COMPLEX) TABS Take 1 tablet by mouth daily       Calcium-Vitamin D 600-200 MG-UNIT TABS 1 tablet at 5pm 30 tablet      OMEGA-3 KRILL  MG CAPS Take 1 tablet by mouth daily.       ASPIRIN 81 MG PO TABS 1 TABLET DAILY         Post Discharge Medication Reconciliation Status: medication reconcilation previously completed during another office visit.    ROS:  10 point ROS neg other than the symptoms noted above in the HPI.    PHYSICAL EXAM:  /61  Pulse 71  Temp 97.9  F (36.6  C)  Resp 19  Wt 189 lb 12.8 oz (86.1 kg)  SpO2 97%  BMI 33.62 kg/m2  Gen: laying in bed, alert, cooperative and in no acute distress  HEENT: normocephalic; oropharynx clear  Card: RRR, S1, S2, no murmurs  Resp: lungs clear to auscultation bilaterally  GI: abdomen soft, not-tender  MSK: normal muscle tone, no LE edema  Neuro: CX II-XII grossly in tact; ROM in all four extremities grossly in tact  Psych: alert and oriented x3; normal affect    LABORATORY/IMAGING DATA:  Reviewed as per Epic    ASSESSMENT/PLAN:    Severe Sespsis Secondary to E Coli / Acute Pyelonephritis  Non-Obstructing Nephrolithiasis  Neurogenic Bladder  Afebrile.  -- completing course of ciprofloxacin 500 mg BID (last day 11/3)  -- start Bactrim ppx on 11/4  -- follow up with urology as scheduled on 11/14    Acute Toxic Metabolic Encephalopathy, Resolved  In setting of sepsis.  -- follow clinically     YASMIN on CKD III, Resolved  In setting of sepsis. Baseline Cr 1.1-1.3  -- avoid nephrotoxic agents  -- periodic BMP    Parkinson's Disease  Follows with  Dr. Pitts with neurology - most recent appt on 10/30.  -- continues on carbidopa-levodopa  mg QID and rotigotine 4 mg/day patch    Dysphagia  In setting of Parkinson's. SLP consulted during hospitalization, but she does not wish to modify her diet and as such further diagnostic testing was not ordered  -- continues on regular diet  -- ?SLP to follow for safe swallow strategies    HTN  SBPs 140s  -- continues on amlodipine 5 mg daily  -- follow BPs and adjust medications as needed    Hypothyroidism  TSH not available  -- continues on levothyroxine 117 mcg daily    Depression / Anxiety  Mood and spirits good today  -- continues on venlafaxine 75 mg daily  -- supportive cares    HLD  -- continues on atorvastatin 20  mg daily    Slow Transit Constipation  -- continues on docusate 100 mg BID and Miralax 17 g daily PRN  -- adjust bowel regimen as needed    Physical Deconditioning  In setting of hospitalization and underlying medical conditions  -- ongoing PT/OT      Electronically signed by:  Genesis Goode MD

## 2017-11-06 ENCOUNTER — PRE VISIT (OUTPATIENT)
Dept: UROLOGY | Facility: CLINIC | Age: 77
End: 2017-11-06

## 2017-11-06 ENCOUNTER — TELEPHONE (OUTPATIENT)
Dept: UROLOGY | Facility: CLINIC | Age: 77
End: 2017-11-06

## 2017-11-06 PROBLEM — N18.30 CKD (CHRONIC KIDNEY DISEASE) STAGE 3, GFR 30-59 ML/MIN (H): Status: ACTIVE | Noted: 2017-10-31

## 2017-11-06 PROBLEM — M17.0 PRIMARY OSTEOARTHRITIS OF BOTH KNEES: Status: ACTIVE | Noted: 2017-11-06

## 2017-11-06 PROBLEM — R13.10 DYSPHAGIA, UNSPECIFIED TYPE: Status: ACTIVE | Noted: 2017-11-06

## 2017-11-06 PROBLEM — G93.41 ACUTE METABOLIC ENCEPHALOPATHY: Status: ACTIVE | Noted: 2017-11-06

## 2017-11-06 PROBLEM — I15.9 SECONDARY HYPERTENSION: Status: ACTIVE | Noted: 2017-11-06

## 2017-11-06 PROBLEM — F32.A DEPRESSION, UNSPECIFIED DEPRESSION TYPE: Status: ACTIVE | Noted: 2017-11-06

## 2017-11-06 RX ORDER — CARBIDOPA AND LEVODOPA 25; 100 MG/1; MG/1
1.5 TABLET ORAL 4 TIMES DAILY
COMMUNITY
End: 2018-01-01

## 2017-11-06 RX ORDER — SULFAMETHOXAZOLE AND TRIMETHOPRIM 400; 80 MG/1; MG/1
1 TABLET ORAL AT BEDTIME
COMMUNITY
End: 2017-11-15

## 2017-11-06 RX ORDER — CARBIDOPA, LEVODOPA AND ENTACAPONE 50; 200; 200 MG/1; MG/1; MG/1
1 TABLET, FILM COATED ORAL 4 TIMES DAILY
COMMUNITY
End: 2018-02-14

## 2017-11-06 NOTE — TELEPHONE ENCOUNTER
Patient with history of mixed incontinence coming in for cystoscopy and review UDS. Patient chart reviewed, no need for call, all records available and ready for appointment.

## 2017-11-07 ENCOUNTER — NURSING HOME VISIT (OUTPATIENT)
Dept: GERIATRICS | Facility: CLINIC | Age: 77
End: 2017-11-07
Payer: MEDICARE

## 2017-11-07 VITALS
BODY MASS INDEX: 33.69 KG/M2 | HEART RATE: 70 BPM | SYSTOLIC BLOOD PRESSURE: 126 MMHG | WEIGHT: 190.2 LBS | OXYGEN SATURATION: 95 % | DIASTOLIC BLOOD PRESSURE: 78 MMHG | TEMPERATURE: 97.7 F | RESPIRATION RATE: 18 BRPM

## 2017-11-07 DIAGNOSIS — N17.9 ACUTE RENAL FAILURE, UNSPECIFIED ACUTE RENAL FAILURE TYPE (H): ICD-10-CM

## 2017-11-07 DIAGNOSIS — A41.51 SEPSIS DUE TO ESCHERICHIA COLI (H): ICD-10-CM

## 2017-11-07 DIAGNOSIS — G93.41 ACUTE METABOLIC ENCEPHALOPATHY: ICD-10-CM

## 2017-11-07 DIAGNOSIS — M17.0 PRIMARY OSTEOARTHRITIS OF BOTH KNEES: ICD-10-CM

## 2017-11-07 DIAGNOSIS — N18.30 CKD (CHRONIC KIDNEY DISEASE) STAGE 3, GFR 30-59 ML/MIN (H): ICD-10-CM

## 2017-11-07 DIAGNOSIS — R13.10 DYSPHAGIA, UNSPECIFIED TYPE: ICD-10-CM

## 2017-11-07 DIAGNOSIS — E78.1 HYPERGLYCERIDEMIA: ICD-10-CM

## 2017-11-07 DIAGNOSIS — K59.01 SLOW TRANSIT CONSTIPATION: ICD-10-CM

## 2017-11-07 DIAGNOSIS — G20.A1 PARKINSON'S DISEASE (H): Primary | ICD-10-CM

## 2017-11-07 DIAGNOSIS — N10 ACUTE PYELONEPHRITIS: ICD-10-CM

## 2017-11-07 DIAGNOSIS — F34.1 DYSTHYMIC DISORDER: ICD-10-CM

## 2017-11-07 DIAGNOSIS — E03.9 HYPOTHYROIDISM, UNSPECIFIED TYPE: ICD-10-CM

## 2017-11-07 DIAGNOSIS — I15.9 SECONDARY HYPERTENSION: ICD-10-CM

## 2017-11-07 PROCEDURE — 99309 SBSQ NF CARE MODERATE MDM 30: CPT | Performed by: NURSE PRACTITIONER

## 2017-11-07 NOTE — TELEPHONE ENCOUNTER
----- Message from Ivania Viveros MD sent at 11/6/2017  3:36 PM CST -----  Regarding: RE: needs call  Contact: [phone]  done  ----- Message -----     From: Glory Sun LPN     Sent: 11/3/2017   4:32 PM       To: Ivania Viveros MD  Subject: needs call                                       Osiel Marmolejo Taoist called triage and needs to have a phone call order by you or one of your fellow doctors stating that she needs to start the daily prophylactic antibiotic. Ask for Pennie.    -Glory Sun LPN

## 2017-11-07 NOTE — PROGRESS NOTES
[place] [facility]    Chief Complaint   Patient presents with     RECHECK       HPI:    Loan Anderson is a 77 year old  (1940), who is being seen today for an episodic care visit at [facility].  HPI information obtained from: facility chart records, facility staff, patient report and [place] Epic chart review.    Today's concern is:  Parkinson disease (H)  Chronic  Mild baseline tremor present during visit  Sinement and Neupro    Acute renal failure, unspecified acute renal failure type (H)  CKD (chronic kidney disease) stage 3, GFR 30-59 ml/min  BMP as below  Voiding without difficulty at this time  Has been having good PO intake since arrival per patient and staff    Sepsis due to Escherichia coli (H)  Acute pyelonephritis  Resolved  Discharged on regimen of Cipro - will complete on 11/3  Denies ongoing flank pain, dysuria, fever/chills    Acute metabolic encephalopathy  Patient appropriate in conversation - is ready to go home, but understands that she needs to get stronger beforehand  OT following    Secondary hypertension  As noted above  On newly prescribed regimen of Norvasc  Last 3 BPs:  126/78, 131/65, 142/72  MmHg          HR:  63-71    Dysphagia, unspecified type  Tolerating regular diet with thin liquids without noted difficulties    Slow transit constipation  Denies difficulties with bowel movements  On regimen of Colace and Miralax    Hypothyroidism, unspecified type  On regimen of Synthroid 112mcg/day  No results found for: TSH]    Hyperglyceridemia  Chronic  On regimen of Lipitor    Dysthymic disorder  Chronic  On regimen of Effexor  Admit PHQ9: 06/27 - mild depression  Denies overt feelings of depression - is looking forward to going home next week, just wishes there was something they could do for her knee pain.    Primary osteoarthritis of both knees  Chronic  Has undergone steroid injections in the past, most recently within the past 2 weeks per her report  - this was ineffective. She notes pain 0/10 at rest and 6-7/10 when ambulating. She currently has Tylenol ordered - she notes minimal pain relief with the use of this - Order placed for low-dose Tramadol, family felt this made patient sleepy so they declined further use      ALLERGIES: Mirapex [pramipexole dihydrochloride monohydrate]; Potassium chloride; Requip [ropinirole hydrochloride]; and Epinephrine  Past Medical, Surgical, Family and Social History reviewed and updated in [facility].    Current Outpatient Prescriptions   Medication Sig Dispense Refill     sulfamethoxazole-trimethoprim (BACTRIM/SEPTRA) 400-80 MG per tablet Take 1 tablet by mouth At Bedtime       Carbidopa-Levodopa-Entacapone -200 MG TABS Take 1 tablet by mouth 4 times daily for parkinson and give 1 tablet by mouth as needed for parkinson       carbidopa-levodopa (SINEMET)  MG per tablet Take 1.5 tablets by mouth 4 times daily for paralysis agitans       OMEGA-3 KRILL OIL PO Take 1 capsule by mouth daily       traMADol (ULTRAM) 25 mg TABS half-tab Take 25 mg by mouth 4 times daily as needed       Estriol 10 % CREA Place 1 g vaginally At Bedtime for atrophic vagintis until 11/15/201,  11/15/2017, and insert 1 gram vaginally at bedtime every Mon, Fri for Atrophic Vaginitis       acetaminophen (TYLENOL) 500 MG tablet Take 500 mg by mouth as needed for parkinson disease       atorvastatin (LIPITOR) 20 MG tablet Take 20 mg by mouth daily        docusate sodium (COLACE) 100 MG capsule Take 1 capsule (100 mg) by mouth 2 times daily 60 capsule      Levothyroxine Sodium 112 MCG CAPS Take 112 mcg by mouth daily 30 capsule      rotigotine (NEUPRO) 4 MG/24HR 24 hr patch Place 1 patch onto the skin daily 90 patch 3     venlafaxine (EFFEXOR-XR) 75 MG 24 hr capsule TAKE 1 TABLET(75 MG) BY MOUTH DAILY 90 capsule 11     polyethylene glycol (MIRALAX) powder Take 17 g (1 capful) by mouth daily as needed 119 g      Inulin 2 G CHEW Take 1 tablet by mouth as  needed  30 tablet      vitamin  B complex with vitamin C (VITAMIN  B COMPLEX) TABS Take 1 tablet by mouth daily       Calcium-Vitamin D 600-200 MG-UNIT TABS 1 tablet every evening  30 tablet      ASPIRIN 81 MG PO TABS 1 TABLET DAILY       Medications reviewed:  Medications reconciled to facility chart and changes were made to reflect current medications as identified as above med list. Below are the changes that were made:   Medications stopped since last EPIC medication reconciliation:   Medications Discontinued During This Encounter   Medication Reason     carbidopa-levodopa (SINEMET CR)  MG per CR tablet Dose adjustment     carbidopa-levodopa (SINEMET)  MG per tablet Dose adjustment     OMEGA-3 KRILL  MG CAPS Discontinued by another Health Care Provider     amLODIPine (NORVASC) 5 MG tablet Discontinued by another Health Care Provider       Medications started since last [facility] medication reconciliation:  Orders Placed This Encounter   Medications     sulfamethoxazole-trimethoprim (BACTRIM/SEPTRA) 400-80 MG per tablet     Sig: Take 1 tablet by mouth At Bedtime     Carbidopa-Levodopa-Entacapone -200 MG TABS     Sig: Take 1 tablet by mouth 4 times daily for parkinson and give 1 tablet by mouth as needed for parkinson     carbidopa-levodopa (SINEMET)  MG per tablet     Sig: Take 1.5 tablets by mouth 4 times daily for paralysis agitans     OMEGA-3 KRILL OIL PO     Sig: Take 1 capsule by mouth daily     REVIEW OF SYSTEMS:  4 point ROS including Respiratory, CV, GI and , other than that noted in the HPI,  is negative    Physical Exam:  /78  Pulse 70  Temp 97.7  F (36.5  C)  Resp 18  Wt 190 lb 3.2 oz (86.3 kg)  SpO2 95%  BMI 33.69 kg/m2  GENERAL APPEARANCE:  Alert, in no distress, appears healthy, oriented, cooperative, elderly woman ambulating with therapy with use of FWW  ENT:  Mouth and posterior oropharynx normal, moist mucous membranes, normal hearing acuity  EYES:  EOM,  conjunctivae, lids, pupils and irises normal  NECK:  No adenopathy, masses or thyromegaly  RESP:  Respiratory effort and palpation of chest normal, lungs clear to auscultation, no respiratory distress  CV:  Palpation and auscultation of heart done , regular rate and rhythm, no murmur, rub, or gallop, no edema, +2 pedal pulses  ABDOMEN:  Normal bowel sounds, soft, nontender, no hepatosplenomegaly or other masses  M/S:   Gait and station abnormal - stable with use of FWW with therapies for ambulation; Digits and nails abnormal - arthritic changes present  SKIN:  Inspection of skin and subcutaneous tissue baseline, Palpation of skin and subcutaneous tissue baseline, skin thin and dry  NEURO:   Cranial nerves 2-12 are normal tested and grossly at patient's baseline, baseline PD tremor present    Recent Labs:    CBC RESULTS:   Recent Labs   Lab Test 11/01/17  10/27/17   0654   WBC  10.8  15.0*   RBC  4.03  3.80   HGB  12.5  11.8   HCT  38.8  34.7*   MCV  96  91   MCH  31.0  31.1   MCHC  32.2  34.0   RDW  14.9*  15.0   PLT  435  304       Last Basic Metabolic Panel:  Recent Labs   Lab Test 11/01/17  10/27/17   0654   NA  138  139   POTASSIUM  3.4*  3.5   CHLORIDE  100  102   NIKKIE  9.1  8.1*   CO2  27  30   BUN  16  15   CR  1.25*  1.02   GLC  188*  121*       Liver Function Studies -   Recent Labs   Lab Test  10/24/17   0700  10/23/17   0653  10/21/17   1751   PROTTOTAL   --   6.7*  7.7   ALBUMIN  2.3*  2.3*  2.9*   BILITOTAL   --   0.4  0.8   ALKPHOS   --   218*  258*   AST   --   38  61*   ALT   --   10  13       Lab Results   Component Value Date    A1C 6.6 10/23/2017       Assessment/Plan:   Diagnosis Comments   1. Parkinson disease (H)  Stable on current regimen; continue medications as currently ordered.   2. Acute renal failure, unspecified acute renal failure type (H)   CKD (chronic kidney disease) stage 3, GFR 30-59 ml/min  BMP 11/8  Medication adjustments as needed   3. Sepsis due to Escherichia coli (H)   Acute  pyelonephritis  Resolved   4. Dysphagia, unspecified type  No noted impairment at this time  Monitor   5. Acute metabolic encephalopathy  Resolved  BMP as above   6. Secondary hypertension  Stable on current regimen; continue medications as currently ordered  Monitor for ongoing BP needs   7. Primary osteoarthritis of both knees  Continue Tylenol for pain control   8. Dysthymic disorder Stable on current regimen; continue medications as currently ordered.   9. Slow transit constipation  Stable on current regimen; continue medications as currently ordered.   10. Hypothyroidism, unspecified type  Stable on current regimen; continue medications as currently ordered.   11. Hyperglyceridemia  Stable on current regimen; continue medications as currently ordered.       Electronically signed by  ROSHNI Jain CNP

## 2017-11-13 NOTE — PROGRESS NOTES
[place] [facility] DISCHARGE SUMMARY    PATIENT'S NAME: Loan Anderson  YOB: 1940  MEDICAL RECORD NUMBER:  [id number]    PRIMARY CARE PROVIDER AND CLINIC RESPONSIBLE AFTER TRANSFER: Opal Cardenas [facility] [address] / [facility] [id number]     CODE STATUS/ADVANCE DIRECTIVES DISCUSSION:   CPR/Full code        Allergies   Allergen Reactions     Mirapex [Pramipexole Dihydrochloride Monohydrate]      Leg swelling     Potassium Chloride      IV infusion only. She tolerates oral potassium chloride     Requip [Ropinirole Hydrochloride]      Leg swelling     Epinephrine Palpitations       TRANSFERRING PROVIDERS: ROSHNI Feliz CNP, Genesis Goode MD  DATE OF SNF ADMISSION:  October / 27 / 2017  DATE OF SNF (anticipated) DISCHARGE: November / 16 / 2017  DISCHARGE DISPOSITION: Home   Nursing Facility: [facility] stay 10/21/2017 to 10/27/2017.     Condition on Discharge:  Improving.  Function:  Extensive assistance with bathing; limited assistance with dressing and occassionally with toileting; supervision with stairs and grooming; independent with bed mobility, transfers, ambulation and eating - walking >300' with use of 4WW.  Cognitive Scores: BIMS 15/15, CPT 4.8/5.6 and Tinetti 23/28    Equipment: walker    DISCHARGE DIAGNOSIS:   1. Parkinson disease (H)    2. Acute renal failure, unspecified acute renal failure type (H)    3. CKD (chronic kidney disease) stage 3, GFR 30-59 ml/min    4. Sepsis due to Escherichia coli (H)    5. Acute pyelonephritis    6. Acute metabolic encephalopathy    7. Secondary hypertension    8. Dysthymic disorder    9. Dysphagia, unspecified type    10. Slow transit constipation    11. Hypothyroidism, unspecified type    12. Hyperglyceridemia    13. Primary osteoarthritis of both knees      HPI Nursing Facility Course:  HPI information obtained from: facility chart records,  facility staff, patient report and [facility] chart review.  Parkinson disease (H)  Chronic  Mild baseline tremor present during visit  Sinement and Neupro  Managed per Neurology  Stable on current regimen; continue medications as currently ordered.  F/u with PCP/Neurology per their recommendations    Acute renal failure, unspecified acute renal failure type (H)  CKD (chronic kidney disease) stage 3, GFR 30-59 ml/min  BMP as below  Voiding without difficulty at this time  Has been having good PO intake since arrival per patient and staff  Stable without medical intervention  Medication adjustments as needed  F/u with PCP for ongoing monitoring     Sepsis due to Escherichia coli (H)  Acute pyelonephritis  Resolved  Discharged on regimen of Cipro - completed on 11/3; Started on prophylactic regimen of Bactrim on 11/6 per Urology  Denies ongoing flank pain, dysuria, fever/chills  States overall she is feeling well  Extended conversation with patient's daughter today - she insists that her mother is more confused, as noted below, and feels this is either 2/2 recurrence of UTI or Bactrim - she has not spoken with Urology re:this concern. The patient's vital signs have remained stable, per nursing and therapies she has maintained her level of cognition with no noted changes - daughter very upset and demanding repeat of CBC prior to tomorrow morning's Urology appointment - Educated daughter that without physical manifestations of infection we have no medical background for STAT lab draws and these can be drawn safely at the patient's Urology appointment in the morning. She is also directed to inform Urology if she feels this may potentially related to the use of Bactrim - she states she will do this in the morning.  Urology appt for cystoscopy on 11/15  CBC in clinic for faster processing and addressing per Urology as needed  Stable on current regimen; continue medications as currently ordered.  F/u with Urology per  their recommendations    Acute metabolic encephalopathy  Patient appropriate in conversation - feeling well as noted above  OT completed - patient met goals  Will d/c home with ongoing OT for transition safety  Resolved  F/u with PCP for ongoing monitoring and intervention as needed    Secondary hypertension  Arrived newly prescribed regimen of Norvasc - this was d/c'd on 11/3 RT low blood pressures  BP Readin/76, 138/78, 115/62              HR:  63-72  Has remained well controlled while in TCU following d/c of medications  Stable without medical intervention  F/u with PCP for ongoing monitoring and management    Dysphagia, unspecified type  Tolerating regular diet with thin liquids without noted difficulties  Resolved  Stable without medical intervention  F/u with PCP as needed for further interventions    Slow transit constipation  Denies difficulties with bowel movements  On regimen of Colace and Miralax  Stable on current regimen; continue medications as currently ordered.  F/u with PCP for ongoing monitoring and management as needed    Hypothyroidism, unspecified type  On regimen of Synthroid 112mcg/day  No results found for: TSH]  Stable on current regimen; continue medications as currently ordered.  F/u with PCP for ongoing monitoring and management    Hyperglyceridemia  Chronic  On regimen of Lipitor  Stable on current regimen; continue medications as currently ordered.  F/u with PCP for ongoing monitoring and management    Dysthymic disorder  Chronic  On regimen of Effexor  Admit PHQ9:  - mild depression  Denies overt feelings of depression - is ready to go home and looking forward to d/c  Stable on current regimen; continue medications as currently ordered.  F/u with PCP for ongoing monitoring and management    Primary osteoarthritis of both knees  Chronic  Has undergone steroid injections in the past, most recently within the past 2 weeks per her report - this was ineffective. She notes pain  "0/10 at rest and 6-7/10 when ambulating. She currently has Tylenol ordered - she notes minimal pain relief with the use of this - Order placed for low-dose Tramadol, family felt this made patient sleepy so this was discontinued  Patient continues to note severe knee pain and is \"mind-boggled\" that with all the medical expertises available we cannot treat her pain  Recommend f/u with PCP for further interventions - would recommend to pain interventions with close monitoring for impairment with use to achieve some level of pain control for patient.    PAST MEDICAL HISTORY:  has a past medical history of Constipation (7/15/2011); Convergence insufficiency (7/15/2011); Depression (7/15/2011); Diplopia (7/18/2011); Hot flashes, menopausal (7/18/2011); Hypercholesterolemia (7/18/2011); Hypothyroid (7/15/2011); Neurogenic bladder (7/15/2011); Parkinson disease (H) (7/15/2011); REM sleep behavior disorder (7/18/2011); Sebaceous cyst (7/18/2011); and Wears glasses (7/18/2011).    DISCHARGE MEDICATIONS:  Current Outpatient Prescriptions   Medication Sig Dispense Refill     sulfamethoxazole-trimethoprim (BACTRIM/SEPTRA) 400-80 MG per tablet Take 1 tablet by mouth At Bedtime       Carbidopa-Levodopa-Entacapone -200 MG TABS Take 1 tablet by mouth 4 times daily for parkinson and give 1 tablet by mouth as needed for parkinson       carbidopa-levodopa (SINEMET)  MG per tablet Take 1.5 tablets by mouth 4 times daily for paralysis agitans       OMEGA-3 KRILL OIL PO Take 1 capsule by mouth daily       Estriol 10 % CREA Place 1 g vaginally At Bedtime for atrophic vagintis until 11/15/201,  11/15/2017, and insert 1 gram vaginally at bedtime every Mon, Fri for Atrophic Vaginitis       acetaminophen (TYLENOL) 500 MG tablet Take 500 mg by mouth as needed for parkinson disease       atorvastatin (LIPITOR) 20 MG tablet Take 20 mg by mouth daily        docusate sodium (COLACE) 100 MG capsule Take 1 capsule (100 mg) by mouth 2 " times daily 60 capsule      Levothyroxine Sodium 112 MCG CAPS Take 112 mcg by mouth daily 30 capsule      rotigotine (NEUPRO) 4 MG/24HR 24 hr patch Place 1 patch onto the skin daily 90 patch 3     venlafaxine (EFFEXOR-XR) 75 MG 24 hr capsule TAKE 1 TABLET(75 MG) BY MOUTH DAILY 90 capsule 11     polyethylene glycol (MIRALAX) powder Take 17 g (1 capful) by mouth daily as needed 119 g      Inulin 2 G CHEW Take 1 tablet by mouth as needed  30 tablet      vitamin  B complex with vitamin C (VITAMIN  B COMPLEX) TABS Take 1 tablet by mouth daily       Calcium-Vitamin D 600-200 MG-UNIT TABS 1 tablet every evening  30 tablet      ASPIRIN 81 MG PO TABS 1 TABLET DAILY         MEDICATION CHANGES/RATIONALE:     10/31/2017: Tramadol 25mg QID PRN pain - Given x3 doses per patient request, did help pain - d/c'd per daughter request 2/2 making patient sedated    11/3/2017: Norvasc 5mg - Discontinued 2/2 hypotension    11/5/2017: Bactrim 400/80mg tablet - 1 tab PO qDay for UTI Prophylaxis per Urology  Controlled medications sent with patient: not applicable/none     ROS:    10 point ROS of systems including Constitutional, Eyes, Respiratory, Cardiovascular, Gastroenterology, Genitourinary, Integumentary, Muscularskeletal, Psychiatric were all negative except for pertinent positives noted in my HPI.    Physical Exam:   Vitals: /80  Pulse 68  Temp 98.6  F (37  C)  Resp 20  Wt 189 lb 9.6 oz (86 kg)  SpO2 97%  BMI 33.59 kg/m2  BMI= Body mass index is 33.59 kg/(m^2).   GENERAL APPEARANCE:  Alert, in no distress, appears healthy, oriented, cooperative, elderly woman ambulating with  independently with use of FWW  ENT:  Mouth and posterior oropharynx normal, moist mucous membranes, normal hearing acuity  EYES:  EOM, conjunctivae, lids, pupils and irises normal  NECK:  No adenopathy, masses or thyromegaly  RESP:  Respiratory effort and palpation of chest normal, lungs clear to auscultation, no respiratory distress  CV:   Palpation and auscultation of heart done , regular rate and rhythm, no murmur, rub, or gallop, no edema, +2 pedal pulses  ABDOMEN:  Normal bowel sounds, soft, nontender, no hepatosplenomegaly or other masses  M/S:   Gait and station abnormal - stable with use of FWW; Digits and nails abnormal - arthritic changes present  SKIN:  Inspection of skin and subcutaneous tissue baseline, Palpation of skin and subcutaneous tissue baseline, skin thin and dry  NEURO:   Cranial nerves 2-12 are normal tested and grossly at patient's baseline, baseline PD tremor present    DISCHARGE PLAN:  Occupational Therapy, Physical Therapy, Registered Nurse, Home Health Aide,  and From:  [facility]  Patient instructed to follow-up with:  PCP in 7 - 10 days       Current [place] scheduled appointments:  Future Appointments  Date Time Provider Department Center   11/15/2017 7:30 AM Keisha Beatty PA-C [facility]   11/15/2017 9:45 AM Ivania Viveros MD [facility]   2/26/2018 9:00 AM Pedro Pitts MD MARILEE LEYVA [facility] referral needed and placed by this provider: No    Pending labs: CBC ordered to be drawn in Urology on 11/15    [facility] labs   CBC RESULTS:   Recent Labs   Lab Test 11/01/17  10/27/17   0654   WBC  10.8  15.0*   RBC  4.03  3.80   HGB  12.5  11.8   HCT  38.8  34.7*   MCV  96  91   MCH  31.0  31.1   MCHC  32.2  34.0   RDW  14.9*  15.0   PLT  435  304       Last Basic Metabolic Panel:  BMP reassessed on 11/8 - improved from 11/01 with K+ WNL - Will get these labs updated in [facility]  Recent Labs   Lab Test 11/01/17  10/27/17   0654   NA  138  139   POTASSIUM  3.4*  3.5   CHLORIDE  100  102   NIKKIE  9.1  8.1*   CO2  27  30   BUN  16  15   CR  1.25*  1.02   GLC  188*  121*       Liver Function Studies -   Recent Labs   Lab Test  10/24/17   0700  10/23/17   0653  10/21/17   1751   PROTTOTAL   --   6.7*  7.7   ALBUMIN  2.3*  2.3*  2.9*   BILITOTAL   --   0.4  0.8   ALKPHOS   --   218*  258*    AST   --   38  61*   ALT   --   10  13       Lab Results   Component Value Date    A1C 6.6 10/23/2017       Discharge Treatments: None    TOTAL DISCHARGE TIME:   Greater than 30 minutes  Electronically signed by:  ROSHNI Feliz CNP

## 2017-11-14 ENCOUNTER — DISCHARGE SUMMARY NURSING HOME (OUTPATIENT)
Dept: GERIATRICS | Facility: CLINIC | Age: 77
End: 2017-11-14
Payer: MEDICARE

## 2017-11-14 VITALS
SYSTOLIC BLOOD PRESSURE: 131 MMHG | DIASTOLIC BLOOD PRESSURE: 80 MMHG | HEART RATE: 68 BPM | OXYGEN SATURATION: 97 % | TEMPERATURE: 98.6 F | BODY MASS INDEX: 33.59 KG/M2 | WEIGHT: 189.6 LBS | RESPIRATION RATE: 20 BRPM

## 2017-11-14 DIAGNOSIS — N18.30 CKD (CHRONIC KIDNEY DISEASE) STAGE 3, GFR 30-59 ML/MIN (H): ICD-10-CM

## 2017-11-14 DIAGNOSIS — E03.9 HYPOTHYROIDISM, UNSPECIFIED TYPE: ICD-10-CM

## 2017-11-14 DIAGNOSIS — I15.9 SECONDARY HYPERTENSION: ICD-10-CM

## 2017-11-14 DIAGNOSIS — N10 ACUTE PYELONEPHRITIS: ICD-10-CM

## 2017-11-14 DIAGNOSIS — M17.0 PRIMARY OSTEOARTHRITIS OF BOTH KNEES: ICD-10-CM

## 2017-11-14 DIAGNOSIS — E78.1 HYPERGLYCERIDEMIA: ICD-10-CM

## 2017-11-14 DIAGNOSIS — G93.41 ACUTE METABOLIC ENCEPHALOPATHY: ICD-10-CM

## 2017-11-14 DIAGNOSIS — G20.A1 PARKINSON DISEASE (H): Primary | ICD-10-CM

## 2017-11-14 DIAGNOSIS — F34.1 DYSTHYMIC DISORDER: ICD-10-CM

## 2017-11-14 DIAGNOSIS — R13.10 DYSPHAGIA, UNSPECIFIED TYPE: ICD-10-CM

## 2017-11-14 DIAGNOSIS — A41.51 SEPSIS DUE TO ESCHERICHIA COLI (H): ICD-10-CM

## 2017-11-14 DIAGNOSIS — N17.9 ACUTE RENAL FAILURE, UNSPECIFIED ACUTE RENAL FAILURE TYPE (H): ICD-10-CM

## 2017-11-14 DIAGNOSIS — R30.0 DYSURIA: Primary | ICD-10-CM

## 2017-11-14 DIAGNOSIS — K59.01 SLOW TRANSIT CONSTIPATION: ICD-10-CM

## 2017-11-14 PROCEDURE — 99316 NF DSCHRG MGMT 30 MIN+: CPT | Performed by: NURSE PRACTITIONER

## 2017-11-15 ENCOUNTER — OFFICE VISIT (OUTPATIENT)
Dept: UROLOGY | Facility: CLINIC | Age: 77
End: 2017-11-15

## 2017-11-15 VITALS
HEART RATE: 78 BPM | BODY MASS INDEX: 33.59 KG/M2 | SYSTOLIC BLOOD PRESSURE: 102 MMHG | DIASTOLIC BLOOD PRESSURE: 53 MMHG | HEIGHT: 63 IN | WEIGHT: 189.6 LBS

## 2017-11-15 VITALS
HEIGHT: 63 IN | SYSTOLIC BLOOD PRESSURE: 102 MMHG | WEIGHT: 189.7 LBS | BODY MASS INDEX: 33.61 KG/M2 | DIASTOLIC BLOOD PRESSURE: 53 MMHG | HEART RATE: 78 BPM

## 2017-11-15 DIAGNOSIS — R39.11 URINARY HESITANCY: ICD-10-CM

## 2017-11-15 DIAGNOSIS — R30.0 DYSURIA: ICD-10-CM

## 2017-11-15 DIAGNOSIS — N39.0 RECURRENT UTI: ICD-10-CM

## 2017-11-15 DIAGNOSIS — N39.41 URGE INCONTINENCE: Primary | ICD-10-CM

## 2017-11-15 DIAGNOSIS — N32.81 OVERACTIVE BLADDER: Primary | ICD-10-CM

## 2017-11-15 LAB
APPEARANCE UR: CLEAR
BILIRUB UR QL: NORMAL
COLOR UR: YELLOW
ERYTHROCYTE [DISTWIDTH] IN BLOOD BY AUTOMATED COUNT: 14.4 % (ref 10–15)
GLUCOSE URINE: NORMAL MG/DL
HCT VFR BLD AUTO: 37.1 % (ref 35–47)
HGB BLD-MCNC: 12.1 G/DL (ref 11.7–15.7)
HGB UR QL: NORMAL
KETONES UR QL: NORMAL MG/DL
LEUKOCYTE ESTERASE URINE: NORMAL
MCH RBC QN AUTO: 30.6 PG (ref 26.5–33)
MCHC RBC AUTO-ENTMCNC: 32.6 G/DL (ref 31.5–36.5)
MCV RBC AUTO: 94 FL (ref 78–100)
NITRITE UR QL STRIP: NORMAL
PH UR STRIP: 6 PH (ref 5–7)
PLATELET # BLD AUTO: 271 10E9/L (ref 150–450)
PROTEIN ALBUMIN URINE: NORMAL MG/DL
RBC # BLD AUTO: 3.95 10E12/L (ref 3.8–5.2)
SOURCE: NORMAL
SP GR UR STRIP: 1.01 (ref 1–1.03)
UROBILINOGEN UR QL STRIP: 0.2 EU/DL (ref 0.2–1)
WBC # BLD AUTO: 8.6 10E9/L (ref 4–11)

## 2017-11-15 RX ORDER — CARBIDOPA AND LEVODOPA 50; 200 MG/1; MG/1
1 TABLET, EXTENDED RELEASE ORAL 4 TIMES DAILY
Refills: 0 | COMMUNITY
Start: 2017-10-19 | End: 2018-01-01

## 2017-11-15 RX ORDER — TROSPIUM CHLORIDE ER 60 MG/1
60 CAPSULE ORAL EVERY MORNING
Qty: 30 EACH | Refills: 11 | Status: SHIPPED | OUTPATIENT
Start: 2017-11-15 | End: 2017-11-21

## 2017-11-15 RX ORDER — LEVOTHYROXINE SODIUM 100 UG/1
TABLET ORAL
Refills: 3 | COMMUNITY
Start: 2017-06-03 | End: 2018-02-14

## 2017-11-15 RX ORDER — LEVOTHYROXINE SODIUM 112 UG/1
TABLET ORAL
Refills: 3 | COMMUNITY
Start: 2017-10-05 | End: 2018-02-14 | Stop reason: DRUGHIGH

## 2017-11-15 RX ORDER — TAMSULOSIN HYDROCHLORIDE 0.4 MG/1
0.4 CAPSULE ORAL DAILY
Qty: 30 CAPSULE | Refills: 11 | Status: SHIPPED | OUTPATIENT
Start: 2017-11-15 | End: 2018-01-01

## 2017-11-15 RX ORDER — NITROFURANTOIN MACROCRYSTALS 50 MG/1
50 CAPSULE ORAL AT BEDTIME
Qty: 30 CAPSULE | Refills: 11 | Status: SHIPPED | OUTPATIENT
Start: 2017-11-15 | End: 2018-02-14

## 2017-11-15 ASSESSMENT — PAIN SCALES - GENERAL
PAINLEVEL: NO PAIN (0)
PAINLEVEL: NO PAIN (0)

## 2017-11-15 NOTE — MR AVS SNAPSHOT
After Visit Summary   11/15/2017    Loan Anderson    MRN: [id number]           Patient Information     Date Of Birth          1940        Visit Information        Provider Department      11/15/2017 7:30 AM Keisha Beatty PA-C [facility]        Today's Diagnoses     Urge incontinence    -  1       Follow-ups after your visit        Your next 10 appointments already scheduled     Nov 15, 2017  9:45 AM CST   (Arrive by 9:30 AM)   Cystoscopy with Ivania Viveros MD   [facility] ([facility])    [address] [facility]  [address] [facility] [address] [phone]            Feb 26, 2018  9:00 AM CST   [facility]   [facility]    [address] [place] [id number] [phone]              Future tests that were ordered for you today     Open Future Orders        Priority Expected Expires Ordered    CBC with platelets Routine 11/15/2017 11/14/2018 11/14/2017            Who to contact     Please call your clinic at [phone] to:    Ask questions about your health    Make or cancel appointments    Discuss your medicines    Learn about your test results    Speak to your doctor   If you have compliments or concerns about an experience at your clinic, or if you wish to file a complaint, please contact [facility] Patient Relations at [phone] or email us at [email or web address]         Additional Information About Your Visit        MyChart Information     [employer] gives you secure access to your electronic health record. If you see a primary care provider, you can also send messages to your care team and make appointments. If you have questions, please call your primary care clinic.  If you do not have a primary care provider, please  "call [phone] and they will assist you.      [employer] is an electronic gateway that provides easy, online access to your medical records. With [employer], you can request a clinic appointment, read your test results, renew a prescription or communicate with your care team.     To access your existing account, please contact your [facility] or call [phone] for assistance.        Care EveryWhere ID     This is your Care EveryWhere ID. This could be used by other organizations to access your [facility] medical records  [id number]        Your Vitals Were     Pulse Height BMI (Body Mass Index)             78 1.6 m (5' 3\") 33.6 kg/m2          Blood Pressure from Last 3 Encounters:   11/15/17 102/53   11/13/17 131/80   11/07/17 126/78    Weight from Last 3 Encounters:   11/15/17 86 kg (189 lb 11.2 oz)   11/13/17 86 kg (189 lb 9.6 oz)   11/07/17 86.3 kg (190 lb 3.2 oz)              We Performed the Following     COMPLEX UROFLOWMETRY     CYSTOMETROGRAM COMPLEX W VOIDING PRESS PROFILE     EMG ANAL/URETH SPHINCTER, OTHER THAN NEEDLE     HC CONTRAST ISOVUE 370 MG/ML, PER ML     Urinalysis chemical screen POCT     VOIDING PRESSURE STUDY, INTRA-ABDOMINAL     X-Ray Voiding cystogram        Primary Care Provider Office Phone # Fax #    Opal Cardenas [phone]       [facility] [address]  [place] [address]        Equal Access to Services     SARAH BETH WILLIS AH: Hadii aad ku hadasho Soomaali, waaxda luqadaha, qaybta kaalmada adeegyada, amber mathew. So [facility] [phone].    ATENCIÓN: Si habla español, tiene a adam disposición servicios gratuitos de asistencia lingüística. Llame al [phone].    We comply with applicable federal civil rights laws and [place] laws. We do not discriminate on the basis of race, color, national origin, age, disability, sex, sexual orientation, or gender identity.            Thank you!     Thank you for " choosing [facility] AND [facility]  for your care. Our goal is always to provide you with excellent care. Hearing back from our patients is one way we can continue to improve our services. Please take a few minutes to complete the written survey that you may receive in the mail after your visit with us. Thank you!             Your Updated Medication List - Protect others around you: Learn how to safely use, store and throw away your medicines at www.disposemymeds.org.          This list is accurate as of: 11/15/17  8:28 AM.  Always use your most recent med list.                   Brand Name Dispense Instructions for use Diagnosis    acetaminophen 500 MG tablet    TYLENOL     Take 500 mg by mouth as needed for parkinson disease        aspirin 81 MG tablet      1 TABLET DAILY        atorvastatin 20 MG tablet    LIPITOR     Take 20 mg by mouth daily        Calcium-Vitamin D 600-200 MG-UNIT Tabs     30 tablet    1 tablet every evening        * carbidopa-levodopa  MG per tablet    SINEMET     Take 1.5 tablets by mouth 4 times daily for paralysis agitans        * carbidopa-levodopa  MG per CR tablet    SINEMET CR          Carbidopa-Levodopa-Entacapone -200 MG Tabs      Take 1 tablet by mouth 4 times daily for parkinson and give 1 tablet by mouth as needed for parkinson        docusate sodium 100 MG capsule    COLACE    60 capsule    Take 1 capsule (100 mg) by mouth 2 times daily        Estriol 10 % Crea      Place 1 g vaginally At Bedtime for atrophic vagintis until 11/15/201,  11/15/2017, and insert 1 gram vaginally at bedtime every Mon, Fri for Atrophic Vaginitis        Inulin 2 G Chew     30 tablet    Take 1 tablet by mouth as needed        * levothyroxine 100 MCG tablet    SYNTHROID/LEVOTHROID     TK 1 T PO D        * levothyroxine 112 MCG tablet    SYNTHROID/LEVOTHROID          * Levothyroxine Sodium 112 MCG Caps     30 capsule    Take 112 mcg by mouth daily         MIRALAX powder   Generic drug:  polyethylene glycol     119 g    Take 17 g (1 capful) by mouth daily as needed        OMEGA-3 KRILL OIL PO      Take 1 capsule by mouth daily        rotigotine 4 MG/24HR 24 hr patch    NEUPRO    90 patch    Place 1 patch onto the skin daily    Paralysis agitans (H)       sulfamethoxazole-trimethoprim 400-80 MG per tablet    BACTRIM/SEPTRA     Take 1 tablet by mouth At Bedtime        venlafaxine 75 MG 24 hr capsule    EFFEXOR-XR    90 capsule    TAKE 1 TABLET(75 MG) BY MOUTH DAILY    Paralysis agitans (H)       vitamin B complex with vitamin C Tabs tablet      Take 1 tablet by mouth daily        * Notice:  This list has 5 medication(s) that are the same as other medications prescribed for you. Read the directions carefully, and ask your doctor or other care provider to review them with you.

## 2017-11-15 NOTE — PROGRESS NOTES
PREPROCEDURE DIAGNOSES:    1. Mixed urinary incontinence  2. Urinary hesitancy  3. Parkinson's disease    POSTPROCEDURE DIAGNOSES:  1. Detrusor overactivity with incontinence  2. Small bladder capacity  3. UDS shows:  -Multiple UDC's with incontinence starting at volumes ~200 mL  -Otherwise good bladder compliance  -Small bladder capacity (~250 cc)  -Sensation: delayed  -Incontinence: no demonstrable stress incontinence but DO with incontinence as described above  -Detrusor activity during voiding: good detrusor contraction (max Pdet 27 cm H2O); has several distinct detrusor contractions with intermittent flow pattern. She does empty to near completion (final PVR < 50 mL).   -Detrusor sphincter dyssenergia: some increased EMG activity during voiding phase - suspect this is secondary to some dysfunctional voiding rather than true DSD  -Outlet obstruction: no evidence for this  -Fluoroscopy reveals a smooth walled bladder without diverticulae or VUR. Bladder neck is closed during filling and open during voiding and times of incontinence.     PROCEDURE:    1. Uroflowmetry.  2. Sterile urethral catheterization for measurement of postvoid residual urine volume.  3. Complex filling cystometrogram with measurement of bladder and rectal pressures.  4. Complex voiding cystometrogram with measurement of bladder and rectal pressures.  5. Electromyography of the pelvic floor during urodynamics.  6. Fluoroscopic imaging of the bladder during urodynamics, at least 3 views.    7. Interpretation of urodynamics and flouroscopic imaging.      INDICATIONS FOR PROCEDURE:  Ms. Loan Anderson is a pleasant 77 year old female with a history of Parkinson's disease with mixed urinary incontinence and some urinary hesitancy. Baseline video urodynamic assessment is requested today by Dr. Viveros to better characterize Ms. Loan Anderson's voiding dysfunction.      VOIDING DIARY:  No formal voiding diary completed. Per history obtained  from the patient:  Voids every 3-4 hours during the day, nocturia x 3-4.  Episodes of incontinence: about 3x per day - wears pads as a precaution.  Incontinence associated with: coughing, sneezing, bending, strong urge.  Total Volume Intake: 4-6 glasses of water, 1/2 cup coffee per day, 1 cup of tea per day  Total Volume Output: unknown    DESCRIPTION OF PROCEDURE:  Risks, benefits, and alternatives to urodynamics were discussed with the patient and she wished to proceed.  Urodynamics are planned to better assess the primary etiology for Ms. Anderson's urologic dysfunction.  The patient does not currently take anticholinergic medications.  After informed consent was obtained, the patient was taken to the procedure room where uroflowmetry was performed. Findings below.     UROFLOWMETRY:  Voided volume: 13 mL.  Maximum flow rate: 2.7 mL/sec.  Average flow rate: 1.6 mL/sec.  Postvoid residual by catheter: 75 mL.  Character of the curve: volume voided insufficient to interpret.  Pretest urine dipstick was negative for leukocytes and nitrites.    Next a 7F double-lumen urodynamics catheter was inserted into the bladder under sterile technique.  A 7F abdominal manometry catheter was placed in the rectum.  EMG pads were placed on both sides of the anal verge.  The bladder was filled with 200 mL of Cystografin at 25 mL/minute and serial pressures were recorded.  With coughing there was an appropriate rise in vesical and abdominal pressures with no change in detrusor pressure, confirming good study catheter placement.    DURING THE FILLING PHASE:  First sensation: 198 mL.  First Desire: 198 mL.  Strong Desire: 224 mL.  Maximum Capacity: 258 mL.    Uninhibited detrusor contractions: multiple UDC's with incontinence which start around ~200 mL  Compliance: otherwise good between UDC's.  Continence: no demonstrable stress leak despite Pabd 204 cm H2O at a volume of 211 mL, but DO with incontinence as described above.  EMG:  concordant during filling.    DURING THE VOIDING PHASE:  Maximum detrusor contraction with void: 27 cm of H2O pressure.  Voided volume: 198 mL.  Maximum flow rate: 9.7 mL/sec.  Average flow rate: 2.5 mL/sec.  Postvoid Residual: < 50 mL.  Detrusor sphincter dyssynergia: some increased EMG activity during voiding phase - suspect this is secondary to some dysfunctional voiding rather than true DSD  Character of voiding curve: intermittent.  BOOI: -15.8 (suggesting no obstruction - see key below)  [obstructed (AU index [BOOI] ? 40); equivocal (no definite   obstruction; BOOI 20-40); and no obstruction (BOOI ? 20)]    FLUOROSCOPIC IMAGING OF THE BLADDER DURING URODYNAMICS:  Fluoroscopy during today's procedure demonstrated a smooth walled bladder without diverticulae or cellules.  No vesicoureteral reflux was observed.  The bladder neck was closed during filling and open during voiding and times of incontiennce.  After voiding to completion, all catheters were removed and the patient was brought back into the consultation room to further discuss today's study results.      ASSESSMENT/PLAN:  Ms. Loan Anderson is a pleasant 77 year old female with a history of Parkinson's disease with mixed urinary incontinence and urinary hesitancy who demonstrated the following findings today on urodynamic evaluation:    -Multiple UDC's with incontinence starting at volumes ~200 mL  -Otherwise good bladder compliance  -Small bladder capacity (~250 cc)  -Sensation: delayed  -Incontinence: no demonstrable stress incontinence but DO with incontinence as described above  -Detrusor activity during voiding: good detrusor contraction (max Pdet 27 cm H2O); has several distinct detrusor contractions with intermittent flow pattern. She does empty to near completion (final PVR < 50 mL).   -Detrusor sphincter dyssenergia: some increased EMG activity during voiding phase - suspect this is secondary to some dysfunctional voiding rather than  true DSD  -Outlet obstruction: no evidence for this  -Fluoroscopy reveals a smooth walled bladder without diverticulae or VUR. Bladder neck is closed during filling and open during voiding and times of incontinence.     The patient will follow up as scheduled with Dr. Viveros for cystoscopy and to further discuss today's study results and make plans for how best to proceed.      - The patient is taking prophylactic Bactrim SS once daily so additional antibiotic prophylaxis was not provided today. The risk of UTI with VUDS is low at ~2.5-3%.      Thank you for allowing me to participate in the care of Ms. Loan Anderson and please don't hesitate to contact me with any questions or concerns.      Keisha Beatty PA-C  Urology Physician Assistant

## 2017-11-15 NOTE — PROGRESS NOTES
Reason for Visit:  Cystoscopy    Clinical Data: Ms. Loan Anderson is a 77 year old female with a hx of mixed incontinence, urinary hesitancy, and Parkinson's dz.  She underwent UDS this morning.  See below:    VOIDING DIARY:  No formal voiding diary completed. Per history obtained from the patient:  Voids every 3-4 hours during the day, nocturia x 3-4.  Episodes of incontinence: about 3x per day - wears pads as a precaution.  Incontinence associated with: coughing, sneezing, bending, strong urge.  Total Volume Intake: 4-6 glasses of water, 1/2 cup coffee per day, 1 cup of tea per day  Total Volume Output: unknown    UDS shows:  -Multiple UDC's with incontinence starting at volumes ~200 mL  -Otherwise good bladder compliance  -Small bladder capacity (~250 cc)  -Sensation: delayed  -Incontinence: no demonstrable stress incontinence but DO with incontinence as described above  -Detrusor activity during voiding: good detrusor contraction (max Pdet 27 cm H2O); has several distinct detrusor contractions with intermittent flow pattern. She does empty to near completion (final PVR < 50 mL).   -Detrusor sphincter dyssenergia: some increased EMG activity during voiding phase - unclear whether this is true DSD from her Parkinson's  -Outlet obstruction: no evidence for this  -Fluoroscopy reveals a smooth walled bladder without diverticulae or VUR. Bladder neck is closed during filling and open during voiding and times of incontinence.     Cystoscopy procedure:  Pt. Was consented and placed in the lithotomy position.  She was cleaned and preparred in the usual fashion.  Lidocain gel was inserted into the urethra and given time to take effect.  A 16 fr flexible cystoscope was then inserted through the urethra and into the bladder.  The urethra was wnl.  The bladder was with 1+ trabeculation.  No tumors, diverticulae, or stones.  Bilateral u/o's were effluxing clear urine.  The cystoscope was then withdrawn.  The pt.  Tolerated the procedure well.    A/P:  77 year old female with Parkinson's and UDS evidence of overactive bladder with urinary leakage and a small capacity of 250 cc.  There also may be some DSD.  We discussed options of an anticholinergic and flomax.  They would like to do that but are worried about comfusion and also feel that the bactrim has caused some in her since starting.  -stop Bactrim start low dose nitrofurantoin for prophylaxis  -start flomax 0.4 mg  -start sanctura   -f/u in a month for flow/ru    Thank you for allowing me to participate in the care of  Ms. Loan Anderson and I will keep you updated on her progress.    Ivania Viveros MD

## 2017-11-15 NOTE — MR AVS SNAPSHOT
"              After Visit Summary   11/15/2017    Loan Anderson    MRN: [id number]           Patient Information     Date Of Birth          1940        Visit Information        Provider Department      11/15/2017 9:45 AM Ivania Viveros MD [facility]rology and [facility]        Today's Diagnoses     Overactive bladder    -  1    Urinary hesitancy        Recurrent UTI          Care Instructions      AFTER YOUR CYSTOSCOPY        You have just completed a cystoscopy, or \"cysto\", which allowed your physician to learn more about your bladder (or to remove a stent placed after surgery). We suggest that you continue to avoid caffeine, fruit juice, and alcohol for the next 24 hours, however, you are encouraged to return to your normal activities.         A few things that are considered normal after your cystoscopy:     * Small amount of bleeding (or spotting) that clears within the next 24 hours     * Slight burning sensation with urination     * Sensation to of needing to avoid more frequently     * The feeling of \"air\" in your urine     * Mild discomfort that is relieved with Tylenol        Please contact our office promptly if you:     * Develop a fever above 101 degrees     * Are unable to urinate     * Develop bright red blood that does not stop     * Severe pain or swelling         Please contact our office with any concerns or questions @[facility].          Follow-ups after your visit        Follow-up notes from your care team     Return in 4 weeks (on 12/13/2017) for flow/ru.      Your next 10 appointments already scheduled     Feb 26, 2018  9:00 AM CST   [facility] Pitts [facility]   [facility]    [address] [place] [id number] [phone]              Who to contact     Please call your clinic at [phone] to:    Ask questions about your health    Make or cancel appointments    Discuss your " "medicines    Learn about your test results    Speak to your doctor   If you have compliments or concerns about an experience at your clinic, or if you wish to file a complaint, please contact [facility] Patient Relations at [phone] or email us at [email or web address]         Additional Information About Your Visit        [email or web address] Information     [employer] gives you secure access to your electronic health record. If you see a primary care provider, you can also send messages to your care team and make appointments. If you have questions, please call your primary care clinic.  If you do not have a primary care provider, please call [phone] and they will assist you.      [employer] is an electronic gateway that provides easy, online access to your medical records. With [employer], you can request a clinic appointment, read your test results, renew a prescription or communicate with your care team.     To access your existing account, please contact your [facility] or call [phone] for assistance.        Care EveryWhere ID     This is your Care EveryWhere ID. This could be used by other organizations to access your [place] medical records  [id number]        Your Vitals Were     Pulse Height BMI (Body Mass Index)             78 1.6 m (5' 3\") 33.59 kg/m2          Blood Pressure from Last 3 Encounters:   11/15/17 102/53   11/15/17 102/53   11/13/17 131/80    Weight from Last 3 Encounters:   11/15/17 86 kg (189 lb 9.5 oz)   11/15/17 86 kg (189 lb 11.2 oz)   11/13/17 86 kg (189 lb 9.6 oz)              We Performed the Following     CYSTOURETHROSCOPY          Today's Medication Changes          These changes are accurate as of: 11/15/17 10:05 AM.  If you have any questions, ask your nurse or doctor.               Start taking these medicines.        Dose/Directions    nitroFURantoin 50 MG capsule   Commonly known as:  MACRODANTIN   Used for:  " Recurrent UTI   Started by:  Ivania Viveros MD        Dose:  50 mg   Take 1 capsule (50 mg) by mouth At Bedtime   Quantity:  30 capsule   Refills:  11       tamsulosin 0.4 MG capsule   Commonly known as:  FLOMAX   Used for:  Urinary hesitancy   Started by:  Ivania Viveros MD        Dose:  0.4 mg   Take 1 capsule (0.4 mg) by mouth daily   Quantity:  30 capsule   Refills:  11       trospium 60 MG Cp24 24 hr capsule   Commonly known as:  SANCTURA XR   Used for:  Overactive bladder   Started by:  Ivania Viveros MD        Dose:  60 mg   Take 1 capsule (60 mg) by mouth every morning   Quantity:  30 each   Refills:  11         Stop taking these medicines if you haven't already. Please contact your care team if you have questions.     sulfamethoxazole-trimethoprim 400-80 MG per tablet   Commonly known as:  BACTRIM/SEPTRA   Stopped by:  Ivnaia Viveros MD                Where to get your medicines      These medications were sent to [facility] [address] [place] [id number]     Phone:  [phone]     nitroFURantoin 50 MG capsule    tamsulosin 0.4 MG capsule    trospium 60 MG Cp24 24 hr capsule                Primary Care Provider Office Phone # Fax #    Opal Cardenas [phone]       [facility] [address]  [facility] [address]        Equal Access to Services     [facility]: Hadii aad ku hadasho Soomaali, waaxda luqadaha, qaybta kaalmada adeegyada, amber egan hayirasema mathew. So [facility] [phone].    ATENCIÓN: Si habla español, tiene a adam disposición servicios gratuitos de asistencia lingüística. Llame al [phone].    We comply with applicable federal civil rights laws and [place] laws. We do not discriminate on the basis of race, color, national origin, age, disability, sex, sexual orientation, or gender identity.            Thank you!     Thank you for choosing [facility] UROLOGY AND [facility] FOR PROSTATE AND UROLOGIC CANCERS  for your care. Our goal is always to provide you with excellent care. Hearing back from our patients is one way we can continue to improve our services. Please take a few minutes to complete the written survey that you may receive in the mail after your visit with us. Thank you!             Your Updated Medication List - Protect others around you: Learn how to safely use, store and throw away your medicines at www.disposemymeds.org.          This list is accurate as of: 11/15/17 10:05 AM.  Always use your most recent med list.                   Brand Name Dispense Instructions for use Diagnosis    acetaminophen 500 MG tablet    TYLENOL     Take 500 mg by mouth as needed for parkinson disease        aspirin 81 MG tablet      1 TABLET DAILY        atorvastatin 20 MG tablet    LIPITOR     Take 20 mg by mouth daily        Calcium-Vitamin D 600-200 MG-UNIT Tabs     30 tablet    1 tablet every evening        * carbidopa-levodopa  MG per tablet    SINEMET     Take 1.5 tablets by mouth 4 times daily for paralysis agitans        * carbidopa-levodopa  MG per CR tablet    SINEMET CR          Carbidopa-Levodopa-Entacapone -200 MG Tabs      Take 1 tablet by mouth 4 times daily for parkinson and give 1 tablet by mouth as needed for parkinson        docusate sodium 100 MG capsule    COLACE    60 capsule    Take 1 capsule (100 mg) by mouth 2 times daily        Estriol 10 % Crea      Place 1 g vaginally At Bedtime for atrophic vagintis until 11/15/201,  11/15/2017, and insert 1 gram vaginally at bedtime every Mon, Fri for Atrophic Vaginitis        Inulin 2 G Chew     30 tablet    Take 1 tablet by mouth as needed        * levothyroxine 100 MCG tablet    SYNTHROID/LEVOTHROID     TK 1 T PO D        * levothyroxine 112 MCG tablet    SYNTHROID/LEVOTHROID          * Levothyroxine Sodium 112 MCG Caps     30 capsule    Take 112 mcg by mouth daily        MIRALAX  powder   Generic drug:  polyethylene glycol     119 g    Take 17 g (1 capful) by mouth daily as needed        nitroFURantoin 50 MG capsule    MACRODANTIN    30 capsule    Take 1 capsule (50 mg) by mouth At Bedtime    Recurrent UTI       OMEGA-3 KRILL OIL PO      Take 1 capsule by mouth daily        rotigotine 4 MG/24HR 24 hr patch    NEUPRO    90 patch    Place 1 patch onto the skin daily    Paralysis agitans (H)       tamsulosin 0.4 MG capsule    FLOMAX    30 capsule    Take 1 capsule (0.4 mg) by mouth daily    Urinary hesitancy       trospium 60 MG Cp24 24 hr capsule    SANCTURA XR    30 each    Take 1 capsule (60 mg) by mouth every morning    Overactive bladder       venlafaxine 75 MG 24 hr capsule    EFFEXOR-XR    90 capsule    TAKE 1 TABLET(75 MG) BY MOUTH DAILY    Paralysis agitans (H)       vitamin B complex with vitamin C Tabs tablet      Take 1 tablet by mouth daily        * Notice:  This list has 5 medication(s) that are the same as other medications prescribed for you. Read the directions carefully, and ask your doctor or other care provider to review them with you.

## 2017-11-15 NOTE — LETTER
11/15/2017       RE: Loan Anderson  [address] [facility] [address]     Dear Colleague,    Thank you for referring your patient, Loan Anderson, to the [facility] UROLOGY AND INST FOR PROSTATE AND UROLOGIC CANCERS at [facility]. Please see a copy of my visit note below.    Reason for Visit:  Cystoscopy    Clinical Data: Ms. Loan Anderson is a 77 year old female with a hx of mixed incontinence, urinary hesitancy, and Parkinson's dz.  She underwent UDS this morning.  See below:    VOIDING DIARY:  No formal voiding diary completed. Per history obtained from the patient:  Voids every 3-4 hours during the day, nocturia x 3-4.  Episodes of incontinence: about 3x per day - wears pads as a precaution.  Incontinence associated with: coughing, sneezing, bending, strong urge.  Total Volume Intake: 4-6 glasses of water, 1/2 cup coffee per day, 1 cup of tea per day  Total Volume Output: unknown    UDS shows:  -Multiple UDC's with incontinence starting at volumes ~200 mL  -Otherwise good bladder compliance  -Small bladder capacity (~250 cc)  -Sensation: delayed  -Incontinence: no demonstrable stress incontinence but DO with incontinence as described above  -Detrusor activity during voiding: good detrusor contraction (max Pdet 27 cm H2O); has several distinct detrusor contractions with intermittent flow pattern. She does empty to near completion (final PVR < 50 mL).   -Detrusor sphincter dyssenergia: some increased EMG activity during voiding phase - unclear whether this is true DSD from her Parkinson's  -Outlet obstruction: no evidence for this  -Fluoroscopy reveals a smooth walled bladder without diverticulae or VUR. Bladder neck is closed during filling and open during voiding and times of incontinence.     Cystoscopy procedure:  Pt. Was consented and placed in the lithotomy position.  She was cleaned and preparred in the usual fashion.  Lidocain gel was inserted  into the urethra and given time to take effect.  A 16 fr flexible cystoscope was then inserted through the urethra and into the bladder.  The urethra was wnl.  The bladder was with 1+ trabeculation.  No tumors, diverticulae, or stones.  Bilateral u/o's were effluxing clear urine.  The cystoscope was then withdrawn.  The pt. Tolerated the procedure well.    A/P:  77 year old female with Parkinson's and UDS evidence of overactive bladder with urinary leakage and a small capacity of 250 cc.  There also may be some DSD.  We discussed options of an anticholinergic and flomax.  They would like to do that but are worried about comfusion and also feel that the bactrim has caused some in her since starting.  -stop Bactrim start low dose nitrofurantoin for prophylaxis  -start flomax 0.4 mg  -start sanctura   -f/u in a month for flow/ru    Thank you for allowing me to participate in the care of  Ms. Loan Anderson and I will keep you updated on her progress.      Again, thank you for allowing me to participate in the care of your patient.      Sincerely,    Ivania Viveros MD

## 2017-11-15 NOTE — NURSING NOTE
"Chief Complaint   Patient presents with     Cystoscopy     mixed urinary incontinence       Blood pressure 102/53, pulse 78, height 1.6 m (5' 3\"), weight 86 kg (189 lb 9.5 oz). Body mass index is 33.59 kg/(m^2).    Patient Active Problem List   Diagnosis     Parkinson disease (H)     Constipation     Hypothyroid     Neurogenic bladder     Convergence insufficiency     Wears glasses     Diplopia     Hot flashes, menopausal     REM sleep behavior disorder     Sebaceous cyst     Hypercholesterolemia     Dysthymic disorder     Hyperglyceridemia     Hypothyroidism     Urinary incontinence     Chronic nonalcoholic liver disease     Obstructive sleep apnea     Disorder of bone and cartilage     Benign neoplasm     Incontinence in female     Nonalcoholic steatohepatitis (COHEN)     Obstructive sleep apnea syndrome     Osteopenia     Parkinson's disease (H)     Systemic infection (H)     Sepsis (H)     E coli bacteremia     Acute kidney failure (H)     Hyponatremia     Hypokalemia     Acute pyelonephritis     Acute cystitis without hematuria     Kidney stone     Sepsis due to other etiology (H)     Acute metabolic encephalopathy     Secondary hypertension     CKD (chronic kidney disease) stage 3, GFR 30-59 ml/min     Dysphagia, unspecified type     Primary osteoarthritis of both knees       Allergies   Allergen Reactions     Mirapex [Pramipexole Dihydrochloride Monohydrate]      Leg swelling     Potassium Chloride      IV infusion only. She tolerates oral potassium chloride     Requip [Ropinirole Hydrochloride]      Leg swelling     Epinephrine Palpitations       Current Outpatient Prescriptions   Medication Sig Dispense Refill     carbidopa-levodopa (SINEMET CR)  MG per CR tablet   0     levothyroxine (SYNTHROID/LEVOTHROID) 100 MCG tablet TK 1 T PO D  3     levothyroxine (SYNTHROID/LEVOTHROID) 112 MCG tablet   3     sulfamethoxazole-trimethoprim (BACTRIM/SEPTRA) 400-80 MG per tablet Take 1 tablet by mouth At Bedtime   "     Carbidopa-Levodopa-Entacapone -200 MG TABS Take 1 tablet by mouth 4 times daily for parkinson and give 1 tablet by mouth as needed for parkinson       carbidopa-levodopa (SINEMET)  MG per tablet Take 1.5 tablets by mouth 4 times daily for paralysis agitans       OMEGA-3 KRILL OIL PO Take 1 capsule by mouth daily       Estriol 10 % CREA Place 1 g vaginally At Bedtime for atrophic vagintis until 11/15/201,  11/15/2017, and insert 1 gram vaginally at bedtime every Mon, Fri for Atrophic Vaginitis       acetaminophen (TYLENOL) 500 MG tablet Take 500 mg by mouth as needed for parkinson disease       atorvastatin (LIPITOR) 20 MG tablet Take 20 mg by mouth daily        docusate sodium (COLACE) 100 MG capsule Take 1 capsule (100 mg) by mouth 2 times daily 60 capsule      Levothyroxine Sodium 112 MCG CAPS Take 112 mcg by mouth daily 30 capsule      rotigotine (NEUPRO) 4 MG/24HR 24 hr patch Place 1 patch onto the skin daily 90 patch 3     venlafaxine (EFFEXOR-XR) 75 MG 24 hr capsule TAKE 1 TABLET(75 MG) BY MOUTH DAILY 90 capsule 11     polyethylene glycol (MIRALAX) powder Take 17 g (1 capful) by mouth daily as needed 119 g      Inulin 2 G CHEW Take 1 tablet by mouth as needed  30 tablet      vitamin  B complex with vitamin C (VITAMIN  B COMPLEX) TABS Take 1 tablet by mouth daily       Calcium-Vitamin D 600-200 MG-UNIT TABS 1 tablet every evening  30 tablet      ASPIRIN 81 MG PO TABS 1 TABLET DAILY         Social History   Substance Use Topics     Smoking status: Never Smoker     Smokeless tobacco: Never Used     Alcohol use Yes      Comment: rare     Invasive Procedure Safety Checklist:    Procedure: cystoscopy    Action: Complete sections and checkboxes as appropriate.    Pre-procedure:  1. Patient ID Verified with 2 identifiers (Ritu and  or MRN) : YES    2. Procedure and site verified with patient/designee (when able) : YES    3. Accurate consent documentation in medical record : YES    4. H&P (or  appropriate assessment) documented in medical record : NO  H&P must be up to 30 days prior to procedure an updated within 24 hours of                 Procedure as applicable.     5. Relevant diagnostic and radiology test results appropriately labeled and displayed as applicable : NO    6. Blood products, implants, devices, and/or special equipment available for the procedure as applicable : NO    7. Procedure site(s) marked with provider initials [Exclusions: none] : NO    8. Marking not required. Reason : Yes  Procedure does not require site marking    Time Out:     Time-Out performed immediately prior to starting procedure, including verbal and active participation of all team members addressing: YES    1. Correct patient identity.  2. Confirmed that the correct side and site are marked.  3. An accurate procedure to be done.  4. Agreement on the procedure to be done.  5. Correct patient position.  6. Relevant images and results are properly labeled and appropriately displayed.  7. The need to administer antibiotics or fluids for irrigation purposes during the procedure as applicable.  8. Safety precautions based on patient history or medication use.    During Procedure: Verification of correct person, site, and procedure occurs any time the responsibility for care of the patient is transferred to another member of the care team.    Glory Sun LPN  11/15/2017  8:25 AM

## 2017-11-15 NOTE — LETTER
11/15/2017       RE: Loan Anderson  [address] [facility] [address]     Dear Colleague,    Thank you for referring your patient, Loan Anderson, to the [facility] UROLOGY AND INST FOR PROSTATE AND UROLOGIC CANCERS at [facility]. Please see a copy of my visit note below.    PREPROCEDURE DIAGNOSES:    1. Mixed urinary incontinence  2. Urinary hesitancy  3. Parkinson's disease    POSTPROCEDURE DIAGNOSES:  1. Detrusor overactivity with incontinence  2. Small bladder capacity  3. UDS shows:  -Multiple UDC's with incontinence starting at volumes ~200 mL  -Otherwise good bladder compliance  -Small bladder capacity (~250 cc)  -Sensation: delayed  -Incontinence: no demonstrable stress incontinence but DO with incontinence as described above  -Detrusor activity during voiding: good detrusor contraction (max Pdet 27 cm H2O); has several distinct detrusor contractions with intermittent flow pattern. She does empty to near completion (final PVR < 50 mL).   -Detrusor sphincter dyssenergia: some increased EMG activity during voiding phase - suspect this is secondary to some dysfunctional voiding rather than true DSD  -Outlet obstruction: no evidence for this  -Fluoroscopy reveals a smooth walled bladder without diverticulae or VUR. Bladder neck is closed during filling and open during voiding and times of incontinence.     PROCEDURE:    1. Uroflowmetry.  2. Sterile urethral catheterization for measurement of postvoid residual urine volume.  3. Complex filling cystometrogram with measurement of bladder and rectal pressures.  4. Complex voiding cystometrogram with measurement of bladder and rectal pressures.  5. Electromyography of the pelvic floor during urodynamics.  6. Fluoroscopic imaging of the bladder during urodynamics, at least 3 views.    7. Interpretation of urodynamics and flouroscopic imaging.      INDICATIONS FOR PROCEDURE:  Ms. Loan Anderson is a pleasant  77 year old female with a history of Parkinson's disease with mixed urinary incontinence and some urinary hesitancy. Baseline video urodynamic assessment is requested today by Dr. Viveros to better characterize Ms. Loan Anderson's voiding dysfunction.      VOIDING DIARY:  No formal voiding diary completed. Per history obtained from the patient:  Voids every 3-4 hours during the day, nocturia x 3-4.  Episodes of incontinence: about 3x per day - wears pads as a precaution.  Incontinence associated with: coughing, sneezing, bending, strong urge.  Total Volume Intake: 4-6 glasses of water, 1/2 cup coffee per day, 1 cup of tea per day  Total Volume Output: unknown    DESCRIPTION OF PROCEDURE:  Risks, benefits, and alternatives to urodynamics were discussed with the patient and she wished to proceed.  Urodynamics are planned to better assess the primary etiology for Ms. Lainezs urologic dysfunction.  The patient does not currently take anticholinergic medications.  After informed consent was obtained, the patient was taken to the procedure room where uroflowmetry was performed. Findings below.     UROFLOWMETRY:  Voided volume: 13 mL.  Maximum flow rate: 2.7 mL/sec.  Average flow rate: 1.6 mL/sec.  Postvoid residual by catheter: 75 mL.  Character of the curve: volume voided insufficient to interpret.  Pretest urine dipstick was negative for leukocytes and nitrites.    Next a 7F double-lumen urodynamics catheter was inserted into the bladder under sterile technique.  A 7F abdominal manometry catheter was placed in the rectum.  EMG pads were placed on both sides of the anal verge.  The bladder was filled with 200 mL of Cystografin at 25 mL/minute and serial pressures were recorded.  With coughing there was an appropriate rise in vesical and abdominal pressures with no change in detrusor pressure, confirming good study catheter placement.    DURING THE FILLING PHASE:  First sensation: 198 mL.  First Desire: 198  mL.  Strong Desire: 224 mL.  Maximum Capacity: 258 mL.    Uninhibited detrusor contractions: multiple UDC's with incontinence which start around ~200 mL  Compliance: otherwise good between UDC's.  Continence: no demonstrable stress leak despite Pabd 204 cm H2O at a volume of 211 mL, but DO with incontinence as described above.  EMG: concordant during filling.    DURING THE VOIDING PHASE:  Maximum detrusor contraction with void: 27 cm of H2O pressure.  Voided volume: 198 mL.  Maximum flow rate: 9.7 mL/sec.  Average flow rate: 2.5 mL/sec.  Postvoid Residual: < 50 mL.  Detrusor sphincter dyssynergia: some increased EMG activity during voiding phase - suspect this is secondary to some dysfunctional voiding rather than true DSD  Character of voiding curve: intermittent.  BOOI: -15.8 (suggesting no obstruction - see key below)  [obstructed (AU index [BOOI] ? 40); equivocal (no definite   obstruction; BOOI 20-40); and no obstruction (BOOI ? 20)]    FLUOROSCOPIC IMAGING OF THE BLADDER DURING URODYNAMICS:  Fluoroscopy during today's procedure demonstrated a smooth walled bladder without diverticulae or cellules.  No vesicoureteral reflux was observed.  The bladder neck was closed during filling and open during voiding and times of incontiennce.  After voiding to completion, all catheters were removed and the patient was brought back into the consultation room to further discuss today's study results.      ASSESSMENT/PLAN:  Ms. Loan Anderson is a pleasant 77 year old female with a history of Parkinson's disease with mixed urinary incontinence and urinary hesitancy who demonstrated the following findings today on urodynamic evaluation:    -Multiple UDC's with incontinence starting at volumes ~200 mL  -Otherwise good bladder compliance  -Small bladder capacity (~250 cc)  -Sensation: delayed  -Incontinence: no demonstrable stress incontinence but DO with incontinence as described above  -Detrusor activity during voiding:  good detrusor contraction (max Pdet 27 cm H2O); has several distinct detrusor contractions with intermittent flow pattern. She does empty to near completion (final PVR < 50 mL).   -Detrusor sphincter dyssenergia: some increased EMG activity during voiding phase - suspect this is secondary to some dysfunctional voiding rather than true DSD  -Outlet obstruction: no evidence for this  -Fluoroscopy reveals a smooth walled bladder without diverticulae or VUR. Bladder neck is closed during filling and open during voiding and times of incontinence.     The patient will follow up as scheduled with Dr. Viveros for cystoscopy and to further discuss today's study results and make plans for how best to proceed.      - The patient is taking prophylactic Bactrim SS once daily so additional antibiotic prophylaxis was not provided today. The risk of UTI with VUDS is low at ~2.5-3%.      Thank you for allowing me to participate in the care of Ms. Loan Anderson and please don't hesitate to contact me with any questions or concerns.        Again, thank you for allowing me to participate in the care of your patient.      Sincerely,    Keisha Beatty PA-C

## 2017-11-15 NOTE — PATIENT INSTRUCTIONS
"Follow up in one month with Dr. Viveros in [place].      AFTER YOUR CYSTOSCOPY        You have just completed a cystoscopy, or \"cysto\", which allowed your physician to learn more about your bladder (or to remove a stent placed after surgery). We suggest that you continue to avoid caffeine, fruit juice, and alcohol for the next 24 hours, however, you are encouraged to return to your normal activities.         A few things that are considered normal after your cystoscopy:     * Small amount of bleeding (or spotting) that clears within the next 24 hours     * Slight burning sensation with urination     * Sensation to of needing to avoid more frequently     * The feeling of \"air\" in your urine     * Mild discomfort that is relieved with Tylenol        Please contact our office promptly if you:     * Develop a fever above 101 degrees     * Are unable to urinate     * Develop bright red blood that does not stop     * Severe pain or swelling        "

## 2017-11-16 ENCOUNTER — TELEPHONE (OUTPATIENT)
Dept: UROLOGY | Facility: CLINIC | Age: 77
End: 2017-11-16

## 2017-11-16 NOTE — TELEPHONE ENCOUNTER
[facility] Prior Authorization Team   Phone: [phone]  Fax: [phone]      PA Initiation    Medication: sancturia  Insurance Company: MEDICA - Phone [phone] Fax [phone]  Pharmacy Filling the Rx: [facility] [id number] [place] [address] [address] [facility] [address]  Filling Pharmacy Phone: [phone]  Filling Pharmacy Fax: [phone]  Start Date: 11/23/2017

## 2017-11-20 NOTE — TELEPHONE ENCOUNTER
Prior Authorization Approval    Authorization Effective Date: 8/19/2017  Authorization Expiration Date: 11/17/2018  Medication: sancturia-initiated- APPROVED  Approved Dose/Quantity:   Reference #:     Insurance Company: Medicare [employer] RX - Phone [phone] Fax [phone]:  [employer]-Phone [phone] Fax [phone]  Expected CoPay: $79.61     CoPay Card Available:      Foundation Assistance Needed:    Which Pharmacy is filling the prescription (Not needed for infusion/clinic administered): [id number] - [place], [place] -  [address] AT [facility] [address]  Pharmacy Notified: Yes  Patient Notified: Yes

## 2017-11-21 DIAGNOSIS — N32.81 OVERACTIVE BLADDER: ICD-10-CM

## 2017-11-21 RX ORDER — TROSPIUM CHLORIDE ER 60 MG/1
60 CAPSULE ORAL EVERY MORNING
Qty: 90 EACH | Refills: 3 | Status: SHIPPED | OUTPATIENT
Start: 2017-11-21 | End: 2018-01-01

## 2018-01-01 ENCOUNTER — TELEPHONE (OUTPATIENT)
Dept: NEUROLOGY | Facility: CLINIC | Age: 78
End: 2018-01-01

## 2018-01-01 ENCOUNTER — RECORDS - HEALTHEAST (OUTPATIENT)
Dept: LAB | Facility: CLINIC | Age: 78
End: 2018-01-01

## 2018-01-01 ENCOUNTER — OFFICE VISIT (OUTPATIENT)
Dept: NEUROLOGY | Facility: CLINIC | Age: 78
End: 2018-01-01
Payer: MEDICARE

## 2018-01-01 ENCOUNTER — TELEPHONE (OUTPATIENT)
Dept: UROLOGY | Facility: CLINIC | Age: 78
End: 2018-01-01

## 2018-01-01 ENCOUNTER — TRANSFERRED RECORDS (OUTPATIENT)
Dept: HEALTH INFORMATION MANAGEMENT | Facility: CLINIC | Age: 78
End: 2018-01-01

## 2018-01-01 ENCOUNTER — HOSPITAL ENCOUNTER (EMERGENCY)
Facility: CLINIC | Age: 78
Discharge: HOME | End: 2018-11-14
Attending: EMERGENCY MEDICINE | Admitting: EMERGENCY MEDICINE
Payer: MEDICARE

## 2018-01-01 ENCOUNTER — APPOINTMENT (OUTPATIENT)
Dept: GENERAL RADIOLOGY | Facility: CLINIC | Age: 78
End: 2018-01-01
Attending: EMERGENCY MEDICINE
Payer: MEDICARE

## 2018-01-01 ENCOUNTER — CARE COORDINATION (OUTPATIENT)
Dept: UROLOGY | Facility: CLINIC | Age: 78
End: 2018-01-01

## 2018-01-01 ENCOUNTER — HOSPITAL ENCOUNTER (EMERGENCY)
Facility: CLINIC | Age: 78
Discharge: HOME | End: 2018-12-02
Attending: EMERGENCY MEDICINE | Admitting: EMERGENCY MEDICINE
Payer: MEDICARE

## 2018-01-01 VITALS
SYSTOLIC BLOOD PRESSURE: 153 MMHG | HEART RATE: 78 BPM | OXYGEN SATURATION: 96 % | BODY MASS INDEX: 33.6 KG/M2 | DIASTOLIC BLOOD PRESSURE: 82 MMHG | WEIGHT: 189.7 LBS

## 2018-01-01 VITALS
BODY MASS INDEX: 33.66 KG/M2 | HEIGHT: 63 IN | HEART RATE: 76 BPM | SYSTOLIC BLOOD PRESSURE: 140 MMHG | RESPIRATION RATE: 20 BRPM | WEIGHT: 190 LBS | TEMPERATURE: 99.1 F | OXYGEN SATURATION: 97 % | DIASTOLIC BLOOD PRESSURE: 72 MMHG

## 2018-01-01 VITALS
TEMPERATURE: 97.7 F | RESPIRATION RATE: 19 BRPM | DIASTOLIC BLOOD PRESSURE: 87 MMHG | SYSTOLIC BLOOD PRESSURE: 146 MMHG | OXYGEN SATURATION: 94 %

## 2018-01-01 VITALS
WEIGHT: 197.7 LBS | SYSTOLIC BLOOD PRESSURE: 179 MMHG | OXYGEN SATURATION: 97 % | HEART RATE: 72 BPM | DIASTOLIC BLOOD PRESSURE: 88 MMHG | BODY MASS INDEX: 35.02 KG/M2

## 2018-01-01 DIAGNOSIS — N39.0 URINARY TRACT INFECTION: Primary | ICD-10-CM

## 2018-01-01 DIAGNOSIS — R39.11 URINARY HESITANCY: ICD-10-CM

## 2018-01-01 DIAGNOSIS — M25.561 CHRONIC PAIN OF BOTH KNEES: ICD-10-CM

## 2018-01-01 DIAGNOSIS — G20.A1 PARALYSIS AGITANS (H): Primary | ICD-10-CM

## 2018-01-01 DIAGNOSIS — E66.01 MORBID OBESITY (H): ICD-10-CM

## 2018-01-01 DIAGNOSIS — S43.004A DISLOCATION OF RIGHT SHOULDER JOINT, INITIAL ENCOUNTER: ICD-10-CM

## 2018-01-01 DIAGNOSIS — M25.562 CHRONIC PAIN OF BOTH KNEES: ICD-10-CM

## 2018-01-01 DIAGNOSIS — F09 COGNITIVE DISORDER: ICD-10-CM

## 2018-01-01 DIAGNOSIS — G89.29 CHRONIC PAIN OF BOTH KNEES: ICD-10-CM

## 2018-01-01 DIAGNOSIS — H51.11 CONVERGENCE INSUFFICIENCY: Primary | ICD-10-CM

## 2018-01-01 DIAGNOSIS — N32.81 OVERACTIVE BLADDER: ICD-10-CM

## 2018-01-01 DIAGNOSIS — H53.2 DIPLOPIA: ICD-10-CM

## 2018-01-01 LAB
25(OH)D3 SERPL-MCNC: 22.2 NG/ML (ref 30–80)
ALBUMIN UR-MCNC: ABNORMAL MG/DL
ALBUMIN UR-MCNC: NEGATIVE MG/DL
ALBUMIN UR-MCNC: NEGATIVE MG/DL
ANION GAP SERPL CALCULATED.3IONS-SCNC: 11 MMOL/L (ref 5–18)
APPEARANCE UR: ABNORMAL
APPEARANCE UR: CLEAR
APPEARANCE UR: CLEAR
BACTERIA #/AREA URNS HPF: ABNORMAL HPF
BACTERIA SPEC CULT: NO GROWTH
BILIRUB UR QL STRIP: NEGATIVE
BUN SERPL-MCNC: 19 MG/DL (ref 8–28)
CALCIUM SERPL-MCNC: 9.2 MG/DL (ref 8.5–10.5)
CAOX CRY #/AREA URNS HPF: PRESENT /[HPF]
CHLORIDE BLD-SCNC: 104 MMOL/L (ref 98–107)
CO2 SERPL-SCNC: 23 MMOL/L (ref 22–31)
COLOR UR AUTO: COLORLESS
COLOR UR AUTO: YELLOW
COLOR UR AUTO: YELLOW
CREAT SERPL-MCNC: 1.3 MG/DL (ref 0.6–1.1)
ERYTHROCYTE [DISTWIDTH] IN BLOOD BY AUTOMATED COUNT: 14 % (ref 11–14.5)
GFR SERPL CREATININE-BSD FRML MDRD: 40 ML/MIN/1.73M2
GLUCOSE BLD-MCNC: 96 MG/DL (ref 70–125)
GLUCOSE UR STRIP-MCNC: NEGATIVE MG/DL
HCT VFR BLD AUTO: 42.3 % (ref 35–47)
HGB BLD-MCNC: 13.7 G/DL (ref 12–16)
HGB UR QL STRIP: NEGATIVE
HYALINE CASTS #/AREA URNS LPF: ABNORMAL LPF
KETONES UR STRIP-MCNC: ABNORMAL MG/DL
KETONES UR STRIP-MCNC: NEGATIVE MG/DL
KETONES UR STRIP-MCNC: NEGATIVE MG/DL
LEUKOCYTE ESTERASE UR QL STRIP: ABNORMAL
LEUKOCYTE ESTERASE UR QL STRIP: NEGATIVE
LEUKOCYTE ESTERASE UR QL STRIP: NEGATIVE
MCH RBC QN AUTO: 31.1 PG (ref 27–34)
MCHC RBC AUTO-ENTMCNC: 32.4 G/DL (ref 32–36)
MCV RBC AUTO: 96 FL (ref 80–100)
MUCOUS THREADS #/AREA URNS LPF: ABNORMAL LPF
NITRATE UR QL: NEGATIVE
PH UR STRIP: 5.5 [PH] (ref 4.5–8)
PH UR STRIP: 7 [PH] (ref 4.5–8)
PH UR STRIP: 7.5 [PH] (ref 4.5–8)
PLATELET # BLD AUTO: 250 THOU/UL (ref 140–440)
PMV BLD AUTO: 11.5 FL (ref 8.5–12.5)
POTASSIUM BLD-SCNC: 3.8 MMOL/L (ref 3.5–5)
RBC # BLD AUTO: 4.41 MILL/UL (ref 3.8–5.4)
RBC #/AREA URNS AUTO: ABNORMAL HPF
SODIUM SERPL-SCNC: 138 MMOL/L (ref 136–145)
SP GR UR STRIP: 1.01 (ref 1–1.03)
SP GR UR STRIP: 1.01 (ref 1–1.03)
SP GR UR STRIP: 1.03 (ref 1–1.03)
SQUAMOUS #/AREA URNS AUTO: ABNORMAL LPF
TSH SERPL DL<=0.005 MIU/L-ACNC: 0.7 UIU/ML (ref 0.3–5)
TSH SERPL DL<=0.005 MIU/L-ACNC: 0.94 UIU/ML (ref 0.3–5)
UROBILINOGEN UR STRIP-ACNC: ABNORMAL
UROBILINOGEN UR STRIP-ACNC: NORMAL
UROBILINOGEN UR STRIP-ACNC: NORMAL
WBC #/AREA URNS AUTO: ABNORMAL HPF
WBC: 7.2 THOU/UL (ref 4–11)

## 2018-01-01 PROCEDURE — 99285 EMERGENCY DEPT VISIT HI MDM: CPT | Mod: 25

## 2018-01-01 PROCEDURE — 40000275 ZZH STATISTIC RCP TIME EA 10 MIN

## 2018-01-01 PROCEDURE — 23650 CLTX SHO DSLC W/MNPJ WO ANES: CPT | Mod: RT

## 2018-01-01 PROCEDURE — 25000128 H RX IP 250 OP 636: Performed by: EMERGENCY MEDICINE

## 2018-01-01 PROCEDURE — 99152 MOD SED SAME PHYS/QHP 5/>YRS: CPT

## 2018-01-01 PROCEDURE — 99156 MOD SED OTH PHYS/QHP 5/>YRS: CPT | Mod: 59

## 2018-01-01 PROCEDURE — 40000276 ZZH STATISTIC RCP TIME ED VENT EA 10 MIN

## 2018-01-01 PROCEDURE — 96374 THER/PROPH/DIAG INJ IV PUSH: CPT

## 2018-01-01 PROCEDURE — 99214 OFFICE O/P EST MOD 30 MIN: CPT | Performed by: PSYCHIATRY & NEUROLOGY

## 2018-01-01 PROCEDURE — 40000986 XR SHOULDER RT PORT G/E 2 VW: Mod: RT

## 2018-01-01 PROCEDURE — 73030 X-RAY EXAM OF SHOULDER: CPT | Mod: RT

## 2018-01-01 PROCEDURE — 99207 ZZC NO CHARGE LOS: CPT | Performed by: PSYCHIATRY & NEUROLOGY

## 2018-01-01 RX ORDER — LEVOTHYROXINE SODIUM 100 UG/1
TABLET ORAL
Qty: 50 TABLET | Refills: 3 | COMMUNITY
Start: 2018-01-01 | End: 2019-01-01

## 2018-01-01 RX ORDER — CARBIDOPA AND LEVODOPA 50; 200 MG/1; MG/1
1 TABLET, EXTENDED RELEASE ORAL 4 TIMES DAILY
Qty: 360 TABLET | Refills: 3 | Status: SHIPPED | OUTPATIENT
Start: 2018-01-01 | End: 2019-01-01

## 2018-01-01 RX ORDER — B-COMPLEX WITH VITAMIN C
TABLET ORAL
COMMUNITY
Start: 2018-01-01 | End: 2019-01-01

## 2018-01-01 RX ORDER — ATORVASTATIN CALCIUM 10 MG/1
TABLET, FILM COATED ORAL
Qty: 90 TABLET | Refills: 3 | COMMUNITY
Start: 2018-01-01 | End: 2018-01-01

## 2018-01-01 RX ORDER — LISINOPRIL 5 MG/1
5 TABLET ORAL DAILY
Qty: 30 TABLET | Refills: 1 | Status: ON HOLD | COMMUNITY
Start: 2018-01-01 | End: 2019-01-01

## 2018-01-01 RX ORDER — ACETAMINOPHEN 500 MG
TABLET ORAL
COMMUNITY
Start: 2018-01-01 | End: 2018-01-01

## 2018-01-01 RX ORDER — TAMSULOSIN HYDROCHLORIDE 0.4 MG/1
CAPSULE ORAL
Qty: 30 CAPSULE | Refills: 11 | COMMUNITY
Start: 2018-01-01 | End: 2019-01-01

## 2018-01-01 RX ORDER — TAMSULOSIN HYDROCHLORIDE 0.4 MG/1
CAPSULE ORAL
Qty: 30 CAPSULE | Refills: 11 | COMMUNITY
Start: 2018-01-01 | End: 2018-01-01

## 2018-01-01 RX ORDER — DOCUSATE SODIUM 100 MG/1
CAPSULE, LIQUID FILLED ORAL
Qty: 60 CAPSULE | COMMUNITY
Start: 2018-01-01 | End: 2019-01-01

## 2018-01-01 RX ORDER — PROPOFOL 10 MG/ML
50 INJECTION, EMULSION INTRAVENOUS ONCE
Status: COMPLETED | OUTPATIENT
Start: 2018-01-01 | End: 2018-01-01

## 2018-01-01 RX ORDER — VENLAFAXINE HYDROCHLORIDE 75 MG/1
CAPSULE, EXTENDED RELEASE ORAL
Qty: 90 CAPSULE | Refills: 11 | COMMUNITY
Start: 2018-01-01 | End: 2019-01-01

## 2018-01-01 RX ORDER — CARBIDOPA AND LEVODOPA 25; 100 MG/1; MG/1
TABLET ORAL
Qty: 540 TABLET | Refills: 3 | Status: SHIPPED | OUTPATIENT
Start: 2018-01-01 | End: 2018-01-01

## 2018-01-01 RX ORDER — TROSPIUM CHLORIDE ER 60 MG/1
60 CAPSULE ORAL
Qty: 90 EACH | Refills: 3 | COMMUNITY
Start: 2018-01-01

## 2018-01-01 RX ORDER — HYDROMORPHONE HYDROCHLORIDE 1 MG/ML
0.2 INJECTION, SOLUTION INTRAMUSCULAR; INTRAVENOUS; SUBCUTANEOUS
Status: DISCONTINUED | OUTPATIENT
Start: 2018-01-01 | End: 2018-01-01 | Stop reason: HOSPADM

## 2018-01-01 RX ORDER — MORPHINE SULFATE 4 MG/ML
4 INJECTION, SOLUTION INTRAMUSCULAR; INTRAVENOUS
Status: DISCONTINUED | OUTPATIENT
Start: 2018-01-01 | End: 2018-01-01 | Stop reason: HOSPADM

## 2018-01-01 RX ORDER — ACETAMINOPHEN 500 MG
TABLET ORAL
COMMUNITY
Start: 2018-01-01 | End: 2019-01-01

## 2018-01-01 RX ORDER — CARBIDOPA AND LEVODOPA 25; 100 MG/1; MG/1
TABLET ORAL
Qty: 630 TABLET | Refills: 3 | Status: SHIPPED | OUTPATIENT
Start: 2018-01-01 | End: 2019-01-01

## 2018-01-01 RX ORDER — CARBIDOPA AND LEVODOPA 50; 200 MG/1; MG/1
1 TABLET, EXTENDED RELEASE ORAL 4 TIMES DAILY
Qty: 360 TABLET | Refills: 3 | Status: SHIPPED | OUTPATIENT
Start: 2018-01-01 | End: 2018-01-01

## 2018-01-01 RX ORDER — POLYETHYLENE GLYCOL 3350 17 G/17G
17 POWDER, FOR SOLUTION ORAL DAILY PRN
Qty: 119 G | COMMUNITY
Start: 2018-01-01

## 2018-01-01 RX ORDER — IBUPROFEN 800 MG/1
TABLET, FILM COATED ORAL
Qty: 30 TABLET | Refills: 1 | COMMUNITY
Start: 2018-01-01 | End: 2019-01-01

## 2018-01-01 RX ORDER — OMEGA-3S/DHA/EPA/FISH OIL 1000-1400
CAPSULE,DELAYED RELEASE (ENTERIC COATED) ORAL
COMMUNITY
Start: 2018-01-01 | End: 2019-01-01

## 2018-01-01 RX ADMIN — PROPOFOL 120 MG: 10 INJECTION, EMULSION INTRAVENOUS at 17:23

## 2018-01-01 RX ADMIN — PROPOFOL 40 MG: 10 INJECTION, EMULSION INTRAVENOUS at 14:09

## 2018-01-01 RX ADMIN — HYDROMORPHONE HYDROCHLORIDE 0.2 MG: 1 INJECTION, SOLUTION INTRAMUSCULAR; INTRAVENOUS; SUBCUTANEOUS at 13:47

## 2018-01-01 ASSESSMENT — ENCOUNTER SYMPTOMS
NUMBNESS: 0
MYALGIAS: 1
ARTHRALGIAS: 1
WEAKNESS: 0

## 2018-01-01 ASSESSMENT — PAIN SCALES - GENERAL: PAINLEVEL: MODERATE PAIN (5)

## 2018-01-25 DIAGNOSIS — N95.2 ATROPHIC VAGINITIS: Primary | ICD-10-CM

## 2018-02-08 ENCOUNTER — TELEPHONE (OUTPATIENT)
Dept: PHARMACY | Facility: OTHER | Age: 78
End: 2018-02-08

## 2018-02-08 NOTE — TELEPHONE ENCOUNTER
MTM referral from: [place] clinic visit (referral by provider)    MTM referral outreach attempt #1 on February 8, 2018 at 1:39 PM      Outcome: Left Message    Laney Mathew MTM Coordinator

## 2018-02-14 ENCOUNTER — ALLIED HEALTH/NURSE VISIT (OUTPATIENT)
Dept: PHARMACY | Facility: CLINIC | Age: 78
End: 2018-02-14
Payer: COMMERCIAL

## 2018-02-14 DIAGNOSIS — K59.01 SLOW TRANSIT CONSTIPATION: ICD-10-CM

## 2018-02-14 DIAGNOSIS — N31.9 NEUROGENIC BLADDER: ICD-10-CM

## 2018-02-14 DIAGNOSIS — E78.00 HYPERCHOLESTEROLEMIA: ICD-10-CM

## 2018-02-14 DIAGNOSIS — F34.1 DYSTHYMIC DISORDER: ICD-10-CM

## 2018-02-14 DIAGNOSIS — E03.9 HYPOTHYROIDISM, UNSPECIFIED TYPE: ICD-10-CM

## 2018-02-14 DIAGNOSIS — E55.9 VITAMIN D DEFICIENCY: ICD-10-CM

## 2018-02-14 DIAGNOSIS — G20.A1 PARKINSON'S DISEASE (H): Primary | ICD-10-CM

## 2018-02-14 PROCEDURE — 99605 MTMS BY PHARM NP 15 MIN: CPT | Performed by: PHARMACIST

## 2018-02-14 PROCEDURE — 99607 MTMS BY PHARM ADDL 15 MIN: CPT | Performed by: PHARMACIST

## 2018-02-14 RX ORDER — LEVOTHYROXINE SODIUM 100 UG/1
100 TABLET ORAL DAILY
COMMUNITY
End: 2018-02-16 | Stop reason: DRUGHIGH

## 2018-02-14 RX ORDER — ATORVASTATIN CALCIUM 10 MG/1
10 TABLET, FILM COATED ORAL DAILY
COMMUNITY
End: 2018-01-01

## 2018-02-14 NOTE — MR AVS SNAPSHOT
After Visit Summary   2/14/2018    Loan Anderson    MRN: [id number]           Patient Information     Date Of Birth          1940        Visit Information        Provider Department      2/14/2018 8:30 AM Tamika Diane [facility] MTM        Care Instructions    Recommendations from today's MTM visit:                                                    MTM (medication therapy management) is a service provided by a clinical pharmacist designed to help you get the most of out of your medicines.     1. We were able to apply for the Parkinson's sherley and you now can use this card to cover the cost of any of your Parkinson's medications (see attached).    2. I would encourage you to try taking only 4 of the FiberWell gummies per day to see if that is sufficient for constipation.     Next MTM visit: 2-3 months or sooner if needed - you can call me for follow up    To schedule another MTM appointment, please call the clinic directly or you may call the MTM scheduling line at [phone] or toll-free at [phone].     My Clinical Pharmacist's contact information:                                                      It was a pleasure seeing you today!  Please feel free to contact me with any questions or concerns you have.      Tamika Diane, Pharm.D.  Medication Therapy Management Resident  Phone: [phone]  Pager: [phone]    You may receive a survey about the MTM services you received.  I would appreciate your feedback to help me serve you better in the future. Please fill it out and return it when you can. Your comments will be anonymous.            Follow-ups after your visit        Your next 10 appointments already scheduled     Apr 09, 2018 10:00 AM CDT   Return Visit with Pedro Pitts MD   [facility]    [address] [address] [place] [id number] [phone]              Who to contact     If you  have questions or need follow up information about today's clinic visit or your schedule please contact [facility] directly at [phone].  Normal or non-critical lab and imaging results will be communicated to you by Ticket ABChart, letter or phone within 4 business days after the clinic has received the results. If you do not hear from us within 7 days, please contact the clinic through Ticket ABChart or phone. If you have a critical or abnormal lab result, we will notify you by phone as soon as possible.  Submit refill requests through [employer] ([employer]) or call your pharmacy and they will forward the refill request to us. Please allow 3 business days for your refill to be completed.          Additional Information About Your Visit        Ticket ABCharPressglue Information     [employer] gives you secure access to your electronic health record. If you see a primary care provider, you can also send messages to your care team and make appointments. If you have questions, please call your primary care clinic.  If you do not have a primary care provider, please call [phone] and they will assist you.        Care EveryWhere ID     This is your Care EveryWhere ID. This could be used by other organizations to access your [place] medical records  [id number]         Blood Pressure from Last 3 Encounters:   11/15/17 102/53   11/15/17 102/53   11/13/17 131/80    Weight from Last 3 Encounters:   11/15/17 189 lb 9.5 oz (86 kg)   11/15/17 189 lb 11.2 oz (86 kg)   11/13/17 189 lb 9.6 oz (86 kg)              Today, you had the following     No orders found for display         Today's Medication Changes          These changes are accurate as of 2/14/18 11:59 PM.  If you have any questions, ask your nurse or doctor.               These medicines have changed or have updated prescriptions.        Dose/Directions    levothyroxine 100 MCG tablet   Commonly known as:  SYNTHROID/LEVOTHROID   This may have changed:  Another medication with the same  name was removed. Continue taking this medication, and follow the directions you see here.        Dose:  100 mcg   Take 100 mcg by mouth daily   Refills:  0                Primary Care Provider Office Phone # Fax #    Opal Cardenas [phone]       [facility] [address]  [facility] [address]        Equal Access to Services     SARAH BETH WILLIS : Hadii aad ku hadasho Soomaali, waaxda luqadaha, qaybta kaalmada adeegyada, amber egan hayheathern pito jasonbertram mcdonald . So [facility] [phone].    ATENCIÓN: Si habla español, tiene a adam disposición servicios gratuitos de asistencia lingüística. [facility] [phone].    We comply with applicable federal civil rights laws and [place] laws. We do not discriminate on the basis of race, color, national origin, age, disability, sex, sexual orientation, or gender identity.            Thank you!     Thank you for choosing [facility]  for your care. Our goal is always to provide you with excellent care. Hearing back from our patients is one way we can continue to improve our services. Please take a few minutes to complete the written survey that you may receive in the mail after your visit with us. Thank you!             Your Updated Medication List - Protect others around you: Learn how to safely use, store and throw away your medicines at www.disposemymeds.org.          This list is accurate as of 2/14/18 11:59 PM.  Always use your most recent med list.                   Brand Name Dispense Instructions for use Diagnosis    acetaminophen 500 MG tablet    TYLENOL     Take 500 mg by mouth as needed for parkinson disease        aspirin 81 MG tablet      1 TABLET DAILY        atorvastatin 10 MG tablet    LIPITOR     Take 10 mg by mouth daily        Calcium-Vitamin D 600-200 MG-UNIT Tabs     30 tablet    1 tablet every evening        * carbidopa-levodopa  MG per tablet    SINEMET     Take 1.5 tablets by mouth 4 times daily for  paralysis agitans        * carbidopa-levodopa  MG per CR tablet    SINEMET CR     Take 1 tablet by mouth 4 times daily        COMPOUNDED NON-CONTROLLED SUBSTANCE - PHARMACY TO MIX COMPOUNDED MEDICATION    CMPD RX    30 g    Estriol 1 mg/Gr   Apply small amount on the index finger apply to vaginal area twice weekly    Atrophic vaginitis       docusate sodium 100 MG capsule    COLACE    60 capsule    Take 1 capsule (100 mg) by mouth 2 times daily        FIBER ADULT GUMMIES PO      Take 5 g by mouth 8 times daily (5 grams of fiber per gummy)        levothyroxine 100 MCG tablet    SYNTHROID/LEVOTHROID     Take 100 mcg by mouth daily        MIRALAX powder   Generic drug:  polyethylene glycol     119 g    Take 17 g (1 capful) by mouth daily as needed        rotigotine 4 MG/24HR 24 hr patch    NEUPRO    90 patch    Place 1 patch onto the skin daily    Paralysis agitans (H)       tamsulosin 0.4 MG capsule    FLOMAX    30 capsule    Take 1 capsule (0.4 mg) by mouth daily    Urinary hesitancy       trospium 60 MG Cp24 24 hr capsule    SANCTURA XR    90 each    Take 1 capsule (60 mg) by mouth every morning    Overactive bladder       venlafaxine 75 MG 24 hr capsule    EFFEXOR-XR    90 capsule    TAKE 1 TABLET(75 MG) BY MOUTH DAILY    Paralysis agitans (H)       vitamin B complex with vitamin C Tabs tablet      Take 1 tablet by mouth daily        * Notice:  This list has 2 medication(s) that are the same as other medications prescribed for you. Read the directions carefully, and ask your doctor or other care provider to review them with you.

## 2018-02-14 NOTE — Clinical Note
Dr. Pitts and Felicita Pearl I was able to get the Neupro patch for a $25 copay for this patient with the [employer] sherley. She will be re-starting it this week!

## 2018-02-14 NOTE — PROGRESS NOTES
"SUBJECTIVE/OBJECTIVE:                           Loan Anderson is a 77 year old female called for an initial visit for Medication Therapy Management.  She was referred to me from Dr. Pitts. Both Loan and , Surjit, were on the call today.    Chief Complaint: Initial MT visit, patient has concerns about the cost of Neupro patch.    Allergies/ADRs: Reviewed in Epic  Tobacco: No tobacco use  Alcohol: Less than 1 beverage / month  Caffeine: some green tea and black tea (1 cup per day)  Activity: before feeling \"so awful\" she was doing a lot of walking and a stationary bike  PMH: Reviewed in Epic    Medication Adherence/Access  The patient misses their medication 0 times per week.    Patient has assistance with medication administration.   Adherence/Compliance is described as excellent.  Medication barriers: no issues reported by patient.  The patient fills [place], [place] (mailed to [place])    Patient is currently living in [place]. She will return to [place] in April 2018.    Parkinson's Disease:  Current medications include: Levodopa-carbidopa -50 mg 4 times daily and levodopa-carbidopa 100-25 1.5 tablets 4 times daily. Both medications are taken at 6 am, 12 pm, 6 pm, and 12 am. Patient denies taking entacapone, which is currently on her medication list.  Since discontinuing the Neupro patch about a month ago due to high cost, patient reports that symptoms of PD are worsened. Current PD symptoms include: Tremor, Akinesia/bradykinesia and freezing. Patient reports some worsening tremor overnight (Neupro helped with this considerably). Medication onset is typically 30-60 minutes after taking. \"Off\" time typically begins 5-5.5 hours after taking Sinemet. Patient does space meals one hour before or one hour after Sinemet. Patient's  asks if they can increase the Sinemet IR dose to 2 tablets at each dosing time now that she has been off the Neupro patch. They would prefer to continue Neupro but " "are concerned about the cost. Patient occasionally takes acetaminophen (a few times per week) for tremor and feels it helps. Follow up with Dr. Pitts scheduled in April 2018.    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 Patient is no longer taking daily nitrofurantoin for recurrent UTIs. Patient follows with urology (last seen 11/15/17). She feels these medications have been helpful.    Constipation: Current medications include docusate 100 mg twice daily and FiberWell sugar-free gummies - 8 per day, spaced out throughout the day. She also has Miralax for as-needed use but with the FiberWell gummies she hasn't used Miralax recently. Patient reports being well-hydrated to prevent UTIs.    Hypothyroidism: Patient reports taking levothyroxine 100 mcg daily. [facility], so [facility] pharmacist confirmed with her pharmacy and they have been dispensing levothyroxine 112 mcg since June of 2017 (she was on 100 mcg prior to that date). Levothyroxine dose was not changed at last TSH level in October 2017 because patient reports missing 2 weeks of the medication prior to lab being drawn. Patient is having the following symptoms: none.     Hyperlipidemia: Current therapy includes atorvastatin 10 mg once daily.  Pt reports no significant myalgias or other side effects.     Depression:  Current medications include: Venlafaxine ER 75 mg once daily. Pt states that she \"doesn't agree with the diagnosis of depression\" and doesn't think the medication is necessary. Patient denied a PHQ-9 assessment today.  agreed with her that her mood is \"fine\" and she doesn't need the antidepressant.    Vitamins/Supplements: Currently takes calcium/Vitamin D 600-200 mg/units once daily and a Vitamin B complex.    Of note, there is a compounded estogen cream on the patient's medication list, which she has not started using " yet.      Current labs include:  BP Readings from Last 3 Encounters:   11/15/17 102/53   11/15/17 102/53   11/13/17 131/80     Lab Results   Component Value Date    A1C 6.6 10/23/2017     Liver Function Studies -   Recent Labs   Lab Test  10/24/17   0700  10/23/17   0653   PROTTOTAL   --   6.7*   ALBUMIN  2.3*  2.3*   BILITOTAL   --   0.4   ALKPHOS   --   218*   AST   --   38   ALT   --   10               ASSESSMENT:                             Current medications were reviewed today.     Medication Adherence: needs improvement - see below    Parkinson's Disease:  Needs improvement. Patient has had worsening of PD symptoms since discontinuing rotigotine patch one month ago. Patient would benefit from patient assistance program to improve the cost of this program, or to increase the dose of carbidopa-levodopa. [facility] pharmacist was able to apply for the [employer] sherley which made the Neupro patch affordable for her so will pursue this option first.    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 Patient follows with urology.    Constipation: Needs improvement. Patient is exceeding the recommended dosage of the FiberWell fiber supplement. Patient would benefit from at least half the dosage of the fiber supplement and to continue hydrating well.    Hypothyroidism: Needs Improvement. Last TSH has not been rechecked since last elevated TSH level in October. Patient would benefit from repeat TSH level when she returns to [place].    Hyperlipidemia: Stable. Patient's 10-year ASCVD risk score is 12% (calculated with most recent lipid values, not necessarily pre-treatment values so may underestimate risk). Pt is on moderate intensity statin which is indicated based on 2013 ACC/AHA guidelines for lipid management.        Depression:  Unable to assess. Patient unwilling to complete assessment over the phone today and she expresses concern over this diagnosis. Patient would benefit from further assessment in  primary care.    Vitamins/Supplements: Did not assess dietary intake of calcium to determine appropriate dosage of supplementation.     PLAN:                            1. Recommend patient decrease FiberWell supplement to 4 gummies/day and continue to increase water intake.    2. MTM pharmacist applied for [employer] and provided pharmacy with the following information (also sent to patient). She will restart Neupro 4 mg patch daily.      Future considerations:   1. TSH level when she returns to [place].  2. Assess calcium intake.     I spent 30 minutes with this patient today. I offer these suggestions for consideration by the PCP and movement disorders specialist. A copy of the visit note was provided to the patient's primary care provider and movement disorders specialist.    Will follow up in 2-3 months or sooner if needed.    I concur with the note as dictated above which reflects our joint assessment and plan.   Brenden ParksD      The patient was mailed a summary of these recommendations as an after visit summary to: [id number] [place] [address].    Tamika Diane, Pharm.D.  Medication Therapy Management Resident  Phone: [phone]  Pager: [phone]

## 2018-02-15 NOTE — PATIENT INSTRUCTIONS
Recommendations from today's MTM visit:                                                    MTM (medication therapy management) is a service provided by a clinical pharmacist designed to help you get the most of out of your medicines.     1. We were able to apply for the Parkinson's sherley and you now can use this card to cover the cost of any of your Parkinson's medications (see attached).    2. I would encourage you to try taking only 4 of the FiberWell gummies per day to see if that is sufficient for constipation.     Next MTM visit: 2-3 months or sooner if needed - you can call me for follow up    To schedule another MTM appointment, please call the clinic directly or you may call the MTM scheduling line at [phone] or toll-free at [phone].     My Clinical Pharmacist's contact information:                                                      It was a pleasure seeing you today!  Please feel free to contact me with any questions or concerns you have.      Tamika Diane, Pharm.D.  Medication Therapy Management Resident  Phone: [phone]  Pager: [phone]    You may receive a survey about the MTM services you received.  I would appreciate your feedback to help me serve you better in the future. Please fill it out and return it when you can. Your comments will be anonymous.

## 2018-02-16 RX ORDER — LEVOTHYROXINE SODIUM 112 UG/1
112 TABLET ORAL DAILY
COMMUNITY
End: 2018-01-01

## 2018-02-23 DIAGNOSIS — G20.A1 PARALYSIS AGITANS (H): ICD-10-CM

## 2018-02-23 RX ORDER — VENLAFAXINE HYDROCHLORIDE 75 MG/1
CAPSULE, EXTENDED RELEASE ORAL
Qty: 90 CAPSULE | Refills: 0 | Status: SHIPPED | OUTPATIENT
Start: 2018-02-23 | End: 2018-01-01

## 2018-02-23 NOTE — TELEPHONE ENCOUNTER
Protocol recommends normal serum Cr be on record for the past year. Will route to provider. Felicita Rogres RN

## 2018-03-16 NOTE — PROGRESS NOTES
Diagnosis/Summary/Recommendations:    PATIENT: Loan Anderson  77 year old female     : 1940    PATRICE:     Patient cancelled    2018    parkinson    Medications     6am 12noon 5pm 10pm    Acetaminophen tylenol        Aspirin 81mg        Atorvastatin lipitor 10mg        Calcium vitamin d        Carbidopa/levodopa sinemet CR 50/200 1 1 1 1    CArbidopa/levodopa Sinemet 25/100 1.5 1.5 1.5 1.5    Compounded non controlled        Docusate colace 100mg        Fiber adult gummies 5 grams        Levothyroxine synthroid 112mcg        Polyethylene glycol miralax         Rotigotine neupro 4mg patch 24 horus        Tamsulosin flomax 0.4mg        Trospium sanctura XR 60mg 24 hr capsule        Venlafaxine effexor xr 75mg 1       Vitamin b complex with vitamin c            History obtained from patient      Coding statement:   Duration of  Services: patient care and care coordination was 0 minutes  Greater than 50% of this visit was spent in counseling and coordination of care.     Pedro Pitts MD     ______________________________________    Last visit date and details:     PATIENT: Loan Anderson  77 year old female      : 1940     PATRICE: 2017     Parkinson     Amlodipine  Aspirin  Atorvastatin  Calcium  Sinemet CR 50/200  Sinemet 25/100   cipro -   Docusate  Inulin  Levothyroxine  macugels  Omega 3  miralax  rotigotine 4mg  Tylenol  Colace  b complex  Fish oil     She is on cipro that will finish on the   She will start on bactrim on the      Her blood pressure was high in the hospital     She has loss of appetite and has lost weight     Her blood pressure was 98/56, 69 is her pulse.  Amlodipine 5mg     She had left knee issues - and was having problems walking. She had problems standing. She had problems getting her socks on and had a fall and had wrist fracture.      She had some freezing of gait.  She reached over for her blouse.      Has had some  illusions. She has insight into them.   May actually be illusions - black plastic bags moving in the wind.      Meds                   6a 12p` 5p 10p            25/100                                              1.5 1.5 1.5 1.5              50/200                    1 1 1 1             rotigotine 4mg                             1         venalfaxine 75mg 1                  Over 50% of this visit was spent in patient care and care coordination.      History obtained from patient     Total visit time was 25 minutes     Wondering about memory care in [facility]'s           Discharge Summary  Hospitalist      Date of Admission:  10/21/2017  Date of Discharge:  10/27/2017  Discharging Provider: Juan Antonio Vu MD          Discharge Diagnoses     Severe sepsis secondary to acute pyelonephritis with E. Coli bacteremia.   Acute Metabolic encephalopathy  Benign essential HTN   Acute kidney injury on CKD-III  Parkinson's disease.      Dysphagia       Hospital Course      Lona Anderson is a 77 year old female with history of Parkinson's disease, CKD, neurogenic bladder, hypothyroidism, and nephrolithiasis who was admitted on 10/21/2017 after presenting with confusion.       Severe sepsis secondary to acute pyelonephritis with E. Coli bacteremia.   -Patient presented with hypotension, fever, leukocytosis, and acute renal failure.   -UA positive for large LE, hematuria, and significant pyuria.  -Urine and blood culture positive for Escherichia coli   -Surveillance blood culture negative  -CT shows multiple non-obstructing kidney stones, urology consulted; no further recs.  -Received IV Rocephin in the Hospital, transitioned to ciprofloxacin for one more week to complete two weeks total.      Acute Metabolic encephalopathy, improving.   -Secondary to urinary tract infection/sepsis.   -Possible some component of hypercarbia as patient has not used CPAP for quite some time  -Close to baseline  -avoid benzos,  narcotics, anticholinergics  -recommend repeating outpatient sleep study post discharge      Elevated blood pressure/HTN.   -No hx HTN, normally runs 110s systolic per family and prior clinic notes. She has been persistently hypertensive over the past few days.   -started Norvasc 5mg daily  -Blood pressure better, continue Norvasc for ongoing needs      Acute kidney injury on CKD-III:  -Baseline Cr 1-1.3.   -Initial creatinine elevated at 2.74.   -improved with IV hydration; back to baseline 1.24.   -encourage po intake       Parkinson's disease.    -Continue sinemet and Neupro.           Dysphagia. Family and patient note concerns for aspiration. SLP following and has noted mild-moderate dysphagia. Recommend safe swallow strategies as well as video swallow. Discussed with patient and family; regardless of the result of a video swallow patient would not be willing to modify her diet at this time. Will not pursue.   -continue regular diet per patient request for now          Juan Antonio Vu MD     PLAN  Since she is recovering from sepsis she is now improving with stay at [facility]  They have not decided to go [place] yet as they are waiting for her to improve.     Wait on increasing venlafaxine but may consider a dose increase        Consider rivastigmine in the future. Information provided     Return in 3months             ______________________________________      Patient was asked about 14 Review of systems including changes in vision (dry eyes, double vision), hearing, heart, lungs, musculoskeletal, depression, anxiety, snoring, RBD, insomnia, urinary frequency, urinary urgency, constipation, swallowing problems, hematological, ID, allergies, skin problems: seborrhea, endocrinological: thyroid, diabetes, cholesterol; balance, weight changes, and other neurological problems and these were not significant at this time except for   Patient Active Problem List   Diagnosis     Parkinson disease (H)      Constipation     Hypothyroid     Neurogenic bladder     Convergence insufficiency     Wears glasses     Diplopia     Hot flashes, menopausal     REM sleep behavior disorder     Sebaceous cyst     Hypercholesterolemia     Dysthymic disorder     Hyperglyceridemia     Hypothyroidism     Urinary incontinence     Chronic nonalcoholic liver disease     Obstructive sleep apnea     Disorder of bone and cartilage     Benign neoplasm     Incontinence in female     Nonalcoholic steatohepatitis (COHEN)     Obstructive sleep apnea syndrome     Osteopenia     Parkinson's disease (H)     Systemic infection (H)     Sepsis (H)     E coli bacteremia     Acute kidney failure (H)     Hyponatremia     Hypokalemia     Acute pyelonephritis     Acute cystitis without hematuria     Kidney stone     Sepsis due to other etiology (H)     Acute metabolic encephalopathy     Secondary hypertension     CKD (chronic kidney disease) stage 3, GFR 30-59 ml/min     Dysphagia, unspecified type     Primary osteoarthritis of both knees     Recurrent major depressive disorder, in full remission (H)          Allergies   Allergen Reactions     Mirapex [Pramipexole Dihydrochloride Monohydrate]      Leg swelling     Potassium Chloride      IV infusion only. She tolerates oral potassium chloride     Requip [Ropinirole Hydrochloride]      Leg swelling     Epinephrine Palpitations     Past Surgical History:   Procedure Laterality Date     ------------OTHER-------------  november ? 2015    lithrotripsy for renal stone     ------------OTHER-------------  november 2015    had sepsis from uti and hospitalized     BRAIN SURGERY  12 or 14 oct 2011    gene therapy enrolled study at [place]     Past Medical History:   Diagnosis Date     Constipation 7/15/2011     Convergence insufficiency 7/15/2011     Depression 7/15/2011     Diplopia 7/18/2011     Hot flashes, menopausal 7/18/2011     Hypercholesterolemia 7/18/2011     Hypothyroid 7/15/2011     Neurogenic bladder  7/15/2011     Parkinson disease (H) 7/15/2011     REM sleep behavior disorder 7/18/2011     Sebaceous cyst 7/18/2011     Wears glasses 7/18/2011     Social History     Social History     Marital status:      Spouse name: N/A     Number of children: N/A     Years of education: N/A     Occupational History     Not on file.     Social History Main Topics     Smoking status: Never Smoker     Smokeless tobacco: Never Used     Alcohol use Yes      Comment: rare     Drug use: Not on file     Sexual activity: Not on file     Other Topics Concern     Not on file     Social History Narrative    Spouse is serenity       Drug and lactation database from the [place] [employer] of Medicine:  http://toxnet.nlm.nih.gov/cgi-bin/sis/htmlgen?LACT      B/P: Data Unavailable, T: Data Unavailable, P: Data Unavailable, R: Data Unavailable 0 lbs 0 oz  There were no vitals taken for this visit., There is no height or weight on file to calculate BMI.  Medications and Vitals not listed above were documented in the cart and reviewed by me.     Current Outpatient Prescriptions   Medication Sig Dispense Refill     venlafaxine (EFFEXOR-XR) 75 MG 24 hr capsule TAKE 1 TABLET(75 MG) BY MOUTH DAILY 90 capsule 0     levothyroxine (SYNTHROID/LEVOTHROID) 112 MCG tablet Take 112 mcg by mouth daily       atorvastatin (LIPITOR) 10 MG tablet Take 10 mg by mouth daily       FIBER ADULT GUMMIES PO Take 5 g by mouth 8 times daily (5 grams of fiber per gummy)       COMPOUNDED NON-CONTROLLED SUBSTANCE (CMPD RX) - PHARMACY TO MIX COMPOUNDED MEDICATION Estriol 1 mg/Gr   Apply small amount on the index finger apply to vaginal area twice weekly 30 g 6     trospium (SANCTURA XR) 60 MG CP24 24 hr capsule Take 1 capsule (60 mg) by mouth every morning 90 each 3     carbidopa-levodopa (SINEMET CR)  MG per CR tablet Take 1 tablet by mouth 4 times daily   0     tamsulosin (FLOMAX) 0.4 MG capsule Take 1 capsule (0.4 mg) by mouth daily 30 capsule 11      carbidopa-levodopa (SINEMET)  MG per tablet Take 1.5 tablets by mouth 4 times daily for paralysis agitans       acetaminophen (TYLENOL) 500 MG tablet Take 500 mg by mouth as needed for parkinson disease       docusate sodium (COLACE) 100 MG capsule Take 1 capsule (100 mg) by mouth 2 times daily 60 capsule      rotigotine (NEUPRO) 4 MG/24HR 24 hr patch Place 1 patch onto the skin daily 90 patch 3     polyethylene glycol (MIRALAX) powder Take 17 g (1 capful) by mouth daily as needed 119 g      vitamin  B complex with vitamin C (VITAMIN  B COMPLEX) TABS Take 1 tablet by mouth daily       Calcium-Vitamin D 600-200 MG-UNIT TABS 1 tablet every evening  30 tablet      ASPIRIN 81 MG PO TABS 1 TABLET DAILY           Pedro Pitts MD

## 2018-04-09 NOTE — MR AVS SNAPSHOT
After Visit Summary   4/9/2018    Loan Anderson    MRN: [id number]           Patient Information     Date Of Birth          1940        Today's Diagnoses     Paralysis agitans (H)    -  1    Cognitive disorder           Follow-ups after your visit        Your next 10 appointments already scheduled     May 14, 2018  2:30 PM CDT   [facility]    [address] [place] [id number] [phone]              Who to contact     If you have questions or need follow up information about today's clinic visit or your schedule please contact [facility] directly at [phone].  Normal or non-critical lab and imaging results will be communicated to you by [employer]hart, letter or phone within 4 business days after the clinic has received the results. If you do not hear from us within 7 days, please contact the clinic through [employer] or phone. If you have a critical or abnormal lab result, we will notify you by phone as soon as possible.  Submit refill requests through [employer] or call your pharmacy and they will forward the refill request to us. Please allow 3 business days for your refill to be completed.          Additional Information About Your Visit        [employer] Information     [employer]omi gives you secure access to your electronic health record. If you see a primary care provider, you can also send messages to your care team and make appointments. If you have questions, please call your primary care clinic.  If you do not have a primary care provider, please call [phone] and they will assist you.      [employer] is an electronic gateway that provides easy, online access to your medical records. With [employer], you can request a clinic appointment, read your test results, renew a prescription or communicate with your care team.     To access your existing account, please contact your  [facility] or call [phone] for assistance.        Care EveryWhere ID     This is your Care EveryWhere ID. This could be used by other organizations to access your [place] medical records  [id number]         Blood Pressure from Last 3 Encounters:   11/15/17 102/53   11/15/17 102/53   11/13/17 131/80    Weight from Last 3 Encounters:   11/15/17 86 kg (189 lb 9.5 oz)   11/15/17 86 kg (189 lb 11.2 oz)   11/13/17 86 kg (189 lb 9.6 oz)              Today, you had the following     No orders found for display       Primary Care Provider Office Phone # Fax #    Opal Cardenas [phone]       [facility] [address]  [facility] [address]        Equal Access to Services     SARAH BETH WILLIS : Hadii aad ku hadasho Soomaali, waaxda luqadaha, qaybta kaalmada adeegyatobin, amber mcdonald . So [facility] [phone].    ATENCIÓN: Si habla español, tiene a adam disposición servicios gratuitos de asistencia lingüística. Ross al [phone].    We comply with applicable federal civil rights laws and [place] laws. We do not discriminate on the basis of race, color, national origin, age, disability, sex, sexual orientation, or gender identity.            Thank you!     Thank you for choosing [facility]  for your care. Our goal is always to provide you with excellent care. Hearing back from our patients is one way we can continue to improve our services. Please take a few minutes to complete the written survey that you may receive in the mail after your visit with us. Thank you!             Your Updated Medication List - Protect others around you: Learn how to safely use, store and throw away your medicines at www.disposemymeds.org.          This list is accurate as of 4/9/18 11:59 PM.  Always use your most recent med list.                   Brand Name Dispense Instructions for use Diagnosis    acetaminophen 500 MG tablet    TYLENOL      Take 500 mg by mouth as needed for parkinson disease        aspirin 81 MG tablet      1 TABLET DAILY        atorvastatin 10 MG tablet    LIPITOR     Take 10 mg by mouth daily        Calcium-Vitamin D 600-200 MG-UNIT Tabs     30 tablet    1 tablet every evening        * carbidopa-levodopa  MG per tablet    SINEMET     Take 1.5 tablets by mouth 4 times daily for paralysis agitans        * carbidopa-levodopa  MG per CR tablet    SINEMET CR     Take 1 tablet by mouth 4 times daily        COMPOUNDED NON-CONTROLLED SUBSTANCE - PHARMACY TO MIX COMPOUNDED MEDICATION    CMPD RX    30 g    Estriol 1 mg/Gr   Apply small amount on the index finger apply to vaginal area twice weekly    Atrophic vaginitis       docusate sodium 100 MG capsule    COLACE    60 capsule    Take 1 capsule (100 mg) by mouth 2 times daily        FIBER ADULT GUMMIES PO      Take 5 g by mouth 8 times daily (5 grams of fiber per gummy)        levothyroxine 112 MCG tablet    SYNTHROID/LEVOTHROID     Take 112 mcg by mouth daily        MIRALAX powder   Generic drug:  polyethylene glycol     119 g    Take 17 g (1 capful) by mouth daily as needed        rotigotine 4 MG/24HR 24 hr patch    NEUPRO    90 patch    Place 1 patch onto the skin daily    Paralysis agitans (H)       tamsulosin 0.4 MG capsule    FLOMAX    30 capsule    Take 1 capsule (0.4 mg) by mouth daily    Urinary hesitancy       trospium 60 MG Cp24 24 hr capsule    SANCTURA XR    90 each    Take 1 capsule (60 mg) by mouth every morning    Overactive bladder       venlafaxine 75 MG 24 hr capsule    EFFEXOR-XR    90 capsule    TAKE 1 TABLET(75 MG) BY MOUTH DAILY    Paralysis agitans (H)       vitamin B complex with vitamin C Tabs tablet      Take 1 tablet by mouth daily        * Notice:  This list has 2 medication(s) that are the same as other medications prescribed for you. Read the directions carefully, and ask your doctor or other care provider to review them with you.

## 2018-04-09 NOTE — Clinical Note
2018         RE: Loan Anderson  [address] [facility] [address]        Dear Colleague,    Thank you for referring your patient, Loan Anderson, to the [facility]. Please see a copy of my visit note below.    Diagnosis/Summary/Recommendations:    PATIENT: Loan Anderson  77 year old female     : 1940    PATRICE: 2018    parkinson    Medications     6am 12noon 5pm 10pm    Acetaminophen tylenol        Aspirin 81mg        Atorvastatin lipitor 10mg        Calcium vitamin d        Carbidopa/levodopa sinemet CR 50/200 1 1 1 1    CArbidopa/levodopa Sinemet 25/100 1.5 1.5 1.5 1.5    Compounded non controlled        Docusate colace 100mg        Fiber adult gummies 5 grams        Levothyroxine synthroid 112mcg        Polyethylene glycol miralax         Rotigotine neupro 4mg patch 24 horus        Tamsulosin flomax 0.4mg        Trospium sanctura XR 60mg 24 hr capsule        Venlafaxine effexor xr 75mg 1       Vitamin b complex with vitamin c            History obtained from patient      Coding statement:   Duration of  Services: patient care and care coordination was 25 minutes  Greater than 50% of this visit was spent in counseling and coordination of care.     Pedro Pitts MD     ______________________________________    Last visit date and details:     PATIENT: Loan Anderson  77 year old female      : 1940     PATRICE: 2017     Parkinson     Amlodipine  Aspirin  Atorvastatin  Calcium  Sinemet CR 50/200  Sinemet 25/100   cipro -   Docusate  Inulin  Levothyroxine  macugels  Omega 3  miralax  rotigotine 4mg  Tylenol  Colace  b complex  Fish oil     She is on cipro that will finish on the   She will start on bactrim on the      Her blood pressure was high in the hospital     She has loss of appetite and has lost weight     Her blood pressure was 98/56, 69 is her pulse.  Amlodipine 5mg     She had left knee issues -  and was having problems walking. She had problems standing. She had problems getting her socks on and had a fall and had wrist fracture.      She had some freezing of gait.  She reached over for her blouse.      Has had some illusions. She has insight into them.   May actually be illusions - black plastic bags moving in the wind.      Meds                   6a 12p` 5p 10p            25/100                                              1.5 1.5 1.5 1.5              50/200                    1 1 1 1             rotigotine 4mg                             1         venalfaxine 75mg 1                  Over 50% of this visit was spent in patient care and care coordination.      History obtained from patient     Total visit time was 25 minutes     Wondering about memory care in [facility]'s           Discharge Summary  Hospitalist      Date of Admission:  10/21/2017  Date of Discharge:  10/27/2017  Discharging Provider: Juan Antonio Vu MD          Discharge Diagnoses     Severe sepsis secondary to acute pyelonephritis with E. Coli bacteremia.   Acute Metabolic encephalopathy  Benign essential HTN   Acute kidney injury on CKD-III  Parkinson's disease.      Dysphagia       Hospital Course      Loan Anderson is a 77 year old female with history of Parkinson's disease, CKD, neurogenic bladder, hypothyroidism, and nephrolithiasis who was admitted on 10/21/2017 after presenting with confusion.       Severe sepsis secondary to acute pyelonephritis with E. Coli bacteremia.   -Patient presented with hypotension, fever, leukocytosis, and acute renal failure.   -UA positive for large LE, hematuria, and significant pyuria.  -Urine and blood culture positive for Escherichia coli   -Surveillance blood culture negative  -CT shows multiple non-obstructing kidney stones, urology consulted; no further recs.  -Received IV Rocephin in the Hospital, transitioned to ciprofloxacin for one more week to complete two weeks  total.      Acute Metabolic encephalopathy, improving.   -Secondary to urinary tract infection/sepsis.   -Possible some component of hypercarbia as patient has not used CPAP for quite some time  -Close to baseline  -avoid benzos, narcotics, anticholinergics  -recommend repeating outpatient sleep study post discharge      Elevated blood pressure/HTN.   -No hx HTN, normally runs 110s systolic per family and prior clinic notes. She has been persistently hypertensive over the past few days.   -started Norvasc 5mg daily  -Blood pressure better, continue Norvasc for ongoing needs      Acute kidney injury on CKD-III:  -Baseline Cr 1-1.3.   -Initial creatinine elevated at 2.74.   -improved with IV hydration; back to baseline 1.24.   -encourage po intake       Parkinson's disease.    -Continue sinemet and Neupro.           Dysphagia. Family and patient note concerns for aspiration. SLP following and has noted mild-moderate dysphagia. Recommend safe swallow strategies as well as video swallow. Discussed with patient and family; regardless of the result of a video swallow patient would not be willing to modify her diet at this time. Will not pursue.   -continue regular diet per patient request for now          Juan Antonio Vu MD     PLAN  Since she is recovering from sepsis she is now improving with stay at [facility]  They have not decided to go [place] yet as they are waiting for her to improve.     Wait on increasing venlafaxine but may consider a dose increase        Consider rivastigmine in the future. Information provided     Return in 3months             ______________________________________      Patient was asked about 14 Review of systems including changes in vision (dry eyes, double vision), hearing, heart, lungs, musculoskeletal, depression, anxiety, snoring, RBD, insomnia, urinary frequency, urinary urgency, constipation, swallowing problems, hematological, ID, allergies, skin problems: seborrhea,  endocrinological: thyroid, diabetes, cholesterol; balance, weight changes, and other neurological problems and these were not significant at this time except for   Patient Active Problem List   Diagnosis     Parkinson disease (H)     Constipation     Hypothyroid     Neurogenic bladder     Convergence insufficiency     Wears glasses     Diplopia     Hot flashes, menopausal     REM sleep behavior disorder     Sebaceous cyst     Hypercholesterolemia     Dysthymic disorder     Hyperglyceridemia     Hypothyroidism     Urinary incontinence     Chronic nonalcoholic liver disease     Obstructive sleep apnea     Disorder of bone and cartilage     Benign neoplasm     Incontinence in female     Nonalcoholic steatohepatitis (COHEN)     Obstructive sleep apnea syndrome     Osteopenia     Parkinson's disease (H)     Systemic infection (H)     Sepsis (H)     E coli bacteremia     Acute kidney failure (H)     Hyponatremia     Hypokalemia     Acute pyelonephritis     Acute cystitis without hematuria     Kidney stone     Sepsis due to other etiology (H)     Acute metabolic encephalopathy     Secondary hypertension     CKD (chronic kidney disease) stage 3, GFR 30-59 ml/min     Dysphagia, unspecified type     Primary osteoarthritis of both knees     Recurrent major depressive disorder, in full remission (H)          Allergies   Allergen Reactions     Mirapex [Pramipexole Dihydrochloride Monohydrate]      Leg swelling     Potassium Chloride      IV infusion only. She tolerates oral potassium chloride     Requip [Ropinirole Hydrochloride]      Leg swelling     Epinephrine Palpitations     Past Surgical History:   Procedure Laterality Date     ------------OTHER-------------  november ? 2015    lithrotripsy for renal stone     ------------OTHER-------------  november 2015    had sepsis from uti and hospitalized     BRAIN SURGERY  12 or 14 oct 2011    gene therapy enrolled study at [place]     Past Medical History:   Diagnosis Date      Constipation 7/15/2011     Convergence insufficiency 7/15/2011     Depression 7/15/2011     Diplopia 7/18/2011     Hot flashes, menopausal 7/18/2011     Hypercholesterolemia 7/18/2011     Hypothyroid 7/15/2011     Neurogenic bladder 7/15/2011     Parkinson disease (H) 7/15/2011     REM sleep behavior disorder 7/18/2011     Sebaceous cyst 7/18/2011     Wears glasses 7/18/2011     Social History     Social History     Marital status:      Spouse name: N/A     Number of children: N/A     Years of education: N/A     Occupational History     Not on file.     Social History Main Topics     Smoking status: Never Smoker     Smokeless tobacco: Never Used     Alcohol use Yes      Comment: rare     Drug use: Not on file     Sexual activity: Not on file     Other Topics Concern     Not on file     Social History Narrative    Spouse is serenity       Drug and lactation database from the [place] [employer] of [employer]:  http://toxnet.nlm.nih.gov/cgi-bin/sis/htmlgen?LACT      B/P: Data Unavailable, T: Data Unavailable, P: Data Unavailable, R: Data Unavailable 0 lbs 0 oz  There were no vitals taken for this visit., There is no height or weight on file to calculate BMI.  Medications and Vitals not listed above were documented in the cart and reviewed by me.     Current Outpatient Prescriptions   Medication Sig Dispense Refill     venlafaxine (EFFEXOR-XR) 75 MG 24 hr capsule TAKE 1 TABLET(75 MG) BY MOUTH DAILY 90 capsule 0     levothyroxine (SYNTHROID/LEVOTHROID) 112 MCG tablet Take 112 mcg by mouth daily       atorvastatin (LIPITOR) 10 MG tablet Take 10 mg by mouth daily       FIBER ADULT GUMMIES PO Take 5 g by mouth 8 times daily (5 grams of fiber per gummy)       COMPOUNDED NON-CONTROLLED SUBSTANCE (CMPD RX) - PHARMACY TO MIX COMPOUNDED MEDICATION Estriol 1 mg/Gr   Apply small amount on the index finger apply to vaginal area twice weekly 30 g 6     trospium (SANCTURA XR) 60 MG CP24 24 hr capsule Take 1 capsule (60  mg) by mouth every morning 90 each 3     carbidopa-levodopa (SINEMET CR)  MG per CR tablet Take 1 tablet by mouth 4 times daily   0     tamsulosin (FLOMAX) 0.4 MG capsule Take 1 capsule (0.4 mg) by mouth daily 30 capsule 11     carbidopa-levodopa (SINEMET)  MG per tablet Take 1.5 tablets by mouth 4 times daily for paralysis agitans       acetaminophen (TYLENOL) 500 MG tablet Take 500 mg by mouth as needed for parkinson disease       docusate sodium (COLACE) 100 MG capsule Take 1 capsule (100 mg) by mouth 2 times daily 60 capsule      rotigotine (NEUPRO) 4 MG/24HR 24 hr patch Place 1 patch onto the skin daily 90 patch 3     polyethylene glycol (MIRALAX) powder Take 17 g (1 capful) by mouth daily as needed 119 g      vitamin  B complex with vitamin C (VITAMIN  B COMPLEX) TABS Take 1 tablet by mouth daily       Calcium-Vitamin D 600-200 MG-UNIT TABS 1 tablet every evening  30 tablet      ASPIRIN 81 MG PO TABS 1 TABLET DAILY           Pedro Pitts MD    Again, thank you for allowing me to participate in the care of your patient.        Sincerely,        Pedro Pitts MD

## 2018-04-09 NOTE — LETTER
2018      RE: Loan Anderson  [address] [facility] [address]       Diagnosis/Summary/Recommendations:    PATIENT: Loan Anderson  77 year old female     : 1940    PATRICE:     Patient cancelled    2018    parkinson    Medications     6am 12noon 5pm 10pm    Acetaminophen tylenol        Aspirin 81mg        Atorvastatin lipitor 10mg        Calcium vitamin d        Carbidopa/levodopa sinemet CR 50/200 1 1 1 1    CArbidopa/levodopa Sinemet 25/100 1.5 1.5 1.5 1.5    Compounded non controlled        Docusate colace 100mg        Fiber adult gummies 5 grams        Levothyroxine synthroid 112mcg        Polyethylene glycol miralax         Rotigotine neupro 4mg patch 24 horus        Tamsulosin flomax 0.4mg        Trospium sanctura XR 60mg 24 hr capsule        Venlafaxine effexor xr 75mg 1       Vitamin b complex with vitamin c            History obtained from patient      Coding statement:   Duration of  Services: patient care and care coordination was 0 minutes  Greater than 50% of this visit was spent in counseling and coordination of care.     Pedro Pitts MD     ______________________________________    Last visit date and details:     PATIENT: Loan Anderson  77 year old female      : 1940     PATRICE: 2017     Parkinson     Amlodipine  Aspirin  Atorvastatin  Calcium  Sinemet CR 50/200  Sinemet 25/100   cipro -   Docusate  Inulin  Levothyroxine  macugels  Omega 3  miralax  rotigotine 4mg  Tylenol  Colace  b complex  Fish oil     She is on cipro that will finish on the   She will start on bactrim on the      Her blood pressure was high in the hospital     She has loss of appetite and has lost weight     Her blood pressure was 98/56, 69 is her pulse.  Amlodipine 5mg     She had left knee issues - and was having problems walking. She had problems standing. She had problems getting her socks on and had a fall and had wrist fracture.       She had some freezing of gait.  She reached over for her blouse.      Has had some illusions. She has insight into them.   May actually be illusions - black plastic bags moving in the wind.      Meds                   6a 12p` 5p 10p            25/100                                              1.5 1.5 1.5 1.5              50/200                    1 1 1 1             rotigotine 4mg                             1         venalfaxine 75mg 1                  Over 50% of this visit was spent in patient care and care coordination.      History obtained from patient     Total visit time was 25 minutes     Wondering about memory care in [facility]           Discharge Summary  Hospitalist      Date of Admission:  10/21/2017  Date of Discharge:  10/27/2017  Discharging Provider: Juan Antonio Vu MD          Discharge Diagnoses     Severe sepsis secondary to acute pyelonephritis with E. Coli bacteremia.   Acute Metabolic encephalopathy  Benign essential HTN   Acute kidney injury on CKD-III  Parkinson's disease.      Dysphagia       Hospital Course      Loan Anderson is a 77 year old female with history of Parkinson's disease, CKD, neurogenic bladder, hypothyroidism, and nephrolithiasis who was admitted on 10/21/2017 after presenting with confusion.       Severe sepsis secondary to acute pyelonephritis with E. Coli bacteremia.   -Patient presented with hypotension, fever, leukocytosis, and acute renal failure.   -UA positive for large LE, hematuria, and significant pyuria.  -Urine and blood culture positive for Escherichia coli   -Surveillance blood culture negative  -CT shows multiple non-obstructing kidney stones, urology consulted; no further recs.  -Received IV Rocephin in the Hospital, transitioned to ciprofloxacin for one more week to complete two weeks total.      Acute Metabolic encephalopathy, improving.   -Secondary to urinary tract infection/sepsis.   -Possible some component of hypercarbia  as patient has not used CPAP for quite some time  -Close to baseline  -avoid benzos, narcotics, anticholinergics  -recommend repeating outpatient sleep study post discharge      Elevated blood pressure/HTN.   -No hx HTN, normally runs 110s systolic per family and prior clinic notes. She has been persistently hypertensive over the past few days.   -started Norvasc 5mg daily  -Blood pressure better, continue Norvasc for ongoing needs      Acute kidney injury on CKD-III:  -Baseline Cr 1-1.3.   -Initial creatinine elevated at 2.74.   -improved with IV hydration; back to baseline 1.24.   -encourage po intake       Parkinson's disease.    -Continue sinemet and Neupro.           Dysphagia. Family and patient note concerns for aspiration. SLP following and has noted mild-moderate dysphagia. Recommend safe swallow strategies as well as video swallow. Discussed with patient and family; regardless of the result of a video swallow patient would not be willing to modify her diet at this time. Will not pursue.   -continue regular diet per patient request for now          Juan Antonio Vu MD     PLAN  Since she is recovering from sepsis she is now improving with stay at [facility]  They have not decided to go [place] yet as they are waiting for her to improve.     Wait on increasing venlafaxine but may consider a dose increase        Consider rivastigmine in the future. Information provided     Return in 3months             ______________________________________      Patient was asked about 14 Review of systems including changes in vision (dry eyes, double vision), hearing, heart, lungs, musculoskeletal, depression, anxiety, snoring, RBD, insomnia, urinary frequency, urinary urgency, constipation, swallowing problems, hematological, ID, allergies, skin problems: seborrhea, endocrinological: thyroid, diabetes, cholesterol; balance, weight changes, and other neurological problems and these were not significant at this time  except for   Patient Active Problem List   Diagnosis     Parkinson disease (H)     Constipation     Hypothyroid     Neurogenic bladder     Convergence insufficiency     Wears glasses     Diplopia     Hot flashes, menopausal     REM sleep behavior disorder     Sebaceous cyst     Hypercholesterolemia     Dysthymic disorder     Hyperglyceridemia     Hypothyroidism     Urinary incontinence     Chronic nonalcoholic liver disease     Obstructive sleep apnea     Disorder of bone and cartilage     Benign neoplasm     Incontinence in female     Nonalcoholic steatohepatitis (COHEN)     Obstructive sleep apnea syndrome     Osteopenia     Parkinson's disease (H)     Systemic infection (H)     Sepsis (H)     E coli bacteremia     Acute kidney failure (H)     Hyponatremia     Hypokalemia     Acute pyelonephritis     Acute cystitis without hematuria     Kidney stone     Sepsis due to other etiology (H)     Acute metabolic encephalopathy     Secondary hypertension     CKD (chronic kidney disease) stage 3, GFR 30-59 ml/min     Dysphagia, unspecified type     Primary osteoarthritis of both knees     Recurrent major depressive disorder, in full remission (H)          Allergies   Allergen Reactions     Mirapex [Pramipexole Dihydrochloride Monohydrate]      Leg swelling     Potassium Chloride      IV infusion only. She tolerates oral potassium chloride     Requip [Ropinirole Hydrochloride]      Leg swelling     Epinephrine Palpitations     Past Surgical History:   Procedure Laterality Date     ------------OTHER-------------  november ? 2015    lithrotripsy for renal stone     ------------OTHER-------------  november 2015    had sepsis from uti and hospitalized     BRAIN SURGERY  12 or 14 oct 2011    gene therapy enrolled study at [place]     Past Medical History:   Diagnosis Date     Constipation 7/15/2011     Convergence insufficiency 7/15/2011     Depression 7/15/2011     Diplopia 7/18/2011     Hot flashes, menopausal 7/18/2011      Hypercholesterolemia 7/18/2011     Hypothyroid 7/15/2011     Neurogenic bladder 7/15/2011     Parkinson disease (H) 7/15/2011     REM sleep behavior disorder 7/18/2011     Sebaceous cyst 7/18/2011     Wears glasses 7/18/2011     Social History     Social History     Marital status:      Spouse name: N/A     Number of children: N/A     Years of education: N/A     Occupational History     Not on file.     Social History Main Topics     Smoking status: Never Smoker     Smokeless tobacco: Never Used     Alcohol use Yes      Comment: rare     Drug use: Not on file     Sexual activity: Not on file     Other Topics Concern     Not on file     Social History Narrative    Spouse is serenity       Drug and lactation database from the [place] [employer] of [employer]:  http://toxnet.nlm.nih.gov/cgi-bin/sis/htmlgen?LACT      B/P: Data Unavailable, T: Data Unavailable, P: Data Unavailable, R: Data Unavailable 0 lbs 0 oz  There were no vitals taken for this visit., There is no height or weight on file to calculate BMI.  Medications and Vitals not listed above were documented in the cart and reviewed by me.     Current Outpatient Prescriptions   Medication Sig Dispense Refill     venlafaxine (EFFEXOR-XR) 75 MG 24 hr capsule TAKE 1 TABLET(75 MG) BY MOUTH DAILY 90 capsule 0     levothyroxine (SYNTHROID/LEVOTHROID) 112 MCG tablet Take 112 mcg by mouth daily       atorvastatin (LIPITOR) 10 MG tablet Take 10 mg by mouth daily       FIBER ADULT GUMMIES PO Take 5 g by mouth 8 times daily (5 grams of fiber per gummy)       COMPOUNDED NON-CONTROLLED SUBSTANCE (CMPD RX) - PHARMACY TO MIX COMPOUNDED MEDICATION Estriol 1 mg/Gr   Apply small amount on the index finger apply to vaginal area twice weekly 30 g 6     trospium (SANCTURA XR) 60 MG CP24 24 hr capsule Take 1 capsule (60 mg) by mouth every morning 90 each 3     carbidopa-levodopa (SINEMET CR)  MG per CR tablet Take 1 tablet by mouth 4 times daily   0     tamsulosin  (FLOMAX) 0.4 MG capsule Take 1 capsule (0.4 mg) by mouth daily 30 capsule 11     carbidopa-levodopa (SINEMET)  MG per tablet Take 1.5 tablets by mouth 4 times daily for paralysis agitans       acetaminophen (TYLENOL) 500 MG tablet Take 500 mg by mouth as needed for parkinson disease       docusate sodium (COLACE) 100 MG capsule Take 1 capsule (100 mg) by mouth 2 times daily 60 capsule      rotigotine (NEUPRO) 4 MG/24HR 24 hr patch Place 1 patch onto the skin daily 90 patch 3     polyethylene glycol (MIRALAX) powder Take 17 g (1 capful) by mouth daily as needed 119 g      vitamin  B complex with vitamin C (VITAMIN  B COMPLEX) TABS Take 1 tablet by mouth daily       Calcium-Vitamin D 600-200 MG-UNIT TABS 1 tablet every evening  30 tablet      ASPIRIN 81 MG PO TABS 1 TABLET DAILY           Pedro Pitts MD

## 2018-05-07 NOTE — TELEPHONE ENCOUNTER
Health Call Center    Phone Message    May a detailed message be left on voicemail: yes    Reason for Call: Order(s): Other:   Reason for requested: Pts daughter is requesting lab orders for UA to be submitted for pt. Lara states that pt has had frequent UTIs and wants a UA along with whatever other lab Dr. Viveros would deem appropriate to have done.   Date needed: As soon as possible  Provider name: Ivania Viveros.  Lara is requesting that these orders be faxed to [facility] attn: Maricruz Bragg at [phone]. Please call Lara with any questions.      Action Taken: Message routed to:  [facility] ([facility]): UC URO AND PROSTATE

## 2018-05-07 NOTE — TELEPHONE ENCOUNTER
Talked to daughter and her mother is not showing any urinary symptoms but her confusion is worse and the last 2 times there was an infection so uauc ordered and faxed to the [facility] at [phone] Kaitlin Oseguera LPN Staff Nurse

## 2018-05-10 NOTE — PROGRESS NOTES
Diagnosis/Summary/Recommendations:    PATIENT: Loan Anderson  77 year old female     : 1940    PATRICE: May 14, 2018    Parkinson    Hospitalization back in 2017  Severe sepsis secondary to acute pyelonephritis with E. Coli bacteremia.   Acute Metabolic encephalopathy  Benign essential HTN   Acute kidney injury on CKD-III  Parkinson's disease.      Dysphagia      Moved into the [facility] in [place] about a week ago    Had steroid injections, rooster comb and laser treatments  More for left than right knee  Still has pain  She has problems with both knees  She has popping and it hurts  She has used topical products  Not sure if she used aspercreme    Surjit was no longer able to care for her and she is in the [facility]  Daughter is in [place]  Surjit may be planning on moving from [place] where they have a house as well as in [place]    There is a gym and a salt water        Medications     6am 12noon 6pm 12midnight     Acetaminophen tylenol 500mg      as needed 2 now       Aspirin 81mg        1     Atorvastatin lipitor 10mg  1           Calcium vitamin d      1       Carbidopa/levodopa sinemet CR 50/200 1 1 1 1     CArbidopa/levodopa Sinemet 25/100 1.5 1.5 1.5 1.5     Compounded non controlled             Docusate colace 100mg    1         Fiber adult gummies 5 grams 8 gummies as needed           Levothyroxine synthroid 100mcg 1           [facility] AirPOS    1    Polyethylene glycol miralax   1 scoop           Rotigotine neupro 4mg patch 24 hours       1      Tamsulosin flomax 0.4mg      1       Trospium sanctura XR 60mg 24 hr capsule 1            Venlafaxine effexor xr 75mg 1           Vitamin b complex with vitamin c      1           History obtained from patient     Medication regimen is working okay and she does not feel a need to make a medication change at this time    PLAN  Therapy at the [facility] etc for physical therapy    PT or OT walker/wheelchair seating clinic evaluation  She may  or may not benefit from a lift chair  She has knee pain and parkinson so the therapist will need to decide what is appropriate.     Will need input about the specific medications - in  Terms of what type of b complex vitamin and calcium she is taking.     Return back in 3-6months.             Coding statement:   Duration of  Services: patient care and care coordination was 25 minutes  Greater than 50% of this visit was spent in counseling and coordination of care.     Pedro Pitts MD     ______________________________________    Last visit date and details:      PATIENT: Loan Anderson  77 year old female      : 1940     PATRICE:      Patient cancelled     2018     parkinson     Medications     6am 12noon 5pm 10pm     Acetaminophen tylenol             Aspirin 81mg             Atorvastatin lipitor 10mg             Calcium vitamin d             Carbidopa/levodopa sinemet CR 50/200 1 1 1 1     CArbidopa/levodopa Sinemet 25/100 1.5 1.5 1.5 1.5     Compounded non controlled             Docusate colace 100mg             Fiber adult gummies 5 grams             Levothyroxine synthroid 112mcg             Polyethylene glycol miralax              Rotigotine neupro 4mg patch 24 horus             Tamsulosin flomax 0.4mg             Trospium sanctura XR 60mg 24 hr capsule             Venlafaxine effexor xr 75mg 1           Vitamin b complex with vitamin c                   History obtained from patient        Coding statement:   Duration of  Services: patient care and care coordination was 0 minutes  Greater than 50% of this visit was spent in counseling and coordination of care.      Pedro Pitts MD      ______________________________________     Last visit date and details:      PATIENT: Loan Anderson  77 year old female       : 1940      PATRICE: 2017      Parkinson      Amlodipine  Aspirin  Atorvastatin  Calcium  Sinemet CR 50/200  Sinemet 25/100   cipro -    Docusate  Inulin  Levothyroxine  macugels  Omega 3  miralax  rotigotine 4mg  Tylenol  Colace  b complex  Fish oil      She is on cipro that will finish on the 3rd of November  She will start on bactrim on the 4th of November      Her blood pressure was high in the hospital      She has loss of appetite and has lost weight      Her blood pressure was 98/56, 69 is her pulse.  Amlodipine 5mg      She had left knee issues - and was having problems walking. She had problems standing. She had problems getting her socks on and had a fall and had wrist fracture.       She had some freezing of gait.  She reached over for her blouse.       Has had some illusions. She has insight into them.   May actually be illusions - black plastic bags moving in the wind.       Meds                   6a 12p` 5p 10p               25/100                                              1.5 1.5 1.5 1.5                 50/200                    1 1 1 1                rotigotine 4mg                                1            venalfaxine 75mg 1                         Over 50% of this visit was spent in patient care and care coordination.       History obtained from patient      Total visit time was 25 minutes      Wondering about memory care in [facility]'s              Discharge Summary  Hospitalist      Date of Admission:  10/21/2017  Date of Discharge:  10/27/2017  Discharging Provider: Juan Antonio Vu MD          Discharge Diagnoses      Severe sepsis secondary to acute pyelonephritis with E. Coli bacteremia.   Acute Metabolic encephalopathy  Benign essential HTN   Acute kidney injury on CKD-III  Parkinson's disease.      Dysphagia       Hospital Course      Loan Anderson is a 77 year old female with history of Parkinson's disease, CKD, neurogenic bladder, hypothyroidism, and nephrolithiasis who was admitted on 10/21/2017 after presenting with confusion.       Severe sepsis secondary to acute pyelonephritis with E. Coli  bacteremia.   -Patient presented with hypotension, fever, leukocytosis, and acute renal failure.   -UA positive for large LE, hematuria, and significant pyuria.  -Urine and blood culture positive for Escherichia coli   -Surveillance blood culture negative  -CT shows multiple non-obstructing kidney stones, urology consulted; no further recs.  -Received IV Rocephin in the Hospital, transitioned to ciprofloxacin for one more week to complete two weeks total.      Acute Metabolic encephalopathy, improving.   -Secondary to urinary tract infection/sepsis.   -Possible some component of hypercarbia as patient has not used CPAP for quite some time  -Close to baseline  -avoid benzos, narcotics, anticholinergics  -recommend repeating outpatient sleep study post discharge      Elevated blood pressure/HTN.   -No hx HTN, normally runs 110s systolic per family and prior clinic notes. She has been persistently hypertensive over the past few days.   -started Norvasc 5mg daily  -Blood pressure better, continue Norvasc for ongoing needs      Acute kidney injury on CKD-III:  -Baseline Cr 1-1.3.   -Initial creatinine elevated at 2.74.   -improved with IV hydration; back to baseline 1.24.   -encourage po intake       Parkinson's disease.    -Continue sinemet and Neupro.           Dysphagia. Family and patient note concerns for aspiration. SLP following and has noted mild-moderate dysphagia. Recommend safe swallow strategies as well as video swallow. Discussed with patient and family; regardless of the result of a video swallow patient would not be willing to modify her diet at this time. Will not pursue.   -continue regular diet per patient request for now          Juan Antonio uV MD      PLAN  Since she is recovering from sepsis she is now improving with stay at [facility]  They have not decided to go [place] yet as they are waiting for her to improve.      Wait on increasing venlafaxine but may consider a dose  increase          Consider rivastigmine in the future. Information provided      Return in 3months                 ______________________________________      Patient was asked about 14 Review of systems including changes in vision (dry eyes, double vision), hearing, heart, lungs, musculoskeletal, depression, anxiety, snoring, RBD, insomnia, urinary frequency, urinary urgency, constipation, swallowing problems, hematological, ID, allergies, skin problems: seborrhea, endocrinological: thyroid, diabetes, cholesterol; balance, weight changes, and other neurological problems and these were not significant at this time except for   Patient Active Problem List   Diagnosis     Parkinson disease (H)     Constipation     Hypothyroid     Neurogenic bladder     Convergence insufficiency     Wears glasses     Diplopia     Hot flashes, menopausal     REM sleep behavior disorder     Sebaceous cyst     Hypercholesterolemia     Dysthymic disorder     Hyperglyceridemia     Hypothyroidism     Urinary incontinence     Chronic nonalcoholic liver disease     Obstructive sleep apnea     Disorder of bone and cartilage     Benign neoplasm     Incontinence in female     Nonalcoholic steatohepatitis (COHEN)     Obstructive sleep apnea syndrome     Osteopenia     Parkinson's disease (H)     Systemic infection (H)     Sepsis (H)     E coli bacteremia     Acute kidney failure (H)     Hyponatremia     Hypokalemia     Acute pyelonephritis     Acute cystitis without hematuria     Kidney stone     Sepsis due to other etiology (H)     Acute metabolic encephalopathy     Secondary hypertension     CKD (chronic kidney disease) stage 3, GFR 30-59 ml/min     Dysphagia, unspecified type     Primary osteoarthritis of both knees     Recurrent major depressive disorder, in full remission (H)          Allergies   Allergen Reactions     Mirapex [Pramipexole Dihydrochloride Monohydrate]      Leg swelling     Potassium Chloride      IV infusion only. She  tolerates oral potassium chloride     Requip [Ropinirole Hydrochloride]      Leg swelling     Epinephrine Palpitations     Past Surgical History:   Procedure Laterality Date     ------------OTHER-------------  november ? 2015    lithrotripsy for renal stone     ------------OTHER-------------  november 2015    had sepsis from uti and hospitalized     BRAIN SURGERY  12 or 14 oct 2011    gene therapy enrolled study at [place]     Past Medical History:   Diagnosis Date     Constipation 7/15/2011     Convergence insufficiency 7/15/2011     Depression 7/15/2011     Diplopia 7/18/2011     Hot flashes, menopausal 7/18/2011     Hypercholesterolemia 7/18/2011     Hypothyroid 7/15/2011     Neurogenic bladder 7/15/2011     Parkinson disease (H) 7/15/2011     REM sleep behavior disorder 7/18/2011     Sebaceous cyst 7/18/2011     Wears glasses 7/18/2011     Social History     Social History     Marital status:      Spouse name: N/A     Number of children: N/A     Years of education: N/A     Occupational History     Not on file.     Social History Main Topics     Smoking status: Never Smoker     Smokeless tobacco: Never Used     Alcohol use Yes      Comment: rare     Drug use: Not on file     Sexual activity: Not on file     Other Topics Concern     Not on file     Social History Narrative    Spouse is serenity       Drug and lactation database from the [place] [employer]:  http://toxnet.nlm.nih.gov/cgi-bin/sis/htmlgen?LACT      B/P: Data Unavailable, T: Data Unavailable, P: Data Unavailable, R: Data Unavailable 0 lbs 0 oz  There were no vitals taken for this visit., There is no height or weight on file to calculate BMI.  Medications and Vitals not listed above were documented in the cart and reviewed by me.     Current Outpatient Prescriptions   Medication Sig Dispense Refill     acetaminophen (TYLENOL) 500 MG tablet Take 500 mg by mouth as needed for parkinson disease       ASPIRIN 81 MG PO TABS 1  TABLET DAILY       atorvastatin (LIPITOR) 10 MG tablet Take 10 mg by mouth daily       Calcium-Vitamin D 600-200 MG-UNIT TABS 1 tablet every evening  30 tablet      carbidopa-levodopa (SINEMET CR)  MG per CR tablet Take 1 tablet by mouth 4 times daily   0     carbidopa-levodopa (SINEMET)  MG per tablet Take 1.5 tablets by mouth 4 times daily for paralysis agitans       COMPOUNDED NON-CONTROLLED SUBSTANCE (CMPD RX) - PHARMACY TO MIX COMPOUNDED MEDICATION Estriol 1 mg/Gr   Apply small amount on the index finger apply to vaginal area twice weekly 30 g 6     docusate sodium (COLACE) 100 MG capsule Take 1 capsule (100 mg) by mouth 2 times daily 60 capsule      FIBER ADULT GUMMIES PO Take 5 g by mouth 8 times daily (5 grams of fiber per gummy)       levothyroxine (SYNTHROID/LEVOTHROID) 112 MCG tablet Take 112 mcg by mouth daily       polyethylene glycol (MIRALAX) powder Take 17 g (1 capful) by mouth daily as needed 119 g      rotigotine (NEUPRO) 4 MG/24HR 24 hr patch Place 1 patch onto the skin daily 90 patch 3     tamsulosin (FLOMAX) 0.4 MG capsule Take 1 capsule (0.4 mg) by mouth daily 30 capsule 11     trospium (SANCTURA XR) 60 MG CP24 24 hr capsule Take 1 capsule (60 mg) by mouth every morning 90 each 3     venlafaxine (EFFEXOR-XR) 75 MG 24 hr capsule TAKE 1 TABLET(75 MG) BY MOUTH DAILY 90 capsule 0     vitamin  B complex with vitamin C (VITAMIN  B COMPLEX) TABS Take 1 tablet by mouth daily           Pedro Pitts MD

## 2018-05-14 NOTE — Clinical Note
5/14/2018         RE: Loan Anderson  [address] [id number]        Dear Colleague,    Thank you for referring your patient, Loan Anderson, to the [facility]. Please see a copy of my visit note below.    No notes on file    Again, thank you for allowing me to participate in the care of your patient.        Sincerely,        Pedro Pitts MD

## 2018-05-14 NOTE — NURSING NOTE
Loan Anderson's goals for this visit include: return  She requests these members of her care team be copied on today's visit information:     PCP: Opal Cardenas    Referring Provider:  Opal Cardenas  [facility]  [address] [place] [address]    /82  Pulse 78  Wt 86 kg (189 lb 11.2 oz)  SpO2 96%  BMI 33.6 kg/m2    Do you need any medication refills at today's visit? y

## 2018-05-14 NOTE — LETTER
2018      RE: Loan Anderson  [address] [facility] [address]       Diagnosis/Summary/Recommendations:    PATIENT: Loan Anderson  77 year old female     : 1940    PATRICE: May 14, 2018    Parkinson    Hospitalization back in 2017  Severe sepsis secondary to acute pyelonephritis with E. Coli bacteremia.   Acute Metabolic encephalopathy  Benign essential HTN   Acute kidney injury on CKD-III  Parkinson's disease.      Dysphagia      Moved into the [facility] in [place] about a week ago    Had steroid injections, rooster comb and laser treatments  More for left than right knee  Still has pain  She has problems with both knees  She has popping and it hurts  She has used topical products  Not sure if she used aspercreme    Surjit was no longer able to care for her and she is in the [facility]  Daughter is in [place]  Surjit may be planning on moving from [place] where they have a house as well as in [place]    There is a gym and a salt water        Medications     6am 12noon 6pm 12midnight     Acetaminophen tylenol 500mg      as needed 2 now       Aspirin 81mg        1     Atorvastatin lipitor 10mg  1           Calcium vitamin d      1       Carbidopa/levodopa sinemet CR 50/200 1 1 1 1     CArbidopa/levodopa Sinemet 25/100 1.5 1.5 1.5 1.5     Compounded non controlled             Docusate colace 100mg    1         Fiber adult gummies 5 grams 8 gummies as needed           Levothyroxine synthroid 100mcg 1           [facility] [employer]    1    Polyethylene glycol miralax   1 scoop           Rotigotine neupro 4mg patch 24 hours       1      Tamsulosin flomax 0.4mg      1       Trospium sanctura XR 60mg 24 hr capsule 1            Venlafaxine effexor xr 75mg 1           Vitamin b complex with vitamin c      1           History obtained from patient     Medication regimen is working okay and she does not feel a need to make a medication change at this time    PLAN  Therapy at the [facility] therapy  etc for physical therapy    PT or OT walker/wheelchair seating clinic evaluation  She may or may not benefit from a lift chair  She has knee pain and parkinson so the therapist will need to decide what is appropriate.     Will need input about the specific medications - in  Terms of what type of b complex vitamin and calcium she is taking.     Return back in 3-6months.             Coding statement:   Duration of  Services: patient care and care coordination was 25 minutes  Greater than 50% of this visit was spent in counseling and coordination of care.     Pedro Pitts MD     ______________________________________    Last visit date and details:      PATIENT: Loan Anderson  77 year old female      : 1940     PATRICE:      Patient cancelled     2018     parkinson     Medications     6am 12noon 5pm 10pm     Acetaminophen tylenol             Aspirin 81mg             Atorvastatin lipitor 10mg             Calcium vitamin d             Carbidopa/levodopa sinemet CR 50/200 1 1 1 1     CArbidopa/levodopa Sinemet 25/100 1.5 1.5 1.5 1.5     Compounded non controlled             Docusate colace 100mg             Fiber adult gummies 5 grams             Levothyroxine synthroid 112mcg             Polyethylene glycol miralax              Rotigotine neupro 4mg patch 24 horus             Tamsulosin flomax 0.4mg             Trospium sanctura XR 60mg 24 hr capsule             Venlafaxine effexor xr 75mg 1           Vitamin b complex with vitamin c                   History obtained from patient        Coding statement:   Duration of  Services: patient care and care coordination was 0 minutes  Greater than 50% of this visit was spent in counseling and coordination of care.      Pedro Pitts MD      ______________________________________     Last visit date and details:      PATIENT: Loan Anderson  77 year old female       : 1940      PATRICE:   2017      Parkinson      Amlodipine  Aspirin  Atorvastatin  Calcium  Sinemet CR 50/200  Sinemet 25/100   cipro -   Docusate  Inulin  Levothyroxine  macugels  Omega 3  miralax  rotigotine 4mg  Tylenol  Colace  b complex  Fish oil      She is on cipro that will finish on the 3rd of November  She will start on bactrim on the 4th of November      Her blood pressure was high in the hospital      She has loss of appetite and has lost weight      Her blood pressure was 98/56, 69 is her pulse.  Amlodipine 5mg      She had left knee issues - and was having problems walking. She had problems standing. She had problems getting her socks on and had a fall and had wrist fracture.       She had some freezing of gait.  She reached over for her blouse.       Has had some illusions. She has insight into them.   May actually be illusions - black plastic bags moving in the wind.       Meds                   6a 12p` 5p 10p               25/100                                              1.5 1.5 1.5 1.5                 50/200                    1 1 1 1                rotigotine 4mg                                1            venalfaxine 75mg 1                         Over 50% of this visit was spent in patient care and care coordination.       History obtained from patient      Total visit time was 25 minutes      Wondering about memory care in [facility]'s              Discharge Summary  Hospitalist      Date of Admission:  10/21/2017  Date of Discharge:  10/27/2017  Discharging Provider: Juan Antonio Vu MD          Discharge Diagnoses      Severe sepsis secondary to acute pyelonephritis with E. Coli bacteremia.   Acute Metabolic encephalopathy  Benign essential HTN   Acute kidney injury on CKD-III  Parkinson's disease.      Dysphagia       Hospital Course      Loan Anderson is a 77 year old female with history of Parkinson's disease, CKD, neurogenic bladder, hypothyroidism, and nephrolithiasis who was admitted on  10/21/2017 after presenting with confusion.       Severe sepsis secondary to acute pyelonephritis with E. Coli bacteremia.   -Patient presented with hypotension, fever, leukocytosis, and acute renal failure.   -UA positive for large LE, hematuria, and significant pyuria.  -Urine and blood culture positive for Escherichia coli   -Surveillance blood culture negative  -CT shows multiple non-obstructing kidney stones, urology consulted; no further recs.  -Received IV Rocephin in the Hospital, transitioned to ciprofloxacin for one more week to complete two weeks total.      Acute Metabolic encephalopathy, improving.   -Secondary to urinary tract infection/sepsis.   -Possible some component of hypercarbia as patient has not used CPAP for quite some time  -Close to baseline  -avoid benzos, narcotics, anticholinergics  -recommend repeating outpatient sleep study post discharge      Elevated blood pressure/HTN.   -No hx HTN, normally runs 110s systolic per family and prior clinic notes. She has been persistently hypertensive over the past few days.   -started Norvasc 5mg daily  -Blood pressure better, continue Norvasc for ongoing needs      Acute kidney injury on CKD-III:  -Baseline Cr 1-1.3.   -Initial creatinine elevated at 2.74.   -improved with IV hydration; back to baseline 1.24.   -encourage po intake       Parkinson's disease.    -Continue sinemet and Neupro.           Dysphagia. Family and patient note concerns for aspiration. SLP following and has noted mild-moderate dysphagia. Recommend safe swallow strategies as well as video swallow. Discussed with patient and family; regardless of the result of a video swallow patient would not be willing to modify her diet at this time. Will not pursue.   -continue regular diet per patient request for now          Juan Antonio Vu MD      PLAN  Since she is recovering from sepsis she is now improving with stay at [facility]  They have not decided to go [place] yet as  they are waiting for her to improve.      Wait on increasing venlafaxine but may consider a dose increase          Consider rivastigmine in the future. Information provided      Return in 3months                 ______________________________________      Patient was asked about 14 Review of systems including changes in vision (dry eyes, double vision), hearing, heart, lungs, musculoskeletal, depression, anxiety, snoring, RBD, insomnia, urinary frequency, urinary urgency, constipation, swallowing problems, hematological, ID, allergies, skin problems: seborrhea, endocrinological: thyroid, diabetes, cholesterol; balance, weight changes, and other neurological problems and these were not significant at this time except for   Patient Active Problem List   Diagnosis     Parkinson disease (H)     Constipation     Hypothyroid     Neurogenic bladder     Convergence insufficiency     Wears glasses     Diplopia     Hot flashes, menopausal     REM sleep behavior disorder     Sebaceous cyst     Hypercholesterolemia     Dysthymic disorder     Hyperglyceridemia     Hypothyroidism     Urinary incontinence     Chronic nonalcoholic liver disease     Obstructive sleep apnea     Disorder of bone and cartilage     Benign neoplasm     Incontinence in female     Nonalcoholic steatohepatitis (COHEN)     Obstructive sleep apnea syndrome     Osteopenia     Parkinson's disease (H)     Systemic infection (H)     Sepsis (H)     E coli bacteremia     Acute kidney failure (H)     Hyponatremia     Hypokalemia     Acute pyelonephritis     Acute cystitis without hematuria     Kidney stone     Sepsis due to other etiology (H)     Acute metabolic encephalopathy     Secondary hypertension     CKD (chronic kidney disease) stage 3, GFR 30-59 ml/min     Dysphagia, unspecified type     Primary osteoarthritis of both knees     Recurrent major depressive disorder, in full remission (H)          Allergies   Allergen Reactions     Mirapex [Pramipexole  Dihydrochloride Monohydrate]      Leg swelling     Potassium Chloride      IV infusion only. She tolerates oral potassium chloride     Requip [Ropinirole Hydrochloride]      Leg swelling     Epinephrine Palpitations     Past Surgical History:   Procedure Laterality Date     ------------OTHER-------------  november ? 2015    lithrotripsy for renal stone     ------------OTHER-------------  november 2015    had sepsis from uti and hospitalized     BRAIN SURGERY  12 or 14 oct 2011    gene therapy enrolled study at [place]     Past Medical History:   Diagnosis Date     Constipation 7/15/2011     Convergence insufficiency 7/15/2011     Depression 7/15/2011     Diplopia 7/18/2011     Hot flashes, menopausal 7/18/2011     Hypercholesterolemia 7/18/2011     Hypothyroid 7/15/2011     Neurogenic bladder 7/15/2011     Parkinson disease (H) 7/15/2011     REM sleep behavior disorder 7/18/2011     Sebaceous cyst 7/18/2011     Wears glasses 7/18/2011     Social History     Social History     Marital status:      Spouse name: N/A     Number of children: N/A     Years of education: N/A     Occupational History     Not on file.     Social History Main Topics     Smoking status: Never Smoker     Smokeless tobacco: Never Used     Alcohol use Yes      Comment: rare     Drug use: Not on file     Sexual activity: Not on file     Other Topics Concern     Not on file     Social History Narrative    Spouse is serenity       Drug and lactation database from the [place] [employer]:  http://toxnet.nlm.nih.gov/cgi-bin/sis/htmlgen?LACT      B/P: Data Unavailable, T: Data Unavailable, P: Data Unavailable, R: Data Unavailable 0 lbs 0 oz  There were no vitals taken for this visit., There is no height or weight on file to calculate BMI.  Medications and Vitals not listed above were documented in the cart and reviewed by me.     Current Outpatient Prescriptions   Medication Sig Dispense Refill     acetaminophen (TYLENOL) 500  MG tablet Take 500 mg by mouth as needed for parkinson disease       ASPIRIN 81 MG PO TABS 1 TABLET DAILY       atorvastatin (LIPITOR) 10 MG tablet Take 10 mg by mouth daily       Calcium-Vitamin D 600-200 MG-UNIT TABS 1 tablet every evening  30 tablet      carbidopa-levodopa (SINEMET CR)  MG per CR tablet Take 1 tablet by mouth 4 times daily   0     carbidopa-levodopa (SINEMET)  MG per tablet Take 1.5 tablets by mouth 4 times daily for paralysis agitans       COMPOUNDED NON-CONTROLLED SUBSTANCE (CMPD RX) - PHARMACY TO MIX COMPOUNDED MEDICATION Estriol 1 mg/Gr   Apply small amount on the index finger apply to vaginal area twice weekly 30 g 6     docusate sodium (COLACE) 100 MG capsule Take 1 capsule (100 mg) by mouth 2 times daily 60 capsule      FIBER ADULT GUMMIES PO Take 5 g by mouth 8 times daily (5 grams of fiber per gummy)       levothyroxine (SYNTHROID/LEVOTHROID) 112 MCG tablet Take 112 mcg by mouth daily       polyethylene glycol (MIRALAX) powder Take 17 g (1 capful) by mouth daily as needed 119 g      rotigotine (NEUPRO) 4 MG/24HR 24 hr patch Place 1 patch onto the skin daily 90 patch 3     tamsulosin (FLOMAX) 0.4 MG capsule Take 1 capsule (0.4 mg) by mouth daily 30 capsule 11     trospium (SANCTURA XR) 60 MG CP24 24 hr capsule Take 1 capsule (60 mg) by mouth every morning 90 each 3     venlafaxine (EFFEXOR-XR) 75 MG 24 hr capsule TAKE 1 TABLET(75 MG) BY MOUTH DAILY 90 capsule 0     vitamin  B complex with vitamin C (VITAMIN  B COMPLEX) TABS Take 1 tablet by mouth daily           Pedro Pitts MD

## 2018-05-14 NOTE — PROGRESS NOTES
Called [facility] for results of UC-no growth    Rachele Schwab, RN   Care Coordinator Urology

## 2018-05-14 NOTE — MR AVS SNAPSHOT
After Visit Summary   2018    Loan Anderson    MRN: [id number]           Patient Information     Date Of Birth          1940        Visit Information        Provider Department      2018 2:30 PM Pedro Pitts MD [facility]        Today's Diagnoses     Paralysis agitans (H)    -  1    Cognitive disorder        Overactive bladder        Urinary hesitancy        Chronic pain of both knees          Care Instructions    PATIENT: Loan Anderson  77 year old female     : 1940    PATRICE: May 14, 2018    Parkinson    Hospitalization back in 2017  Severe sepsis secondary to acute pyelonephritis with E. Coli bacteremia.   Acute Metabolic encephalopathy  Benign essential HTN   Acute kidney injury on CKD-III  Parkinson's disease.      Dysphagia      Moved into the [facility] in [place] about a week ago    Had steroid injections, rooster comb and laser treatments  More for left than right knee  Still has pain  She has problems with both knees  She has popping and it hurts  She has used topical products  Not sure if she used aspercreme    Surjit was no longer able to care for her and she is in the [facility]  Daughter is in [place]  Surjit may be planning on moving from [place] where they have a house as well as in [place]    There is a gym and a salt water        Medications     6am 12noon 6pm 12midnight     Acetaminophen tylenol 500mg      as needed 2 now       Aspirin 81mg        1     Atorvastatin lipitor 10mg  1           Calcium vitamin d      1       Carbidopa/levodopa sinemet CR 50/200 1 1 1 1     CArbidopa/levodopa Sinemet 25/100 1.5 1.5 1.5 1.5     Compounded non controlled             Docusate colace 100mg    1         Fiber adult gummies 5 grams 8 gummies as needed           Levothyroxine synthroid 100mcg 1           [facility]    1    Polyethylene glycol miralax   1 scoop           Rotigotine neupro 4mg patch 24 hours       1      Tamsulosin  flomax 0.4mg      1       Trospium sanctura XR 60mg 24 hr capsule 1            Venlafaxine effexor xr 75mg 1           Vitamin b complex with vitamin c      1           History obtained from patient     Medication regimen is working okay and she does not feel a need to make a medication change at this time    PLAN  Therapy at the [facility] - pool therapy etc for physical therapy    PT or OT walker/wheelchair seating clinic evaluation  She may or may not benefit from a lift chair  She has knee pain and parkinson so the therapist will need to decide what is appropriate.     Will need input about the specific medications - in  Terms of what type of b complex vitamin and calcium she is taking.     Return back in 3-6months.             Follow-ups after your visit        Additional Services     OCCUPATIONAL THERAPY REFERRAL       Wheelchair seating clinic            PHYSICAL THERAPY REFERRAL       *This therapy referral will be filtered to a centralized scheduling office at [facility] and the patient will receive a call to schedule an appointment at a [place] location most convenient for them. *     [facility] provides Physical Therapy evaluation and treatment and many specialty services across the [place] system.  If requesting a specialty program, please choose from the list below.    If you have not heard from the scheduling office within 2 business days, please call [phone] for all locations, with the exception of [place], please call [phone] and [facility], please call [phone]  Treatment: Evaluation & Treatment  Special Instructions/Modalities: evaluate and treat - if needed pool therapy/water aerobics and other therapy programs  Special Programs: --     Please be aware that coverage of these services is subject to the terms and limitations of your health insurance plan.  Call member services at your health plan with any benefit or coverage questions.   "    **Note to Provider:  If you are referring outside of [place] for the therapy appointment, please list the name of the location in the \"special instructions\" above, print the referral and give to the patient to schedule the appointment.            PHYSICAL THERAPY REFERRAL       *This therapy referral will be filtered to a centralized scheduling office at [facility] and the patient will receive a call to schedule an appointment at a [place] location most convenient for them. *     [facility] provides Physical Therapy evaluation and treatment and many specialty services across the [place] system.  If requesting a specialty program, please choose from the list below.    If you have not heard from the scheduling office within 2 business days, please call [phone] for all locations, with the exception of [place], please call [phone] and [facility], please call [phone]  Treatment: Evaluation & Treatment  Special Instructions/Modalities: wheelchair seating clinic evaluation  Special Programs: Seating & Wheeled Mobility ([facility] location only)    Please be aware that coverage of these services is subject to the terms and limitations of your health insurance plan.  Call member services at your health plan with any benefit or coverage questions.      **Note to Provider:  If you are referring outside of [place] for the therapy appointment, please list the name of the location in the \"special instructions\" above, print the referral and give to the patient to schedule the appointment.                  Follow-up notes from your care team     Return in about 6 months (around 11/14/2018).      Your next 10 appointments already scheduled     Oct 22, 2018  9:30 AM CDT   [facility] Pedro Pitts MD   [facility]    [id number] [place] [id number] [phone]              Who to contact     If you " have questions or need follow up information about today's clinic visit or your schedule please contact [facility] directly at [phone].  Normal or non-critical lab and imaging results will be communicated to you by Unified Officehart, letter or phone within 4 business days after the clinic has received the results. If you do not hear from us within 7 days, please contact the clinic through Unified Officehart or phone. If you have a critical or abnormal lab result, we will notify you by phone as soon as possible.  Submit refill requests through OmPrompt or call your pharmacy and they will forward the refill request to us. Please allow 3 business days for your refill to be completed.          Additional Information About Your Visit        OmPrompt Information     OmPrompt gives you secure access to your electronic health record. If you see a primary care provider, you can also send messages to your care team and make appointments. If you have questions, please call your primary care clinic.  If you do not have a primary care provider, please call [phone] and they will assist you.      OmPrompt is an electronic gateway that provides easy, online access to your medical records. With OmPrompt, you can request a clinic appointment, read your test results, renew a prescription or communicate with your care team.     To access your existing account, please contact your [facility] or call [phone] for assistance.        Care EveryWhere ID     This is your Care EveryWhere ID. This could be used by other organizations to access your [place] medical records  [id number]        Your Vitals Were     Pulse Pulse Oximetry BMI (Body Mass Index)             78 96% 33.6 kg/m2          Blood Pressure from Last 3 Encounters:   05/14/18 153/82   11/15/17 102/53   11/15/17 102/53    Weight from Last 3 Encounters:   05/14/18 86 kg (189 lb 11.2 oz)   11/15/17 86 kg (189 lb 9.5 oz)   11/15/17 86 kg (189  lb 11.2 oz)              We Performed the Following     OCCUPATIONAL THERAPY REFERRAL     PHYSICAL THERAPY REFERRAL     PHYSICAL THERAPY REFERRAL          Today's Medication Changes          These changes are accurate as of 5/14/18  3:20 PM.  If you have any questions, ask your nurse or doctor.               Start taking these medicines.        Dose/Directions    rotigotine 4 MG/24HR 24 hr patch   Commonly known as:  NEUPRO   Used for:  Paralysis agitans (H)   Started by:  Pedro Pitts MD        Apply patch daily @ midnight   Quantity:  90 patch   Refills:  3         These medicines have changed or have updated prescriptions.        Dose/Directions    acetaminophen 500 MG tablet   Commonly known as:  TYLENOL   This may have changed:    - how much to take  - how to take this  - when to take this  - reasons to take this  - additional instructions   Changed by:  Pedro Pitts MD        1-2 tabs by mouth as needed; often taking at 5pm   Refills:  0       aspirin 81 MG tablet   This may have changed:  See the new instructions.   Changed by:  Pedro Pitts MD        81mg tab by mouth @ midnight   Quantity:  30 tablet   Refills:  0       atorvastatin 10 MG tablet   Commonly known as:  LIPITOR   This may have changed:    - how much to take  - how to take this  - when to take this  - additional instructions   Changed by:  Pedro Pitts MD        10mg tab by mouth  @ 6am   Quantity:  90 tablet   Refills:  3       Calcium-Vitamin D 600-200 MG-UNIT Tabs   This may have changed:    - how much to take  - when to take this  - additional instructions   Changed by:  Pedro Pitts MD        1 tab by mouth @ 6pm   Quantity:  30 tablet   Refills:  0       * carbidopa-levodopa  MG per CR tablet   Commonly known as:  SINEMET CR   This may have changed:  Another medication with the same name was changed. Make sure you understand how and when to take each.   Used for:  Paralysis agitans (H)   Changed  by:  Pedro Pitts MD        Dose:  1 tablet   Take 1 tablet by mouth 4 times daily 6am,noon,6pm midnight   Quantity:  360 tablet   Refills:  3       * carbidopa-levodopa  MG per tablet   Commonly known as:  SINEMET   This may have changed:    - how much to take  - how to take this  - when to take this  - additional instructions   Used for:  Paralysis agitans (H)   Changed by:  Pedro Pitts MD        1.5 tabs by mouth daily @ 6am,noon,6pm midnight   Quantity:  540 tablet   Refills:  3       docusate sodium 100 MG capsule   Commonly known as:  COLACE   This may have changed:    - how much to take  - how to take this  - when to take this  - additional instructions   Changed by:  Pedro Pitts MD        100mg capsule by mouth daily @ noon   Quantity:  60 capsule   Refills:  0       EYE VITAMINS Caps   This may have changed:    - how to take this  - when to take this  - additional instructions   Changed by:  Pedro Pitts MD        1 capsule (macu-health) by mouth daily @ midnight   Quantity:  30 capsule   Refills:  0       FIBER ADULT GUMMIES 2 g Chew   This may have changed:    - medication strength  - how much to take  - how to take this  - when to take this  - additional instructions   Changed by:  Pedro Pitts MD        8 gummies by mouth daily @ 8am (as needed: 5 grams of fiber per gummy)   Refills:  0       FLOMAX 0.4 MG capsule   This may have changed:    - how much to take  - how to take this  - when to take this  - additional instructions   Used for:  Urinary hesitancy   Generic drug:  tamsulosin   Changed by:  Pedro Pitts MD        0.4mg capsule by mouth daily @ 6pm   Quantity:  30 capsule   Refills:  11       levothyroxine 100 MCG tablet   Commonly known as:  SYNTHROID/LEVOTHROID   This may have changed:    - medication strength  - how much to take  - how to take this  - when to take this  - additional instructions   Changed by:  Pedro Pitts MD         100mcg tab by mouth @ 6am   Quantity:  50 tablet   Refills:  3       MIRALAX powder   This may have changed:    - how much to take  - how to take this  - when to take this  - reasons to take this  - additional instructions   Generic drug:  polyethylene glycol   Changed by:  Pedro Pitts MD        1 scoop in liquid by mouth daily @ 6am (as needed)   Quantity:  119 g   Refills:  0       SANCTURA XR 60 MG Cp24 24 hr capsule   This may have changed:    - how much to take  - how to take this  - when to take this  - additional instructions   Used for:  Overactive bladder   Generic drug:  trospium   Changed by:  Pedro Pitts MD        60mg capsule by mouth daily @ 6am   Quantity:  90 each   Refills:  3       venlafaxine 75 MG 24 hr capsule   Commonly known as:  EFFEXOR-XR   This may have changed:  See the new instructions.   Used for:  Paralysis agitans (H)   Changed by:  Pedro Pitts MD        75mg tab by mouth daily @ 6am   Quantity:  90 capsule   Refills:  11       vitamin B complex with vitamin C Tabs tablet   This may have changed:    - how much to take  - how to take this  - when to take this  - additional instructions   Changed by:  Pedro Pitts MD        Vitamin b complex by mouth @ 6pm   Refills:  0       * Notice:  This list has 2 medication(s) that are the same as other medications prescribed for you. Read the directions carefully, and ask your doctor or other care provider to review them with you.         Where to get your medicines      These medications were sent to [id number] - [place], [place] - [address]  [address], [facility] [address]     Phone:  [phone]     carbidopa-levodopa  MG per tablet    carbidopa-levodopa  MG per CR tablet    rotigotine 4 MG/24HR 24 hr patch                Primary Care Provider Office Phone # Fax #    Opal Cardenas [phone]       [facility] [address]  [facility] [address]        Equal Access to Services     [facility] ALBA : Hadii nyla mcnally kelvinct Enrique, washivanida luqadaha, qaybta kaalmada ty, amber velakatiebertram mathew. So [facility] [phone].    ATENCIÓN: Si habla español, tiene a adam disposición servicios gratuitos de asistencia lingüística. Sriniame al [phone].    We comply with applicable federal civil rights laws and [place] laws. We do not discriminate on the basis of race, color, national origin, age, disability, sex, sexual orientation, or gender identity.            Thank you!     Thank you for choosing [facility]  for your care. Our goal is always to provide you with excellent care. Hearing back from our patients is one way we can continue to improve our services. Please take a few minutes to complete the written survey that you may receive in the mail after your visit with us. Thank you!             Your Updated Medication List - Protect others around you: Learn how to safely use, store and throw away your medicines at www.disposemymeds.org.          This list is accurate as of 5/14/18  3:20 PM.  Always use your most recent med list.                   Brand Name Dispense Instructions for use Diagnosis    acetaminophen 500 MG tablet    TYLENOL     1-2 tabs by mouth as needed; often taking at 5pm        aspirin 81 MG tablet     30 tablet    81mg tab by mouth @ midnight        atorvastatin 10 MG tablet    LIPITOR    90 tablet    10mg tab by mouth  @ 6am        Calcium-Vitamin D 600-200 MG-UNIT Tabs     30 tablet    1 tab by mouth @ 6pm        * carbidopa-levodopa  MG per CR tablet    SINEMET CR    360 tablet    Take 1 tablet by mouth 4 times daily 6am,noon,6pm midnight    Paralysis agitans (H)       * carbidopa-levodopa  MG per tablet    SINEMET    540 tablet    1.5 tabs by mouth daily @ 6am,noon,6pm midnight    Paralysis agitans (H)       COMPOUNDED NON-CONTROLLED SUBSTANCE - PHARMACY TO MIX COMPOUNDED  MEDICATION    CMPD RX    30 g    Estriol 1 mg/Gr   Apply small amount on the index finger apply to vaginal area twice weekly    Atrophic vaginitis       docusate sodium 100 MG capsule    COLACE    60 capsule    100mg capsule by mouth daily @ noon        EYE VITAMINS Caps     30 capsule    1 capsule (macu-health) by mouth daily @ midnight        FIBER ADULT GUMMIES 2 g Chew      8 gummies by mouth daily @ 8am (as needed: 5 grams of fiber per gummy)        FLOMAX 0.4 MG capsule   Generic drug:  tamsulosin     30 capsule    0.4mg capsule by mouth daily @ 6pm    Urinary hesitancy       levothyroxine 100 MCG tablet    SYNTHROID/LEVOTHROID    50 tablet    100mcg tab by mouth @ 6am        MIRALAX powder   Generic drug:  polyethylene glycol     119 g    1 scoop in liquid by mouth daily @ 6am (as needed)        rotigotine 4 MG/24HR 24 hr patch    NEUPRO    90 patch    Apply patch daily @ midnight    Paralysis agitans (H)       SANCTURA XR 60 MG Cp24 24 hr capsule   Generic drug:  trospium     90 each    60mg capsule by mouth daily @ 6am    Overactive bladder       venlafaxine 75 MG 24 hr capsule    EFFEXOR-XR    90 capsule    75mg tab by mouth daily @ 6am    Paralysis agitans (H)       vitamin B complex with vitamin C Tabs tablet      Vitamin b complex by mouth @ 6pm        * Notice:  This list has 2 medication(s) that are the same as other medications prescribed for you. Read the directions carefully, and ask your doctor or other care provider to review them with you.

## 2018-05-14 NOTE — PATIENT INSTRUCTIONS
PATIENT: Loan Anderson  77 year old female     : 1940    PATRICE: May 14, 2018    Parkinson    Hospitalization back in 2017  Severe sepsis secondary to acute pyelonephritis with E. Coli bacteremia.   Acute Metabolic encephalopathy  Benign essential HTN   Acute kidney injury on CKD-III  Parkinson's disease.      Dysphagia      Moved into the [facility] in [place] about a week ago    Had steroid injections, rooster comb and laser treatments  More for left than right knee  Still has pain  She has problems with both knees  She has popping and it hurts  She has used topical products  Not sure if she used aspercreme    Surjit was no longer able to care for her and she is in the [facility]  Daughter is in [place]  Surjit may be planning on moving from [place] where they have a house as well as in [place]    There is a gym and a salt water        Medications     6am 12noon 6pm 12midnight     Acetaminophen tylenol 500mg      as needed 2 now       Aspirin 81mg        1     Atorvastatin lipitor 10mg  1           Calcium vitamin d      1       Carbidopa/levodopa sinemet CR 50/200 1 1 1 1     CArbidopa/levodopa Sinemet 25/100 1.5 1.5 1.5 1.5     Compounded non controlled             Docusate colace 100mg    1         Fiber adult gummies 5 grams 8 gummies as needed           Levothyroxine synthroid 100mcg 1           [facility] [facility]    1    Polyethylene glycol miralax   1 scoop           Rotigotine neupro 4mg patch 24 hours       1      Tamsulosin flomax 0.4mg      1       Trospium sanctura XR 60mg 24 hr capsule 1            Venlafaxine effexor xr 75mg 1           Vitamin b complex with vitamin c      1           History obtained from patient     Medication regimen is working okay and she does not feel a need to make a medication change at this time    PLAN  [facility]    PT or OT walker/wheelchair seating clinic evaluation  She may or may not benefit from a lift  chair  She has knee pain and parkinson so the therapist will need to decide what is appropriate.     Will need input about the specific medications - in  Terms of what type of b complex vitamin and calcium she is taking.     Return back in 3-6months.

## 2018-06-01 NOTE — TELEPHONE ENCOUNTER
Called and talked with patient's daughter. Patient has Parkinson's disease, knee pain, and cognitive difficulties, which causes her to have difficult mobility. She lives in a care facility and in the evening when she feels that she has to use the restroom, she forgets to press call button and 'sinks to ground' / falls. She is currently taking sanctura and flomax. The daughter is wondering if there is anything that could be changed to help her not have to use the restroom at night. Message sent to Dr. Viveros.

## 2018-06-01 NOTE — TELEPHONE ENCOUNTER
[facility] Call Center    Phone Message    May a detailed message be left on voicemail: no    Reason for Call: Other: Pt's daughter Lara called to speak with Dr. Viveros and/or her RN about some advice she's looking for. Pt is on a few different medications to manage her urination and Lara reports that the Pt gets up a few times each night to use the bathroom. The Pt is able to go as needed and seems to be doing well with managing incontinence, but the Pt has a high risk for falling. Lara wants to know if there is something the Pt can do, whether changing some dosages on medications or another solution, to reduce the amount of times the Pt gets up to use the bathroom. The Pt has parkinson's and dementia. Please contact Lara at [phone] to discuss.    Action Taken: Message routed to:  [facility]

## 2018-10-08 NOTE — PROGRESS NOTES
Diagnosis/Summary/Recommendations:    PATIENT: Loan Anderson  78 year old female     : 1940    PATRICE: 2018    Surjit was no longer able to care for her and she is in the [facility]  Daughter is in [place]  Surjit may be planning on moving from [place] where they have a house as well as in [place]    Atorvastatin - stopped as had joint pain and confusion  It was stopped and there was no change in these behaviors    Her blood pressure increased after stopping the atorvastatin  She had been put on lisinopril 5mg that has helped the blood pressure  She has not gone back on the statin  She is on ibuprofen 800mg 4/day    Not clear if she is on calcium    Not on memory medications  Has never filled them      She is living in the [facility].   They are friendly   Many activities  Exercise classes.   Surjit is in [place].   He will go down to [place] and will sell the house down in [place]    She has had some slow falls.   She has been encouraged to press pendant every time she has to go to the bathroom.  Discussion about donepezil with memory/cognition  She had problems with urinary urgency and had problems with initiation  She had been put on flomax/tamsulosin to help with bladder relaxation        Medications     6am 12noon 6pm 12midnight      Acetaminophen tylenol 500mg   not taking             Aspirin 81mg           1      Atorvastatin lipitor 10mg  stopped               Calcium vitamin d        1         Carbidopa/levodopa sinemet CR 50/200 1 1 1 1      CArbidopa/levodopa Sinemet 25/100 1.5 1.5 1.5 1.5      Compounded non controlled                  Docusate colace 100mg     1            Fiber adult gummies 5 grams 8 gummies as needed               Ibuprofen 800mg 1 1 1 1    Levothyroxine synthroid 100mcg 1               Lisinopril 5mg 1       [facility]       1     Omeprazole 20mg ?       Polyethylene glycol miralax   1 scoop as needed               Rotigotine neupro 4mg patch 24 hours          1       Tamsulosin flomax 0.4mg        1         Trospium sanctura XR 60mg 24 hr capsule 1                Venlafaxine effexor xr 75mg 1               Vitamin b complex with vitamin c        1            History obtained from patient     PLAN  Consider going off the anticholinergic bladder medication trospium sanctura to see if her cognition improves after 1-4 weeks  If has more bladder issues may consider mirabegron/myrbetriq 25mg or 50mg dose per day in 4 weeks    Discussed the above approach first prior to trying a acetylcholinesterase inhibitor such as donepezil (aricept) or rivastigmine (exelon)  Which would be something to consider in 2-3 months from now.    Will need to get buy in from her primary  Care provider Dr Castro to see if he agrees with this approach.     Other medications will remain s table for now.     Long goals is to improve word finding and executive dysfunction in terms of cognitive impairment    Return visit.       Coding statement:   Duration of  Services: patient care and care coordination was 25 minutes  Greater than 50% of this visit was spent in counseling and coordination of care.     Pedro Pitts MD     ______________________________________    Last visit date and details:      PATRICE: May 14, 2018     Parkinson     Hospitalization back in October 2017  Severe sepsis secondary to acute pyelonephritis with E. Coli bacteremia.   Acute Metabolic encephalopathy  Benign essential HTN   Acute kidney injury on CKD-III  Parkinson's disease.      Dysphagia       Moved into the [facility] in [place] about a week ago     Had steroid injections, rooster comb and laser treatments  More for left than right knee  Still has pain  She has problems with both knees  She has popping and it hurts  She has used topical products  Not sure if she used aspercreme     Surjit was no longer able to care for her and she is in the [facility]  Daughter is in [place]  Surjit may be planning on moving from [place] where they have a  house as well as in [place]     There is a gym and a salt water           Medications     6am 12noon 6pm 12midnight      Acetaminophen tylenol 500mg        as needed 2 now         Aspirin 81mg           1      Atorvastatin lipitor 10mg  1               Calcium vitamin d        1         Carbidopa/levodopa sinemet CR 50/200 1 1 1 1      CArbidopa/levodopa Sinemet 25/100 1.5 1.5 1.5 1.5      Compounded non controlled                  Docusate colace 100mg     1            Fiber adult gummies 5 grams 8 gummies as needed               Levothyroxine synthroid 100mcg 1               [facility]       1     Polyethylene glycol miralax   1 scoop               Rotigotine neupro 4mg patch 24 hours         1       Tamsulosin flomax 0.4mg        1         Trospium sanctura XR 60mg 24 hr capsule 1                Venlafaxine effexor xr 75mg 1               Vitamin b complex with vitamin c        1               History obtained from patient      Medication regimen is working okay and she does not feel a need to make a medication change at this time     PLAN  Therapy at the [facility] etc for physical therapy     PT or OT walker/wheelchair seating clinic evaluation  She may or may not benefit from a lift chair  She has knee pain and parkinson so the therapist will need to decide what is appropriate.      Will need input about the specific medications - in  Terms of what type of b complex vitamin and calcium she is taking.      Return back in 3-6months.           ______________________________________      Patient was asked about 14 Review of systems including changes in vision (dry eyes, double vision), hearing, heart, lungs, musculoskeletal, depression, anxiety, snoring, RBD, insomnia, urinary frequency, urinary urgency, constipation, swallowing problems, hematological, ID, allergies, skin problems: seborrhea, endocrinological: thyroid, diabetes, cholesterol; balance, weight changes, and other neurological  problems and these were not significant at this time except for   Patient Active Problem List   Diagnosis     Parkinson disease (H)     Constipation     Hypothyroid     Neurogenic bladder     Convergence insufficiency     Wears glasses     Diplopia     Hot flashes, menopausal     REM sleep behavior disorder     Sebaceous cyst     Hypercholesterolemia     Dysthymic disorder     Hyperglyceridemia     Hypothyroidism     Urinary incontinence     Chronic nonalcoholic liver disease     Obstructive sleep apnea     Disorder of bone and cartilage     Benign neoplasm     Incontinence in female     Nonalcoholic steatohepatitis (COHEN)     Obstructive sleep apnea syndrome     Osteopenia     Parkinson's disease (H)     Systemic infection (H)     Sepsis (H)     E coli bacteremia     Acute kidney failure (H)     Hyponatremia     Hypokalemia     Acute pyelonephritis     Acute cystitis without hematuria     Kidney stone     Sepsis due to other etiology (H)     Acute metabolic encephalopathy     Secondary hypertension     CKD (chronic kidney disease) stage 3, GFR 30-59 ml/min (H)     Dysphagia, unspecified type     Primary osteoarthritis of both knees     Recurrent major depressive disorder, in full remission (H)          Allergies   Allergen Reactions     Mirapex [Pramipexole Dihydrochloride Monohydrate]      Leg swelling     Potassium Chloride      IV infusion only. She tolerates oral potassium chloride     Requip [Ropinirole Hydrochloride]      Leg swelling     Epinephrine Palpitations     Past Surgical History:   Procedure Laterality Date     ------------OTHER-------------  november ? 2015    lithrotripsy for renal stone     ------------OTHER-------------  november 2015    had sepsis from uti and hospitalized     BRAIN SURGERY  12 or 14 oct 2011    gene therapy enrolled study at [facility]     Past Medical History:   Diagnosis Date     Constipation 7/15/2011     Convergence insufficiency 7/15/2011     Depression 7/15/2011     Diplopia  7/18/2011     Hot flashes, menopausal 7/18/2011     Hypercholesterolemia 7/18/2011     Hypothyroid 7/15/2011     Neurogenic bladder 7/15/2011     Parkinson disease (H) 7/15/2011     REM sleep behavior disorder 7/18/2011     Sebaceous cyst 7/18/2011     Wears glasses 7/18/2011     Social History     Social History     Marital status:      Spouse name: N/A     Number of children: N/A     Years of education: N/A     Occupational History     Not on file.     Social History Main Topics     Smoking status: Never Smoker     Smokeless tobacco: Never Used     Alcohol use Yes      Comment: rare     Drug use: Not on file     Sexual activity: Not on file     Other Topics Concern     Not on file     Social History Narrative    Spouse is serenity       Drug and lactation database from the [place] National Library of Medicine:  http://toxnet.nlm.nih.gov/cgi-bin/sis/htmlgen?LACT      B/P: Data Unavailable, T: Data Unavailable, P: Data Unavailable, R: Data Unavailable 0 lbs 0 oz  There were no vitals taken for this visit., There is no height or weight on file to calculate BMI.  Medications and Vitals not listed above were documented in the cart and reviewed by me.     Current Outpatient Prescriptions   Medication Sig Dispense Refill     acetaminophen (TYLENOL) 500 MG tablet 1-2 tabs by mouth as needed; often taking at 5pm       aspirin 81 MG tablet 81mg tab by mouth @ midnight 30 tablet      atorvastatin (LIPITOR) 10 MG tablet 10mg tab by mouth  @ 6am 90 tablet 3     Calcium-Vitamin D 600-200 MG-UNIT TABS 1 tab by mouth @ 6pm 30 tablet      carbidopa-levodopa (SINEMET CR)  MG per CR tablet Take 1 tablet by mouth 4 times daily 6am,noon,6pm midnight 360 tablet 3     carbidopa-levodopa (SINEMET)  MG per tablet 1.5 tabs by mouth daily @ 6am,noon,6pm midnight 540 tablet 3     COMPOUNDED NON-CONTROLLED SUBSTANCE (CMPD RX) - PHARMACY TO MIX COMPOUNDED MEDICATION Estriol 1 mg/Gr   Apply small amount on the index  finger apply to vaginal area twice weekly 30 g 6     docusate sodium (COLACE) 100 MG capsule 100mg capsule by mouth daily @ noon 60 capsule      FIBER ADULT GUMMIES 2 g CHEW 8 gummies by mouth daily @ 8am (as needed: 5 grams of fiber per gummy)       levothyroxine (SYNTHROID/LEVOTHROID) 100 MCG tablet 100mcg tab by mouth @ 6am 50 tablet 3     Multiple Vitamins-Minerals (EYE VITAMINS) CAPS 1 capsule (macu-health) by mouth daily @ midnight 30 capsule      polyethylene glycol (MIRALAX) powder 1 scoop in liquid by mouth daily @ 6am (as needed) 119 g      rotigotine (NEUPRO) 4 MG/24HR 24 hr patch Apply patch daily @ midnight 90 patch 3     tamsulosin (FLOMAX) 0.4 MG capsule 0.4mg capsule by mouth daily @ 6pm 30 capsule 11     trospium (SANCTURA XR) 60 MG CP24 24 hr capsule 60mg capsule by mouth daily @ 6am 90 each 3     venlafaxine (EFFEXOR-XR) 75 MG 24 hr capsule 75mg tab by mouth daily @ 6am 90 capsule 11     vitamin B complex with vitamin C (VITAMIN  B COMPLEX) TABS tablet Vitamin b complex by mouth @ 6pm           Pedro Pitts MD

## 2018-10-22 PROBLEM — E66.01 MORBID OBESITY (H): Status: ACTIVE | Noted: 2018-01-01

## 2018-10-22 NOTE — MR AVS SNAPSHOT
After Visit Summary   10/22/2018    Loan Anderson    MRN: [id number]           Patient Information     Date Of Birth          1940        Visit Information        Provider Department      10/22/2018 9:30 AM Pedro Pitts MD [facility]        Today's Diagnoses     Paralysis agitans (H)    -  1    Cognitive disorder        Urinary hesitancy        Morbid obesity (H)          Care Instructions    : 1940    PATRICE: 2018    Surjit was no longer able to care for her and she is in the [facility]  Daughter is in [place]  Surjit may be planning on moving from [place] where they have a house as well as in [place]    Atorvastatin - stopped as had joint pain and confusion  It was stopped and there was no change in these behaviors    Her blood pressure increased after stopping the atorvastatin  She had been put on lisinopril 5mg that has helped the blood pressure  She has not gone back on the statin  She is on ibuprofen 800mg 4/day    Not clear if she is on calcium    Not on memory medications  Has never filled them      She is living in the [facility].   They are friendly   Many activities  Exercise classes.   Surjit is in [place].   He will go down to [place] and will sell the house down in [place]    She has had some slow falls.   She has been encouraged to press pendant every time she has to go to the bathroom.  Discussion about donepezil with memory/cognition  She had problems with urinary urgency and had problems with initiation  She had been put on flomax/tamsulosin to help with bladder relaxation        Medications     6am 12noon 6pm 12midnight      Acetaminophen tylenol 500mg   not taking             Aspirin 81mg           1      Atorvastatin lipitor 10mg  stopped               Calcium vitamin d        1         Carbidopa/levodopa sinemet CR 50/200 1 1 1 1      CArbidopa/levodopa Sinemet 25/100 1.5 1.5 1.5 1.5      Compounded non controlled                   Docusate colace 100mg     1            Fiber adult gummies 5 grams 8 gummies as needed               Ibuprofen 800mg 1 1 1 1    Levothyroxine synthroid 100mcg 1               Lisinopril 5mg 1       [facility]       1     Polyethylene glycol miralax   1 scoop as needed               Rotigotine neupro 4mg patch 24 hours         1       Tamsulosin flomax 0.4mg        1         Trospium sanctura XR 60mg 24 hr capsule 1                Venlafaxine effexor xr 75mg 1               Vitamin b complex with vitamin c        1            History obtained from patient     PLAN  Consider going off the anticholinergic bladder medication trospium sanctura to see if her cognition improves after 1-4 weeks  If has more bladder issues may consider mirabegron/myrbetriq 25mg or 50mg dose per day in 4 weeks    Discussed the above approach first prior to trying a acetylcholinesterase inhibitor such as donepezil (aricept) or rivastigmine (exelon)  Which would be something to consider in 2-3 months from now.    Will need to get buy in from her primary  Care provider Dr Castro to see if he agrees with this approach.     Other medications will remain s table for now.     Long goals is to improve word finding and executive dysfunction in terms of cognitive impairment    Return visit.           Follow-ups after your visit        Your next 10 appointments already scheduled     Jan 28, 2019  9:00 AM CST   [facility] with Pedro Pitts MD   [facility]    [address] [address] [place] [id number] [phone]              Who to contact     If you have questions or need follow up information about today's clinic visit or your schedule please contact [facility] directly at [phone].  Normal or non-critical lab and imaging results will be communicated to you by MyChart, letter or phone within 4 business days after the clinic has received the results. If you do  not hear from us within 7 days, please contact the clinic through [employer] or phone. If you have a critical or abnormal lab result, we will notify you by phone as soon as possible.  Submit refill requests through [employer] or call your pharmacy and they will forward the refill request to us. Please allow 3 business days for your refill to be completed.          Additional Information About Your Visit        [employer] Information     [employer] gives you secure access to your electronic health record. If you see a primary care provider, you can also send messages to your care team and make appointments. If you have questions, please call your primary care clinic.  If you do not have a primary care provider, please call [phone] and they will assist you.      [employer] is an electronic gateway that provides easy, online access to your medical records. With [employer], you can request a clinic appointment, read your test results, renew a prescription or communicate with your care team.     To access your existing account, please contact your [facility] or call [phone] for assistance.        Care EveryWhere ID     This is your Care EveryWhere ID. This could be used by other organizations to access your [facility] medical records  [id number]        Your Vitals Were     Pulse Pulse Oximetry BMI (Body Mass Index)             72 97% 35.02 kg/m2          Blood Pressure from Last 3 Encounters:   10/22/18 179/88   05/14/18 153/82   11/15/17 102/53    Weight from Last 3 Encounters:   10/22/18 89.7 kg (197 lb 11.2 oz)   05/14/18 86 kg (189 lb 11.2 oz)   11/15/17 86 kg (189 lb 9.5 oz)              Today, you had the following     No orders found for display         Today's Medication Changes          These changes are accurate as of 10/22/18 10:15 AM.  If you have any questions, ask your nurse or doctor.               These medicines have changed or have updated prescriptions.        Dose/Directions     acetaminophen 500 MG tablet   Commonly known as:  TYLENOL   This may have changed:  additional instructions   Changed by:  Pedro Pitts MD        Rarely taking   Refills:  0       * carbidopa-levodopa  MG per CR tablet   Commonly known as:  SINEMET CR   This may have changed:  Another medication with the same name was changed. Make sure you understand how and when to take each.   Used for:  Paralysis agitans (H)   Changed by:  Pedro Pitts MD        Dose:  1 tablet   Take 1 tablet by mouth 4 times daily 6am,noon,6pm midnight   Quantity:  360 tablet   Refills:  3       * carbidopa-levodopa  MG per tablet   Commonly known as:  SINEMET   This may have changed:  additional instructions   Used for:  Paralysis agitans (H)   Changed by:  Pedro Pitts MD        1.5 tabs by mouth daily @ 6am,noon,6pm midnight and 1 tab by mouth daily as needed   Quantity:  630 tablet   Refills:  3       FLOMAX 0.4 MG capsule   This may have changed:  additional instructions   Used for:  Urinary hesitancy   Generic drug:  tamsulosin   Changed by:  Pedro Ptits MD        0.4mg capsule by mouth daily @ 530pm   Quantity:  30 capsule   Refills:  11       * Notice:  This list has 2 medication(s) that are the same as other medications prescribed for you. Read the directions carefully, and ask your doctor or other care provider to review them with you.      Stop taking these medicines if you haven't already. Please contact your care team if you have questions.     atorvastatin 10 MG tablet   Commonly known as:  LIPITOR   Stopped by:  Pedro Pitts MD                Where to get your medicines      These medications were sent to [facility] - [place], [place] - [address]  [address], [place] [address]     Phone:  [phone]     carbidopa-levodopa  MG per tablet    carbidopa-levodopa  MG per CR tablet                Primary Care Provider Office  Phone # Fax #    Opal Cardenas [phone]       [facility] [address]  [place] [address]        Equal Access to Services     SARAH BETH WILLIS : Hadii aad ku hadpitact Rutyifan, washivanida kailashqjoseha, qayanickta kaalmada ty, amber vanessain hayaaconrado avendañosimin cordero harry mathew. So [facility] [phone].    ATENCIÓN: Si habla español, tiene a adam disposición servicios gratuitos de asistencia lingüística. Llame al [phone].    We comply with applicable federal civil rights laws and [place] laws. We do not discriminate on the basis of race, color, national origin, age, disability, sex, sexual orientation, or gender identity.            Thank you!     Thank you for choosing [facility]  for your care. Our goal is always to provide you with excellent care. Hearing back from our patients is one way we can continue to improve our services. Please take a few minutes to complete the written survey that you may receive in the mail after your visit with us. Thank you!             Your Updated Medication List - Protect others around you: Learn how to safely use, store and throw away your medicines at www.disposemymeds.org.          This list is accurate as of 10/22/18 10:15 AM.  Always use your most recent med list.                   Brand Name Dispense Instructions for use Diagnosis    acetaminophen 500 MG tablet    TYLENOL     Rarely taking        aspirin 81 MG tablet     30 tablet    81mg tab by mouth @ midnight        Calcium-Vitamin D 600-200 MG-UNIT Tabs     30 tablet    1 tab by mouth @ 6pm        * carbidopa-levodopa  MG per CR tablet    SINEMET CR    360 tablet    Take 1 tablet by mouth 4 times daily 6am,noon,6pm midnight    Paralysis agitans (H)       * carbidopa-levodopa  MG per tablet    SINEMET    630 tablet    1.5 tabs by mouth daily @ 6am,noon,6pm midnight and 1 tab by mouth daily as needed    Paralysis agitans (H)       COMPOUNDED NON-CONTROLLED SUBSTANCE - PHARMACY  TO MIX COMPOUNDED MEDICATION    CMPD RX    30 g    Estriol 1 mg/Gr   Apply small amount on the index finger apply to vaginal area twice weekly    Atrophic vaginitis       docusate sodium 100 MG capsule    COLACE    60 capsule    100mg capsule by mouth daily @ noon        EYE VITAMINS Caps     30 capsule    1 capsule (macu-health) by mouth daily @ midnight        FIBER ADULT GUMMIES 2 g Chew      8 gummies by mouth daily @ 8am (as needed: 5 grams of fiber per gummy)        FLOMAX 0.4 MG capsule   Generic drug:  tamsulosin     30 capsule    0.4mg capsule by mouth daily @ 530pm    Urinary hesitancy       ibuprofen 800 MG tablet    ADVIL/MOTRIN    30 tablet    800mg tab by mouth 4/day @ 6am, 12noon, 6pm and 12midnight        levothyroxine 100 MCG tablet    SYNTHROID/LEVOTHROID    50 tablet    100mcg tab by mouth @ 6am        lisinopril 5 MG tablet    PRINIVIL/ZESTRIL    30 tablet    5mg tab by mouth daily @ 6am        MIRALAX powder   Generic drug:  polyethylene glycol     119 g    1 scoop in liquid by mouth daily @ 6am (as needed)        omeprazole 20 MG CR capsule    priLOSEC    30 capsule    Take 1 capsule (20 mg) by mouth daily        rotigotine 4 MG/24HR 24 hr patch    NEUPRO    90 patch    Apply patch daily @ midnight    Paralysis agitans (H)       SANCTURA XR 60 MG Cp24 24 hr capsule   Generic drug:  trospium     90 each    60mg capsule by mouth daily @ 6am    Overactive bladder       venlafaxine 75 MG 24 hr capsule    EFFEXOR-XR    90 capsule    75mg tab by mouth daily @ 6am    Paralysis agitans (H)       vitamin B complex with vitamin C Tabs tablet      Vitamin b complex by mouth @ 6pm        * Notice:  This list has 2 medication(s) that are the same as other medications prescribed for you. Read the directions carefully, and ask your doctor or other care provider to review them with you.

## 2018-10-22 NOTE — Clinical Note
10/22/2018         RE: Loan Anderson  [address]  [place] [address]        Dear Colleague,    Thank you for referring your patient, Loan Anderson, to the [facility]. Please see a copy of my visit note below.    No notes on file    Again, thank you for allowing me to participate in the care of your patient.        Sincerely,        Pedro Pitts MD

## 2018-10-22 NOTE — PATIENT INSTRUCTIONS
: 1940    PATRICE: 2018    Surjit was no longer able to care for her and she is in the [facility]  Daughter is in [place]  Surjit may be planning on moving from [place] where they have a house as well as in [place]    Atorvastatin - stopped as had joint pain and confusion  It was stopped and there was no change in these behaviors    Her blood pressure increased after stopping the atorvastatin  She had been put on lisinopril 5mg that has helped the blood pressure  She has not gone back on the statin  She is on ibuprofen 800mg 4/day    Not clear if she is on calcium    Not on memory medications  Has never filled them      She is living in the [facility].   They are friendly   Many activities  Exercise classes.   Surjit is in [place].   He will go down to [place] and will sell the house down in [place]    She has had some slow falls.   She has been encouraged to press pendant every time she has to go to the bathroom.  Discussion about donepezil with memory/cognition  She had problems with urinary urgency and had problems with initiation  She had been put on flomax/tamsulosin to help with bladder relaxation        Medications     6am 12noon 6pm 12midnight      Acetaminophen tylenol 500mg   not taking             Aspirin 81mg           1      Atorvastatin lipitor 10mg  stopped               Calcium vitamin d        1         Carbidopa/levodopa sinemet CR 50/200 1 1 1 1      CArbidopa/levodopa Sinemet 25/100 1.5 1.5 1.5 1.5      Compounded non controlled                  Docusate colace 100mg     1            Fiber adult gummies 5 grams 8 gummies as needed               Ibuprofen 800mg 1 1 1 1    Levothyroxine synthroid 100mcg 1               Lisinopril 5mg 1       [facility]       1     Polyethylene glycol miralax   1 scoop as needed               Rotigotine neupro 4mg patch 24 hours         1       Tamsulosin flomax 0.4mg        1         Trospium sanctura XR 60mg 24 hr capsule 1                 Venlafaxine effexor xr 75mg 1               Vitamin b complex with vitamin c        1            History obtained from patient     PLAN  Consider going off the anticholinergic bladder medication trospium sanctura to see if her cognition improves after 1-4 weeks  If has more bladder issues may consider mirabegron/myrbetriq 25mg or 50mg dose per day in 4 weeks    Discussed the above approach first prior to trying a acetylcholinesterase inhibitor such as donepezil (aricept) or rivastigmine (exelon)  Which would be something to consider in 2-3 months from now.    Will need to get buy in from her primary  Care provider Dr Castro to see if he agrees with this approach.     Other medications will remain s table for now.     Long goals is to improve word finding and executive dysfunction in terms of cognitive impairment    Return visit.

## 2018-10-22 NOTE — LETTER
10/22/2018      RE: Loan Anderson  [address]  [facility] [address]       Diagnosis/Summary/Recommendations:    PATIENT: Loan Anderson  78 year old female     : 1940    PATRICE: 2018    Surjit was no longer able to care for her and she is in the [facility]  Daughter is in [place]  Surjit may be planning on moving from [place] where they have a house as well as in [place]    Atorvastatin - stopped as had joint pain and confusion  It was stopped and there was no change in these behaviors    Her blood pressure increased after stopping the atorvastatin  She had been put on lisinopril 5mg that has helped the blood pressure  She has not gone back on the statin  She is on ibuprofen 800mg 4/day    Not clear if she is on calcium    Not on memory medications  Has never filled them      She is living in the [facility].   They are friendly   Many activities  Exercise classes.   Surjit is in [place].   He will go down to [place] and will sell the house down in [place]    She has had some slow falls.   She has been encouraged to press pendant every time she has to go to the bathroom.  Discussion about donepezil with memory/cognition  She had problems with urinary urgency and had problems with initiation  She had been put on flomax/tamsulosin to help with bladder relaxation        Medications     6am 12noon 6pm 12midnight      Acetaminophen tylenol 500mg   not taking             Aspirin 81mg           1      Atorvastatin lipitor 10mg  stopped               Calcium vitamin d        1         Carbidopa/levodopa sinemet CR 50/200 1 1 1 1      CArbidopa/levodopa Sinemet 25/100 1.5 1.5 1.5 1.5      Compounded non controlled                  Docusate colace 100mg     1            Fiber adult gummies 5 grams 8 gummies as needed               Ibuprofen 800mg 1 1 1 1    Levothyroxine synthroid 100mcg 1               Lisinopril 5mg 1       [facility]       1     Omeprazole 20mg ?       Polyethylene glycol  miralax   1 scoop as needed               Rotigotine neupro 4mg patch 24 hours         1       Tamsulosin flomax 0.4mg        1         Trospium sanctura XR 60mg 24 hr capsule 1                Venlafaxine effexor xr 75mg 1               Vitamin b complex with vitamin c        1            History obtained from patient     PLAN  Consider going off the anticholinergic bladder medication trospium sanctura to see if her cognition improves after 1-4 weeks  If has more bladder issues may consider mirabegron/myrbetriq 25mg or 50mg dose per day in 4 weeks    Discussed the above approach first prior to trying a acetylcholinesterase inhibitor such as donepezil (aricept) or rivastigmine (exelon)  Which would be something to consider in 2-3 months from now.    Will need to get buy in from her primary  Care provider Dr Castro to see if he agrees with this approach.     Other medications will remain s table for now.     Long goals is to improve word finding and executive dysfunction in terms of cognitive impairment    Return visit.       Coding statement:   Duration of  Services: patient care and care coordination was 25 minutes  Greater than 50% of this visit was spent in counseling and coordination of care.     Pedro Pitts MD     ______________________________________    Last visit date and details:      PATRICE: May 14, 2018     Parkinson     Hospitalization back in October 2017  Severe sepsis secondary to acute pyelonephritis with E. Coli bacteremia.   Acute Metabolic encephalopathy  Benign essential HTN   Acute kidney injury on CKD-III  Parkinson's disease.      Dysphagia       Moved into the [facility] in [place] about a week ago     Had steroid injections, rooster comb and laser treatments  More for left than right knee  Still has pain  She has problems with both knees  She has popping and it hurts  She has used topical products  Not sure if she used aspercreme     Surjit was no longer able to care for her and she is in the  jared Cowan is in [place]  Surjit may be planning on moving from [place] where they have a house as well as in [place]     There is a gym and a salt water           Medications     6am 12noon 6pm 12midnight      Acetaminophen tylenol 500mg        as needed 2 now         Aspirin 81mg           1      Atorvastatin lipitor 10mg  1               Calcium vitamin d        1         Carbidopa/levodopa sinemet CR 50/200 1 1 1 1      CArbidopa/levodopa Sinemet 25/100 1.5 1.5 1.5 1.5      Compounded non controlled                  Docusate colace 100mg     1            Fiber adult gummies 5 grams 8 gummies as needed               Levothyroxine synthroid 100mcg 1               [facility]       1     Polyethylene glycol miralax   1 scoop               Rotigotine neupro 4mg patch 24 hours         1       Tamsulosin flomax 0.4mg        1         Trospium sanctura XR 60mg 24 hr capsule 1                Venlafaxine effexor xr 75mg 1               Vitamin b complex with vitamin c        1               History obtained from patient      Medication regimen is working okay and she does not feel a need to make a medication change at this time     PLAN  Therapy at the [facility] etc for physical therapy     PT or OT walker/wheelchair seating clinic evaluation  She may or may not benefit from a lift chair  She has knee pain and parkinson so the therapist will need to decide what is appropriate.      Will need input about the specific medications - in  Terms of what type of b complex vitamin and calcium she is taking.      Return back in 3-6months.           ______________________________________      Patient was asked about 14 Review of systems including changes in vision (dry eyes, double vision), hearing, heart, lungs, musculoskeletal, depression, anxiety, snoring, RBD, insomnia, urinary frequency, urinary urgency, constipation, swallowing problems, hematological, ID, allergies, skin problems: seborrhea,  endocrinological: thyroid, diabetes, cholesterol; balance, weight changes, and other neurological problems and these were not significant at this time except for   Patient Active Problem List   Diagnosis     Parkinson disease (H)     Constipation     Hypothyroid     Neurogenic bladder     Convergence insufficiency     Wears glasses     Diplopia     Hot flashes, menopausal     REM sleep behavior disorder     Sebaceous cyst     Hypercholesterolemia     Dysthymic disorder     Hyperglyceridemia     Hypothyroidism     Urinary incontinence     Chronic nonalcoholic liver disease     Obstructive sleep apnea     Disorder of bone and cartilage     Benign neoplasm     Incontinence in female     Nonalcoholic steatohepatitis (COHEN)     Obstructive sleep apnea syndrome     Osteopenia     Parkinson's disease (H)     Systemic infection (H)     Sepsis (H)     E coli bacteremia     Acute kidney failure (H)     Hyponatremia     Hypokalemia     Acute pyelonephritis     Acute cystitis without hematuria     Kidney stone     Sepsis due to other etiology (H)     Acute metabolic encephalopathy     Secondary hypertension     CKD (chronic kidney disease) stage 3, GFR 30-59 ml/min (H)     Dysphagia, unspecified type     Primary osteoarthritis of both knees     Recurrent major depressive disorder, in full remission (H)          Allergies   Allergen Reactions     Mirapex [Pramipexole Dihydrochloride Monohydrate]      Leg swelling     Potassium Chloride      IV infusion only. She tolerates oral potassium chloride     Requip [Ropinirole Hydrochloride]      Leg swelling     Epinephrine Palpitations     Past Surgical History:   Procedure Laterality Date     ------------OTHER-------------  november ? 2015    lithrotripsy for renal stone     ------------OTHER-------------  november 2015    had sepsis from uti and hospitalized     BRAIN SURGERY  12 or 14 oct 2011    gene therapy enrolled study at [place]     Past Medical History:   Diagnosis Date      Constipation 7/15/2011     Convergence insufficiency 7/15/2011     Depression 7/15/2011     Diplopia 7/18/2011     Hot flashes, menopausal 7/18/2011     Hypercholesterolemia 7/18/2011     Hypothyroid 7/15/2011     Neurogenic bladder 7/15/2011     Parkinson disease (H) 7/15/2011     REM sleep behavior disorder 7/18/2011     Sebaceous cyst 7/18/2011     Wears glasses 7/18/2011     Social History     Social History     Marital status:      Spouse name: N/A     Number of children: N/A     Years of education: N/A     Occupational History     Not on file.     Social History Main Topics     Smoking status: Never Smoker     Smokeless tobacco: Never Used     Alcohol use Yes      Comment: rare     Drug use: Not on file     Sexual activity: Not on file     Other Topics Concern     Not on file     Social History Narrative    Spouse is serenity       Drug and lactation database from the [place] [employer] of Medicine:  http://toxnet.nlm.nih.gov/cgi-bin/sis/htmlgen?LACT      B/P: Data Unavailable, T: Data Unavailable, P: Data Unavailable, R: Data Unavailable 0 lbs 0 oz  There were no vitals taken for this visit., There is no height or weight on file to calculate BMI.  Medications and Vitals not listed above were documented in the cart and reviewed by me.     Current Outpatient Prescriptions   Medication Sig Dispense Refill     acetaminophen (TYLENOL) 500 MG tablet 1-2 tabs by mouth as needed; often taking at 5pm       aspirin 81 MG tablet 81mg tab by mouth @ midnight 30 tablet      atorvastatin (LIPITOR) 10 MG tablet 10mg tab by mouth  @ 6am 90 tablet 3     Calcium-Vitamin D 600-200 MG-UNIT TABS 1 tab by mouth @ 6pm 30 tablet      carbidopa-levodopa (SINEMET CR)  MG per CR tablet Take 1 tablet by mouth 4 times daily 6am,noon,6pm midnight 360 tablet 3     carbidopa-levodopa (SINEMET)  MG per tablet 1.5 tabs by mouth daily @ 6am,noon,6pm midnight 540 tablet 3     COMPOUNDED NON-CONTROLLED SUBSTANCE  (CMPD RX) - PHARMACY TO MIX COMPOUNDED MEDICATION Estriol 1 mg/Gr   Apply small amount on the index finger apply to vaginal area twice weekly 30 g 6     docusate sodium (COLACE) 100 MG capsule 100mg capsule by mouth daily @ noon 60 capsule      FIBER ADULT GUMMIES 2 g CHEW 8 gummies by mouth daily @ 8am (as needed: 5 grams of fiber per gummy)       levothyroxine (SYNTHROID/LEVOTHROID) 100 MCG tablet 100mcg tab by mouth @ 6am 50 tablet 3     Multiple Vitamins-Minerals (EYE VITAMINS) CAPS 1 capsule ([facility]) by mouth daily @ midnight 30 capsule      polyethylene glycol (MIRALAX) powder 1 scoop in liquid by mouth daily @ 6am (as needed) 119 g      rotigotine (NEUPRO) 4 MG/24HR 24 hr patch Apply patch daily @ midnight 90 patch 3     tamsulosin (FLOMAX) 0.4 MG capsule 0.4mg capsule by mouth daily @ 6pm 30 capsule 11     trospium (SANCTURA XR) 60 MG CP24 24 hr capsule 60mg capsule by mouth daily @ 6am 90 each 3     venlafaxine (EFFEXOR-XR) 75 MG 24 hr capsule 75mg tab by mouth daily @ 6am 90 capsule 11     vitamin B complex with vitamin C (VITAMIN  B COMPLEX) TABS tablet Vitamin b complex by mouth @ 6pm           Pedro Pitts MD

## 2018-11-14 NOTE — ED PROVIDER NOTES
History     Chief Complaint:  Right Shoulder injury    HPI   Loan Anderson is a right-handed 78 year old female with history of parkinson's disease, hypothyroid, and hypercholesterolemia who presents for evaluation of right shoulder injury. The patient's daughter reports that the patient often gets weak and is able to slowly lower herself to the ground. The daughter reports that lately, the patient has been trying to avoid reaching the ground because this is considered a fall by her memory care facility, and she dislikes undergoing the post-fall protocols. The patient's daughter reports that 2 days ago, the patient had one of these weakness episodes. The daughter states that she suspects the patient attempted to use her arms to keep from falling off the chair. The patient's memory care facility staff report that she did not complain of pain immediately after the incident, but that she did begin reporting right shoulder pain yesterday afternoon. The patient has been reporting a pain level around 8/10 since yesterday.     This morning, positive PTA imaging for right shoulder dislocation was obtained at the patient's nursing home. Here, the patient reports a pain level of 7/10,  And slight numbness in her right arm. The patient's daughter reports that her last PO was  2 hours ago.     Allergies:  Mirapex   Potassium chloride (IV only)  Requip  Epinephrine     Medications:    Sinemet  Neupro  Tylenol  Aspirin  Colace  Levothyroxine   Lisinopril  Prilosec  Flomax  Sanctura  Effexor    Past Medical History:    Constipation  Convergence insufficiency  Depression  Diplopia  Hypercholesterolemia  Hypothyroid  Neurogenic bladder  Parkinson disease  REM sleep behavior disorder  Acute kidney failure  CKD stage 3  Dysphagia    Past Surgical History:    Brain surgery    Family History:    Neurologic disorder  Eye disorder  Cerebrovascular disease (father stroke, age 47)  Heart disease      Social History:  Presents with  "daughter  Resides at [facility] care facility  Marital Status:    Smoking status: never  Alcohol use: yes, rare      Review of Systems   Musculoskeletal: Positive for arthralgias and myalgias.         Physical Exam     Patient Vitals for the past 24 hrs:   BP Temp Temp src Pulse Heart Rate Resp SpO2 Height Weight   11/14/18 1534 140/72 - - 76 - 20 97 % - -   11/14/18 1500 137/67 - - - 78 - 96 % - -   11/14/18 1447 - - - - - - 100 % - -   11/14/18 1446 (!) 119/97 - - - 76 - - - -   11/14/18 1424 - - - - - 20 - - -   11/14/18 1423 - - - - 74 18 99 % - -   11/14/18 1422 - - - - 77 - 95 % - -   11/14/18 1420 - - - - 74 - 100 % - -   11/14/18 1419 - - - - 75 - 100 % - -   11/14/18 1418 - - - - 75 26 99 % - -   11/14/18 1416 - - - - 75 - 100 % - -   11/14/18 1415 - - - - 77 18 99 % - -   11/14/18 1414 - - - - 75 - 100 % - -   11/14/18 1413 - - - - 73 - 99 % - -   11/14/18 1412 - - - - 71 - 99 % - -   11/14/18 1411 - - - - 79 20 96 % - -   11/14/18 1410 132/80 - - - 80 19 100 % - -   11/14/18 1408 - - - - - 20 - - -   11/14/18 1407 - - - - - 20 - - -   11/14/18 1401 - - - - 74 20 100 % - -   11/14/18 1400 133/83 - - - 74 19 100 % - -   11/14/18 1353 127/68 - - - 75 19 99 % - -   11/14/18 1352 - - - - 75 20 95 % - -   11/14/18 1351 - - - - 75 13 92 % - -   11/14/18 1254 142/80 99.1  F (37.3  C) Temporal 84 84 16 99 % 1.6 m (5' 3\") 86.2 kg (190 lb)        Physical Exam  General/Appearance: appears stated age, well-groomed, appears comfortable, continuous jerking movements  Eyes: EOMI, no scleral injection, no icterus  ENT: MMM  Neck: supple, nl ROM, no stiffness  Cardiovascular: RRR, nl S1S2, no m/r/g, 2+ pulses in all 4 extremities, cap refill <2sec  Respiratory: CTAB, good air movement throughout, no wheezes/rhonchi/rales, no increased WOB, no retractions  Back: no lesions  GI: abd soft, non-distended, nttp,  no HSM, no rebound, no guarding, nl BS  MSK: R shoulder with palpable anterior dislocation, no ttp, able to move " wrist and fingers  Skin: warm and well-perfused, no rash, no edema, no ecchymosis, nl turgor  Neuro: GCS 15, alert  Psych: interacts appropriately  Heme: no petechia, no purpura, no active bleeding    Emergency Department Course     Imaging:  Radiographic findings were communicated with the patient and daughter who voiced understanding of the findings.    Shoulder XR, right, 2-3 views portable  Impression: Two views of the right shoulder show normal glenohumeral  joint alignment. No definite fractures are seen but the humeral head  and glenoid are not optimally profiled for evaluation of fracture.  As read by Radiology.         Procedures:  [facility] Procedure Note        Sedation:      Performed by: Shauna Reynolds  Authorized by: Shauna Reynolds    Pre-Procedure Assessment done at 1300.    Expected Level:  Deep Sedation    Indication:  Sedation is required to allow for joint reduction    Consent obtained from patient and relative daughter after discussing the risks, benefits and alternatives.    PO Intake:  Appropriately NPO    ASA Class:  Class 3 - SEVERE SYSTEMIC DISEASE, DEFINITE FUNCTIONAL LIMITATIONS.    Mallampati:  Grade 4:  Soft palate obscured by base of tongue    Lungs: Lungs Clear with good breath sounds bilaterally.     Heart: Normal heart sounds and rate    History and physical reviewed and no updates needed. I have reviewed the lab findings, diagnostic data, medications, and the plan for sedation. I have determined this patient to be an appropriate candidate for the planned sedation and procedure and have reassessed the patient IMMEDIATELY PRIOR to sedation and procedure.      Sedation Post Procedure Summary:    Prior to the start of the procedure and with procedural staff participation, I verbally confirmed the patient s identity using two indicators, relevant allergies, that the procedure was appropriate and matched the consent or emergent situation, and that the correct  equipment/implants were available. Immediately prior to starting the procedure I conducted the Time Out with the procedural staff and re-confirmed the patient s name, procedure, and site/side. (The Joint Commission universal protocol was followed.)  Yes      Sedatives: Propofol    Vital signs, airway, End Tidal CO2 and pulse oximetry were monitored and remained stable throughout the procedure and sedation was maintained until the procedure was complete.  The patient was monitored by staff until sedation discharge criteria were met.    Patient tolerance: Patient tolerated the procedure well with no immediate complications.    Time of sedation in minutes:  15 minutes from beginning to end of physician one to one monitoring.      Procedure Note: Reduction of glenohumeral joint dislocation (shoulder dislocation)  Physician: Aaron Emanuel MD  Diagnosis: Shoulder dislocation   Consent: Informed written, see paper chart  Description of Procedure: Consent as above.  Patient positioned in supine position.  Procedural anesthesia was utilized, see above.  The arm was grasped and with gentle longitudinal traction with scapular manipulation the humeral head was easily reduced back into the glenoid fossa.  The neurovascular exam was normal before and after reduction attempt.  The patient tolerated the procedure well, there were no complications.      Procedure Note: Splint/Shoulder Immobilizer Placement  Physician: Aaron Emanuel MD  Diagnosis: Shoulder dislocation   Consent: Informed written, see paper chart  Description of Procedure:  Following reduction of shoulder dislocation, a well-fitting shoulder immobilizer was placed.  The elbow was maintained at 90 degrees flexion.   The neurovascular exam was normal before and after shoulder immobilizer placement.  The patient tolerated the procedure well, there were no complications.          Interventions:  1347: dilaudid 0.2 mg, IV  1409: Diprivan 40 mg, IV     Emergency  Department Course:  Past medical records, nursing notes, and vitals reviewed.  1315: I performed an exam of the patient and obtained history, as documented above.  .The patient was sent for a Xray while in the emergency department, findings above.     Impression & Plan      Medical Decision Making:  This patient is a 78-year-old female who presents with right anterior shoulder dislocation.  It is unclear exactly when she sustained this so there was a slow mechanical fall 2 days ago which stretched her shoulder which is likely the time it occurred.  She has slight numbness to the hand but has normal sensation on direct physical exam.  She is got good capillary refill and good pulses.  Under propofol sedation it was very easily reduced and a shoulder immobilizer was placed.  I have asked her to wear the shoulder immobilizer for the next week and then follow-up with orthopedics at which time they will likely have her start doing shoulder exercises.    Diagnosis:    ICD-10-CM   1. Dislocation of right shoulder joint, initial encounter S43.004A       Disposition:  discharged to home    Megan Beh  11/14/2018    EMERGENCY DEPARTMENT  I, Megan Beh, am serving as a scribe at 1:15 PM on 11/14/2018 to document services personally performed by Shauna Reynolds MD based on my observations and the provider's statements to me.       Shuana Reynolds MD  11/16/18 [id number]

## 2018-11-14 NOTE — ED AVS SNAPSHOT
Emergency Department    [address] [place] [address]    Phone:  [phone]    Fax:  [phone]                                       Loan Anderson   MRN: [id number]    Department:   Emergency Department   Date of Visit:  11/14/2018           After Visit Summary Signature Page     I have received my discharge instructions, and my questions have been answered. I have discussed any challenges I see with this plan with the nurse or doctor.    ..........................................................................................................................................  Patient/Patient Representative Signature      ..........................................................................................................................................  Patient Representative Print Name and Relationship to Patient    ..................................................               ................................................  Date                                   Time    ..........................................................................................................................................  Reviewed by Signature/Title    ...................................................              ..............................................  Date                                               Time          22EPIC Rev 08/18

## 2018-11-14 NOTE — ED AVS SNAPSHOT
Emergency Department    [address] [facility] [address]    Phone:  [phone]    Fax:  [phone]                                       Loan Anderson   MRN: [id number]    Department:   Emergency Department   Date of Visit:  11/14/2018           Patient Information     Date Of Birth          1940        Your diagnoses for this visit were:     Dislocation of right shoulder joint, initial encounter        You were seen by Shauna Reynolds MD.      Follow-up Information     Follow up with [facility] [place] [facility]. Schedule an appointment as soon as possible for a visit in 1 week.    Contact information:    [address] [address]  [phone]          Follow up with  Emergency Department.    Specialty:  EMERGENCY MEDICINE    Why:  As needed, If symptoms worsen    Contact information:    [address] [facility] [id number] [phone]        Discharge Instructions         Dislocation: Shoulder (Reduced)    A shoulder is dislocated when a strong force injures, and possibly tears the ligaments that hold the shoulder joint together. The bones that make up the joint then move apart and become stuck out of place. The joint must be put back in place. Then it will take several weeks for the shoulder to heal. This injury may weaken the ligaments. Weakened ligaments put you at risk for another dislocation. Another dislocation can happen even if you are hit with less force.  Shoulder dislocation is treated with a special type of arm sling called a shoulder immobilizer. This keeps your arm close to your body. This stops the shoulder from dislocating again while the ligaments heal. After a few weeks, you may start an exercise program. This will gradually bring back your range-of-motion and shoulder strength. It will also lower your risk for another dislocation.  Home care  Follow these tips for taking care of yourself at home:    Until your next  doctor visit, wear your shoulder immobilizer at all times. Don t take it off at night to sleep. This is because it s possible to dislocate your arm again in your sleep. You can take it off to bathe or dress. But don t move your arm away from your body. Keep your arm in the same position that the sling was holding it in until you put the sling back on. During your next visit, ask your doctor how long you should wear the sling.    Apply an ice pack over the injured area for 20 minutes every 1 to 2 hours the first day. You can make an ice pack by putting ice cubes in a plastic bag. Wrap the bag in a towel before putting it on your shoulder. Continue with ice packs 3 to 4 times a day for the next 2 to 3 days. Then use the ice as needed to relieve pain and swelling.    You may use acetaminophen or ibuprofen to control pain, unless another pain medicine was prescribed. If you have chronic liver or kidney disease or ever had a stomach ulcer or gastrointestinal bleeding, talk with your doctor before using these medicines.    Don t take part in sports or physical education classes until your doctor says it s OK.  Follow-up care  Follow up with your healthcare provider, or as advised. You shouldn t wear your shoulder immobilizer or sling for more than a few weeks without taking it off. Keeping it on for a longer time may limit your range-of-motion at the shoulder joint. If you have had several dislocations of the same shoulder, you may have permanent damage to the ligaments. Ask an orthopedic doctor about surgery to prevent another dislocation.  When to seek medical advice  Call your healthcare provider right away if any of these occur:    Another dislocation of your shoulder    Swelling or pain in the shoulder or arm that gets worse    Your fingers become cold, blue, numb or tingly    Fever or chills  Date Last Reviewed: 8/2/2016 2000-2018 [employer]. [address] [place] [address], [place] [address]. All rights  reserved. This information is not intended as a substitute for professional medical care. Always follow your healthcare professional's instructions.          Your next 10 appointments already scheduled     Jan 28, 2019  9:00 AM CST   [facility] Visit with Pedro Pitts MD   [facility]    [address] [place] [id number] [phone]              24 Hour Appointment Hotline       To make an appointment at any [facility], call [phone] ([phone]). If you don't have a family doctor or clinic, we will help you find one. [facility] are conveniently located to serve the needs of you and your family.             Review of your medicines      Our records show that you are taking the medicines listed below. If these are incorrect, please call your family doctor or clinic.        Dose / Directions Last dose taken    acetaminophen 500 MG tablet   Commonly known as:  TYLENOL        Rarely taking   Refills:  0        aspirin 81 MG tablet   Quantity:  30 tablet        81mg tab by mouth @ midnight   Refills:  0        Calcium-Vitamin D 600-200 MG-UNIT Tabs   Quantity:  30 tablet        1 tab by mouth @ 6pm   Refills:  0        * carbidopa-levodopa  MG per CR tablet   Commonly known as:  SINEMET CR   Dose:  1 tablet   Quantity:  360 tablet        Take 1 tablet by mouth 4 times daily 6am,noon,6pm midnight   Refills:  3        * carbidopa-levodopa  MG per tablet   Commonly known as:  SINEMET   Quantity:  630 tablet        1.5 tabs by mouth daily @ 6am,noon,6pm midnight and 1 tab by mouth daily as needed   Refills:  3        COMPOUNDED NON-CONTROLLED SUBSTANCE - PHARMACY TO MIX COMPOUNDED MEDICATION   Commonly known as:  CMPD RX   Quantity:  30 g        Estriol 1 mg/Gr   Apply small amount on the index finger apply to vaginal area twice weekly   Refills:  6        docusate sodium 100 MG capsule   Commonly known as:  COLACE   Quantity:   60 capsule        100mg capsule by mouth daily @ noon   Refills:  0        EYE VITAMINS Caps   Quantity:  30 capsule        1 capsule (macu-health) by mouth daily @ midnight   Refills:  0        FIBER ADULT GUMMIES 2 g Chew        8 gummies by mouth daily @ 8am (as needed: 5 grams of fiber per gummy)   Refills:  0        FLOMAX 0.4 MG capsule   Quantity:  30 capsule   Generic drug:  tamsulosin        0.4mg capsule by mouth daily @ 530pm   Refills:  11        ibuprofen 800 MG tablet   Commonly known as:  ADVIL/MOTRIN   Quantity:  30 tablet        800mg tab by mouth 4/day @ 6am, 12noon, 6pm and 12midnight   Refills:  1        levothyroxine 100 MCG tablet   Commonly known as:  SYNTHROID/LEVOTHROID   Quantity:  50 tablet        100mcg tab by mouth @ 6am   Refills:  3        lisinopril 5 MG tablet   Commonly known as:  PRINIVIL/ZESTRIL   Quantity:  30 tablet        5mg tab by mouth daily @ 6am   Refills:  1        MIRALAX powder   Quantity:  119 g   Generic drug:  polyethylene glycol        1 scoop in liquid by mouth daily @ 6am (as needed)   Refills:  0        omeprazole 20 MG CR capsule   Commonly known as:  priLOSEC   Dose:  20 mg   Quantity:  30 capsule        Take 1 capsule (20 mg) by mouth daily   Refills:  1        rotigotine 4 MG/24HR 24 hr patch   Commonly known as:  NEUPRO   Quantity:  90 patch        Apply patch daily @ midnight   Refills:  3        SANCTURA XR 60 MG Cp24 24 hr capsule   Quantity:  90 each   Generic drug:  trospium        60mg capsule by mouth daily @ 6am   Refills:  3        venlafaxine 75 MG 24 hr capsule   Commonly known as:  EFFEXOR-XR   Quantity:  90 capsule        75mg tab by mouth daily @ 6am   Refills:  11        vitamin B complex with vitamin C Tabs tablet        Vitamin b complex by mouth @ 6pm   Refills:  0        * Notice:  This list has 2 medication(s) that are the same as other medications prescribed for you. Read the directions carefully, and ask your doctor or other care  provider to review them with you.            Procedures and tests performed during your visit     Shoulder XR, right, 2-3 vw PORTABLE      Orders Needing Specimen Collection     None      Pending Results     Date and Time Order Name Status Description    11/14/2018 1411 Shoulder XR, right, 2-3 vw PORTABLE Preliminary             Pending Culture Results     No orders found from 11/12/2018 to 11/15/2018.            Pending Results Instructions     If you had any lab results that were not finalized at the time of your Discharge, you can call the ED Lab Result RN at [phone]. You will be contacted by this team for any positive Lab results or changes in treatment. The nurses are available 7 days a week from 10A to 6:30P.  You can leave a message 24 hours per day and they will return your call.        Test Results From Your Hospital Stay        11/14/2018  2:49 PM      Narrative     RIGHT SHOULDER PORTABLE TWO OR MORE VIEWS 11/14/2018 2:26 PM     HISTORY: Post reduction.     COMPARISON: None.        Impression     IMPRESSION: Two views of the right shoulder show normal glenohumeral  joint alignment. No definite fractures are seen but the humeral head  and glenoid are not optimally profiled for evaluation of fracture.                Clinical Quality Measure: Blood Pressure Screening     Your blood pressure was checked while you were in the emergency department today. The last reading we obtained was  BP: 137/67 . Please read the guidelines below about what these numbers mean and what you should do about them.  If your systolic blood pressure (the top number) is less than 120 and your diastolic blood pressure (the bottom number) is less than 80, then your blood pressure is normal. There is nothing more that you need to do about it.  If your systolic blood pressure (the top number) is 120-139 or your diastolic blood pressure (the bottom number) is 80-89, your blood pressure may be higher than it should be. You should  have your blood pressure rechecked within a year by a primary care provider.  If your systolic blood pressure (the top number) is 140 or greater or your diastolic blood pressure (the bottom number) is 90 or greater, you may have high blood pressure. High blood pressure is treatable, but if left untreated over time it can put you at risk for heart attack, stroke, or kidney failure. You should have your blood pressure rechecked by a primary care provider within the next 4 weeks.  If your provider in the emergency department today gave you specific instructions to follow-up with your doctor or provider even sooner than that, you should follow that instruction and not wait for up to 4 weeks for your follow-up visit.        Thank you for choosing [facility]       Thank you for choosing [facility] for your care. Our goal is always to provide you with excellent care. Hearing back from our patients is one way we can continue to improve our services. Please take a few minutes to complete the written survey that you may receive in the mail after you visit with us. Thank you!        [employer] Information     [facility] gives you secure access to your electronic health record. If you see a primary care provider, you can also send messages to your care team and make appointments. If you have questions, please call your primary care clinic.  If you do not have a primary care provider, please call [phone] and they will assist you.        Care EveryWhere ID     This is your Care EveryWhere ID. This could be used by other organizations to access your [facility] medical records  [id number]        Equal Access to Services     SARAH BETH WILLIS : Hadjamil Nunez, timoteoda ac, qaybta amber vargas . So [facility] [phone].    ATENCIÓN: Si habla español, tiene a adam disposición servicios gratuitos de asistencia lingüística. Llame al [phone].    We comply with applicable federal  civil rights laws and [place] laws. We do not discriminate on the basis of race, color, national origin, age, disability, sex, sexual orientation, or gender identity.            After Visit Summary       This is your record. Keep this with you and show to your community pharmacist(s) and doctor(s) at your next visit.

## 2018-11-14 NOTE — DISCHARGE INSTRUCTIONS
Dislocation: Shoulder (Reduced)    A shoulder is dislocated when a strong force injures, and possibly tears the ligaments that hold the shoulder joint together. The bones that make up the joint then move apart and become stuck out of place. The joint must be put back in place. Then it will take several weeks for the shoulder to heal. This injury may weaken the ligaments. Weakened ligaments put you at risk for another dislocation. Another dislocation can happen even if you are hit with less force.  Shoulder dislocation is treated with a special type of arm sling called a shoulder immobilizer. This keeps your arm close to your body. This stops the shoulder from dislocating again while the ligaments heal. After a few weeks, you may start an exercise program. This will gradually bring back your range-of-motion and shoulder strength. It will also lower your risk for another dislocation.  Home care  Follow these tips for taking care of yourself at home:    Until your next doctor visit, wear your shoulder immobilizer at all times. Don t take it off at night to sleep. This is because it s possible to dislocate your arm again in your sleep. You can take it off to bathe or dress. But don t move your arm away from your body. Keep your arm in the same position that the sling was holding it in until you put the sling back on. During your next visit, ask your doctor how long you should wear the sling.    Apply an ice pack over the injured area for 20 minutes every 1 to 2 hours the first day. You can make an ice pack by putting ice cubes in a plastic bag. Wrap the bag in a towel before putting it on your shoulder. Continue with ice packs 3 to 4 times a day for the next 2 to 3 days. Then use the ice as needed to relieve pain and swelling.    You may use acetaminophen or ibuprofen to control pain, unless another pain medicine was prescribed. If you have chronic liver or kidney disease or ever had a stomach ulcer or  gastrointestinal bleeding, talk with your doctor before using these medicines.    Don t take part in sports or physical education classes until your doctor says it s OK.  Follow-up care  Follow up with your healthcare provider, or as advised. You shouldn t wear your shoulder immobilizer or sling for more than a few weeks without taking it off. Keeping it on for a longer time may limit your range-of-motion at the shoulder joint. If you have had several dislocations of the same shoulder, you may have permanent damage to the ligaments. Ask an orthopedic doctor about surgery to prevent another dislocation.  When to seek medical advice  Call your healthcare provider right away if any of these occur:    Another dislocation of your shoulder    Swelling or pain in the shoulder or arm that gets worse    Your fingers become cold, blue, numb or tingly    Fever or chills  Date Last Reviewed: 8/2/2016 2000-2018 [employer]. [address] [place] [id number]. All rights reserved. This information is not intended as a substitute for professional medical care. Always follow your healthcare professional's instructions.

## 2018-12-02 NOTE — DISCHARGE INSTRUCTIONS
Dislocation: Shoulder (Reduced)    A shoulder is dislocated when a strong force injures, and possibly tears the ligaments that hold the shoulder joint together. The bones that make up the joint then move apart and become stuck out of place. The joint must be put back in place. Then it will take several weeks for the shoulder to heal. This injury may weaken the ligaments. Weakened ligaments put you at risk for another dislocation. Another dislocation can happen even if you are hit with less force.  Shoulder dislocation is treated with a special type of arm sling called a shoulder immobilizer. This keeps your arm close to your body. This stops the shoulder from dislocating again while the ligaments heal. After a few weeks, you may start an exercise program. This will gradually bring back your range-of-motion and shoulder strength. It will also lower your risk for another dislocation.  Home care  Follow these tips for taking care of yourself at home:    Until your next doctor visit, wear your shoulder immobilizer at all times. Don t take it off at night to sleep. This is because it s possible to dislocate your arm again in your sleep. You can take it off to bathe or dress. But don t move your arm away from your body. Keep your arm in the same position that the sling was holding it in until you put the sling back on. During your next visit, ask your doctor how long you should wear the sling.    Apply an ice pack over the injured area for 20 minutes every 1 to 2 hours the first day. You can make an ice pack by putting ice cubes in a plastic bag. Wrap the bag in a towel before putting it on your shoulder. Continue with ice packs 3 to 4 times a day for the next 2 to 3 days. Then use the ice as needed to relieve pain and swelling.    You may use acetaminophen or ibuprofen to control pain, unless another pain medicine was prescribed. If you have chronic liver or kidney disease or ever had a stomach ulcer or  gastrointestinal bleeding, talk with your doctor before using these medicines.    Don t take part in sports or physical education classes until your doctor says it s OK.  Follow-up care  Follow up with your healthcare provider, or as advised. You shouldn t wear your shoulder immobilizer or sling for more than a few weeks without taking it off. Keeping it on for a longer time may limit your range-of-motion at the shoulder joint. If you have had several dislocations of the same shoulder, you may have permanent damage to the ligaments. Ask an orthopedic doctor about surgery to prevent another dislocation.  When to seek medical advice  Call your healthcare provider right away if any of these occur:    Another dislocation of your shoulder    Swelling or pain in the shoulder or arm that gets worse    Your fingers become cold, blue, numb or tingly    Fever or chills  Date Last Reviewed: 8/2/2016 2000-2018 [employer]. [address] [place] [id number]. All rights reserved. This information is not intended as a substitute for professional medical care. Always follow your healthcare professional's instructions.

## 2018-12-02 NOTE — ED NOTES
Bed: ST03  Expected date: 12/2/18  Expected time: 4:02 PM  Means of arrival:   Comments:  Room 9 for sedation

## 2018-12-02 NOTE — ED AVS SNAPSHOT
Emergency Department    [address] [facility] [address]    Phone:  [phone]    Fax:  [phone]                                       Loan Anderson   MRN: [id number]    Department:   Emergency Department   Date of Visit:  12/2/2018           Patient Information     Date Of Birth          1940        Your diagnoses for this visit were:     Dislocation of right shoulder joint, initial encounter        You were seen by Shauna Reynolds MD.      Follow-up Information     Follow up with Opal Cardenas.    Specialty:  Internal Medicine    Why:  As needed    Contact information:    [facility]  [address]  [facility] [address]  [phone]          Follow up with  Emergency Department.    Specialty:  EMERGENCY MEDICINE    Why:  As needed, If symptoms worsen    Contact information:    [address] [facility] [id number] [phone]        Discharge Instructions         Dislocation: Shoulder (Reduced)    A shoulder is dislocated when a strong force injures, and possibly tears the ligaments that hold the shoulder joint together. The bones that make up the joint then move apart and become stuck out of place. The joint must be put back in place. Then it will take several weeks for the shoulder to heal. This injury may weaken the ligaments. Weakened ligaments put you at risk for another dislocation. Another dislocation can happen even if you are hit with less force.  Shoulder dislocation is treated with a special type of arm sling called a shoulder immobilizer. This keeps your arm close to your body. This stops the shoulder from dislocating again while the ligaments heal. After a few weeks, you may start an exercise program. This will gradually bring back your range-of-motion and shoulder strength. It will also lower your risk for another dislocation.  Home care  Follow these tips for taking care of yourself at home:    Until your next  doctor visit, wear your shoulder immobilizer at all times. Don t take it off at night to sleep. This is because it s possible to dislocate your arm again in your sleep. You can take it off to bathe or dress. But don t move your arm away from your body. Keep your arm in the same position that the sling was holding it in until you put the sling back on. During your next visit, ask your doctor how long you should wear the sling.    Apply an ice pack over the injured area for 20 minutes every 1 to 2 hours the first day. You can make an ice pack by putting ice cubes in a plastic bag. Wrap the bag in a towel before putting it on your shoulder. Continue with ice packs 3 to 4 times a day for the next 2 to 3 days. Then use the ice as needed to relieve pain and swelling.    You may use acetaminophen or ibuprofen to control pain, unless another pain medicine was prescribed. If you have chronic liver or kidney disease or ever had a stomach ulcer or gastrointestinal bleeding, talk with your doctor before using these medicines.    Don t take part in sports or physical education classes until your doctor says it s OK.  Follow-up care  Follow up with your healthcare provider, or as advised. You shouldn t wear your shoulder immobilizer or sling for more than a few weeks without taking it off. Keeping it on for a longer time may limit your range-of-motion at the shoulder joint. If you have had several dislocations of the same shoulder, you may have permanent damage to the ligaments. Ask an orthopedic doctor about surgery to prevent another dislocation.  When to seek medical advice  Call your healthcare provider right away if any of these occur:    Another dislocation of your shoulder    Swelling or pain in the shoulder or arm that gets worse    Your fingers become cold, blue, numb or tingly    Fever or chills  Date Last Reviewed: 8/2/2016 2000-2018 [employer]. [address] [place] [address], [place] [address]. All rights  reserved. This information is not intended as a substitute for professional medical care. Always follow your healthcare professional's instructions.          Your next 10 appointments already scheduled     Jan 28, 2019  9:00 AM CST   Return Visit with Pedro Pitts MD   [facility]    [address] [place] [id number] [phone]              24 Hour Appointment Hotline       To make an appointment at any [facility], call [phone] ([phone]). If you don't have a family doctor or clinic, we will help you find one. [facility] are conveniently located to serve the needs of you and your family.             Review of your medicines      Our records show that you are taking the medicines listed below. If these are incorrect, please call your family doctor or clinic.        Dose / Directions Last dose taken    acetaminophen 500 MG tablet   Commonly known as:  TYLENOL        Rarely taking   Refills:  0        aspirin 81 MG tablet   Commonly known as:  ASA   Quantity:  30 tablet        81mg tab by mouth @ midnight   Refills:  0        Calcium-Vitamin D 600-200 MG-UNIT Tabs   Quantity:  30 tablet        1 tab by mouth @ 6pm   Refills:  0        * carbidopa-levodopa  MG CR tablet   Commonly known as:  SINEMET CR   Dose:  1 tablet   Quantity:  360 tablet        Take 1 tablet by mouth 4 times daily 6am,noon,6pm midnight   Refills:  3        * carbidopa-levodopa  MG tablet   Commonly known as:  SINEMET   Quantity:  630 tablet        1.5 tabs by mouth daily @ 6am,noon,6pm midnight and 1 tab by mouth daily as needed   Refills:  3        COMPOUNDED NON-CONTROLLED SUBSTANCE - PHARMACY TO MIX COMPOUNDED MEDICATION   Commonly known as:  CMPD RX   Quantity:  30 g        Estriol 1 mg/Gr   Apply small amount on the index finger apply to vaginal area twice weekly   Refills:  6        docusate sodium 100 MG capsule   Commonly known as:   COLACE   Quantity:  60 capsule        100mg capsule by mouth daily @ noon   Refills:  0        EYE VITAMINS Caps   Quantity:  30 capsule        1 capsule ([facility]) by mouth daily @ midnight   Refills:  0        FIBER ADULT GUMMIES 2 g Chew        8 gummies by mouth daily @ 8am (as needed: 5 grams of fiber per gummy)   Refills:  0        FLOMAX 0.4 MG capsule   Quantity:  30 capsule   Generic drug:  tamsulosin        0.4mg capsule by mouth daily @ 530pm   Refills:  11        ibuprofen 800 MG tablet   Commonly known as:  ADVIL/MOTRIN   Quantity:  30 tablet        800mg tab by mouth 4/day @ 6am, 12noon, 6pm and 12midnight   Refills:  1        levothyroxine 100 MCG tablet   Commonly known as:  SYNTHROID/LEVOTHROID   Quantity:  50 tablet        100mcg tab by mouth @ 6am   Refills:  3        lisinopril 5 MG tablet   Commonly known as:  PRINIVIL/ZESTRIL   Quantity:  30 tablet        5mg tab by mouth daily @ 6am   Refills:  1        MIRALAX powder   Quantity:  119 g   Generic drug:  polyethylene glycol        1 scoop in liquid by mouth daily @ 6am (as needed)   Refills:  0        omeprazole 20 MG DR capsule   Commonly known as:  priLOSEC   Dose:  20 mg   Quantity:  30 capsule        Take 1 capsule (20 mg) by mouth daily   Refills:  1        rotigotine 4 MG/24HR 24 hr patch   Commonly known as:  NEUPRO   Quantity:  90 patch        Apply patch daily @ midnight   Refills:  3        SANCTURA XR 60 MG Cp24 24 hr capsule   Quantity:  90 each   Generic drug:  trospium        60mg capsule by mouth daily @ 6am   Refills:  3        venlafaxine 75 MG 24 hr capsule   Commonly known as:  EFFEXOR-XR   Quantity:  90 capsule        75mg tab by mouth daily @ 6am   Refills:  11        vitamin B complex with vitamin C tablet        Vitamin b complex by mouth @ 6pm   Refills:  0        * Notice:  This list has 2 medication(s) that are the same as other medications prescribed for you. Read the directions carefully, and ask your doctor or  other care provider to review them with you.            Procedures and tests performed during your visit     Shoulder XR, right, 2-3 vw PORTABLE      Orders Needing Specimen Collection     None      Pending Results     No orders found from 11/30/2018 to 12/3/2018.            Pending Culture Results     No orders found from 11/30/2018 to 12/3/2018.            Pending Results Instructions     If you had any lab results that were not finalized at the time of your Discharge, you can call the ED Lab Result RN at [phone]. You will be contacted by this team for any positive Lab results or changes in treatment. The nurses are available 7 days a week from 10A to 6:30P.  You can leave a message 24 hours per day and they will return your call.        Test Results From Your Hospital Stay        12/2/2018  5:20 PM      Narrative     SHOULDER RIGHT PORTABLE TWO OR MORE VIEWS   12/2/2018 5:08 PM     HISTORY: Post reduction.     COMPARISON: 11/14/2018        Impression     IMPRESSION: The glenohumeral joint is located. No fractures are  identified. Acromioclavicular joint is unremarkable.    JOSE JUAN LEAL MD                Clinical Quality Measure: Blood Pressure Screening     Your blood pressure was checked while you were in the emergency department today. The last reading we obtained was  BP: (!) 163/116 . Please read the guidelines below about what these numbers mean and what you should do about them.  If your systolic blood pressure (the top number) is less than 120 and your diastolic blood pressure (the bottom number) is less than 80, then your blood pressure is normal. There is nothing more that you need to do about it.  If your systolic blood pressure (the top number) is 120-139 or your diastolic blood pressure (the bottom number) is 80-89, your blood pressure may be higher than it should be. You should have your blood pressure rechecked within a year by a primary care provider.  If your systolic blood pressure (the  top number) is 140 or greater or your diastolic blood pressure (the bottom number) is 90 or greater, you may have high blood pressure. High blood pressure is treatable, but if left untreated over time it can put you at risk for heart attack, stroke, or kidney failure. You should have your blood pressure rechecked by a primary care provider within the next 4 weeks.  If your provider in the emergency department today gave you specific instructions to follow-up with your doctor or provider even sooner than that, you should follow that instruction and not wait for up to 4 weeks for your follow-up visit.        Thank you for choosing [facility]       Thank you for choosing [facility] for your care. Our goal is always to provide you with excellent care. Hearing back from our patients is one way we can continue to improve our services. Please take a few minutes to complete the written survey that you may receive in the mail after you visit with us. Thank you!        [employer] Information     [employer] gives you secure access to your electronic health record. If you see a primary care provider, you can also send messages to your care team and make appointments. If you have questions, please call your primary care clinic.  If you do not have a primary care provider, please call [phone] and they will assist you.        Care EveryWhere ID     This is your Care EveryWhere ID. This could be used by other organizations to access your [facility] medical records  [id number]        Equal Access to Services     SARAH BETH WILLIS : Hadii nyla Nunez, washivanida ac, qaybta kaalamrgy crawford, amber mathew. So [facility] [phone].    ATENCIÓN: Si habla español, tiene a adam disposición servicios gratuitos de asistencia lingüística. Llame al [phone].    We comply with applicable federal civil rights laws and [place] laws. We do not discriminate on the basis of race, color, national origin, age,  disability, sex, sexual orientation, or gender identity.            After Visit Summary       This is your record. Keep this with you and show to your community pharmacist(s) and doctor(s) at your next visit.

## 2018-12-02 NOTE — ED PROVIDER NOTES
History     Chief Complaint:  Right shoulder dislocation    The history is provided by a relative.      Loan Anderson is a 78 year old female with a history of Parkinson's disease, Hypothyroidism, Hypercholesterolemia, HTN, CKD, and neurogenic bladder who presents to the emergency department with family for evaluation of right shoulder dislocation. Two weeks ago the patient dislocated her R shoulder and had it reduced in the ED.  This morning the pt slipped out of bed and landed on her right shoulder. In the process of trying to get herself up, she dislocated her shoulder. Since then, the shoulder re-dislocated and she was seen at [facility] today, where x-rays confirmed the dislocation. The doctor at [facility] was unable to reduce the shoulder without sedation, prompted the patient to be sent here for a sedated reduction in the emergency department.  Here, the patient has pain in the right shoulder but no numbness/tingling or weakness.       Allergies:  Mirapex  Potassium chloride  Requip  Epinephrine     Medications:    Aspirin, 81 mg  Cardidopa-Levodopa  Colace  Levothyroxine  Omeprazole  Lisinopril  Miralax  Rotigotine  Flomax  Trospium  Effexor    Past Medical History:    Acute kidney failure  Acute pyelonephritis  HTN  Obesity  E Coli Bacteremia  CKD  Sepsis  Dysphagia  Acute metabolic encephalopathy  Osteopenia  Parkinson's disease  Hyponatremia  Hypokalemia  Parkinson's disease  Kidney stones  Urinary incontinence  Nonalcoholic steatohepatitis  Hypercholesteremia  Obstructive sleep apnea   Neurogenic bladder  Hypothyroid  Diplopia  Sebaceous cyst     Past Surgical History:    Brain surgery  Lithotripsy for renal stone    Family History:    Parkinson's disease  Eye disorder  Cerebrovascular disease: Stroke  Heart disease: Father    Social History:  Negative for tobacco use.  Positive for alcohol use.  Patient presents with her family.  Marital Status:        Review of Systems   Musculoskeletal:         Positive for right shoulder pain   Neurological: Negative for weakness and numbness.   All other systems reviewed and are negative.    Physical Exam     Patient Vitals for the past 24 hrs:   BP Temp Temp src Heart Rate Resp SpO2   12/02/18 1837 - - - - - 94 %   12/02/18 1836 - - - - - 92 %   12/02/18 1835 - - - - - 95 %   12/02/18 1833 - - - - - 93 %   12/02/18 1832 - - - - - 95 %   12/02/18 1831 - - - - - 95 %   12/02/18 1830 - - - - - (!) 84 %   12/02/18 1829 - - - - - 92 %   12/02/18 1815 - - - - - 97 %   12/02/18 1800 146/87 - - - - 97 %   12/02/18 1720 (!) 163/116 - - - - 99 %   12/02/18 1710 177/81 - - - 19 99 %   12/02/18 1700 181/80 - - - 22 99 %   12/02/18 1653 (!) 192/115 - - - (!) 31 97 %   12/02/18 1650 - - - - (!) 33 99 %   12/02/18 1649 (!) 227/133 - - - 24 100 %   12/02/18 1639 (!) 150/135 - - - 17 100 %   12/02/18 1638 162/78 - - - 17 97 %   12/02/18 1456 (!) 176/114 - - - - -   12/02/18 1450 - 97.7  F (36.5  C) Oral 80 16 96 %     Physical Exam    General/Appearance: appears stated age, well-groomed, appears uncomfortable  Eyes: EOMI, no scleral injection, no icterus  ENT: MMM  Neck: supple, nl ROM, no stiffness  Cardiovascular: RRR, nl S1S2, no m/r/g, 2+ pulses in all 4 extremities, cap refill <2sec  Respiratory: CTAB, good air movement throughout, no wheezes/rhonchi/rales, no increased WOB, no retractions  Back: no lesions  GI: abd soft, non-distended, nttp,  no HSM, no rebound, no guarding, nl BS  MSK: visible and palpable deformity to R shoulder  Skin: warm and well-perfused, no rash, no edema, no ecchymosis, nl turgor  Neuro: GCS 15, alert and oriented, generalized tremor  Psych: interacts appropriately  Heme: no petechia, no purpura, no active bleeding      Emergency Department Course   Imaging:  Radiographic findings were communicated with the patient who voiced understanding of the findings.    XR Shoulder, right, 2-3 vw PORTABLE:  The glenohumeral joint is located. No fractures  are  identified. Acromioclavicular joint is unremarkable. As per radiology.    Procedures:  [facility] Procedure Note        Sedation:      Performed by: Shauna Reynolds MD  Authorized by: Shauna Reynolds MD    Pre-Procedure Assessment done at 1430.    Expected Level:  Deep Sedation    Indication:  Sedation is required to allow for joint reduction    Consent obtained from patient and family after discussing the risks, benefits and alternatives.    PO Intake:  Appropriately NPO for procedure    ASA Class:  Class 3 - SEVERE SYSTEMIC DISEASE, DEFINITE FUNCTIONAL LIMITATIONS.    Mallampati:  Grade 4    Lungs: Lungs Clear with good breath sounds bilaterally.     Heart: Normal heart sounds and rate    History and physical reviewed and no updates needed. I have reviewed the lab findings, diagnostic data, medications, and the plan for sedation. I have determined this patient to be an appropriate candidate for the planned sedation and procedure and have reassessed the patient IMMEDIATELY PRIOR to sedation and procedure.      Sedation Post Procedure Summary:    Prior to the start of the procedure and with procedural staff participation, I verbally confirmed the patient s identity using two indicators, relevant allergies, that the procedure was appropriate and matched the consent or emergent situation, and that the correct equipment/implants were available. Immediately prior to starting the procedure I conducted the Time Out with the procedural staff and re-confirmed the patient s name, procedure, and site/side. (The Joint Commission universal protocol was followed.)  Yes      Sedatives: Propofol    Vital signs, airway, End Tidal CO2 and pulse oximetry were monitored and remained stable throughout the procedure and sedation was maintained until the procedure was complete.  The patient was monitored by staff until sedation discharge criteria were met.    Patient tolerance: Patient tolerated the procedure well  with no immediate complications.    Time of sedation in minutes:  20 minutes from beginning to end of physician one to one monitoring.    Procedure Note: Reduction of glenohumeral joint dislocation (shoulder dislocation)  Physician: Shauna Reynolds MD  Diagnosis: Right Shoulder dislocation   Consent: Informed written, see paper chart  Description of Procedure: Consent as above.  Patient positioned in supine position.  Procedural anesthesia was utilized, see above.  The arm was grasped and with gentle longitudinal traction with scapular manipulation the humeral head was reduced back into the glenoid fossa.  The neurovascular exam was normal before and after reduction attempt.  The patient tolerated the procedure well, there were no complications.      Procedure Note: Splint/Shoulder Immobilizer Placement  Physician: Shauna Reynolds MD  Diagnosis: Right Shoulder dislocation   Consent: Informed written, see paper chart  Description of Procedure:  Following reduction of shoulder dislocation, a well-fitting shoulder immobilizer was placed.  The elbow was maintained at 90 degrees flexion.   The neurovascular exam was normal before and after shoulder immobilizer placement.  The patient tolerated the procedure well, there were no complications.    Interventions:  1723 Propofol 120 mg IV    Emergency Department Course:  1550 Nursing notes and vitals reviewed.  I performed an exam of the patient as documented above.     IV inserted. Medicine administered as documented above.     1430 The patient had her right shoulder reduced while sedated after obtaining consent here in the emergency department, see procedure note above. Her right shoulder was then placed in a joint immobilizer.     The patient was sent for a right shoulder xray while in the emergency department, findings above.     2048 I rechecked the patient and discussed the results of her workup thus far.     Findings and plan explained to the Patient and  daughter. Patient discharged home with instructions regarding supportive care, medications, and reasons to return. The importance of close follow-up was reviewed.     I personally answered all related questions prior to discharge.   Impression & Plan    Medical Decision Making:  This patient is a 78-year-old female who presents with right anterior shoulder dislocation.  It is unclear exactly when she sustained this so there was a slow mechanical fall 2 days ago which stretched her shoulder which is likely the time it occurred.  She has slight numbness to the hand but has normal sensation on direct physical exam.  She is got good capillary refill and good pulses.  Under propofol sedation it was very easily reduced and a shoulder immobilizer was placed.  I have asked her to wear the shoulder immobilizer for the next week and then follow-up with orthopedics at which time they will likely have her start doing shoulder exercises.  Diagnosis:    ICD-10-CM    1. Dislocation of right shoulder joint, initial encounter S43.004A        Disposition:  discharged to home with her family    Scribe Disclosure:  I, Delilah Bañuelos, am serving as a scribe on 12/2/2018 at 3:48 PM to personally document services performed by Shauna Reynolds MD based on my observations and the provider's statements to me.     Delilah Bañuelos  12/2/2018    EMERGENCY DEPARTMENT       Shauna Reynolds MD  12/02/18 1926

## 2018-12-02 NOTE — ED AVS SNAPSHOT
Emergency Department    [address] [place] [address]    Phone:  [phone]    Fax:  [phone]                                       Loan Anderson   MRN: [id number]    Department:   Emergency Department   Date of Visit:  12/2/2018           After Visit Summary Signature Page     I have received my discharge instructions, and my questions have been answered. I have discussed any challenges I see with this plan with the nurse or doctor.    ..........................................................................................................................................  Patient/Patient Representative Signature      ..........................................................................................................................................  Patient Representative Print Name and Relationship to Patient    ..................................................               ................................................  Date                                   Time    ..........................................................................................................................................  Reviewed by Signature/Title    ...................................................              ..............................................  Date                                               Time          22EPIC Rev 08/18

## 2018-12-20 NOTE — TELEPHONE ENCOUNTER
Please call the pt's daughter to let her know that the referral has been placed.   Kimberly Hubbard RN

## 2018-12-20 NOTE — TELEPHONE ENCOUNTER
Prior Authorization Retail Medication Request    Medication/Dose: sanctura 60 mg  ICD code (if different than what is on RX):  Urge incontinence frequency leakage  Previously Tried and Failed:    Rationale:  Relieve symptoms    Insurance Name:  Medicare  Insurance ID:  [id number]      Pharmacy Information (if different than what is on RX)  Name:    Phone:

## 2018-12-20 NOTE — TELEPHONE ENCOUNTER
Health Call Center    Phone Message    May a detailed message be left on voicemail: yes    Reason for Call: Other: Lara, daughter, Is calling to request a referral be placed for an Ophthalmology. Please reach to daughter with update.      Action Taken: Message routed to:  Adult Clinics: Neurology p [phone]

## 2018-12-20 NOTE — TELEPHONE ENCOUNTER
Health Call Center    Phone Message    May a detailed message be left on voicemail: yes    Reason for Call: Medication Question or concern regarding medication   Prescription Clarification  Name of Medication: trospium (SANCTURA XR) 60 MG CP24 24 hr capsule   Prescribing Provider: Dr Viveros   Pharmacy:    What on the order needs clarification? [facility] calling to say that the medication needs a prior authorization.  Adeline would appreciate a call back to discuss          Action Taken: Message routed to:  [facility] ([facility]):  Urology

## 2018-12-24 NOTE — TELEPHONE ENCOUNTER
Per pharmacy, patient has [employer] Part D; [id number]; [id number]; [id number]; [id number].    Central Prior Authorization Team   Phone: [phone]      PA Initiation    Medication: sanctura 60 mg-PA initiated  Insurance Company:    Pharm[facility]illing the Rx: [facility] ONLY #[id number] - [place], [place] - [address]  Filling Pharmacy Phone: [phone]  Filling Pharmacy Fax:    Start Date: 12/24/2018

## 2018-12-24 NOTE — TELEPHONE ENCOUNTER
Prior Authorization Approval    Authorization Effective Date: 9/25/2018  Authorization Expiration Date: 12/24/2019  Medication: sanctura 60 mg-PA approved  Approved Dose/Quantity:   Reference #:     Insurance Company: [employer] - Phone [phone] Fax [phone]  Expected CoPay:       CoPay Card Available:      Foundation Assistance Needed:    Which Pharmacy is filling the prescription (Not needed for infusion/clinic administered): [facility] ONLY #888 - [place], [place] - [address]  Pharmacy Notified: Yes  Patient Notified: No-Pharmacy will contact

## 2019-01-01 ENCOUNTER — APPOINTMENT (OUTPATIENT)
Dept: PHYSICAL THERAPY | Facility: CLINIC | Age: 79
DRG: 100 | End: 2019-01-01
Payer: MEDICARE

## 2019-01-01 ENCOUNTER — APPOINTMENT (OUTPATIENT)
Dept: SPEECH THERAPY | Facility: CLINIC | Age: 79
DRG: 100 | End: 2019-01-01
Attending: INTERNAL MEDICINE
Payer: MEDICARE

## 2019-01-01 ENCOUNTER — ALLIED HEALTH/NURSE VISIT (OUTPATIENT)
Dept: PHARMACY | Facility: CLINIC | Age: 79
End: 2019-01-01
Payer: COMMERCIAL

## 2019-01-01 ENCOUNTER — APPOINTMENT (OUTPATIENT)
Dept: CT IMAGING | Facility: CLINIC | Age: 79
DRG: 100 | End: 2019-01-01
Attending: NURSE PRACTITIONER
Payer: MEDICARE

## 2019-01-01 ENCOUNTER — APPOINTMENT (OUTPATIENT)
Dept: GENERAL RADIOLOGY | Facility: CLINIC | Age: 79
DRG: 100 | End: 2019-01-01
Attending: NURSE PRACTITIONER
Payer: MEDICARE

## 2019-01-01 ENCOUNTER — APPOINTMENT (OUTPATIENT)
Dept: OCCUPATIONAL THERAPY | Facility: CLINIC | Age: 79
DRG: 100 | End: 2019-01-01
Payer: MEDICARE

## 2019-01-01 ENCOUNTER — APPOINTMENT (OUTPATIENT)
Dept: SPEECH THERAPY | Facility: CLINIC | Age: 79
DRG: 100 | End: 2019-01-01
Payer: MEDICARE

## 2019-01-01 ENCOUNTER — APPOINTMENT (OUTPATIENT)
Dept: CT IMAGING | Facility: CLINIC | Age: 79
DRG: 100 | End: 2019-01-01
Attending: EMERGENCY MEDICINE
Payer: MEDICARE

## 2019-01-01 ENCOUNTER — OFFICE VISIT (OUTPATIENT)
Dept: NEUROLOGY | Facility: CLINIC | Age: 79
End: 2019-01-01
Payer: MEDICARE

## 2019-01-01 ENCOUNTER — AMBULATORY - HEALTHEAST (OUTPATIENT)
Dept: OTHER | Facility: CLINIC | Age: 79
End: 2019-01-01

## 2019-01-01 ENCOUNTER — OFFICE VISIT (OUTPATIENT)
Dept: OPHTHALMOLOGY | Facility: CLINIC | Age: 79
End: 2019-01-01
Attending: PSYCHIATRY & NEUROLOGY
Payer: MEDICARE

## 2019-01-01 ENCOUNTER — TRANSFERRED RECORDS (OUTPATIENT)
Dept: HEALTH INFORMATION MANAGEMENT | Facility: CLINIC | Age: 79
End: 2019-01-01

## 2019-01-01 ENCOUNTER — TELEPHONE (OUTPATIENT)
Dept: NEUROLOGY | Facility: CLINIC | Age: 79
End: 2019-01-01

## 2019-01-01 ENCOUNTER — RECORDS - HEALTHEAST (OUTPATIENT)
Dept: LAB | Facility: CLINIC | Age: 79
End: 2019-01-01

## 2019-01-01 ENCOUNTER — APPOINTMENT (OUTPATIENT)
Dept: GENERAL RADIOLOGY | Facility: CLINIC | Age: 79
DRG: 100 | End: 2019-01-01
Attending: INTERNAL MEDICINE
Payer: MEDICARE

## 2019-01-01 ENCOUNTER — APPOINTMENT (OUTPATIENT)
Dept: GENERAL RADIOLOGY | Facility: CLINIC | Age: 79
DRG: 100 | End: 2019-01-01
Attending: EMERGENCY MEDICINE
Payer: MEDICARE

## 2019-01-01 ENCOUNTER — TELEPHONE (OUTPATIENT)
Dept: INTERVENTIONAL RADIOLOGY/VASCULAR | Facility: CLINIC | Age: 79
End: 2019-01-01

## 2019-01-01 ENCOUNTER — TELEPHONE (OUTPATIENT)
Dept: PHARMACY | Facility: CLINIC | Age: 79
End: 2019-01-01

## 2019-01-01 ENCOUNTER — DOCUMENTATION ONLY (OUTPATIENT)
Dept: OTHER | Facility: CLINIC | Age: 79
End: 2019-01-01

## 2019-01-01 ENCOUNTER — HOSPITAL ENCOUNTER (OUTPATIENT)
Dept: MRI IMAGING | Facility: CLINIC | Age: 79
End: 2019-02-21
Attending: ORTHOPAEDIC SURGERY | Admitting: RADIOLOGY
Payer: MEDICARE

## 2019-01-01 ENCOUNTER — HOSPITAL ENCOUNTER (OUTPATIENT)
Facility: CLINIC | Age: 79
Discharge: HOME | End: 2019-02-21
Admitting: EMERGENCY MEDICINE
Payer: MEDICARE

## 2019-01-01 ENCOUNTER — APPOINTMENT (OUTPATIENT)
Dept: PHYSICAL THERAPY | Facility: CLINIC | Age: 79
DRG: 100 | End: 2019-01-01
Attending: INTERNAL MEDICINE
Payer: MEDICARE

## 2019-01-01 ENCOUNTER — HOSPITAL ENCOUNTER (INPATIENT)
Facility: CLINIC | Age: 79
LOS: 26 days | Discharge: HOSPICE | DRG: 100 | End: 2019-03-28
Attending: EMERGENCY MEDICINE | Admitting: INTERNAL MEDICINE
Payer: MEDICARE

## 2019-01-01 ENCOUNTER — APPOINTMENT (OUTPATIENT)
Dept: OCCUPATIONAL THERAPY | Facility: CLINIC | Age: 79
DRG: 100 | End: 2019-01-01
Attending: INTERNAL MEDICINE
Payer: MEDICARE

## 2019-01-01 ENCOUNTER — APPOINTMENT (OUTPATIENT)
Dept: MRI IMAGING | Facility: CLINIC | Age: 79
DRG: 100 | End: 2019-01-01
Attending: INTERNAL MEDICINE
Payer: MEDICARE

## 2019-01-01 ENCOUNTER — APPOINTMENT (OUTPATIENT)
Dept: GENERAL RADIOLOGY | Facility: CLINIC | Age: 79
DRG: 100 | End: 2019-01-01
Attending: PSYCHIATRY & NEUROLOGY
Payer: MEDICARE

## 2019-01-01 ENCOUNTER — APPOINTMENT (OUTPATIENT)
Dept: GENERAL RADIOLOGY | Facility: CLINIC | Age: 79
DRG: 100 | End: 2019-01-01
Attending: SURGERY
Payer: MEDICARE

## 2019-01-01 ENCOUNTER — HOSPITAL ENCOUNTER (EMERGENCY)
Facility: CLINIC | Age: 79
Discharge: HOME | DRG: 100 | End: 2019-02-27
Attending: EMERGENCY MEDICINE | Admitting: EMERGENCY MEDICINE
Payer: MEDICARE

## 2019-01-01 ENCOUNTER — OFFICE VISIT (OUTPATIENT)
Dept: PHARMACY | Facility: CLINIC | Age: 79
End: 2019-01-01
Payer: COMMERCIAL

## 2019-01-01 VITALS — SYSTOLIC BLOOD PRESSURE: 113 MMHG | DIASTOLIC BLOOD PRESSURE: 73 MMHG | OXYGEN SATURATION: 93 % | HEART RATE: 68 BPM

## 2019-01-01 VITALS
OXYGEN SATURATION: 95 % | SYSTOLIC BLOOD PRESSURE: 178 MMHG | DIASTOLIC BLOOD PRESSURE: 100 MMHG | RESPIRATION RATE: 20 BRPM

## 2019-01-01 VITALS
TEMPERATURE: 97.9 F | HEART RATE: 60 BPM | SYSTOLIC BLOOD PRESSURE: 138 MMHG | WEIGHT: 190 LBS | DIASTOLIC BLOOD PRESSURE: 84 MMHG | RESPIRATION RATE: 18 BRPM | OXYGEN SATURATION: 97 % | BODY MASS INDEX: 32.44 KG/M2 | HEIGHT: 64 IN

## 2019-01-01 VITALS
HEART RATE: 71 BPM | BODY MASS INDEX: 35.38 KG/M2 | WEIGHT: 199.7 LBS | OXYGEN SATURATION: 93 % | TEMPERATURE: 99.2 F | DIASTOLIC BLOOD PRESSURE: 45 MMHG | SYSTOLIC BLOOD PRESSURE: 116 MMHG | RESPIRATION RATE: 20 BRPM | HEIGHT: 63 IN

## 2019-01-01 VITALS — WEIGHT: 194 LBS | BODY MASS INDEX: 34.37 KG/M2 | OXYGEN SATURATION: 96 % | HEART RATE: 77 BPM

## 2019-01-01 DIAGNOSIS — H50.21 HYPERTROPIA OF RIGHT EYE: Primary | ICD-10-CM

## 2019-01-01 DIAGNOSIS — G47.00 INSOMNIA, UNSPECIFIED TYPE: ICD-10-CM

## 2019-01-01 DIAGNOSIS — R45.1 AGITATION: ICD-10-CM

## 2019-01-01 DIAGNOSIS — S22.41XA CLOSED FRACTURE OF MULTIPLE RIBS OF RIGHT SIDE, INITIAL ENCOUNTER: ICD-10-CM

## 2019-01-01 DIAGNOSIS — G20.A1 PARALYSIS AGITANS (H): Primary | ICD-10-CM

## 2019-01-01 DIAGNOSIS — B37.49 CANDIDA UTI: ICD-10-CM

## 2019-01-01 DIAGNOSIS — I10 ESSENTIAL HYPERTENSION: ICD-10-CM

## 2019-01-01 DIAGNOSIS — N31.9 NEUROGENIC BLADDER: ICD-10-CM

## 2019-01-01 DIAGNOSIS — M17.0 PRIMARY OSTEOARTHRITIS OF BOTH KNEES: ICD-10-CM

## 2019-01-01 DIAGNOSIS — G47.00 INSOMNIA, UNSPECIFIED TYPE: Primary | ICD-10-CM

## 2019-01-01 DIAGNOSIS — M54.50 LUMBAR PAIN: ICD-10-CM

## 2019-01-01 DIAGNOSIS — B37.49 CANDIDURIA: ICD-10-CM

## 2019-01-01 DIAGNOSIS — M25.519 SHOULDER PAIN, UNSPECIFIED CHRONICITY, UNSPECIFIED LATERALITY: ICD-10-CM

## 2019-01-01 DIAGNOSIS — Z78.9 TAKES DIETARY SUPPLEMENTS: ICD-10-CM

## 2019-01-01 DIAGNOSIS — F34.1 DYSTHYMIC DISORDER: ICD-10-CM

## 2019-01-01 DIAGNOSIS — E03.9 HYPOTHYROIDISM, UNSPECIFIED TYPE: ICD-10-CM

## 2019-01-01 DIAGNOSIS — H51.9 CONVERGENCE INSUFFICIENCY OR PALSY IN BINOCULAR EYE MOVEMENT: ICD-10-CM

## 2019-01-01 DIAGNOSIS — K21.9 GASTROESOPHAGEAL REFLUX DISEASE, ESOPHAGITIS PRESENCE NOT SPECIFIED: ICD-10-CM

## 2019-01-01 DIAGNOSIS — G40.89 OTHER SEIZURES (H): ICD-10-CM

## 2019-01-01 DIAGNOSIS — H53.10 SUBJECTIVE VISUAL DISTURBANCE: Primary | ICD-10-CM

## 2019-01-01 DIAGNOSIS — Z82.49 FAMILY HISTORY OF MI (MYOCARDIAL INFARCTION): ICD-10-CM

## 2019-01-01 DIAGNOSIS — G40.909 SEIZURE DISORDER (H): ICD-10-CM

## 2019-01-01 DIAGNOSIS — R06.02 SHORTNESS OF BREATH: ICD-10-CM

## 2019-01-01 DIAGNOSIS — G20.A1 PARKINSON DISEASE (H): ICD-10-CM

## 2019-01-01 DIAGNOSIS — R39.11 URINARY HESITANCY: ICD-10-CM

## 2019-01-01 DIAGNOSIS — Z51.5 HOSPICE CARE PATIENT: ICD-10-CM
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
## 2019-01-01 DIAGNOSIS — K59.01 SLOW TRANSIT CONSTIPATION: ICD-10-CM

## 2019-01-01 DIAGNOSIS — S49.90XA SHOULDER INJURY: ICD-10-CM

## 2019-01-01 DIAGNOSIS — H53.10 SUBJECTIVE VISUAL DISTURBANCE: ICD-10-CM

## 2019-01-01 DIAGNOSIS — K59.00 CONSTIPATION, UNSPECIFIED CONSTIPATION TYPE: ICD-10-CM

## 2019-01-01 DIAGNOSIS — R41.82 ALTERED MENTAL STATUS, UNSPECIFIED ALTERED MENTAL STATUS TYPE: ICD-10-CM

## 2019-01-01 LAB
ABO + RH BLD: NORMAL
ALBUMIN SERPL-MCNC: 2.3 G/DL (ref 3.4–5)
ALBUMIN SERPL-MCNC: 2.3 G/DL (ref 3.4–5)
ALBUMIN SERPL-MCNC: 2.6 G/DL (ref 3.4–5)
ALBUMIN SERPL-MCNC: 2.7 G/DL (ref 3.4–5)
ALBUMIN SERPL-MCNC: 3.2 G/DL (ref 3.4–5)
ALBUMIN UR-MCNC: 10 MG/DL
ALBUMIN UR-MCNC: 300 MG/DL
ALP SERPL-CCNC: 127 U/L (ref 40–150)
ALP SERPL-CCNC: 137 U/L (ref 40–150)
ALP SERPL-CCNC: 138 U/L (ref 40–150)
ALP SERPL-CCNC: 168 U/L (ref 40–150)
ALP SERPL-CCNC: 259 U/L (ref 40–150)
ALT SERPL W P-5'-P-CCNC: 10 U/L (ref 0–50)
ALT SERPL W P-5'-P-CCNC: 13 U/L (ref 0–50)
ALT SERPL W P-5'-P-CCNC: 14 U/L (ref 0–50)
ALT SERPL W P-5'-P-CCNC: 7 U/L (ref 0–50)
ALT SERPL W P-5'-P-CCNC: 7 U/L (ref 0–50)
AMORPH CRY #/AREA URNS HPF: ABNORMAL /HPF
ANION GAP SERPL CALCULATED.3IONS-SCNC: 10 MMOL/L (ref 3–14)
ANION GAP SERPL CALCULATED.3IONS-SCNC: 11 MMOL/L (ref 5–18)
ANION GAP SERPL CALCULATED.3IONS-SCNC: 3 MMOL/L (ref 3–14)
ANION GAP SERPL CALCULATED.3IONS-SCNC: 4 MMOL/L (ref 3–14)
ANION GAP SERPL CALCULATED.3IONS-SCNC: 5 MMOL/L (ref 3–14)
ANION GAP SERPL CALCULATED.3IONS-SCNC: 6 MMOL/L (ref 3–14)
ANION GAP SERPL CALCULATED.3IONS-SCNC: 7 MMOL/L (ref 3–14)
ANION GAP SERPL CALCULATED.3IONS-SCNC: 8 MMOL/L (ref 3–14)
ANION GAP SERPL CALCULATED.3IONS-SCNC: 8 MMOL/L (ref 3–14)
ANION GAP SERPL CALCULATED.3IONS-SCNC: 9 MMOL/L (ref 3–14)
ANION GAP SERPL CALCULATED.3IONS-SCNC: 9 MMOL/L (ref 3–14)
APPEARANCE UR: ABNORMAL
APPEARANCE UR: CLEAR
APPEARANCE UR: CLEAR
APTT PPP: 28 SEC (ref 22–37)
AST SERPL W P-5'-P-CCNC: 20 U/L (ref 0–45)
AST SERPL W P-5'-P-CCNC: 21 U/L (ref 0–45)
AST SERPL W P-5'-P-CCNC: 23 U/L (ref 0–45)
BACTERIA #/AREA URNS HPF: ABNORMAL /HPF
BACTERIA SPEC CULT: ABNORMAL
BACTERIA SPEC CULT: NO GROWTH
BACTERIA SPEC CULT: NORMAL
BASE EXCESS BLDV CALC-SCNC: 13.1 MMOL/L
BASE EXCESS BLDV CALC-SCNC: 14.3 MMOL/L
BASE EXCESS BLDV CALC-SCNC: 4 MMOL/L
BASOPHILS # BLD AUTO: 0 10E9/L (ref 0–0.2)
BASOPHILS # BLD AUTO: 0 10E9/L (ref 0–0.2)
BASOPHILS # BLD AUTO: 0.1 10E9/L (ref 0–0.2)
BASOPHILS # BLD AUTO: 0.1 10E9/L (ref 0–0.2)
BASOPHILS NFR BLD AUTO: 0.2 %
BASOPHILS NFR BLD AUTO: 0.3 %
BASOPHILS NFR BLD AUTO: 0.4 %
BASOPHILS NFR BLD AUTO: 0.5 %
BILIRUB DIRECT SERPL-MCNC: <0.1 MG/DL (ref 0–0.2)
BILIRUB SERPL-MCNC: 0.3 MG/DL (ref 0.2–1.3)
BILIRUB SERPL-MCNC: 0.4 MG/DL (ref 0.2–1.3)
BILIRUB SERPL-MCNC: 0.5 MG/DL (ref 0.2–1.3)
BILIRUB UR QL STRIP: NEGATIVE
BLD GP AB SCN SERPL QL: NORMAL
BLOOD BANK CMNT PATIENT-IMP: NORMAL
BUN SERPL-MCNC: 17 MG/DL (ref 7–30)
BUN SERPL-MCNC: 19 MG/DL (ref 7–30)
BUN SERPL-MCNC: 19 MG/DL (ref 7–30)
BUN SERPL-MCNC: 20 MG/DL (ref 8–28)
BUN SERPL-MCNC: 22 MG/DL (ref 7–30)
BUN SERPL-MCNC: 23 MG/DL (ref 7–30)
BUN SERPL-MCNC: 25 MG/DL (ref 7–30)
BUN SERPL-MCNC: 26 MG/DL (ref 7–30)
BUN SERPL-MCNC: 28 MG/DL (ref 7–30)
BUN SERPL-MCNC: 31 MG/DL (ref 7–30)
BUN SERPL-MCNC: 31 MG/DL (ref 7–30)
BUN SERPL-MCNC: 33 MG/DL (ref 7–30)
BUN SERPL-MCNC: 34 MG/DL (ref 7–30)
BUN SERPL-MCNC: 37 MG/DL (ref 7–30)
BUN SERPL-MCNC: 37 MG/DL (ref 7–30)
BUN SERPL-MCNC: 38 MG/DL (ref 7–30)
BUN SERPL-MCNC: 39 MG/DL (ref 7–30)
BUN SERPL-MCNC: 39 MG/DL (ref 7–30)
BUN SERPL-MCNC: 40 MG/DL (ref 7–30)
BUN SERPL-MCNC: 40 MG/DL (ref 7–30)
BUN SERPL-MCNC: 41 MG/DL (ref 7–30)
BUN SERPL-MCNC: 41 MG/DL (ref 7–30)
BUN SERPL-MCNC: 42 MG/DL (ref 7–30)
BUN SERPL-MCNC: 51 MG/DL (ref 7–30)
BUN SERPL-MCNC: 51 MG/DL (ref 7–30)
BUN SERPL-MCNC: 52 MG/DL (ref 7–30)
CA-I BLD-MCNC: 4.3 MG/DL (ref 4.4–5.2)
CALCIUM SERPL-MCNC: 7.6 MG/DL (ref 8.5–10.1)
CALCIUM SERPL-MCNC: 7.6 MG/DL (ref 8.5–10.1)
CALCIUM SERPL-MCNC: 7.7 MG/DL (ref 8.5–10.1)
CALCIUM SERPL-MCNC: 7.7 MG/DL (ref 8.5–10.1)
CALCIUM SERPL-MCNC: 8 MG/DL (ref 8.5–10.1)
CALCIUM SERPL-MCNC: 8 MG/DL (ref 8.5–10.1)
CALCIUM SERPL-MCNC: 8.1 MG/DL (ref 8.5–10.1)
CALCIUM SERPL-MCNC: 8.3 MG/DL (ref 8.5–10.1)
CALCIUM SERPL-MCNC: 8.4 MG/DL (ref 8.5–10.1)
CALCIUM SERPL-MCNC: 8.6 MG/DL (ref 8.5–10.1)
CALCIUM SERPL-MCNC: 8.7 MG/DL (ref 8.5–10.1)
CALCIUM SERPL-MCNC: 8.7 MG/DL (ref 8.5–10.1)
CALCIUM SERPL-MCNC: 8.8 MG/DL (ref 8.5–10.1)
CALCIUM SERPL-MCNC: 8.9 MG/DL (ref 8.5–10.1)
CALCIUM SERPL-MCNC: 9 MG/DL (ref 8.5–10.1)
CALCIUM SERPL-MCNC: 9.1 MG/DL (ref 8.5–10.1)
CALCIUM SERPL-MCNC: 9.1 MG/DL (ref 8.5–10.5)
CALCIUM SERPL-MCNC: 9.3 MG/DL (ref 8.5–10.1)
CAOX CRY #/AREA URNS HPF: ABNORMAL /HPF
CHLORIDE BLD-SCNC: 103 MMOL/L (ref 98–107)
CHLORIDE SERPL-SCNC: 100 MMOL/L (ref 94–109)
CHLORIDE SERPL-SCNC: 102 MMOL/L (ref 94–109)
CHLORIDE SERPL-SCNC: 102 MMOL/L (ref 94–109)
CHLORIDE SERPL-SCNC: 103 MMOL/L (ref 94–109)
CHLORIDE SERPL-SCNC: 104 MMOL/L (ref 94–109)
CHLORIDE SERPL-SCNC: 104 MMOL/L (ref 94–109)
CHLORIDE SERPL-SCNC: 105 MMOL/L (ref 94–109)
CHLORIDE SERPL-SCNC: 107 MMOL/L (ref 94–109)
CHLORIDE SERPL-SCNC: 107 MMOL/L (ref 94–109)
CHLORIDE SERPL-SCNC: 109 MMOL/L (ref 94–109)
CHLORIDE SERPL-SCNC: 112 MMOL/L (ref 94–109)
CHLORIDE SERPL-SCNC: 115 MMOL/L (ref 94–109)
CHLORIDE SERPL-SCNC: 94 MMOL/L (ref 94–109)
CHLORIDE SERPL-SCNC: 94 MMOL/L (ref 94–109)
CHLORIDE SERPL-SCNC: 96 MMOL/L (ref 94–109)
CHLORIDE SERPL-SCNC: 97 MMOL/L (ref 94–109)
CHLORIDE SERPL-SCNC: 98 MMOL/L (ref 94–109)
CHLORIDE SERPL-SCNC: 99 MMOL/L (ref 94–109)
CO2 SERPL-SCNC: 23 MMOL/L (ref 22–31)
CO2 SERPL-SCNC: 26 MMOL/L (ref 20–32)
CO2 SERPL-SCNC: 27 MMOL/L (ref 20–32)
CO2 SERPL-SCNC: 28 MMOL/L (ref 20–32)
CO2 SERPL-SCNC: 29 MMOL/L (ref 20–32)
CO2 SERPL-SCNC: 31 MMOL/L (ref 20–32)
CO2 SERPL-SCNC: 32 MMOL/L (ref 20–32)
CO2 SERPL-SCNC: 32 MMOL/L (ref 20–32)
CO2 SERPL-SCNC: 33 MMOL/L (ref 20–32)
CO2 SERPL-SCNC: 34 MMOL/L (ref 20–32)
CO2 SERPL-SCNC: 34 MMOL/L (ref 20–32)
CO2 SERPL-SCNC: 35 MMOL/L (ref 20–32)
CO2 SERPL-SCNC: 38 MMOL/L (ref 20–32)
COLOR UR AUTO: ABNORMAL
COLOR UR AUTO: ABNORMAL
COLOR UR AUTO: YELLOW
CREAT SERPL-MCNC: 0.71 MG/DL (ref 0.52–1.04)
CREAT SERPL-MCNC: 0.76 MG/DL (ref 0.52–1.04)
CREAT SERPL-MCNC: 0.76 MG/DL (ref 0.52–1.04)
CREAT SERPL-MCNC: 0.77 MG/DL (ref 0.52–1.04)
CREAT SERPL-MCNC: 0.78 MG/DL (ref 0.52–1.04)
CREAT SERPL-MCNC: 0.84 MG/DL (ref 0.52–1.04)
CREAT SERPL-MCNC: 0.85 MG/DL (ref 0.52–1.04)
CREAT SERPL-MCNC: 0.86 MG/DL (ref 0.52–1.04)
CREAT SERPL-MCNC: 0.86 MG/DL (ref 0.52–1.04)
CREAT SERPL-MCNC: 0.88 MG/DL (ref 0.52–1.04)
CREAT SERPL-MCNC: 0.88 MG/DL (ref 0.52–1.04)
CREAT SERPL-MCNC: 0.89 MG/DL (ref 0.52–1.04)
CREAT SERPL-MCNC: 0.9 MG/DL (ref 0.52–1.04)
CREAT SERPL-MCNC: 0.91 MG/DL (ref 0.52–1.04)
CREAT SERPL-MCNC: 0.93 MG/DL (ref 0.52–1.04)
CREAT SERPL-MCNC: 0.94 MG/DL (ref 0.52–1.04)
CREAT SERPL-MCNC: 0.95 MG/DL (ref 0.52–1.04)
CREAT SERPL-MCNC: 0.95 MG/DL (ref 0.52–1.04)
CREAT SERPL-MCNC: 0.97 MG/DL (ref 0.52–1.04)
CREAT SERPL-MCNC: 0.98 MG/DL (ref 0.52–1.04)
CREAT SERPL-MCNC: 1.03 MG/DL (ref 0.52–1.04)
CREAT SERPL-MCNC: 1.06 MG/DL (ref 0.52–1.04)
CREAT SERPL-MCNC: 1.08 MG/DL (ref 0.52–1.04)
CREAT SERPL-MCNC: 1.18 MG/DL (ref 0.52–1.04)
CREAT SERPL-MCNC: 1.2 MG/DL (ref 0.52–1.04)
CREAT SERPL-MCNC: 1.21 MG/DL (ref 0.6–1.1)
DIFFERENTIAL METHOD BLD: ABNORMAL
DIFFERENTIAL METHOD BLD: NORMAL
EOSINOPHIL # BLD AUTO: 0.3 10E9/L (ref 0–0.7)
EOSINOPHIL # BLD AUTO: 0.5 10E9/L (ref 0–0.7)
EOSINOPHIL # BLD AUTO: 0.8 10E9/L (ref 0–0.7)
EOSINOPHIL # BLD AUTO: 0.8 10E9/L (ref 0–0.7)
EOSINOPHIL NFR BLD AUTO: 3.4 %
EOSINOPHIL NFR BLD AUTO: 3.7 %
EOSINOPHIL NFR BLD AUTO: 3.9 %
EOSINOPHIL NFR BLD AUTO: 4 %
ERYTHROCYTE [DISTWIDTH] IN BLOOD BY AUTOMATED COUNT: 14.1 % (ref 11–14.5)
ERYTHROCYTE [DISTWIDTH] IN BLOOD BY AUTOMATED COUNT: 14.3 % (ref 10–15)
ERYTHROCYTE [DISTWIDTH] IN BLOOD BY AUTOMATED COUNT: 14.6 % (ref 10–15)
ERYTHROCYTE [DISTWIDTH] IN BLOOD BY AUTOMATED COUNT: 14.6 % (ref 10–15)
ERYTHROCYTE [DISTWIDTH] IN BLOOD BY AUTOMATED COUNT: 14.9 % (ref 10–15)
ERYTHROCYTE [DISTWIDTH] IN BLOOD BY AUTOMATED COUNT: 15.1 % (ref 10–15)
ERYTHROCYTE [DISTWIDTH] IN BLOOD BY AUTOMATED COUNT: 15.2 % (ref 10–15)
ERYTHROCYTE [DISTWIDTH] IN BLOOD BY AUTOMATED COUNT: 15.3 % (ref 10–15)
ERYTHROCYTE [DISTWIDTH] IN BLOOD BY AUTOMATED COUNT: 15.4 % (ref 10–15)
ERYTHROCYTE [DISTWIDTH] IN BLOOD BY AUTOMATED COUNT: 15.4 % (ref 10–15)
ERYTHROCYTE [DISTWIDTH] IN BLOOD BY AUTOMATED COUNT: 15.5 % (ref 10–15)
ERYTHROCYTE [DISTWIDTH] IN BLOOD BY AUTOMATED COUNT: 15.6 % (ref 10–15)
ERYTHROCYTE [DISTWIDTH] IN BLOOD BY AUTOMATED COUNT: 15.6 % (ref 10–15)
ERYTHROCYTE [DISTWIDTH] IN BLOOD BY AUTOMATED COUNT: 15.8 % (ref 10–15)
ERYTHROCYTE [DISTWIDTH] IN BLOOD BY AUTOMATED COUNT: 15.8 % (ref 10–15)
ERYTHROCYTE [DISTWIDTH] IN BLOOD BY AUTOMATED COUNT: 16 % (ref 10–15)
ERYTHROCYTE [DISTWIDTH] IN BLOOD BY AUTOMATED COUNT: 16.1 % (ref 10–15)
ERYTHROCYTE [DISTWIDTH] IN BLOOD BY AUTOMATED COUNT: 16.2 % (ref 10–15)
ERYTHROCYTE [DISTWIDTH] IN BLOOD BY AUTOMATED COUNT: 16.3 % (ref 10–15)
FLUAV H1 2009 PAND RNA SPEC QL NAA+PROBE: NEGATIVE
FLUAV H1 RNA SPEC QL NAA+PROBE: NEGATIVE
FLUAV H3 RNA SPEC QL NAA+PROBE: NEGATIVE
FLUAV RNA SPEC QL NAA+PROBE: NEGATIVE
FLUBV RNA SPEC QL NAA+PROBE: NEGATIVE
GFR SERPL CREATININE-BSD FRML MDRD: 43 ML/MIN/1.73M2
GFR SERPL CREATININE-BSD FRML MDRD: 44 ML/MIN/{1.73_M2}
GFR SERPL CREATININE-BSD FRML MDRD: 45 ML/MIN/{1.73_M2}
GFR SERPL CREATININE-BSD FRML MDRD: 50 ML/MIN/{1.73_M2}
GFR SERPL CREATININE-BSD FRML MDRD: 51 ML/MIN/{1.73_M2}
GFR SERPL CREATININE-BSD FRML MDRD: 53 ML/MIN/{1.73_M2}
GFR SERPL CREATININE-BSD FRML MDRD: 56 ML/MIN/{1.73_M2}
GFR SERPL CREATININE-BSD FRML MDRD: 56 ML/MIN/{1.73_M2}
GFR SERPL CREATININE-BSD FRML MDRD: 58 ML/MIN/{1.73_M2}
GFR SERPL CREATININE-BSD FRML MDRD: 58 ML/MIN/{1.73_M2}
GFR SERPL CREATININE-BSD FRML MDRD: 59 ML/MIN/{1.73_M2}
GFR SERPL CREATININE-BSD FRML MDRD: 60 ML/MIN/{1.73_M2}
GFR SERPL CREATININE-BSD FRML MDRD: 61 ML/MIN/{1.73_M2}
GFR SERPL CREATININE-BSD FRML MDRD: 61 ML/MIN/{1.73_M2}
GFR SERPL CREATININE-BSD FRML MDRD: 62 ML/MIN/{1.73_M2}
GFR SERPL CREATININE-BSD FRML MDRD: 62 ML/MIN/{1.73_M2}
GFR SERPL CREATININE-BSD FRML MDRD: 63 ML/MIN/{1.73_M2}
GFR SERPL CREATININE-BSD FRML MDRD: 64 ML/MIN/{1.73_M2}
GFR SERPL CREATININE-BSD FRML MDRD: 64 ML/MIN/{1.73_M2}
GFR SERPL CREATININE-BSD FRML MDRD: 65 ML/MIN/{1.73_M2}
GFR SERPL CREATININE-BSD FRML MDRD: 66 ML/MIN/{1.73_M2}
GFR SERPL CREATININE-BSD FRML MDRD: 66 ML/MIN/{1.73_M2}
GFR SERPL CREATININE-BSD FRML MDRD: 67 ML/MIN/{1.73_M2}
GFR SERPL CREATININE-BSD FRML MDRD: 74 ML/MIN/{1.73_M2}
GFR SERPL CREATININE-BSD FRML MDRD: 74 ML/MIN/{1.73_M2}
GFR SERPL CREATININE-BSD FRML MDRD: 76 ML/MIN/{1.73_M2}
GFR SERPL CREATININE-BSD FRML MDRD: 76 ML/MIN/{1.73_M2}
GFR SERPL CREATININE-BSD FRML MDRD: 82 ML/MIN/{1.73_M2}
GLUCOSE BLD-MCNC: 187 MG/DL (ref 70–125)
GLUCOSE BLDC GLUCOMTR-MCNC: 100 MG/DL (ref 70–99)
GLUCOSE BLDC GLUCOMTR-MCNC: 100 MG/DL (ref 70–99)
GLUCOSE BLDC GLUCOMTR-MCNC: 101 MG/DL (ref 70–99)
GLUCOSE BLDC GLUCOMTR-MCNC: 104 MG/DL (ref 70–99)
GLUCOSE BLDC GLUCOMTR-MCNC: 106 MG/DL (ref 70–99)
GLUCOSE BLDC GLUCOMTR-MCNC: 107 MG/DL (ref 70–99)
GLUCOSE BLDC GLUCOMTR-MCNC: 108 MG/DL (ref 70–99)
GLUCOSE BLDC GLUCOMTR-MCNC: 110 MG/DL (ref 70–99)
GLUCOSE BLDC GLUCOMTR-MCNC: 111 MG/DL (ref 70–99)
GLUCOSE BLDC GLUCOMTR-MCNC: 111 MG/DL (ref 70–99)
GLUCOSE BLDC GLUCOMTR-MCNC: 112 MG/DL (ref 70–99)
GLUCOSE BLDC GLUCOMTR-MCNC: 112 MG/DL (ref 70–99)
GLUCOSE BLDC GLUCOMTR-MCNC: 117 MG/DL (ref 70–99)
GLUCOSE BLDC GLUCOMTR-MCNC: 118 MG/DL (ref 70–99)
GLUCOSE BLDC GLUCOMTR-MCNC: 118 MG/DL (ref 70–99)
GLUCOSE BLDC GLUCOMTR-MCNC: 123 MG/DL (ref 70–99)
GLUCOSE BLDC GLUCOMTR-MCNC: 123 MG/DL (ref 70–99)
GLUCOSE BLDC GLUCOMTR-MCNC: 124 MG/DL (ref 70–99)
GLUCOSE BLDC GLUCOMTR-MCNC: 124 MG/DL (ref 70–99)
GLUCOSE BLDC GLUCOMTR-MCNC: 125 MG/DL (ref 70–99)
GLUCOSE BLDC GLUCOMTR-MCNC: 126 MG/DL (ref 70–99)
GLUCOSE BLDC GLUCOMTR-MCNC: 127 MG/DL (ref 70–99)
GLUCOSE BLDC GLUCOMTR-MCNC: 128 MG/DL (ref 70–99)
GLUCOSE BLDC GLUCOMTR-MCNC: 129 MG/DL (ref 70–99)
GLUCOSE BLDC GLUCOMTR-MCNC: 129 MG/DL (ref 70–99)
GLUCOSE BLDC GLUCOMTR-MCNC: 130 MG/DL (ref 70–99)
GLUCOSE BLDC GLUCOMTR-MCNC: 130 MG/DL (ref 70–99)
GLUCOSE BLDC GLUCOMTR-MCNC: 132 MG/DL (ref 70–99)
GLUCOSE BLDC GLUCOMTR-MCNC: 132 MG/DL (ref 70–99)
GLUCOSE BLDC GLUCOMTR-MCNC: 135 MG/DL (ref 70–99)
GLUCOSE BLDC GLUCOMTR-MCNC: 137 MG/DL (ref 70–99)
GLUCOSE BLDC GLUCOMTR-MCNC: 139 MG/DL (ref 70–99)
GLUCOSE BLDC GLUCOMTR-MCNC: 142 MG/DL (ref 70–99)
GLUCOSE BLDC GLUCOMTR-MCNC: 143 MG/DL (ref 70–99)
GLUCOSE BLDC GLUCOMTR-MCNC: 145 MG/DL (ref 70–99)
GLUCOSE BLDC GLUCOMTR-MCNC: 147 MG/DL (ref 70–99)
GLUCOSE BLDC GLUCOMTR-MCNC: 147 MG/DL (ref 70–99)
GLUCOSE BLDC GLUCOMTR-MCNC: 148 MG/DL (ref 70–99)
GLUCOSE BLDC GLUCOMTR-MCNC: 148 MG/DL (ref 70–99)
GLUCOSE BLDC GLUCOMTR-MCNC: 150 MG/DL (ref 70–99)
GLUCOSE BLDC GLUCOMTR-MCNC: 151 MG/DL (ref 70–99)
GLUCOSE BLDC GLUCOMTR-MCNC: 151 MG/DL (ref 70–99)
GLUCOSE BLDC GLUCOMTR-MCNC: 152 MG/DL (ref 70–99)
GLUCOSE BLDC GLUCOMTR-MCNC: 153 MG/DL (ref 70–99)
GLUCOSE BLDC GLUCOMTR-MCNC: 154 MG/DL (ref 70–99)
GLUCOSE BLDC GLUCOMTR-MCNC: 155 MG/DL (ref 70–99)
GLUCOSE BLDC GLUCOMTR-MCNC: 156 MG/DL (ref 70–99)
GLUCOSE BLDC GLUCOMTR-MCNC: 157 MG/DL (ref 70–99)
GLUCOSE BLDC GLUCOMTR-MCNC: 158 MG/DL (ref 70–99)
GLUCOSE BLDC GLUCOMTR-MCNC: 159 MG/DL (ref 70–99)
GLUCOSE BLDC GLUCOMTR-MCNC: 159 MG/DL (ref 70–99)
GLUCOSE BLDC GLUCOMTR-MCNC: 160 MG/DL (ref 70–99)
GLUCOSE BLDC GLUCOMTR-MCNC: 160 MG/DL (ref 70–99)
GLUCOSE BLDC GLUCOMTR-MCNC: 161 MG/DL (ref 70–99)
GLUCOSE BLDC GLUCOMTR-MCNC: 162 MG/DL (ref 70–99)
GLUCOSE BLDC GLUCOMTR-MCNC: 163 MG/DL (ref 70–99)
GLUCOSE BLDC GLUCOMTR-MCNC: 164 MG/DL (ref 70–99)
GLUCOSE BLDC GLUCOMTR-MCNC: 166 MG/DL (ref 70–99)
GLUCOSE BLDC GLUCOMTR-MCNC: 167 MG/DL (ref 70–99)
GLUCOSE BLDC GLUCOMTR-MCNC: 168 MG/DL (ref 70–99)
GLUCOSE BLDC GLUCOMTR-MCNC: 169 MG/DL (ref 70–99)
GLUCOSE BLDC GLUCOMTR-MCNC: 173 MG/DL (ref 70–99)
GLUCOSE BLDC GLUCOMTR-MCNC: 174 MG/DL (ref 70–99)
GLUCOSE BLDC GLUCOMTR-MCNC: 175 MG/DL (ref 70–99)
GLUCOSE BLDC GLUCOMTR-MCNC: 175 MG/DL (ref 70–99)
GLUCOSE BLDC GLUCOMTR-MCNC: 178 MG/DL (ref 70–99)
GLUCOSE BLDC GLUCOMTR-MCNC: 178 MG/DL (ref 70–99)
GLUCOSE BLDC GLUCOMTR-MCNC: 181 MG/DL (ref 70–99)
GLUCOSE BLDC GLUCOMTR-MCNC: 181 MG/DL (ref 70–99)
GLUCOSE BLDC GLUCOMTR-MCNC: 183 MG/DL (ref 70–99)
GLUCOSE BLDC GLUCOMTR-MCNC: 185 MG/DL (ref 70–99)
GLUCOSE BLDC GLUCOMTR-MCNC: 186 MG/DL (ref 70–99)
GLUCOSE BLDC GLUCOMTR-MCNC: 187 MG/DL (ref 70–99)
GLUCOSE BLDC GLUCOMTR-MCNC: 187 MG/DL (ref 70–99)
GLUCOSE BLDC GLUCOMTR-MCNC: 188 MG/DL (ref 70–99)
GLUCOSE BLDC GLUCOMTR-MCNC: 189 MG/DL (ref 70–99)
GLUCOSE BLDC GLUCOMTR-MCNC: 191 MG/DL (ref 70–99)
GLUCOSE BLDC GLUCOMTR-MCNC: 191 MG/DL (ref 70–99)
GLUCOSE BLDC GLUCOMTR-MCNC: 192 MG/DL (ref 70–99)
GLUCOSE BLDC GLUCOMTR-MCNC: 193 MG/DL (ref 70–99)
GLUCOSE BLDC GLUCOMTR-MCNC: 195 MG/DL (ref 70–99)
GLUCOSE BLDC GLUCOMTR-MCNC: 197 MG/DL (ref 70–99)
GLUCOSE BLDC GLUCOMTR-MCNC: 203 MG/DL (ref 70–99)
GLUCOSE BLDC GLUCOMTR-MCNC: 204 MG/DL (ref 70–99)
GLUCOSE BLDC GLUCOMTR-MCNC: 206 MG/DL (ref 70–99)
GLUCOSE BLDC GLUCOMTR-MCNC: 206 MG/DL (ref 70–99)
GLUCOSE BLDC GLUCOMTR-MCNC: 207 MG/DL (ref 70–99)
GLUCOSE BLDC GLUCOMTR-MCNC: 207 MG/DL (ref 70–99)
GLUCOSE BLDC GLUCOMTR-MCNC: 208 MG/DL (ref 70–99)
GLUCOSE BLDC GLUCOMTR-MCNC: 210 MG/DL (ref 70–99)
GLUCOSE BLDC GLUCOMTR-MCNC: 217 MG/DL (ref 70–99)
GLUCOSE BLDC GLUCOMTR-MCNC: 219 MG/DL (ref 70–99)
GLUCOSE BLDC GLUCOMTR-MCNC: 219 MG/DL (ref 70–99)
GLUCOSE BLDC GLUCOMTR-MCNC: 228 MG/DL (ref 70–99)
GLUCOSE BLDC GLUCOMTR-MCNC: 229 MG/DL (ref 70–99)
GLUCOSE BLDC GLUCOMTR-MCNC: 232 MG/DL (ref 70–99)
GLUCOSE BLDC GLUCOMTR-MCNC: 236 MG/DL (ref 70–99)
GLUCOSE BLDC GLUCOMTR-MCNC: 240 MG/DL (ref 70–99)
GLUCOSE BLDC GLUCOMTR-MCNC: 243 MG/DL (ref 70–99)
GLUCOSE BLDC GLUCOMTR-MCNC: 253 MG/DL (ref 70–99)
GLUCOSE BLDC GLUCOMTR-MCNC: 266 MG/DL (ref 70–99)
GLUCOSE BLDC GLUCOMTR-MCNC: 273 MG/DL (ref 70–99)
GLUCOSE BLDC GLUCOMTR-MCNC: 98 MG/DL (ref 70–99)
GLUCOSE SERPL-MCNC: 101 MG/DL (ref 70–99)
GLUCOSE SERPL-MCNC: 120 MG/DL (ref 70–99)
GLUCOSE SERPL-MCNC: 121 MG/DL (ref 70–99)
GLUCOSE SERPL-MCNC: 122 MG/DL (ref 70–99)
GLUCOSE SERPL-MCNC: 128 MG/DL (ref 70–99)
GLUCOSE SERPL-MCNC: 135 MG/DL (ref 70–99)
GLUCOSE SERPL-MCNC: 138 MG/DL (ref 70–99)
GLUCOSE SERPL-MCNC: 145 MG/DL (ref 70–99)
GLUCOSE SERPL-MCNC: 148 MG/DL (ref 70–99)
GLUCOSE SERPL-MCNC: 152 MG/DL (ref 70–99)
GLUCOSE SERPL-MCNC: 153 MG/DL (ref 70–99)
GLUCOSE SERPL-MCNC: 169 MG/DL (ref 70–99)
GLUCOSE SERPL-MCNC: 169 MG/DL (ref 70–99)
GLUCOSE SERPL-MCNC: 170 MG/DL (ref 70–99)
GLUCOSE SERPL-MCNC: 172 MG/DL (ref 70–99)
GLUCOSE SERPL-MCNC: 187 MG/DL (ref 70–99)
GLUCOSE SERPL-MCNC: 188 MG/DL (ref 70–99)
GLUCOSE SERPL-MCNC: 188 MG/DL (ref 70–99)
GLUCOSE SERPL-MCNC: 191 MG/DL (ref 70–99)
GLUCOSE SERPL-MCNC: 196 MG/DL (ref 70–99)
GLUCOSE SERPL-MCNC: 202 MG/DL (ref 70–99)
GLUCOSE SERPL-MCNC: 203 MG/DL (ref 70–99)
GLUCOSE SERPL-MCNC: 227 MG/DL (ref 70–99)
GLUCOSE SERPL-MCNC: 240 MG/DL (ref 70–99)
GLUCOSE SERPL-MCNC: 274 MG/DL (ref 70–99)
GLUCOSE SERPL-MCNC: 301 MG/DL (ref 70–99)
GLUCOSE SERPL-MCNC: 98 MG/DL (ref 70–99)
GLUCOSE UR STRIP-MCNC: NEGATIVE MG/DL
GRAM STN SPEC: NORMAL
GRAM STN SPEC: NORMAL
HADV DNA SPEC QL NAA+PROBE: NEGATIVE
HADV DNA SPEC QL NAA+PROBE: NEGATIVE
HBA1C MFR BLD: 6.5 % (ref 4.2–6.1)
HBA1C MFR BLD: 6.7 % (ref 0–5.6)
HCO3 BLDV-SCNC: 30 MMOL/L (ref 21–28)
HCO3 BLDV-SCNC: 39 MMOL/L (ref 21–28)
HCO3 BLDV-SCNC: 40 MMOL/L (ref 21–28)
HCT VFR BLD AUTO: 31.7 % (ref 35–47)
HCT VFR BLD AUTO: 33.1 % (ref 35–47)
HCT VFR BLD AUTO: 33.7 % (ref 35–47)
HCT VFR BLD AUTO: 33.9 % (ref 35–47)
HCT VFR BLD AUTO: 34 % (ref 35–47)
HCT VFR BLD AUTO: 34.1 % (ref 35–47)
HCT VFR BLD AUTO: 34.9 % (ref 35–47)
HCT VFR BLD AUTO: 35.4 % (ref 35–47)
HCT VFR BLD AUTO: 35.6 % (ref 35–47)
HCT VFR BLD AUTO: 35.7 % (ref 35–47)
HCT VFR BLD AUTO: 36.2 % (ref 35–47)
HCT VFR BLD AUTO: 36.5 % (ref 35–47)
HCT VFR BLD AUTO: 36.6 % (ref 35–47)
HCT VFR BLD AUTO: 37.4 % (ref 35–47)
HCT VFR BLD AUTO: 37.5 % (ref 35–47)
HCT VFR BLD AUTO: 37.5 % (ref 35–47)
HCT VFR BLD AUTO: 37.6 % (ref 35–47)
HCT VFR BLD AUTO: 37.7 % (ref 35–47)
HCT VFR BLD AUTO: 37.7 % (ref 35–47)
HCT VFR BLD AUTO: 38 % (ref 35–47)
HCT VFR BLD AUTO: 38.1 % (ref 35–47)
HCT VFR BLD AUTO: 38.2 % (ref 35–47)
HCT VFR BLD AUTO: 38.3 % (ref 35–47)
HCT VFR BLD AUTO: 38.6 % (ref 35–47)
HCT VFR BLD AUTO: 39.5 % (ref 35–47)
HCT VFR BLD AUTO: 39.7 % (ref 35–47)
HCT VFR BLD AUTO: 43.2 % (ref 35–47)
HCT VFR BLD AUTO: 44.1 % (ref 35–47)
HGB BLD-MCNC: 10.4 G/DL (ref 11.7–15.7)
HGB BLD-MCNC: 10.8 G/DL (ref 11.7–15.7)
HGB BLD-MCNC: 10.9 G/DL (ref 11.7–15.7)
HGB BLD-MCNC: 11.1 G/DL (ref 11.7–15.7)
HGB BLD-MCNC: 11.1 G/DL (ref 11.7–15.7)
HGB BLD-MCNC: 11.3 G/DL (ref 11.7–15.7)
HGB BLD-MCNC: 11.4 G/DL (ref 11.7–15.7)
HGB BLD-MCNC: 11.4 G/DL (ref 11.7–15.7)
HGB BLD-MCNC: 11.6 G/DL (ref 11.7–15.7)
HGB BLD-MCNC: 11.7 G/DL (ref 11.7–15.7)
HGB BLD-MCNC: 11.7 G/DL (ref 11.7–15.7)
HGB BLD-MCNC: 11.8 G/DL (ref 11.7–15.7)
HGB BLD-MCNC: 11.9 G/DL (ref 11.7–15.7)
HGB BLD-MCNC: 12 G/DL (ref 11.7–15.7)
HGB BLD-MCNC: 12.1 G/DL (ref 11.7–15.7)
HGB BLD-MCNC: 12.1 G/DL (ref 11.7–15.7)
HGB BLD-MCNC: 12.2 G/DL (ref 11.7–15.7)
HGB BLD-MCNC: 12.3 G/DL (ref 11.7–15.7)
HGB BLD-MCNC: 12.4 G/DL (ref 11.7–15.7)
HGB BLD-MCNC: 12.4 G/DL (ref 11.7–15.7)
HGB BLD-MCNC: 12.5 G/DL (ref 11.7–15.7)
HGB BLD-MCNC: 12.5 G/DL (ref 11.7–15.7)
HGB BLD-MCNC: 14.3 G/DL (ref 12–16)
HGB BLD-MCNC: 14.5 G/DL (ref 11.7–15.7)
HGB UR QL STRIP: ABNORMAL
HGB UR QL STRIP: NEGATIVE
HMPV RNA SPEC QL NAA+PROBE: NEGATIVE
HPIV1 RNA SPEC QL NAA+PROBE: NEGATIVE
HPIV2 RNA SPEC QL NAA+PROBE: NEGATIVE
HPIV3 RNA SPEC QL NAA+PROBE: NEGATIVE
HYALINE CASTS #/AREA URNS LPF: 7 /LPF (ref 0–2)
IMM GRANULOCYTES # BLD: 0 10E9/L (ref 0–0.4)
IMM GRANULOCYTES # BLD: 0.2 10E9/L (ref 0–0.4)
IMM GRANULOCYTES # BLD: 0.4 10E9/L (ref 0–0.4)
IMM GRANULOCYTES # BLD: 0.4 10E9/L (ref 0–0.4)
IMM GRANULOCYTES NFR BLD: 0.5 %
IMM GRANULOCYTES NFR BLD: 1.3 %
IMM GRANULOCYTES NFR BLD: 1.7 %
IMM GRANULOCYTES NFR BLD: 2 %
INR PPP: 0.93 (ref 0.86–1.14)
INR PPP: 1.04 (ref 0.86–1.14)
INTERPRETATION ECG - MUSE: NORMAL
KETONES UR STRIP-MCNC: NEGATIVE MG/DL
LACTATE BLD-SCNC: 0.9 MMOL/L (ref 0.7–2)
LACTATE BLD-SCNC: 1 MMOL/L (ref 0.7–2)
LACTATE BLD-SCNC: 1.3 MMOL/L (ref 0.7–2)
LACTATE BLD-SCNC: 1.4 MMOL/L (ref 0.7–2)
LACTATE BLD-SCNC: 1.6 MMOL/L (ref 0.7–2)
LACTATE BLD-SCNC: 1.8 MMOL/L (ref 0.7–2)
LACTATE BLD-SCNC: 1.8 MMOL/L (ref 0.7–2)
LACTATE BLD-SCNC: 2.3 MMOL/L (ref 0.7–2)
LACTATE BLD-SCNC: 2.3 MMOL/L (ref 0.7–2)
LACTATE BLD-SCNC: 2.8 MMOL/L (ref 0.7–2)
LACTATE BLD-SCNC: 3.2 MMOL/L (ref 0.7–2)
LACTATE BLD-SCNC: 3.3 MMOL/L (ref 0.7–2)
LACTATE BLD-SCNC: 3.8 MMOL/L (ref 0.7–2)
LACTATE BLD-SCNC: 3.9 MMOL/L (ref 0.7–2)
LEUKOCYTE ESTERASE UR QL STRIP: ABNORMAL
LEUKOCYTE ESTERASE UR QL STRIP: ABNORMAL
LEUKOCYTE ESTERASE UR QL STRIP: NEGATIVE
LYMPHOCYTES # BLD AUTO: 1.6 10E9/L (ref 0.8–5.3)
LYMPHOCYTES # BLD AUTO: 2.2 10E9/L (ref 0.8–5.3)
LYMPHOCYTES # BLD AUTO: 2.7 10E9/L (ref 0.8–5.3)
LYMPHOCYTES # BLD AUTO: 3.8 10E9/L (ref 0.8–5.3)
LYMPHOCYTES NFR BLD AUTO: 10.3 %
LYMPHOCYTES NFR BLD AUTO: 13.3 %
LYMPHOCYTES NFR BLD AUTO: 20.1 %
LYMPHOCYTES NFR BLD AUTO: 27.9 %
Lab: NORMAL
MAGNESIUM SERPL-MCNC: 2 MG/DL (ref 1.6–2.3)
MAGNESIUM SERPL-MCNC: 2 MG/DL (ref 1.6–2.3)
MAGNESIUM SERPL-MCNC: 2.1 MG/DL (ref 1.6–2.3)
MAGNESIUM SERPL-MCNC: 2.2 MG/DL (ref 1.6–2.3)
MAGNESIUM SERPL-MCNC: 2.3 MG/DL (ref 1.6–2.3)
MAGNESIUM SERPL-MCNC: 2.4 MG/DL (ref 1.6–2.3)
MAGNESIUM SERPL-MCNC: 2.4 MG/DL (ref 1.6–2.3)
MAGNESIUM SERPL-MCNC: 2.5 MG/DL (ref 1.6–2.3)
MAGNESIUM SERPL-MCNC: 2.5 MG/DL (ref 1.6–2.3)
MAGNESIUM SERPL-MCNC: 2.6 MG/DL (ref 1.6–2.3)
MCH RBC QN AUTO: 29.5 PG (ref 26.5–33)
MCH RBC QN AUTO: 29.8 PG (ref 26.5–33)
MCH RBC QN AUTO: 29.9 PG (ref 26.5–33)
MCH RBC QN AUTO: 30 PG (ref 26.5–33)
MCH RBC QN AUTO: 30.1 PG (ref 26.5–33)
MCH RBC QN AUTO: 30.2 PG (ref 26.5–33)
MCH RBC QN AUTO: 30.3 PG (ref 26.5–33)
MCH RBC QN AUTO: 30.3 PG (ref 26.5–33)
MCH RBC QN AUTO: 30.4 PG (ref 26.5–33)
MCH RBC QN AUTO: 30.4 PG (ref 26.5–33)
MCH RBC QN AUTO: 30.5 PG (ref 26.5–33)
MCH RBC QN AUTO: 30.7 PG (ref 27–34)
MCHC RBC AUTO-ENTMCNC: 31 G/DL (ref 31.5–36.5)
MCHC RBC AUTO-ENTMCNC: 31.2 G/DL (ref 31.5–36.5)
MCHC RBC AUTO-ENTMCNC: 31.6 G/DL (ref 31.5–36.5)
MCHC RBC AUTO-ENTMCNC: 31.6 G/DL (ref 31.5–36.5)
MCHC RBC AUTO-ENTMCNC: 31.8 G/DL (ref 31.5–36.5)
MCHC RBC AUTO-ENTMCNC: 31.8 G/DL (ref 31.5–36.5)
MCHC RBC AUTO-ENTMCNC: 32 G/DL (ref 31.5–36.5)
MCHC RBC AUTO-ENTMCNC: 32.1 G/DL (ref 31.5–36.5)
MCHC RBC AUTO-ENTMCNC: 32.1 G/DL (ref 31.5–36.5)
MCHC RBC AUTO-ENTMCNC: 32.2 G/DL (ref 31.5–36.5)
MCHC RBC AUTO-ENTMCNC: 32.2 G/DL (ref 31.5–36.5)
MCHC RBC AUTO-ENTMCNC: 32.4 G/DL (ref 31.5–36.5)
MCHC RBC AUTO-ENTMCNC: 32.4 G/DL (ref 32–36)
MCHC RBC AUTO-ENTMCNC: 32.5 G/DL (ref 31.5–36.5)
MCHC RBC AUTO-ENTMCNC: 32.6 G/DL (ref 31.5–36.5)
MCHC RBC AUTO-ENTMCNC: 32.8 G/DL (ref 31.5–36.5)
MCHC RBC AUTO-ENTMCNC: 32.9 G/DL (ref 31.5–36.5)
MCHC RBC AUTO-ENTMCNC: 32.9 G/DL (ref 31.5–36.5)
MCHC RBC AUTO-ENTMCNC: 33.1 G/DL (ref 31.5–36.5)
MCHC RBC AUTO-ENTMCNC: 33.1 G/DL (ref 31.5–36.5)
MCHC RBC AUTO-ENTMCNC: 33.6 G/DL (ref 31.5–36.5)
MCHC RBC AUTO-ENTMCNC: 33.6 G/DL (ref 31.5–36.5)
MCV RBC AUTO: 90 FL (ref 78–100)
MCV RBC AUTO: 91 FL (ref 78–100)
MCV RBC AUTO: 92 FL (ref 78–100)
MCV RBC AUTO: 93 FL (ref 78–100)
MCV RBC AUTO: 94 FL (ref 78–100)
MCV RBC AUTO: 95 FL (ref 78–100)
MCV RBC AUTO: 95 FL (ref 80–100)
MICROBIOLOGIST REVIEW: NORMAL
MONOCYTES # BLD AUTO: 0.7 10E9/L (ref 0–1.3)
MONOCYTES # BLD AUTO: 1.1 10E9/L (ref 0–1.3)
MONOCYTES # BLD AUTO: 1.9 10E9/L (ref 0–1.3)
MONOCYTES # BLD AUTO: 1.9 10E9/L (ref 0–1.3)
MONOCYTES NFR BLD AUTO: 8.4 %
MONOCYTES NFR BLD AUTO: 8.9 %
MONOCYTES NFR BLD AUTO: 9.1 %
MONOCYTES NFR BLD AUTO: 9.5 %
MRSA DNA SPEC QL NAA+PROBE: NEGATIVE
MUCOUS THREADS #/AREA URNS LPF: PRESENT /LPF
NEUTROPHILS # BLD AUTO: 14.2 10E9/L (ref 1.6–8.3)
NEUTROPHILS # BLD AUTO: 15.6 10E9/L (ref 1.6–8.3)
NEUTROPHILS # BLD AUTO: 5.2 10E9/L (ref 1.6–8.3)
NEUTROPHILS # BLD AUTO: 8 10E9/L (ref 1.6–8.3)
NEUTROPHILS NFR BLD AUTO: 58.8 %
NEUTROPHILS NFR BLD AUTO: 65.9 %
NEUTROPHILS NFR BLD AUTO: 70.8 %
NEUTROPHILS NFR BLD AUTO: 75.1 %
NITRATE UR QL: NEGATIVE
NRBC # BLD AUTO: 0 10*3/UL
NRBC BLD AUTO-RTO: 0 /100
NT-PROBNP SERPL-MCNC: 160 PG/ML (ref 0–1800)
NT-PROBNP SERPL-MCNC: 218 PG/ML (ref 0–1800)
NT-PROBNP SERPL-MCNC: 241 PG/ML (ref 0–1800)
O2/TOTAL GAS SETTING VFR VENT: 1 %
OXYHGB MFR BLDV: 86 %
OXYHGB MFR BLDV: 90 %
OXYHGB MFR BLDV: 92 %
PCO2 BLDV: 48 MM HG (ref 40–50)
PCO2 BLDV: 50 MM HG (ref 40–50)
PCO2 BLDV: 53 MM HG (ref 40–50)
PH BLDV: 7.4 PH (ref 7.32–7.43)
PH BLDV: 7.49 PH (ref 7.32–7.43)
PH BLDV: 7.5 PH (ref 7.32–7.43)
PH UR STRIP: 6 PH (ref 5–7)
PH UR STRIP: 6.5 PH (ref 5–7)
PH UR STRIP: 7 PH (ref 5–7)
PH UR STRIP: 7.5 PH (ref 5–7)
PH UR STRIP: 8 PH (ref 5–7)
PHOSPHATE SERPL-MCNC: 2.4 MG/DL (ref 2.5–4.5)
PHOSPHATE SERPL-MCNC: 2.5 MG/DL (ref 2.5–4.5)
PHOSPHATE SERPL-MCNC: 2.7 MG/DL (ref 2.5–4.5)
PHOSPHATE SERPL-MCNC: 2.7 MG/DL (ref 2.5–4.5)
PHOSPHATE SERPL-MCNC: 2.8 MG/DL (ref 2.5–4.5)
PHOSPHATE SERPL-MCNC: 2.9 MG/DL (ref 2.5–4.5)
PHOSPHATE SERPL-MCNC: 3.1 MG/DL (ref 2.5–4.5)
PHOSPHATE SERPL-MCNC: 3.2 MG/DL (ref 2.5–4.5)
PHOSPHATE SERPL-MCNC: 3.3 MG/DL (ref 2.5–4.5)
PHOSPHATE SERPL-MCNC: 3.3 MG/DL (ref 2.5–4.5)
PHOSPHATE SERPL-MCNC: 3.4 MG/DL (ref 2.5–4.5)
PHOSPHATE SERPL-MCNC: 3.8 MG/DL (ref 2.5–4.5)
PLATELET # BLD AUTO: 172 10E9/L (ref 150–450)
PLATELET # BLD AUTO: 182 10E9/L (ref 150–450)
PLATELET # BLD AUTO: 195 10E9/L (ref 150–450)
PLATELET # BLD AUTO: 205 10E9/L (ref 150–450)
PLATELET # BLD AUTO: 210 10E9/L (ref 150–450)
PLATELET # BLD AUTO: 216 10E9/L (ref 150–450)
PLATELET # BLD AUTO: 231 10E9/L (ref 150–450)
PLATELET # BLD AUTO: 246 10E9/L (ref 150–450)
PLATELET # BLD AUTO: 255 10E9/L (ref 150–450)
PLATELET # BLD AUTO: 262 THOU/UL (ref 140–440)
PLATELET # BLD AUTO: 301 10E9/L (ref 150–450)
PLATELET # BLD AUTO: 308 10E9/L (ref 150–450)
PLATELET # BLD AUTO: 388 10E9/L (ref 150–450)
PLATELET # BLD AUTO: 442 10E9/L (ref 150–450)
PLATELET # BLD AUTO: 456 10E9/L (ref 150–450)
PLATELET # BLD AUTO: 461 10E9/L (ref 150–450)
PLATELET # BLD AUTO: 474 10E9/L (ref 150–450)
PLATELET # BLD AUTO: 496 10E9/L (ref 150–450)
PLATELET # BLD AUTO: 510 10E9/L (ref 150–450)
PLATELET # BLD AUTO: 529 10E9/L (ref 150–450)
PLATELET # BLD AUTO: 530 10E9/L (ref 150–450)
PLATELET # BLD AUTO: 538 10E9/L (ref 150–450)
PLATELET # BLD AUTO: 546 10E9/L (ref 150–450)
PLATELET # BLD AUTO: 553 10E9/L (ref 150–450)
PLATELET # BLD AUTO: 564 10E9/L (ref 150–450)
PLATELET # BLD AUTO: 565 10E9/L (ref 150–450)
PLATELET # BLD AUTO: 580 10E9/L (ref 150–450)
PLATELET # BLD AUTO: 605 10E9/L (ref 150–450)
PMV BLD AUTO: 12 FL (ref 8.5–12.5)
PO2 BLDV: 51 MM HG (ref 25–47)
PO2 BLDV: 62 MM HG (ref 25–47)
PO2 BLDV: 68 MM HG (ref 25–47)
POTASSIUM BLD-SCNC: 4 MMOL/L (ref 3.5–5)
POTASSIUM SERPL-SCNC: 3.3 MMOL/L (ref 3.4–5.3)
POTASSIUM SERPL-SCNC: 3.3 MMOL/L (ref 3.4–5.3)
POTASSIUM SERPL-SCNC: 3.4 MMOL/L (ref 3.4–5.3)
POTASSIUM SERPL-SCNC: 3.4 MMOL/L (ref 3.4–5.3)
POTASSIUM SERPL-SCNC: 3.5 MMOL/L (ref 3.4–5.3)
POTASSIUM SERPL-SCNC: 3.6 MMOL/L (ref 3.4–5.3)
POTASSIUM SERPL-SCNC: 3.6 MMOL/L (ref 3.4–5.3)
POTASSIUM SERPL-SCNC: 3.7 MMOL/L (ref 3.4–5.3)
POTASSIUM SERPL-SCNC: 3.8 MMOL/L (ref 3.4–5.3)
POTASSIUM SERPL-SCNC: 3.8 MMOL/L (ref 3.4–5.3)
POTASSIUM SERPL-SCNC: 3.9 MMOL/L (ref 3.4–5.3)
POTASSIUM SERPL-SCNC: 4 MMOL/L (ref 3.4–5.3)
POTASSIUM SERPL-SCNC: 4.1 MMOL/L (ref 3.4–5.3)
POTASSIUM SERPL-SCNC: 4.1 MMOL/L (ref 3.4–5.3)
POTASSIUM SERPL-SCNC: 4.2 MMOL/L (ref 3.4–5.3)
POTASSIUM SERPL-SCNC: 4.5 MMOL/L (ref 3.4–5.3)
PREALB SERPL IA-MCNC: 13 MG/DL (ref 15–45)
PREALB SERPL IA-MCNC: 16 MG/DL (ref 15–45)
PREALB SERPL IA-MCNC: 20 MG/DL (ref 15–45)
PREALB SERPL IA-MCNC: 29 MG/DL (ref 15–45)
PROCALCITONIN SERPL-MCNC: 0.07 NG/ML
PROCALCITONIN SERPL-MCNC: <0.05 NG/ML
PROT SERPL-MCNC: 6.7 G/DL (ref 6.8–8.8)
PROT SERPL-MCNC: 6.9 G/DL (ref 6.8–8.8)
PROT SERPL-MCNC: 7 G/DL (ref 6.8–8.8)
RBC # BLD AUTO: 3.47 10E12/L (ref 3.8–5.2)
RBC # BLD AUTO: 3.6 10E12/L (ref 3.8–5.2)
RBC # BLD AUTO: 3.67 10E12/L (ref 3.8–5.2)
RBC # BLD AUTO: 3.69 10E12/L (ref 3.8–5.2)
RBC # BLD AUTO: 3.7 10E12/L (ref 3.8–5.2)
RBC # BLD AUTO: 3.76 10E12/L (ref 3.8–5.2)
RBC # BLD AUTO: 3.79 10E12/L (ref 3.8–5.2)
RBC # BLD AUTO: 3.83 10E12/L (ref 3.8–5.2)
RBC # BLD AUTO: 3.88 10E12/L (ref 3.8–5.2)
RBC # BLD AUTO: 3.9 10E12/L (ref 3.8–5.2)
RBC # BLD AUTO: 3.92 10E12/L (ref 3.8–5.2)
RBC # BLD AUTO: 3.93 10E12/L (ref 3.8–5.2)
RBC # BLD AUTO: 3.96 10E12/L (ref 3.8–5.2)
RBC # BLD AUTO: 3.98 10E12/L (ref 3.8–5.2)
RBC # BLD AUTO: 4 10E12/L (ref 3.8–5.2)
RBC # BLD AUTO: 4.01 10E12/L (ref 3.8–5.2)
RBC # BLD AUTO: 4.01 10E12/L (ref 3.8–5.2)
RBC # BLD AUTO: 4.04 10E12/L (ref 3.8–5.2)
RBC # BLD AUTO: 4.05 10E12/L (ref 3.8–5.2)
RBC # BLD AUTO: 4.05 10E12/L (ref 3.8–5.2)
RBC # BLD AUTO: 4.06 10E12/L (ref 3.8–5.2)
RBC # BLD AUTO: 4.08 10E12/L (ref 3.8–5.2)
RBC # BLD AUTO: 4.11 10E12/L (ref 3.8–5.2)
RBC # BLD AUTO: 4.14 10E12/L (ref 3.8–5.2)
RBC # BLD AUTO: 4.18 10E12/L (ref 3.8–5.2)
RBC # BLD AUTO: 4.2 10E12/L (ref 3.8–5.2)
RBC # BLD AUTO: 4.66 MILL/UL (ref 3.8–5.4)
RBC # BLD AUTO: 4.76 10E12/L (ref 3.8–5.2)
RBC #/AREA URNS AUTO: 177 /HPF (ref 0–2)
RBC #/AREA URNS AUTO: 3 /HPF (ref 0–2)
RBC #/AREA URNS AUTO: 87 /HPF (ref 0–2)
RBC #/AREA URNS AUTO: >182 /HPF (ref 0–2)
RBC #/AREA URNS AUTO: >182 /HPF (ref 0–2)
RHINOVIRUS RNA SPEC QL NAA+PROBE: NEGATIVE
RSV RNA SPEC QL NAA+PROBE: NEGATIVE
RSV RNA SPEC QL NAA+PROBE: NEGATIVE
SODIUM SERPL-SCNC: 134 MMOL/L (ref 133–144)
SODIUM SERPL-SCNC: 135 MMOL/L (ref 133–144)
SODIUM SERPL-SCNC: 136 MMOL/L (ref 133–144)
SODIUM SERPL-SCNC: 136 MMOL/L (ref 133–144)
SODIUM SERPL-SCNC: 137 MMOL/L (ref 133–144)
SODIUM SERPL-SCNC: 137 MMOL/L (ref 136–145)
SODIUM SERPL-SCNC: 138 MMOL/L (ref 133–144)
SODIUM SERPL-SCNC: 140 MMOL/L (ref 133–144)
SODIUM SERPL-SCNC: 141 MMOL/L (ref 133–144)
SODIUM SERPL-SCNC: 142 MMOL/L (ref 133–144)
SODIUM SERPL-SCNC: 143 MMOL/L (ref 133–144)
SODIUM SERPL-SCNC: 145 MMOL/L (ref 133–144)
SOURCE: ABNORMAL
SP GR UR STRIP: 1.01 (ref 1–1.03)
SP GR UR STRIP: 1.02 (ref 1–1.03)
SP GR UR STRIP: 1.04 (ref 1–1.03)
SPECIMEN EXP DATE BLD: NORMAL
SPECIMEN SOURCE: ABNORMAL
SPECIMEN SOURCE: NORMAL
SQUAMOUS #/AREA URNS AUTO: 1 /HPF (ref 0–1)
TROPONIN I SERPL-MCNC: <0.015 UG/L (ref 0–0.04)
TSH SERPL DL<=0.005 MIU/L-ACNC: 3.12 MU/L (ref 0.4–4)
TSH SERPL DL<=0.005 MIU/L-ACNC: 3.32 UIU/ML (ref 0.3–5)
UROBILINOGEN UR STRIP-MCNC: 2 MG/DL (ref 0–2)
UROBILINOGEN UR STRIP-MCNC: NORMAL MG/DL (ref 0–2)
UROBILINOGEN UR STRIP-MCNC: NORMAL MG/DL (ref 0–2)
VALPROATE FREE SERPL-MCNC: 24.9 UG/ML
VALPROATE SERPL-MCNC: 36 MG/L (ref 50–100)
VALPROATE SERPL-MCNC: 39 MG/L (ref 50–100)
VALPROATE SERPL-MCNC: 55 MG/L (ref 50–100)
WBC # BLD AUTO: 10.1 10E9/L (ref 4–11)
WBC # BLD AUTO: 10.6 10E9/L (ref 4–11)
WBC # BLD AUTO: 11 10E9/L (ref 4–11)
WBC # BLD AUTO: 11.3 10E9/L (ref 4–11)
WBC # BLD AUTO: 12 10E9/L (ref 4–11)
WBC # BLD AUTO: 13 10E9/L (ref 4–11)
WBC # BLD AUTO: 13 10E9/L (ref 4–11)
WBC # BLD AUTO: 13.2 10E9/L (ref 4–11)
WBC # BLD AUTO: 13.5 10E9/L (ref 4–11)
WBC # BLD AUTO: 13.8 10E9/L (ref 4–11)
WBC # BLD AUTO: 14.9 10E9/L (ref 4–11)
WBC # BLD AUTO: 15.5 10E9/L (ref 4–11)
WBC # BLD AUTO: 15.8 10E9/L (ref 4–11)
WBC # BLD AUTO: 15.9 10E9/L (ref 4–11)
WBC # BLD AUTO: 15.9 10E9/L (ref 4–11)
WBC # BLD AUTO: 16.2 10E9/L (ref 4–11)
WBC # BLD AUTO: 16.4 10E9/L (ref 4–11)
WBC # BLD AUTO: 16.5 10E9/L (ref 4–11)
WBC # BLD AUTO: 17.5 10E9/L (ref 4–11)
WBC # BLD AUTO: 17.6 10E9/L (ref 4–11)
WBC # BLD AUTO: 18.5 10E9/L (ref 4–11)
WBC # BLD AUTO: 18.9 10E9/L (ref 4–11)
WBC # BLD AUTO: 20 10E9/L (ref 4–11)
WBC # BLD AUTO: 20.8 10E9/L (ref 4–11)
WBC # BLD AUTO: 7.9 10E9/L (ref 4–11)
WBC # BLD AUTO: 8.5 10E9/L (ref 4–11)
WBC # BLD AUTO: 9.6 10E9/L (ref 4–11)
WBC #/AREA URNS AUTO: 0 /HPF (ref 0–5)
WBC #/AREA URNS AUTO: 0 /HPF (ref 0–5)
WBC #/AREA URNS AUTO: 5 /HPF (ref 0–5)
WBC #/AREA URNS AUTO: 5 /HPF (ref 0–5)
WBC #/AREA URNS AUTO: >182 /HPF (ref 0–5)
WBC: 8.4 THOU/UL (ref 4–11)

## 2019-01-01 PROCEDURE — 25000132 ZZH RX MED GY IP 250 OP 250 PS 637: Mod: GY | Performed by: PSYCHIATRY & NEUROLOGY

## 2019-01-01 PROCEDURE — A9270 NON-COVERED ITEM OR SERVICE: HCPCS | Mod: GY | Performed by: HOSPITALIST

## 2019-01-01 PROCEDURE — A9270 NON-COVERED ITEM OR SERVICE: HCPCS | Mod: GY | Performed by: INTERNAL MEDICINE

## 2019-01-01 PROCEDURE — 86901 BLOOD TYPING SEROLOGIC RH(D): CPT | Performed by: EMERGENCY MEDICINE

## 2019-01-01 PROCEDURE — 97110 THERAPEUTIC EXERCISES: CPT | Mod: GO

## 2019-01-01 PROCEDURE — 99291 CRITICAL CARE FIRST HOUR: CPT | Performed by: INTERNAL MEDICINE

## 2019-01-01 PROCEDURE — 25000125 ZZHC RX 250: Performed by: INTERNAL MEDICINE

## 2019-01-01 PROCEDURE — 80048 BASIC METABOLIC PNL TOTAL CA: CPT | Performed by: INTERNAL MEDICINE

## 2019-01-01 PROCEDURE — 5A1955Z RESPIRATORY VENTILATION, GREATER THAN 96 CONSECUTIVE HOURS: ICD-10-PCS | Performed by: INTERNAL MEDICINE

## 2019-01-01 PROCEDURE — 99291 CRITICAL CARE FIRST HOUR: CPT | Performed by: PSYCHIATRY & NEUROLOGY

## 2019-01-01 PROCEDURE — 36415 COLL VENOUS BLD VENIPUNCTURE: CPT | Performed by: INTERNAL MEDICINE

## 2019-01-01 PROCEDURE — 99233 SBSQ HOSP IP/OBS HIGH 50: CPT | Performed by: PSYCHIATRY & NEUROLOGY

## 2019-01-01 PROCEDURE — 25800030 ZZH RX IP 258 OP 636: Performed by: INTERNAL MEDICINE

## 2019-01-01 PROCEDURE — 87086 URINE CULTURE/COLONY COUNT: CPT | Performed by: INTERNAL MEDICINE

## 2019-01-01 PROCEDURE — 72131 CT LUMBAR SPINE W/O DYE: CPT

## 2019-01-01 PROCEDURE — 97530 THERAPEUTIC ACTIVITIES: CPT | Mod: GO | Performed by: OCCUPATIONAL THERAPY ASSISTANT

## 2019-01-01 PROCEDURE — 85025 COMPLETE CBC W/AUTO DIFF WBC: CPT | Performed by: INTERNAL MEDICINE

## 2019-01-01 PROCEDURE — 92526 ORAL FUNCTION THERAPY: CPT | Mod: GN | Performed by: SPEECH-LANGUAGE PATHOLOGIST

## 2019-01-01 PROCEDURE — 25000132 ZZH RX MED GY IP 250 OP 250 PS 637: Mod: GY | Performed by: PHYSICIAN ASSISTANT

## 2019-01-01 PROCEDURE — 25000132 ZZH RX MED GY IP 250 OP 250 PS 637: Mod: GY | Performed by: INTERNAL MEDICINE

## 2019-01-01 PROCEDURE — 25000132 ZZH RX MED GY IP 250 OP 250 PS 637: Mod: GY | Performed by: HOSPITALIST

## 2019-01-01 PROCEDURE — 95951 ZZHC EEG VIDEO EACH 24 HR: CPT

## 2019-01-01 PROCEDURE — 87640 STAPH A DNA AMP PROBE: CPT | Performed by: INTERNAL MEDICINE

## 2019-01-01 PROCEDURE — A9270 NON-COVERED ITEM OR SERVICE: HCPCS | Mod: GY | Performed by: PSYCHIATRY & NEUROLOGY

## 2019-01-01 PROCEDURE — 12000000 ZZH R&B MED SURG/OB

## 2019-01-01 PROCEDURE — 25000128 H RX IP 250 OP 636: Performed by: EMERGENCY MEDICINE

## 2019-01-01 PROCEDURE — 40000809 ZZH STATISTIC NO DOCUMENTATION TO SUPPORT CHARGE

## 2019-01-01 PROCEDURE — 00000146 ZZHCL STATISTIC GLUCOSE BY METER IP

## 2019-01-01 PROCEDURE — 25000128 H RX IP 250 OP 636: Performed by: INTERNAL MEDICINE

## 2019-01-01 PROCEDURE — 25800030 ZZH RX IP 258 OP 636: Performed by: PSYCHIATRY & NEUROLOGY

## 2019-01-01 PROCEDURE — 80048 BASIC METABOLIC PNL TOTAL CA: CPT | Performed by: PHYSICIAN ASSISTANT

## 2019-01-01 PROCEDURE — 40000275 ZZH STATISTIC RCP TIME EA 10 MIN

## 2019-01-01 PROCEDURE — 99607 MTMS BY PHARM ADDL 15 MIN: CPT | Performed by: PHARMACIST

## 2019-01-01 PROCEDURE — 99232 SBSQ HOSP IP/OBS MODERATE 35: CPT | Performed by: HOSPITALIST

## 2019-01-01 PROCEDURE — 27210429 ZZH NUTRITION PRODUCT INTERMEDIATE LITER

## 2019-01-01 PROCEDURE — C9254 INJECTION, LACOSAMIDE: HCPCS | Performed by: PSYCHIATRY & NEUROLOGY

## 2019-01-01 PROCEDURE — 81001 URINALYSIS AUTO W/SCOPE: CPT | Performed by: EMERGENCY MEDICINE

## 2019-01-01 PROCEDURE — 94640 AIRWAY INHALATION TREATMENT: CPT | Mod: 76

## 2019-01-01 PROCEDURE — 94003 VENT MGMT INPAT SUBQ DAY: CPT

## 2019-01-01 PROCEDURE — 85027 COMPLETE CBC AUTOMATED: CPT | Performed by: INTERNAL MEDICINE

## 2019-01-01 PROCEDURE — 85027 COMPLETE CBC AUTOMATED: CPT | Performed by: NURSE PRACTITIONER

## 2019-01-01 PROCEDURE — 99233 SBSQ HOSP IP/OBS HIGH 50: CPT | Performed by: INTERNAL MEDICINE

## 2019-01-01 PROCEDURE — 74230 X-RAY XM SWLNG FUNCJ C+: CPT

## 2019-01-01 PROCEDURE — 25000128 H RX IP 250 OP 636

## 2019-01-01 PROCEDURE — 80048 BASIC METABOLIC PNL TOTAL CA: CPT | Performed by: EMERGENCY MEDICINE

## 2019-01-01 PROCEDURE — 36415 COLL VENOUS BLD VENIPUNCTURE: CPT | Performed by: HOSPITALIST

## 2019-01-01 PROCEDURE — 80053 COMPREHEN METABOLIC PANEL: CPT | Performed by: INTERNAL MEDICINE

## 2019-01-01 PROCEDURE — 84484 ASSAY OF TROPONIN QUANT: CPT | Performed by: NURSE PRACTITIONER

## 2019-01-01 PROCEDURE — 83036 HEMOGLOBIN GLYCOSYLATED A1C: CPT | Performed by: EMERGENCY MEDICINE

## 2019-01-01 PROCEDURE — 85610 PROTHROMBIN TIME: CPT | Performed by: EMERGENCY MEDICINE

## 2019-01-01 PROCEDURE — 97530 THERAPEUTIC ACTIVITIES: CPT | Mod: GP

## 2019-01-01 PROCEDURE — 40000008 ZZH STATISTIC AIRWAY CARE

## 2019-01-01 PROCEDURE — 86850 RBC ANTIBODY SCREEN: CPT | Performed by: EMERGENCY MEDICINE

## 2019-01-01 PROCEDURE — 36415 COLL VENOUS BLD VENIPUNCTURE: CPT | Performed by: EMERGENCY MEDICINE

## 2019-01-01 PROCEDURE — 84100 ASSAY OF PHOSPHORUS: CPT | Performed by: INTERNAL MEDICINE

## 2019-01-01 PROCEDURE — 83605 ASSAY OF LACTIC ACID: CPT | Performed by: INTERNAL MEDICINE

## 2019-01-01 PROCEDURE — 20000003 ZZH R&B ICU

## 2019-01-01 PROCEDURE — 25000125 ZZHC RX 250: Performed by: PSYCHIATRY & NEUROLOGY

## 2019-01-01 PROCEDURE — 83735 ASSAY OF MAGNESIUM: CPT | Performed by: INTERNAL MEDICINE

## 2019-01-01 PROCEDURE — 92526 ORAL FUNCTION THERAPY: CPT | Mod: GN

## 2019-01-01 PROCEDURE — 25000128 H RX IP 250 OP 636: Performed by: PSYCHIATRY & NEUROLOGY

## 2019-01-01 PROCEDURE — 84132 ASSAY OF SERUM POTASSIUM: CPT | Performed by: INTERNAL MEDICINE

## 2019-01-01 PROCEDURE — 73502 X-RAY EXAM HIP UNI 2-3 VIEWS: CPT

## 2019-01-01 PROCEDURE — 40000141 ZZH STATISTIC PERIPHERAL IV START W/O US GUIDANCE

## 2019-01-01 PROCEDURE — 84145 PROCALCITONIN (PCT): CPT | Performed by: NURSE PRACTITIONER

## 2019-01-01 PROCEDURE — 25000132 ZZH RX MED GY IP 250 OP 250 PS 637: Mod: GY

## 2019-01-01 PROCEDURE — 97116 GAIT TRAINING THERAPY: CPT | Mod: GP

## 2019-01-01 PROCEDURE — 94640 AIRWAY INHALATION TREATMENT: CPT

## 2019-01-01 PROCEDURE — 71260 CT THORAX DX C+: CPT

## 2019-01-01 PROCEDURE — 25000125 ZZHC RX 250: Performed by: NURSE PRACTITIONER

## 2019-01-01 PROCEDURE — 83880 ASSAY OF NATRIURETIC PEPTIDE: CPT | Performed by: NURSE PRACTITIONER

## 2019-01-01 PROCEDURE — 87040 BLOOD CULTURE FOR BACTERIA: CPT | Performed by: NURSE PRACTITIONER

## 2019-01-01 PROCEDURE — 80165 DIPROPYLACETIC ACID FREE: CPT | Performed by: INTERNAL MEDICINE

## 2019-01-01 PROCEDURE — 82805 BLOOD GASES W/O2 SATURATION: CPT | Performed by: NURSE PRACTITIONER

## 2019-01-01 PROCEDURE — 99233 SBSQ HOSP IP/OBS HIGH 50: CPT | Performed by: NURSE PRACTITIONER

## 2019-01-01 PROCEDURE — 83605 ASSAY OF LACTIC ACID: CPT | Performed by: EMERGENCY MEDICINE

## 2019-01-01 PROCEDURE — A9270 NON-COVERED ITEM OR SERVICE: HCPCS | Mod: GY

## 2019-01-01 PROCEDURE — 83735 ASSAY OF MAGNESIUM: CPT | Performed by: HOSPITALIST

## 2019-01-01 PROCEDURE — 36415 COLL VENOUS BLD VENIPUNCTURE: CPT | Performed by: PHYSICIAN ASSISTANT

## 2019-01-01 PROCEDURE — 81001 URINALYSIS AUTO W/SCOPE: CPT | Performed by: INTERNAL MEDICINE

## 2019-01-01 PROCEDURE — 25000128 H RX IP 250 OP 636: Performed by: NURSE PRACTITIONER

## 2019-01-01 PROCEDURE — 74177 CT ABD & PELVIS W/CONTRAST: CPT

## 2019-01-01 PROCEDURE — 99232 SBSQ HOSP IP/OBS MODERATE 35: CPT | Performed by: INTERNAL MEDICINE

## 2019-01-01 PROCEDURE — A9270 NON-COVERED ITEM OR SERVICE: HCPCS | Mod: GY | Performed by: NURSE PRACTITIONER

## 2019-01-01 PROCEDURE — 80164 ASSAY DIPROPYLACETIC ACD TOT: CPT | Performed by: INTERNAL MEDICINE

## 2019-01-01 PROCEDURE — 93005 ELECTROCARDIOGRAM TRACING: CPT

## 2019-01-01 PROCEDURE — 99291 CRITICAL CARE FIRST HOUR: CPT | Performed by: NURSE PRACTITIONER

## 2019-01-01 PROCEDURE — 95819 EEG AWAKE AND ASLEEP: CPT

## 2019-01-01 PROCEDURE — A9270 NON-COVERED ITEM OR SERVICE: HCPCS | Mod: GY | Performed by: PHYSICIAN ASSISTANT

## 2019-01-01 PROCEDURE — 83605 ASSAY OF LACTIC ACID: CPT | Performed by: NURSE PRACTITIONER

## 2019-01-01 PROCEDURE — 73221 MRI JOINT UPR EXTREM W/O DYE: CPT | Mod: RT

## 2019-01-01 PROCEDURE — 25000125 ZZHC RX 250: Performed by: EMERGENCY MEDICINE

## 2019-01-01 PROCEDURE — 97535 SELF CARE MNGMENT TRAINING: CPT | Mod: GO | Performed by: OCCUPATIONAL THERAPIST

## 2019-01-01 PROCEDURE — 36415 COLL VENOUS BLD VENIPUNCTURE: CPT | Performed by: NURSE PRACTITIONER

## 2019-01-01 PROCEDURE — 84145 PROCALCITONIN (PCT): CPT | Performed by: EMERGENCY MEDICINE

## 2019-01-01 PROCEDURE — 87070 CULTURE OTHR SPECIMN AEROBIC: CPT | Performed by: INTERNAL MEDICINE

## 2019-01-01 PROCEDURE — 86900 BLOOD TYPING SEROLOGIC ABO: CPT | Performed by: EMERGENCY MEDICINE

## 2019-01-01 PROCEDURE — 84145 PROCALCITONIN (PCT): CPT | Performed by: INTERNAL MEDICINE

## 2019-01-01 PROCEDURE — 71045 X-RAY EXAM CHEST 1 VIEW: CPT

## 2019-01-01 PROCEDURE — 40000986 XR ABDOMEN PORT 1 VW

## 2019-01-01 PROCEDURE — 85730 THROMBOPLASTIN TIME PARTIAL: CPT | Performed by: EMERGENCY MEDICINE

## 2019-01-01 PROCEDURE — 97535 SELF CARE MNGMENT TRAINING: CPT | Mod: GO

## 2019-01-01 PROCEDURE — 83605 ASSAY OF LACTIC ACID: CPT | Performed by: HOSPITALIST

## 2019-01-01 PROCEDURE — 85027 COMPLETE CBC AUTOMATED: CPT | Performed by: HOSPITALIST

## 2019-01-01 PROCEDURE — 84443 ASSAY THYROID STIM HORMONE: CPT | Performed by: INTERNAL MEDICINE

## 2019-01-01 PROCEDURE — 40000281 ZZH STATISTIC TRANSPORT TIME EA 15 MIN

## 2019-01-01 PROCEDURE — 94002 VENT MGMT INPAT INIT DAY: CPT

## 2019-01-01 PROCEDURE — 25000132 ZZH RX MED GY IP 250 OP 250 PS 637: Mod: GY | Performed by: NURSE PRACTITIONER

## 2019-01-01 PROCEDURE — 25000125 ZZHC RX 250

## 2019-01-01 PROCEDURE — 80053 COMPREHEN METABOLIC PANEL: CPT | Performed by: NURSE PRACTITIONER

## 2019-01-01 PROCEDURE — 31500 INSERT EMERGENCY AIRWAY: CPT

## 2019-01-01 PROCEDURE — 99239 HOSP IP/OBS DSCHRG MGMT >30: CPT | Performed by: HOSPITALIST

## 2019-01-01 PROCEDURE — G0463 HOSPITAL OUTPT CLINIC VISIT: HCPCS | Mod: ZF | Performed by: TECHNICIAN/TECHNOLOGIST

## 2019-01-01 PROCEDURE — 99214 OFFICE O/P EST MOD 30 MIN: CPT | Performed by: PSYCHIATRY & NEUROLOGY

## 2019-01-01 PROCEDURE — 96374 THER/PROPH/DIAG INJ IV PUSH: CPT

## 2019-01-01 PROCEDURE — 99233 SBSQ HOSP IP/OBS HIGH 50: CPT | Performed by: HOSPITALIST

## 2019-01-01 PROCEDURE — 99222 1ST HOSP IP/OBS MODERATE 55: CPT | Performed by: NURSE PRACTITIONER

## 2019-01-01 PROCEDURE — 96366 THER/PROPH/DIAG IV INF ADDON: CPT

## 2019-01-01 PROCEDURE — 97165 OT EVAL LOW COMPLEX 30 MIN: CPT | Mod: GO

## 2019-01-01 PROCEDURE — 84134 ASSAY OF PREALBUMIN: CPT | Performed by: PHYSICIAN ASSISTANT

## 2019-01-01 PROCEDURE — 85027 COMPLETE CBC AUTOMATED: CPT | Performed by: PHYSICIAN ASSISTANT

## 2019-01-01 PROCEDURE — 40000225 ZZH STATISTIC SLP WARD VISIT: Performed by: SPEECH-LANGUAGE PATHOLOGIST

## 2019-01-01 PROCEDURE — 80048 BASIC METABOLIC PNL TOTAL CA: CPT | Performed by: HOSPITALIST

## 2019-01-01 PROCEDURE — 96365 THER/PROPH/DIAG IV INF INIT: CPT | Mod: 59

## 2019-01-01 PROCEDURE — 99605 MTMS BY PHARM NP 15 MIN: CPT | Performed by: PHARMACIST

## 2019-01-01 PROCEDURE — 25000128 H RX IP 250 OP 636: Performed by: RADIOLOGY

## 2019-01-01 PROCEDURE — 92060 SENSORIMOTOR EXAMINATION: CPT | Mod: ZF | Performed by: OPHTHALMOLOGY

## 2019-01-01 PROCEDURE — 84484 ASSAY OF TROPONIN QUANT: CPT | Performed by: EMERGENCY MEDICINE

## 2019-01-01 PROCEDURE — 40000986 XR CHEST PORT 1 VW

## 2019-01-01 PROCEDURE — 25000131 ZZH RX MED GY IP 250 OP 636 PS 637: Mod: GY | Performed by: INTERNAL MEDICINE

## 2019-01-01 PROCEDURE — 99207 ZZC CONSULT E&M CHANGED TO INITIAL LEVEL: CPT | Performed by: PHYSICIAN ASSISTANT

## 2019-01-01 PROCEDURE — 85049 AUTOMATED PLATELET COUNT: CPT | Performed by: INTERNAL MEDICINE

## 2019-01-01 PROCEDURE — L0625 LO FLEX L1-BELOW L5 PRE OTS: HCPCS

## 2019-01-01 PROCEDURE — 40000863 ZZH STATISTIC RADIOLOGY XRAY, US, CT, MAR, NM

## 2019-01-01 PROCEDURE — 70553 MRI BRAIN STEM W/O & W/DYE: CPT

## 2019-01-01 PROCEDURE — 93010 ELECTROCARDIOGRAM REPORT: CPT | Performed by: INTERNAL MEDICINE

## 2019-01-01 PROCEDURE — 40000061 ZZH STATISTIC EEG TIME EA 10 MIN

## 2019-01-01 PROCEDURE — 92611 MOTION FLUOROSCOPY/SWALLOW: CPT | Mod: GN | Performed by: SPEECH-LANGUAGE PATHOLOGIST

## 2019-01-01 PROCEDURE — 99606 MTMS BY PHARM EST 15 MIN: CPT | Performed by: PHARMACIST

## 2019-01-01 PROCEDURE — 85025 COMPLETE CBC W/AUTO DIFF WBC: CPT | Performed by: EMERGENCY MEDICINE

## 2019-01-01 PROCEDURE — 81001 URINALYSIS AUTO W/SCOPE: CPT | Performed by: NURSE PRACTITIONER

## 2019-01-01 PROCEDURE — 97110 THERAPEUTIC EXERCISES: CPT | Mod: GP | Performed by: PHYSICAL THERAPIST

## 2019-01-01 PROCEDURE — 87106 FUNGI IDENTIFICATION YEAST: CPT | Performed by: INTERNAL MEDICINE

## 2019-01-01 PROCEDURE — 84134 ASSAY OF PREALBUMIN: CPT | Performed by: INTERNAL MEDICINE

## 2019-01-01 PROCEDURE — 99207 ZZC APP CREDIT; MD BILLING SHARED VISIT: CPT | Performed by: PHYSICIAN ASSISTANT

## 2019-01-01 PROCEDURE — 97530 THERAPEUTIC ACTIVITIES: CPT | Mod: GO

## 2019-01-01 PROCEDURE — 70450 CT HEAD/BRAIN W/O DYE: CPT

## 2019-01-01 PROCEDURE — 87633 RESP VIRUS 12-25 TARGETS: CPT | Performed by: INTERNAL MEDICINE

## 2019-01-01 PROCEDURE — 97110 THERAPEUTIC EXERCISES: CPT | Mod: GO | Performed by: OCCUPATIONAL THERAPIST

## 2019-01-01 PROCEDURE — 99207 ZZC APP CREDIT; MD BILLING SHARED VISIT: CPT | Performed by: NURSE PRACTITIONER

## 2019-01-01 PROCEDURE — 97163 PT EVAL HIGH COMPLEX 45 MIN: CPT | Mod: GP

## 2019-01-01 PROCEDURE — 96375 TX/PRO/DX INJ NEW DRUG ADDON: CPT

## 2019-01-01 PROCEDURE — 87641 MR-STAPH DNA AMP PROBE: CPT | Performed by: INTERNAL MEDICINE

## 2019-01-01 PROCEDURE — 80076 HEPATIC FUNCTION PANEL: CPT | Performed by: EMERGENCY MEDICINE

## 2019-01-01 PROCEDURE — 84100 ASSAY OF PHOSPHORUS: CPT | Performed by: HOSPITALIST

## 2019-01-01 PROCEDURE — 97110 THERAPEUTIC EXERCISES: CPT | Mod: GP

## 2019-01-01 PROCEDURE — 51702 INSERT TEMP BLADDER CATH: CPT

## 2019-01-01 PROCEDURE — 25500064 ZZH RX 255 OP 636: Performed by: INTERNAL MEDICINE

## 2019-01-01 PROCEDURE — 25000128 H RX IP 250 OP 636: Performed by: HOSPITALIST

## 2019-01-01 PROCEDURE — 70498 CT ANGIOGRAPHY NECK: CPT

## 2019-01-01 PROCEDURE — 72100 X-RAY EXAM L-S SPINE 2/3 VWS: CPT

## 2019-01-01 PROCEDURE — A9585 GADOBUTROL INJECTION: HCPCS | Performed by: INTERNAL MEDICINE

## 2019-01-01 PROCEDURE — 87040 BLOOD CULTURE FOR BACTERIA: CPT | Performed by: EMERGENCY MEDICINE

## 2019-01-01 PROCEDURE — 99285 EMERGENCY DEPT VISIT HI MDM: CPT | Mod: 25

## 2019-01-01 PROCEDURE — 25800030 ZZH RX IP 258 OP 636: Performed by: EMERGENCY MEDICINE

## 2019-01-01 PROCEDURE — 99223 1ST HOSP IP/OBS HIGH 75: CPT | Performed by: PHYSICIAN ASSISTANT

## 2019-01-01 PROCEDURE — 97530 THERAPEUTIC ACTIVITIES: CPT | Mod: GO | Performed by: OCCUPATIONAL THERAPIST

## 2019-01-01 PROCEDURE — 87205 SMEAR GRAM STAIN: CPT | Performed by: INTERNAL MEDICINE

## 2019-01-01 PROCEDURE — 82330 ASSAY OF CALCIUM: CPT | Performed by: INTERNAL MEDICINE

## 2019-01-01 PROCEDURE — 92610 EVALUATE SWALLOWING FUNCTION: CPT | Mod: GN | Performed by: SPEECH-LANGUAGE PATHOLOGIST

## 2019-01-01 PROCEDURE — 97530 THERAPEUTIC ACTIVITIES: CPT | Mod: GP | Performed by: PHYSICAL THERAPIST

## 2019-01-01 PROCEDURE — 87040 BLOOD CULTURE FOR BACTERIA: CPT | Performed by: PSYCHIATRY & NEUROLOGY

## 2019-01-01 RX ORDER — CARBIDOPA AND LEVODOPA 25; 100 MG/1; MG/1
2 TABLET, ORALLY DISINTEGRATING ORAL 4 TIMES DAILY
Status: DISCONTINUED | OUTPATIENT
Start: 2019-01-01 | End: 2019-01-01

## 2019-01-01 RX ORDER — CARBIDOPA AND LEVODOPA 25; 100 MG/1; MG/1
1 TABLET ORAL 4 TIMES DAILY
Status: DISCONTINUED | OUTPATIENT
Start: 2019-01-01 | End: 2019-01-01 | Stop reason: HOSPADM

## 2019-01-01 RX ORDER — AMLODIPINE BESYLATE 10 MG/1
10 TABLET ORAL DAILY
Qty: 30 TABLET | Refills: 0 | Status: SHIPPED | OUTPATIENT
Start: 2019-01-01

## 2019-01-01 RX ORDER — LORAZEPAM 2 MG/ML
INJECTION INTRAMUSCULAR
Status: COMPLETED
Start: 2019-01-01 | End: 2019-01-01

## 2019-01-01 RX ORDER — HYDRALAZINE HYDROCHLORIDE 20 MG/ML
10-20 INJECTION INTRAMUSCULAR; INTRAVENOUS EVERY 4 HOURS PRN
Status: DISCONTINUED | OUTPATIENT
Start: 2019-01-01 | End: 2019-01-01 | Stop reason: DRUGHIGH

## 2019-01-01 RX ORDER — MORPHINE SULFATE 100 MG/5ML
2.5 SOLUTION ORAL
Qty: 15 ML | Refills: 0 | Status: SHIPPED | OUTPATIENT
Start: 2019-01-01

## 2019-01-01 RX ORDER — SODIUM CHLORIDE 9 MG/ML
INJECTION, SOLUTION INTRAVENOUS CONTINUOUS
Status: DISCONTINUED | OUTPATIENT
Start: 2019-01-01 | End: 2019-01-01

## 2019-01-01 RX ORDER — TRAMADOL HYDROCHLORIDE 50 MG/1
50 TABLET ORAL EVERY 6 HOURS PRN
Qty: 1 TABLET | Refills: 0 | Status: ON HOLD | COMMUNITY
Start: 2019-01-01 | End: 2019-01-01

## 2019-01-01 RX ORDER — HYDROMORPHONE HYDROCHLORIDE 1 MG/ML
0.2 INJECTION, SOLUTION INTRAMUSCULAR; INTRAVENOUS; SUBCUTANEOUS
Status: DISCONTINUED | OUTPATIENT
Start: 2019-01-01 | End: 2019-01-01

## 2019-01-01 RX ORDER — IPRATROPIUM BROMIDE AND ALBUTEROL SULFATE 2.5; .5 MG/3ML; MG/3ML
3 SOLUTION RESPIRATORY (INHALATION)
Status: DISCONTINUED | OUTPATIENT
Start: 2019-01-01 | End: 2019-01-01

## 2019-01-01 RX ORDER — CARBIDOPA AND LEVODOPA 25; 100 MG/1; MG/1
TABLET ORAL
Qty: 270 TABLET | Refills: 3 | Status: SHIPPED | OUTPATIENT
Start: 2019-01-01 | End: 2019-01-01

## 2019-01-01 RX ORDER — POTASSIUM CHLORIDE 1500 MG/1
20-40 TABLET, EXTENDED RELEASE ORAL
Status: DISCONTINUED | OUTPATIENT
Start: 2019-01-01 | End: 2019-01-01 | Stop reason: HOSPADM

## 2019-01-01 RX ORDER — LACOSAMIDE 10 MG/ML
150 SOLUTION ORAL 2 TIMES DAILY
Status: DISCONTINUED | OUTPATIENT
Start: 2019-01-01 | End: 2019-01-01 | Stop reason: DRUGHIGH

## 2019-01-01 RX ORDER — CARBIDOPA AND LEVODOPA 25; 100 MG/1; MG/1
2 TABLET ORAL ONCE
Status: COMPLETED | OUTPATIENT
Start: 2019-01-01 | End: 2019-01-01

## 2019-01-01 RX ORDER — LABETALOL 20 MG/4 ML (5 MG/ML) INTRAVENOUS SYRINGE
10-20 EVERY 4 HOURS PRN
Status: DISCONTINUED | OUTPATIENT
Start: 2019-01-01 | End: 2019-01-01

## 2019-01-01 RX ORDER — QUETIAPINE FUMARATE 25 MG/1
25 TABLET, FILM COATED ORAL AT BEDTIME
Status: DISCONTINUED | OUTPATIENT
Start: 2019-01-01 | End: 2019-01-01

## 2019-01-01 RX ORDER — CARBIDOPA AND LEVODOPA 50; 200 MG/1; MG/1
1 TABLET, EXTENDED RELEASE ORAL AT BEDTIME
Qty: 90 TABLET | Refills: 3 | Status: ON HOLD | OUTPATIENT
Start: 2019-01-01 | End: 2019-01-01

## 2019-01-01 RX ORDER — PIPERACILLIN SODIUM, TAZOBACTAM SODIUM 4; .5 G/20ML; G/20ML
4.5 INJECTION, POWDER, LYOPHILIZED, FOR SOLUTION INTRAVENOUS EVERY 6 HOURS
Status: DISCONTINUED | OUTPATIENT
Start: 2019-01-01 | End: 2019-01-01

## 2019-01-01 RX ORDER — CARBIDOPA AND LEVODOPA 25; 100 MG/1; MG/1
2 TABLET ORAL
Status: DISCONTINUED | OUTPATIENT
Start: 2019-01-01 | End: 2019-01-01

## 2019-01-01 RX ORDER — HYDRALAZINE HCL 10 MG
5 TABLET ORAL EVERY 8 HOURS SCHEDULED
Status: DISCONTINUED | OUTPATIENT
Start: 2019-01-01 | End: 2019-01-01 | Stop reason: HOSPADM

## 2019-01-01 RX ORDER — DOCUSATE SODIUM 100 MG/1
100 CAPSULE, LIQUID FILLED ORAL 2 TIMES DAILY
Status: DISCONTINUED | OUTPATIENT
Start: 2019-01-01 | End: 2019-01-01

## 2019-01-01 RX ORDER — LEVOTHYROXINE SODIUM 112 UG/1
112 TABLET ORAL DAILY
COMMUNITY
Start: 2019-01-01

## 2019-01-01 RX ORDER — ACETAMINOPHEN 325 MG/1
650 TABLET ORAL EVERY 6 HOURS
Status: DISCONTINUED | OUTPATIENT
Start: 2019-01-01 | End: 2019-01-01

## 2019-01-01 RX ORDER — QUETIAPINE FUMARATE 25 MG/1
50 TABLET, FILM COATED ORAL 2 TIMES DAILY
Status: DISCONTINUED | OUTPATIENT
Start: 2019-01-01 | End: 2019-01-01

## 2019-01-01 RX ORDER — CARBIDOPA AND LEVODOPA 50; 200 MG/1; MG/1
1 TABLET, EXTENDED RELEASE ORAL AT BEDTIME
Qty: 90 TABLET | Refills: 3 | Status: SHIPPED | OUTPATIENT
Start: 2019-01-01 | End: 2019-01-01

## 2019-01-01 RX ORDER — VENLAFAXINE 100 MG/1
100 TABLET ORAL DAILY
Qty: 180 TABLET | Refills: 3 | Status: ON HOLD | COMMUNITY
Start: 2019-01-01 | End: 2019-01-01

## 2019-01-01 RX ORDER — LORAZEPAM 2 MG/ML
.25-.5 CONCENTRATE ORAL EVERY 4 HOURS PRN
Qty: 30 ML | Refills: 1 | Status: SHIPPED | OUTPATIENT
Start: 2019-01-01

## 2019-01-01 RX ORDER — NALOXONE HYDROCHLORIDE 0.4 MG/ML
.1-.4 INJECTION, SOLUTION INTRAMUSCULAR; INTRAVENOUS; SUBCUTANEOUS
Status: DISCONTINUED | OUTPATIENT
Start: 2019-01-01 | End: 2019-01-01

## 2019-01-01 RX ORDER — IOPAMIDOL 755 MG/ML
71 INJECTION, SOLUTION INTRAVASCULAR ONCE
Status: COMPLETED | OUTPATIENT
Start: 2019-01-01 | End: 2019-01-01

## 2019-01-01 RX ORDER — HYDRALAZINE HYDROCHLORIDE 10 MG/1
5 TABLET, FILM COATED ORAL EVERY 8 HOURS
Qty: 90 TABLET | Refills: 0 | Status: SHIPPED | OUTPATIENT
Start: 2019-01-01 | End: 2019-01-01

## 2019-01-01 RX ORDER — LISINOPRIL 20 MG/1
20 TABLET ORAL DAILY
Status: DISCONTINUED | OUTPATIENT
Start: 2019-01-01 | End: 2019-01-01

## 2019-01-01 RX ORDER — NALOXONE HYDROCHLORIDE 0.4 MG/ML
.1-.4 INJECTION, SOLUTION INTRAMUSCULAR; INTRAVENOUS; SUBCUTANEOUS
Status: DISCONTINUED | OUTPATIENT
Start: 2019-01-01 | End: 2019-01-01 | Stop reason: HOSPADM

## 2019-01-01 RX ORDER — POTASSIUM CHLORIDE 1.5 G/1.58G
20-40 POWDER, FOR SOLUTION ORAL
Status: DISCONTINUED | OUTPATIENT
Start: 2019-01-01 | End: 2019-01-01 | Stop reason: HOSPADM

## 2019-01-01 RX ORDER — ACETAMINOPHEN 500 MG
500 TABLET ORAL EVERY 8 HOURS PRN
Status: DISCONTINUED | OUTPATIENT
Start: 2019-01-01 | End: 2019-01-01

## 2019-01-01 RX ORDER — DEXTROSE MONOHYDRATE 25 G/50ML
25-50 INJECTION, SOLUTION INTRAVENOUS
Status: DISCONTINUED | OUTPATIENT
Start: 2019-01-01 | End: 2019-01-01 | Stop reason: HOSPADM

## 2019-01-01 RX ORDER — ETOMIDATE 2 MG/ML
30 INJECTION INTRAVENOUS ONCE
Status: COMPLETED | OUTPATIENT
Start: 2019-01-01 | End: 2019-01-01

## 2019-01-01 RX ORDER — FUROSEMIDE 10 MG/ML
20 INJECTION INTRAMUSCULAR; INTRAVENOUS EVERY 12 HOURS
Status: DISCONTINUED | OUTPATIENT
Start: 2019-01-01 | End: 2019-01-01

## 2019-01-01 RX ORDER — FLUCONAZOLE 200 MG/1
200 TABLET ORAL DAILY
Qty: 9 TABLET | Refills: 0 | Status: SHIPPED | OUTPATIENT
Start: 2019-01-01

## 2019-01-01 RX ORDER — FUROSEMIDE 10 MG/ML
40 INJECTION INTRAMUSCULAR; INTRAVENOUS EVERY 12 HOURS
Status: DISCONTINUED | OUTPATIENT
Start: 2019-01-01 | End: 2019-01-01

## 2019-01-01 RX ORDER — ACETYLCYSTEINE 100 MG/ML
4 SOLUTION ORAL; RESPIRATORY (INHALATION) 2 TIMES DAILY
Status: DISCONTINUED | OUTPATIENT
Start: 2019-01-01 | End: 2019-01-01

## 2019-01-01 RX ORDER — LIDOCAINE 4 G/G
3 PATCH TOPICAL
Status: DISCONTINUED | OUTPATIENT
Start: 2019-01-01 | End: 2019-01-01

## 2019-01-01 RX ORDER — CARBIDOPA AND LEVODOPA 25; 100 MG/1; MG/1
1 TABLET ORAL 4 TIMES DAILY
Qty: 120 TABLET | Refills: 0 | Status: SHIPPED | OUTPATIENT
Start: 2019-01-01

## 2019-01-01 RX ORDER — CARBIDOPA AND LEVODOPA 25; 100 MG/1; MG/1
1 TABLET ORAL 4 TIMES DAILY
Qty: 120 TABLET | Refills: 0 | Status: SHIPPED | OUTPATIENT
Start: 2019-01-01 | End: 2019-01-01

## 2019-01-01 RX ORDER — FUROSEMIDE 10 MG/ML
40 INJECTION INTRAMUSCULAR; INTRAVENOUS EVERY 12 HOURS
Status: COMPLETED | OUTPATIENT
Start: 2019-01-01 | End: 2019-01-01

## 2019-01-01 RX ORDER — MIRABEGRON 25 MG/1
25 TABLET, FILM COATED, EXTENDED RELEASE ORAL DAILY
COMMUNITY
Start: 2019-01-01

## 2019-01-01 RX ORDER — ACETYLCYSTEINE 200 MG/ML
2 SOLUTION ORAL; RESPIRATORY (INHALATION) 2 TIMES DAILY
Status: DISCONTINUED | OUTPATIENT
Start: 2019-01-01 | End: 2019-01-01

## 2019-01-01 RX ORDER — LABETALOL HYDROCHLORIDE 5 MG/ML
INJECTION, SOLUTION INTRAVENOUS
Status: COMPLETED
Start: 2019-01-01 | End: 2019-01-01

## 2019-01-01 RX ORDER — ACETAMINOPHEN 500 MG
500 TABLET ORAL 3 TIMES DAILY
Status: DISCONTINUED | OUTPATIENT
Start: 2019-01-01 | End: 2019-01-01

## 2019-01-01 RX ORDER — TAMSULOSIN HYDROCHLORIDE 0.4 MG/1
0.4 CAPSULE ORAL DAILY
Qty: 30 CAPSULE | Refills: 11 | COMMUNITY
Start: 2019-01-01

## 2019-01-01 RX ORDER — FUROSEMIDE 10 MG/ML
20 INJECTION INTRAMUSCULAR; INTRAVENOUS ONCE
Status: COMPLETED | OUTPATIENT
Start: 2019-01-01 | End: 2019-01-01

## 2019-01-01 RX ORDER — ETOMIDATE 2 MG/ML
INJECTION INTRAVENOUS
Status: COMPLETED
Start: 2019-01-01 | End: 2019-01-01

## 2019-01-01 RX ORDER — CARBIDOPA AND LEVODOPA 25; 100 MG/1; MG/1
TABLET ORAL
Qty: 270 TABLET | Refills: 3 | Status: ON HOLD | OUTPATIENT
Start: 2019-01-01 | End: 2019-01-01

## 2019-01-01 RX ORDER — POLYETHYLENE GLYCOL 3350 17 G/17G
17 POWDER, FOR SOLUTION ORAL DAILY PRN
Status: DISCONTINUED | OUTPATIENT
Start: 2019-01-01 | End: 2019-01-01 | Stop reason: HOSPADM

## 2019-01-01 RX ORDER — CARBIDOPA AND LEVODOPA 25; 100 MG/1; MG/1
1 TABLET ORAL 4 TIMES DAILY
Status: DISPENSED | OUTPATIENT
Start: 2019-01-01 | End: 2019-01-01

## 2019-01-01 RX ORDER — PROPOFOL 10 MG/ML
5-75 INJECTION, EMULSION INTRAVENOUS CONTINUOUS
Status: DISCONTINUED | OUTPATIENT
Start: 2019-01-01 | End: 2019-01-01

## 2019-01-01 RX ORDER — QUETIAPINE FUMARATE 25 MG/1
50 TABLET, FILM COATED ORAL AT BEDTIME
Status: DISCONTINUED | OUTPATIENT
Start: 2019-01-01 | End: 2019-01-01

## 2019-01-01 RX ORDER — HYDRALAZINE HYDROCHLORIDE 20 MG/ML
10 INJECTION INTRAMUSCULAR; INTRAVENOUS EVERY 4 HOURS PRN
Status: DISCONTINUED | OUTPATIENT
Start: 2019-01-01 | End: 2019-01-01 | Stop reason: HOSPADM

## 2019-01-01 RX ORDER — MAGNESIUM SULFATE HEPTAHYDRATE 40 MG/ML
4 INJECTION, SOLUTION INTRAVENOUS EVERY 4 HOURS PRN
Status: DISCONTINUED | OUTPATIENT
Start: 2019-01-01 | End: 2019-01-01 | Stop reason: HOSPADM

## 2019-01-01 RX ORDER — AMLODIPINE BESYLATE 10 MG/1
10 TABLET ORAL DAILY
Status: DISCONTINUED | OUTPATIENT
Start: 2019-01-01 | End: 2019-01-01 | Stop reason: HOSPADM

## 2019-01-01 RX ORDER — TERAZOSIN 1 MG/1
1 CAPSULE ORAL DAILY
Status: DISCONTINUED | OUTPATIENT
Start: 2019-01-01 | End: 2019-01-01 | Stop reason: HOSPADM

## 2019-01-01 RX ORDER — LEVOTHYROXINE SODIUM 112 UG/1
112 TABLET ORAL
Status: DISCONTINUED | OUTPATIENT
Start: 2019-01-01 | End: 2019-01-01 | Stop reason: HOSPADM

## 2019-01-01 RX ORDER — LISINOPRIL 10 MG/1
10 TABLET ORAL DAILY
Status: DISCONTINUED | OUTPATIENT
Start: 2019-01-01 | End: 2019-01-01

## 2019-01-01 RX ORDER — PANTOPRAZOLE SODIUM 40 MG/1
40 TABLET, DELAYED RELEASE ORAL DAILY
Status: DISCONTINUED | OUTPATIENT
Start: 2019-01-01 | End: 2019-01-01

## 2019-01-01 RX ORDER — IBUPROFEN 200 MG
600 TABLET ORAL EVERY 8 HOURS PRN
Qty: 100 TABLET | COMMUNITY
Start: 2019-01-01 | End: 2019-01-01

## 2019-01-01 RX ORDER — LACOSAMIDE 10 MG/ML
100 SOLUTION ORAL 2 TIMES DAILY
Status: DISCONTINUED | OUTPATIENT
Start: 2019-01-01 | End: 2019-01-01

## 2019-01-01 RX ORDER — IBUPROFEN 200 MG
400 TABLET ORAL 3 TIMES DAILY
Qty: 100 TABLET | Status: ON HOLD | COMMUNITY
Start: 2019-01-01 | End: 2019-01-01

## 2019-01-01 RX ORDER — DOCUSATE SODIUM 100 MG/1
300 CAPSULE, LIQUID FILLED ORAL DAILY
Qty: 60 CAPSULE | COMMUNITY
Start: 2019-01-01

## 2019-01-01 RX ORDER — BARIUM SULFATE 400 MG/ML
SUSPENSION ORAL ONCE
Status: COMPLETED | OUTPATIENT
Start: 2019-01-01 | End: 2019-01-01

## 2019-01-01 RX ORDER — IOPAMIDOL 755 MG/ML
101 INJECTION, SOLUTION INTRAVASCULAR ONCE
Status: DISCONTINUED | OUTPATIENT
Start: 2019-01-01 | End: 2019-01-01

## 2019-01-01 RX ORDER — HYDROMORPHONE HYDROCHLORIDE 1 MG/ML
0.5 INJECTION, SOLUTION INTRAMUSCULAR; INTRAVENOUS; SUBCUTANEOUS ONCE
Status: COMPLETED | OUTPATIENT
Start: 2019-01-01 | End: 2019-01-01

## 2019-01-01 RX ORDER — FUROSEMIDE 10 MG/ML
INJECTION INTRAMUSCULAR; INTRAVENOUS
Status: COMPLETED
Start: 2019-01-01 | End: 2019-01-01

## 2019-01-01 RX ORDER — BISACODYL 10 MG
10 SUPPOSITORY, RECTAL RECTAL DAILY PRN
Qty: 10 SUPPOSITORY | Refills: 0 | Status: SHIPPED | OUTPATIENT
Start: 2019-01-01

## 2019-01-01 RX ORDER — FLUMAZENIL 0.1 MG/ML
0.2 INJECTION, SOLUTION INTRAVENOUS
Status: DISCONTINUED | OUTPATIENT
Start: 2019-01-01 | End: 2019-01-01 | Stop reason: HOSPADM

## 2019-01-01 RX ORDER — HYDRALAZINE HYDROCHLORIDE 20 MG/ML
10-20 INJECTION INTRAMUSCULAR; INTRAVENOUS EVERY 6 HOURS PRN
Status: DISCONTINUED | OUTPATIENT
Start: 2019-01-01 | End: 2019-01-01

## 2019-01-01 RX ORDER — CEFTRIAXONE 2 G/1
2 INJECTION, POWDER, FOR SOLUTION INTRAMUSCULAR; INTRAVENOUS ONCE
Status: COMPLETED | OUTPATIENT
Start: 2019-01-01 | End: 2019-01-01

## 2019-01-01 RX ORDER — VENLAFAXINE 37.5 MG/1
37.5 TABLET ORAL 3 TIMES DAILY
Status: DISCONTINUED | OUTPATIENT
Start: 2019-01-01 | End: 2019-01-01 | Stop reason: HOSPADM

## 2019-01-01 RX ORDER — CARBIDOPA AND LEVODOPA 25; 100 MG/1; MG/1
2 TABLET ORAL 4 TIMES DAILY
Status: DISCONTINUED | OUTPATIENT
Start: 2019-01-01 | End: 2019-01-01 | Stop reason: ALTCHOICE

## 2019-01-01 RX ORDER — DEXAMETHASONE SODIUM PHOSPHATE 4 MG/ML
4 INJECTION, SOLUTION INTRA-ARTICULAR; INTRALESIONAL; INTRAMUSCULAR; INTRAVENOUS; SOFT TISSUE EVERY 6 HOURS
Status: DISCONTINUED | OUTPATIENT
Start: 2019-01-01 | End: 2019-01-01

## 2019-01-01 RX ORDER — BISACODYL 10 MG
10 SUPPOSITORY, RECTAL RECTAL DAILY PRN
Status: DISCONTINUED | OUTPATIENT
Start: 2019-01-01 | End: 2019-01-01 | Stop reason: HOSPADM

## 2019-01-01 RX ORDER — METHYLPREDNISOLONE SODIUM SUCCINATE 125 MG/2ML
125 INJECTION, POWDER, LYOPHILIZED, FOR SOLUTION INTRAMUSCULAR; INTRAVENOUS ONCE
Status: COMPLETED | OUTPATIENT
Start: 2019-01-01 | End: 2019-01-01

## 2019-01-01 RX ORDER — PIPERACILLIN SODIUM, TAZOBACTAM SODIUM 3; .375 G/15ML; G/15ML
3.38 INJECTION, POWDER, LYOPHILIZED, FOR SOLUTION INTRAVENOUS EVERY 6 HOURS
Status: DISCONTINUED | OUTPATIENT
Start: 2019-01-01 | End: 2019-01-01

## 2019-01-01 RX ORDER — LORAZEPAM 2 MG/ML
0.5 INJECTION INTRAMUSCULAR ONCE
Status: COMPLETED | OUTPATIENT
Start: 2019-01-01 | End: 2019-01-01

## 2019-01-01 RX ORDER — MAGNESIUM SULFATE HEPTAHYDRATE 40 MG/ML
2 INJECTION, SOLUTION INTRAVENOUS DAILY PRN
Status: DISCONTINUED | OUTPATIENT
Start: 2019-01-01 | End: 2019-01-01 | Stop reason: HOSPADM

## 2019-01-01 RX ORDER — CARBIDOPA AND LEVODOPA 25; 100 MG/1; MG/1
2 TABLET ORAL 4 TIMES DAILY
Status: DISCONTINUED | OUTPATIENT
Start: 2019-01-01 | End: 2019-01-01

## 2019-01-01 RX ORDER — CHLORHEXIDINE GLUCONATE ORAL RINSE 1.2 MG/ML
15 SOLUTION DENTAL EVERY 12 HOURS
Status: DISCONTINUED | OUTPATIENT
Start: 2019-01-01 | End: 2019-01-01 | Stop reason: CLARIF

## 2019-01-01 RX ORDER — ACETAMINOPHEN 325 MG/1
650 TABLET ORAL EVERY 4 HOURS PRN
Status: DISCONTINUED | OUTPATIENT
Start: 2019-01-01 | End: 2019-01-01

## 2019-01-01 RX ORDER — VENLAFAXINE 37.5 MG/1
37.5 TABLET ORAL 3 TIMES DAILY
Qty: 90 TABLET | Refills: 0 | Status: SHIPPED | OUTPATIENT
Start: 2019-01-01

## 2019-01-01 RX ORDER — PIPERACILLIN SODIUM, TAZOBACTAM SODIUM 3; .375 G/15ML; G/15ML
3.38 INJECTION, POWDER, LYOPHILIZED, FOR SOLUTION INTRAVENOUS ONCE
Status: COMPLETED | OUTPATIENT
Start: 2019-01-01 | End: 2019-01-01

## 2019-01-01 RX ORDER — AMINO ACIDS/PROTEIN HYDROLYS 15G-100/30
1 LIQUID (ML) ORAL 2 TIMES DAILY
Status: DISCONTINUED | OUTPATIENT
Start: 2019-01-01 | End: 2019-01-01 | Stop reason: CLARIF

## 2019-01-01 RX ORDER — MAGNESIUM SULFATE HEPTAHYDRATE 40 MG/ML
2 INJECTION, SOLUTION INTRAVENOUS ONCE
Status: COMPLETED | OUTPATIENT
Start: 2019-01-01 | End: 2019-01-01

## 2019-01-01 RX ORDER — NICOTINE POLACRILEX 4 MG
15-30 LOZENGE BUCCAL
Status: DISCONTINUED | OUTPATIENT
Start: 2019-01-01 | End: 2019-01-01 | Stop reason: HOSPADM

## 2019-01-01 RX ORDER — CARBIDOPA AND LEVODOPA 25; 100 MG/1; MG/1
TABLET ORAL
Qty: 630 TABLET | Refills: 3 | COMMUNITY
Start: 2019-01-01 | End: 2019-01-01 | Stop reason: DRUGHIGH

## 2019-01-01 RX ORDER — FUROSEMIDE 10 MG/ML
10 INJECTION INTRAMUSCULAR; INTRAVENOUS ONCE
Status: COMPLETED | OUTPATIENT
Start: 2019-01-01 | End: 2019-01-01

## 2019-01-01 RX ORDER — ONDANSETRON 4 MG/1
4 TABLET, ORALLY DISINTEGRATING ORAL EVERY 6 HOURS PRN
Status: DISCONTINUED | OUTPATIENT
Start: 2019-01-01 | End: 2019-01-01 | Stop reason: HOSPADM

## 2019-01-01 RX ORDER — HYDRALAZINE HYDROCHLORIDE 10 MG/1
10 TABLET, FILM COATED ORAL EVERY 8 HOURS SCHEDULED
Status: DISCONTINUED | OUTPATIENT
Start: 2019-01-01 | End: 2019-01-01

## 2019-01-01 RX ORDER — CARBIDOPA AND LEVODOPA 50; 200 MG/1; MG/1
TABLET, EXTENDED RELEASE ORAL
Qty: 360 TABLET | Refills: 3 | COMMUNITY
Start: 2019-01-01 | End: 2019-01-01

## 2019-01-01 RX ORDER — ALBUTEROL SULFATE 5 MG/ML
2.5 SOLUTION RESPIRATORY (INHALATION) 2 TIMES DAILY
Status: DISCONTINUED | OUTPATIENT
Start: 2019-01-01 | End: 2019-01-01

## 2019-01-01 RX ORDER — MORPHINE SULFATE 100 MG/5ML
2.5 SOLUTION ORAL
Status: DISCONTINUED | OUTPATIENT
Start: 2019-01-01 | End: 2019-01-01 | Stop reason: HOSPADM

## 2019-01-01 RX ORDER — IOPAMIDOL 755 MG/ML
70 INJECTION, SOLUTION INTRAVASCULAR ONCE
Status: COMPLETED | OUTPATIENT
Start: 2019-01-01 | End: 2019-01-01

## 2019-01-01 RX ORDER — PROPOFOL 10 MG/ML
10-20 INJECTION, EMULSION INTRAVENOUS EVERY 30 MIN PRN
Status: DISCONTINUED | OUTPATIENT
Start: 2019-01-01 | End: 2019-01-01

## 2019-01-01 RX ORDER — PROPOFOL 10 MG/ML
INJECTION, EMULSION INTRAVENOUS
Status: COMPLETED
Start: 2019-01-01 | End: 2019-01-01

## 2019-01-01 RX ORDER — LACOSAMIDE 10 MG/ML
150 SOLUTION ORAL 2 TIMES DAILY
Status: DISCONTINUED | OUTPATIENT
Start: 2019-01-01 | End: 2019-01-01 | Stop reason: HOSPADM

## 2019-01-01 RX ORDER — GADOBUTROL 604.72 MG/ML
9 INJECTION INTRAVENOUS ONCE
Status: COMPLETED | OUTPATIENT
Start: 2019-01-01 | End: 2019-01-01

## 2019-01-01 RX ORDER — NITROGLYCERIN 0.4 MG/1
0.4 TABLET SUBLINGUAL EVERY 5 MIN PRN
Status: CANCELLED | OUTPATIENT
Start: 2019-01-01

## 2019-01-01 RX ORDER — AMLODIPINE BESYLATE 5 MG/1
5 TABLET ORAL DAILY
Status: DISCONTINUED | OUTPATIENT
Start: 2019-01-01 | End: 2019-01-01

## 2019-01-01 RX ORDER — HEPARIN SODIUM 5000 [USP'U]/.5ML
5000 INJECTION, SOLUTION INTRAVENOUS; SUBCUTANEOUS EVERY 8 HOURS
Status: DISCONTINUED | OUTPATIENT
Start: 2019-01-01 | End: 2019-01-01

## 2019-01-01 RX ORDER — FLUCONAZOLE 100 MG/1
200 TABLET ORAL DAILY
Status: DISCONTINUED | OUTPATIENT
Start: 2019-01-01 | End: 2019-01-01 | Stop reason: HOSPADM

## 2019-01-01 RX ORDER — IBUPROFEN 200 MG
400 TABLET ORAL EVERY 8 HOURS PRN
Qty: 100 TABLET | COMMUNITY
Start: 2019-01-01

## 2019-01-01 RX ORDER — FENTANYL CITRATE 50 UG/ML
100 INJECTION, SOLUTION INTRAMUSCULAR; INTRAVENOUS EVERY 30 MIN PRN
Status: DISCONTINUED | OUTPATIENT
Start: 2019-01-01 | End: 2019-01-01

## 2019-01-01 RX ORDER — LACOSAMIDE 10 MG/ML
150 SOLUTION ORAL 2 TIMES DAILY
Qty: 465 ML | Refills: 0 | Status: SHIPPED | OUTPATIENT
Start: 2019-01-01

## 2019-01-01 RX ORDER — LABETALOL HYDROCHLORIDE 5 MG/ML
10 INJECTION, SOLUTION INTRAVENOUS ONCE
Status: COMPLETED | OUTPATIENT
Start: 2019-01-01 | End: 2019-01-01

## 2019-01-01 RX ORDER — QUETIAPINE FUMARATE 25 MG/1
25 TABLET, FILM COATED ORAL 2 TIMES DAILY
Status: DISCONTINUED | OUTPATIENT
Start: 2019-01-01 | End: 2019-01-01

## 2019-01-01 RX ORDER — OXYCODONE HYDROCHLORIDE 5 MG/1
5-10 TABLET ORAL
Status: DISCONTINUED | OUTPATIENT
Start: 2019-01-01 | End: 2019-01-01

## 2019-01-01 RX ORDER — MORPHINE SULFATE 30 MG/1
2.5 TABLET ORAL
Qty: 30 TABLET | Refills: 0 | Status: SHIPPED | OUTPATIENT
Start: 2019-01-01 | End: 2019-01-01

## 2019-01-01 RX ORDER — ALBUTEROL SULFATE 0.83 MG/ML
2.5 SOLUTION RESPIRATORY (INHALATION)
Status: DISCONTINUED | OUTPATIENT
Start: 2019-01-01 | End: 2019-01-01 | Stop reason: HOSPADM

## 2019-01-01 RX ORDER — ALBUTEROL SULFATE 0.83 MG/ML
2.5 SOLUTION RESPIRATORY (INHALATION)
Qty: 1 BOX | Refills: 0 | Status: SHIPPED | OUTPATIENT
Start: 2019-01-01 | End: 2019-01-01

## 2019-01-01 RX ORDER — LABETALOL 20 MG/4 ML (5 MG/ML) INTRAVENOUS SYRINGE
10-20 EVERY 4 HOURS PRN
Status: DISCONTINUED | OUTPATIENT
Start: 2019-01-01 | End: 2019-01-01 | Stop reason: HOSPADM

## 2019-01-01 RX ORDER — TERAZOSIN 1 MG/1
1 CAPSULE ORAL DAILY
Qty: 30 CAPSULE | Refills: 0 | Status: SHIPPED | OUTPATIENT
Start: 2019-01-01 | End: 2019-01-01

## 2019-01-01 RX ORDER — LIDOCAINE 4 G/G
1-3 PATCH TOPICAL
Status: DISCONTINUED | OUTPATIENT
Start: 2019-01-01 | End: 2019-01-01 | Stop reason: ALTCHOICE

## 2019-01-01 RX ORDER — FENTANYL CITRATE 50 UG/ML
25-50 INJECTION, SOLUTION INTRAMUSCULAR; INTRAVENOUS EVERY 5 MIN PRN
Status: DISCONTINUED | OUTPATIENT
Start: 2019-01-01 | End: 2019-01-01 | Stop reason: HOSPADM

## 2019-01-01 RX ORDER — ACETAMINOPHEN 325 MG/1
650 TABLET ORAL EVERY 4 HOURS PRN
COMMUNITY
Start: 2019-01-01

## 2019-01-01 RX ORDER — CEFTRIAXONE 1 G/1
1 INJECTION, POWDER, FOR SOLUTION INTRAMUSCULAR; INTRAVENOUS EVERY 24 HOURS
Status: DISCONTINUED | OUTPATIENT
Start: 2019-01-01 | End: 2019-01-01

## 2019-01-01 RX ORDER — ASPIRIN 81 MG/1
81 TABLET, CHEWABLE ORAL AT BEDTIME
Status: DISCONTINUED | OUTPATIENT
Start: 2019-01-01 | End: 2019-01-01 | Stop reason: HOSPADM

## 2019-01-01 RX ORDER — LACOSAMIDE 10 MG/ML
200 SOLUTION ORAL 2 TIMES DAILY
Status: DISCONTINUED | OUTPATIENT
Start: 2019-01-01 | End: 2019-01-01

## 2019-01-01 RX ORDER — ONDANSETRON 2 MG/ML
4 INJECTION INTRAMUSCULAR; INTRAVENOUS EVERY 6 HOURS PRN
Status: DISCONTINUED | OUTPATIENT
Start: 2019-01-01 | End: 2019-01-01 | Stop reason: HOSPADM

## 2019-01-01 RX ADMIN — Medication 0.2 MG: at 06:29

## 2019-01-01 RX ADMIN — ASPIRIN 81 MG 81 MG: 81 TABLET ORAL at 21:20

## 2019-01-01 RX ADMIN — ALBUTEROL SULFATE 2.5 MG: 2.5 SOLUTION RESPIRATORY (INHALATION) at 22:45

## 2019-01-01 RX ADMIN — FAMOTIDINE 20 MG: 10 INJECTION, SOLUTION INTRAVENOUS at 10:24

## 2019-01-01 RX ADMIN — CARBIDOPA AND LEVODOPA 1 TABLET: 25; 100 TABLET ORAL at 20:41

## 2019-01-01 RX ADMIN — CARBIDOPA AND LEVODOPA 2 TABLET: 25; 100 TABLET ORAL at 08:19

## 2019-01-01 RX ADMIN — ASPIRIN 81 MG 81 MG: 81 TABLET ORAL at 22:00

## 2019-01-01 RX ADMIN — PIPERACILLIN SODIUM,TAZOBACTAM SODIUM 3.38 G: 3; .375 INJECTION, POWDER, FOR SOLUTION INTRAVENOUS at 22:11

## 2019-01-01 RX ADMIN — OXYCODONE HYDROCHLORIDE 10 MG: 5 TABLET ORAL at 16:09

## 2019-01-01 RX ADMIN — ACETAMINOPHEN 650 MG: 160 SUSPENSION ORAL at 06:43

## 2019-01-01 RX ADMIN — Medication 1 PACKET: at 08:06

## 2019-01-01 RX ADMIN — ACETAMINOPHEN 500 MG: 160 SUSPENSION ORAL at 22:41

## 2019-01-01 RX ADMIN — QUETIAPINE 12.5 MG: 25 TABLET, FILM COATED ORAL at 04:15

## 2019-01-01 RX ADMIN — ASPIRIN 81 MG 81 MG: 81 TABLET ORAL at 21:19

## 2019-01-01 RX ADMIN — VALPROATE SODIUM 350 MG: 100 INJECTION, SOLUTION INTRAVENOUS at 00:34

## 2019-01-01 RX ADMIN — ALBUTEROL SULFATE 2.5 MG: 2.5 SOLUTION RESPIRATORY (INHALATION) at 18:06

## 2019-01-01 RX ADMIN — DEXTRAN 70, AND HYPROMELLOSE 2910 1 DROP: 1; 3 SOLUTION/ DROPS OPHTHALMIC at 19:41

## 2019-01-01 RX ADMIN — CARBIDOPA AND LEVODOPA 1 TABLET: 25; 100 TABLET ORAL at 16:25

## 2019-01-01 RX ADMIN — VALPROATE SODIUM 350 MG: 100 INJECTION, SOLUTION INTRAVENOUS at 00:00

## 2019-01-01 RX ADMIN — ACETAMINOPHEN 650 MG: 160 SUSPENSION ORAL at 23:56

## 2019-01-01 RX ADMIN — ALBUTEROL SULFATE 2.5 MG: 2.5 SOLUTION RESPIRATORY (INHALATION) at 07:45

## 2019-01-01 RX ADMIN — AMLODIPINE BESYLATE 10 MG: 10 TABLET ORAL at 08:41

## 2019-01-01 RX ADMIN — HEPARIN SODIUM 5000 UNITS: 5000 INJECTION, SOLUTION INTRAVENOUS; SUBCUTANEOUS at 05:51

## 2019-01-01 RX ADMIN — CARBIDOPA AND LEVODOPA 2 TABLET: 25; 100 TABLET ORAL at 19:40

## 2019-01-01 RX ADMIN — HYDRALAZINE HYDROCHLORIDE 5 MG: 10 TABLET ORAL at 05:53

## 2019-01-01 RX ADMIN — DOCUSATE SODIUM 100 MG: 50 LIQUID ORAL at 09:30

## 2019-01-01 RX ADMIN — DOCUSATE SODIUM 100 MG: 100 CAPSULE, LIQUID FILLED ORAL at 08:02

## 2019-01-01 RX ADMIN — ACETYLCYSTEINE 2 ML: 200 SOLUTION ORAL; RESPIRATORY (INHALATION) at 11:43

## 2019-01-01 RX ADMIN — ROTIGOTINE 1 PATCH: 4 PATCH, EXTENDED RELEASE TRANSDERMAL at 19:05

## 2019-01-01 RX ADMIN — ACETAMINOPHEN 650 MG: 160 SUSPENSION ORAL at 12:17

## 2019-01-01 RX ADMIN — LIDOCAINE 1 PATCH: 560 PATCH PERCUTANEOUS; TOPICAL; TRANSDERMAL at 08:13

## 2019-01-01 RX ADMIN — CARBIDOPA AND LEVODOPA 2 TABLET: 25; 100 TABLET ORAL at 08:26

## 2019-01-01 RX ADMIN — DOCUSATE SODIUM 100 MG: 50 LIQUID ORAL at 21:05

## 2019-01-01 RX ADMIN — INSULIN ASPART 1 UNITS: 100 INJECTION, SOLUTION INTRAVENOUS; SUBCUTANEOUS at 05:16

## 2019-01-01 RX ADMIN — INSULIN ASPART 1 UNITS: 100 INJECTION, SOLUTION INTRAVENOUS; SUBCUTANEOUS at 08:37

## 2019-01-01 RX ADMIN — DOCUSATE SODIUM 100 MG: 50 LIQUID ORAL at 08:13

## 2019-01-01 RX ADMIN — DICLOFENAC 1 G: 10 GEL TOPICAL at 21:45

## 2019-01-01 RX ADMIN — OXYCODONE HYDROCHLORIDE 5 MG: 5 TABLET ORAL at 19:44

## 2019-01-01 RX ADMIN — OXYCODONE HYDROCHLORIDE 10 MG: 5 TABLET ORAL at 21:12

## 2019-01-01 RX ADMIN — PIPERACILLIN SODIUM,TAZOBACTAM SODIUM 4.5 G: 4; .5 INJECTION, POWDER, FOR SOLUTION INTRAVENOUS at 11:31

## 2019-01-01 RX ADMIN — ACETAMINOPHEN 650 MG: 325 TABLET, FILM COATED ORAL at 11:39

## 2019-01-01 RX ADMIN — SODIUM CHLORIDE: 9 INJECTION, SOLUTION INTRAVENOUS at 00:16

## 2019-01-01 RX ADMIN — DEXMEDETOMIDINE HYDROCHLORIDE 0.4 MCG/KG/HR: 100 INJECTION, SOLUTION INTRAVENOUS at 03:13

## 2019-01-01 RX ADMIN — VENLAFAXINE 37.5 MG: 37.5 TABLET ORAL at 13:50

## 2019-01-01 RX ADMIN — AMLODIPINE BESYLATE 10 MG: 10 TABLET ORAL at 09:46

## 2019-01-01 RX ADMIN — CARBIDOPA AND LEVODOPA 1 TABLET: 25; 100 TABLET ORAL at 06:20

## 2019-01-01 RX ADMIN — POTASSIUM CHLORIDE 20 MEQ: 1.5 POWDER, FOR SOLUTION ORAL at 08:39

## 2019-01-01 RX ADMIN — INSULIN ASPART 3 UNITS: 100 INJECTION, SOLUTION INTRAVENOUS; SUBCUTANEOUS at 04:32

## 2019-01-01 RX ADMIN — Medication 0.2 MG: at 19:47

## 2019-01-01 RX ADMIN — DOCUSATE SODIUM 100 MG: 50 LIQUID ORAL at 20:12

## 2019-01-01 RX ADMIN — PIPERACILLIN SODIUM,TAZOBACTAM SODIUM 4.5 G: 4; .5 INJECTION, POWDER, FOR SOLUTION INTRAVENOUS at 05:27

## 2019-01-01 RX ADMIN — VENLAFAXINE 37.5 MG: 37.5 TABLET ORAL at 07:27

## 2019-01-01 RX ADMIN — INSULIN ASPART 1 UNITS: 100 INJECTION, SOLUTION INTRAVENOUS; SUBCUTANEOUS at 16:23

## 2019-01-01 RX ADMIN — AMLODIPINE BESYLATE 5 MG: 5 TABLET ORAL at 12:16

## 2019-01-01 RX ADMIN — INSULIN ASPART 1 UNITS: 100 INJECTION, SOLUTION INTRAVENOUS; SUBCUTANEOUS at 22:01

## 2019-01-01 RX ADMIN — CARBIDOPA AND LEVODOPA 2 TABLET: 25; 100 TABLET ORAL at 18:30

## 2019-01-01 RX ADMIN — INSULIN ASPART 1 UNITS: 100 INJECTION, SOLUTION INTRAVENOUS; SUBCUTANEOUS at 11:49

## 2019-01-01 RX ADMIN — ACETAMINOPHEN 650 MG: 325 TABLET, FILM COATED ORAL at 12:21

## 2019-01-01 RX ADMIN — Medication 887 ML: at 08:17

## 2019-01-01 RX ADMIN — DEXTRAN 70, AND HYPROMELLOSE 2910 1 DROP: 1; 3 SOLUTION/ DROPS OPHTHALMIC at 03:52

## 2019-01-01 RX ADMIN — CARBIDOPA AND LEVODOPA 2 TABLET: 25; 100 TABLET ORAL at 11:59

## 2019-01-01 RX ADMIN — VALPROATE SODIUM 350 MG: 100 INJECTION, SOLUTION INTRAVENOUS at 00:15

## 2019-01-01 RX ADMIN — MULTIVITAMIN 15 ML: LIQUID ORAL at 14:09

## 2019-01-01 RX ADMIN — SODIUM CHLORIDE 100 MG: 9 INJECTION, SOLUTION INTRAVENOUS at 09:19

## 2019-01-01 RX ADMIN — TERAZOSIN HYDROCHLORIDE 1 MG: 1 CAPSULE ORAL at 20:42

## 2019-01-01 RX ADMIN — ACETAMINOPHEN 650 MG: 160 SUSPENSION ORAL at 05:45

## 2019-01-01 RX ADMIN — MULTIVITAMIN 15 ML: LIQUID ORAL at 12:02

## 2019-01-01 RX ADMIN — VENLAFAXINE 37.5 MG: 37.5 TABLET ORAL at 13:32

## 2019-01-01 RX ADMIN — CARBIDOPA AND LEVODOPA 2 TABLET: 25; 100 TABLET ORAL at 11:55

## 2019-01-01 RX ADMIN — DICLOFENAC 1 G: 10 GEL TOPICAL at 08:32

## 2019-01-01 RX ADMIN — FUROSEMIDE 20 MG: 10 INJECTION, SOLUTION INTRAVENOUS at 13:32

## 2019-01-01 RX ADMIN — ACETAMINOPHEN 500 MG: 160 SUSPENSION ORAL at 08:05

## 2019-01-01 RX ADMIN — CARBIDOPA AND LEVODOPA 1 TABLET: 25; 100 TABLET ORAL at 07:59

## 2019-01-01 RX ADMIN — INSULIN ASPART 2 UNITS: 100 INJECTION, SOLUTION INTRAVENOUS; SUBCUTANEOUS at 17:48

## 2019-01-01 RX ADMIN — POTASSIUM CHLORIDE 20 MEQ: 1.5 POWDER, FOR SOLUTION ORAL at 01:21

## 2019-01-01 RX ADMIN — INSULIN ASPART 4 UNITS: 100 INJECTION, SOLUTION INTRAVENOUS; SUBCUTANEOUS at 12:17

## 2019-01-01 RX ADMIN — ACETAMINOPHEN 650 MG: 160 SUSPENSION ORAL at 00:01

## 2019-01-01 RX ADMIN — TERAZOSIN HYDROCHLORIDE 1 MG: 1 CAPSULE ORAL at 21:20

## 2019-01-01 RX ADMIN — VENLAFAXINE 37.5 MG: 37.5 TABLET ORAL at 08:05

## 2019-01-01 RX ADMIN — ALBUTEROL SULFATE 2.5 MG: 2.5 SOLUTION RESPIRATORY (INHALATION) at 19:45

## 2019-01-01 RX ADMIN — LEVOTHYROXINE SODIUM 112 MCG: 112 TABLET ORAL at 06:54

## 2019-01-01 RX ADMIN — CARBIDOPA AND LEVODOPA 2 TABLET: 25; 100 TABLET ORAL at 21:55

## 2019-01-01 RX ADMIN — ACETAMINOPHEN 650 MG: 325 TABLET, FILM COATED ORAL at 01:12

## 2019-01-01 RX ADMIN — OXYCODONE HYDROCHLORIDE 5 MG: 5 TABLET ORAL at 01:03

## 2019-01-01 RX ADMIN — VENLAFAXINE 37.5 MG: 37.5 TABLET ORAL at 08:07

## 2019-01-01 RX ADMIN — ACETAMINOPHEN 650 MG: 160 SUSPENSION ORAL at 17:37

## 2019-01-01 RX ADMIN — OXYCODONE HYDROCHLORIDE 10 MG: 5 TABLET ORAL at 18:24

## 2019-01-01 RX ADMIN — DEXAMETHASONE SODIUM PHOSPHATE 4 MG: 4 INJECTION, SOLUTION INTRA-ARTICULAR; INTRALESIONAL; INTRAMUSCULAR; INTRAVENOUS; SOFT TISSUE at 05:36

## 2019-01-01 RX ADMIN — ROTIGOTINE 1 PATCH: 4 PATCH, EXTENDED RELEASE TRANSDERMAL at 20:44

## 2019-01-01 RX ADMIN — POTASSIUM CHLORIDE 40 MEQ: 1.5 POWDER, FOR SOLUTION ORAL at 05:47

## 2019-01-01 RX ADMIN — VENLAFAXINE 37.5 MG: 37.5 TABLET ORAL at 20:44

## 2019-01-01 RX ADMIN — MULTIVITAMIN 15 ML: LIQUID ORAL at 11:47

## 2019-01-01 RX ADMIN — CARBIDOPA AND LEVODOPA 2 TABLET: 25; 100 TABLET ORAL at 08:14

## 2019-01-01 RX ADMIN — CARBIDOPA AND LEVODOPA 2 TABLET: 25; 100 TABLET ORAL at 08:24

## 2019-01-01 RX ADMIN — SODIUM CHLORIDE 140 ML: 9 INJECTION, SOLUTION INTRAVENOUS at 12:30

## 2019-01-01 RX ADMIN — Medication 40 MG: at 09:12

## 2019-01-01 RX ADMIN — QUETIAPINE 50 MG: 25 TABLET ORAL at 08:14

## 2019-01-01 RX ADMIN — ACETAMINOPHEN 500 MG: 160 SUSPENSION ORAL at 07:05

## 2019-01-01 RX ADMIN — QUETIAPINE 12.5 MG: 25 TABLET, FILM COATED ORAL at 20:34

## 2019-01-01 RX ADMIN — ACETAMINOPHEN 500 MG: 160 SUSPENSION ORAL at 08:07

## 2019-01-01 RX ADMIN — LEVOTHYROXINE SODIUM 112 MCG: 112 TABLET ORAL at 05:26

## 2019-01-01 RX ADMIN — MULTIVITAMIN 15 ML: LIQUID ORAL at 08:06

## 2019-01-01 RX ADMIN — QUETIAPINE 12.5 MG: 25 TABLET, FILM COATED ORAL at 22:07

## 2019-01-01 RX ADMIN — CARBIDOPA AND LEVODOPA 2 TABLET: 25; 100 TABLET ORAL at 16:13

## 2019-01-01 RX ADMIN — SODIUM CHLORIDE 100 MG: 9 INJECTION, SOLUTION INTRAVENOUS at 20:32

## 2019-01-01 RX ADMIN — LABETALOL HYDROCHLORIDE 10 MG: 5 INJECTION, SOLUTION INTRAVENOUS at 15:00

## 2019-01-01 RX ADMIN — VENLAFAXINE 37.5 MG: 37.5 TABLET ORAL at 19:58

## 2019-01-01 RX ADMIN — CARBIDOPA AND LEVODOPA 1 TABLET: 25; 100 TABLET ORAL at 11:02

## 2019-01-01 RX ADMIN — IOPAMIDOL 70 ML: 755 INJECTION, SOLUTION INTRAVENOUS at 15:13

## 2019-01-01 RX ADMIN — LEVOTHYROXINE SODIUM 112 MCG: 112 TABLET ORAL at 08:52

## 2019-01-01 RX ADMIN — VENLAFAXINE 37.5 MG: 37.5 TABLET ORAL at 20:24

## 2019-01-01 RX ADMIN — VALPROATE SODIUM 350 MG: 100 INJECTION, SOLUTION INTRAVENOUS at 05:42

## 2019-01-01 RX ADMIN — INSULIN ASPART 2 UNITS: 100 INJECTION, SOLUTION INTRAVENOUS; SUBCUTANEOUS at 20:42

## 2019-01-01 RX ADMIN — DOCUSATE SODIUM 100 MG: 50 LIQUID ORAL at 09:35

## 2019-01-01 RX ADMIN — CARBIDOPA AND LEVODOPA 2 TABLET: 25; 100 TABLET ORAL at 08:04

## 2019-01-01 RX ADMIN — VALPROIC ACID 250 MG: 250 SOLUTION ORAL at 20:00

## 2019-01-01 RX ADMIN — VENLAFAXINE 37.5 MG: 37.5 TABLET ORAL at 20:57

## 2019-01-01 RX ADMIN — CARBIDOPA AND LEVODOPA 2 TABLET: 25; 100 TABLET ORAL at 15:26

## 2019-01-01 RX ADMIN — INSULIN ASPART 3 UNITS: 100 INJECTION, SOLUTION INTRAVENOUS; SUBCUTANEOUS at 13:43

## 2019-01-01 RX ADMIN — LORAZEPAM 0.5 MG: 2 INJECTION INTRAMUSCULAR at 18:58

## 2019-01-01 RX ADMIN — Medication 40 MG: at 08:02

## 2019-01-01 RX ADMIN — QUETIAPINE 50 MG: 25 TABLET ORAL at 08:06

## 2019-01-01 RX ADMIN — Medication 40 MG: at 05:24

## 2019-01-01 RX ADMIN — CARBIDOPA AND LEVODOPA 2 TABLET: 25; 100 TABLET ORAL at 08:51

## 2019-01-01 RX ADMIN — CARBIDOPA AND LEVODOPA 2 TABLET: 25; 100 TABLET ORAL at 11:31

## 2019-01-01 RX ADMIN — INSULIN ASPART 1 UNITS: 100 INJECTION, SOLUTION INTRAVENOUS; SUBCUTANEOUS at 20:15

## 2019-01-01 RX ADMIN — HEPARIN SODIUM 5000 UNITS: 5000 INJECTION, SOLUTION INTRAVENOUS; SUBCUTANEOUS at 21:55

## 2019-01-01 RX ADMIN — QUETIAPINE 50 MG: 25 TABLET ORAL at 20:32

## 2019-01-01 RX ADMIN — DOCUSATE SODIUM 100 MG: 50 LIQUID ORAL at 08:23

## 2019-01-01 RX ADMIN — HEPARIN SODIUM 5000 UNITS: 5000 INJECTION, SOLUTION INTRAVENOUS; SUBCUTANEOUS at 22:05

## 2019-01-01 RX ADMIN — QUETIAPINE 12.5 MG: 25 TABLET, FILM COATED ORAL at 21:07

## 2019-01-01 RX ADMIN — INSULIN ASPART 1 UNITS: 100 INJECTION, SOLUTION INTRAVENOUS; SUBCUTANEOUS at 17:00

## 2019-01-01 RX ADMIN — INSULIN ASPART 3 UNITS: 100 INJECTION, SOLUTION INTRAVENOUS; SUBCUTANEOUS at 01:13

## 2019-01-01 RX ADMIN — CARBIDOPA AND LEVODOPA 2 TABLET: 25; 100 TABLET ORAL at 08:05

## 2019-01-01 RX ADMIN — SODIUM CHLORIDE 71 ML: 9 INJECTION, SOLUTION INTRAVENOUS at 15:22

## 2019-01-01 RX ADMIN — LEVOTHYROXINE SODIUM 112 MCG: 112 TABLET ORAL at 09:28

## 2019-01-01 RX ADMIN — INSULIN ASPART 2 UNITS: 100 INJECTION, SOLUTION INTRAVENOUS; SUBCUTANEOUS at 04:28

## 2019-01-01 RX ADMIN — INSULIN ASPART 3 UNITS: 100 INJECTION, SOLUTION INTRAVENOUS; SUBCUTANEOUS at 16:37

## 2019-01-01 RX ADMIN — HYDRALAZINE HYDROCHLORIDE 10 MG: 20 INJECTION INTRAMUSCULAR; INTRAVENOUS at 04:49

## 2019-01-01 RX ADMIN — Medication 0.5 MG: at 06:14

## 2019-01-01 RX ADMIN — Medication 40 MG: at 05:26

## 2019-01-01 RX ADMIN — ASPIRIN 81 MG 81 MG: 81 TABLET ORAL at 21:55

## 2019-01-01 RX ADMIN — INSULIN ASPART 1 UNITS: 100 INJECTION, SOLUTION INTRAVENOUS; SUBCUTANEOUS at 20:01

## 2019-01-01 RX ADMIN — CARBIDOPA AND LEVODOPA 2 TABLET: 25; 100 TABLET ORAL at 12:00

## 2019-01-01 RX ADMIN — INSULIN ASPART 2 UNITS: 100 INJECTION, SOLUTION INTRAVENOUS; SUBCUTANEOUS at 08:08

## 2019-01-01 RX ADMIN — DEXTRAN 70, AND HYPROMELLOSE 2910 1 DROP: 1; 3 SOLUTION/ DROPS OPHTHALMIC at 17:19

## 2019-01-01 RX ADMIN — CARBIDOPA AND LEVODOPA 2 TABLET: 25; 100 TABLET ORAL at 07:27

## 2019-01-01 RX ADMIN — CARBIDOPA AND LEVODOPA 1 TABLET: 25; 100 TABLET ORAL at 20:51

## 2019-01-01 RX ADMIN — Medication 887 ML: at 13:29

## 2019-01-01 RX ADMIN — AMLODIPINE BESYLATE 10 MG: 10 TABLET ORAL at 09:32

## 2019-01-01 RX ADMIN — VENLAFAXINE 37.5 MG: 37.5 TABLET ORAL at 14:54

## 2019-01-01 RX ADMIN — SODIUM CHLORIDE 100 MG: 9 INJECTION, SOLUTION INTRAVENOUS at 08:32

## 2019-01-01 RX ADMIN — CHLORHEXIDINE GLUCONATE 15 ML: 1.2 RINSE ORAL at 08:14

## 2019-01-01 RX ADMIN — LACOSAMIDE 150 MG: 10 SOLUTION ORAL at 08:39

## 2019-01-01 RX ADMIN — DOCUSATE SODIUM 100 MG: 50 LIQUID ORAL at 20:26

## 2019-01-01 RX ADMIN — MULTIVITAMIN 15 ML: LIQUID ORAL at 13:23

## 2019-01-01 RX ADMIN — VENLAFAXINE 37.5 MG: 37.5 TABLET ORAL at 08:49

## 2019-01-01 RX ADMIN — ACETAMINOPHEN 650 MG: 160 SUSPENSION ORAL at 18:13

## 2019-01-01 RX ADMIN — VALPROATE SODIUM 350 MG: 100 INJECTION, SOLUTION INTRAVENOUS at 06:05

## 2019-01-01 RX ADMIN — INSULIN ASPART 2 UNITS: 100 INJECTION, SOLUTION INTRAVENOUS; SUBCUTANEOUS at 09:00

## 2019-01-01 RX ADMIN — VENLAFAXINE 37.5 MG: 37.5 TABLET ORAL at 08:33

## 2019-01-01 RX ADMIN — CHLORHEXIDINE GLUCONATE 15 ML: 1.2 RINSE ORAL at 20:00

## 2019-01-01 RX ADMIN — DOCUSATE SODIUM 100 MG: 50 LIQUID ORAL at 20:22

## 2019-01-01 RX ADMIN — Medication 1 MG: at 20:57

## 2019-01-01 RX ADMIN — ACETAMINOPHEN 500 MG: 160 SUSPENSION ORAL at 17:44

## 2019-01-01 RX ADMIN — CARBIDOPA AND LEVODOPA 2 TABLET: 25; 100 TABLET ORAL at 19:57

## 2019-01-01 RX ADMIN — VANCOMYCIN HYDROCHLORIDE 2000 MG: 5 INJECTION, POWDER, LYOPHILIZED, FOR SOLUTION INTRAVENOUS at 19:18

## 2019-01-01 RX ADMIN — ALBUTEROL SULFATE 2.5 MG: 2.5 SOLUTION RESPIRATORY (INHALATION) at 00:24

## 2019-01-01 RX ADMIN — ASPIRIN 81 MG 81 MG: 81 TABLET ORAL at 21:14

## 2019-01-01 RX ADMIN — VALPROATE SODIUM 350 MG: 100 INJECTION, SOLUTION INTRAVENOUS at 19:00

## 2019-01-01 RX ADMIN — HEPARIN SODIUM 5000 UNITS: 5000 INJECTION, SOLUTION INTRAVENOUS; SUBCUTANEOUS at 22:11

## 2019-01-01 RX ADMIN — Medication 0.2 MG: at 04:05

## 2019-01-01 RX ADMIN — LIDOCAINE 3 PATCH: 560 PATCH PERCUTANEOUS; TOPICAL; TRANSDERMAL at 21:14

## 2019-01-01 RX ADMIN — VALPROATE SODIUM 500 MG: 100 INJECTION, SOLUTION INTRAVENOUS at 11:55

## 2019-01-01 RX ADMIN — PROPOFOL 40 MCG/KG/MIN: 10 INJECTION, EMULSION INTRAVENOUS at 06:26

## 2019-01-01 RX ADMIN — VALPROIC ACID 250 MG: 250 SOLUTION ORAL at 20:10

## 2019-01-01 RX ADMIN — ACETAMINOPHEN 650 MG: 160 SUSPENSION ORAL at 12:16

## 2019-01-01 RX ADMIN — ACETAMINOPHEN 650 MG: 160 SUSPENSION ORAL at 05:31

## 2019-01-01 RX ADMIN — FUROSEMIDE 40 MG: 10 INJECTION, SOLUTION INTRAVENOUS at 15:43

## 2019-01-01 RX ADMIN — OXYCODONE HYDROCHLORIDE 10 MG: 5 TABLET ORAL at 16:22

## 2019-01-01 RX ADMIN — QUETIAPINE 12.5 MG: 25 TABLET, FILM COATED ORAL at 21:10

## 2019-01-01 RX ADMIN — INSULIN ASPART 2 UNITS: 100 INJECTION, SOLUTION INTRAVENOUS; SUBCUTANEOUS at 16:13

## 2019-01-01 RX ADMIN — LACOSAMIDE 150 MG: 10 SOLUTION ORAL at 09:26

## 2019-01-01 RX ADMIN — DEXAMETHASONE SODIUM PHOSPHATE 4 MG: 4 INJECTION, SOLUTION INTRA-ARTICULAR; INTRALESIONAL; INTRAMUSCULAR; INTRAVENOUS; SOFT TISSUE at 23:35

## 2019-01-01 RX ADMIN — INSULIN ASPART 5 UNITS: 100 INJECTION, SOLUTION INTRAVENOUS; SUBCUTANEOUS at 03:57

## 2019-01-01 RX ADMIN — INSULIN ASPART 1 UNITS: 100 INJECTION, SOLUTION INTRAVENOUS; SUBCUTANEOUS at 00:44

## 2019-01-01 RX ADMIN — ACETAMINOPHEN 650 MG: 325 TABLET, FILM COATED ORAL at 23:51

## 2019-01-01 RX ADMIN — HEPARIN SODIUM 5000 UNITS: 5000 INJECTION, SOLUTION INTRAVENOUS; SUBCUTANEOUS at 13:28

## 2019-01-01 RX ADMIN — TERAZOSIN HYDROCHLORIDE 1 MG: 1 CAPSULE ORAL at 21:55

## 2019-01-01 RX ADMIN — MIDAZOLAM HYDROCHLORIDE 2 MG: 1 INJECTION, SOLUTION INTRAMUSCULAR; INTRAVENOUS at 16:08

## 2019-01-01 RX ADMIN — INSULIN ASPART 1 UNITS: 100 INJECTION, SOLUTION INTRAVENOUS; SUBCUTANEOUS at 16:56

## 2019-01-01 RX ADMIN — ACETAMINOPHEN 500 MG: 160 SUSPENSION ORAL at 09:33

## 2019-01-01 RX ADMIN — SODIUM CHLORIDE 100 MG: 9 INJECTION, SOLUTION INTRAVENOUS at 22:11

## 2019-01-01 RX ADMIN — INSULIN ASPART 2 UNITS: 100 INJECTION, SOLUTION INTRAVENOUS; SUBCUTANEOUS at 08:36

## 2019-01-01 RX ADMIN — Medication 0.5 MG: at 05:45

## 2019-01-01 RX ADMIN — VALPROATE SODIUM 500 MG: 100 INJECTION, SOLUTION INTRAVENOUS at 01:26

## 2019-01-01 RX ADMIN — ACETAMINOPHEN 650 MG: 160 SUSPENSION ORAL at 00:42

## 2019-01-01 RX ADMIN — INSULIN ASPART 4 UNITS: 100 INJECTION, SOLUTION INTRAVENOUS; SUBCUTANEOUS at 13:04

## 2019-01-01 RX ADMIN — HEPARIN SODIUM 5000 UNITS: 5000 INJECTION, SOLUTION INTRAVENOUS; SUBCUTANEOUS at 22:02

## 2019-01-01 RX ADMIN — AMLODIPINE BESYLATE 10 MG: 10 TABLET ORAL at 08:06

## 2019-01-01 RX ADMIN — ACETAMINOPHEN 500 MG: 160 SUSPENSION ORAL at 17:54

## 2019-01-01 RX ADMIN — LIDOCAINE 3 PATCH: 560 PATCH PERCUTANEOUS; TOPICAL; TRANSDERMAL at 21:19

## 2019-01-01 RX ADMIN — CHLORHEXIDINE GLUCONATE 15 ML: 1.2 RINSE ORAL at 20:11

## 2019-01-01 RX ADMIN — ACETAMINOPHEN 650 MG: 160 SUSPENSION ORAL at 06:10

## 2019-01-01 RX ADMIN — ACETAMINOPHEN 650 MG: 160 SUSPENSION ORAL at 11:47

## 2019-01-01 RX ADMIN — INSULIN ASPART 1 UNITS: 100 INJECTION, SOLUTION INTRAVENOUS; SUBCUTANEOUS at 05:53

## 2019-01-01 RX ADMIN — PIPERACILLIN SODIUM,TAZOBACTAM SODIUM 3.38 G: 3; .375 INJECTION, POWDER, FOR SOLUTION INTRAVENOUS at 06:26

## 2019-01-01 RX ADMIN — PROPOFOL 55 MCG/KG/MIN: 10 INJECTION, EMULSION INTRAVENOUS at 17:35

## 2019-01-01 RX ADMIN — POTASSIUM CHLORIDE 20 MEQ: 1500 TABLET, EXTENDED RELEASE ORAL at 08:24

## 2019-01-01 RX ADMIN — CARBIDOPA AND LEVODOPA 2 TABLET: 25; 100 TABLET ORAL at 12:22

## 2019-01-01 RX ADMIN — ALBUTEROL SULFATE 2.5 MG: 2.5 SOLUTION RESPIRATORY (INHALATION) at 02:59

## 2019-01-01 RX ADMIN — AMLODIPINE BESYLATE 10 MG: 10 TABLET ORAL at 08:17

## 2019-01-01 RX ADMIN — INSULIN ASPART 3 UNITS: 100 INJECTION, SOLUTION INTRAVENOUS; SUBCUTANEOUS at 21:02

## 2019-01-01 RX ADMIN — MIDAZOLAM HYDROCHLORIDE 2 MG: 1 INJECTION, SOLUTION INTRAMUSCULAR; INTRAVENOUS at 09:18

## 2019-01-01 RX ADMIN — DICLOFENAC 1 G: 10 GEL TOPICAL at 12:03

## 2019-01-01 RX ADMIN — INSULIN ASPART 3 UNITS: 100 INJECTION, SOLUTION INTRAVENOUS; SUBCUTANEOUS at 04:22

## 2019-01-01 RX ADMIN — CARBIDOPA AND LEVODOPA 1 TABLET: 25; 100 TABLET ORAL at 12:22

## 2019-01-01 RX ADMIN — VENLAFAXINE 37.5 MG: 37.5 TABLET ORAL at 14:13

## 2019-01-01 RX ADMIN — SODIUM CHLORIDE 100 MG: 9 INJECTION, SOLUTION INTRAVENOUS at 09:18

## 2019-01-01 RX ADMIN — MULTIVITAMIN 15 ML: LIQUID ORAL at 11:31

## 2019-01-01 RX ADMIN — CARBIDOPA AND LEVODOPA 2 TABLET: 25; 100 TABLET ORAL at 19:55

## 2019-01-01 RX ADMIN — CARBIDOPA AND LEVODOPA 2 TABLET: 25; 100 TABLET ORAL at 19:58

## 2019-01-01 RX ADMIN — VENLAFAXINE 37.5 MG: 37.5 TABLET ORAL at 14:48

## 2019-01-01 RX ADMIN — Medication 1 MG: at 20:44

## 2019-01-01 RX ADMIN — ACETAMINOPHEN 500 MG: 160 SUSPENSION ORAL at 11:43

## 2019-01-01 RX ADMIN — HEPARIN SODIUM 5000 UNITS: 5000 INJECTION, SOLUTION INTRAVENOUS; SUBCUTANEOUS at 06:20

## 2019-01-01 RX ADMIN — LEVOTHYROXINE SODIUM 112 MCG: 112 TABLET ORAL at 08:07

## 2019-01-01 RX ADMIN — VALPROATE SODIUM 350 MG: 100 INJECTION, SOLUTION INTRAVENOUS at 17:26

## 2019-01-01 RX ADMIN — VALPROATE SODIUM 350 MG: 100 INJECTION, SOLUTION INTRAVENOUS at 12:26

## 2019-01-01 RX ADMIN — ACETAMINOPHEN 650 MG: 160 SUSPENSION ORAL at 12:24

## 2019-01-01 RX ADMIN — LACOSAMIDE 150 MG: 10 SOLUTION ORAL at 20:44

## 2019-01-01 RX ADMIN — CARBIDOPA AND LEVODOPA 2 TABLET: 25; 100 TABLET ORAL at 08:13

## 2019-01-01 RX ADMIN — CARBIDOPA AND LEVODOPA 1 TABLET: 25; 100 TABLET ORAL at 16:32

## 2019-01-01 RX ADMIN — HEPARIN SODIUM 5000 UNITS: 5000 INJECTION, SOLUTION INTRAVENOUS; SUBCUTANEOUS at 21:14

## 2019-01-01 RX ADMIN — ETOMIDATE 30 MG: 2 INJECTION, SOLUTION INTRAVENOUS at 14:45

## 2019-01-01 RX ADMIN — DOCUSATE SODIUM 100 MG: 50 LIQUID ORAL at 09:46

## 2019-01-01 RX ADMIN — HEPARIN SODIUM 5000 UNITS: 5000 INJECTION, SOLUTION INTRAVENOUS; SUBCUTANEOUS at 21:30

## 2019-01-01 RX ADMIN — ROTIGOTINE 1 PATCH: 4 PATCH, EXTENDED RELEASE TRANSDERMAL at 21:08

## 2019-01-01 RX ADMIN — INSULIN ASPART 2 UNITS: 100 INJECTION, SOLUTION INTRAVENOUS; SUBCUTANEOUS at 20:14

## 2019-01-01 RX ADMIN — SODIUM CHLORIDE: 9 INJECTION, SOLUTION INTRAVENOUS at 04:39

## 2019-01-01 RX ADMIN — INSULIN ASPART 1 UNITS: 100 INJECTION, SOLUTION INTRAVENOUS; SUBCUTANEOUS at 01:05

## 2019-01-01 RX ADMIN — POTASSIUM CHLORIDE 20 MEQ: 1.5 POWDER, FOR SOLUTION ORAL at 08:13

## 2019-01-01 RX ADMIN — Medication 0.2 MG: at 15:21

## 2019-01-01 RX ADMIN — DOCUSATE SODIUM 100 MG: 50 LIQUID ORAL at 20:56

## 2019-01-01 RX ADMIN — Medication 0.2 MG: at 02:10

## 2019-01-01 RX ADMIN — PROPOFOL 45 MCG/KG/MIN: 10 INJECTION, EMULSION INTRAVENOUS at 02:07

## 2019-01-01 RX ADMIN — CARBIDOPA AND LEVODOPA 1 TABLET: 25; 100 TABLET ORAL at 16:09

## 2019-01-01 RX ADMIN — QUETIAPINE 12.5 MG: 25 TABLET, FILM COATED ORAL at 20:57

## 2019-01-01 RX ADMIN — CARBIDOPA AND LEVODOPA 2 TABLET: 25; 100 TABLET ORAL at 16:19

## 2019-01-01 RX ADMIN — Medication 40 MG: at 08:04

## 2019-01-01 RX ADMIN — VALPROATE SODIUM 350 MG: 100 INJECTION, SOLUTION INTRAVENOUS at 05:44

## 2019-01-01 RX ADMIN — INSULIN ASPART 2 UNITS: 100 INJECTION, SOLUTION INTRAVENOUS; SUBCUTANEOUS at 01:06

## 2019-01-01 RX ADMIN — HEPARIN SODIUM 5000 UNITS: 5000 INJECTION, SOLUTION INTRAVENOUS; SUBCUTANEOUS at 05:47

## 2019-01-01 RX ADMIN — DEXAMETHASONE SODIUM PHOSPHATE 4 MG: 4 INJECTION, SOLUTION INTRA-ARTICULAR; INTRALESIONAL; INTRAMUSCULAR; INTRAVENOUS; SOFT TISSUE at 04:33

## 2019-01-01 RX ADMIN — INSULIN ASPART 1 UNITS: 100 INJECTION, SOLUTION INTRAVENOUS; SUBCUTANEOUS at 21:45

## 2019-01-01 RX ADMIN — HEPARIN SODIUM 5000 UNITS: 5000 INJECTION, SOLUTION INTRAVENOUS; SUBCUTANEOUS at 14:07

## 2019-01-01 RX ADMIN — ASPIRIN 81 MG 81 MG: 81 TABLET ORAL at 22:27

## 2019-01-01 RX ADMIN — LIDOCAINE 3 PATCH: 560 PATCH PERCUTANEOUS; TOPICAL; TRANSDERMAL at 20:16

## 2019-01-01 RX ADMIN — POTASSIUM CHLORIDE 20 MEQ: 1.5 POWDER, FOR SOLUTION ORAL at 06:40

## 2019-01-01 RX ADMIN — CARBIDOPA AND LEVODOPA 2 TABLET: 25; 100 TABLET, ORALLY DISINTEGRATING ORAL at 13:25

## 2019-01-01 RX ADMIN — DEXMEDETOMIDINE HYDROCHLORIDE 0.4 MCG/KG/HR: 100 INJECTION, SOLUTION INTRAVENOUS at 18:45

## 2019-01-01 RX ADMIN — CARBIDOPA AND LEVODOPA 2 TABLET: 25; 100 TABLET ORAL at 13:28

## 2019-01-01 RX ADMIN — CARBIDOPA AND LEVODOPA 1 TABLET: 25; 100 TABLET ORAL at 16:13

## 2019-01-01 RX ADMIN — FLUCONAZOLE 200 MG: 100 TABLET ORAL at 08:56

## 2019-01-01 RX ADMIN — CHLORHEXIDINE GLUCONATE 15 ML: 1.2 RINSE ORAL at 20:01

## 2019-01-01 RX ADMIN — INSULIN ASPART 5 UNITS: 100 INJECTION, SOLUTION INTRAVENOUS; SUBCUTANEOUS at 00:27

## 2019-01-01 RX ADMIN — DICLOFENAC 1 G: 10 GEL TOPICAL at 15:24

## 2019-01-01 RX ADMIN — DOCUSATE SODIUM 100 MG: 50 LIQUID ORAL at 10:24

## 2019-01-01 RX ADMIN — VALPROATE SODIUM 350 MG: 100 INJECTION, SOLUTION INTRAVENOUS at 12:12

## 2019-01-01 RX ADMIN — HYDRALAZINE HYDROCHLORIDE 10 MG: 10 TABLET ORAL at 08:41

## 2019-01-01 RX ADMIN — INSULIN ASPART 6 UNITS: 100 INJECTION, SOLUTION INTRAVENOUS; SUBCUTANEOUS at 13:50

## 2019-01-01 RX ADMIN — ASPIRIN 81 MG 81 MG: 81 TABLET ORAL at 22:15

## 2019-01-01 RX ADMIN — VENLAFAXINE 37.5 MG: 37.5 TABLET ORAL at 08:24

## 2019-01-01 RX ADMIN — LIDOCAINE 3 PATCH: 560 PATCH PERCUTANEOUS; TOPICAL; TRANSDERMAL at 20:21

## 2019-01-01 RX ADMIN — VENLAFAXINE 37.5 MG: 37.5 TABLET ORAL at 21:07

## 2019-01-01 RX ADMIN — INSULIN ASPART 1 UNITS: 100 INJECTION, SOLUTION INTRAVENOUS; SUBCUTANEOUS at 13:40

## 2019-01-01 RX ADMIN — LACOSAMIDE 200 MG: 10 SOLUTION ORAL at 22:07

## 2019-01-01 RX ADMIN — VENLAFAXINE 37.5 MG: 37.5 TABLET ORAL at 20:21

## 2019-01-01 RX ADMIN — Medication 100 MG: at 14:45

## 2019-01-01 RX ADMIN — POTASSIUM CHLORIDE 20 MEQ: 1.5 POWDER, FOR SOLUTION ORAL at 10:22

## 2019-01-01 RX ADMIN — ACETAMINOPHEN 650 MG: 160 SUSPENSION ORAL at 12:00

## 2019-01-01 RX ADMIN — PROPOFOL 45 MCG/KG/MIN: 10 INJECTION, EMULSION INTRAVENOUS at 06:45

## 2019-01-01 RX ADMIN — INSULIN ASPART 1 UNITS: 100 INJECTION, SOLUTION INTRAVENOUS; SUBCUTANEOUS at 12:05

## 2019-01-01 RX ADMIN — LACOSAMIDE 150 MG: 10 SOLUTION ORAL at 21:21

## 2019-01-01 RX ADMIN — LEVOTHYROXINE SODIUM 112 MCG: 112 TABLET ORAL at 08:17

## 2019-01-01 RX ADMIN — VALPROIC ACID 250 MG: 250 SOLUTION ORAL at 08:17

## 2019-01-01 RX ADMIN — HEPARIN SODIUM 5000 UNITS: 5000 INJECTION, SOLUTION INTRAVENOUS; SUBCUTANEOUS at 22:15

## 2019-01-01 RX ADMIN — OXYCODONE HYDROCHLORIDE 10 MG: 5 TABLET ORAL at 06:25

## 2019-01-01 RX ADMIN — LORAZEPAM 0.5 MG: 2 INJECTION INTRAMUSCULAR; INTRAVENOUS at 18:59

## 2019-01-01 RX ADMIN — ASPIRIN 81 MG 81 MG: 81 TABLET ORAL at 19:58

## 2019-01-01 RX ADMIN — LEVOTHYROXINE SODIUM 112 MCG: 112 TABLET ORAL at 08:06

## 2019-01-01 RX ADMIN — OXYCODONE HYDROCHLORIDE 2.5 MG: 5 TABLET ORAL at 17:12

## 2019-01-01 RX ADMIN — VENLAFAXINE 37.5 MG: 37.5 TABLET ORAL at 19:37

## 2019-01-01 RX ADMIN — ACETAMINOPHEN 500 MG: 160 SUSPENSION ORAL at 09:46

## 2019-01-01 RX ADMIN — DOCUSATE SODIUM 100 MG: 50 LIQUID ORAL at 20:57

## 2019-01-01 RX ADMIN — ACETAMINOPHEN 500 MG: 160 SUSPENSION ORAL at 12:59

## 2019-01-01 RX ADMIN — INSULIN ASPART 3 UNITS: 100 INJECTION, SOLUTION INTRAVENOUS; SUBCUTANEOUS at 08:02

## 2019-01-01 RX ADMIN — INSULIN ASPART 1 UNITS: 100 INJECTION, SOLUTION INTRAVENOUS; SUBCUTANEOUS at 17:13

## 2019-01-01 RX ADMIN — INSULIN ASPART 2 UNITS: 100 INJECTION, SOLUTION INTRAVENOUS; SUBCUTANEOUS at 00:31

## 2019-01-01 RX ADMIN — ACETAMINOPHEN 650 MG: 325 TABLET, FILM COATED ORAL at 00:26

## 2019-01-01 RX ADMIN — DEXMEDETOMIDINE HYDROCHLORIDE 0.5 MCG/KG/HR: 100 INJECTION, SOLUTION INTRAVENOUS at 20:53

## 2019-01-01 RX ADMIN — DOCUSATE SODIUM 100 MG: 50 LIQUID ORAL at 20:43

## 2019-01-01 RX ADMIN — ACETAMINOPHEN 500 MG: 160 SUSPENSION ORAL at 16:21

## 2019-01-01 RX ADMIN — INSULIN ASPART 1 UNITS: 100 INJECTION, SOLUTION INTRAVENOUS; SUBCUTANEOUS at 08:03

## 2019-01-01 RX ADMIN — HEPARIN SODIUM 5000 UNITS: 5000 INJECTION, SOLUTION INTRAVENOUS; SUBCUTANEOUS at 22:22

## 2019-01-01 RX ADMIN — HEPARIN SODIUM 5000 UNITS: 5000 INJECTION, SOLUTION INTRAVENOUS; SUBCUTANEOUS at 05:28

## 2019-01-01 RX ADMIN — LACOSAMIDE 150 MG: 10 SOLUTION ORAL at 20:58

## 2019-01-01 RX ADMIN — CHLORHEXIDINE GLUCONATE 15 ML: 1.2 RINSE ORAL at 19:55

## 2019-01-01 RX ADMIN — AMLODIPINE BESYLATE 10 MG: 10 TABLET ORAL at 08:13

## 2019-01-01 RX ADMIN — LEVOTHYROXINE SODIUM 112 MCG: 112 TABLET ORAL at 08:19

## 2019-01-01 RX ADMIN — HYDRALAZINE HYDROCHLORIDE 5 MG: 10 TABLET ORAL at 21:16

## 2019-01-01 RX ADMIN — ACETAMINOPHEN 650 MG: 160 SUSPENSION ORAL at 17:12

## 2019-01-01 RX ADMIN — PROPOFOL 45 MCG/KG/MIN: 10 INJECTION, EMULSION INTRAVENOUS at 09:43

## 2019-01-01 RX ADMIN — CHLORHEXIDINE GLUCONATE 15 ML: 1.2 RINSE ORAL at 21:15

## 2019-01-01 RX ADMIN — ALBUTEROL SULFATE 2.5 MG: 2.5 SOLUTION RESPIRATORY (INHALATION) at 12:28

## 2019-01-01 RX ADMIN — Medication 40 MG: at 05:30

## 2019-01-01 RX ADMIN — VALPROATE SODIUM 350 MG: 100 INJECTION, SOLUTION INTRAVENOUS at 18:27

## 2019-01-01 RX ADMIN — CARBIDOPA AND LEVODOPA 2 TABLET: 25; 100 TABLET ORAL at 22:14

## 2019-01-01 RX ADMIN — PROPOFOL 45 MCG/KG/MIN: 10 INJECTION, EMULSION INTRAVENOUS at 01:35

## 2019-01-01 RX ADMIN — QUETIAPINE 25 MG: 25 TABLET ORAL at 20:21

## 2019-01-01 RX ADMIN — CHLORHEXIDINE GLUCONATE 15 ML: 1.2 RINSE ORAL at 07:28

## 2019-01-01 RX ADMIN — VENLAFAXINE 37.5 MG: 37.5 TABLET ORAL at 20:48

## 2019-01-01 RX ADMIN — CARBIDOPA AND LEVODOPA 2 TABLET: 25; 100 TABLET ORAL at 22:07

## 2019-01-01 RX ADMIN — VENLAFAXINE 37.5 MG: 37.5 TABLET ORAL at 14:29

## 2019-01-01 RX ADMIN — FUROSEMIDE 20 MG: 10 INJECTION, SOLUTION INTRAVENOUS at 07:56

## 2019-01-01 RX ADMIN — CARBIDOPA AND LEVODOPA 2 TABLET: 25; 100 TABLET ORAL at 11:53

## 2019-01-01 RX ADMIN — DICLOFENAC 1 G: 10 GEL TOPICAL at 19:39

## 2019-01-01 RX ADMIN — ALBUTEROL SULFATE 2.5 MG: 2.5 SOLUTION RESPIRATORY (INHALATION) at 19:26

## 2019-01-01 RX ADMIN — VENLAFAXINE 37.5 MG: 37.5 TABLET ORAL at 09:28

## 2019-01-01 RX ADMIN — LABETALOL HYDROCHLORIDE 10 MG: 5 INJECTION INTRAVENOUS at 15:00

## 2019-01-01 RX ADMIN — HEPARIN SODIUM 5000 UNITS: 5000 INJECTION, SOLUTION INTRAVENOUS; SUBCUTANEOUS at 06:41

## 2019-01-01 RX ADMIN — MAGNESIUM SULFATE HEPTAHYDRATE 2 G: 40 INJECTION, SOLUTION INTRAVENOUS at 19:32

## 2019-01-01 RX ADMIN — POTASSIUM CHLORIDE 20 MEQ: 1.5 POWDER, FOR SOLUTION ORAL at 06:35

## 2019-01-01 RX ADMIN — Medication 1 MG: at 20:42

## 2019-01-01 RX ADMIN — DICLOFENAC 1 G: 10 GEL TOPICAL at 08:51

## 2019-01-01 RX ADMIN — CARBIDOPA AND LEVODOPA 2 TABLET: 25; 100 TABLET ORAL at 16:21

## 2019-01-01 RX ADMIN — ASPIRIN 81 MG 81 MG: 81 TABLET ORAL at 20:21

## 2019-01-01 RX ADMIN — INSULIN ASPART 1 UNITS: 100 INJECTION, SOLUTION INTRAVENOUS; SUBCUTANEOUS at 19:50

## 2019-01-01 RX ADMIN — ETOMIDATE 30 MG: 2 INJECTION INTRAVENOUS at 14:45

## 2019-01-01 RX ADMIN — INSULIN ASPART 1 UNITS: 100 INJECTION, SOLUTION INTRAVENOUS; SUBCUTANEOUS at 07:42

## 2019-01-01 RX ADMIN — FUROSEMIDE 10 MG: 10 INJECTION, SOLUTION INTRAVENOUS at 14:47

## 2019-01-01 RX ADMIN — CHLORHEXIDINE GLUCONATE 15 ML: 1.2 RINSE ORAL at 08:05

## 2019-01-01 RX ADMIN — DICLOFENAC 1 G: 10 GEL TOPICAL at 21:08

## 2019-01-01 RX ADMIN — LACOSAMIDE 150 MG: 10 SOLUTION ORAL at 21:16

## 2019-01-01 RX ADMIN — LACOSAMIDE 150 MG: 10 SOLUTION ORAL at 20:39

## 2019-01-01 RX ADMIN — HEPARIN SODIUM 5000 UNITS: 5000 INJECTION, SOLUTION INTRAVENOUS; SUBCUTANEOUS at 05:29

## 2019-01-01 RX ADMIN — LEVOTHYROXINE SODIUM 112 MCG: 112 TABLET ORAL at 07:03

## 2019-01-01 RX ADMIN — INSULIN ASPART 1 UNITS: 100 INJECTION, SOLUTION INTRAVENOUS; SUBCUTANEOUS at 12:17

## 2019-01-01 RX ADMIN — VALPROATE SODIUM 350 MG: 100 INJECTION, SOLUTION INTRAVENOUS at 18:13

## 2019-01-01 RX ADMIN — POTASSIUM CHLORIDE 20 MEQ: 1.5 POWDER, FOR SOLUTION ORAL at 18:09

## 2019-01-01 RX ADMIN — DICLOFENAC 1 G: 10 GEL TOPICAL at 17:41

## 2019-01-01 RX ADMIN — QUETIAPINE 12.5 MG: 25 TABLET, FILM COATED ORAL at 20:21

## 2019-01-01 RX ADMIN — INSULIN ASPART 2 UNITS: 100 INJECTION, SOLUTION INTRAVENOUS; SUBCUTANEOUS at 21:00

## 2019-01-01 RX ADMIN — DEXMEDETOMIDINE HYDROCHLORIDE 0.5 MCG/KG/HR: 100 INJECTION, SOLUTION INTRAVENOUS at 00:10

## 2019-01-01 RX ADMIN — VALPROATE SODIUM 350 MG: 100 INJECTION, SOLUTION INTRAVENOUS at 06:25

## 2019-01-01 RX ADMIN — DEXAMETHASONE SODIUM PHOSPHATE 4 MG: 4 INJECTION, SOLUTION INTRA-ARTICULAR; INTRALESIONAL; INTRAMUSCULAR; INTRAVENOUS; SOFT TISSUE at 16:16

## 2019-01-01 RX ADMIN — CARBIDOPA AND LEVODOPA 2 TABLET: 25; 100 TABLET ORAL at 12:10

## 2019-01-01 RX ADMIN — TERAZOSIN HYDROCHLORIDE 1 MG: 1 CAPSULE ORAL at 21:07

## 2019-01-01 RX ADMIN — OXYCODONE HYDROCHLORIDE 10 MG: 5 TABLET ORAL at 16:15

## 2019-01-01 RX ADMIN — PIPERACILLIN SODIUM,TAZOBACTAM SODIUM 4.5 G: 4; .5 INJECTION, POWDER, FOR SOLUTION INTRAVENOUS at 00:16

## 2019-01-01 RX ADMIN — HEPARIN SODIUM 5000 UNITS: 5000 INJECTION, SOLUTION INTRAVENOUS; SUBCUTANEOUS at 14:48

## 2019-01-01 RX ADMIN — ACETAMINOPHEN 650 MG: 325 TABLET, FILM COATED ORAL at 06:25

## 2019-01-01 RX ADMIN — HEPARIN SODIUM 5000 UNITS: 5000 INJECTION, SOLUTION INTRAVENOUS; SUBCUTANEOUS at 22:28

## 2019-01-01 RX ADMIN — POTASSIUM CHLORIDE 20 MEQ: 1.5 POWDER, FOR SOLUTION ORAL at 06:54

## 2019-01-01 RX ADMIN — CHLORHEXIDINE GLUCONATE 15 ML: 1.2 RINSE ORAL at 08:29

## 2019-01-01 RX ADMIN — ACETAMINOPHEN 500 MG: 160 SUSPENSION ORAL at 13:31

## 2019-01-01 RX ADMIN — ASPIRIN 81 MG 81 MG: 81 TABLET ORAL at 22:22

## 2019-01-01 RX ADMIN — CEFTRIAXONE SODIUM 2 G: 2 INJECTION, POWDER, FOR SOLUTION INTRAMUSCULAR; INTRAVENOUS at 02:36

## 2019-01-01 RX ADMIN — LACOSAMIDE 150 MG: 10 SOLUTION ORAL at 09:50

## 2019-01-01 RX ADMIN — DICLOFENAC 1 G: 10 GEL TOPICAL at 18:41

## 2019-01-01 RX ADMIN — INSULIN ASPART 2 UNITS: 100 INJECTION, SOLUTION INTRAVENOUS; SUBCUTANEOUS at 21:19

## 2019-01-01 RX ADMIN — FUROSEMIDE 40 MG: 10 INJECTION, SOLUTION INTRAVENOUS at 08:17

## 2019-01-01 RX ADMIN — ALBUTEROL SULFATE 2.5 MG: 2.5 SOLUTION RESPIRATORY (INHALATION) at 11:40

## 2019-01-01 RX ADMIN — FUROSEMIDE 20 MG: 10 INJECTION, SOLUTION INTRAVENOUS at 09:30

## 2019-01-01 RX ADMIN — CARBIDOPA AND LEVODOPA 2 TABLET: 25; 100 TABLET ORAL at 16:16

## 2019-01-01 RX ADMIN — VALPROATE SODIUM 350 MG: 100 INJECTION, SOLUTION INTRAVENOUS at 11:39

## 2019-01-01 RX ADMIN — VENLAFAXINE 37.5 MG: 37.5 TABLET ORAL at 08:56

## 2019-01-01 RX ADMIN — Medication 0.2 MG: at 16:43

## 2019-01-01 RX ADMIN — CARBIDOPA AND LEVODOPA 1 TABLET: 25; 100 TABLET ORAL at 12:02

## 2019-01-01 RX ADMIN — RACEPINEPHRINE HYDROCHLORIDE 0.5 ML: 11.25 SOLUTION RESPIRATORY (INHALATION) at 19:22

## 2019-01-01 RX ADMIN — LISINOPRIL 20 MG: 20 TABLET ORAL at 09:21

## 2019-01-01 RX ADMIN — Medication 40 MG: at 06:58

## 2019-01-01 RX ADMIN — PROPOFOL 55 MCG/KG/MIN: 10 INJECTION, EMULSION INTRAVENOUS at 03:28

## 2019-01-01 RX ADMIN — VENLAFAXINE 37.5 MG: 37.5 TABLET ORAL at 14:57

## 2019-01-01 RX ADMIN — CARBIDOPA AND LEVODOPA 2 TABLET: 25; 100 TABLET ORAL at 13:59

## 2019-01-01 RX ADMIN — ACETAMINOPHEN 650 MG: 325 TABLET, FILM COATED ORAL at 08:02

## 2019-01-01 RX ADMIN — VENLAFAXINE 37.5 MG: 37.5 TABLET ORAL at 15:24

## 2019-01-01 RX ADMIN — INSULIN ASPART 1 UNITS: 100 INJECTION, SOLUTION INTRAVENOUS; SUBCUTANEOUS at 10:24

## 2019-01-01 RX ADMIN — CHLORHEXIDINE GLUCONATE 15 ML: 1.2 RINSE ORAL at 07:56

## 2019-01-01 RX ADMIN — INSULIN ASPART 2 UNITS: 100 INJECTION, SOLUTION INTRAVENOUS; SUBCUTANEOUS at 08:15

## 2019-01-01 RX ADMIN — ACETAMINOPHEN 650 MG: 160 SUSPENSION ORAL at 20:23

## 2019-01-01 RX ADMIN — DICLOFENAC 1 G: 10 GEL TOPICAL at 13:47

## 2019-01-01 RX ADMIN — ACETAMINOPHEN 650 MG: 325 TABLET, FILM COATED ORAL at 05:41

## 2019-01-01 RX ADMIN — SODIUM CHLORIDE 100 MG: 9 INJECTION, SOLUTION INTRAVENOUS at 21:03

## 2019-01-01 RX ADMIN — INSULIN ASPART 4 UNITS: 100 INJECTION, SOLUTION INTRAVENOUS; SUBCUTANEOUS at 12:30

## 2019-01-01 RX ADMIN — ASPIRIN 81 MG 81 MG: 81 TABLET ORAL at 21:01

## 2019-01-01 RX ADMIN — AMLODIPINE BESYLATE 10 MG: 10 TABLET ORAL at 08:04

## 2019-01-01 RX ADMIN — AMLODIPINE BESYLATE 10 MG: 10 TABLET ORAL at 08:14

## 2019-01-01 RX ADMIN — QUETIAPINE 12.5 MG: 25 TABLET, FILM COATED ORAL at 20:20

## 2019-01-01 RX ADMIN — LACOSAMIDE 150 MG: 10 SOLUTION ORAL at 09:42

## 2019-01-01 RX ADMIN — LIDOCAINE 1 PATCH: 560 PATCH PERCUTANEOUS; TOPICAL; TRANSDERMAL at 08:51

## 2019-01-01 RX ADMIN — LIDOCAINE 3 PATCH: 560 PATCH PERCUTANEOUS; TOPICAL; TRANSDERMAL at 19:27

## 2019-01-01 RX ADMIN — HEPARIN SODIUM 5000 UNITS: 5000 INJECTION, SOLUTION INTRAVENOUS; SUBCUTANEOUS at 21:13

## 2019-01-01 RX ADMIN — PROPOFOL 45 MCG/KG/MIN: 10 INJECTION, EMULSION INTRAVENOUS at 10:01

## 2019-01-01 RX ADMIN — OXYCODONE HYDROCHLORIDE 10 MG: 5 TABLET ORAL at 08:15

## 2019-01-01 RX ADMIN — HEPARIN SODIUM 5000 UNITS: 5000 INJECTION, SOLUTION INTRAVENOUS; SUBCUTANEOUS at 14:14

## 2019-01-01 RX ADMIN — ALBUTEROL SULFATE 2.5 MG: 2.5 SOLUTION RESPIRATORY (INHALATION) at 08:07

## 2019-01-01 RX ADMIN — LEVOTHYROXINE SODIUM 112 MCG: 112 TABLET ORAL at 08:04

## 2019-01-01 RX ADMIN — QUETIAPINE 12.5 MG: 25 TABLET, FILM COATED ORAL at 22:00

## 2019-01-01 RX ADMIN — DICLOFENAC 1 G: 10 GEL TOPICAL at 08:24

## 2019-01-01 RX ADMIN — DICLOFENAC 1 G: 10 GEL TOPICAL at 13:27

## 2019-01-01 RX ADMIN — TERAZOSIN HYDROCHLORIDE 1 MG: 1 CAPSULE ORAL at 21:01

## 2019-01-01 RX ADMIN — VALPROATE SODIUM 350 MG: 100 INJECTION, SOLUTION INTRAVENOUS at 12:16

## 2019-01-01 RX ADMIN — CARBIDOPA AND LEVODOPA 2 TABLET: 25; 100 TABLET ORAL at 07:21

## 2019-01-01 RX ADMIN — LABETALOL 20 MG/4 ML (5 MG/ML) INTRAVENOUS SYRINGE 20 MG: at 08:50

## 2019-01-01 RX ADMIN — ALBUTEROL SULFATE 2.5 MG: 2.5 SOLUTION RESPIRATORY (INHALATION) at 13:52

## 2019-01-01 RX ADMIN — ACETAMINOPHEN 650 MG: 325 TABLET, FILM COATED ORAL at 18:30

## 2019-01-01 RX ADMIN — HEPARIN SODIUM 5000 UNITS: 5000 INJECTION, SOLUTION INTRAVENOUS; SUBCUTANEOUS at 14:34

## 2019-01-01 RX ADMIN — CARBIDOPA AND LEVODOPA 2 TABLET: 25; 100 TABLET ORAL at 05:59

## 2019-01-01 RX ADMIN — PROPOFOL 55 MCG/KG/MIN: 10 INJECTION, EMULSION INTRAVENOUS at 13:28

## 2019-01-01 RX ADMIN — LACOSAMIDE 150 MG: 10 SOLUTION ORAL at 08:47

## 2019-01-01 RX ADMIN — LEVOTHYROXINE SODIUM 112 MCG: 112 TABLET ORAL at 07:01

## 2019-01-01 RX ADMIN — HEPARIN SODIUM 5000 UNITS: 5000 INJECTION, SOLUTION INTRAVENOUS; SUBCUTANEOUS at 05:36

## 2019-01-01 RX ADMIN — OXYCODONE HYDROCHLORIDE 10 MG: 5 TABLET ORAL at 06:22

## 2019-01-01 RX ADMIN — VENLAFAXINE 37.5 MG: 37.5 TABLET ORAL at 13:49

## 2019-01-01 RX ADMIN — DOCUSATE SODIUM 100 MG: 50 LIQUID ORAL at 20:32

## 2019-01-01 RX ADMIN — PIPERACILLIN SODIUM,TAZOBACTAM SODIUM 4.5 G: 4; .5 INJECTION, POWDER, FOR SOLUTION INTRAVENOUS at 17:35

## 2019-01-01 RX ADMIN — ALBUTEROL SULFATE 2.5 MG: 2.5 SOLUTION RESPIRATORY (INHALATION) at 15:19

## 2019-01-01 RX ADMIN — CARBIDOPA AND LEVODOPA 2 TABLET: 25; 100 TABLET ORAL at 16:18

## 2019-01-01 RX ADMIN — OXYCODONE HYDROCHLORIDE 10 MG: 5 TABLET ORAL at 03:12

## 2019-01-01 RX ADMIN — ACETAMINOPHEN 500 MG: 160 SUSPENSION ORAL at 11:58

## 2019-01-01 RX ADMIN — INSULIN ASPART 1 UNITS: 100 INJECTION, SOLUTION INTRAVENOUS; SUBCUTANEOUS at 20:41

## 2019-01-01 RX ADMIN — VENLAFAXINE 37.5 MG: 37.5 TABLET ORAL at 09:34

## 2019-01-01 RX ADMIN — PROPOFOL 40 MCG/KG/MIN: 10 INJECTION, EMULSION INTRAVENOUS at 20:04

## 2019-01-01 RX ADMIN — DOCUSATE SODIUM 100 MG: 50 LIQUID ORAL at 20:21

## 2019-01-01 RX ADMIN — CARBIDOPA AND LEVODOPA 2 TABLET: 25; 100 TABLET ORAL at 11:39

## 2019-01-01 RX ADMIN — CARBIDOPA AND LEVODOPA 2 TABLET: 25; 100 TABLET ORAL at 08:17

## 2019-01-01 RX ADMIN — OXYCODONE HYDROCHLORIDE 5 MG: 5 TABLET ORAL at 13:05

## 2019-01-01 RX ADMIN — LIDOCAINE 3 PATCH: 560 PATCH PERCUTANEOUS; TOPICAL; TRANSDERMAL at 20:19

## 2019-01-01 RX ADMIN — VALPROATE SODIUM 350 MG: 100 INJECTION, SOLUTION INTRAVENOUS at 12:02

## 2019-01-01 RX ADMIN — ACETAMINOPHEN 650 MG: 160 SUSPENSION ORAL at 23:35

## 2019-01-01 RX ADMIN — PROPOFOL 40 MCG/KG/MIN: 10 INJECTION, EMULSION INTRAVENOUS at 08:01

## 2019-01-01 RX ADMIN — LACOSAMIDE 150 MG: 10 SOLUTION ORAL at 08:51

## 2019-01-01 RX ADMIN — INSULIN ASPART 2 UNITS: 100 INJECTION, SOLUTION INTRAVENOUS; SUBCUTANEOUS at 20:53

## 2019-01-01 RX ADMIN — HEPARIN SODIUM 5000 UNITS: 5000 INJECTION, SOLUTION INTRAVENOUS; SUBCUTANEOUS at 14:35

## 2019-01-01 RX ADMIN — DEXMEDETOMIDINE HYDROCHLORIDE 0.5 MCG/KG/HR: 100 INJECTION, SOLUTION INTRAVENOUS at 05:42

## 2019-01-01 RX ADMIN — MULTIVITAMIN 15 ML: LIQUID ORAL at 13:28

## 2019-01-01 RX ADMIN — DEXMEDETOMIDINE HYDROCHLORIDE 0.2 MCG/KG/HR: 100 INJECTION, SOLUTION INTRAVENOUS at 19:51

## 2019-01-01 RX ADMIN — DEXTRAN 70, AND HYPROMELLOSE 2910 1 DROP: 1; 3 SOLUTION/ DROPS OPHTHALMIC at 04:03

## 2019-01-01 RX ADMIN — VENLAFAXINE 37.5 MG: 37.5 TABLET ORAL at 14:34

## 2019-01-01 RX ADMIN — LACOSAMIDE 150 MG: 10 SOLUTION ORAL at 20:21

## 2019-01-01 RX ADMIN — DICLOFENAC 1 G: 10 GEL TOPICAL at 18:58

## 2019-01-01 RX ADMIN — VENLAFAXINE 37.5 MG: 37.5 TABLET ORAL at 08:13

## 2019-01-01 RX ADMIN — ASPIRIN 81 MG 81 MG: 81 TABLET ORAL at 22:14

## 2019-01-01 RX ADMIN — POTASSIUM CHLORIDE 20 MEQ: 1.5 POWDER, FOR SOLUTION ORAL at 17:19

## 2019-01-01 RX ADMIN — FLUCONAZOLE 200 MG: 100 TABLET ORAL at 15:30

## 2019-01-01 RX ADMIN — ASPIRIN 81 MG 81 MG: 81 TABLET ORAL at 21:06

## 2019-01-01 RX ADMIN — ACETAMINOPHEN 500 MG: 160 SUSPENSION ORAL at 14:07

## 2019-01-01 RX ADMIN — LACOSAMIDE 150 MG: 10 SOLUTION ORAL at 08:52

## 2019-01-01 RX ADMIN — CARBIDOPA AND LEVODOPA 1 TABLET: 25; 100 TABLET ORAL at 05:25

## 2019-01-01 RX ADMIN — CARBIDOPA AND LEVODOPA 2 TABLET: 25; 100 TABLET ORAL at 19:38

## 2019-01-01 RX ADMIN — CARBIDOPA AND LEVODOPA 1 TABLET: 25; 100 TABLET ORAL at 08:31

## 2019-01-01 RX ADMIN — VENLAFAXINE 37.5 MG: 37.5 TABLET ORAL at 22:07

## 2019-01-01 RX ADMIN — POTASSIUM CHLORIDE 20 MEQ: 1.5 POWDER, FOR SOLUTION ORAL at 06:20

## 2019-01-01 RX ADMIN — Medication 1 MG: at 21:07

## 2019-01-01 RX ADMIN — ACETAMINOPHEN 500 MG: 160 SUSPENSION ORAL at 08:38

## 2019-01-01 RX ADMIN — INSULIN ASPART 1 UNITS: 100 INJECTION, SOLUTION INTRAVENOUS; SUBCUTANEOUS at 08:04

## 2019-01-01 RX ADMIN — AMLODIPINE BESYLATE 10 MG: 10 TABLET ORAL at 08:55

## 2019-01-01 RX ADMIN — ACETAMINOPHEN 650 MG: 160 SUSPENSION ORAL at 17:32

## 2019-01-01 RX ADMIN — DOCUSATE SODIUM 100 MG: 50 LIQUID ORAL at 08:50

## 2019-01-01 RX ADMIN — PIPERACILLIN SODIUM,TAZOBACTAM SODIUM 3.38 G: 3; .375 INJECTION, POWDER, FOR SOLUTION INTRAVENOUS at 17:40

## 2019-01-01 RX ADMIN — Medication 1 PACKET: at 21:11

## 2019-01-01 RX ADMIN — ACETAMINOPHEN 500 MG: 160 SUSPENSION ORAL at 16:22

## 2019-01-01 RX ADMIN — INSULIN ASPART 1 UNITS: 100 INJECTION, SOLUTION INTRAVENOUS; SUBCUTANEOUS at 21:04

## 2019-01-01 RX ADMIN — LACOSAMIDE 150 MG: 10 SOLUTION ORAL at 20:30

## 2019-01-01 RX ADMIN — HYDRALAZINE HYDROCHLORIDE 5 MG: 10 TABLET ORAL at 20:41

## 2019-01-01 RX ADMIN — VENLAFAXINE 37.5 MG: 37.5 TABLET ORAL at 08:06

## 2019-01-01 RX ADMIN — RACEPINEPHRINE HYDROCHLORIDE 0.5 ML: 11.25 SOLUTION RESPIRATORY (INHALATION) at 18:24

## 2019-01-01 RX ADMIN — AMLODIPINE BESYLATE 10 MG: 10 TABLET ORAL at 08:49

## 2019-01-01 RX ADMIN — MIDAZOLAM HYDROCHLORIDE 0.5 MG: 1 INJECTION, SOLUTION INTRAMUSCULAR; INTRAVENOUS at 10:24

## 2019-01-01 RX ADMIN — Medication 0.2 MG: at 13:58

## 2019-01-01 RX ADMIN — ACETAMINOPHEN 650 MG: 325 TABLET, FILM COATED ORAL at 05:49

## 2019-01-01 RX ADMIN — ASPIRIN 81 MG 81 MG: 81 TABLET ORAL at 22:11

## 2019-01-01 RX ADMIN — INSULIN ASPART 2 UNITS: 100 INJECTION, SOLUTION INTRAVENOUS; SUBCUTANEOUS at 17:24

## 2019-01-01 RX ADMIN — LEVOTHYROXINE SODIUM 112 MCG: 112 TABLET ORAL at 05:24

## 2019-01-01 RX ADMIN — DICLOFENAC 1 G: 10 GEL TOPICAL at 08:39

## 2019-01-01 RX ADMIN — DICLOFENAC 1 G: 10 GEL TOPICAL at 13:44

## 2019-01-01 RX ADMIN — QUETIAPINE 12.5 MG: 25 TABLET, FILM COATED ORAL at 19:58

## 2019-01-01 RX ADMIN — Medication 3 MG: at 20:20

## 2019-01-01 RX ADMIN — DOCUSATE SODIUM 100 MG: 50 LIQUID ORAL at 22:28

## 2019-01-01 RX ADMIN — INSULIN ASPART 3 UNITS: 100 INJECTION, SOLUTION INTRAVENOUS; SUBCUTANEOUS at 17:51

## 2019-01-01 RX ADMIN — ALBUTEROL SULFATE 2.5 MG: 2.5 SOLUTION RESPIRATORY (INHALATION) at 13:55

## 2019-01-01 RX ADMIN — ACETAMINOPHEN 650 MG: 160 SUSPENSION ORAL at 12:10

## 2019-01-01 RX ADMIN — HEPARIN SODIUM 5000 UNITS: 5000 INJECTION, SOLUTION INTRAVENOUS; SUBCUTANEOUS at 14:01

## 2019-01-01 RX ADMIN — HYDRALAZINE HYDROCHLORIDE 10 MG: 20 INJECTION INTRAMUSCULAR; INTRAVENOUS at 15:19

## 2019-01-01 RX ADMIN — HEPARIN SODIUM 5000 UNITS: 5000 INJECTION, SOLUTION INTRAVENOUS; SUBCUTANEOUS at 14:53

## 2019-01-01 RX ADMIN — LEVOTHYROXINE SODIUM 112 MCG: 112 TABLET ORAL at 05:59

## 2019-01-01 RX ADMIN — ALBUTEROL SULFATE 2.5 MG: 2.5 SOLUTION RESPIRATORY (INHALATION) at 12:37

## 2019-01-01 RX ADMIN — VENLAFAXINE 37.5 MG: 37.5 TABLET ORAL at 14:10

## 2019-01-01 RX ADMIN — QUETIAPINE 12.5 MG: 25 TABLET, FILM COATED ORAL at 21:13

## 2019-01-01 RX ADMIN — LEVOTHYROXINE SODIUM 112 MCG: 112 TABLET ORAL at 08:02

## 2019-01-01 RX ADMIN — CARBIDOPA AND LEVODOPA 2 TABLET: 25; 100 TABLET ORAL at 16:05

## 2019-01-01 RX ADMIN — CARBIDOPA AND LEVODOPA 2 TABLET: 25; 100 TABLET ORAL at 06:30

## 2019-01-01 RX ADMIN — VALPROATE SODIUM 350 MG: 100 INJECTION, SOLUTION INTRAVENOUS at 23:57

## 2019-01-01 RX ADMIN — INSULIN ASPART 1 UNITS: 100 INJECTION, SOLUTION INTRAVENOUS; SUBCUTANEOUS at 04:00

## 2019-01-01 RX ADMIN — ROCURONIUM BROMIDE 100 MG: 50 INJECTION, SOLUTION INTRAVENOUS at 14:45

## 2019-01-01 RX ADMIN — INSULIN ASPART 3 UNITS: 100 INJECTION, SOLUTION INTRAVENOUS; SUBCUTANEOUS at 05:00

## 2019-01-01 RX ADMIN — LISINOPRIL 10 MG: 10 TABLET ORAL at 11:59

## 2019-01-01 RX ADMIN — SODIUM CHLORIDE 100 MG: 9 INJECTION, SOLUTION INTRAVENOUS at 09:04

## 2019-01-01 RX ADMIN — PROPOFOL 45 MCG/KG/MIN: 10 INJECTION, EMULSION INTRAVENOUS at 06:06

## 2019-01-01 RX ADMIN — MULTIVITAMIN 15 ML: LIQUID ORAL at 08:15

## 2019-01-01 RX ADMIN — VENLAFAXINE 37.5 MG: 37.5 TABLET ORAL at 19:40

## 2019-01-01 RX ADMIN — CARBIDOPA AND LEVODOPA 2 TABLET: 25; 100 TABLET ORAL at 11:40

## 2019-01-01 RX ADMIN — ACETAMINOPHEN 500 MG: 160 SUSPENSION ORAL at 17:04

## 2019-01-01 RX ADMIN — CARBIDOPA AND LEVODOPA 2 TABLET: 25; 100 TABLET ORAL at 20:00

## 2019-01-01 RX ADMIN — VENLAFAXINE 37.5 MG: 37.5 TABLET ORAL at 20:08

## 2019-01-01 RX ADMIN — LIDOCAINE 3 PATCH: 560 PATCH PERCUTANEOUS; TOPICAL; TRANSDERMAL at 09:23

## 2019-01-01 RX ADMIN — INSULIN ASPART 3 UNITS: 100 INJECTION, SOLUTION INTRAVENOUS; SUBCUTANEOUS at 12:11

## 2019-01-01 RX ADMIN — HEPARIN SODIUM 5000 UNITS: 5000 INJECTION, SOLUTION INTRAVENOUS; SUBCUTANEOUS at 22:44

## 2019-01-01 RX ADMIN — CARBIDOPA AND LEVODOPA 2 TABLET: 25; 100 TABLET ORAL at 16:09

## 2019-01-01 RX ADMIN — ACETAMINOPHEN 650 MG: 325 TABLET, FILM COATED ORAL at 05:27

## 2019-01-01 RX ADMIN — QUETIAPINE 12.5 MG: 25 TABLET, FILM COATED ORAL at 21:00

## 2019-01-01 RX ADMIN — ACETAMINOPHEN 650 MG: 160 SUSPENSION ORAL at 17:46

## 2019-01-01 RX ADMIN — PROPOFOL 40 MCG/KG/MIN: 10 INJECTION, EMULSION INTRAVENOUS at 14:22

## 2019-01-01 RX ADMIN — QUETIAPINE 25 MG: 25 TABLET ORAL at 08:05

## 2019-01-01 RX ADMIN — ACETYLCYSTEINE 4 ML: 200 SOLUTION ORAL; RESPIRATORY (INHALATION) at 07:45

## 2019-01-01 RX ADMIN — HEPARIN SODIUM 5000 UNITS: 5000 INJECTION, SOLUTION INTRAVENOUS; SUBCUTANEOUS at 06:43

## 2019-01-01 RX ADMIN — VENLAFAXINE 37.5 MG: 37.5 TABLET ORAL at 13:59

## 2019-01-01 RX ADMIN — LACOSAMIDE 150 MG: 10 SOLUTION ORAL at 09:12

## 2019-01-01 RX ADMIN — Medication 0.2 MG: at 13:14

## 2019-01-01 RX ADMIN — PROPOFOL 50 MCG/KG/MIN: 10 INJECTION, EMULSION INTRAVENOUS at 18:32

## 2019-01-01 RX ADMIN — FUROSEMIDE 40 MG: 10 INJECTION, SOLUTION INTRAVENOUS at 08:32

## 2019-01-01 RX ADMIN — ASPIRIN 81 MG 81 MG: 81 TABLET ORAL at 21:07

## 2019-01-01 RX ADMIN — OXYCODONE HYDROCHLORIDE 10 MG: 5 TABLET ORAL at 03:57

## 2019-01-01 RX ADMIN — Medication 887 ML: at 20:22

## 2019-01-01 RX ADMIN — ALBUTEROL SULFATE 2.5 MG: 2.5 SOLUTION RESPIRATORY (INHALATION) at 19:23

## 2019-01-01 RX ADMIN — OXYCODONE HYDROCHLORIDE 5 MG: 5 TABLET ORAL at 22:06

## 2019-01-01 RX ADMIN — MIDAZOLAM HYDROCHLORIDE 2 MG: 1 INJECTION, SOLUTION INTRAMUSCULAR; INTRAVENOUS at 14:32

## 2019-01-01 RX ADMIN — CHLORHEXIDINE GLUCONATE 15 ML: 1.2 RINSE ORAL at 08:31

## 2019-01-01 RX ADMIN — RACEPINEPHRINE HYDROCHLORIDE 0.5 ML: 11.25 SOLUTION RESPIRATORY (INHALATION) at 21:35

## 2019-01-01 RX ADMIN — PROPOFOL 50 MCG/KG/MIN: 10 INJECTION, EMULSION INTRAVENOUS at 19:03

## 2019-01-01 RX ADMIN — CARBIDOPA AND LEVODOPA 2 TABLET: 25; 100 TABLET ORAL at 20:20

## 2019-01-01 RX ADMIN — ASPIRIN 81 MG 81 MG: 81 TABLET ORAL at 22:03

## 2019-01-01 RX ADMIN — ACETAMINOPHEN 500 MG: 160 SUSPENSION ORAL at 08:55

## 2019-01-01 RX ADMIN — AMLODIPINE BESYLATE 10 MG: 10 TABLET ORAL at 08:24

## 2019-01-01 RX ADMIN — ALBUTEROL SULFATE 2.5 MG: 2.5 SOLUTION RESPIRATORY (INHALATION) at 12:56

## 2019-01-01 RX ADMIN — OXYCODONE HYDROCHLORIDE 10 MG: 5 TABLET ORAL at 00:34

## 2019-01-01 RX ADMIN — CARBIDOPA AND LEVODOPA 2 TABLET: 25; 100 TABLET ORAL at 12:24

## 2019-01-01 RX ADMIN — CHLORHEXIDINE GLUCONATE 15 ML: 1.2 RINSE ORAL at 08:25

## 2019-01-01 RX ADMIN — ALBUTEROL SULFATE 2.5 MG: 2.5 SOLUTION RESPIRATORY (INHALATION) at 20:30

## 2019-01-01 RX ADMIN — VENLAFAXINE 37.5 MG: 37.5 TABLET ORAL at 20:41

## 2019-01-01 RX ADMIN — Medication 1 MG: at 19:05

## 2019-01-01 RX ADMIN — POTASSIUM CHLORIDE 20 MEQ: 1.5 POWDER, FOR SOLUTION ORAL at 08:02

## 2019-01-01 RX ADMIN — VENLAFAXINE 37.5 MG: 37.5 TABLET ORAL at 13:23

## 2019-01-01 RX ADMIN — OXYCODONE HYDROCHLORIDE 10 MG: 5 TABLET ORAL at 22:35

## 2019-01-01 RX ADMIN — Medication 1 MG: at 20:50

## 2019-01-01 RX ADMIN — LACOSAMIDE 150 MG: 10 SOLUTION ORAL at 20:22

## 2019-01-01 RX ADMIN — INSULIN ASPART 1 UNITS: 100 INJECTION, SOLUTION INTRAVENOUS; SUBCUTANEOUS at 01:00

## 2019-01-01 RX ADMIN — CHLORHEXIDINE GLUCONATE 15 ML: 1.2 RINSE ORAL at 19:56

## 2019-01-01 RX ADMIN — VENLAFAXINE 37.5 MG: 37.5 TABLET ORAL at 08:26

## 2019-01-01 RX ADMIN — HEPARIN SODIUM 5000 UNITS: 5000 INJECTION, SOLUTION INTRAVENOUS; SUBCUTANEOUS at 14:00

## 2019-01-01 RX ADMIN — ACETAMINOPHEN 500 MG: 160 SUSPENSION ORAL at 12:02

## 2019-01-01 RX ADMIN — ACETAMINOPHEN 500 MG: 160 SUSPENSION ORAL at 11:31

## 2019-01-01 RX ADMIN — VENLAFAXINE 37.5 MG: 37.5 TABLET ORAL at 21:14

## 2019-01-01 RX ADMIN — SODIUM CHLORIDE: 9 INJECTION, SOLUTION INTRAVENOUS at 03:29

## 2019-01-01 RX ADMIN — CARBIDOPA AND LEVODOPA 2 TABLET: 25; 100 TABLET ORAL at 11:47

## 2019-01-01 RX ADMIN — VENLAFAXINE 37.5 MG: 37.5 TABLET ORAL at 10:24

## 2019-01-01 RX ADMIN — POTASSIUM CHLORIDE 40 MEQ: 1.5 POWDER, FOR SOLUTION ORAL at 08:14

## 2019-01-01 RX ADMIN — DEXMEDETOMIDINE HYDROCHLORIDE 0.3 MCG/KG/HR: 100 INJECTION, SOLUTION INTRAVENOUS at 04:51

## 2019-01-01 RX ADMIN — VENLAFAXINE 37.5 MG: 37.5 TABLET ORAL at 14:33

## 2019-01-01 RX ADMIN — OXYCODONE HYDROCHLORIDE 2.5 MG: 5 TABLET ORAL at 09:27

## 2019-01-01 RX ADMIN — DICLOFENAC 1 G: 10 GEL TOPICAL at 14:01

## 2019-01-01 RX ADMIN — CARBIDOPA AND LEVODOPA 2 TABLET: 25; 100 TABLET ORAL at 16:01

## 2019-01-01 RX ADMIN — CARBIDOPA AND LEVODOPA 2 TABLET: 25; 100 TABLET ORAL at 20:01

## 2019-01-01 RX ADMIN — VALPROATE SODIUM 350 MG: 100 INJECTION, SOLUTION INTRAVENOUS at 19:52

## 2019-01-01 RX ADMIN — HEPARIN SODIUM 5000 UNITS: 5000 INJECTION, SOLUTION INTRAVENOUS; SUBCUTANEOUS at 21:49

## 2019-01-01 RX ADMIN — CARBIDOPA AND LEVODOPA 2 TABLET: 25; 100 TABLET ORAL at 11:07

## 2019-01-01 RX ADMIN — Medication 40 MG: at 07:01

## 2019-01-01 RX ADMIN — QUETIAPINE 50 MG: 25 TABLET ORAL at 22:11

## 2019-01-01 RX ADMIN — VALPROATE SODIUM 500 MG: 100 INJECTION, SOLUTION INTRAVENOUS at 14:01

## 2019-01-01 RX ADMIN — PIPERACILLIN SODIUM,TAZOBACTAM SODIUM 4.5 G: 4; .5 INJECTION, POWDER, FOR SOLUTION INTRAVENOUS at 00:01

## 2019-01-01 RX ADMIN — VENLAFAXINE 37.5 MG: 37.5 TABLET ORAL at 14:07

## 2019-01-01 RX ADMIN — HEPARIN SODIUM 5000 UNITS: 5000 INJECTION, SOLUTION INTRAVENOUS; SUBCUTANEOUS at 13:31

## 2019-01-01 RX ADMIN — POTASSIUM CHLORIDE 20 MEQ: 1.5 POWDER, FOR SOLUTION ORAL at 05:49

## 2019-01-01 RX ADMIN — TERAZOSIN HYDROCHLORIDE 1 MG: 1 CAPSULE ORAL at 21:19

## 2019-01-01 RX ADMIN — INSULIN ASPART 1 UNITS: 100 INJECTION, SOLUTION INTRAVENOUS; SUBCUTANEOUS at 00:21

## 2019-01-01 RX ADMIN — DOCUSATE SODIUM 100 MG: 50 LIQUID ORAL at 08:07

## 2019-01-01 RX ADMIN — VENLAFAXINE 37.5 MG: 37.5 TABLET ORAL at 08:42

## 2019-01-01 RX ADMIN — Medication 40 MG: at 08:17

## 2019-01-01 RX ADMIN — MULTIVITAMIN 15 ML: LIQUID ORAL at 08:05

## 2019-01-01 RX ADMIN — DOCUSATE SODIUM 100 MG: 50 LIQUID ORAL at 13:28

## 2019-01-01 RX ADMIN — METHYLPREDNISOLONE SODIUM SUCCINATE 125 MG: 125 INJECTION, POWDER, FOR SOLUTION INTRAMUSCULAR; INTRAVENOUS at 19:12

## 2019-01-01 RX ADMIN — CARBIDOPA AND LEVODOPA 2 TABLET: 25; 100 TABLET ORAL at 13:50

## 2019-01-01 RX ADMIN — ALBUTEROL SULFATE 2.5 MG: 2.5 SOLUTION RESPIRATORY (INHALATION) at 07:35

## 2019-01-01 RX ADMIN — ACETAMINOPHEN 500 MG: 160 SUSPENSION ORAL at 08:23

## 2019-01-01 RX ADMIN — ACETAMINOPHEN 500 MG: 160 SUSPENSION ORAL at 18:53

## 2019-01-01 RX ADMIN — ACETYLCYSTEINE 4 ML: 200 SOLUTION ORAL; RESPIRATORY (INHALATION) at 19:45

## 2019-01-01 RX ADMIN — INSULIN ASPART 1 UNITS: 100 INJECTION, SOLUTION INTRAVENOUS; SUBCUTANEOUS at 09:47

## 2019-01-01 RX ADMIN — ASPIRIN 81 MG 81 MG: 81 TABLET ORAL at 21:49

## 2019-01-01 RX ADMIN — QUETIAPINE 12.5 MG: 25 TABLET, FILM COATED ORAL at 22:47

## 2019-01-01 RX ADMIN — ACETAMINOPHEN 650 MG: 325 TABLET, FILM COATED ORAL at 13:28

## 2019-01-01 RX ADMIN — INSULIN ASPART 2 UNITS: 100 INJECTION, SOLUTION INTRAVENOUS; SUBCUTANEOUS at 23:18

## 2019-01-01 RX ADMIN — ACETAMINOPHEN 650 MG: 325 TABLET, FILM COATED ORAL at 17:18

## 2019-01-01 RX ADMIN — CARBIDOPA AND LEVODOPA 2 TABLET: 25; 100 TABLET ORAL at 15:41

## 2019-01-01 RX ADMIN — LISINOPRIL 20 MG: 20 TABLET ORAL at 08:26

## 2019-01-01 RX ADMIN — LEVOTHYROXINE SODIUM 112 MCG: 112 TABLET ORAL at 05:45

## 2019-01-01 RX ADMIN — PROPOFOL 40 MCG/KG/MIN: 10 INJECTION, EMULSION INTRAVENOUS at 00:59

## 2019-01-01 RX ADMIN — VENLAFAXINE 37.5 MG: 37.5 TABLET ORAL at 19:56

## 2019-01-01 RX ADMIN — VENLAFAXINE 37.5 MG: 37.5 TABLET ORAL at 14:01

## 2019-01-01 RX ADMIN — INSULIN ASPART 2 UNITS: 100 INJECTION, SOLUTION INTRAVENOUS; SUBCUTANEOUS at 05:10

## 2019-01-01 RX ADMIN — INSULIN ASPART 4 UNITS: 100 INJECTION, SOLUTION INTRAVENOUS; SUBCUTANEOUS at 01:48

## 2019-01-01 RX ADMIN — OXYCODONE HYDROCHLORIDE 5 MG: 5 TABLET ORAL at 15:10

## 2019-01-01 RX ADMIN — FUROSEMIDE 40 MG: 10 INJECTION, SOLUTION INTRAVENOUS at 09:31

## 2019-01-01 RX ADMIN — VENLAFAXINE 37.5 MG: 37.5 TABLET ORAL at 15:32

## 2019-01-01 RX ADMIN — CARBIDOPA AND LEVODOPA 2 TABLET: 25; 100 TABLET ORAL at 08:31

## 2019-01-01 RX ADMIN — TERAZOSIN HYDROCHLORIDE 1 MG: 1 CAPSULE ORAL at 20:58

## 2019-01-01 RX ADMIN — VENLAFAXINE 37.5 MG: 37.5 TABLET ORAL at 14:00

## 2019-01-01 RX ADMIN — DEXAMETHASONE SODIUM PHOSPHATE 4 MG: 4 INJECTION, SOLUTION INTRA-ARTICULAR; INTRALESIONAL; INTRAMUSCULAR; INTRAVENOUS; SOFT TISSUE at 11:40

## 2019-01-01 RX ADMIN — HEPARIN SODIUM 5000 UNITS: 5000 INJECTION, SOLUTION INTRAVENOUS; SUBCUTANEOUS at 22:14

## 2019-01-01 RX ADMIN — LEVOTHYROXINE SODIUM 112 MCG: 112 TABLET ORAL at 06:20

## 2019-01-01 RX ADMIN — SODIUM CHLORIDE 100 MG: 9 INJECTION, SOLUTION INTRAVENOUS at 09:45

## 2019-01-01 RX ADMIN — INSULIN ASPART 2 UNITS: 100 INJECTION, SOLUTION INTRAVENOUS; SUBCUTANEOUS at 12:23

## 2019-01-01 RX ADMIN — ASPIRIN 81 MG 81 MG: 81 TABLET ORAL at 21:13

## 2019-01-01 RX ADMIN — HEPARIN SODIUM 5000 UNITS: 5000 INJECTION, SOLUTION INTRAVENOUS; SUBCUTANEOUS at 05:44

## 2019-01-01 RX ADMIN — LIDOCAINE 3 PATCH: 560 PATCH PERCUTANEOUS; TOPICAL; TRANSDERMAL at 19:59

## 2019-01-01 RX ADMIN — INSULIN ASPART 3 UNITS: 100 INJECTION, SOLUTION INTRAVENOUS; SUBCUTANEOUS at 00:12

## 2019-01-01 RX ADMIN — MULTIVITAMIN 15 ML: LIQUID ORAL at 11:17

## 2019-01-01 RX ADMIN — PROPOFOL 55 MCG/KG/MIN: 10 INJECTION, EMULSION INTRAVENOUS at 11:31

## 2019-01-01 RX ADMIN — VALPROATE SODIUM 350 MG: 100 INJECTION, SOLUTION INTRAVENOUS at 00:26

## 2019-01-01 RX ADMIN — TERAZOSIN HYDROCHLORIDE 1 MG: 1 CAPSULE ORAL at 20:50

## 2019-01-01 RX ADMIN — INSULIN ASPART 2 UNITS: 100 INJECTION, SOLUTION INTRAVENOUS; SUBCUTANEOUS at 00:24

## 2019-01-01 RX ADMIN — ACETAMINOPHEN 650 MG: 160 SUSPENSION ORAL at 11:40

## 2019-01-01 RX ADMIN — DICLOFENAC 1 G: 10 GEL TOPICAL at 17:00

## 2019-01-01 RX ADMIN — AMLODIPINE BESYLATE 10 MG: 10 TABLET ORAL at 08:05

## 2019-01-01 RX ADMIN — DOCUSATE SODIUM 100 MG: 50 LIQUID ORAL at 20:00

## 2019-01-01 RX ADMIN — FAMOTIDINE 20 MG: 10 INJECTION, SOLUTION INTRAVENOUS at 21:06

## 2019-01-01 RX ADMIN — RACEPINEPHRINE HYDROCHLORIDE 0.5 ML: 11.25 SOLUTION RESPIRATORY (INHALATION) at 08:09

## 2019-01-01 RX ADMIN — MULTIVITAMIN 15 ML: LIQUID ORAL at 12:59

## 2019-01-01 RX ADMIN — SODIUM CHLORIDE 100 MG: 9 INJECTION, SOLUTION INTRAVENOUS at 22:29

## 2019-01-01 RX ADMIN — POTASSIUM CHLORIDE 20 MEQ: 1.5 POWDER, FOR SOLUTION ORAL at 06:16

## 2019-01-01 RX ADMIN — DOCUSATE SODIUM 100 MG: 50 LIQUID ORAL at 08:05

## 2019-01-01 RX ADMIN — INSULIN ASPART 1 UNITS: 100 INJECTION, SOLUTION INTRAVENOUS; SUBCUTANEOUS at 12:34

## 2019-01-01 RX ADMIN — PROPOFOL 5 MCG/KG/MIN: 10 INJECTION, EMULSION INTRAVENOUS at 14:52

## 2019-01-01 RX ADMIN — QUETIAPINE 50 MG: 25 TABLET ORAL at 08:19

## 2019-01-01 RX ADMIN — CARBIDOPA AND LEVODOPA 2 TABLET: 25; 100 TABLET ORAL at 18:48

## 2019-01-01 RX ADMIN — INSULIN ASPART 2 UNITS: 100 INJECTION, SOLUTION INTRAVENOUS; SUBCUTANEOUS at 12:45

## 2019-01-01 RX ADMIN — VENLAFAXINE 37.5 MG: 37.5 TABLET ORAL at 08:14

## 2019-01-01 RX ADMIN — PANTOPRAZOLE SODIUM 40 MG: 40 TABLET, DELAYED RELEASE ORAL at 07:21

## 2019-01-01 RX ADMIN — MIDAZOLAM HYDROCHLORIDE 0.5 MG: 1 INJECTION, SOLUTION INTRAMUSCULAR; INTRAVENOUS at 10:34

## 2019-01-01 RX ADMIN — ACETAMINOPHEN 650 MG: 325 TABLET, FILM COATED ORAL at 11:55

## 2019-01-01 RX ADMIN — CARBIDOPA AND LEVODOPA 2 TABLET: 25; 100 TABLET ORAL at 14:29

## 2019-01-01 RX ADMIN — ACETAMINOPHEN 650 MG: 325 TABLET, FILM COATED ORAL at 11:59

## 2019-01-01 RX ADMIN — SODIUM CHLORIDE 90 ML: 9 INJECTION, SOLUTION INTRAVENOUS at 15:13

## 2019-01-01 RX ADMIN — OXYCODONE HYDROCHLORIDE 5 MG: 5 TABLET ORAL at 12:20

## 2019-01-01 RX ADMIN — OXYCODONE HYDROCHLORIDE 2.5 MG: 5 TABLET ORAL at 08:25

## 2019-01-01 RX ADMIN — ROTIGOTINE 1 PATCH: 4 PATCH, EXTENDED RELEASE TRANSDERMAL at 19:39

## 2019-01-01 RX ADMIN — LIDOCAINE 3 PATCH: 560 PATCH PERCUTANEOUS; TOPICAL; TRANSDERMAL at 20:20

## 2019-01-01 RX ADMIN — CARBIDOPA AND LEVODOPA 1 TABLET: 25; 100 TABLET ORAL at 11:17

## 2019-01-01 RX ADMIN — INSULIN ASPART 3 UNITS: 100 INJECTION, SOLUTION INTRAVENOUS; SUBCUTANEOUS at 12:29

## 2019-01-01 RX ADMIN — LEVOTHYROXINE SODIUM 112 MCG: 112 TABLET ORAL at 08:05

## 2019-01-01 RX ADMIN — MIDAZOLAM HYDROCHLORIDE 2 MG: 1 INJECTION, SOLUTION INTRAMUSCULAR; INTRAVENOUS at 09:57

## 2019-01-01 RX ADMIN — Medication 30 ML: at 22:48

## 2019-01-01 RX ADMIN — LEVOTHYROXINE SODIUM 112 MCG: 112 TABLET ORAL at 06:33

## 2019-01-01 RX ADMIN — CARBIDOPA AND LEVODOPA 2 TABLET: 25; 100 TABLET ORAL at 20:11

## 2019-01-01 RX ADMIN — DOCUSATE SODIUM 100 MG: 50 LIQUID ORAL at 08:26

## 2019-01-01 RX ADMIN — LISINOPRIL 10 MG: 10 TABLET ORAL at 08:19

## 2019-01-01 RX ADMIN — CARBIDOPA AND LEVODOPA 2 TABLET: 25; 100 TABLET ORAL at 08:15

## 2019-01-01 RX ADMIN — Medication 40 MG: at 08:26

## 2019-01-01 RX ADMIN — OXYCODONE HYDROCHLORIDE 10 MG: 5 TABLET ORAL at 20:04

## 2019-01-01 RX ADMIN — CARBIDOPA AND LEVODOPA 2 TABLET: 25; 100 TABLET ORAL at 08:49

## 2019-01-01 RX ADMIN — OXYCODONE HYDROCHLORIDE 10 MG: 5 TABLET ORAL at 09:17

## 2019-01-01 RX ADMIN — VALPROATE SODIUM 350 MG: 100 INJECTION, SOLUTION INTRAVENOUS at 00:42

## 2019-01-01 RX ADMIN — HEPARIN SODIUM 5000 UNITS: 5000 INJECTION, SOLUTION INTRAVENOUS; SUBCUTANEOUS at 14:40

## 2019-01-01 RX ADMIN — ALBUTEROL SULFATE 2.5 MG: 2.5 SOLUTION RESPIRATORY (INHALATION) at 19:22

## 2019-01-01 RX ADMIN — HEPARIN SODIUM 5000 UNITS: 5000 INJECTION, SOLUTION INTRAVENOUS; SUBCUTANEOUS at 22:16

## 2019-01-01 RX ADMIN — ALBUTEROL SULFATE 2.5 MG: 2.5 SOLUTION RESPIRATORY (INHALATION) at 11:43

## 2019-01-01 RX ADMIN — INSULIN ASPART 2 UNITS: 100 INJECTION, SOLUTION INTRAVENOUS; SUBCUTANEOUS at 13:34

## 2019-01-01 RX ADMIN — Medication 0.2 MG: at 22:23

## 2019-01-01 RX ADMIN — PROPOFOL 45 MCG/KG/MIN: 10 INJECTION, EMULSION INTRAVENOUS at 21:56

## 2019-01-01 RX ADMIN — INSULIN ASPART 2 UNITS: 100 INJECTION, SOLUTION INTRAVENOUS; SUBCUTANEOUS at 16:04

## 2019-01-01 RX ADMIN — Medication 40 MG: at 08:15

## 2019-01-01 RX ADMIN — QUETIAPINE 12.5 MG: 25 TABLET, FILM COATED ORAL at 19:37

## 2019-01-01 RX ADMIN — HEPARIN SODIUM 5000 UNITS: 5000 INJECTION, SOLUTION INTRAVENOUS; SUBCUTANEOUS at 20:24

## 2019-01-01 RX ADMIN — INSULIN ASPART 2 UNITS: 100 INJECTION, SOLUTION INTRAVENOUS; SUBCUTANEOUS at 08:27

## 2019-01-01 RX ADMIN — ACETAMINOPHEN 650 MG: 325 TABLET, FILM COATED ORAL at 06:26

## 2019-01-01 RX ADMIN — ACETAMINOPHEN 650 MG: 325 TABLET, FILM COATED ORAL at 00:34

## 2019-01-01 RX ADMIN — VENLAFAXINE 37.5 MG: 37.5 TABLET ORAL at 07:21

## 2019-01-01 RX ADMIN — Medication 40 MG: at 08:19

## 2019-01-01 RX ADMIN — ACETAMINOPHEN 650 MG: 160 SUSPENSION ORAL at 00:26

## 2019-01-01 RX ADMIN — OXYCODONE HYDROCHLORIDE 10 MG: 5 TABLET ORAL at 16:47

## 2019-01-01 RX ADMIN — VENLAFAXINE 37.5 MG: 37.5 TABLET ORAL at 08:02

## 2019-01-01 RX ADMIN — RACEPINEPHRINE HYDROCHLORIDE 0.5 ML: 11.25 SOLUTION RESPIRATORY (INHALATION) at 02:20

## 2019-01-01 RX ADMIN — INSULIN ASPART 1 UNITS: 100 INJECTION, SOLUTION INTRAVENOUS; SUBCUTANEOUS at 08:41

## 2019-01-01 RX ADMIN — CARBIDOPA AND LEVODOPA 1 TABLET: 25; 100 TABLET ORAL at 21:07

## 2019-01-01 RX ADMIN — VENLAFAXINE 37.5 MG: 37.5 TABLET ORAL at 08:19

## 2019-01-01 RX ADMIN — AMLODIPINE BESYLATE 10 MG: 10 TABLET ORAL at 10:24

## 2019-01-01 RX ADMIN — ACETAMINOPHEN 650 MG: 160 SUSPENSION ORAL at 18:24

## 2019-01-01 RX ADMIN — LACOSAMIDE 150 MG: 10 SOLUTION ORAL at 10:30

## 2019-01-01 RX ADMIN — ROTIGOTINE 1 PATCH: 4 PATCH, EXTENDED RELEASE TRANSDERMAL at 20:57

## 2019-01-01 RX ADMIN — VENLAFAXINE 37.5 MG: 37.5 TABLET ORAL at 08:15

## 2019-01-01 RX ADMIN — ACETAMINOPHEN 650 MG: 160 SUSPENSION ORAL at 12:09

## 2019-01-01 RX ADMIN — PROPOFOL 40 MCG/KG/MIN: 10 INJECTION, EMULSION INTRAVENOUS at 08:35

## 2019-01-01 RX ADMIN — Medication 40 MG: at 05:45

## 2019-01-01 RX ADMIN — DEXAMETHASONE SODIUM PHOSPHATE 4 MG: 4 INJECTION, SOLUTION INTRA-ARTICULAR; INTRALESIONAL; INTRAMUSCULAR; INTRAVENOUS; SOFT TISSUE at 22:48

## 2019-01-01 RX ADMIN — ASPIRIN 81 MG 81 MG: 81 TABLET ORAL at 20:58

## 2019-01-01 RX ADMIN — PANTOPRAZOLE SODIUM 40 MG: 40 TABLET, DELAYED RELEASE ORAL at 07:27

## 2019-01-01 RX ADMIN — LACOSAMIDE 150 MG: 10 SOLUTION ORAL at 08:25

## 2019-01-01 RX ADMIN — VENLAFAXINE 37.5 MG: 37.5 TABLET ORAL at 13:28

## 2019-01-01 RX ADMIN — ACETAMINOPHEN 650 MG: 160 SUSPENSION ORAL at 05:36

## 2019-01-01 RX ADMIN — VALPROATE SODIUM 350 MG: 100 INJECTION, SOLUTION INTRAVENOUS at 17:47

## 2019-01-01 RX ADMIN — OXYCODONE HYDROCHLORIDE 10 MG: 5 TABLET ORAL at 12:16

## 2019-01-01 RX ADMIN — INSULIN ASPART 2 UNITS: 100 INJECTION, SOLUTION INTRAVENOUS; SUBCUTANEOUS at 16:01

## 2019-01-01 RX ADMIN — IOPAMIDOL 71 ML: 755 INJECTION, SOLUTION INTRAVENOUS at 15:22

## 2019-01-01 RX ADMIN — DICLOFENAC 1 G: 10 GEL TOPICAL at 21:29

## 2019-01-01 RX ADMIN — ACETYLCYSTEINE 2 ML: 200 SOLUTION ORAL; RESPIRATORY (INHALATION) at 20:30

## 2019-01-01 RX ADMIN — Medication 40 MG: at 05:53

## 2019-01-01 RX ADMIN — DOCUSATE SODIUM 100 MG: 50 LIQUID ORAL at 20:19

## 2019-01-01 RX ADMIN — BARIUM SULFATE 15 ML: 400 SUSPENSION ORAL at 14:11

## 2019-01-01 RX ADMIN — OXYCODONE HYDROCHLORIDE 5 MG: 5 TABLET ORAL at 04:39

## 2019-01-01 RX ADMIN — OXYCODONE HYDROCHLORIDE 10 MG: 5 TABLET ORAL at 13:49

## 2019-01-01 RX ADMIN — ASPIRIN 81 MG 81 MG: 81 TABLET ORAL at 22:47

## 2019-01-01 RX ADMIN — QUETIAPINE 12.5 MG: 25 TABLET, FILM COATED ORAL at 20:42

## 2019-01-01 RX ADMIN — DOCUSATE SODIUM 100 MG: 50 LIQUID ORAL at 20:44

## 2019-01-01 RX ADMIN — DOCUSATE SODIUM 100 MG: 50 LIQUID ORAL at 09:29

## 2019-01-01 RX ADMIN — INSULIN ASPART 1 UNITS: 100 INJECTION, SOLUTION INTRAVENOUS; SUBCUTANEOUS at 04:21

## 2019-01-01 RX ADMIN — OXYCODONE HYDROCHLORIDE 10 MG: 5 TABLET ORAL at 04:18

## 2019-01-01 RX ADMIN — LEVOTHYROXINE SODIUM 112 MCG: 112 TABLET ORAL at 05:53

## 2019-01-01 RX ADMIN — DICLOFENAC 1 G: 10 GEL TOPICAL at 10:29

## 2019-01-01 RX ADMIN — LACOSAMIDE 150 MG: 10 SOLUTION ORAL at 09:30

## 2019-01-01 RX ADMIN — CARBIDOPA AND LEVODOPA 2 TABLET: 25; 100 TABLET ORAL at 12:21

## 2019-01-01 RX ADMIN — ACETAMINOPHEN 500 MG: 160 SUSPENSION ORAL at 13:23

## 2019-01-01 RX ADMIN — INSULIN ASPART 1 UNITS: 100 INJECTION, SOLUTION INTRAVENOUS; SUBCUTANEOUS at 16:33

## 2019-01-01 RX ADMIN — INSULIN ASPART 2 UNITS: 100 INJECTION, SOLUTION INTRAVENOUS; SUBCUTANEOUS at 04:27

## 2019-01-01 RX ADMIN — PROPOFOL 45 MCG/KG/MIN: 10 INJECTION, EMULSION INTRAVENOUS at 22:30

## 2019-01-01 RX ADMIN — SODIUM CHLORIDE: 9 INJECTION, SOLUTION INTRAVENOUS at 19:15

## 2019-01-01 RX ADMIN — LACOSAMIDE 150 MG: 10 SOLUTION ORAL at 21:13

## 2019-01-01 RX ADMIN — ACETAMINOPHEN 650 MG: 160 SUSPENSION ORAL at 01:16

## 2019-01-01 RX ADMIN — DEXAMETHASONE SODIUM PHOSPHATE 4 MG: 4 INJECTION, SOLUTION INTRA-ARTICULAR; INTRALESIONAL; INTRAMUSCULAR; INTRAVENOUS; SOFT TISSUE at 04:03

## 2019-01-01 RX ADMIN — PROPOFOL 55 MCG/KG/MIN: 10 INJECTION, EMULSION INTRAVENOUS at 00:08

## 2019-01-01 RX ADMIN — Medication 0.2 MG: at 16:30

## 2019-01-01 RX ADMIN — LACOSAMIDE 150 MG: 10 SOLUTION ORAL at 20:43

## 2019-01-01 RX ADMIN — LACOSAMIDE 150 MG: 10 SOLUTION ORAL at 09:47

## 2019-01-01 RX ADMIN — CARBIDOPA AND LEVODOPA 2 TABLET: 25; 100 TABLET ORAL at 08:07

## 2019-01-01 RX ADMIN — LACOSAMIDE 150 MG: 10 SOLUTION ORAL at 22:01

## 2019-01-01 RX ADMIN — DOCUSATE SODIUM 100 MG: 50 LIQUID ORAL at 21:14

## 2019-01-01 RX ADMIN — FUROSEMIDE 40 MG: 10 INJECTION, SOLUTION INTRAVENOUS at 19:58

## 2019-01-01 RX ADMIN — DOCUSATE SODIUM 100 MG: 50 LIQUID ORAL at 08:49

## 2019-01-01 RX ADMIN — OXYCODONE HYDROCHLORIDE 5 MG: 5 TABLET ORAL at 09:30

## 2019-01-01 RX ADMIN — DICLOFENAC 1 G: 10 GEL TOPICAL at 08:55

## 2019-01-01 RX ADMIN — VALPROIC ACID 250 MG: 250 SOLUTION ORAL at 19:58

## 2019-01-01 RX ADMIN — CARBIDOPA AND LEVODOPA 2 TABLET: 25; 100 TABLET ORAL at 12:17

## 2019-01-01 RX ADMIN — DOCUSATE SODIUM 100 MG: 50 LIQUID ORAL at 09:21

## 2019-01-01 RX ADMIN — CARBIDOPA AND LEVODOPA 1 TABLET: 25; 100 TABLET ORAL at 05:00

## 2019-01-01 RX ADMIN — OXYCODONE HYDROCHLORIDE 10 MG: 5 TABLET ORAL at 08:02

## 2019-01-01 RX ADMIN — LACOSAMIDE 150 MG: 10 SOLUTION ORAL at 21:07

## 2019-01-01 RX ADMIN — FAMOTIDINE 20 MG: 10 INJECTION, SOLUTION INTRAVENOUS at 20:56

## 2019-01-01 RX ADMIN — INSULIN ASPART 3 UNITS: 100 INJECTION, SOLUTION INTRAVENOUS; SUBCUTANEOUS at 05:21

## 2019-01-01 RX ADMIN — DOCUSATE SODIUM 100 MG: 50 LIQUID ORAL at 20:04

## 2019-01-01 RX ADMIN — ACETAMINOPHEN 650 MG: 160 SUSPENSION ORAL at 22:15

## 2019-01-01 RX ADMIN — Medication 40 MG: at 06:20

## 2019-01-01 RX ADMIN — PIPERACILLIN SODIUM,TAZOBACTAM SODIUM 4.5 G: 4; .5 INJECTION, POWDER, FOR SOLUTION INTRAVENOUS at 17:32

## 2019-01-01 RX ADMIN — HEPARIN SODIUM 5000 UNITS: 5000 INJECTION, SOLUTION INTRAVENOUS; SUBCUTANEOUS at 14:33

## 2019-01-01 RX ADMIN — TERAZOSIN HYDROCHLORIDE 1 MG: 1 CAPSULE ORAL at 22:07

## 2019-01-01 RX ADMIN — FAMOTIDINE 20 MG: 10 INJECTION, SOLUTION INTRAVENOUS at 09:44

## 2019-01-01 RX ADMIN — LACOSAMIDE 150 MG: 10 SOLUTION ORAL at 10:20

## 2019-01-01 RX ADMIN — HEPARIN SODIUM 5000 UNITS: 5000 INJECTION, SOLUTION INTRAVENOUS; SUBCUTANEOUS at 13:49

## 2019-01-01 RX ADMIN — INSULIN ASPART 3 UNITS: 100 INJECTION, SOLUTION INTRAVENOUS; SUBCUTANEOUS at 04:16

## 2019-01-01 RX ADMIN — AMLODIPINE BESYLATE 10 MG: 10 TABLET ORAL at 08:52

## 2019-01-01 RX ADMIN — CARBIDOPA AND LEVODOPA 2 TABLET: 25; 100 TABLET ORAL at 16:47

## 2019-01-01 RX ADMIN — FLUCONAZOLE 200 MG: 100 TABLET ORAL at 09:34

## 2019-01-01 RX ADMIN — ACETAMINOPHEN 500 MG: 160 SUSPENSION ORAL at 11:52

## 2019-01-01 RX ADMIN — INSULIN ASPART 3 UNITS: 100 INJECTION, SOLUTION INTRAVENOUS; SUBCUTANEOUS at 22:16

## 2019-01-01 RX ADMIN — CHLORHEXIDINE GLUCONATE 15 ML: 1.2 RINSE ORAL at 08:21

## 2019-01-01 RX ADMIN — HEPARIN SODIUM 5000 UNITS: 5000 INJECTION, SOLUTION INTRAVENOUS; SUBCUTANEOUS at 05:46

## 2019-01-01 RX ADMIN — LEVOTHYROXINE SODIUM 112 MCG: 112 TABLET ORAL at 05:52

## 2019-01-01 RX ADMIN — PIPERACILLIN SODIUM,TAZOBACTAM SODIUM 4.5 G: 4; .5 INJECTION, POWDER, FOR SOLUTION INTRAVENOUS at 05:54

## 2019-01-01 RX ADMIN — CARBIDOPA AND LEVODOPA 1 TABLET: 25; 100 TABLET ORAL at 17:04

## 2019-01-01 RX ADMIN — OXYCODONE HYDROCHLORIDE 2.5 MG: 5 TABLET ORAL at 15:24

## 2019-01-01 RX ADMIN — SODIUM CHLORIDE: 9 INJECTION, SOLUTION INTRAVENOUS at 18:32

## 2019-01-01 RX ADMIN — LIDOCAINE 3 PATCH: 560 PATCH PERCUTANEOUS; TOPICAL; TRANSDERMAL at 20:24

## 2019-01-01 RX ADMIN — Medication 40 MG: at 08:33

## 2019-01-01 RX ADMIN — CHLORHEXIDINE GLUCONATE 15 ML: 1.2 RINSE ORAL at 07:44

## 2019-01-01 RX ADMIN — VENLAFAXINE 37.5 MG: 37.5 TABLET ORAL at 08:51

## 2019-01-01 RX ADMIN — HYDRALAZINE HYDROCHLORIDE 5 MG: 10 TABLET ORAL at 05:09

## 2019-01-01 RX ADMIN — FAMOTIDINE 20 MG: 10 INJECTION, SOLUTION INTRAVENOUS at 09:35

## 2019-01-01 RX ADMIN — CHLORHEXIDINE GLUCONATE 15 ML: 1.2 RINSE ORAL at 20:08

## 2019-01-01 RX ADMIN — VENLAFAXINE 37.5 MG: 37.5 TABLET ORAL at 14:53

## 2019-01-01 RX ADMIN — INSULIN ASPART 2 UNITS: 100 INJECTION, SOLUTION INTRAVENOUS; SUBCUTANEOUS at 01:09

## 2019-01-01 RX ADMIN — LEVOTHYROXINE SODIUM 112 MCG: 112 TABLET ORAL at 08:14

## 2019-01-01 RX ADMIN — ACETAMINOPHEN 650 MG: 325 TABLET, FILM COATED ORAL at 17:00

## 2019-01-01 RX ADMIN — INSULIN ASPART 3 UNITS: 100 INJECTION, SOLUTION INTRAVENOUS; SUBCUTANEOUS at 21:01

## 2019-01-01 RX ADMIN — DOCUSATE SODIUM 100 MG: 50 LIQUID ORAL at 20:05

## 2019-01-01 RX ADMIN — CHLORHEXIDINE GLUCONATE 15 ML: 1.2 RINSE ORAL at 19:40

## 2019-01-01 RX ADMIN — ACETAMINOPHEN 650 MG: 160 SUSPENSION ORAL at 18:19

## 2019-01-01 RX ADMIN — LACOSAMIDE 150 MG: 10 SOLUTION ORAL at 09:29

## 2019-01-01 RX ADMIN — HYDRALAZINE HYDROCHLORIDE 5 MG: 10 TABLET ORAL at 14:07

## 2019-01-01 RX ADMIN — ACETAMINOPHEN 500 MG: 160 SUSPENSION ORAL at 11:07

## 2019-01-01 RX ADMIN — CARBIDOPA AND LEVODOPA 2 TABLET: 25; 100 TABLET ORAL at 17:52

## 2019-01-01 RX ADMIN — INSULIN ASPART 2 UNITS: 100 INJECTION, SOLUTION INTRAVENOUS; SUBCUTANEOUS at 04:04

## 2019-01-01 RX ADMIN — CARBIDOPA AND LEVODOPA 2 TABLET: 25; 100 TABLET ORAL at 15:55

## 2019-01-01 RX ADMIN — ACETAMINOPHEN 500 MG: 160 SUSPENSION ORAL at 17:40

## 2019-01-01 RX ADMIN — INSULIN ASPART 5 UNITS: 100 INJECTION, SOLUTION INTRAVENOUS; SUBCUTANEOUS at 09:30

## 2019-01-01 RX ADMIN — CARBIDOPA AND LEVODOPA 2 TABLET: 25; 100 TABLET ORAL at 18:09

## 2019-01-01 RX ADMIN — ACETAMINOPHEN 650 MG: 325 TABLET, FILM COATED ORAL at 04:18

## 2019-01-01 RX ADMIN — DEXMEDETOMIDINE HYDROCHLORIDE 0.2 MCG/KG/HR: 100 INJECTION, SOLUTION INTRAVENOUS at 12:06

## 2019-01-01 RX ADMIN — LISINOPRIL 20 MG: 20 TABLET ORAL at 08:33

## 2019-01-01 RX ADMIN — HEPARIN SODIUM 5000 UNITS: 5000 INJECTION, SOLUTION INTRAVENOUS; SUBCUTANEOUS at 06:09

## 2019-01-01 RX ADMIN — VALPROIC ACID 250 MG: 250 SOLUTION ORAL at 12:24

## 2019-01-01 RX ADMIN — CARBIDOPA AND LEVODOPA 2 TABLET: 25; 100 TABLET ORAL at 12:29

## 2019-01-01 RX ADMIN — Medication 1 MG: at 19:39

## 2019-01-01 RX ADMIN — Medication 40 MG: at 07:03

## 2019-01-01 RX ADMIN — INSULIN ASPART 1 UNITS: 100 INJECTION, SOLUTION INTRAVENOUS; SUBCUTANEOUS at 21:39

## 2019-01-01 RX ADMIN — DEXAMETHASONE SODIUM PHOSPHATE 4 MG: 4 INJECTION, SOLUTION INTRA-ARTICULAR; INTRALESIONAL; INTRAMUSCULAR; INTRAVENOUS; SOFT TISSUE at 20:55

## 2019-01-01 RX ADMIN — CARBIDOPA AND LEVODOPA 2 TABLET: 25; 100 TABLET ORAL at 20:48

## 2019-01-01 RX ADMIN — MULTIVITAMIN 15 ML: LIQUID ORAL at 11:58

## 2019-01-01 RX ADMIN — CARBIDOPA AND LEVODOPA 2 TABLET: 25; 100 TABLET ORAL at 16:08

## 2019-01-01 RX ADMIN — ACETYLCYSTEINE 4 ML: 200 SOLUTION ORAL; RESPIRATORY (INHALATION) at 19:26

## 2019-01-01 RX ADMIN — CARBIDOPA AND LEVODOPA 2 TABLET: 25; 100 TABLET ORAL at 10:46

## 2019-01-01 RX ADMIN — LACOSAMIDE 200 MG: 10 SOLUTION ORAL at 08:32

## 2019-01-01 RX ADMIN — Medication 40 MG: at 08:06

## 2019-01-01 RX ADMIN — Medication 40 MG: at 08:05

## 2019-01-01 RX ADMIN — DICLOFENAC 1 G: 10 GEL TOPICAL at 17:04

## 2019-01-01 RX ADMIN — VENLAFAXINE 37.5 MG: 37.5 TABLET ORAL at 20:20

## 2019-01-01 RX ADMIN — OXYCODONE HYDROCHLORIDE 10 MG: 5 TABLET ORAL at 07:22

## 2019-01-01 RX ADMIN — DOCUSATE SODIUM 100 MG: 50 LIQUID ORAL at 08:32

## 2019-01-01 RX ADMIN — Medication 40 MG: at 08:07

## 2019-01-01 RX ADMIN — POTASSIUM CHLORIDE 20 MEQ: 1.5 POWDER, FOR SOLUTION ORAL at 05:45

## 2019-01-01 RX ADMIN — ACETAMINOPHEN 500 MG: 160 SUSPENSION ORAL at 18:48

## 2019-01-01 RX ADMIN — LACOSAMIDE 150 MG: 10 SOLUTION ORAL at 08:49

## 2019-01-01 RX ADMIN — DOCUSATE SODIUM 100 MG: 50 LIQUID ORAL at 08:55

## 2019-01-01 RX ADMIN — ACETAMINOPHEN 650 MG: 160 SUSPENSION ORAL at 05:54

## 2019-01-01 RX ADMIN — DOCUSATE SODIUM 100 MG: 50 LIQUID ORAL at 08:04

## 2019-01-01 RX ADMIN — ASPIRIN 81 MG 81 MG: 81 TABLET ORAL at 22:07

## 2019-01-01 RX ADMIN — QUETIAPINE 25 MG: 25 TABLET ORAL at 10:05

## 2019-01-01 RX ADMIN — LIDOCAINE 3 PATCH: 560 PATCH PERCUTANEOUS; TOPICAL; TRANSDERMAL at 20:01

## 2019-01-01 RX ADMIN — FUROSEMIDE 40 MG: 10 INJECTION, SOLUTION INTRAVENOUS at 19:56

## 2019-01-01 RX ADMIN — ACETAMINOPHEN 650 MG: 160 SUSPENSION ORAL at 00:16

## 2019-01-01 RX ADMIN — ACETYLCYSTEINE 4 ML: 200 SOLUTION ORAL; RESPIRATORY (INHALATION) at 07:34

## 2019-01-01 RX ADMIN — RACEPINEPHRINE HYDROCHLORIDE 0.5 ML: 11.25 SOLUTION RESPIRATORY (INHALATION) at 19:23

## 2019-01-01 RX ADMIN — CARBIDOPA AND LEVODOPA 2 TABLET: 25; 100 TABLET ORAL at 20:23

## 2019-01-01 RX ADMIN — DICLOFENAC 1 G: 10 GEL TOPICAL at 21:06

## 2019-01-01 RX ADMIN — QUETIAPINE 25 MG: 25 TABLET ORAL at 22:03

## 2019-01-01 RX ADMIN — LISINOPRIL 20 MG: 20 TABLET ORAL at 09:30

## 2019-01-01 RX ADMIN — VENLAFAXINE 37.5 MG: 37.5 TABLET ORAL at 09:46

## 2019-01-01 RX ADMIN — INSULIN ASPART 1 UNITS: 100 INJECTION, SOLUTION INTRAVENOUS; SUBCUTANEOUS at 03:47

## 2019-01-01 RX ADMIN — OXYCODONE HYDROCHLORIDE 10 MG: 5 TABLET ORAL at 20:00

## 2019-01-01 RX ADMIN — VENLAFAXINE 37.5 MG: 37.5 TABLET ORAL at 20:12

## 2019-01-01 RX ADMIN — LACOSAMIDE 150 MG: 10 SOLUTION ORAL at 21:14

## 2019-01-01 RX ADMIN — PROPOFOL 45 MCG/KG/MIN: 10 INJECTION, EMULSION INTRAVENOUS at 14:26

## 2019-01-01 RX ADMIN — QUETIAPINE 50 MG: 25 TABLET ORAL at 21:14

## 2019-01-01 RX ADMIN — QUETIAPINE 12.5 MG: 25 TABLET, FILM COATED ORAL at 20:49

## 2019-01-01 RX ADMIN — VENLAFAXINE 37.5 MG: 37.5 TABLET ORAL at 20:00

## 2019-01-01 RX ADMIN — INSULIN ASPART 1 UNITS: 100 INJECTION, SOLUTION INTRAVENOUS; SUBCUTANEOUS at 12:31

## 2019-01-01 RX ADMIN — VENLAFAXINE 37.5 MG: 37.5 TABLET ORAL at 19:05

## 2019-01-01 RX ADMIN — QUETIAPINE 50 MG: 25 TABLET ORAL at 22:27

## 2019-01-01 RX ADMIN — VANCOMYCIN HYDROCHLORIDE 2000 MG: 5 INJECTION, POWDER, LYOPHILIZED, FOR SOLUTION INTRAVENOUS at 18:30

## 2019-01-01 RX ADMIN — ACETAMINOPHEN 500 MG: 160 SUSPENSION ORAL at 18:09

## 2019-01-01 RX ADMIN — VENLAFAXINE 37.5 MG: 37.5 TABLET ORAL at 20:25

## 2019-01-01 RX ADMIN — HYDRALAZINE HYDROCHLORIDE 5 MG: 10 TABLET ORAL at 21:07

## 2019-01-01 RX ADMIN — OXYCODONE HYDROCHLORIDE 10 MG: 5 TABLET ORAL at 03:19

## 2019-01-01 RX ADMIN — LIDOCAINE 3 PATCH: 560 PATCH PERCUTANEOUS; TOPICAL; TRANSDERMAL at 08:24

## 2019-01-01 RX ADMIN — CARBIDOPA AND LEVODOPA 2 TABLET: 25; 100 TABLET ORAL at 09:29

## 2019-01-01 RX ADMIN — LORAZEPAM 0.5 MG: 2 INJECTION INTRAMUSCULAR; INTRAVENOUS at 18:58

## 2019-01-01 RX ADMIN — VENLAFAXINE 37.5 MG: 37.5 TABLET ORAL at 14:35

## 2019-01-01 RX ADMIN — OXYCODONE HYDROCHLORIDE 10 MG: 5 TABLET ORAL at 11:59

## 2019-01-01 RX ADMIN — ACETAMINOPHEN 500 MG: 160 SUSPENSION ORAL at 08:14

## 2019-01-01 RX ADMIN — INSULIN ASPART 1 UNITS: 100 INJECTION, SOLUTION INTRAVENOUS; SUBCUTANEOUS at 04:37

## 2019-01-01 RX ADMIN — LIDOCAINE 3 PATCH: 560 PATCH PERCUTANEOUS; TOPICAL; TRANSDERMAL at 20:08

## 2019-01-01 RX ADMIN — PROPOFOL 55 MCG/KG/MIN: 10 INJECTION, EMULSION INTRAVENOUS at 18:30

## 2019-01-01 RX ADMIN — VENLAFAXINE 37.5 MG: 37.5 TABLET ORAL at 14:40

## 2019-01-01 RX ADMIN — INSULIN ASPART 4 UNITS: 100 INJECTION, SOLUTION INTRAVENOUS; SUBCUTANEOUS at 03:59

## 2019-01-01 RX ADMIN — DOCUSATE SODIUM 100 MG: 50 LIQUID ORAL at 22:07

## 2019-01-01 RX ADMIN — LEVOTHYROXINE SODIUM 112 MCG: 112 TABLET ORAL at 05:09

## 2019-01-01 RX ADMIN — INSULIN ASPART 3 UNITS: 100 INJECTION, SOLUTION INTRAVENOUS; SUBCUTANEOUS at 13:27

## 2019-01-01 RX ADMIN — OXYCODONE HYDROCHLORIDE 10 MG: 5 TABLET ORAL at 06:30

## 2019-01-01 RX ADMIN — VALPROIC ACID 250 MG: 250 SOLUTION ORAL at 05:36

## 2019-01-01 RX ADMIN — FENTANYL CITRATE 100 MCG: 50 INJECTION, SOLUTION INTRAMUSCULAR; INTRAVENOUS at 17:39

## 2019-01-01 RX ADMIN — FAMOTIDINE 20 MG: 10 INJECTION, SOLUTION INTRAVENOUS at 20:52

## 2019-01-01 RX ADMIN — DEXAMETHASONE SODIUM PHOSPHATE 4 MG: 4 INJECTION, SOLUTION INTRA-ARTICULAR; INTRALESIONAL; INTRAMUSCULAR; INTRAVENOUS; SOFT TISSUE at 12:16

## 2019-01-01 RX ADMIN — ACETAMINOPHEN 650 MG: 160 SUSPENSION ORAL at 18:00

## 2019-01-01 RX ADMIN — DOCUSATE SODIUM 100 MG: 50 LIQUID ORAL at 20:34

## 2019-01-01 RX ADMIN — PROPOFOL 45 MCG/KG/MIN: 10 INJECTION, EMULSION INTRAVENOUS at 18:49

## 2019-01-01 RX ADMIN — DICLOFENAC 1 G: 10 GEL TOPICAL at 14:10

## 2019-01-01 RX ADMIN — INSULIN ASPART 1 UNITS: 100 INJECTION, SOLUTION INTRAVENOUS; SUBCUTANEOUS at 12:12

## 2019-01-01 RX ADMIN — DICLOFENAC 1 G: 10 GEL TOPICAL at 09:48

## 2019-01-01 RX ADMIN — ACETAMINOPHEN 650 MG: 160 SUSPENSION ORAL at 12:30

## 2019-01-01 RX ADMIN — CARBIDOPA AND LEVODOPA 1 TABLET: 25; 100 TABLET ORAL at 12:59

## 2019-01-01 RX ADMIN — OXYCODONE HYDROCHLORIDE 10 MG: 5 TABLET ORAL at 22:15

## 2019-01-01 RX ADMIN — DICLOFENAC 1 G: 10 GEL TOPICAL at 17:44

## 2019-01-01 RX ADMIN — LEVOTHYROXINE SODIUM 112 MCG: 112 TABLET ORAL at 06:30

## 2019-01-01 RX ADMIN — PROPOFOL 40 MCG/KG/MIN: 10 INJECTION, EMULSION INTRAVENOUS at 17:00

## 2019-01-01 RX ADMIN — INSULIN ASPART 1 UNITS: 100 INJECTION, SOLUTION INTRAVENOUS; SUBCUTANEOUS at 08:53

## 2019-01-01 RX ADMIN — PIPERACILLIN SODIUM,TAZOBACTAM SODIUM 4.5 G: 4; .5 INJECTION, POWDER, FOR SOLUTION INTRAVENOUS at 11:47

## 2019-01-01 RX ADMIN — VENLAFAXINE 37.5 MG: 37.5 TABLET ORAL at 08:17

## 2019-01-01 RX ADMIN — DOCUSATE SODIUM 100 MG: 50 LIQUID ORAL at 21:19

## 2019-01-01 RX ADMIN — ASPIRIN 81 MG 81 MG: 81 TABLET ORAL at 20:42

## 2019-01-01 RX ADMIN — HEPARIN SODIUM 5000 UNITS: 5000 INJECTION, SOLUTION INTRAVENOUS; SUBCUTANEOUS at 06:05

## 2019-01-01 RX ADMIN — DICLOFENAC 1 G: 10 GEL TOPICAL at 21:01

## 2019-01-01 RX ADMIN — AMLODIPINE BESYLATE 5 MG: 5 TABLET ORAL at 08:07

## 2019-01-01 RX ADMIN — ALBUTEROL SULFATE 2.5 MG: 5 SOLUTION RESPIRATORY (INHALATION) at 07:10

## 2019-01-01 RX ADMIN — ACETAMINOPHEN 650 MG: 160 SUSPENSION ORAL at 05:47

## 2019-01-01 RX ADMIN — CARBIDOPA AND LEVODOPA 2 TABLET: 25; 100 TABLET ORAL at 12:16

## 2019-01-01 RX ADMIN — ASPIRIN 81 MG 81 MG: 81 TABLET ORAL at 20:50

## 2019-01-01 RX ADMIN — INSULIN ASPART 2 UNITS: 100 INJECTION, SOLUTION INTRAVENOUS; SUBCUTANEOUS at 20:20

## 2019-01-01 RX ADMIN — VENLAFAXINE 37.5 MG: 37.5 TABLET ORAL at 19:42

## 2019-01-01 RX ADMIN — Medication 3 MG: at 20:26

## 2019-01-01 RX ADMIN — ALBUTEROL SULFATE 2.5 MG: 2.5 SOLUTION RESPIRATORY (INHALATION) at 08:42

## 2019-01-01 RX ADMIN — QUETIAPINE 12.5 MG: 25 TABLET, FILM COATED ORAL at 20:44

## 2019-01-01 RX ADMIN — HEPARIN SODIUM 5000 UNITS: 5000 INJECTION, SOLUTION INTRAVENOUS; SUBCUTANEOUS at 22:47

## 2019-01-01 RX ADMIN — CEFTRIAXONE SODIUM 1 G: 1 INJECTION, POWDER, FOR SOLUTION INTRAMUSCULAR; INTRAVENOUS at 00:41

## 2019-01-01 RX ADMIN — OXYCODONE HYDROCHLORIDE 10 MG: 5 TABLET ORAL at 13:28

## 2019-01-01 RX ADMIN — HEPARIN SODIUM 5000 UNITS: 5000 INJECTION, SOLUTION INTRAVENOUS; SUBCUTANEOUS at 14:57

## 2019-01-01 RX ADMIN — ACETAMINOPHEN 650 MG: 160 SUSPENSION ORAL at 06:19

## 2019-01-01 RX ADMIN — ACETAMINOPHEN 500 MG: 160 SUSPENSION ORAL at 08:13

## 2019-01-01 RX ADMIN — FUROSEMIDE 40 MG: 10 INJECTION, SOLUTION INTRAVENOUS at 23:35

## 2019-01-01 RX ADMIN — LEVOTHYROXINE SODIUM 112 MCG: 112 TABLET ORAL at 05:28

## 2019-01-01 RX ADMIN — DEXAMETHASONE SODIUM PHOSPHATE 4 MG: 4 INJECTION, SOLUTION INTRA-ARTICULAR; INTRALESIONAL; INTRAMUSCULAR; INTRAVENOUS; SOFT TISSUE at 16:19

## 2019-01-01 RX ADMIN — SODIUM CHLORIDE 100 MG: 9 INJECTION, SOLUTION INTRAVENOUS at 21:48

## 2019-01-01 RX ADMIN — INSULIN ASPART 1 UNITS: 100 INJECTION, SOLUTION INTRAVENOUS; SUBCUTANEOUS at 13:07

## 2019-01-01 RX ADMIN — CARBIDOPA AND LEVODOPA 2 TABLET: 25; 100 TABLET ORAL at 20:08

## 2019-01-01 RX ADMIN — GADOBUTROL 9 ML: 604.72 INJECTION INTRAVENOUS at 15:41

## 2019-01-01 RX ADMIN — INSULIN ASPART 6 UNITS: 100 INJECTION, SOLUTION INTRAVENOUS; SUBCUTANEOUS at 03:32

## 2019-01-01 RX ADMIN — VALPROATE SODIUM 350 MG: 100 INJECTION, SOLUTION INTRAVENOUS at 05:34

## 2019-01-01 RX ADMIN — FUROSEMIDE 20 MG: 10 INJECTION, SOLUTION INTRAVENOUS at 20:17

## 2019-01-01 RX ADMIN — HEPARIN SODIUM 5000 UNITS: 5000 INJECTION, SOLUTION INTRAVENOUS; SUBCUTANEOUS at 05:41

## 2019-01-01 RX ADMIN — VALPROATE SODIUM 350 MG: 100 INJECTION, SOLUTION INTRAVENOUS at 12:23

## 2019-01-01 RX ADMIN — VENLAFAXINE 37.5 MG: 37.5 TABLET ORAL at 20:50

## 2019-01-01 RX ADMIN — CARBIDOPA AND LEVODOPA 1 TABLET: 25; 100 TABLET ORAL at 20:58

## 2019-01-01 RX ADMIN — OXYCODONE HYDROCHLORIDE 10 MG: 5 TABLET ORAL at 09:22

## 2019-01-01 RX ADMIN — HEPARIN SODIUM 5000 UNITS: 5000 INJECTION, SOLUTION INTRAVENOUS; SUBCUTANEOUS at 06:25

## 2019-01-01 RX ADMIN — OXYCODONE HYDROCHLORIDE 10 MG: 5 TABLET ORAL at 01:50

## 2019-01-01 RX ADMIN — Medication 0.2 MG: at 10:19

## 2019-01-01 RX ADMIN — LACOSAMIDE 150 MG: 10 SOLUTION ORAL at 09:35

## 2019-01-01 RX ADMIN — ACETAMINOPHEN 650 MG: 160 SUSPENSION ORAL at 01:00

## 2019-01-01 RX ADMIN — DEXTRAN 70, AND HYPROMELLOSE 2910 1 DROP: 1; 3 SOLUTION/ DROPS OPHTHALMIC at 20:17

## 2019-01-01 RX ADMIN — INSULIN ASPART 1 UNITS: 100 INJECTION, SOLUTION INTRAVENOUS; SUBCUTANEOUS at 08:48

## 2019-01-01 RX ADMIN — CARBIDOPA AND LEVODOPA 2 TABLET: 25; 100 TABLET ORAL at 08:06

## 2019-01-01 RX ADMIN — DOCUSATE SODIUM 100 MG: 50 LIQUID ORAL at 08:14

## 2019-01-01 RX ADMIN — OXYCODONE HYDROCHLORIDE 10 MG: 5 TABLET ORAL at 18:30

## 2019-01-01 RX ADMIN — LACOSAMIDE 150 MG: 10 SOLUTION ORAL at 21:55

## 2019-01-01 RX ADMIN — ALBUTEROL SULFATE 2.5 MG: 2.5 SOLUTION RESPIRATORY (INHALATION) at 03:03

## 2019-01-01 RX ADMIN — ACETAMINOPHEN 500 MG: 160 SUSPENSION ORAL at 21:16

## 2019-01-01 RX ADMIN — FLUCONAZOLE 200 MG: 100 TABLET ORAL at 08:06

## 2019-01-01 RX ADMIN — Medication 1 PACKET: at 11:48

## 2019-01-01 RX ADMIN — FLUCONAZOLE 200 MG: 100 TABLET ORAL at 10:24

## 2019-01-01 RX ADMIN — ALBUTEROL SULFATE 2.5 MG: 2.5 SOLUTION RESPIRATORY (INHALATION) at 07:33

## 2019-01-01 RX ADMIN — SODIUM CHLORIDE 100 MG: 9 INJECTION, SOLUTION INTRAVENOUS at 22:19

## 2019-01-01 RX ADMIN — VALPROATE SODIUM 350 MG: 100 INJECTION, SOLUTION INTRAVENOUS at 06:57

## 2019-01-01 RX ADMIN — SODIUM CHLORIDE 100 MG: 9 INJECTION, SOLUTION INTRAVENOUS at 11:39

## 2019-01-01 RX ADMIN — INSULIN ASPART 2 UNITS: 100 INJECTION, SOLUTION INTRAVENOUS; SUBCUTANEOUS at 04:15

## 2019-01-01 RX ADMIN — CARBIDOPA AND LEVODOPA 2 TABLET: 25; 100 TABLET ORAL at 21:14

## 2019-01-01 RX ADMIN — CARBIDOPA AND LEVODOPA 2 TABLET: 25; 100 TABLET ORAL at 10:05

## 2019-01-01 RX ADMIN — SODIUM CHLORIDE 200 MG: 9 INJECTION, SOLUTION INTRAVENOUS at 14:03

## 2019-01-01 RX ADMIN — CARBIDOPA AND LEVODOPA 2 TABLET: 25; 100 TABLET ORAL at 14:35

## 2019-01-01 ASSESSMENT — ACTIVITIES OF DAILY LIVING (ADL)
ADLS_ACUITY_SCORE: 28
ADLS_ACUITY_SCORE: 31
ADLS_ACUITY_SCORE: 31
ADLS_ACUITY_SCORE: 33
ADLS_ACUITY_SCORE: 25
ADLS_ACUITY_SCORE: 29
ADLS_ACUITY_SCORE: 30
ADLS_ACUITY_SCORE: 28
ADLS_ACUITY_SCORE: 29
ADLS_ACUITY_SCORE: 31
ADLS_ACUITY_SCORE: 29
ADLS_ACUITY_SCORE: 28
ADLS_ACUITY_SCORE: 22
ADLS_ACUITY_SCORE: 22
ADLS_ACUITY_SCORE: 31
ADLS_ACUITY_SCORE: 28
ADLS_ACUITY_SCORE: 30
ADLS_ACUITY_SCORE: 31
ADLS_ACUITY_SCORE: 28
ADLS_ACUITY_SCORE: 29
ADLS_ACUITY_SCORE: 31
ADLS_ACUITY_SCORE: 30
ADLS_ACUITY_SCORE: 28
ADLS_ACUITY_SCORE: 33
ADLS_ACUITY_SCORE: 31
ADLS_ACUITY_SCORE: 28
ADLS_ACUITY_SCORE: 35
ADLS_ACUITY_SCORE: 33
ADLS_ACUITY_SCORE: 35
ADLS_ACUITY_SCORE: 32
ADLS_ACUITY_SCORE: 29
ADLS_ACUITY_SCORE: 31
ADLS_ACUITY_SCORE: 25
ADLS_ACUITY_SCORE: 33
ADLS_ACUITY_SCORE: 28
ADLS_ACUITY_SCORE: 28
ADLS_ACUITY_SCORE: 31
ADLS_ACUITY_SCORE: 33
ADLS_ACUITY_SCORE: 31
ADLS_ACUITY_SCORE: 26
ADLS_ACUITY_SCORE: 34
ADLS_ACUITY_SCORE: 31
ADLS_ACUITY_SCORE: 27
ADLS_ACUITY_SCORE: 34
ADLS_ACUITY_SCORE: 33
ADLS_ACUITY_SCORE: 31
ADLS_ACUITY_SCORE: 33
ADLS_ACUITY_SCORE: 30
ADLS_ACUITY_SCORE: 26
ADLS_ACUITY_SCORE: 25
ADLS_ACUITY_SCORE: 31
ADLS_ACUITY_SCORE: 31
ADLS_ACUITY_SCORE: 26
ADLS_ACUITY_SCORE: 33
ADLS_ACUITY_SCORE: 26
ADLS_ACUITY_SCORE: 35
ADLS_ACUITY_SCORE: 29
ADLS_ACUITY_SCORE: 26
ADLS_ACUITY_SCORE: 26
ADLS_ACUITY_SCORE: 28
ADLS_ACUITY_SCORE: 22
ADLS_ACUITY_SCORE: 30
ADLS_ACUITY_SCORE: 31
ADLS_ACUITY_SCORE: 33
ADLS_ACUITY_SCORE: 33
ADLS_ACUITY_SCORE: 28
ADLS_ACUITY_SCORE: 31
ADLS_ACUITY_SCORE: 28
ADLS_ACUITY_SCORE: 26
ADLS_ACUITY_SCORE: 31
ADLS_ACUITY_SCORE: 26
ADLS_ACUITY_SCORE: 31
ADLS_ACUITY_SCORE: 33
ADLS_ACUITY_SCORE: 26
ADLS_ACUITY_SCORE: 29
ADLS_ACUITY_SCORE: 28
ADLS_ACUITY_SCORE: 35
ADLS_ACUITY_SCORE: 26
ADLS_ACUITY_SCORE: 26
ADLS_ACUITY_SCORE: 35
ADLS_ACUITY_SCORE: 33
ADLS_ACUITY_SCORE: 29
ADLS_ACUITY_SCORE: 33
ADLS_ACUITY_SCORE: 33
ADLS_ACUITY_SCORE: 26
ADLS_ACUITY_SCORE: 35
ADLS_ACUITY_SCORE: 33
ADLS_ACUITY_SCORE: 26
ADLS_ACUITY_SCORE: 31
ADLS_ACUITY_SCORE: 31
ADLS_ACUITY_SCORE: 29
ADLS_ACUITY_SCORE: 28
ADLS_ACUITY_SCORE: 27
ADLS_ACUITY_SCORE: 31
ADLS_ACUITY_SCORE: 33
ADLS_ACUITY_SCORE: 31
ADLS_ACUITY_SCORE: 27
ADLS_ACUITY_SCORE: 31
ADLS_ACUITY_SCORE: 26
ADLS_ACUITY_SCORE: 29
ADLS_ACUITY_SCORE: 28
ADLS_ACUITY_SCORE: 22
ADLS_ACUITY_SCORE: 30
ADLS_ACUITY_SCORE: 34
ADLS_ACUITY_SCORE: 22
ADLS_ACUITY_SCORE: 28
ADLS_ACUITY_SCORE: 22
ADLS_ACUITY_SCORE: 29
ADLS_ACUITY_SCORE: 33
ADLS_ACUITY_SCORE: 29
ADLS_ACUITY_SCORE: 22
ADLS_ACUITY_SCORE: 31
ADLS_ACUITY_SCORE: 33
ADLS_ACUITY_SCORE: 29
ADLS_ACUITY_SCORE: 28
ADLS_ACUITY_SCORE: 27
ADLS_ACUITY_SCORE: 28
ADLS_ACUITY_SCORE: 22
ADLS_ACUITY_SCORE: 29
ADLS_ACUITY_SCORE: 30
ADLS_ACUITY_SCORE: 33
ADLS_ACUITY_SCORE: 31
ADLS_ACUITY_SCORE: 29
ADLS_ACUITY_SCORE: 28
ADLS_ACUITY_SCORE: 29
ADLS_ACUITY_SCORE: 30
ADLS_ACUITY_SCORE: 29
ADLS_ACUITY_SCORE: 26
ADLS_ACUITY_SCORE: 31
ADLS_ACUITY_SCORE: 26
ADLS_ACUITY_SCORE: 28
ADLS_ACUITY_SCORE: 35
ADLS_ACUITY_SCORE: 29
ADLS_ACUITY_SCORE: 26
ADLS_ACUITY_SCORE: 29
ADLS_ACUITY_SCORE: 30
ADLS_ACUITY_SCORE: 31
ADLS_ACUITY_SCORE: 26
ADLS_ACUITY_SCORE: 26
ADLS_ACUITY_SCORE: 28
ADLS_ACUITY_SCORE: 35
ADLS_ACUITY_SCORE: 35
ADLS_ACUITY_SCORE: 28
ADLS_ACUITY_SCORE: 33
ADLS_ACUITY_SCORE: 35
ADLS_ACUITY_SCORE: 29
ADLS_ACUITY_SCORE: 28
ADLS_ACUITY_SCORE: 29
ADLS_ACUITY_SCORE: 22

## 2019-01-01 ASSESSMENT — MIFFLIN-ST. JEOR
SCORE: 1361.79
SCORE: 1376.3
SCORE: 1369.13
SCORE: 1362.7
SCORE: 1396.13
SCORE: 1377.13
SCORE: 1380.84
SCORE: 1371.77
SCORE: 1389.46
SCORE: 1373.13
SCORE: 1357.25
SCORE: 1361.13
SCORE: 1397.13
SCORE: 1394.13
SCORE: 1364.96
SCORE: 1348.18
SCORE: 1336.83
SCORE: 1369.13
SCORE: 1369.13
SCORE: 1393.13
SCORE: 1362.13
SCORE: 1393.13
SCORE: 1335.93

## 2019-01-01 ASSESSMENT — EXTERNAL EXAM - RIGHT EYE: OD_EXAM: NORMAL

## 2019-01-01 ASSESSMENT — ENCOUNTER SYMPTOMS
VOMITING: 0
ABDOMINAL PAIN: 0
NECK PAIN: 0
NUMBNESS: 0
DIARRHEA: 0
ARTHRALGIAS: 1
NAUSEA: 0
MYALGIAS: 0
HEADACHES: 0

## 2019-01-01 ASSESSMENT — TONOMETRY
IOP_METHOD: ICARE
OS_IOP_MMHG: 12
OD_IOP_MMHG: 18

## 2019-01-01 ASSESSMENT — VISUAL ACUITY
OS_CC: 20/40
OD_CC: 20/40
METHOD: SNELLEN - LINEAR
OS_CC+: +2
CORRECTION_TYPE: GLASSES

## 2019-01-01 ASSESSMENT — REFRACTION_FINALRX
OD_HPRISM: 6 BI
OD_VPRISM: 2BD
OS_HPRISM: 6 BI
OS_VPRISM: 2BU

## 2019-01-01 ASSESSMENT — REFRACTION_WEARINGRX
OD_SPHERE: +0.50
OD_ADD: +2.50
OS_SPHERE: +0.50
OS_CYLINDER: +1.00
OS_HPRISM: 5 BI
OD_AXIS: 179
OD_HPRISM: 5 BI
OS_AXIS: 165
SPECS_TYPE: PAL
OS_ADD: +2.50
OD_CYLINDER: +1.00

## 2019-01-01 ASSESSMENT — PAIN SCALES - GENERAL: PAINLEVEL: NO PAIN (0)

## 2019-01-01 ASSESSMENT — CUP TO DISC RATIO
OS_RATIO: 0.3
OD_RATIO: 0.3

## 2019-01-01 ASSESSMENT — EXTERNAL EXAM - LEFT EYE: OS_EXAM: NORMAL

## 2019-01-15 NOTE — LETTER
1/15/2019         RE:  :  MRN: Loan Anderson  1940  [id number]     Dear Dr. Pedro Pitts,    Thank you for asking me to see your very pleasant patient, Loan Anderson, in neuro-ophthalmic consultation.  I would like to thank you for sending your records and I have summarized them in the history of present illness. She presented with her daughter who provided additional history.  My assessment and plan are below.  For further details, please see my attached clinic note.           Assessment & Plan     Loan Anderson is a 78 year old female with the following diagnoses:   1. Hypertropia of right eye    2. Subjective visual disturbance    3. Convergence insufficiency or palsy in binocular eye movement       Diagnosed with Parkinsons in . Her symptoms, however, started many years prior to that. Started on carbidopa/levodopa in .     In terms of her eyes, she had noticed double vision at near and distance for decades (ever since she was in college). She has been wearing prisms glasses. She sees a local ophthalmologist and feels that they have been increasing her glasses prisms. She recalls doing pencil exercises as a child.     She can no longer read, she was a . She gets maybe double vision 50% of the time reading and 50% in distance    On examination, her visual acuity is 20/40. Color plates 8/11 right eye and 7/11 left eye. With her current glasses (10PD base in), she has additional 2 PRISM DIOPTERS exotropia and 4 PRISM DIOPTERS of right hypertropia in primary gaze. Her intermittent exotropia increases to 6 PRISM DIOPTERS at near. She as an intermittent 2 right hypertropia at near. She has abnormal near point of convergence. Anterior segment examination notable for PCIOLs. Funduscopic examination notable for some small focal areas of choroidal atrophy.     In summary, loan has convergence insufficiency. This is likely longstanding. It sounds like she has a new  vertical component in the past 2 years. She doesn't have evidence of myasthenia gravis or CAL. This could be sagging eye syndrome. She doesn't have ptosis or eyelid retraction. There is no proptosis.  She really wants to have 1 pair of glasses for distance and near work. We will give her updated glasses prescription with 2 additional PRISM DIOPTERS of horizontal prism and 4 of vertical. She seems to like this for both distance and near.    We discussed getting TSI and Ach binding antibody but patient does not want to get labs at this time and I am not compelled to make her at this time. She wants to go ahead and try the glasses.     RTC as needed.         Again, thank you for allowing me to participate in the care of your patient.      Sincerely,    Angelo Carter MD  Professor  Ophthalmology Residency   Director of Neuro-Ophthalmology  [employer] - [employer] Endowed Chair  Departments of Ophthalmology, Neurology, and Neurosurgery  [facility] [address] [place]  [address]  T - [phone]  F - [phone]  TAYLOR [email or web address]      CC: Genesis Goode MD  [address] [facility] [address]  [facility] [address]  VIA In Basket     Pedro Pitts MD  [address] [facility] [id number]  [facility] [address]  VIA In Basket     Glory Treadwell, PhD [facility] [facility] [address]  VIA In Basket       DX = convergence insufficiency, sagging eye syndrome

## 2019-01-15 NOTE — PROGRESS NOTES
Assessment & Plan     Loan Anderson is a 78 year old female with the following diagnoses:   1. Hypertropia of right eye    2. Subjective visual disturbance    3. Convergence insufficiency or palsy in binocular eye movement       Diagnosed with Parkinsons in 2002. Her symptoms, however, started many years prior to that. Started on carbidopa/levodopa in 2002.     In terms of her eyes, she had noticed double vision at near and distance for decades (ever since she was in college). She has been wearing prisms glasses. She sees a local ophthalmologist and feels that they have been increasing her glasses prisms. She recalls doing pencil exercises as a child.     She can no longer read, she was a . She gets maybe double vision 50% of the time reading and 50% in distance    On examination, her visual acuity is 20/40. Color plates 8/11 right eye and 7/11 left eye. With her current glasses (10PD base in), she has additional 2 PRISM DIOPTERS exotropia and 4 PRISM DIOPTERS of right hypertropia in primary gaze. Her intermittent exotropia increases to 6 PRISM DIOPTERS at near. She as an intermittent 2 right hypertropia at near. She has abnormal near point of convergence. Anterior segment examination notable for PCIOLs. Funduscopic examination notable for some small focal areas of choroidal atrophy.     In summary, loan has convergence insufficiency. This is likely longstanding. It sounds like she has a new vertical component in the past 2 years. She doesn't have evidence of myasthenia gravis or CAL. This could be sagging eye syndrome. She doesn't have ptosis or eyelid retraction. There is no proptosis.  She really wants to have 1 pair of glasses for distance and near work. We will give her updated glasses prescription with 2 additional PRISM DIOPTERS of horizontal prism and 4 of vertical. She seems to like this for both distance and near.    We discussed getting TSI and Ach binding antibody but patient  does not want to get labs at this time and I am not compelled to make her at this time. She wants to go ahead and try the glasses.     RTC as needed.          Attending Physician Attestation:  Complete documentation of historical and exam elements from today's encounter can be found in the full encounter summary report (not reduplicated in this progress note).  I personally obtained the chief complaint(s) and history of present illness.  I confirmed and edited as necessary the review of systems, past medical/surgical history, family history, social history, and examination findings as documented by others; and I examined the patient myself.  I personally reviewed the relevant tests, images, and reports as documented above.  I formulated and edited as necessary the assessment and plan and discussed the findings and management plan with the patient and family. - Angelo Johnson MD  Neuro-ophthalmology Fellow

## 2019-02-04 NOTE — PROGRESS NOTES
Diagnosis/Summary/Recommendations:    PATIENT: Loan Anderson  78 year old female     : 1940    PATRICE: 2019  Parkinson  Eye evaluation      REVIEW MEDICATIONS    Shoulder issues  Mri of her shoulder?    Dr. Mayte Meng MD  [facility] orthopedics    [phone].       Medications     6am 12noon 5pm 12midnight      Acetaminophen tylenol 500mg   stopped              Aspirin 81mg           1      Atorvastatin lipitor 10mg or 20mg  stopped               Calcium vitamin d dose ?       stopped         Carbidopa/levodopa sinemet CR 50/200 1 1 1 1      Carbidopa/levodopa sinemet 10/100 stopped       CArbidopa/levodopa Sinemet 25/100 1.5 1.5 1.5 1.5      Compounded non controlled   stopped               Docusate colace 100mg     3            Fiber adult gummies 5 grams stopped               Ibuprofen 200mg 3 3 3      Levothyroxine synthroid 112mcg 1               Lisinopril 5mg 1           Methylcellulose citrucel 2 gram  stopped           Mirabegron myrbetriq 25mg 24 hr tablet  1           [facility] health       1     Omeprazole 20mg 1           Polyethylene glycol miralax   1 scoop                Rotigotine neupro 4mg patch 24 hours         1       Tamsulosin flomax 0.4mg        1         Tramadol ultram 50mg  as needed           Trospium sanctura XR 60mg 24 hr capsule 1                Venlafaxine effexor xr 100mg Mistake on dose                           Vitamin b complex with vitamin c        1               History obtained from patient    PLAN    No medication change at this time.    I have no specific recommendations for the type of anesthesia in regards to a mri scan of her shoulder.   Obviously a conscious sedation or rapidly reversible agent would be best but recovery is always a challenge with sedatives in Parkinson's disease.     In regards to surgery, if she does get a rotator cuff surgery or another procedure, it may be wise to have a slight reduction in her sinemet regimen post  shoulder surgery - with input from Tamika Diane, Pharmacist. -- so that the dyskinesias would be less and help in terms of the shoulder with less movements.     Need clarification of her dose of venlafaxine - as there is no 100mg extended release formulation    She is wondering about another colonoscopy - risk benefit.     Return back in 3 months.    Coding statement:   Duration of  Services: patient care and care coordination was 25 minutes  Greater than 50% of this visit was spent in counseling and coordination of care.     Pedro Pitts MD     ______________________________________    Last visit date and details:     PATRICE: October 22, 2018     Surjit was no longer able to care for her and she is in the [facility]  Daughter is in [place]  Surjit may be planning on moving from [place] where they have a house as well as in [place]     Atorvastatin - stopped as had joint pain and confusion  It was stopped and there was no change in these behaviors     Her blood pressure increased after stopping the atorvastatin  She had been put on lisinopril 5mg that has helped the blood pressure  She has not gone back on the statin  She is on ibuprofen 800mg 4/day     Not clear if she is on calcium     Not on memory medications  Has never filled them        She is living in the [facility].   They are friendly   Many activities  Exercise classes.   Surjit is in [place].   He will go down to [place] and will sell the house down in [place]     She has had some slow falls.   She has been encouraged to press pendant every time she has to go to the bathroom.  Discussion about donepezil with memory/cognition  She had problems with urinary urgency and had problems with initiation  She had been put on flomax/tamsulosin to help with bladder relaxation           Medications     6am 12noon 6pm 12midnight      Acetaminophen tylenol 500mg   not taking              Aspirin 81mg           1      Atorvastatin lipitor 10mg  stopped               Calcium vitamin  d        1         Carbidopa/levodopa sinemet CR 50/200 1 1 1 1      CArbidopa/levodopa Sinemet 25/100 1.5 1.5 1.5 1.5      Compounded non controlled                  Docusate colace 100mg     1            Fiber adult gummies 5 grams 8 gummies as needed               Ibuprofen 800mg 1 1 1 1     Levothyroxine synthroid 100mcg 1               Lisinopril 5mg 1           [facility] health       1     Omeprazole 20mg ?           Polyethylene glycol miralax   1 scoop as needed               Rotigotine neupro 4mg patch 24 hours         1       Tamsulosin flomax 0.4mg        1         Trospium sanctura XR 60mg 24 hr capsule 1                Venlafaxine effexor xr 75mg 1               Vitamin b complex with vitamin c        1            History obtained from patient      PLAN  Consider going off the anticholinergic bladder medication trospium sanctura to see if her cognition improves after 1-4 weeks  If has more bladder issues may consider mirabegron/myrbetriq 25mg or 50mg dose per day in 4 weeks     Discussed the above approach first prior to trying a acetylcholinesterase inhibitor such as donepezil (aricept) or rivastigmine (exelon)  Which would be something to consider in 2-3 months from now.     Will need to get buy in from her primary  Care provider Dr Castro to see if he agrees with this approach.      Other medications will remain s table for now.      Long goals is to improve word finding and executive dysfunction in terms of cognitive impairment     Return visit.                 ______________________________________      Patient was asked about 14 Review of systems including changes in vision (dry eyes, double vision), hearing, heart, lungs, musculoskeletal, depression, anxiety, snoring, RBD, insomnia, urinary frequency, urinary urgency, constipation, swallowing problems, hematological, ID, allergies, skin problems: seborrhea, endocrinological: thyroid, diabetes, cholesterol; balance, weight changes, and other  neurological problems and these were not significant at this time except for   Patient Active Problem List   Diagnosis     Parkinson disease (H)     Constipation     Hypothyroid     Neurogenic bladder     Convergence insufficiency     Wears glasses     Diplopia     Hot flashes, menopausal     REM sleep behavior disorder     Sebaceous cyst     Hypercholesterolemia     Dysthymic disorder     Hyperglyceridemia     Hypothyroidism     Urinary incontinence     Chronic nonalcoholic liver disease     Obstructive sleep apnea     Disorder of bone and cartilage     Benign neoplasm     Incontinence in female     Nonalcoholic steatohepatitis (COHEN)     Obstructive sleep apnea syndrome     Osteopenia     Parkinson's disease (H)     Systemic infection (H)     Sepsis (H)     E coli bacteremia     Acute kidney failure (H)     Hyponatremia     Hypokalemia     Acute pyelonephritis     Acute cystitis without hematuria     Kidney stone     Sepsis due to other etiology (H)     Acute metabolic encephalopathy     Secondary hypertension     CKD (chronic kidney disease) stage 3, GFR 30-59 ml/min (H)     Dysphagia, unspecified type     Primary osteoarthritis of both knees     Recurrent major depressive disorder, in full remission (H)     Obesity (BMI 35.0-39.9) with comorbidity (H)          Allergies   Allergen Reactions     Mirapex [Pramipexole Dihydrochloride Monohydrate]      Leg swelling     Potassium Chloride      IV infusion only. She tolerates oral potassium chloride     Requip [Ropinirole Hydrochloride]      Leg swelling     Epinephrine Palpitations     Past Surgical History:   Procedure Laterality Date     ------------OTHER-------------  november ? 2015    lithrotripsy for renal stone     ------------OTHER-------------  november 2015    had sepsis from uti and hospitalized     BRAIN SURGERY  12 or 14 oct 2011    gene therapy enrolled study at [facility]     Past Medical History:   Diagnosis Date     Constipation 7/15/2011     Convergence  insufficiency 7/15/2011     Depression 7/15/2011     Diplopia 7/18/2011     Hot flashes, menopausal 7/18/2011     Hypercholesterolemia 7/18/2011     Hypothyroid 7/15/2011     Neurogenic bladder 7/15/2011     Parkinson disease (H) 7/15/2011     REM sleep behavior disorder 7/18/2011     Sebaceous cyst 7/18/2011     Wears glasses 7/18/2011     Social History     Socioeconomic History     Marital status:      Spouse name: Not on file     Number of children: Not on file     Years of education: Not on file     Highest education level: Not on file   Social Needs     Financial resource strain: Not on file     Food insecurity - worry: Not on file     Food insecurity - inability: Not on file     Transportation needs - medical: Not on file     Transportation needs - non-medical: Not on file   Occupational History     Not on file   Tobacco Use     Smoking status: Never Smoker     Smokeless tobacco: Never Used   Substance and Sexual Activity     Alcohol use: Yes     Comment: rare     Drug use: Not on file     Sexual activity: Not on file   Other Topics Concern     Parent/sibling w/ CABG, MI or angioplasty before 65F 55M? Not Asked   Social History Narrative    Spouse is serenity       Drug and lactation database from the [place] [employer] Library of Medicine:  http://toxnet.nlm.nih.gov/cgi-bin/sis/htmlgen?LACT      B/P: Data Unavailable, T: Data Unavailable, P: Data Unavailable, R: Data Unavailable 0 lbs 0 oz  There were no vitals taken for this visit., There is no height or weight on file to calculate BMI.  Medications and Vitals not listed above were documented in the cart and reviewed by me.     Current Outpatient Medications   Medication Sig Dispense Refill     acetaminophen (TYLENOL) 500 MG tablet Rarely taking       aspirin 81 MG tablet 81mg tab by mouth @ midnight 30 tablet      atorvastatin (LIPITOR) 20 MG tablet Take 20 mg by mouth       calcium Citrate-vitamin D 500-400 MG-UNIT CHEW        Calcium-Vitamin D  600-200 MG-UNIT TABS 1 tab by mouth @ 6pm 30 tablet      carbidopa-levodopa (SINEMET CR)  MG per CR tablet Take 1 tablet by mouth 4 times daily 6am,noon,6pm midnight 360 tablet 3     carbidopa-levodopa (SINEMET)  MG tablet Unclear if using the blue 10/100 carbidopa/levodopa       carbidopa-levodopa (SINEMET)  MG per tablet 1.5 tabs by mouth daily @ 6am,noon,6pm midnight and 1 tab by mouth daily as needed 630 tablet 3     COMPOUNDED NON-CONTROLLED SUBSTANCE (CMPD RX) - PHARMACY TO MIX COMPOUNDED MEDICATION Estriol 1 mg/Gr   Apply small amount on the index finger apply to vaginal area twice weekly 30 g 6     docusate sodium (COLACE) 100 MG capsule 100mg capsule by mouth daily @ noon 60 capsule      FIBER ADULT GUMMIES 2 g CHEW 8 gummies by mouth daily @ 8am (as needed: 5 grams of fiber per gummy)       ibuprofen (ADVIL/MOTRIN) 200 MG tablet Take 200-400 mg by mouth       ibuprofen (ADVIL/MOTRIN) 800 MG tablet 800mg tab by mouth 4/day @ 6am, 12noon, 6pm and 12midnight 30 tablet 1     levothyroxine (SYNTHROID/LEVOTHROID) 100 MCG tablet 100mcg tab by mouth @ 6am 50 tablet 3     levothyroxine (SYNTHROID/LEVOTHROID) 112 MCG tablet Take 112 mcg by mouth       lisinopril (PRINIVIL/ZESTRIL) 5 MG tablet 5mg tab by mouth daily @ 6am 30 tablet 1     methylcellulose (CITRUCEL) powder Take 2 g by mouth       mirabegron (MYRBETRIQ) 25 MG 24 hr tablet Take 25 mg by mouth every 24 hours       Multiple Vitamins-Minerals (EYE VITAMINS) CAPS 1 capsule ([facility]) by mouth daily @ midnight 30 capsule      NONFORMULARY Indications: thyroid medications, antidepressants medications, incontience medication, and HTN medication       omeprazole (PRILOSEC) 20 MG CR capsule Take 1 capsule (20 mg) by mouth daily 30 capsule 1     polyethylene glycol (MIRALAX) powder 1 scoop in liquid by mouth daily @ 6am (as needed) 119 g      rotigotine (NEUPRO) 4 MG/24HR 24 hr patch Apply patch daily @ midnight 90 patch 3     tamsulosin  (FLOMAX) 0.4 MG capsule 0.4mg capsule by mouth daily @ 530pm 30 capsule 11     traMADol (ULTRAM) 50 MG tablet Take 50 mg by mouth       trospium (SANCTURA XR) 60 MG CP24 24 hr capsule 60mg capsule by mouth daily @ 6am 90 each 3     venlafaxine (EFFEXOR) 25 MG tablet Take 25 mg by mouth 3 times daily       venlafaxine (EFFEXOR-XR) 75 MG 24 hr capsule 75mg tab by mouth daily @ 6am 90 capsule 11     vitamin B complex with vitamin C (VITAMIN  B COMPLEX) TABS tablet Vitamin b complex by mouth @ 6pm           Pedro Pitts MD

## 2019-02-11 NOTE — Clinical Note
2/11/2019         RE: Loan Anderson  [address]  [facility] [address]        Dear Colleague,    Thank you for referring your patient, Loan Anderson, to the [facility]. Please see a copy of my visit note below.    No notes on file    Again, thank you for allowing me to participate in the care of your patient.        Sincerely,        Pedro Pitts MD

## 2019-02-11 NOTE — LETTER
2019      RE: Loan Anderson  [address]  [facility], [place] [address]       Diagnosis/Summary/Recommendations:    PATIENT: Loan Anderson  78 year old female     : 1940    PATRICE: 2019  Parkinson  Eye evaluation      REVIEW MEDICATIONS    Shoulder issues  Mri of her shoulder?    Dr. Mayte Meng MD  [facility] orthopedics    [phone].       Medications     6am 12noon 5pm 12midnight      Acetaminophen tylenol 500mg   stopped              Aspirin 81mg           1      Atorvastatin lipitor 10mg or 20mg  stopped               Calcium vitamin d dose ?       stopped         Carbidopa/levodopa sinemet CR 50/200 1 1 1 1      Carbidopa/levodopa sinemet 10/100 stopped       CArbidopa/levodopa Sinemet 25/100 1.5 1.5 1.5 1.5      Compounded non controlled   stopped               Docusate colace 100mg     3            Fiber adult gummies 5 grams stopped               Ibuprofen 200mg 3 3 3      Levothyroxine synthroid 112mcg 1               Lisinopril 5mg 1           Methylcellulose citrucel 2 gram  stopped           Mirabegron myrbetriq 25mg 24 hr tablet  1           [facility] [employer] [facility]       1     Omeprazole 20mg 1           Polyethylene glycol miralax   1 scoop                Rotigotine neupro 4mg patch 24 hours         1       Tamsulosin flomax 0.4mg        1         Tramadol ultram 50mg  as needed           Trospium sanctura XR 60mg 24 hr capsule 1                Venlafaxine effexor xr 100mg Mistake on dose                           Vitamin b complex with vitamin c        1               History obtained from patient    PLAN    No medication change at this time.    I have no specific recommendations for the type of anesthesia in regards to a mri scan of her shoulder.   Obviously a conscious sedation or rapidly reversible agent would be best but recovery is always a challenge with sedatives in Parkinson's disease.     In regards to surgery, if she does get a rotator cuff surgery or  another procedure, it may be wise to have a slight reduction in her sinemet regimen post shoulder surgery - with input from Tamika Diane, Pharmacist. -- so that the dyskinesias would be less and help in terms of the shoulder with less movements.     Need clarification of her dose of venlafaxine - as there is no 100mg extended release formulation    She is wondering about another colonoscopy - risk benefit.     Return back in 3 months.    Coding statement:   Duration of  Services: patient care and care coordination was 25 minutes  Greater than 50% of this visit was spent in counseling and coordination of care.     Pedro Pitts MD     ______________________________________    Last visit date and details:     PATRICE: October 22, 2018     Surjit was no longer able to care for her and she is in the [facility]  Daughter is in [place]  Surjit may be planning on moving from [place] where they have a house as well as in [place]     Atorvastatin - stopped as had joint pain and confusion  It was stopped and there was no change in these behaviors     Her blood pressure increased after stopping the atorvastatin  She had been put on lisinopril 5mg that has helped the blood pressure  She has not gone back on the statin  She is on ibuprofen 800mg 4/day     Not clear if she is on calcium     Not on memory medications  Has never filled them        She is living in the [facility].   They are friendly   Many activities  Exercise classes.   Surjit is in [place].   He will go down to [place] and will sell the house down in [place]     She has had some slow falls.   She has been encouraged to press pendant every time she has to go to the bathroom.  Discussion about donepezil with memory/cognition  She had problems with urinary urgency and had problems with initiation  She had been put on flomax/tamsulosin to help with bladder relaxation           Medications     6am 12noon 6pm 12midnight      Acetaminophen tylenol 500mg   not taking               Aspirin 81mg           1      Atorvastatin lipitor 10mg  stopped               Calcium vitamin d        1         Carbidopa/levodopa sinemet CR 50/200 1 1 1 1      CArbidopa/levodopa Sinemet 25/100 1.5 1.5 1.5 1.5      Compounded non controlled                  Docusate colace 100mg     1            Fiber adult gummies 5 grams 8 gummies as needed               Ibuprofen 800mg 1 1 1 1     Levothyroxine synthroid 100mcg 1               Lisinopril 5mg 1           [facility]       1     Omeprazole 20mg ?           Polyethylene glycol miralax   1 scoop as needed               Rotigotine neupro 4mg patch 24 hours         1       Tamsulosin flomax 0.4mg        1         Trospium sanctura XR 60mg 24 hr capsule 1                Venlafaxine effexor xr 75mg 1               Vitamin b complex with vitamin c        1            History obtained from patient      PLAN  Consider going off the anticholinergic bladder medication trospium sanctura to see if her cognition improves after 1-4 weeks  If has more bladder issues may consider mirabegron/myrbetriq 25mg or 50mg dose per day in 4 weeks     Discussed the above approach first prior to trying a acetylcholinesterase inhibitor such as donepezil (aricept) or rivastigmine (exelon)  Which would be something to consider in 2-3 months from now.     Will need to get buy in from her primary  Care provider Dr Castro to see if he agrees with this approach.      Other medications will remain s table for now.      Long goals is to improve word finding and executive dysfunction in terms of cognitive impairment     Return visit.                 ______________________________________      Patient was asked about 14 Review of systems including changes in vision (dry eyes, double vision), hearing, heart, lungs, musculoskeletal, depression, anxiety, snoring, RBD, insomnia, urinary frequency, urinary urgency, constipation, swallowing problems, hematological, ID, allergies, skin problems:  seborrhea, endocrinological: thyroid, diabetes, cholesterol; balance, weight changes, and other neurological problems and these were not significant at this time except for   Patient Active Problem List   Diagnosis     Parkinson disease (H)     Constipation     Hypothyroid     Neurogenic bladder     Convergence insufficiency     Wears glasses     Diplopia     Hot flashes, menopausal     REM sleep behavior disorder     Sebaceous cyst     Hypercholesterolemia     Dysthymic disorder     Hyperglyceridemia     Hypothyroidism     Urinary incontinence     Chronic nonalcoholic liver disease     Obstructive sleep apnea     Disorder of bone and cartilage     Benign neoplasm     Incontinence in female     Nonalcoholic steatohepatitis (COHEN)     Obstructive sleep apnea syndrome     Osteopenia     Parkinson's disease (H)     Systemic infection (H)     Sepsis (H)     E coli bacteremia     Acute kidney failure (H)     Hyponatremia     Hypokalemia     Acute pyelonephritis     Acute cystitis without hematuria     Kidney stone     Sepsis due to other etiology (H)     Acute metabolic encephalopathy     Secondary hypertension     CKD (chronic kidney disease) stage 3, GFR 30-59 ml/min (H)     Dysphagia, unspecified type     Primary osteoarthritis of both knees     Recurrent major depressive disorder, in full remission (H)     Obesity (BMI 35.0-39.9) with comorbidity (H)          Allergies   Allergen Reactions     Mirapex [Pramipexole Dihydrochloride Monohydrate]      Leg swelling     Potassium Chloride      IV infusion only. She tolerates oral potassium chloride     Requip [Ropinirole Hydrochloride]      Leg swelling     Epinephrine Palpitations     Past Surgical History:   Procedure Laterality Date     ------------OTHER-------------  november ? 2015    lithrotripsy for renal stone     ------------OTHER-------------  november 2015    had sepsis from uti and hospitalized     BRAIN SURGERY  12 or 14 oct 2011    gene therapy enrolled study  at [place]     Past Medical History:   Diagnosis Date     Constipation 7/15/2011     Convergence insufficiency 7/15/2011     Depression 7/15/2011     Diplopia 7/18/2011     Hot flashes, menopausal 7/18/2011     Hypercholesterolemia 7/18/2011     Hypothyroid 7/15/2011     Neurogenic bladder 7/15/2011     Parkinson disease (H) 7/15/2011     REM sleep behavior disorder 7/18/2011     Sebaceous cyst 7/18/2011     Wears glasses 7/18/2011     Social History     Socioeconomic History     Marital status:      Spouse name: Not on file     Number of children: Not on file     Years of education: Not on file     Highest education level: Not on file   Social Needs     Financial resource strain: Not on file     Food insecurity - worry: Not on file     Food insecurity - inability: Not on file     Transportation needs - medical: Not on file     Transportation needs - non-medical: Not on file   Occupational History     Not on file   Tobacco Use     Smoking status: Never Smoker     Smokeless tobacco: Never Used   Substance and Sexual Activity     Alcohol use: Yes     Comment: rare     Drug use: Not on file     Sexual activity: Not on file   Other Topics Concern     Parent/sibling w/ CABG, MI or angioplasty before 65F 55M? Not Asked   Social History Narrative    Spouse is serenity       Drug and lactation database from the [place] [employer] of Medicine:  http://toxnet.nlm.nih.gov/cgi-bin/sis/htmlgen?LACT      B/P: Data Unavailable, T: Data Unavailable, P: Data Unavailable, R: Data Unavailable 0 lbs 0 oz  There were no vitals taken for this visit., There is no height or weight on file to calculate BMI.  Medications and Vitals not listed above were documented in the cart and reviewed by me.     Current Outpatient Medications   Medication Sig Dispense Refill     acetaminophen (TYLENOL) 500 MG tablet Rarely taking       aspirin 81 MG tablet 81mg tab by mouth @ midnight 30 tablet      atorvastatin (LIPITOR) 20 MG tablet Take  20 mg by mouth       calcium Citrate-vitamin D 500-400 MG-UNIT CHEW        Calcium-Vitamin D 600-200 MG-UNIT TABS 1 tab by mouth @ 6pm 30 tablet      carbidopa-levodopa (SINEMET CR)  MG per CR tablet Take 1 tablet by mouth 4 times daily 6am,noon,6pm midnight 360 tablet 3     carbidopa-levodopa (SINEMET)  MG tablet Unclear if using the blue 10/100 carbidopa/levodopa       carbidopa-levodopa (SINEMET)  MG per tablet 1.5 tabs by mouth daily @ 6am,noon,6pm midnight and 1 tab by mouth daily as needed 630 tablet 3     COMPOUNDED NON-CONTROLLED SUBSTANCE (CMPD RX) - PHARMACY TO MIX COMPOUNDED MEDICATION Estriol 1 mg/Gr   Apply small amount on the index finger apply to vaginal area twice weekly 30 g 6     docusate sodium (COLACE) 100 MG capsule 100mg capsule by mouth daily @ noon 60 capsule      FIBER ADULT GUMMIES 2 g CHEW 8 gummies by mouth daily @ 8am (as needed: 5 grams of fiber per gummy)       ibuprofen (ADVIL/MOTRIN) 200 MG tablet Take 200-400 mg by mouth       ibuprofen (ADVIL/MOTRIN) 800 MG tablet 800mg tab by mouth 4/day @ 6am, 12noon, 6pm and 12midnight 30 tablet 1     levothyroxine (SYNTHROID/LEVOTHROID) 100 MCG tablet 100mcg tab by mouth @ 6am 50 tablet 3     levothyroxine (SYNTHROID/LEVOTHROID) 112 MCG tablet Take 112 mcg by mouth       lisinopril (PRINIVIL/ZESTRIL) 5 MG tablet 5mg tab by mouth daily @ 6am 30 tablet 1     methylcellulose (CITRUCEL) powder Take 2 g by mouth       mirabegron (MYRBETRIQ) 25 MG 24 hr tablet Take 25 mg by mouth every 24 hours       Multiple Vitamins-Minerals (EYE VITAMINS) CAPS 1 capsule ([employer]) by mouth daily @ midnight 30 capsule      NONFORMULARY Indications: thyroid medications, antidepressants medications, incontience medication, and HTN medication       omeprazole (PRILOSEC) 20 MG CR capsule Take 1 capsule (20 mg) by mouth daily 30 capsule 1     polyethylene glycol (MIRALAX) powder 1 scoop in liquid by mouth daily @ 6am (as needed) 119 g       rotigotine (NEUPRO) 4 MG/24HR 24 hr patch Apply patch daily @ midnight 90 patch 3     tamsulosin (FLOMAX) 0.4 MG capsule 0.4mg capsule by mouth daily @ 530pm 30 capsule 11     traMADol (ULTRAM) 50 MG tablet Take 50 mg by mouth       trospium (SANCTURA XR) 60 MG CP24 24 hr capsule 60mg capsule by mouth daily @ 6am 90 each 3     venlafaxine (EFFEXOR) 25 MG tablet Take 25 mg by mouth 3 times daily       venlafaxine (EFFEXOR-XR) 75 MG 24 hr capsule 75mg tab by mouth daily @ 6am 90 capsule 11     vitamin B complex with vitamin C (VITAMIN  B COMPLEX) TABS tablet Vitamin b complex by mouth @ 6pm           Pedro Pitts MD

## 2019-02-11 NOTE — NURSING NOTE
Loan Anderson's goals for this visit include:   Chief Complaint   Patient presents with     RECHECK     She requests these members of her care team be copied on today's visit information: pcp    PCP: No Ref-Primary, Physician    Referring Provider:  Referred Self, MD  No address on file    Pulse 77   Wt 88 kg (194 lb)   SpO2 96%   BMI 34.37 kg/m      Do you need any medication refills at today's visit? maybe

## 2019-02-11 NOTE — PATIENT INSTRUCTIONS
: 1940    PATRICE: 2019  Parkinson  Eye evaluation      REVIEW MEDICATIONS    Shoulder issues  Mri of her shoulder?    Dr. Mayte Meng MD  [facility]    [phone].       Medications     6am 12noon 5pm 12midnight      Acetaminophen tylenol 500mg   stopped              Aspirin 81mg           1      Atorvastatin lipitor 10mg or 20mg  stopped               Calcium vitamin d dose ?       stopped         Carbidopa/levodopa sinemet CR 50/200 1 1 1 1      Carbidopa/levodopa sinemet 10/100 stopped       CArbidopa/levodopa Sinemet 25/100 1.5 1.5 1.5 1.5      Compounded non controlled   stopped               Docusate colace 100mg     3            Fiber adult gummies 5 grams stopped               Ibuprofen 200mg 3 3 3      Levothyroxine synthroid 112mcg 1               Lisinopril 5mg 1           Methylcellulose citrucel 2 gram  stopped           Mirabegron myrbetriq 25mg 24 hr tablet  1           [facility]       1     Omeprazole 20mg 1           Polyethylene glycol miralax   1 scoop                Rotigotine neupro 4mg patch 24 hours         1       Tamsulosin flomax 0.4mg        1         Tramadol ultram 50mg  as needed           Trospium sanctura XR 60mg 24 hr capsule 1                Venlafaxine effexor xr 100mg Mistake on dose                           Vitamin b complex with vitamin c        1               History obtained from patient    PLAN    No medication change at this time.    I have no specific recommendations for the type of anesthesia in regards to a mri scan of her shoulder.   Obviously a conscious sedation or rapidly reversible agent would be best but recovery is always a challenge with sedatives in Parkinson's disease.     In regards to surgery, if she does get a rotator cuff surgery or another procedure, it may be wise to have a slight reduction in her sinemet regimen post shoulder surgery - with input from Tamika Diane, Pharmacist. -- so that the dyskinesias would be  less and help in terms of the shoulder with less movements.     Need clarification of her dose of venlafaxine - as there is no 100mg extended release formulation    She is wondering about another colonoscopy - risk benefit.     Return back in 3 months.

## 2019-02-19 NOTE — TELEPHONE ENCOUNTER
Interventional Radiology   Radiology at [facility] has been requested to perform a Right shoulder MRI with IV moderate sedation from Dr Meng.  Loan Anderson is a 78 year old woman with a history of hypertension, hyperlipidemia, CKD,  Hypothyroidism, Parkinson's disease and neurogenic bladder who has dislocated her shoulder a couple times in 11/18 and 12/18. She was seen by orthopedics for this problem and Dr Meng ordered an MRI to rule out rotator cuff tear. The patient is unable to lie still because of the Parkinson's and sedation is requested.   Kassandra is the contact for her mother and initially she was requesting propofol as her mother had that in ED for sedation to reduce her shoulder and Loan tolerated it well. She brought it up to the MDs to get a new order but they decided to try to start with IV moderate sedation first.    Central scheduling has contacted the patient and scheduled the biopsy for Thursday 2/21/19.     Thanks [facility] Interventional Radiology CNP ([phone]) (phone [phone])

## 2019-02-21 NOTE — IP AVS SNAPSHOT
[place] [address] [place] [address]  Phone:  [phone]                                    After Visit Summary   2/21/2019    Loan Anderson    MRN: [id number]           After Visit Summary Signature Page    I have received my discharge instructions, and my questions have been answered. I have discussed any challenges I see with this plan with the nurse or doctor.    ..........................................................................................................................................  Patient/Patient Representative Signature      ..........................................................................................................................................  Patient Representative Print Name and Relationship to Patient    ..................................................               ................................................  Date                                   Time    ..........................................................................................................................................  Reviewed by Signature/Title    ...................................................              ..............................................  Date                                               Time          22EPIC Rev 08/18

## 2019-02-21 NOTE — PROGRESS NOTES
Care Suites Discharge Summary    Discharge Criteria:   Discharge Criteria met per MD orders: Yes.   Vital signs stable.     Pt demonstrates ability to ambulate safely: Yes.  (See discharge questionnaire for additional information)    Discharge instructions & education:   Discharge instructions reviewed with patient and daughter. Patient verbalizes  understanding.   Additional patient education provided:  none.     Medications:   Patient will be discharging on new medications- No. Patient verbalizes reason for use, start date, and side effects NA.    Items returned to patient:   Home and hospital acquired medications returned to patient NA   Listed belongings gathered and returned to patient: Yes    Patient discharged to home via wheelchair with daughter driving.    Sandra Lorenzo

## 2019-02-21 NOTE — PROGRESS NOTES
0930 Pt here for pre procedure assessment prior to IV sedation for MRI of shoulder. Genia here to assess and consent prior to sedation. All questions answered and pt appears to accept and understand.

## 2019-02-21 NOTE — DISCHARGE INSTRUCTIONS
Sedation Discharge Instructions     After you go home:      You may resume your normal diet    Have an adult stay with you for 6 hours if you received sedation       For 24 hours - due to the sedation you received:    Relax and take it easy    Do NOT make any important or legal decisions    Do NOT drive or operate machines at home or at work    Do NOT drink alcohol    Activity       You may go back to normal activity in 24 hours    Medicines:      You may resume all medications                   If you have questions call:          [facility] Radiology Dept @ [phone]

## 2019-02-21 NOTE — PROGRESS NOTES
Pt tolerated po food and fluids. Pt VSS post sedation. Daughter here with pt and will be  to home.  Discharge instructions reviewed with pt and daughter prior to discharge.  Pt tolerated activity of transfer table to chair with assist of 2-- no change from pre sedation.

## 2019-02-21 NOTE — IP AVS SNAPSHOT
MRN:[id number]                      After Visit Summary   2/21/2019    Loan [facility]    MRN: [id number]           Visit Information        Department      2/21/2019  9:01 AM [facility]          Review of your medicines      UNREVIEWED medicines. Ask your doctor about these medicines       Dose / Directions   aspirin 81 MG tablet  Commonly known as:  ASA      81mg tab by mouth @ midnight  Quantity:  30 tablet  Refills:  0     * carbidopa-levodopa  MG CR tablet  Commonly known as:  SINEMET CR  Used for:  Paralysis agitans (H)      1 tab by mouth 4/day @ 6am,noon,5pm midnight  Quantity:  360 tablet  Refills:  3     * carbidopa-levodopa  MG tablet  Commonly known as:  SINEMET  Used for:  Paralysis agitans (H)      1.5 tabs by mouth daily @ 6am,noon,5pm midnight and 1 tab by mouth daily as needed  Quantity:  630 tablet  Refills:  3     cholecalciferol 5000 units Caps      5000 units by mouth daily @ 6am  Quantity:  30 capsule  Refills:  0     docusate sodium 100 MG capsule  Commonly known as:  COLACE      3 x 100mg capsule by mouth daily @ noon  Quantity:  60 capsule  Refills:  0     EYE VITAMINS Caps      1 capsule ([facility]) by mouth daily @ midnight  Quantity:  30 capsule  Refills:  0     FLOMAX 0.4 MG capsule  Used for:  Urinary hesitancy  Generic drug:  tamsulosin      0.4mg capsule by mouth daily @ 6pm  Quantity:  30 capsule  Refills:  11     ibuprofen 200 MG tablet  Commonly known as:  ADVIL/MOTRIN      2 x 200mg tabs by mouth 3/day @ 6am, 12pm and 6pm and 3 x 200mg tabs by mouth every 8 hours as needed  Quantity:  100 tablet  Refills:  0     levothyroxine 112 MCG tablet  Commonly known as:  SYNTHROID/LEVOTHROID      112mcg tab by mouth daily @ 6am  Refills:  0     lisinopril 5 MG tablet  Commonly known as:  PRINIVIL/ZESTRIL      5mg tab by mouth daily @ 6am  Quantity:  30 tablet  Refills:  1     MIRALAX powder  Generic drug:  polyethylene glycol      1  scoop in liquid by mouth daily @ 6am (as needed)  Quantity:  119 g  Refills:  0     MYRBETRIQ 25 MG 24 hr tablet  Generic drug:  mirabegron      25mcg tab by mouth daily @ 6am  Refills:  0     omeprazole 20 MG DR capsule  Commonly known as:  priLOSEC      20mg capsule by mouth daily @ 6am  Quantity:  30 capsule  Refills:  1     rotigotine 4 MG/24HR 24 hr patch  Commonly known as:  NEUPRO  Used for:  Paralysis agitans (H)      Apply patch daily @ midnight  Quantity:  90 patch  Refills:  3     SANCTURA XR 60 MG Cp24 24 hr capsule  Used for:  Overactive bladder  Generic drug:  trospium      60mg capsule by mouth daily @ 6am  Quantity:  90 each  Refills:  3     traMADol 50 MG tablet  Commonly known as:  ULTRAM      Dose:  50 mg  Take 1 tablet (50 mg) by mouth every 6 hours as needed for severe pain  Quantity:  1 tablet  Refills:  0     venlafaxine 100 MG tablet  Commonly known as:  EFFEXOR      100mg EXTENDED RELEASE by mouth once daily @ 6am  Quantity:  180 tablet  Refills:  3     vitamin B complex with vitamin C tablet      Vitamin b complex by mouth @ 6pm  Refills:  0         * This list has 2 medication(s) that are the same as other medications prescribed for you. Read the directions carefully, and ask your doctor or other care provider to review them with you.                  Protect others around you: Learn how to safely use, store and throw away your medicines at www.disposemymeds.org.       Follow-ups after your visit       Your next 10 appointments already scheduled    Feb 22, 2019  9:30 AM CST  (Arrive by 9:15 AM)  Office Visit with Tamika Diane [facility] ([facility]) [address] [place] [id number] [phone]   Bring a current list of meds and any records pertaining to this visit. For Physicals, please bring immunization records and any forms needing to be filled out. Please arrive 10 minutes early to complete paperwork.    May 20, 2019  8:30 AM CDT  (Arrive by 8:15 AM)  [facility] [address] [place] [id number] [phone]      Care Instructions       Further instructions from your care team       Sedation Discharge Instructions     After you go home:      You may resume your normal diet    Have an adult stay with you for 6 hours if you received sedation       For 24 hours - due to the sedation you received:    Relax and take it easy    Do NOT make any important or legal decisions    Do NOT drive or operate machines at home or at work    Do NOT drink alcohol    Activity       You may go back to normal activity in 24 hours    Medicines:      You may resume all medications                   If you have questions call:          [facility] Radiology Dept @ [phone]      Additional Information About Your Visit       MyChart Information    [facility] gives you secure access to your electronic health record. If you see a primary care provider, you can also send messages to your care team and make appointments. If you have questions, please call your primary care clinic.  If you do not have a primary care provider, please call [phone] and they will assist you.       Care EveryWhere ID    This is your Care EveryWhere ID. This could be used by other organizations to access your [facility] medical records  [id number]       Your Vitals Were     Blood Pressure   186/78    Respirations   16    Pulse Oximetry   94%            Primary Care Provider Fax #    Physician No Ref-Primary [phone]      Equal Access to Services    [facility] : Hadii aad ku hadasho Soomaali, waaxda luqadaha, qaybta kaalmada adeegyada, amber mcdonald . So [facility] [phone].    ATENCIÓN: Si habla español, tiene a adam disposición servicios gratuitos de asistencia lingüística. Llame al [phone].    We comply with applicable  federal civil rights laws and [place] laws. We do not discriminate on the basis of race, color, national origin, age, disability, sex, sexual orientation, or gender identity.           Thank you!    Thank you for choosing [facility] for your care. Our goal is always to provide you with excellent care. Hearing back from our patients is one way we can continue to improve our services. Please take a few minutes to complete the written survey that you may receive in the mail after you visit with us. Thank you!            Medication List      ASK your doctor about these medications          Morning Afternoon Evening Bedtime As Needed    aspirin 81 MG tablet  Also known as:  ASA  INSTRUCTIONS:  81mg tab by mouth @ midnight                     * carbidopa-levodopa  MG CR tablet  Also known as:  SINEMET CR  INSTRUCTIONS:  1 tab by mouth 4/day @ 6am,noon,5pm midnight                     * carbidopa-levodopa  MG tablet  Also known as:  SINEMET  INSTRUCTIONS:  1.5 tabs by mouth daily @ 6am,noon,5pm midnight and 1 tab by mouth daily as needed                     cholecalciferol 5000 units Caps  INSTRUCTIONS:  5000 units by mouth daily @ 6am                     docusate sodium 100 MG capsule  Also known as:  COLACE  INSTRUCTIONS:  3 x 100mg capsule by mouth daily @ noon                     EYE VITAMINS Caps  INSTRUCTIONS:  1 capsule ([employer]) by mouth daily @ midnight                     FLOMAX 0.4 MG capsule  INSTRUCTIONS:  0.4mg capsule by mouth daily @ 6pm  Generic drug:  tamsulosin                     ibuprofen 200 MG tablet  Also known as:  ADVIL/MOTRIN  INSTRUCTIONS:  2 x 200mg tabs by mouth 3/day @ 6am, 12pm and 6pm and 3 x 200mg tabs by mouth every 8 hours as needed                     levothyroxine 112 MCG tablet  Also known as:  SYNTHROID/LEVOTHROID  INSTRUCTIONS:  112mcg tab by mouth daily @ 6am                     lisinopril 5 MG tablet  Also known as:  PRINIVIL/ZESTRIL  INSTRUCTIONS:  5mg tab by  mouth daily @ 6am                     MIRALAX powder  INSTRUCTIONS:  1 scoop in liquid by mouth daily @ 6am (as needed)  Generic drug:  polyethylene glycol                     MYRBETRIQ 25 MG 24 hr tablet  INSTRUCTIONS:  25mcg tab by mouth daily @ 6am  Generic drug:  mirabegron                     omeprazole 20 MG DR capsule  Also known as:  priLOSEC  INSTRUCTIONS:  20mg capsule by mouth daily @ 6am                     rotigotine 4 MG/24HR 24 hr patch  Also known as:  NEUPRO  INSTRUCTIONS:  Apply patch daily @ midnight                     SANCTURA XR 60 MG Cp24 24 hr capsule  INSTRUCTIONS:  60mg capsule by mouth daily @ 6am  Generic drug:  trospium                     traMADol 50 MG tablet  Also known as:  ULTRAM  INSTRUCTIONS:  Take 1 tablet (50 mg) by mouth every 6 hours as needed for severe pain                     venlafaxine 100 MG tablet  Also known as:  EFFEXOR  INSTRUCTIONS:  100mg EXTENDED RELEASE by mouth once daily @ 6am                     vitamin B complex with vitamin C tablet  INSTRUCTIONS:  Vitamin b complex by mouth @ 6pm                        * This list has 2 medication(s) that are the same as other medications prescribed for you. Read the directions carefully, and ask your doctor or other care provider to review them with you.

## 2019-02-21 NOTE — PROGRESS NOTES
Interventional Radiology Pre-Procedure Sedation Assessment   Time of Assessment: 9:58 AM    Expected Level: Moderate Sedation    Indication: Sedation is required for the following type of Procedure: Right shoulder MRI with IV moderate sedation    Sedation and procedural consent: Risks, benefits and alternatives were discussed with Patient and Relative Daughter    PO Intake: Appropriately NPO for procedure    ASA Class: Class 3 - SEVERE SYSTEMIC DISEASE, DEFINITE FUNCTIONAL LIMITATIONS.    Mallampati: Grade 2:  Soft palate, base of uvula, tonsillar pillars, and portion of posterior pharyngeal wall visible    Lungs: Lungs Clear with good breath sounds bilaterally    Heart: Normal heart sounds and rate    History and physical reviewed and no updates needed. I have reviewed the lab findings, diagnostic data, medications, and the plan for sedation. I have determined this patient to be an appropriate candidate for the planned sedation and procedure and have reassessed the patient IMMEDIATELY PRIOR to sedation and procedure.    Luisana Hussein, ROSHNI CNP

## 2019-02-22 NOTE — Clinical Note
Changed her PD regimen to try to address dyskinesia in preparation for shoulder surgery. Will be in touch with family.

## 2019-02-22 NOTE — NURSING NOTE
Chief Complaint   [facility]     [facility]     [facility]       Therese Mckeon MA

## 2019-02-22 NOTE — PROGRESS NOTES
"SUBJECTIVE/OBJECTIVE:                Loan Anderson is a 78 year old female coming in for a follow-up visit for Medication Therapy Management.  She was referred to me from Dr. Pitts. , Marco, and daughters, Kassandra and Lamberto, were also present for the visit today.     Chief Complaint: Follow up from our visit on 2/14/18  Tobacco: No tobacco use  Alcohol: Less than 1 beverage / month    Medication Adherence/Access:  Patient has assistance with medication administration: assisted living.  Patient takes medications 6 time(s) per day.   Per patient, misses medication 0 times per week.   Medication barriers: none.   The patient fills medications at [facility]: NO, [facility]    Parkinson's Disease:  Current medications include: carbidopa-levodopa and rotigotine (see below). She can take carbidopa-levodopa  mg tablets as needed but states she only does about a couple times per month. She reportedly has dyskinesia \"all day\" but also has freezing and wearing off of medication. Ropinirole and pramipexole caused leg swelling in the past. Patient also expressed concern about her sleep habits and states that she is frustrated that staff wake her up for medications at midnight and 6 am. She would like to go to bed earlier and sleep in later without getting awoken. She doesn't think that rotigotine helps her PD symptoms much; has been on it 3-5 years.      6 am 12 pm 5 pm Midnight   Carbidopa-levodopa  mg CR 1.5 1.5 1.5 1.5   Carbidopa-levodopa  mg IR 1 1 1 1   Rotigotine 4 mg    1 patch     Shoulder Pain: Takes ibuprofen 400 mg 3 times daily and tramadol 50 mg as needed, typically about 1-2 times per month. This is a significant issue for her; she is currently wearing a sling and has recently seen orthopedics to discuss surgery; will meet with the surgeon next week. Patient was told she couldn't have surgery with the current dyskinesia she is experiencing so they would like to " reduce PD medications.     Insomnia: Not currently taking any medications. Reports frustration with getting woken up for medications at night.  states that he has found her up wandering at night and she has also had multiple falls overnight because she forgets to ask for help or slips out of bed. She reports having hallucinations at night and her legs are very active in bed, per daughters.     Depression:  Current medications include: Venlafaxine 100 mg once daily. This medication dose was recently increased about a month ago. Family expresses concern about the formulation, whether this is immediate-release or extended-release. No change in depression symptoms. Called [facility] and they confirmed that this is immediate-release venlafaxine.    Hypothyroidism: Patient is taking levothyroxine 112 mcg daily. Patient is having the following symptoms: none. Lab not available.     Hypertension: Current medications include lisinopril 5 mg daily.  Patient's blood pressure is monitored at assisted living twice a day. Lisinopril was added in summer 2018 because she had elevated blood pressure at Physical Therapy; it was 190/120 per family. BP has been better since on lisinopril per family. Denies dizziness.     BP Readings from Last 3 Encounters:   02/22/19 113/73   02/21/19 (!) 178/100   12/02/18 146/87     Urinary frequency: Takes mirabegron 25 mg once daily, trospium 60 mg ER daily, and tamsulosin 0.4 mg daily. Did not discuss urinary habits today; just confirmed medication dosages.    ASCVD prevention: Currently taking aspirin 81 mg daily. Patient states that he father had a MI at 57 and aneurysm in brain at 47 so she would prefer to stay on aspirin preventively. Denies unusual bruising/bleeding.     Constipation: Takes docusate 300 mg once daily and Miralax as needed. States this regimen is working well for her.    GERD: Current medications include: Prilosec (omeprazole) 20 mg once daily. Pt c/o heartburn.   Patient feels that current regimen is effective.    Vitamins/supplements: Takes vitamin B complex daily, eye vitamin daily, and Vitamin D3 5000 units daily.     Today's Vitals: /73 (BP Location: Left arm)   Pulse 68   SpO2 93%       ASSESSMENT:              Current medications were reviewed today as discussed above.      Medication Adherence: excellent, no issues identified    Parkinson's Disease: Needs improvement.  Patient's most significant concern today is dyskinesia and therefore she would benefit from taking a lower overall dose of carbidopa-levodopa.  In addition, patient would benefit from not getting awoken at night for medications so we will change the timing of her administration to improve both of these things.  Long discussion with the family about needing to monitor closely for her symptoms of dyskinesia as well as wearing off with adjustments in her dose and therefore we decided to primarily give her short acting carbidopa-levodopa during the day.  Would also consider reducing the rotigotine patch if the carbidopa-levodopa changes not sufficiently decreasing her dyskinesia.    Shoulder pain: Needs improvement. Defer to orthopedics.    Insomnia: Needs improvement.  Patient may benefit from not having medication administration overnight so that she can sleep better.  She also may be having a dream enactment and therefore could benefit from melatonin which we will discuss at the next visit.    Depression: Needs improvement.  Patient is getting 1 dose of immediate release venlafaxine each day which is likely not sufficiently managing her depression.  She would benefit from a second dose of immediate release venlafaxine or switching to an extended release formulation.  Will discuss at next MTM visit.    Hypothyroidism: Not fully assessed.    Hypertension: Stable.     Urinary frequency: Not fully assessed.    ASCVD prevention: Stable.     Constipation: Stable.     GERD: Stable.      Vitamins/supplements: Appears stable.     PLAN:                  New medication regimen is as below.     7 am 10 am 1 pm 4 pm 8 pm/bedtime   Carbidopa-  Levodopa  mg (short) 2 tabs 2 tabs 2 tabs 2 tabs    Carbidopa-  Levodopa  mg (long)     1 tab   Neupro patch   4 mg     1 patch     Updated orders faxed to [phone] ([facility]) and to [facility]. [facility] staff will discuss with PCP about changing the timing of other medications to prevent needing to wake her up at night.     Future considerations:   1. Melatonin for sleep/dream enactment  2. Increase venlafaxine or switching to extended-release formulation    I spent 70 minutes with this patient today. All changes were made via collaborative practice agreement with Dr. Pitts. A copy of the visit note was provided to the patient's referring provider.     Will follow up in 2 weeks: 3/4/19.    The patient was given a summary of these recommendations as an after visit summary.    Chart documentation was completed in part with Dragon voice-recognition software. Even though reviewed, some grammatical, spelling, and word errors may remain.    Tamika [facility], Pharm.D.  Medication Therapy Management Pharmacist  Phone: [phone]

## 2019-02-22 NOTE — PATIENT INSTRUCTIONS
Recommendations from today's MTM visit:                                                       7 am 10 am 1 pm 4 pm 8 pm/bedtime   Carbidopa-  Levodopa  mg (short) 2 tabs 2 tabs 2 tabs 2 tabs    Carbidopa-  Levodopa  mg (long)     1   Neupro patch   4 mg     1     Monitor for increased tremor, stiffness, freezing. These symptoms are indicative of not enough medication. Monitor for dyskinesia; this is indicative of too much medication. Timing is very important in monitoring these symptoms.    After this regimen is stable, we will start melatonin 3-5 mg at bedtime.     I will check with the pharmacy on the venlafaxine dose/formulation.    Next MTM visit: 3/4/19 at 11 am by phone with Kassandra    To schedule another MTM appointment, please call the clinic directly or you may call the MTM scheduling line at [phone] or toll-free at [phone].     My Clinical Pharmacist's contact information:                                                      It was a pleasure talking with you today!  Please feel free to contact me with any questions or concerns you have.      Tamika Diane, Pharm.D.  Medication Therapy Management Pharmacist  Phone: [phone]    You may receive a survey about the MTM services you received by email and/or US Mail.  I would appreciate your feedback to help me serve you better in the future. Your comments will be anonymous.

## 2019-02-27 NOTE — DISCHARGE INSTRUCTIONS
Tylenol and tramadol as needed for back pain.  Ice the area 20 minutes, 4 times per day for the next 2 days

## 2019-02-27 NOTE — ED PROVIDER NOTES
ED course:  Patient received as a handoff from my partner Dr. Smith.  On face-to-face evaluation patient reports no new complaints.  X-rays without acute findings.  Please see Dr. Smith's note for additional details.  Patient discharged; recommended close PCP follow-up PRN.  Return precautions discussed.  Recommended previously prescribed tramadol and Tylenol as needed for pain.  Presentation consistent with fall and resulting soft tissue injury.    Impression:    ICD-10-CM    1. Fall from standing, initial encounter W19.XXXA ABO/Rh type and screen   2. Lumbar pain M54.5        Disposition:  Discharged home with daughter         Rigoberto Singh, DO  02/27/19 0802

## 2019-02-27 NOTE — ED NOTES
Bed: ED04  Expected date: 2/27/19  Expected time: 4:50 AM  Means of arrival: Ambulance  Comments:  Martita M1 78F fall; hip injury

## 2019-02-27 NOTE — ED PROVIDER NOTES
History     Chief Complaint:  Right Hip Pain    HPI   Loan Anderson is a 78 year old female with a history of osteopenia, Parkinson's Disease, hypertension, hypothyroid and stage 3 CKD who presents with right hip pain after a fall. The patient reports that about 1 hour prior to arrival to the ED she was pulling on some packages that were stuck together when she pulled too hard, lost her balance and fell backwards onto the ground. Per daughter, patient had gotten up to go to the bathroom and fell back when trying to pull up her pants.  She is apparently supposed to ask for assistance when getting up as she uses a wheelchair typically, but she had not done so.  The patient denies hitting her head or losing consciousness. She denies any chest, abdominal, neck, lower leg, or arm pain, nausea, vomiting, diarrhea or any numbness or tingling.     Allergies:  Atorvastatin  Mirapex  Potassium Chloride  Requip  Epinephrine     Medications:    Aspirin 81 mg tablet  Sinemet CR  Sinemet  Cholecalciferol 5000 units CAPS  Colace  Ibuprofen   Levothyroxine  Lisinopril  Myrbetriq  Eye Vitamins  Prilosec  Miralax  Neupro  Flomax  Ultram  Sanctura XR  Effexor  Vitamin B Complex    Past Medical History:    Constipation  Convergence insufficiency  Depression  Diplopia  Hypercholesterolemia  Hypothyroid  Neurogenic Bladder  Parkinson's Disease  REM sleep behavior disorder  Sebaceous cyst  Dysthymic disorder  CATHIE  Chronic nonalcoholic liver disease  Kidney Stone  Osteopenia  Stage 3 CKD  Primary osteoarthritis of both knees  Secondary Hypertension  Acute metabolic encephalopathy    Past Surgical History:    Lithotripsy for renal stone  Brain Surgery  Gene Therapy    Family History:    Parkinson's Disease  Eye Disorder  Stroke  MI  Heart Disease  Cerebrovascular Disease    Social History:  Smoking Status: Never Smoker  Alcohol Use: Rarely  Patient presents via EMS.  Marital Status:       Review of Systems   Cardiovascular:  "Negative for chest pain.   Gastrointestinal: Negative for abdominal pain, diarrhea, nausea and vomiting.   Musculoskeletal: Positive for arthralgias. Negative for myalgias and neck pain.   Neurological: Negative for syncope, numbness and headaches.   All other systems reviewed and are negative.    Physical Exam     Patient Vitals for the past 24 hrs:   BP Temp Temp src Pulse Heart Rate Resp SpO2 Height Weight   02/27/19 0704 138/73 -- -- 62 -- -- 92 % -- --   02/27/19 0654 -- -- -- -- -- -- 91 % -- --   02/27/19 0633 -- -- -- -- -- 12 93 % -- --   02/27/19 0605 -- -- -- -- -- -- 94 % -- --   02/27/19 0600 141/86 -- -- -- -- -- 94 % -- --   02/27/19 0501 180/83 97.9  F (36.6  C) Oral -- 67 -- 97 % 1.626 m (5' 4\") 86.2 kg (190 lb)     Physical Exam  General: Appears well-developed and well-nourished.   Head: No signs of trauma.   Mouth/Throat: Oropharynx is clear and moist.   Eyes: Conjunctivae are normal.   Neck: Normal range of motion. No nuchal rigidity. No cervical adenopathy  CV: Normal rate and regular rhythm.    Resp: Effort normal and breath sounds normal. No respiratory distress.   GI: Soft. There is no tenderness.  No rebound or guarding.  Normal bowel sounds.  No CVA tenderness.  MSK: Normal range of motion. Tenderness to lower back and right hip.  No apparent deformities.  No tenderness to remainder of extremities and moving right leg on her own.  +neurovasculalry intact distally.  Neuro: The patient is alert and oriented to person, place, and time, but poor historian on details.  PERRLA, EOMI, strength in upper/lower extremities normal and symmetrical.   Sensation normal. Speech normal.  GCS 15  Skin: Skin is warm and dry. No rash noted.   Psych: normal mood and affect. behavior is normal.       Emergency Department Course     ECG (5:04:32):  Rate 61 bpm. SD interval 166 ms. QRS duration 126 ms. QT/QTc 446/448 ms. P-R-T axes 56 -5 16.   Normal sinus rhythm.  Right bundle branch block.  Minimal voltage " criteria for LVH, may be normal variant.  Abnormal ECG.  Interpreted at 0515 by Roshan Smith MD.    Imaging:  Radiographic findings were communicated with the patient who voiced understanding of the findings.    XR Pelvis w Hip Right 1 View  No acute fracture or dislocation.  As read by radiology.    XR Chest 1 View  No acute abnormality.  As read by radiology.    Lumbar spine XR, 2-3 views  Vertebral body heights are maintained. There is grade 1  anterolisthesis of L4 on L5. There is moderate L5-S1 degenerative disc  disease and multilevel facet arthropathy.   As read by radiology.    Laboratory:    INR: 0.93    BMP: Creatinine 1.06 (H), Glucose 121 (H), GFR Estimate 51 (L), o/w AWNL    CBC: AWNL (WBC 7.9, HGB 14.5, )    ABO/Rh type and screen: AB Pos    Interventions:    0545: PF Dilaudid 0.5 mg IV  0614: PF Dilaudid 0.5 mg IV    Emergency Department Course:  Past medical records, nursing notes, and vitals reviewed.  0454: I performed an exam of the patient and obtained history, as documented above.    IV inserted and blood drawn.    The patient was sent for X-rays while in the emergency department, findings above.     0650: I rechecked the patient.         Impression & Plan      Medical Decision Making:  Erin Anderson is a 78-year-old woman who presents after apparent fall with complaint of right hip pain.  She apparently got up to use the bathroom when she fell back hitting her hip on the toilet per report.  On my evaluation, there is no obvious bony deformity or shortening of the leg and she is neurovascular intact distally.  There is no pain with palpation to the remainder the extremities.  X-ray of the hip and pelvis did not show any signs of fracture.  Patient's daughter arrived and on further discussion and evaluation of the patient, patient was not having hip pain but seem to have more low back pain.  I then ordered an x-ray of the lumbar spine which was pending.  Patient signed out to   O'elisa with XR pending.  If it is negative, I feel patient can likely be discharged back to the nursing home as she will have assistance and uses a wheelchair to get around.  She has tramadol for home pain control.    Diagnosis:    ICD-10-CM    1. Fall from standing, initial encounter W19.XXXA ABO/Rh type and screen   2. Lumbar pain M54.5        Disposition:  pending    Discharge Medications:     Medication List      There are no discharge medications for this visit.           Brandie Bravo  2/27/2019    EMERGENCY DEPARTMENT  I, Brandie Bravo, am serving as a scribe at 4:54 AM on 2/27/2019 to document services personally performed by Roshan Smith MD based on my observations and the provider's statements to me.        Roshan Smith MD  02/28/19 0632

## 2019-03-02 PROBLEM — G93.40 ENCEPHALOPATHY: Status: ACTIVE | Noted: 2019-01-01

## 2019-03-02 NOTE — H&P
Critical Care  Note      03/02/2019    Name: Loan Anderson MRN#: [id number]   Age: 78 year old YOB: 1940     [facility] Day# 0  ICU DAY #0    MV DAY #0             Problem List:   Acute encephalopathy  Rib fractures  YASMIN  leukocytosis         Summary/Hospital Course:     78F with parkinsons disease who lives in a memory center now presents from [facility] after being found acutely encephalopathic and mildly agitated/combative by her  around 2 pm on day of admission.  Apparently there had been an episode earlier in the day where she had turned red and coughed 3-4 min after taking pills and family member did the heimlich maneuver.  She returned to her normal state after this but no source of asphyxiation was found.  Later in the day her  went to get the mail and when he returned to their home (~10 min) he found staff doing cpr on her.  Apparently she became unresponsive at some point and cpr was started, but on EMS arrival she did have a perfusing rhythm.     A couple days ago she had a mechanical fall resulting in R hip pain.  No injury noted at that time, but today has several rib fx on CT scan (may be due to cpr?) and transverse process L1L2 fx's--suspect these may be from fall.      Assessment and plan :     Loan Anderson IS a 78 year old female admitted on 3/2/2019 for acute encephalopathy.   I have personally reviewed the daily labs, imaging studies, cultures and discussed the case with referring physician and consulting physicians.   My assessment and plan by system for this patient is as follows:    Neurology/Psychiatry:   1. Acute encephalopathy:  Etiology unclear.  Working up for underlying sepsis, on broad spectrum abx.  ?seizure activity (episodic unresponsiveness)?  May be taking more tramadol than usual after fall--this would lower seizure threshold.  Neurocrit consult.  2. Analgesia:  lidoderm patches on chest for rib fx.  Prn tylenol/oxycodone/dilaudid  3.  Sedation:  Propofol drip for now.  Midazolam pushes.  4. H/o parkinsons dz:  D/w Dr. Villavicencio of neuro crit.  Will continue short acting carbo/levodopa.  Holding long-acting and patch forms for now per his advice.    Cardiovascular:   1.  H/o htn:  Hold antihypertensives for now  2.  Question of cardiac arrest:  No clear pulselessness that I'm aware of.   Hold off on cooling for now.    Pulmonary/Ventilator Management:   1. Loss of protective airway reflexes:  Due to encephalopathy noted above.  Not neurologically appropriate for extubation today.    GI and Nutrition :   1. NPO for now  2.  PPI for PUD prophy    Renal/Fluids/Electrolytes:   1. YASMIN on CKD:  Mild, creat to 1.18.  Monitor creat and UOP.    Infectious Disease:   1. Procal ok <0.05 and no clear source of infection.  Empiric broad spectrum cvg for now and pan culture.  Low threshold to stop abx in 1-2 days if cx neg.    Endocrine:   1. FSG ok for now.    2.  Hypothyroid:  Continue home synthroid.    Hematology/Oncology:   1. Leukocytosis:  Mild to 13.5.  ID plan as noted above.    ICU Prophylaxis:   1. DVT: Hep Subq/ mechanical  2. VAP: HOB 30 degrees, chlorhexidine rinse  3. Stress Ulcer: PPI  4. Restraints: Nonviolent soft two point restraints required and necessary for patient safety and continued cares and good effect as patient continues to pull at necessary lines, tubes despite education and distraction. Will readdress daily.   5. Wound care  -   6. Feeding - npo for now  7. Family Update:see below  8. Disposition - critically ill    Met with pt's  and daughters.  Explained our planned care course including neurologic consultation.  We discussed risks/benefits of acls should she cardiac arrest again and family decided to make her DNR.  Order placed.         Medical History:     Past Medical History:   Diagnosis Date     Constipation 7/15/2011     Convergence insufficiency 7/15/2011     Depression 7/15/2011     Diplopia 7/18/2011     Hot  flashes, menopausal 7/18/2011     Hypercholesterolemia 7/18/2011     Hypothyroid 7/15/2011     Neurogenic bladder 7/15/2011     Parkinson disease (H) 7/15/2011     REM sleep behavior disorder 7/18/2011     Sebaceous cyst 7/18/2011     Wears glasses 7/18/2011     Past Surgical History:   Procedure Laterality Date     ------------OTHER-------------  november ? 2015    lithrotripsy for renal stone     ------------OTHER-------------  november 2015    had sepsis from uti and hospitalized     BRAIN SURGERY  12 or 14 oct 2011    gene therapy enrolled study at [place]     Social History     Socioeconomic History     Marital status:      Spouse name: Not on file     Number of children: Not on file     Years of education: Not on file     Highest education level: Not on file   Occupational History     Not on file   Social Needs     Financial resource strain: Not on file     Food insecurity:     Worry: Not on file     Inability: Not on file     Transportation needs:     Medical: Not on file     Non-medical: Not on file   Tobacco Use     Smoking status: Never Smoker     Smokeless tobacco: Never Used   Substance and Sexual Activity     Alcohol use: Yes     Comment: rare     Drug use: Not on file     Sexual activity: Not on file   Lifestyle     Physical activity:     Days per week: Not on file     Minutes per session: Not on file     Stress: Not on file   Relationships     Social connections:     Talks on phone: Not on file     Gets together: Not on file     Attends [employer] service: Not on file     Active member of club or organization: Not on file     Attends meetings of clubs or organizations: Not on file     Relationship status: Not on file     Intimate partner violence:     Fear of current or ex partner: Not on file     Emotionally abused: Not on file     Physically abused: Not on file     Forced sexual activity: Not on file   Other Topics Concern     Parent/sibling w/ CABG, MI or angioplasty before 65F 55M? Not Asked    Social History Narrative    Spouse is serenity        Allergies   Allergen Reactions     Atorvastatin      Concern that it was contributing to confusion     Mirapex [Pramipexole Dihydrochloride Monohydrate]      Leg swelling     Potassium Chloride      IV infusion only. She tolerates oral potassium chloride     Requip [Ropinirole Hydrochloride]      Leg swelling     Epinephrine Palpitations              Key Medications:       piperacillin-tazobactam  3.375 g Intravenous Once     vancomycin (VANCOCIN) IV  2,000 mg Intravenous Once       propofol (DIPRIVAN) infusion 40 mcg/kg/min (03/02/19 7323)        Home Meds    No current facility-administered medications on file prior to encounter.   Current Outpatient Medications on File Prior to Encounter:  aspirin 81 MG tablet 81mg tab by mouth @ midnight   carbidopa-levodopa (SINEMET CR)  MG CR tablet Take 1 tablet by mouth At Bedtime At 8 pm   carbidopa-levodopa (SINEMET)  MG tablet Take 2 tablets 4 times daily at 7 am, 10 am, 1 pm, and 4 pm   cholecalciferol 5000 units CAPS 5000 units by mouth daily @ 6am   docusate sodium (COLACE) 100 MG capsule 3 x 100mg capsule by mouth daily @ noon   ibuprofen (ADVIL/MOTRIN) 200 MG tablet 2 x 200mg tabs by mouth 3/day @ 6am, 12pm and 6pm and 3 x 200mg tabs by mouth every 8 hours as needed   levothyroxine (SYNTHROID/LEVOTHROID) 112 MCG tablet 112mcg tab by mouth daily @ 6am   lisinopril (PRINIVIL/ZESTRIL) 5 MG tablet 5mg tab by mouth daily @ 6am   mirabegron (MYRBETRIQ) 25 MG 24 hr tablet 25mcg tab by mouth daily @ 6am   Multiple Vitamins-Minerals (EYE VITAMINS) CAPS 1 capsule (macu-health) by mouth daily @ midnight   omeprazole (PRILOSEC) 20 MG DR capsule 20mg capsule by mouth daily @ 6am   polyethylene glycol (MIRALAX) powder 1 scoop in liquid by mouth daily @ 6am (as needed)   rotigotine (NEUPRO) 4 MG/24HR 24 hr patch Apply patch daily @ bedtime/8 pm   tamsulosin (FLOMAX) 0.4 MG capsule 0.4mg capsule by mouth daily @ 6pm    traMADol (ULTRAM) 50 MG tablet Take 1 tablet (50 mg) by mouth every 6 hours as needed for severe pain   trospium (SANCTURA XR) 60 MG CP24 24 hr capsule 60mg capsule by mouth daily @ 6am   venlafaxine (EFFEXOR) 100 MG tablet 100mg EXTENDED RELEASE by mouth once daily @ 6am   vitamin B complex with vitamin C (VITAMIN  B COMPLEX) TABS tablet Vitamin b complex by mouth @ 6pm              Physical Examination:   Temp:  [97.52  F (36.4  C)-98.42  F (36.9  C)] 98.06  F (36.7  C)  Pulse:  [] 68  Heart Rate:  [61-78] 65  Resp:  [12-25] 16  BP: (127-220)/(63-95) 142/73  FiO2 (%):  [40 %-60 %] 40 %  SpO2:  [96 %-100 %] 96 %  No intake or output data in the 24 hours ending 03/02/19 1732  Wt Readings from Last 4 Encounters:   03/02/19 91 kg (200 lb 9.9 oz)   02/27/19 86.2 kg (190 lb)   02/11/19 88 kg (194 lb)   11/14/18 86.2 kg (190 lb)     BP - Mean:  [] 105  Ventilation Mode: CMV/AC  (Continuous Mandatory Ventilation/ Assist Control)  FiO2 (%): 40 %  Rate Set (breaths/minute): 16 breaths/min  Tidal Volume Set (mL): 450 mL  PEEP (cm H2O): 5 cmH2O  Oxygen Concentration (%): 60 %  Resp: 16    No lab results found in last 7 days.    GEN: no acute distress, sedated   HEENT: head ncat, sclera anicteric, OP patent, trachea midline   PULM: unlabored synchronous with vent, clear anteriorly    CV/COR: RRR S1S2 no gallop,  No rub, no murmur  ABD: soft nontender, hypoactive bowel sounds, no mass  EXT:  Minimal Edema x4,  Warm x4  NEURO: sedated.  Moves all 4 when sedation weaned.  L pupil slightly larger than R but both reactive.  SKIN: no obvious rash  LINES: clean, dry intact         Data:   All data and imaging reviewed     ROUTINE ICU LABS (Last four results)  CMP  Recent Labs   Lab 03/02/19  1449 02/27/19  0540    138   POTASSIUM 4.1 4.2   CHLORIDE 102 105   CO2 28 29   ANIONGAP 6 4   * 121*   BUN 26 25   CR 1.18* 1.06*   GFRESTIMATED 45* 51*   GFRESTBLACK 52* 59*   NIKKIE 8.4* 8.9   PROTTOTAL 7.0  --     ALBUMIN 3.2*  --    BILITOTAL 0.3  --    ALKPHOS 137  --    AST 21  --    ALT 7  --      CBC  Recent Labs   Lab 03/02/19  1449 02/27/19  0540   WBC 13.5* 7.9   RBC 3.98 4.76   HGB 12.0 14.5   HCT 36.2 43.2   MCV 91 91   MCH 30.2 30.5   MCHC 33.1 33.6   RDW 14.3 14.6    210     INR  Recent Labs   Lab 03/02/19  1449 02/27/19  0540   INR 1.04 0.93     Arterial Blood GasNo lab results found in last 7 days.    All cultures:  No results for input(s): CULT in the last 168 hours.  Recent Results (from the past 24 hour(s))   XR Chest Port 1 View    Narrative    CHEST PORTABLE ONE VIEW     3/2/2019 3:00 PM     HISTORY: Tube placement.    COMPARISON: 2/27/2019.      Impression    IMPRESSION: Endotracheal tube in place in mid trachea. New area of  atelectasis or infiltrate in the right lung base. Left lung is clear.  Heart size is upper normal. Pulmonary vasculature does not appear  distended.      KELLIE BANKS MD   CT Head w/o Contrast    Narrative    CT SCAN OF THE HEAD WITHOUT CONTRAST   3/2/2019 3:10 PM     HISTORY: Altered level of consciousness (LOC), unexplained.    TECHNIQUE:  Axial images of the head and coronal reformations without  IV contrast material.  Radiation dose for this scan was reduced using  automated exposure control, adjustment of the mA and/or kV according  to patient size, or iterative reconstruction technique.    COMPARISON: None.    FINDINGS: There is some moderate cerebral atrophy. There is an old  infarct in the left basal ganglia region. There are some patchy white  matter changes in both hemispheres without mass effect. The patient is  intubated. The visualized paranasal sinuses and mastoid air cells are  clear. There is no evidence for intracranial hemorrhage, acute  infarct, mass effect, or skull fracture. Bilateral angi hole  procedures are noted.      Impression    IMPRESSION: Chronic changes. No evidence for intracranial hemorrhage  or any acute process.       SHAGUFTA MOORE MD   CT  Head Neck Angio w/o & w Contrast    Narrative    CTA HEAD NECK WITH CONTRAST 3/2/2019 3:18 PM     HISTORY: Headache, sudden, carotid/vertebral dissection suspected/    TECHNIQUE: Multiplanar multisequence images were obtained through the  neck without and with intravenous contrast. 70 mL of Isovue-370 was  given. Multiplanar reconstructions were performed. 3-D reconstructions  off a remote workstation for CT angiography were also acquired.  Carotid stenoses were evaluated by comparing the caliber of the  proximal internal carotid artery to the caliber of the distal internal  carotid artery. Radiation dose for this scan was reduced using  automated exposure control, adjustment of the mA and/or kV according  to patient size, or iterative reconstruction technique.    FINDINGS:    Brachiocephalic vessels: Normal.    Right carotid system: Normal.    Left carotid system: Trace amount of plaque. No stenosis or  dissection.    Right vertebral artery: Normal.    Left vertebral artery: Normal.    Ida of [place]: Normal.    Other findings: The patient is intubated. The tip of the ET tube is  above the sánchez. Trachea has slight deviation to the right. There is  a moderate size right pleural effusion. Degenerative changes are seen  in the spine. There is some minimal degenerative spondylolisthesis of  C3 and C4.      Impression    IMPRESSION:  1. Negative CT angiography head and neck.  2. Moderate size right pleural effusion.     SHAGUFTA MOORE MD   CT Chest (PE) Abdomen Pelvis w Contrast    Narrative    CT CHEST PE, ABDOMEN PELVIS WITH CONTRAST   3/2/2019 3:26 PM     HISTORY: Trauma. Altered mental status. Hypotension.    TECHNIQUE: 75 mL Isovue-370 IV were administered. After contrast  administration, volumetric helical sections were acquired from the  thoracic inlet to the ischial tuberosities. Pulmonary embolism  protocol was performed through the chest. Coronal images were also  reconstructed. Radiation dose for this scan  was reduced using  automated exposure control, adjustment of the mA and/or kV according  to patient size, or iterative reconstruction technique.    COMPARISON: CT of the abdomen and pelvis performed 10/24/2017.    FINDINGS:    Chest: No evidence for pulmonary embolism or thoracic aortic  dissection. Atherosclerotic calcification of the thoracic aorta.  Endotracheal tube is in place, with tip approximately 2.5 cm above the  sánchez. Small to moderate right pleural effusion, with mild associated  compressive atelectasis at the right lung base posteriorly. No  pericardial or left pleural effusion. There is mild atelectasis at the  left lung base posteriorly. Mild patchy mosaic attenuation is noted in  the lungs bilaterally. There are displaced acute appearing fractures  involving the right 10th and 11th ribs posteriorly. Mildly displaced  fractures of the right second rib in two places laterally and  anteriorly. There are also mildly displaced fractures of the right  third through sixth ribs laterally. Nondisplaced fracture of the left  second rib laterally.    Abdomen and Pelvis: No bowel obstruction. There is a moderate amount  of stool in the rectum. No free fluid in the pelvis. No convincing  evidence for colitis or diverticulitis. No intra-abdominal fluid  collections to suggest hematoma. No free intraperitoneal air.  Scattered cortical scarring in the left kidney is not significantly  changed, given differences in technique. The liver, gallbladder,  spleen, adrenal glands, pancreas, and kidneys are otherwise  unremarkable. No hydronephrosis. No evidence for solid organ injury.  Mild atherosclerotic aortoiliac calcification. The uterus is not seen,  and is likely surgically absent. Degenerative changes are noted in the  thoracolumbar spine. Displaced acute appearing fractures are noted  involving the L1 and L2 transverse processes on the right.      Impression    IMPRESSION:   1. Displaced acute appearing fractures  are noted involving the right  10th and 11th ribs posteriorly, as well as the right 2nd through 6th  ribs laterally. Nondisplaced fracture of the left second rib laterally  may also be acute. There are also acute-appearing displaced fractures  of the L1 and L2 transverse processes on the right.  2. No evidence for pulmonary embolism.  3. Small to moderate right pleural effusion.  4. Mild patchy mosaic attenuation in the lungs bilaterally may be  related to air trapping.  5. Moderate amount of stool in the rectum.      NUVIA ISLAS MD     Labs (personally reviewed/interpreted):  See a/p.  EKG (personally reviewed/interpreted):  78 sinus, nl axis, [id number], RBBB, precordial twave inversions previously seen 2/27.  No acute ischemic changes.  CT head (personally reviewed):  NAD  CT ch/abd/pelv (personally reviewed):  L1/L2 transverse process fx.  Several rib fx.  Moderate R pleural effusion.    D/w Dr. Villavicencio of neuro crit and Dr. Treviño in the ED.    Billing: This patient is critically ill: Yes. Total critical care time today 60 min.

## 2019-03-02 NOTE — ED NOTES
Bed: ST01  Expected date:   Expected time:   Means of arrival:   Comments:  E2  78 F poss head bleed/htn/  1440

## 2019-03-02 NOTE — ED NOTES
Family reports to MD patient resides at [facility] and staff performed CPR on the patient. Patient was revived prior to EMS arrival to the [facility].

## 2019-03-02 NOTE — PHARMACY-ADMISSION MEDICATION HISTORY
Admission medication history interview status for the 3/2/2019  admission is complete. See EPIC admission navigator for prior to admission medications     Medication history source reliability:Good    Actions taken by pharmacist (provider contacted, etc):  -PTA list obtained from family members, called Rx pharmacy [facility], and compared with last clinic visit     Additional medication history information not noted on PTA med list :  1. Family stated that the Sinemet dose was reduced one week ago. They suspect patient's major complain from this admission may due to the dose change from Sinemet.   2. Family stated that patient was not compliant to her vitamin supplements.       Medication reconciliation/reorder completed by provider prior to medication history? No    Time spent in this activity:  40 minutes    Prior to Admission medications    Medication Sig Last Dose Taking? Auth Provider   aspirin 81 MG tablet Take 81 mg by mouth At Bedtime @ midnight 3/1/2019 at HS Yes Pedro Pitts MD   carbidopa-levodopa (SINEMET CR)  MG CR tablet Take 1 tablet by mouth At Bedtime At 8 pm 3/1/2019 at 2000 Yes Pedro Pitts MD   carbidopa-levodopa (SINEMET)  MG tablet Take 2 tablets 4 times daily at 7 am, 10 am, 1 pm, and 4 pm 3/2/2019 at 1300 Yes Pedro Pitts MD   cholecalciferol 5000 units CAPS Take 1 capsule by mouth every morning @ 6am  Yes Pedro Pitts MD   docusate sodium (COLACE) 100 MG capsule Take 300 mg by mouth daily 3 x 100mg capsule @ noon 3/2/2019 at 1200 Yes Pedro Pitts MD   ibuprofen (ADVIL/MOTRIN) 200 MG tablet Take 400 mg by mouth 3 times daily 2 x 200mg tabs by mouth 3/day @ 6am, 12pm and 6pm and 3 x 200mg tabs by mouth every 8 hours as needed 3/2/2019 at 1200 Yes Pedro Pitts MD   levothyroxine (SYNTHROID/LEVOTHROID) 112 MCG tablet Take 112 mcg by mouth daily @ 6am 3/2/2019 at AM Yes Pedro Pitts MD   lisinopril (PRINIVIL/ZESTRIL) 5 MG tablet Take  5 mg by mouth daily @ 6am 3/2/2019 at 0600 Yes Pedro Pitts MD   mirabegron (MYRBETRIQ) 25 MG 24 hr tablet Take 25 mg by mouth daily @ 6am 3/2/2019 at 0600 Yes Pedro Pitts MD   Multiple Vitamins-Minerals (EYE VITAMINS) CAPS Take 1 capsule by mouth daily 1 capsule (macu-health) by mouth daily @ midnight  Yes Pedro Pitts MD   omeprazole (PRILOSEC) 20 MG DR capsule Take 20 mg by mouth daily @ 6am 3/2/2019 at 0600 Yes Pedro Pitts MD   polyethylene glycol (MIRALAX) powder Take 17 g by mouth daily as needed @0600 PRN Yes Pedro Pitts MD   rotigotine (NEUPRO) 4 MG/24HR 24 hr patch Apply patch daily @ bedtime/8 pm  Patient taking differently: Place 1 patch onto the skin daily @ bedtime/8 pm 3/1/2019 at 2000 Yes Pedro Pitts MD   tamsulosin (FLOMAX) 0.4 MG capsule Take 0.4 mg by mouth daily @ 6pm 3/1/2019 at 1800 Yes Pedro Pitts MD   traMADol (ULTRAM) 50 MG tablet Take 1 tablet (50 mg) by mouth every 6 hours as needed for severe pain PRN Yes Pedro Pitts MD   trospium (SANCTURA XR) 60 MG CP24 24 hr capsule Take 60 mg by mouth every morning (before breakfast) @ 6am 3/2/2019 at 0600 Yes Pedro Pitts MD   venlafaxine (EFFEXOR) 100 MG tablet Take 100 mg by mouth daily @ 6am 3/2/2019 at 0600 Yes Pedro Pitts MD Poppy Yue Wang, PharmD IV Student

## 2019-03-02 NOTE — ED PROVIDER NOTES
History     Chief Complaint:  Altered Mental Status    HPI   Loan Anderson is a 78 year old female with Parkinson's who presents combative with AMS. The  reports that he left her at home in normal condition at 1410 and returned around 1430 to find her in her current condition (non-responsive, gasping for breath). The  reported that she was given CPR and rescue breaths before EMS. EMS was called and brought the patient in. EMS reported that her blood sugar was appropriate and her oxygen was in the 90's.     Allergies:  Atorvastatin  Mirapex  Potassium Chloride  Requip  Epinephrine     Medications:    Aspirin  Sinemet  Cholecalciferol  Colace  Advil  Synthroid  Prinivil  Myrbetriq  Prilosec  Miralax  Neupro  Flomax  Ultram  Sanctura  Effexor    Past Medical History:    Constipation  Convergence insufficiency  Depression  Diplopia  Hot flashes  Hypothyroid  Hypercholesterolemia  Hypothyroid  Neurogenic bladder  Parkinson's Disease  REM sleep behavior disorder  Sebaceous cyst    Past Surgical History:    Gene therapy enrolled study  Sepsis  Lithrotripsy    Family History:    Parkinson disease  Eye disorder  Heart Attack  Stroke    Social History:  The patient is not a smoker and rarely drinks alcohol.   Marital Status:   [2]     Review of Systems   Unable to perform ROS: Patient unresponsive     Physical Exam     Patient Vitals for the past 24 hrs:   BP Temp Temp src Pulse Heart Rate Resp SpO2 Height Weight   03/02/19 1930 139/70 97.5  F (36.4  C) -- 64 64 14 100 % -- --   03/02/19 1915 142/71 97.7  F (36.5  C) -- 63 61 14 100 % -- --   03/02/19 1900 143/74 97.7  F (36.5  C) -- 63 63 14 100 % -- --   03/02/19 1845 144/70 97.9  F (36.6  C) -- 63 61 14 100 % -- --   03/02/19 1830 149/74 98.1  F (36.7  C) Bladder 64 63 14 100 % -- --   03/02/19 1730 164/86 98.06  F (36.7  C) -- 67 65 16 96 % -- --   03/02/19 1715 142/73 98.06  F (36.7  C) -- 68 67 12 97 % -- --   03/02/19 1700 162/81 98.06  F  "(36.7  C) -- 62 61 22 97 % -- --   03/02/19 1645 143/76 98.24  F (36.8  C) -- 68 68 25 98 % -- --   03/02/19 1630 145/82 98.24  F (36.8  C) -- 70 68 17 97 % -- --   03/02/19 1615 127/68 98.42  F (36.9  C) -- 71 71 15 96 % -- --   03/02/19 1600 128/63 98.42  F (36.9  C) -- 71 72 13 -- -- --   03/02/19 1555 -- -- -- -- -- -- 97 % -- --   03/02/19 1552 -- 98.2  F (36.8  C) Bladder -- -- -- -- -- --   03/02/19 1545 163/82 97.52  F (36.4  C) -- -- 75 12 98 % -- --   03/02/19 1530 149/77 -- -- 76 78 14 -- -- --   03/02/19 1520 -- -- -- -- -- -- 99 % -- --   03/02/19 1510 145/75 -- -- -- -- -- 100 % -- --   03/02/19 1503 131/79 -- -- -- -- -- 100 % -- --   03/02/19 1457 (!) 200/95 -- -- -- -- -- 100 % -- --   03/02/19 1452 (!) 220/95 -- -- 100 -- -- 99 % 1.6 m (5' 3\") 91 kg (200 lb 9.9 oz)     Physical Exam    General: Constantly moving in bed, flailing arms/legs  Head:  No head trauma  Eyes:  The pupils are equal and round though eyes appear disconjugate    Conjunctivae and sclerae are normal  ENT:    Blood in mouth  Neck:  Normal range of motion  CV:  Tachycardic rate and regular rhythm   Resp:  Lungs are clear    Non-labored breathing    No rales    No wheezing   GI:  Abdomen is soft, there is no rigidity    No distension    No rebound tenderness     No abdominal tenderness  MS:  Moving all extremities, appears to be equal movement in all extremities  Skin:  No rash or acute skin lesions noted.  Neuro: Moving all extremities    Eyes open but not following commands    Non verbal    Thrashing and flailing in bed  Emergency Department Course   ECG (15:30:49):  Rate 78 bpm. [place] interval 206. QRS duration 142. QT/QTc 442/503. P-R-T axes 75 24 72. Interpreted at 1536 by Rox Montilla MD.  Normal sinus rhythm with sinus arrhythmia  Right bundle branch block  Abnormal ECG    Imaging:  Radiographic findings were communicated with the patient who voiced understanding of the findings.  XR Chest Port 1 View  IMPRESSION: " Endotracheal tube in place in mid trachea. New area of  atelectasis or infiltrate in the right lung base. Left lung is clear.  Heart size is upper normal. Pulmonary vasculature does not appear  distended.    As read by Radiology.    CT Head w/o Contrast  IMPRESSION: Chronic changes. No evidence for intracranial hemorrhage  or any acute process.  As read by Radiology.    CT Chest (PE) Abdomen Pelvis w Contrast  IMPRESSION:   1. Displaced acute appearing fractures are noted involving the right  10th and 11th ribs posteriorly, as well as the right 2nd through 6th  ribs laterally. Nondisplaced fracture of the left second rib laterally  may also be acute. There are also acute-appearing displaced fractures  of the L1 and L2 transverse processes on the right.  2. No evidence for pulmonary embolism.  3. Small to moderate right pleural effusion.  4. Mild patchy mosaic attenuation in the lungs bilaterally may be  related to air trapping.  5. Moderate amount of stool in the rectum.    As read by Radiology.    CT Head Neck Angio w/o & w Contrast  IMPRESSION:  1. Negative CT angiography head and neck.  2. Moderate size right pleural effusion.   As read by Radiology.    Laboratory:  Laboratory findings were communicated to the patient who voiced understanding of the findings.  BMP: (Creatinine 1.18), Glucose 122, GFR Estimate 45, Calcium 8.4 o/w WNL  Lactic acid whole blood: 3.8  Glucose by meter: 112  CBC: WBC 13.5 o/w WNL (HGB 12.0, )  INR: 1.04  PTT: 28  ABO/Rh type and screen: AB Positive  UA with Micro: Specific gravity 1.039, Protein albumin 10, RBC urine 3. Bacteria urine few o/w WNL  Troponin I: <0.015  Hepatic panel: Albumin 3.2 o/w WNL  Procalcitonin: <0.05    Procedures:  Intubation Procedure Note:   Date/Time: 7:43 PM  Performed by: Rox Montilla MD    The procedure was performed in an emergent situation.  Risks and benefits: risks, benefits and alternatives were discussed.  Patient identity confirmed: verbally  "with patient and arm band  Time out: Immediately prior to procedure a \"time out\" was called to verify the correct patient, procedure, equipment, support staff and site/side marked as required.    Indication: Acute respiratory failure. and Airway protection.    Intubation method: CMAC  Patient status: RSI  Preoxygenation: Mask  Pretreatment medications: None  Sedatives: Etomidate  Paralytic: rocuronium  Laryngoscope size: Mac 4  Tube size: 7.5 cuffed with cuff inflated after placement  Number of attempts: 1  Cricoid pressure: yes  Cords visualized: yes  Post-procedure assessment: Breath sounds equal bilaterally with chest rise and absent over the epigastrium, Chest x-ray interpreted by me demonstrating endotracheal tube in appropriate position and CO2 detector.  ETT to teeth: 24 cm  Tube secured with: ETT lewis    Patient tolerated the procedure well with no immediate complications.  Complications:  None    Interventions:  1445: Amidate 30 mg IV  1447: Zemuron 100 mg IV  1452: Propofol 5 mcg/kg/min * 91 kg IV  1500: Normodyne 10 mg IV  1531: Propofol 10 mcg/kg/min * 91 kg IV  1731: Propofol 40 mcg/kg/min * 91 kg IV    Emergency Department Course:  Past medical records, nursing notes, and vitals reviewed.  1440: I performed an exam of the patient and obtained history, as documented above.   1447: Patient was restrained.  1448: Intubated  1450: X-Series in use.   1455: Bedside imaging was done.   1458: Patient was sent for Head CT.   Findings and plan explained to the Patient who consents to admission.   1630: Discussed the patient with Dr. Cartagena, who will admit the patient to a ICU bed for further monitoring, evaluation, and treatment.     Impression & Plan      Medical Decision Making:  Loan Anderson is a 78-year-old female who presented to the emergency department with altered mental status.  Patient with altered mental status.  EMS report was that she has a head bleed. They did not mention that she had CPR. "  She is combative, swinging her arms, not following commands.  IV access was established and given her condition and unclear exactly what is going on, patient was intubated for airway protection and to facilitate workup.  She did have blood coming out of her mouth on arrival to the emergency department.  Unclear if she bit her tongue at some point.   reports after the fact that he thinks there was CPR and rescue breathing performed.  Unclear exactly what happened today. There was also another incident this morning where she kind of went unresponsive.  Staff had said that may be there was a seizure yesterday though family thinks that it was not a seizure and more that she was acting herself as she sometimes gets agitated.  Did have a fall a few days ago.  Pt intubated and then went to CT.  No head bleed.  Given no head bleed and the further history provided by , did obtain CT chest abdomen pelvis.  Shows multiple rib fractures which may be from the CPR today versus the fall a few days ago.  Has lumbar fractures which I would think is from the fall a few days ago though did have negative lumbar xray.  Does have a pleural effusion.  Patient calm after intubated and on propofol.  She was also very hypertensive initially which significantly improved after intubation and on propofol.  EKG is unremarkable and appears similar to prior EKG.  Troponin is negative.  I discussed patient with ICU attending and we agreed to hold off on cooling at this time.  Did discuss that she may have a pneumonia and so did start her on vancomycin and Zosyn.  Lactate mildly elevated which could be from multiple different reasons.  Unclear if her initial agitation was from possible anoxic injury if there was actually no pulse.    Critical Care time:  was 45 minutes for this patient excluding procedures.    Diagnosis:    ICD-10-CM    1. Altered mental status, unspecified altered mental status type R41.82 Blood culture     Blood  culture     Procalcitonin     Hemoglobin A1c     Hemoglobin A1c     Glucose by meter     Glucose by meter     Lactic acid whole blood     CANCELED: Hemoglobin A1c   2. Agitation R45.1        Disposition:  Admitted to ICU    Vinny Wheatley  3/2/2019    EMERGENCY DEPARTMENT  I, Vinny Dioni, am serving as a scribe at 2:40 PM on 3/2/2019 to document services personally performed by Rox Montilla MD based on my observations and the provider's statements to me.         Rox Montilla MD  03/02/19 [id number]

## 2019-03-02 NOTE — PROGRESS NOTES
Called to ED to assist with intubation. Patient intubated at 1500 with 7.5 ETT secured 24cm at the lip. Breath sounds bilateral. Good color change on EtCO2 detector.    Patient transported to CT and back with no complications.     Placed on vent settings as follows:  Ventilation Mode: CMV/AC  (Continuous Mandatory Ventilation/ Assist Control)  FiO2 (%): 40 %  Rate Set (breaths/minute): 16 breaths/min  Tidal Volume Set (mL): 450 mL  PEEP (cm H2O): 5 cmH2O  Oxygen Concentration (%): 60 %

## 2019-03-03 NOTE — PROGRESS NOTES
Critical Care  Note      03/03/2019    Name: Loan Anderson MRN#: [id number]   Age: 78 year old YOB: 1940     [facility]    [facility] DAY #1             Problem List:   Acute encephalopathy  Rib fractures  YASMIN  leukocytosis         Summary/Hospital Course:     78F with parkinsons disease who lives in a memory center now presents from [facility] after being found acutely encephalopathic and mildly agitated/combative by her  around 2 pm on day of admission.  Apparently there had been an episode earlier in the day where she had turned red and coughed 3-4 min after taking pills and family member did the heimlich maneuver.  She returned to her normal state after this but no source of asphyxiation was found.  Later in the day her  went to get the mail and when he returned to their home (~10 min) he found staff doing cpr on her.  Apparently she became unresponsive at some point and cpr was started, but on EMS arrival she did have a perfusing rhythm.     A couple days prior to admit she had a mechanical fall resulting in R hip pain.  No injury noted at that time, but today has several rib fx on CT scan (may be due to cpr?) and transverse process L1L2 fx's--suspect these may be from fall.      Assessment and plan :     Loan Anderson IS a 78 year old female admitted on 3/2/2019 for acute encephalopathy.   I have personally reviewed the daily labs, imaging studies, cultures and discussed the case with referring physician and consulting physicians.   My assessment and plan by system for this patient is as follows:    Neurology/Psychiatry:   1. Acute encephalopathy:  Etiology unclear.  Working up for underlying sepsis, on broad spectrum abx.  ?seizure activity (episodic unresponsiveness)?  May be taking more tramadol than usual after fall--this would lower seizure threshold.  Neurocrit consult.  2. Analgesia:  lidoderm patches on chest for rib fx.  Prn  tylenol/oxycodone/dilaudid  3. Sedation:  Propofol drip for now.  Midazolam pushes prn.  4. H/o parkinsons dz:  D/w Dr. Villavicencio of neuro crit.  Will continue short acting carbo/levodopa.  Holding long-acting and patch forms for now per his advice.    Cardiovascular:   1.  H/o htn:  Hold antihypertensives for now  2.  Question of cardiac arrest:  No clear pulselessness that I'm aware of.   Hold off on cooling for now.  3.  Lactic acidosis:  resovled to 1.6    Pulmonary/Ventilator Management:   1. Loss of protective airway reflexes:  Due to encephalopathy noted above.  Not neurologically appropriate for extubation today.    GI and Nutrition :   1.  Tube feeds today  2.  PPI for PUD prophy    Renal/Fluids/Electrolytes:   1. YASMIN on CKD:  Mild, creat improving to 1.03.  UOP adequate.    Infectious Disease:   1. Procal ok <0.05 and no clear source of infection.  Empiric broad spectrum cvg for now and pan culture.  Low threshold to stop abx in 1-2 days if cx neg.    Endocrine:   1. FSG ok for now.    2.  Hypothyroid:  Continue home synthroid.    Hematology/Oncology:   1. Leukocytosis:  Resolved to 10.1.  ID plan as noted above.    ICU Prophylaxis:   1. DVT: Hep Subq/ mechanical  2. VAP: HOB 30 degrees, chlorhexidine rinse  3. Stress Ulcer: PPI  4. Restraints: Nonviolent soft two point restraints required and necessary for patient safety and continued cares and good effect as patient continues to pull at necessary lines, tubes despite education and distraction. Will readdress daily.   5. Wound care  - per unit routine  6. Feeding - ok to start tf today  7. Family Update: pending today  8. Disposition - critically ill           Medical History:     Past Medical History:   Diagnosis Date     Constipation 7/15/2011     Convergence insufficiency 7/15/2011     Depression 7/15/2011     Diplopia 7/18/2011     Hot flashes, menopausal 7/18/2011     Hypercholesterolemia 7/18/2011     Hypothyroid 7/15/2011     Neurogenic bladder  7/15/2011     Parkinson disease (H) 7/15/2011     REM sleep behavior disorder 7/18/2011     Sebaceous cyst 7/18/2011     Wears glasses 7/18/2011     Past Surgical History:   Procedure Laterality Date     ------------OTHER-------------  november ? 2015    lithrotripsy for renal stone     ------------OTHER-------------  november 2015    had sepsis from uti and hospitalized     BRAIN SURGERY  12 or 14 oct 2011    gene therapy enrolled study at [facility]     Social History     Socioeconomic History     Marital status:      Spouse name: Not on file     Number of children: Not on file     Years of education: Not on file     Highest education level: Not on file   Occupational History     Not on file   Social Needs     Financial resource strain: Not on file     Food insecurity:     Worry: Not on file     Inability: Not on file     Transportation needs:     Medical: Not on file     Non-medical: Not on file   Tobacco Use     Smoking status: Never Smoker     Smokeless tobacco: Never Used   Substance and Sexual Activity     Alcohol use: Yes     Comment: rare     Drug use: Not on file     Sexual activity: Not on file   Lifestyle     Physical activity:     Days per week: Not on file     Minutes per session: Not on file     Stress: Not on file   Relationships     Social connections:     Talks on phone: Not on file     Gets together: Not on file     Attends Hinduism service: Not on file     Active member of club or organization: Not on file     Attends meetings of clubs or organizations: Not on file     Relationship status: Not on file     Intimate partner violence:     Fear of current or ex partner: Not on file     Emotionally abused: Not on file     Physically abused: Not on file     Forced sexual activity: Not on file   Other Topics Concern     Parent/sibling w/ CABG, MI or angioplasty before 65F 55M? Not Asked   Social History Narrative    Spouse is serenity        Allergies   Allergen Reactions     Atorvastatin      Concern  that it was contributing to confusion     Mirapex [Pramipexole Dihydrochloride Monohydrate]      Leg swelling     Potassium Chloride      IV infusion only. She tolerates oral potassium chloride     Requip [Ropinirole Hydrochloride]      Leg swelling     Epinephrine Palpitations              Key Medications:       aspirin  81 mg Oral At Bedtime     carbidopa-levodopa  2 tablet Oral 4x Daily     chlorhexidine  15 mL Mouth/Throat Q12H     docusate sodium  100 mg Oral BID     heparin  5,000 Units Subcutaneous Q8H     insulin aspart  1-6 Units Subcutaneous Q4H     levothyroxine  112 mcg Oral QAM AC     lidocaine  3 patch Transdermal Q24h    And     lidocaine   Transdermal Q24H    And     lidocaine   Transdermal Q8H     pantoprazole  40 mg Oral Daily    Or     pantoprazole  40 mg Oral or Feeding Tube Daily     piperacillin-tazobactam  3.375 g Intravenous Q6H     vancomycin (VANCOCIN) IV  2,000 mg Intravenous Q24H     venlafaxine  37.5 mg Oral TID       propofol (DIPRIVAN) infusion 45 mcg/kg/min (03/03/19 0207)     sodium chloride 50 mL/hr at 03/02/19 1832        Home Meds    No current facility-administered medications on file prior to encounter.   Current Outpatient Medications on File Prior to Encounter:  aspirin 81 MG tablet Take 81 mg by mouth At Bedtime @ midnight   carbidopa-levodopa (SINEMET CR)  MG CR tablet Take 1 tablet by mouth At Bedtime At 8 pm   carbidopa-levodopa (SINEMET)  MG tablet Take 2 tablets 4 times daily at 7 am, 10 am, 1 pm, and 4 pm   cholecalciferol 5000 units CAPS Take 1 capsule by mouth every morning @ 6am   docusate sodium (COLACE) 100 MG capsule Take 300 mg by mouth daily 3 x 100mg capsule @ noon   ibuprofen (ADVIL/MOTRIN) 200 MG tablet Take 400 mg by mouth 3 times daily 2 x 200mg tabs by mouth 3/day @ 6am, 12pm and 6pm and 3 x 200mg tabs by mouth every 8 hours as needed   levothyroxine (SYNTHROID/LEVOTHROID) 112 MCG tablet Take 112 mcg by mouth daily @ 6am   lisinopril  (PRINIVIL/ZESTRIL) 5 MG tablet Take 5 mg by mouth daily @ 6am   mirabegron (MYRBETRIQ) 25 MG 24 hr tablet Take 25 mg by mouth daily @ 6am   Multiple Vitamins-Minerals (EYE VITAMINS) CAPS Take 1 capsule by mouth daily 1 capsule (macu-health) by mouth daily @ midnight   omeprazole (PRILOSEC) 20 MG DR capsule Take 20 mg by mouth daily @ 6am   polyethylene glycol (MIRALAX) powder Take 17 g by mouth daily as needed @0600   rotigotine (NEUPRO) 4 MG/24HR 24 hr patch Apply patch daily @ bedtime/8 pm (Patient taking differently: Place 1 patch onto the skin daily @ bedtime/8 pm)   tamsulosin (FLOMAX) 0.4 MG capsule Take 0.4 mg by mouth daily @ 6pm   traMADol (ULTRAM) 50 MG tablet Take 1 tablet (50 mg) by mouth every 6 hours as needed for severe pain   trospium (SANCTURA XR) 60 MG CP24 24 hr capsule Take 60 mg by mouth every morning (before breakfast) @ 6am   venlafaxine (EFFEXOR) 100 MG tablet Take 100 mg by mouth daily @ 6am              Physical Examination:   Temp:  [97.3  F (36.3  C)-98.42  F (36.9  C)] 98.2  F (36.8  C)  Pulse:  [] 63  Heart Rate:  [57-78] 64  Resp:  [11-25] 20  BP: (116-220)/(56-95) 123/60  FiO2 (%):  [40 %-60 %] 40 %  SpO2:  [96 %-100 %] 97 %  No intake or output data in the 24 hours ending 03/02/19 1732  Wt Readings from Last 4 Encounters:   03/02/19 91 kg (200 lb 9.9 oz)   02/27/19 86.2 kg (190 lb)   02/11/19 88 kg (194 lb)   11/14/18 86.2 kg (190 lb)     BP - Mean:  [] 77  Ventilation Mode: CMV/AC  (Continuous Mandatory Ventilation/ Assist Control)  FiO2 (%): 40 %  Rate Set (breaths/minute): 14 breaths/min  Tidal Volume Set (mL): 400 mL  PEEP (cm H2O): 5 cmH2O  Oxygen Concentration (%): 40 %  Resp: 20    No lab results found in last 7 days.    GEN: no acute distress, sedated   HEENT: head ncat, sclera anicteric, OP patent, trachea midline   PULM: unlabored synchronous with vent, clear anteriorly    CV/COR: RRR S1S2 no gallop,  No rub, no murmur  ABD: soft nontender, hypoactive bowel  sounds, no mass  EXT:  Minimal Edema x4,  Warm x4  NEURO: sedated.  Moves all 4 when sedation weaned.  L pupil slightly larger than R but both reactive.  SKIN: no obvious rash  LINES: clean, dry intact         Data:   All data and imaging reviewed     ROUTINE ICU LABS (Last four results)  CMP  Recent Labs   Lab 03/02/19  1449 02/27/19  0540    138   POTASSIUM 4.1 4.2   CHLORIDE 102 105   CO2 28 29   ANIONGAP 6 4   * 121*   BUN 26 25   CR 1.18* 1.06*   GFRESTIMATED 45* 51*   GFRESTBLACK 52* 59*   NIKKIE 8.4* 8.9   PROTTOTAL 7.0  --    ALBUMIN 3.2*  --    BILITOTAL 0.3  --    ALKPHOS 137  --    AST 21  --    ALT 7  --      CBC  Recent Labs   Lab 03/02/19  1449 02/27/19  0540   WBC 13.5* 7.9   RBC 3.98 4.76   HGB 12.0 14.5   HCT 36.2 43.2   MCV 91 91   MCH 30.2 30.5   MCHC 33.1 33.6   RDW 14.3 14.6    210     INR  Recent Labs   Lab 03/02/19  1449 02/27/19  0540   INR 1.04 0.93     Arterial Blood GasNo lab results found in last 7 days.    All cultures:  Recent Labs   Lab 03/02/19  1554 03/02/19  1543   CULT No growth after 3 hours No growth after 3 hours     Recent Results (from the past 24 hour(s))   XR Chest Port 1 View    Narrative    CHEST PORTABLE ONE VIEW     3/2/2019 3:00 PM     HISTORY: Tube placement.    COMPARISON: 2/27/2019.      Impression    IMPRESSION: Endotracheal tube in place in mid trachea. New area of  atelectasis or infiltrate in the right lung base. Left lung is clear.  Heart size is upper normal. Pulmonary vasculature does not appear  distended.      KELLIE BANKS MD   CT Head w/o Contrast    Narrative    CT SCAN OF THE HEAD WITHOUT CONTRAST   3/2/2019 3:10 PM     HISTORY: Altered level of consciousness (LOC), unexplained.    TECHNIQUE:  Axial images of the head and coronal reformations without  IV contrast material.  Radiation dose for this scan was reduced using  automated exposure control, adjustment of the mA and/or kV according  to patient size, or iterative reconstruction  technique.    COMPARISON: None.    FINDINGS: There is some moderate cerebral atrophy. There is an old  infarct in the left basal ganglia region. There are some patchy white  matter changes in both hemispheres without mass effect. The patient is  intubated. The visualized paranasal sinuses and mastoid air cells are  clear. There is no evidence for intracranial hemorrhage, acute  infarct, mass effect, or skull fracture. Bilateral angi hole  procedures are noted.      Impression    IMPRESSION: Chronic changes. No evidence for intracranial hemorrhage  or any acute process.       SHAGUFTA MOORE MD   CT Head Neck Angio w/o & w Contrast    Narrative    CTA HEAD NECK WITH CONTRAST 3/2/2019 3:18 PM     HISTORY: Headache, sudden, carotid/vertebral dissection suspected/    TECHNIQUE: Multiplanar multisequence images were obtained through the  neck without and with intravenous contrast. 70 mL of Isovue-370 was  given. Multiplanar reconstructions were performed. 3-D reconstructions  off a remote workstation for CT angiography were also acquired.  Carotid stenoses were evaluated by comparing the caliber of the  proximal internal carotid artery to the caliber of the distal internal  carotid artery. Radiation dose for this scan was reduced using  automated exposure control, adjustment of the mA and/or kV according  to patient size, or iterative reconstruction technique.    FINDINGS:    Brachiocephalic vessels: Normal.    Right carotid system: Normal.    Left carotid system: Trace amount of plaque. No stenosis or  dissection.    Right vertebral artery: Normal.    Left vertebral artery: Normal.    [place] of [place]: Normal.    Other findings: The patient is intubated. The tip of the ET tube is  above the sánchez. Trachea has slight deviation to the right. There is  a moderate size right pleural effusion. Degenerative changes are seen  in the spine. There is some minimal degenerative spondylolisthesis of  C3 and C4.      Impression     IMPRESSION:  1. Negative CT angiography head and neck.  2. Moderate size right pleural effusion.     SHAGUFTA MOORE MD   CT Chest (PE) Abdomen Pelvis w Contrast    Narrative    CT CHEST PE, ABDOMEN PELVIS WITH CONTRAST   3/2/2019 3:26 PM     HISTORY: Trauma. Altered mental status. Hypotension.    TECHNIQUE: 75 mL Isovue-370 IV were administered. After contrast  administration, volumetric helical sections were acquired from the  thoracic inlet to the ischial tuberosities. Pulmonary embolism  protocol was performed through the chest. Coronal images were also  reconstructed. Radiation dose for this scan was reduced using  automated exposure control, adjustment of the mA and/or kV according  to patient size, or iterative reconstruction technique.    COMPARISON: CT of the abdomen and pelvis performed 10/24/2017.    FINDINGS:    Chest: No evidence for pulmonary embolism or thoracic aortic  dissection. Atherosclerotic calcification of the thoracic aorta.  Endotracheal tube is in place, with tip approximately 2.5 cm above the  sánchez. Small to moderate right pleural effusion, with mild associated  compressive atelectasis at the right lung base posteriorly. No  pericardial or left pleural effusion. There is mild atelectasis at the  left lung base posteriorly. Mild patchy mosaic attenuation is noted in  the lungs bilaterally. There are displaced acute appearing fractures  involving the right 10th and 11th ribs posteriorly. Mildly displaced  fractures of the right second rib in two places laterally and  anteriorly. There are also mildly displaced fractures of the right  third through sixth ribs laterally. Nondisplaced fracture of the left  second rib laterally.    Abdomen and Pelvis: No bowel obstruction. There is a moderate amount  of stool in the rectum. No free fluid in the pelvis. No convincing  evidence for colitis or diverticulitis. No intra-abdominal fluid  collections to suggest hematoma. No free intraperitoneal  air.  Scattered cortical scarring in the left kidney is not significantly  changed, given differences in technique. The liver, gallbladder,  spleen, adrenal glands, pancreas, and kidneys are otherwise  unremarkable. No hydronephrosis. No evidence for solid organ injury.  Mild atherosclerotic aortoiliac calcification. The uterus is not seen,  and is likely surgically absent. Degenerative changes are noted in the  thoracolumbar spine. Displaced acute appearing fractures are noted  involving the L1 and L2 transverse processes on the right.      Impression    IMPRESSION:   1. Displaced acute appearing fractures are noted involving the right  10th and 11th ribs posteriorly, as well as the right 2nd through 6th  ribs laterally. Nondisplaced fracture of the left second rib laterally  may also be acute. There are also acute-appearing displaced fractures  of the L1 and L2 transverse processes on the right.  2. No evidence for pulmonary embolism.  3. Small to moderate right pleural effusion.  4. Mild patchy mosaic attenuation in the lungs bilaterally may be  related to air trapping.  5. Moderate amount of stool in the rectum.      NUVIA ISLAS MD     Labs (personally reviewed/interpreted):  See a/p.    D/w Dr. Villavicencio of neuro crit    Billing: This patient is critically ill: Yes. Total critical care time today 40 min.

## 2019-03-03 NOTE — PROGRESS NOTES
[facility] ICU RESPIRATORY NOTE  Date of Admission: 3//2/2019  Date of Intubation (most recent): 3/2/2019  Reason for Mechanical Ventilation: AW protection   Number of Days on Mechanical Ventilation: 2  Met Criteria for Pressure Support Trial: No   Reason for No Pressure Support Trial: Per MD     Significant Events Today: None     Ventilation Mode: CMV/AC  (Continuous Mandatory Ventilation/ Assist Control)  FiO2 (%): 40 %  Rate Set (breaths/minute): 14 breaths/min  Tidal Volume Set (mL): 400 mL  PEEP (cm H2O): 5 cmH2O  Oxygen Concentration (%): 40 %  Resp: 13    ABG Results:  No lab results found in last 7 days.    ETT appearance on chest x-ray: ETT in good position     Plan:  Patient to remain on full vent support.    Shannan Chambers RT  3/3/2019

## 2019-03-03 NOTE — PLAN OF CARE
Neuro: Sedated on Propofol, moved all extremities restless upon admission, did not follow any commands, did not appear consistently purposeful. Left pupil slightly bigger than right but brisk bilaterally, Dr. [place] aware. Propofol increased and restraints applied for patient safety.  Resp: Stable on current vent settings, minimal secretions from ETT.  CV: Good HR and BP  GI: Passing gas, OG with small output  : Good UO  Skin: No new issues, bruising right flank and left arm present on admission  Family updated at bedside on plan of care upon admission.

## 2019-03-03 NOTE — PROGRESS NOTES
Pt observed on SIMV 14 400 5/0 40% during 1900 vent check. Placed pt back on CMV 14 400 5+ 40% per order. RN aware.     Will continue to follow.     Nathan Vanessa RT

## 2019-03-03 NOTE — PHARMACY-VANCOMYCIN DOSING SERVICE
Pharmacy Vancomycin Initial Note  Date of Service 2019  Patient's  1940  78 year old, female    Indication: [facility] Pneumonia    Current estimated CrCl = Estimated Creatinine Clearance: 43.4 mL/min (A) (based on SCr of 1.18 mg/dL (H)).    Creatinine for last 3 days  3/2/2019:  2:49 PM Creatinine 1.18 mg/dL        Vancomycin IV Administrations (past 72 hours)                   vancomycin (VANCOCIN) 2,000 mg in sodium chloride 0.9 % 500 mL intermittent infusion (mg) 2,000 mg New Bag 19 1830                Nephrotoxins and other renal medications (From now, onward)    Start     Dose/Rate Route Frequency Ordered Stop    19 1800  vancomycin (VANCOCIN) 2,000 mg in sodium chloride 0.9 % 500 mL intermittent infusion      2,000 mg  over 2 Hours Intravenous EVERY 24 HOURS 19 2147      19 2300  piperacillin-tazobactam (ZOSYN) 3.375 g vial to attach to  mL bag      3.375 g  over 30 Minutes Intravenous EVERY 6 HOURS 19 1753            Contrast Orders - past 72 hours (72h ago, onward)    Start     Dose/Rate Route Frequency Ordered Stop    19 1515  iopamidol (ISOVUE-370) solution 71 mL      71 mL Intravenous ONCE 19 1514 19 1522    19 1512  iopamidol (ISOVUE-370) solution 101 mL  Status:  Discontinued      101 mL Intravenous ONCE 19 1511 19 1514    19 1454  iopamidol (ISOVUE-370) solution 70 mL      70 mL Intravenous ONCE 19 1453 19 1513              Plan:  1.  Start vancomycin  2000 mg IV q24h.   2.  Goal Trough Level: 15-20 mg/L   3.  Pharmacy will check trough levels as appropriate in 3-4 doses.    4. Serum creatinine levels will be ordered daily for the first week of therapy and at least twice weekly for subsequent weeks.    5. [place] method utilized to dose vancomycin therapy: Method 1    Hansel Garland

## 2019-03-03 NOTE — PROGRESS NOTES
[facility] ICU RESPIRATORY NOTE  Date of Admission: 3/2/19  Date of Intubation (most recent): 3/2/19  Reason for Mechanical Ventilation: Airway protection   Number of Days on Mechanical Ventilation: 2  Met Criteria for Pressure Support Trial: No  Length of Pressure Support Trial:    Reason for Stopping Pressure Support Trial:  Reason for No Pressure Support Trial: Per MD     Significant Events Today: None overnight     ABG Results: No lab results found in last 7 days.    Ventilation Mode: CMV/AC  (Continuous Mandatory Ventilation/ Assist Control)  FiO2 (%): 40 %  Rate Set (breaths/minute): 14 breaths/min  Tidal Volume Set (mL): 400 mL  PEEP (cm H2O): 5 cmH2O  Oxygen Concentration (%): 40 %  Resp: 17         ETT appearance on chest x-ray: In good position      Skin Assessment: Intact     Plan:   continue full support    Nathan Vanessa RT

## 2019-03-03 NOTE — CONSULTS
"CLINICAL NUTRITION SERVICES  -  ASSESSMENT NOTE      Recommendations Ordered by Registered Dietitian (RD):     TF via OG:  Replete with Fiber @ 35 mL/hr = 840 cals, 54 gm pro, 13 gm fiber, 700 mL free water, 56% RDI  1 packet Prostat BID = 200 cals, 30 gm fiber  TOTAL = 1040 cals, 84 gm pro (1.6 gm/kg)    TF + Propofol ===> 1616 cals (18 yg/kg)    Begin TF at 15 mL/hr.  Increase by 10 mL every 24 hrs to goal rate of 35 mL/hr.  Standard water flushes of 60 mL every 4 hrs  Daily multivitamin      Malnutrition:   % Weight Loss:  None noted  % Intake: unable to assess  Subcutaneous Fat Loss: unable to assess  Muscle Loss:  Unable to assess  Fluid Retention:  None noted    Malnutrition Diagnosis: Unable to determine due to unable to complete physical assessment and obtain diet history         REASON FOR ASSESSMENT  Loan Anderson is a 78 year old female seen by Registered Dietitian for Provider Order - Registered Dietitian to Assess and Order TF per Medical Nutrition Therapy Protocol      NUTRITION HISTORY  Pt resides in a memory care unit  Unable to obtain diet history (pt on vent, family meeting with )      CURRENT NUTRITION ORDERS  Diet Order:     NPO    Pt has OG  Plan to begin TF today      PHYSICAL FINDINGS  Observed  Vent  Obtained from Chart/Interdisciplinary Team  None noted    ANTHROPOMETRICS  Height: 5' 3\"  Weight:(3/3) 90.2 kg / 198 lbs 13.68 oz  Body mass index is 35.23 kg/m .  Weight Status:  Obesity Grade II BMI 35-39.9  IBW: 52.3 kg  % IBW: 172%  Weight History:   Wt Readings from Last 10 Encounters:   03/03/19 90.2 kg (198 lb 13.7 oz)   02/27/19 86.2 kg (190 lb)   02/11/19 88 kg (194 lb)   11/14/18 86.2 kg (190 lb)   10/22/18 89.7 kg (197 lb 11.2 oz)   05/14/18 86 kg (189 lb 11.2 oz)   11/15/17 86 kg (189 lb 9.5 oz)   11/15/17 86 kg (189 lb 11.2 oz)   11/13/17 86 kg (189 lb 9.6 oz)   11/07/17 86.3 kg (190 lb 3.2 oz)         LABS  Labs reviewed    MEDICATIONS  IVF @ 50 " mL/hr  Colace  Insulin  Propofol @ 21.8 mL/hr  (576 cals)      ASSESSED NUTRITION NEEDS PER APPROVED PRACTICE GUIDELINES:    Dosing Weight 90.2 kg (3/3 actual wt for energy); 52.3 kg (IBW for protein)  Estimated Energy Needs: 7312-3710 kcals (14-17 Kcal/Kg)  Justification: hypercaloric feeding guidelines for obesity  Estimated Protein Needs:  grams protein (1.5-2 g pro/Kg)  Justification: hypercatabolism with acute illness  Estimated Fluid Needs: 2357-9639  mL (1 mL/Kcal)  Justification: maintenance OR per provider pending fluid status    MALNUTRITION:  % Weight Loss:  None noted  % Intake: unable to assess  Subcutaneous Fat Loss: unable to assess  Muscle Loss:  Unable to assess  Fluid Retention:  None noted    Malnutrition Diagnosis: Unable to determine due to unable to complete physical assessment and obtain diet history      NUTRITION DIAGNOSIS:  Inadequate protein-energy intake related to NPO status on vent  as evidenced by pt meeting 0% est needs      NUTRITION INTERVENTIONS  Recommendations / Nutrition Prescription  NPO    TF via OG:  Replete with Fiber @ 35 mL/hr = 840 cals, 54 gm pro, 13 gm fiber, 700 mL free water, 56% RDI  1 packet Prostat BID = 200 cals, 30 gm fiber  TOTAL = 1040 cals, 84 gm pro (1.6 gm/kg)    TF + Propofol ===> 1606 cals (18 gy/kg)    Begin TF at 15 mL/hr.  Increase by 10 mL every 24 hrs to goal rate of 35 mL/hr.  Standard water flushes of 60 mL every 4 hrs  Daily multivitamin   .      Implementation  Nutrition education: No education needs assessed at this time  EN Composition and EN Schedule: entered orders in EPIC  .      Nutrition Goals  EN to meet % est needs  .      MONITORING AND EVALUATION:  Progress towards goals will be monitored and evaluated per protocol and Practice Guidelines

## 2019-03-03 NOTE — PROGRESS NOTES
"SPIRITUAL HEALTH SERVICES  Spiritual Assessment Progress Note  [facility] ICU   met with pt's daughter, Therese. Pt is intubated and sedated.  Daughter tearfully processed the situation and reflected that \" no option is a good one\".  Daughter doesn't think pt would want all of this but she knows pt would not want family \"to give up too soon.\"  Daughter stated that pt's quality of life has been difficult with her memory issues.  In her prime, pt was very smart and self sufficient.  Pt's  travels back and forth from his home - an hour drive away to be with pt in the .   Pt has 2 daughters that live in the city so pt is in a memory care facility here.     is away at a  and will likely be back tomorrow.   Pt is [employer].    Daughter is tearful and grieving the situation and the decisions before them.     listened and provided emotional support.      SH will continue to follow.                                                                                                                                            Kayla Mcmillan M.Div., [facility]  Staff    Pager [phone]  "

## 2019-03-04 NOTE — PLAN OF CARE
No changes. EEG started around 1400.  Family aware and consented to video monitoring.  Propofol increased due to agitation. Restraints on. SR. Started TF at 1400.

## 2019-03-04 NOTE — PLAN OF CARE
Neuro: Patient remains sedated, very agitated/restless without sedation. Patient withdraws with legs, arms move spontaneously, does not follow commands or track with eyes. Left pupil slightly greater than right, unchanged.   Resp: Stable on current vent settings, minimal secretions  CV: Stable HR and BP  GI: Tolerating TF with moderate residuals  : Good UO  Skin: No new issues  Pain: Oxycodone, Tylenol and Lidocaine patches given for pain from multiple rib fractures. Appears comfortable.    Aicha Psator RN, CCRN-CSC

## 2019-03-04 NOTE — TELEPHONE ENCOUNTER
Called patient's daughter, Kassandra, for MTM visit today. However, patient is currently inpatient in ICU at [facility] so just discussed briefly with daughter today.     Daughter states that for the first week on the patient's new PD regimen after MTM visit she did very well; she was sleeping better and her dyskinesia improved. The family was pleased with the PD medication changes. Then last week she had an episode where she got up overnight and fell backwards into the toilet frame. She was given more tramadol than usual for pain (1 tablet every 4 hours) from the fall and it was suspected that this may have lowered her seizure threshold and resulted in some episodes of SOB/non-responsiveness. She is now intubated in the ICU and they are reportedly working to wean the propofol and extubate her. Of note, she is currently on quetiapine while intubated.     I advised Kassandra to call me after the patient has discharged so we can follow up on medications and she agreed to this.     Tamika Diane, Pharm.D.  Medication Therapy Management Pharmacist  Phone: [phone]

## 2019-03-04 NOTE — PROGRESS NOTES
Critical Care  Note      03/04/2019    Name: Loan Anderson MRN#: [id number]   Age: 78 year old YOB: 1940     Hsptl Day# 2  ICU DAY #2    MV DAY #2             Problem List:   Acute encephalopathy  Rib fractures  YASMIN  leukocytosis         Summary/Hospital Course:     78F with parkinsons disease who lives in a memory center now presents from [facility] after being found acutely encephalopathic and mildly agitated/combative by her  around 2 pm on day of admission.  Apparently there had been an episode earlier in the day where she had turned red and coughed 3-4 min after taking pills and family member did the heimlich maneuver.  She returned to her normal state after this but no source of asphyxiation was found.  Later in the day her  went to get the mail and when he returned to their home (~10 min) he found staff doing cpr on her.  Apparently she became unresponsive at some point and cpr was started, but on EMS arrival she did have a perfusing rhythm.     A couple days prior to admit she had a mechanical fall resulting in R hip pain.  No injury noted at that time, but today has several rib fx on CT scan (may be due to cpr?) and transverse process L1L2 fx's--suspect these may be from fall.      Assessment and plan :     Loan Anderson IS a 78 year old female admitted on 3/2/2019 for acute encephalopathy.   I have personally reviewed the daily labs, imaging studies, cultures and discussed the case with referring physician and consulting physicians.   My assessment and plan by system for this patient is as follows:    Neurology/Psychiatry:   1. Acute encephalopathy:  Etiology unclear; ?seizure activity (episodic unresponsiveness)? -- on video EEG today. May be taking more tramadol than usual after fall--this would lower seizure threshold.  Neurocrit consult.  2. Analgesia:  lidoderm patches on chest for rib fx.  Making PO tylenol standing.  Prn oxycodone/dilaudid  3. Sedation:   Propofol drip for now.  Midazolam pushes prn.  Increasing seroquel, initiate valproate.  4. H/o parkinsons dz:  Appreciate neuro crit input.  Will continue short acting carbo/levodopa.  Holding long-acting and patch forms for now.    Cardiovascular:    1.  H/o htn:  Hold antihypertensives for now    Pulmonary/Ventilator Management:   1. Loss of protective airway reflexes:  Due to encephalopathy noted above.  Not neurologically appropriate for extubation today.    GI and Nutrition :   1.  Tube feeds  2.  PPI for PUD prophy    Renal/Fluids/Electrolytes:   1. YASMIN on CKD:  Mild, creat slightly worse today to 1.20.  UOP adequate.  Continue to watch closely.    Infectious Disease:   1. Procal ok <0.05 and no clear source of infection.  Cultures remain ngtd.  Stop vanco today.  Will stop zosyn tomorrow if no growth on sputum cx by then.    Endocrine:   1. FSG ok for now.    2.  Hypothyroid:  Continue home synthroid.    Hematology/Oncology:   1.  Anemia to 10.4-- slow decline since admission.  No hemodynamic instability or signs of bleeding.  Suspect due to critical illness.  2.  WBC ok 8.5, pltlts ok 182.    ICU Prophylaxis:   1. DVT: Hep Subq/ mechanical  2. VAP: HOB 30 degrees, chlorhexidine rinse  3. Stress Ulcer: PPI  4. Restraints: Nonviolent soft two point restraints required and necessary for patient safety and continued cares and good effect as patient continues to pull at necessary lines, tubes despite education and distraction. Will readdress daily.   5. Wound care  - per unit routine  6. Feeding - ok to start tf today  7. Family Update: daughters and  at bedside yesterday; still pending today.  8. Disposition - critically ill           Medical History:     Past Medical History:   Diagnosis Date     Constipation 7/15/2011     Convergence insufficiency 7/15/2011     Depression 7/15/2011     Diplopia 7/18/2011     Hot flashes, menopausal 7/18/2011     Hypercholesterolemia 7/18/2011     Hypothyroid 7/15/2011      Neurogenic bladder 7/15/2011     Parkinson disease (H) 7/15/2011     REM sleep behavior disorder 7/18/2011     Sebaceous cyst 7/18/2011     Wears glasses 7/18/2011     Past Surgical History:   Procedure Laterality Date     ------------OTHER-------------  november ? 2015    lithrotripsy for renal stone     ------------OTHER-------------  november 2015    had sepsis from uti and hospitalized     BRAIN SURGERY  12 or 14 oct 2011    gene therapy enrolled study at [place]     Social History     Socioeconomic History     Marital status:      Spouse name: Not on file     Number of children: Not on file     Years of education: Not on file     Highest education level: Not on file   Occupational History     Not on file   Social Needs     Financial resource strain: Not on file     Food insecurity:     Worry: Not on file     Inability: Not on file     Transportation needs:     Medical: Not on file     Non-medical: Not on file   Tobacco Use     Smoking status: Never Smoker     Smokeless tobacco: Never Used   Substance and Sexual Activity     Alcohol use: Yes     Comment: rare     Drug use: Not on file     Sexual activity: Not on file   Lifestyle     Physical activity:     Days per week: Not on file     Minutes per session: Not on file     Stress: Not on file   Relationships     Social connections:     Talks on phone: Not on file     Gets together: Not on file     Attends Jew service: Not on file     Active member of club or organization: Not on file     Attends meetings of clubs or organizations: Not on file     Relationship status: Not on file     Intimate partner violence:     Fear of current or ex partner: Not on file     Emotionally abused: Not on file     Physically abused: Not on file     Forced sexual activity: Not on file   Other Topics Concern     Parent/sibling w/ CABG, MI or angioplasty before 65F 55M? Not Asked   Social History Narrative    Spouse is serenity        Allergies   Allergen Reactions      Atorvastatin      Concern that it was contributing to confusion     Mirapex [Pramipexole Dihydrochloride Monohydrate]      Leg swelling     Potassium Chloride      IV infusion only. She tolerates oral potassium chloride     Requip [Ropinirole Hydrochloride]      Leg swelling     Epinephrine Palpitations              Key Medications:       aspirin  81 mg Oral At Bedtime     carbidopa-levodopa  2 tablet Oral 4x Daily     chlorhexidine  15 mL Mouth/Throat Q12H     docusate  100 mg Oral or Feeding Tube BID     heparin  5,000 Units Subcutaneous Q8H     insulin aspart  1-6 Units Subcutaneous Q4H     levothyroxine  112 mcg Oral QAM AC     lidocaine  3 patch Transdermal Q24h    And     lidocaine   Transdermal Q24H    And     lidocaine   Transdermal Q8H     multivitamins w/minerals  15 mL Per Feeding Tube Daily     pantoprazole  40 mg Oral Daily    Or     pantoprazole  40 mg Oral or Feeding Tube Daily     piperacillin-tazobactam  4.5 g Intravenous Q6H     protein modular  1 packet Per Feeding Tube BID     QUEtiapine  25 mg Oral or Feeding Tube BID     venlafaxine  37.5 mg Oral TID       IV fluid REPLACEMENT ONLY       propofol (DIPRIVAN) infusion 45 mcg/kg/min (03/04/19 0645)     sodium chloride 50 mL/hr at 03/04/19 0329        Home Meds    No current facility-administered medications on file prior to encounter.   Current Outpatient Medications on File Prior to Encounter:  aspirin 81 MG tablet Take 81 mg by mouth At Bedtime @ midnight   carbidopa-levodopa (SINEMET CR)  MG CR tablet Take 1 tablet by mouth At Bedtime At 8 pm   carbidopa-levodopa (SINEMET)  MG tablet Take 2 tablets 4 times daily at 7 am, 10 am, 1 pm, and 4 pm   cholecalciferol 5000 units CAPS Take 1 capsule by mouth every morning @ 6am   docusate sodium (COLACE) 100 MG capsule Take 300 mg by mouth daily 3 x 100mg capsule @ noon   ibuprofen (ADVIL/MOTRIN) 200 MG tablet Take 400 mg by mouth 3 times daily 2 x 200mg tabs by mouth 3/day @ 6am, 12pm and  6pm and 3 x 200mg tabs by mouth every 8 hours as needed   levothyroxine (SYNTHROID/LEVOTHROID) 112 MCG tablet Take 112 mcg by mouth daily @ 6am   lisinopril (PRINIVIL/ZESTRIL) 5 MG tablet Take 5 mg by mouth daily @ 6am   mirabegron (MYRBETRIQ) 25 MG 24 hr tablet Take 25 mg by mouth daily @ 6am   Multiple Vitamins-Minerals (EYE VITAMINS) CAPS Take 1 capsule by mouth daily 1 capsule (macu-health) by mouth daily @ midnight   omeprazole (PRILOSEC) 20 MG DR capsule Take 20 mg by mouth daily @ 6am   polyethylene glycol (MIRALAX) powder Take 17 g by mouth daily as needed @0600   rotigotine (NEUPRO) 4 MG/24HR 24 hr patch Apply patch daily @ bedtime/8 pm (Patient taking differently: Place 1 patch onto the skin daily @ bedtime/8 pm)   tamsulosin (FLOMAX) 0.4 MG capsule Take 0.4 mg by mouth daily @ 6pm   traMADol (ULTRAM) 50 MG tablet Take 1 tablet (50 mg) by mouth every 6 hours as needed for severe pain   trospium (SANCTURA XR) 60 MG CP24 24 hr capsule Take 60 mg by mouth every morning (before breakfast) @ 6am   venlafaxine (EFFEXOR) 100 MG tablet Take 100 mg by mouth daily @ 6am              Physical Examination:   Temp:  [98.6  F (37  C)-100  F (37.8  C)] 98.8  F (37.1  C)  Pulse:  [58-73] 58  Heart Rate:  [56-74] 60  Resp:  [10-15] 13  BP: (103-141)/(45-67) 104/47  FiO2 (%):  [40 %] 40 %  SpO2:  [96 %-100 %] 98 %  No intake or output data in the 24 hours ending 03/02/19 1732  Wt Readings from Last 4 Encounters:   03/04/19 91.4 kg (201 lb 8 oz)   02/27/19 86.2 kg (190 lb)   02/11/19 88 kg (194 lb)   11/14/18 86.2 kg (190 lb)     BP - Mean:  [65-95] 66  Ventilation Mode: CMV/AC  (Continuous Mandatory Ventilation/ Assist Control)  FiO2 (%): 40 %  Rate Set (breaths/minute): 14 breaths/min  Tidal Volume Set (mL): 400 mL  PEEP (cm H2O): 5 cmH2O  Oxygen Concentration (%): 40 %  Resp: 13    No lab results found in last 7 days.    GEN: no acute distress, sedated   HEENT: head ncat, sclera anicteric, OP patent, trachea midline    PULM: unlabored synchronous with vent, clear anteriorly    CV/COR: RRR S1S2 no gallop,  No rub, no murmur  ABD: soft nontender, hypoactive bowel sounds, no mass  EXT:  Minimal Edema x4,  Warm x4  NEURO: sedated.  Moves all 4 when sedation weaned.  L pupil slightly larger than R but both reactive.  Very agitated when sedation weaned--doesn't follow.  SKIN: no obvious rash  LINES: clean, dry intact         Data:   All data and imaging reviewed     ROUTINE ICU LABS (Last four results)  CMP  Recent Labs   Lab 03/04/19  0530 03/03/19  0630 03/02/19  1449 02/27/19  0540   * 137 136 138   POTASSIUM 3.8 3.5 4.1 4.2   CHLORIDE 115* 104 102 105   CO2 26 27 28 29   ANIONGAP 4 6 6 4   * 98 122* 121*   BUN 17 19 26 25   CR 1.20* 1.03 1.18* 1.06*   GFRESTIMATED 44* 53* 45* 51*   GFRESTBLACK 51* 61 52* 59*   NIKKIE 7.6* 7.6* 8.4* 8.9   MAG 2.3 2.2  --   --    PHOS 3.3 3.1  --   --    PROTTOTAL  --   --  7.0  --    ALBUMIN  --   --  3.2*  --    BILITOTAL  --   --  0.3  --    ALKPHOS  --   --  137  --    AST  --   --  21  --    ALT  --   --  7  --      CBC  Recent Labs   Lab 03/04/19 0530 03/03/19  0630 03/02/19  1449 02/27/19  0540   WBC 8.5 10.1 13.5* 7.9   RBC 3.47* 3.76* 3.98 4.76   HGB 10.4* 11.4* 12.0 14.5   HCT 31.7* 33.9* 36.2 43.2   MCV 91 90 91 91   MCH 30.0 30.3 30.2 30.5   MCHC 32.8 33.6 33.1 33.6   RDW 15.2* 14.6 14.3 14.6    172 195 210     INR  Recent Labs   Lab 03/02/19  1449 02/27/19  0540   INR 1.04 0.93     Arterial Blood GasNo lab results found in last 7 days.    All cultures:  Recent Labs   Lab 03/03/19  0530 03/02/19  1554 03/02/19  1543   CULT PENDING No growth after 2 days No growth after 2 days     Recent Results (from the past 24 hour(s))   XR Chest Port 1 View    Narrative    CHEST PORTABLE ONE VIEW     3/2/2019 3:00 PM     HISTORY: Tube placement.    COMPARISON: 2/27/2019.      Impression    IMPRESSION: Endotracheal tube in place in mid trachea. New area of  atelectasis or infiltrate  in the right lung base. Left lung is clear.  Heart size is upper normal. Pulmonary vasculature does not appear  distended.      KELLIE BANKS MD   CT Head w/o Contrast    Narrative    CT SCAN OF THE HEAD WITHOUT CONTRAST   3/2/2019 3:10 PM     HISTORY: Altered level of consciousness (LOC), unexplained.    TECHNIQUE:  Axial images of the head and coronal reformations without  IV contrast material.  Radiation dose for this scan was reduced using  automated exposure control, adjustment of the mA and/or kV according  to patient size, or iterative reconstruction technique.    COMPARISON: None.    FINDINGS: There is some moderate cerebral atrophy. There is an old  infarct in the left basal ganglia region. There are some patchy white  matter changes in both hemispheres without mass effect. The patient is  intubated. The visualized paranasal sinuses and mastoid air cells are  clear. There is no evidence for intracranial hemorrhage, acute  infarct, mass effect, or skull fracture. Bilateral angi hole  procedures are noted.      Impression    IMPRESSION: Chronic changes. No evidence for intracranial hemorrhage  or any acute process.       SHAGUFTA MOORE MD   CT Head Neck Angio w/o & w Contrast    Narrative    CTA HEAD NECK WITH CONTRAST 3/2/2019 3:18 PM     HISTORY: Headache, sudden, carotid/vertebral dissection suspected/    TECHNIQUE: Multiplanar multisequence images were obtained through the  neck without and with intravenous contrast. 70 mL of Isovue-370 was  given. Multiplanar reconstructions were performed. 3-D reconstructions  off a remote workstation for CT angiography were also acquired.  Carotid stenoses were evaluated by comparing the caliber of the  proximal internal carotid artery to the caliber of the distal internal  carotid artery. Radiation dose for this scan was reduced using  automated exposure control, adjustment of the mA and/or kV according  to patient size, or iterative reconstruction  technique.    FINDINGS:    Brachiocephalic vessels: Normal.    Right carotid system: Normal.    Left carotid system: Trace amount of plaque. No stenosis or  dissection.    Right vertebral artery: Normal.    Left vertebral artery: Normal.    [place] of [place]: Normal.    Other findings: The patient is intubated. The tip of the ET tube is  above the sánchez. Trachea has slight deviation to the right. There is  a moderate size right pleural effusion. Degenerative changes are seen  in the spine. There is some minimal degenerative spondylolisthesis of  C3 and C4.      Impression    IMPRESSION:  1. Negative CT angiography head and neck.  2. Moderate size right pleural effusion.     SHAGUFTA MOORE MD   CT Chest (PE) Abdomen Pelvis w Contrast    Narrative    CT CHEST PE, ABDOMEN PELVIS WITH CONTRAST   3/2/2019 3:26 PM     HISTORY: Trauma. Altered mental status. Hypotension.    TECHNIQUE: 75 mL Isovue-370 IV were administered. After contrast  administration, volumetric helical sections were acquired from the  thoracic inlet to the ischial tuberosities. Pulmonary embolism  protocol was performed through the chest. Coronal images were also  reconstructed. Radiation dose for this scan was reduced using  automated exposure control, adjustment of the mA and/or kV according  to patient size, or iterative reconstruction technique.    COMPARISON: CT of the abdomen and pelvis performed 10/24/2017.    FINDINGS:    Chest: No evidence for pulmonary embolism or thoracic aortic  dissection. Atherosclerotic calcification of the thoracic aorta.  Endotracheal tube is in place, with tip approximately 2.5 cm above the  sánchez. Small to moderate right pleural effusion, with mild associated  compressive atelectasis at the right lung base posteriorly. No  pericardial or left pleural effusion. There is mild atelectasis at the  left lung base posteriorly. Mild patchy mosaic attenuation is noted in  the lungs bilaterally. There are displaced acute  appearing fractures  involving the right 10th and 11th ribs posteriorly. Mildly displaced  fractures of the right second rib in two places laterally and  anteriorly. There are also mildly displaced fractures of the right  third through sixth ribs laterally. Nondisplaced fracture of the left  second rib laterally.    Abdomen and Pelvis: No bowel obstruction. There is a moderate amount  of stool in the rectum. No free fluid in the pelvis. No convincing  evidence for colitis or diverticulitis. No intra-abdominal fluid  collections to suggest hematoma. No free intraperitoneal air.  Scattered cortical scarring in the left kidney is not significantly  changed, given differences in technique. The liver, gallbladder,  spleen, adrenal glands, pancreas, and kidneys are otherwise  unremarkable. No hydronephrosis. No evidence for solid organ injury.  Mild atherosclerotic aortoiliac calcification. The uterus is not seen,  and is likely surgically absent. Degenerative changes are noted in the  thoracolumbar spine. Displaced acute appearing fractures are noted  involving the L1 and L2 transverse processes on the right.      Impression    IMPRESSION:   1. Displaced acute appearing fractures are noted involving the right  10th and 11th ribs posteriorly, as well as the right 2nd through 6th  ribs laterally. Nondisplaced fracture of the left second rib laterally  may also be acute. There are also acute-appearing displaced fractures  of the L1 and L2 transverse processes on the right.  2. No evidence for pulmonary embolism.  3. Small to moderate right pleural effusion.  4. Mild patchy mosaic attenuation in the lungs bilaterally may be  related to air trapping.  5. Moderate amount of stool in the rectum.      NUVIA ISLAS MD     Labs (personally reviewed/interpreted):  See a/p.  EKG (personally reviewed/interpreted):  59 sinus.  Nl int (qtc 459 on bid seroquel 25 mg), nl axis, nl acute ischemic changes.  twi in precordial leads  again noted.  rbbb again noted.    D/w Dr. Cabrera of neurology, he agrees with increasing seroquel and initiating valproate for acute delirium.     Billing: This patient is critically ill: Yes. Total critical care time today 35 min.

## 2019-03-04 NOTE — PROGRESS NOTES
[facility] ICU RESPIRATORY NOTE  Date of Admission: 3//2/2019  Date of Intubation (most recent): 3/2/2019  Reason for Mechanical Ventilation: AW protection   Number of Days on Mechanical Ventilation: 3  Met Criteria for Pressure Support Trial: No   Reason for No Pressure Support Trial: Per MD      Significant Events Today: None Overnight    No lab results found in last 7 days.    Ventilation Mode: CMV/AC  (Continuous Mandatory Ventilation/ Assist Control)  FiO2 (%): 40 %  Rate Set (breaths/minute): 14 breaths/min  Tidal Volume Set (mL): 400 mL  PEEP (cm H2O): 5 cmH2O  Oxygen Concentration (%): 40 %  Resp: 14    Will continue to follow.     Nathan Vanessa RT

## 2019-03-04 NOTE — PROGRESS NOTES
[facility] ICU RESPIRATORY NOTE        Date of Admission: 3/2/2019  Date of Intubation (most recent): 3/2/19  Reason for Mechanical Ventilation: AW protection    Number of Days on Mechanical Ventilation: 3    Met Criteria for Pressure Support Trial: No    Reason for No Pressure Support Trial: Per MD    Significant Events Today:     ABG Results: No lab results found in last 7 days.    Current Vent Settings: Ventilation Mode: CMV/AC  (Continuous Mandatory Ventilation/ Assist Control)  FiO2 (%): 40 %  Rate Set (breaths/minute): 14 breaths/min  Tidal Volume Set (mL): 400 mL  PEEP (cm H2O): 5 cmH2O  Oxygen Concentration (%): 40 %  Resp: 14      Plan: Continue full ventilatory support.     Genesis Wells, RRT

## 2019-03-04 NOTE — PROGRESS NOTES
SPIRITUAL HEALTH SERVICES  Spiritual Assessment Progress Note  [facility] ICU   introduced self to pt's  and the other daughter.  Family very involved and concerned about pt's wellbeing.   Neurology came in the middle of my visit so conversation was discontinued.     Pt is Jainism and  has informed their [facility]  in [place], [place]  of pt's condition.        Provided support and will continue to follow and provide support as needed.                                                                                                                                            Kayla Mcmillan M.Div., [facility]  Staff    Pager [phone]

## 2019-03-04 NOTE — CONSULTS
[facility]  NeuroCritical Care Consult    Patient Name:  Loan Anderson MRN:  [id number]    :  1940  Date of Admission:  3/2/2019 Date of Service:  March 3, 2019    Reason for Consult: altered mental status and Parkinson's disease  Chief Complaint: altered mental status    HPI:  Loan Anderson is a 78 year old year old female admitted on 3/2/2019 with [] altered mental status from her facility.  She was in her usual state of health until earlier in the day of presentation where she turned red and coughed violently to be related to depression.  Her daughter who is with her noted that she stopped coughing or making noises and it was felt that she was choking at that time.  Her daughter performed the Heimlich maneuver was 5 abdominal thrusts after which time the issue had resolved.    Later that day her  was sitting with her in the living room when it was noted that she became acutely unresponsive.  This was noted by staff who was trying to give her medication.  Reportedly they eased her to the floor and felt that there was no pulse.  CPR was initiated and EMS was called.  Upon EMS arrival she had a perfusing rhythm but was markedly agitated.  While in the emergency room she became severely agitated with a concern for intracranial hemorrhage and she was intubated for airway protection.    She has a history of Parkinson's disease for which she is on a complicated regimen of medications.  She was recently changed from taking long-acting carbidopa/levodopa along with short acting carbidopa/levodopa to just taking the long-acting medication at night and taking the short acting during the day.  She is on a Neupro patch as well for her Parkinson's.  Since she changed her medications there has been falls and she had had a significant amount of pain.  She was placed on tramadol every 6 hours for the pain in her back.  This was recently increased to every 4 hours as needed.  Per family  report she was taking this medication every 4 hours on schedule.    Problem List  1.  Encephalopathy  2.  Parkinson's disease  3.  Dementia  4.  Lumbar transverse process fractures  5.  Hypothyroid  6.  REM sleep behavior disorder (very significant)    Assessment/Plan  Neuro:  Parkinson's disease:  -Continue with scheduled short acting carbidopa/levodopa, remove patch and hold extended release medication, this was discussed extensively with her daughters who are in agreement  -Discuss with pharmacy timing of carbidopa/levodopa in setting of continuous tube feeds    Encephalopathy:  -Unclear etiology at this point in time.  Cannot exclude seizure in the setting of tramadol and dementia.  -Start continuous EEG, wean sedation to minimum safe amount  -Need to evaluate for possible infectious causes  -Hold all tramadol  -Narcotics as needed for pain control    ENDO:  Hypothyroid:  -TSH/free T4 sent to monitor therapy    Code: DNTERE Villavicencio MD  Vascular Neurology/Neurocritical Care  Text Page - [phone]    ___________________________________________________________    ROS:   Review of systems not obtained due to patient factors - critical condition    Past Medical/Surgical History:   Past Medical History:   Diagnosis Date     Constipation 7/15/2011     Convergence insufficiency 7/15/2011     Depression 7/15/2011     Diplopia 7/18/2011     Hot flashes, menopausal 7/18/2011     Hypercholesterolemia 7/18/2011     Hypothyroid 7/15/2011     Neurogenic bladder 7/15/2011     Parkinson disease (H) 7/15/2011     REM sleep behavior disorder 7/18/2011     Sebaceous cyst 7/18/2011     Wears glasses 7/18/2011     Past Surgical History:   Procedure Laterality Date     ------------OTHER-------------  november ? 2015    lithrotripsy for renal stone     ------------OTHER-------------  november 2015    had sepsis from uti and hospitalized     BRAIN SURGERY  12 or 14 oct 2011    gene therapy enrolled study at [facility]        Medications:    aspirin  81 mg Oral At Bedtime     carbidopa-levodopa  2 tablet Oral 4x Daily     chlorhexidine  15 mL Mouth/Throat Q12H     docusate  100 mg Oral or Feeding Tube BID     heparin  5,000 Units Subcutaneous Q8H     insulin aspart  1-6 Units Subcutaneous Q4H     levothyroxine  112 mcg Oral QAM AC     lidocaine  3 patch Transdermal Q24h    And     lidocaine   Transdermal Q24H    And     lidocaine   Transdermal Q8H     multivitamins w/minerals  15 mL Per Feeding Tube Daily     pantoprazole  40 mg Oral Daily    Or     pantoprazole  40 mg Oral or Feeding Tube Daily     piperacillin-tazobactam  4.5 g Intravenous Q6H     protein modular  1 packet Per Feeding Tube BID     QUEtiapine  25 mg Oral or Feeding Tube BID     vancomycin (VANCOCIN) IV  2,000 mg Intravenous Q24H     venlafaxine  37.5 mg Oral TID     Medications Prior to Admission   Medication Sig Dispense Refill Last Dose     aspirin 81 MG tablet Take 81 mg by mouth At Bedtime @ midnight 30 tablet  3/1/2019 at HS     carbidopa-levodopa (SINEMET CR)  MG CR tablet Take 1 tablet by mouth At Bedtime At 8 pm 90 tablet 3 3/1/2019 at 2000     carbidopa-levodopa (SINEMET)  MG tablet Take 2 tablets 4 times daily at 7 am, 10 am, 1 pm, and 4 pm 270 tablet 3 3/2/2019 at 1300     cholecalciferol 5000 units CAPS Take 1 capsule by mouth every morning @ 6am 30 capsule       docusate sodium (COLACE) 100 MG capsule Take 300 mg by mouth daily 3 x 100mg capsule @ noon 60 capsule  3/2/2019 at 1200     ibuprofen (ADVIL/MOTRIN) 200 MG tablet Take 400 mg by mouth 3 times daily 2 x 200mg tabs by mouth 3/day @ 6am, 12pm and 6pm and 3 x 200mg tabs by mouth every 8 hours as needed 100 tablet  3/2/2019 at 1200     levothyroxine (SYNTHROID/LEVOTHROID) 112 MCG tablet Take 112 mcg by mouth daily @ 6am   3/2/2019 at AM     lisinopril (PRINIVIL/ZESTRIL) 5 MG tablet Take 5 mg by mouth daily @ 6am 30 tablet 1 3/2/2019 at 0600     mirabegron (MYRBETRIQ) 25 MG 24 hr tablet  Take 25 mg by mouth daily @ 6am   3/2/2019 at 0600     Multiple Vitamins-Minerals (EYE VITAMINS) CAPS Take 1 capsule by mouth daily 1 capsule (macu-health) by mouth daily @ midnight 30 capsule       omeprazole (PRILOSEC) 20 MG DR capsule Take 20 mg by mouth daily @ 6am 30 capsule 1 3/2/2019 at 0600     polyethylene glycol (MIRALAX) powder Take 17 g by mouth daily as needed @0600 119 g  PRN     rotigotine (NEUPRO) 4 MG/24HR 24 hr patch Apply patch daily @ bedtime/8 pm (Patient taking differently: Place 1 patch onto the skin daily @ bedtime/8 pm) 90 patch 1 3/1/2019 at 2000     tamsulosin (FLOMAX) 0.4 MG capsule Take 0.4 mg by mouth daily @ 6pm 30 capsule 11 3/1/2019 at 1800     traMADol (ULTRAM) 50 MG tablet Take 1 tablet (50 mg) by mouth every 6 hours as needed for severe pain 1 tablet 0 PRN     trospium (SANCTURA XR) 60 MG CP24 24 hr capsule Take 60 mg by mouth every morning (before breakfast) @ 6am 90 each 3 3/2/2019 at 0600     venlafaxine (EFFEXOR) 100 MG tablet Take 100 mg by mouth daily @ 6am 180 tablet 3 3/2/2019 at 0600       Allergies:   Allergies   Allergen Reactions     Atorvastatin      Concern that it was contributing to confusion     Mirapex [Pramipexole Dihydrochloride Monohydrate]      Leg swelling     Potassium Chloride      IV infusion only. She tolerates oral potassium chloride     Requip [Ropinirole Hydrochloride]      Leg swelling     Epinephrine Palpitations       Family History:  I have reviewed this patient's family history and updated it with pertinent information if needed.   Family History   Problem Relation Age of Onset     Neurologic Disorder Mother         parkinson disease ? was on sinemet. She had micrographia     Eye Disorder Mother      Cerebrovascular Disease Father         stroke age 47     Heart Disease Father         heart attack age 57       Social History:  I have reviewed this patient's social history and updated it with pertinent information if needed. Loan Anderson   "reports that  has never smoked. she has never used smokeless tobacco. She reports that she drinks alcohol.    24 Hour Vital Signs Summary:  Temperatures:  Current - Temp: 99.7  F (37.6  C); Max - Temp  Av.6  F (37  C)  Min: 97.3  F (36.3  C)  Max: 100  F (37.8  C)  Respiration range: Resp  Avg: 15.2  Min: 9  Max: 30  Pulse range: Pulse  Av  Min: 55  Max: 73  Blood pressure range: Systolic (24hrs), Av , Min:103 , Max:150   ; Diastolic (24hrs), Av, Min:45, Max:92    Pulse oximetry range: SpO2  Av.5 %  Min: 96 %  Max: 100 %    Ventilator Settings  Ventilation Mode: CMV/AC  (Continuous Mandatory Ventilation/ Assist Control)  FiO2 (%): 40 %  Rate Set (breaths/minute): 14 breaths/min  Tidal Volume Set (mL): 400 mL  PEEP (cm H2O): 5 cmH2O  Oxygen Concentration (%): 40 %  Resp: 14        Intake/Output Summary (Last 24 hours) at 3/3/2019 2045  Last data filed at 3/3/2019 2000  Gross per 24 hour   Intake 2819.36 ml   Output 1745 ml   Net 1074.36 ml       Physical Examination:  /53   Pulse 62   Temp 99.7  F (37.6  C)   Resp 14   Ht 1.6 m (5' 3\")   Wt 90.2 kg (198 lb 13.7 oz)   SpO2 98%   BMI 35.23 kg/m      GENERAL APPEARANCE ADULT: Sedated    Neurological Examination    MENTAL STATE:  Sedated on propofol, not following any commands, agitated with sedation held and not following commands.        CRANIAL NERVES:  Cranial nerves were [normal].  CN 2         Unable to assess due to patient mental status  CN 3, 4, 6  Pupils round, [4] mm in diameter, [equally reactive to light]. [Lids symmetric; no ptosis.] [VOR normal. ]  CN 5  [Normal corneal reflexes.]  CN 7  [Nasolabial folds symmetric.]  CN 8  Unable to assess due to mental status.  CN 9  [Normal gag reflex. ]  CN 11  Does not follow commands to assess  CN 12  Does not follow commands to assess    MOTOR:  Motor exam was [normal]. [Muscle bulk and tone were normal in both upper and lower extremities. ]Muscle strength was grossly full in " that she was actively moving all 4 extremities with full strength while agitated, no obvious focal deficit.      SENSORY:  Sensory exam was [normal]. In both upper and lower extremities, sensation was intact to noxious stimuli.     COORDINATION:   Coordination exam was unable to be performed due to patient mental status.     GAIT:  Gait was not tested due to patient medical condition.     EYES: PERRL, EOM normal, conjunctiva and lids normal  HENT: Mouth and posterior oropharynx normal, moist mucous membranes  RESP: no respiratory distress  CV: regular rate and rhythm, no murmur, rub, or gallop  ABDOMEN: soft, non-tender  MSK: Grossly full strength manifesting as equal movements with agitation      Labs/Studies:  CMP  Recent Labs   Lab 03/03/19  0630 03/02/19  1449 02/27/19  0540    136 138   POTASSIUM 3.5 4.1 4.2   CHLORIDE 104 102 105   CO2 27 28 29   ANIONGAP 6 6 4   GLC 98 122* 121*   BUN 19 26 25   CR 1.03 1.18* 1.06*   GFRESTIMATED 53* 45* 51*   GFRESTBLACK 61 52* 59*   NIKKIE 7.6* 8.4* 8.9   MAG 2.2  --   --    PHOS 3.1  --   --    PROTTOTAL  --  7.0  --    ALBUMIN  --  3.2*  --    BILITOTAL  --  0.3  --    ALKPHOS  --  137  --    AST  --  21  --    ALT  --  7  --      CBC  Recent Labs   Lab 03/03/19  0630 03/02/19  1449 02/27/19  0540   WBC 10.1 13.5* 7.9   RBC 3.76* 3.98 4.76   HGB 11.4* 12.0 14.5   HCT 33.9* 36.2 43.2   MCV 90 91 91   MCH 30.3 30.2 30.5   MCHC 33.6 33.1 33.6   RDW 14.6 14.3 14.6    195 210     INR  Recent Labs   Lab 03/02/19  1449 02/27/19  0540   INR 1.04 0.93     Arterial Blood GasNo lab results found in last 7 days.    Cultures:   Blood culture:  Invalid input(s): BC   Urine culture:  No results for input(s): URC in the last 168 hours.      EEG:  ordered      Neurocritical care time:  I spent 35 minutes bedside and on the inpatient unit today managing the neurocritical care of Loan Anderson in relation to the issues listed in this note. Patient is critically ill / has  very high risk of deterioration

## 2019-03-04 NOTE — PROGRESS NOTES
Day 1  started at 3:0pm iso room,   Dr Villavicencio ordered  Sedated    Daughter consented for video Video Observation initiated, patient informed.     Colette Garzon    Video Observation initiated, patient informed.     Colette Garzon

## 2019-03-05 NOTE — PROGRESS NOTES
[facility], later [facility] ICU RESPIRATORY NOTE        Date of Admission: 3/2/2019    Date of Intubation (most recent): 03/02/2019    Reason for Mechanical Ventilation: Airway Protection    Number of Days on Mechanical Ventilation: 4    Significant Events Today: none overnight    ABG Results: No lab results found in last 7 days.    Current Vent Settings: Ventilation Mode: CMV/AC  (Continuous Mandatory Ventilation/ Assist Control)  FiO2 (%): 30 %  Rate Set (breaths/minute): 14 breaths/min  Tidal Volume Set (mL): 400 mL  PEEP (cm H2O): 5 cmH2O  Oxygen Concentration (%): 30 %  Resp: 12      Skin Assessment: No skin issues    Plan: Continue vent support    Tamika Love

## 2019-03-05 NOTE — PLAN OF CARE
RN. Vitals stable, afebrile. No signs of pain with scheduled tylenol. No significant changes in neuro status, continues on EEG. HU on sedation holiday, unable to make significant progress in weaning propofol. Tube feeding increased to goal, pt had large soft BM today. Family at bedside throughout day, updated on POC, questions answered. Awaiting input from care team providers to make decisions on next steps in patient progression.

## 2019-03-05 NOTE — PROGRESS NOTES
Vascular Neurology Progress Note    ____________________________________________________________    Admission Summary:  Loan Anderson is a 78 year old female admitted for AMS on 3/2/2019. She was found to be coughing and choking on something and lost consciousness. She recovered after 30 seconds or so. She then had similar episode later and became unresponsive. She was seen by RN at the time at the facility and found to have no pulse and CPR performed. She had ROSC. She was agitated in ER and required intubation. cEEG showed left frontal focus seizure.    Last 24 hours:      No acute issues.    Impression:    Encephalopathy likely post ictal  Seizure, probably provoked by Tramadol usage along with history of previous bilateral frontal angi holes    Recommendations:     -Continue with EEG  -Increased Depakote; Check level in AM - load based on level; Add Vimpat as well  -Wean off Propofol as able  -Hopefully she can be extubated safely to have better clinical exam    Please contact the stroke service with any questions: Feel free to call my cellphone.    Gopi Cabrera MD  Vascular Neurology  March 5, 2019    I personally reviewed all relevant labs and neuroimaging.  I spent 35 minutes of critical care time due to encephalopathy management reviewing labs, diagnostic studies, neuroimaging, and evaluating the patient.    ____________________________________________________________________        Medications:      Current Facility-Administered Medications:      acetaminophen (TYLENOL) tablet 650 mg, 650 mg, Oral, Q6H, 650 mg at 03/05/19 1155 **OR** acetaminophen (TYLENOL) solution 650 mg, 650 mg, Per NG tube, Q6H, Gregg Colorado MD, 650 mg at 03/05/19 8757     albuterol (PROVENTIL) neb solution 2.5 mg, 2.5 mg, Nebulization, Q2H PRN, Gregg Colorado MD     aspirin (ASA) chewable tablet 81 mg, 81 mg, Oral, At Bedtime, Gregg Colorado MD, 81 mg at 03/04/19 2114     carbidopa-levodopa (SINEMET)  MG per tablet 2  tablet, 2 tablet, Oral, 4x Daily, Gregg Colorado MD, 2 tablet at 03/05/19 1526     chlorhexidine (PERIDEX) 0.12 % solution 15 mL, 15 mL, Mouth/Throat, Q12H, Gregg Colorado MD, 15 mL at 03/05/19 0814     dextrose 10 % 1,000 mL infusion, , Intravenous, Continuous PRN, Gregg Colorado MD     glucose gel 15-30 g, 15-30 g, Oral, Q15 Min PRN **OR** dextrose 50 % injection 25-50 mL, 25-50 mL, Intravenous, Q15 Min PRN **OR** glucagon injection 1 mg, 1 mg, Subcutaneous, Q15 Min PRN, Gregg Colorado MD     docusate (COLACE) 50 MG/5ML liquid 100 mg, 100 mg, Oral or Feeding Tube, BID, Gregg Colorado MD, 100 mg at 03/05/19 0805     heparin sodium injection 5,000 Units, 5,000 Units, Subcutaneous, Q8H, Gregg Colorado MD, 5,000 Units at 03/05/19 1407     HYDROmorphone (PF) (DILAUDID) injection 0.2 mg, 0.2 mg, Intravenous, Q2H PRN, Gregg Colorado MD, 0.2 mg at 03/03/19 0629     hypromellose-dextran (ARTIFICAL TEARS) 0.1-0.3 % ophthalmic solution 1 drop, 1 drop, Both Eyes, Q1H PRN, Gregg Colorado MD, 1 drop at 03/05/19 1719     insulin aspart (NovoLOG) inj (RAPID ACTING), 1-6 Units, Subcutaneous, Q4H, Gregg Colorado MD, 1 Units at 03/02/19 2001     lacosamide (VIMPAT) 100 mg in sodium chloride 0.9 % 100 mL intermittent infusion, 100 mg, Intravenous, BID, Karolyn Diop MD     levothyroxine (SYNTHROID/LEVOTHROID) tablet 112 mcg, 112 mcg, Oral, QAM AC, Gregg Colorado MD, 112 mcg at 03/05/19 0806     Lidocaine (LIDOCARE) 4 % Patch 3 patch, 3 patch, Transdermal, Q24h, 3 patch at 03/04/19 2114 **AND** lidocaine patch REMOVAL, , Transdermal, Q24H **AND** lidocaine patch in PLACE, , Transdermal, Q8H, Gregg Colorado MD     magnesium sulfate 2 g in water intermittent infusion, 2 g, Intravenous, Daily PRN, Gregg Colorado MD     magnesium sulfate 4 g in 100 mL sterile water (premade), 4 g, Intravenous, Q4H PRN, Gregg Colorado MD     midazolam (VERSED) injection 2-4 mg, 2-4 mg, Intravenous, Q2H PRN, Gregg Colorado,  MD     multivitamins w/minerals (CERTAVITE) liquid 15 mL, 15 mL, Per Feeding Tube, Daily, Gregg Colorado MD, 15 mL at 03/05/19 0806     naloxone (NARCAN) injection 0.1-0.4 mg, 0.1-0.4 mg, Intravenous, Q2 Min PRN, Gregg Colorado MD     ondansetron (ZOFRAN-ODT) ODT tab 4 mg, 4 mg, Oral, Q6H PRN **OR** ondansetron (ZOFRAN) injection 4 mg, 4 mg, Intravenous, Q6H PRN, Gregg Colorado MD     oxyCODONE (ROXICODONE) tablet 5-10 mg, 5-10 mg, Oral, Q3H PRN, Gregg Colorado MD, 10 mg at 03/05/19 1622     pantoprazole (PROTONIX) EC tablet 40 mg, 40 mg, Oral, Daily **OR** pantoprazole (PROTONIX) 2 mg/mL suspension 40 mg, 40 mg, Oral or Feeding Tube, Daily, Gregg Colorado MD, 40 mg at 03/05/19 0807     polyethylene glycol (MIRALAX/GLYCOLAX) Packet 17 g, 17 g, Oral, Daily PRN, Gregg Colorado MD     potassium chloride (KLOR-CON) Packet 20-40 mEq, 20-40 mEq, Oral or Feeding Tube, Q2H PRN, Gregg Colorado MD, 20 mEq at 03/04/19 0620     potassium chloride ER (K-DUR/KLOR-CON M) CR tablet 20-40 mEq, 20-40 mEq, Oral, Q2H PRN, Gregg Colorado MD     propofol (DIPRIVAN) infusion, 5-75 mcg/kg/min, Intravenous, Continuous, Last Rate: 21.8 mL/hr at 03/05/19 1700, 40 mcg/kg/min at 03/05/19 1700 **AND** propofol (DIPRIVAN) injection 10 mg/mL vial, 10-20 mg, Intravenous, Q30 Min PRN **AND** CK total, , , CONDITIONAL (SPECIFY) **AND** Triglycerides, , , CONDITIONAL (SPECIFY), Gregg Colorado MD     protein modular (PROSTAT SUGAR FREE) packet 1 packet, 1 packet, Per Feeding Tube, BID, Gregg Colorado MD, 1 packet at 03/05/19 0806     QUEtiapine (SEROquel) tablet 50 mg, 50 mg, Oral or Feeding Tube, BID, Gregg Colorado MD, 50 mg at 03/05/19 0806     sodium chloride 0.9% infusion, , Intravenous, Continuous, Gregg Colorado MD, Last Rate: 10 mL/hr at 03/05/19 1550     valproate (DEPACON) 350 mg in sodium chloride 0.9 % 50 mL intermittent infusion, 350 mg, Intravenous, Q6H, Gopi Cabrera MD     venlafaxine (EFFEXOR) tablet 37.5 mg,  "37.5 mg, Oral, TID, Gregg Colorado MD, 37.5 mg at 03/05/19 1407    Vital Signs:  B/P: 162/76, T: 99, P: 63, R: 14    /72   Pulse 62   Temp 98.8  F (37.1  C)   Resp 13   Ht 1.6 m (5' 3\")   Wt 93.5 kg (206 lb 2.1 oz)   SpO2 96%   BMI 36.51 kg/m    General: Intubated on propofol  HEENT: Atraumatic, normocephalic, no scleral icterus or conjunctival pallor   Cardiac: RRR  Chest: Clear to auscultation  Abdomen: Soft, non-tender, non-distended  Extremities: No LE swelling.  Skin: No rash or lesion.      Neuro:  Intubated and sedation  Grimaces to pain, not opening eyes  +VOR/corneal  Not moving much of ext to pain stim    Labs/Studies:  CBC:     Recent Labs   Lab 03/05/19  0520 03/04/19  0530 03/03/19  0630   WBC 9.6 8.5 10.1   RBC 3.60* 3.47* 3.76*   HGB 10.8* 10.4* 11.4*   HCT 33.1* 31.7* 33.9*    182 172     Basic Metabolic Panel:   Recent Labs   Lab Test 03/05/19  0520 03/04/19  0530 03/03/19  0630    145* 137   POTASSIUM 4.0 3.8 3.5   CHLORIDE 112* 115* 104   CO2 26 26 27   BUN 19 17 19   CR 0.98 1.20* 1.03   * 101* 98   NIKKIE 7.7* 7.6* 7.6*     Liver panel:  Recent Labs   Lab Test 03/02/19  1449 10/24/17  0700 10/23/17  0653 10/21/17  1751   PROTTOTAL 7.0  --  6.7* 7.7   ALBUMIN 3.2* 2.3* 2.3* 2.9*   BILITOTAL 0.3  --  0.4 0.8   ALKPHOS 137  --  218* 258*   AST 21  --  38 61*   ALT 7  --  10 13     INR:  Recent Labs   Lab Test 03/02/19  1449 02/27/19  0540   INR 1.04 0.93      Lipid Profile:No lab results found.  A1C:   Recent Labs   Lab Test 03/02/19  1449 10/23/17  0653   A1C 6.7* 6.6*     Troponin I:   Recent Labs   Lab Test 03/02/19  1449   TROPI <0.015         Imaging:  Relevant findings as per the Impression above.    "

## 2019-03-05 NOTE — PLAN OF CARE
RN. Vitals stable, scheduled tylenol for pain, propofol for sedation. Continues on 24hr EEG. Attempted to lighten sedation, pt became restless and agitated - Seroquel dose increased and valporic acid started in attempt to get propofol off. Family at bedside throughout day, updated on POC, questions answered.

## 2019-03-05 NOTE — PROGRESS NOTES
"Critical Care  Note      03/05/2019    Name: Loan Anderson MRN#: [id number]   Age: 78 year old YOB: 1940     [facility]    [facility] DAY #3             Problem List:   Acute encephalopathy  Rib fractures  YASMIN  leukocytosis         Summary/Hospital Course:     78F with parkinsons disease who lives in a memory center now presents from [facility] after being found acutely encephalopathic and mildly agitated/combative by her  around 2 pm on day of admission.  Apparently there had been an episode earlier in the day where she had turned red and coughed 3-4 min after taking pills and family member did the heimlich maneuver.  She returned to her normal state after this but no source of asphyxiation was found.  Later in the day her  went to get the mail and when he returned to their home (~10 min) he found staff doing cpr on her.  Apparently she became unresponsive at some point and cpr was started, but on EMS arrival she did have a perfusing rhythm.     A couple days prior to admit she had a mechanical fall resulting in R hip pain.  No injury noted at that time, but today has several rib fx on CT scan (may be due to cpr?) and transverse process L1L2 fx's--suspect these may be from fall.      Assessment and plan :     Loan Anderson IS a 78 year old female admitted on 3/2/2019 for acute encephalopathy.   I have personally reviewed the daily labs, imaging studies, cultures and discussed the case with referring physician and consulting physicians.   My assessment and plan by system for this patient is as follows:    Neurology/Psychiatry:   1. Acute encephalopathy:  Etiology unclear; ?seizure activity (episodic unresponsiveness)? -- video eeg \"consistent with the interictal state of partial epilepsy\". May have been taking more tramadol than usual after fall--this would lower seizure threshold.  Appreciate neurocrit input.  2. Analgesia:  lidoderm patches on chest for rib fx.  " Making PO tylenol standing.  Prn oxycodone/dilaudid  3. Sedation:  Propofol drip for now.  Midazolam pushes prn.  Seroquel and valproate being given with good effect.  4. H/o parkinsons dz:  Appreciate neuro crit input.  Will continue short acting carbo/levodopa.  Holding long-acting and patch forms for now.    Cardiovascular:    1.  H/o htn:  Hold antihypertensives for now    Pulmonary/Ventilator Management:   1. Loss of protective airway reflexes:  Due to encephalopathy noted above.  Not neurologically appropriate for extubation today.    GI and Nutrition :   1.  Tube feeds  2.  PPI for PUD prophy    Renal/Fluids/Electrolytes:   1. YASMIN on CKD:  Resolved to 0.98 today.  UOP adequate.  Continue to watch closely.    Infectious Disease:   1. Procal ok <0.05 and no clear source of infection.  Cultures remain ngtd.  Stopped all abx 3/5/19.    Endocrine:   1. FSG ok for now.    2.  Hypothyroid:  Continue home synthroid.    Hematology/Oncology:   1.  Anemia to 10.8-- stable today.  No hemodynamic instability or signs of bleeding.  Suspect due to critical illness.  2.  WBC ok 9.6, pltlts ok 205.    ICU Prophylaxis:   1. DVT: Hep Subq/ mechanical  2. VAP: HOB 30 degrees, chlorhexidine rinse  3. Stress Ulcer: PPI  4. Restraints: Nonviolent soft two point restraints required and necessary for patient safety and continued cares and good effect as patient continues to pull at necessary lines, tubes despite education and distraction. Will readdress daily.   5. Wound care  - per unit routine  6. Feeding - ok to start tf today  7. Family Update: daughters and  at bedside today.  8. Disposition - critically ill           Medical History:     Past Medical History:   Diagnosis Date     Constipation 7/15/2011     Convergence insufficiency 7/15/2011     Depression 7/15/2011     Diplopia 7/18/2011     Hot flashes, menopausal 7/18/2011     Hypercholesterolemia 7/18/2011     Hypothyroid 7/15/2011     Neurogenic bladder 7/15/2011      Parkinson disease (H) 7/15/2011     REM sleep behavior disorder 7/18/2011     Sebaceous cyst 7/18/2011     Wears glasses 7/18/2011     Past Surgical History:   Procedure Laterality Date     ------------OTHER-------------  november ? 2015    lithrotripsy for renal stone     ------------OTHER-------------  november 2015    had sepsis from uti and hospitalized     BRAIN SURGERY  12 or 14 oct 2011    gene therapy enrolled study at [place]     Social History     Socioeconomic History     Marital status:      Spouse name: Not on file     Number of children: Not on file     Years of education: Not on file     Highest education level: Not on file   Occupational History     Not on file   Social Needs     Financial resource strain: Not on file     Food insecurity:     Worry: Not on file     Inability: Not on file     Transportation needs:     Medical: Not on file     Non-medical: Not on file   Tobacco Use     Smoking status: Never Smoker     Smokeless tobacco: Never Used   Substance and Sexual Activity     Alcohol use: Yes     Comment: rare     Drug use: Not on file     Sexual activity: Not on file   Lifestyle     Physical activity:     Days per week: Not on file     Minutes per session: Not on file     Stress: Not on file   Relationships     Social connections:     Talks on phone: Not on file     Gets together: Not on file     Attends Voodoo service: Not on file     Active member of club or organization: Not on file     Attends meetings of clubs or organizations: Not on file     Relationship status: Not on file     Intimate partner violence:     Fear of current or ex partner: Not on file     Emotionally abused: Not on file     Physically abused: Not on file     Forced sexual activity: Not on file   Other Topics Concern     Parent/sibling w/ CABG, MI or angioplasty before 65F 55M? Not Asked   Social History Narrative    Spouse is serenity        Allergies   Allergen Reactions     Atorvastatin      Concern that it  was contributing to confusion     Mirapex [Pramipexole Dihydrochloride Monohydrate]      Leg swelling     Potassium Chloride      IV infusion only. She tolerates oral potassium chloride     Requip [Ropinirole Hydrochloride]      Leg swelling     Epinephrine Palpitations              Key Medications:       acetaminophen  650 mg Oral Q6H    Or     acetaminophen  650 mg Per NG tube Q6H     aspirin  81 mg Oral At Bedtime     carbidopa-levodopa  2 tablet Oral 4x Daily     chlorhexidine  15 mL Mouth/Throat Q12H     docusate  100 mg Oral or Feeding Tube BID     heparin  5,000 Units Subcutaneous Q8H     insulin aspart  1-6 Units Subcutaneous Q4H     lacosamide (VIMPAT) intermittent infusion  100 mg Intravenous BID     lacosamide (VIMPAT) intermittent infusion  200 mg Intravenous Once     levothyroxine  112 mcg Oral QAM AC     lidocaine  3 patch Transdermal Q24h    And     lidocaine   Transdermal Q24H    And     lidocaine   Transdermal Q8H     multivitamins w/minerals  15 mL Per Feeding Tube Daily     pantoprazole  40 mg Oral Daily    Or     pantoprazole  40 mg Oral or Feeding Tube Daily     protein modular  1 packet Per Feeding Tube BID     QUEtiapine  50 mg Oral or Feeding Tube BID     valproate (DEPACON) intermittent infusion  500 mg Intravenous Q12H     venlafaxine  37.5 mg Oral TID       IV fluid REPLACEMENT ONLY       propofol (DIPRIVAN) infusion 40 mcg/kg/min (03/05/19 1011)     sodium chloride 50 mL/hr at 03/05/19 0016        Home Meds    No current facility-administered medications on file prior to encounter.   Current Outpatient Medications on File Prior to Encounter:  aspirin 81 MG tablet Take 81 mg by mouth At Bedtime @ midnight   carbidopa-levodopa (SINEMET CR)  MG CR tablet Take 1 tablet by mouth At Bedtime At 8 pm   carbidopa-levodopa (SINEMET)  MG tablet Take 2 tablets 4 times daily at 7 am, 10 am, 1 pm, and 4 pm   cholecalciferol 5000 units CAPS Take 1 capsule by mouth every morning @ 6am    docusate sodium (COLACE) 100 MG capsule Take 300 mg by mouth daily 3 x 100mg capsule @ noon   ibuprofen (ADVIL/MOTRIN) 200 MG tablet Take 400 mg by mouth 3 times daily 2 x 200mg tabs by mouth 3/day @ 6am, 12pm and 6pm and 3 x 200mg tabs by mouth every 8 hours as needed   levothyroxine (SYNTHROID/LEVOTHROID) 112 MCG tablet Take 112 mcg by mouth daily @ 6am   lisinopril (PRINIVIL/ZESTRIL) 5 MG tablet Take 5 mg by mouth daily @ 6am   mirabegron (MYRBETRIQ) 25 MG 24 hr tablet Take 25 mg by mouth daily @ 6am   Multiple Vitamins-Minerals (EYE VITAMINS) CAPS Take 1 capsule by mouth daily 1 capsule (macu-health) by mouth daily @ midnight   omeprazole (PRILOSEC) 20 MG DR capsule Take 20 mg by mouth daily @ 6am   polyethylene glycol (MIRALAX) powder Take 17 g by mouth daily as needed @0600   rotigotine (NEUPRO) 4 MG/24HR 24 hr patch Apply patch daily @ bedtime/8 pm (Patient taking differently: Place 1 patch onto the skin daily @ bedtime/8 pm)   tamsulosin (FLOMAX) 0.4 MG capsule Take 0.4 mg by mouth daily @ 6pm   traMADol (ULTRAM) 50 MG tablet Take 1 tablet (50 mg) by mouth every 6 hours as needed for severe pain   trospium (SANCTURA XR) 60 MG CP24 24 hr capsule Take 60 mg by mouth every morning (before breakfast) @ 6am   venlafaxine (EFFEXOR) 100 MG tablet Take 100 mg by mouth daily @ 6am              Physical Examination:   Temp:  [98.2  F (36.8  C)-99.3  F (37.4  C)] 98.6  F (37  C)  Pulse:  [55-68] 61  Heart Rate:  [53-68] 61  Resp:  [12-20] 18  BP: (114-179)/() 154/75  FiO2 (%):  [30 %-40 %] 30 %  SpO2:  [93 %-99 %] 96 %  No intake or output data in the 24 hours ending 03/02/19 1732  Wt Readings from Last 4 Encounters:   03/05/19 93.5 kg (206 lb 2.1 oz)   02/27/19 86.2 kg (190 lb)   02/11/19 88 kg (194 lb)   11/14/18 86.2 kg (190 lb)     BP - Mean:  [] 111  Ventilation Mode: CMV/AC  (Continuous Mandatory Ventilation/ Assist Control)  FiO2 (%): 30 %  Rate Set (breaths/minute): 14 breaths/min  Tidal Volume  Set (mL): 400 mL  PEEP (cm H2O): 5 cmH2O  Oxygen Concentration (%): 30 %  Resp: 18    No lab results found in last 7 days.    GEN: no acute distress, sedated   HEENT: head ncat, sclera anicteric, OP patent, trachea midline   PULM: unlabored synchronous with vent, clear anteriorly    CV/COR: RRR S1S2 no gallop,  No rub, no murmur  ABD: soft nontender, hypoactive bowel sounds, no mass  EXT:  Minimal Edema x4,  Warm x4  NEURO: sedated.  Moves all 4 when sedation weaned.  L pupil slightly larger than R but both reactive. Doesn't follow.  SKIN: no obvious rash  LINES: clean, dry intact         Data:   All data and imaging reviewed     ROUTINE ICU LABS (Last four results)  CMP  Recent Labs   Lab 03/05/19 0520 03/04/19 0530 03/03/19 0630 03/02/19  1449    145* 137 136   POTASSIUM 4.0 3.8 3.5 4.1   CHLORIDE 112* 115* 104 102   CO2 26 26 27 28   ANIONGAP 5 4 6 6   * 101* 98 122*   BUN 19 17 19 26   CR 0.98 1.20* 1.03 1.18*   GFRESTIMATED 56* 44* 53* 45*   GFRESTBLACK 65 51* 61 52*   NIKKIE 7.7* 7.6* 7.6* 8.4*   MAG 2.2 2.3 2.2  --    PHOS 2.7 3.3 3.1  --    PROTTOTAL  --   --   --  7.0   ALBUMIN  --   --   --  3.2*   BILITOTAL  --   --   --  0.3   ALKPHOS  --   --   --  137   AST  --   --   --  21   ALT  --   --   --  7     CBC  Recent Labs   Lab 03/05/19 0520 03/04/19  0530 03/03/19  0630 03/02/19  1449   WBC 9.6 8.5 10.1 13.5*   RBC 3.60* 3.47* 3.76* 3.98   HGB 10.8* 10.4* 11.4* 12.0   HCT 33.1* 31.7* 33.9* 36.2   MCV 92 91 90 91   MCH 30.0 30.0 30.3 30.2   MCHC 32.6 32.8 33.6 33.1   RDW 15.5* 15.2* 14.6 14.3    182 172 195     INR  Recent Labs   Lab 03/02/19  1449 02/27/19  0540   INR 1.04 0.93     Arterial Blood GasNo lab results found in last 7 days.    All cultures:  Recent Labs   Lab 03/03/19  0530 03/02/19  1554 03/02/19  1543   CULT Moderate growth  Normal abhijeet   No growth after 3 days No growth after 3 days     Recent Results (from the past 24 hour(s))   XR Chest Port 1 View    Narrative     CHEST PORTABLE ONE VIEW     3/2/2019 3:00 PM     HISTORY: Tube placement.    COMPARISON: 2/27/2019.      Impression    IMPRESSION: Endotracheal tube in place in mid trachea. New area of  atelectasis or infiltrate in the right lung base. Left lung is clear.  Heart size is upper normal. Pulmonary vasculature does not appear  distended.      KELLIE BANKS MD   CT Head w/o Contrast    Narrative    CT SCAN OF THE HEAD WITHOUT CONTRAST   3/2/2019 3:10 PM     HISTORY: Altered level of consciousness (LOC), unexplained.    TECHNIQUE:  Axial images of the head and coronal reformations without  IV contrast material.  Radiation dose for this scan was reduced using  automated exposure control, adjustment of the mA and/or kV according  to patient size, or iterative reconstruction technique.    COMPARISON: None.    FINDINGS: There is some moderate cerebral atrophy. There is an old  infarct in the left basal ganglia region. There are some patchy white  matter changes in both hemispheres without mass effect. The patient is  intubated. The visualized paranasal sinuses and mastoid air cells are  clear. There is no evidence for intracranial hemorrhage, acute  infarct, mass effect, or skull fracture. Bilateral angi hole  procedures are noted.      Impression    IMPRESSION: Chronic changes. No evidence for intracranial hemorrhage  or any acute process.       SHAGUFTA MOORE MD   CT Head Neck Angio w/o & w Contrast    Narrative    CTA HEAD NECK WITH CONTRAST 3/2/2019 3:18 PM     HISTORY: Headache, sudden, carotid/vertebral dissection suspected/    TECHNIQUE: Multiplanar multisequence images were obtained through the  neck without and with intravenous contrast. 70 mL of Isovue-370 was  given. Multiplanar reconstructions were performed. 3-D reconstructions  off a remote workstation for CT angiography were also acquired.  Carotid stenoses were evaluated by comparing the caliber of the  proximal internal carotid artery to the caliber of the  distal internal  carotid artery. Radiation dose for this scan was reduced using  automated exposure control, adjustment of the mA and/or kV according  to patient size, or iterative reconstruction technique.    FINDINGS:    Brachiocephalic vessels: Normal.    Right carotid system: Normal.    Left carotid system: Trace amount of plaque. No stenosis or  dissection.    Right vertebral artery: Normal.    Left vertebral artery: Normal.    Ione of [place]: Normal.    Other findings: The patient is intubated. The tip of the ET tube is  above the sánchez. Trachea has slight deviation to the right. There is  a moderate size right pleural effusion. Degenerative changes are seen  in the spine. There is some minimal degenerative spondylolisthesis of  C3 and C4.      Impression    IMPRESSION:  1. Negative CT angiography head and neck.  2. Moderate size right pleural effusion.     SHAGUFTA MOORE MD   CT Chest (PE) Abdomen Pelvis w Contrast    Narrative    CT CHEST PE, ABDOMEN PELVIS WITH CONTRAST   3/2/2019 3:26 PM     HISTORY: Trauma. Altered mental status. Hypotension.    TECHNIQUE: 75 mL Isovue-370 IV were administered. After contrast  administration, volumetric helical sections were acquired from the  thoracic inlet to the ischial tuberosities. Pulmonary embolism  protocol was performed through the chest. Coronal images were also  reconstructed. Radiation dose for this scan was reduced using  automated exposure control, adjustment of the mA and/or kV according  to patient size, or iterative reconstruction technique.    COMPARISON: CT of the abdomen and pelvis performed 10/24/2017.    FINDINGS:    Chest: No evidence for pulmonary embolism or thoracic aortic  dissection. Atherosclerotic calcification of the thoracic aorta.  Endotracheal tube is in place, with tip approximately 2.5 cm above the  sánchez. Small to moderate right pleural effusion, with mild associated  compressive atelectasis at the right lung base posteriorly.  No  pericardial or left pleural effusion. There is mild atelectasis at the  left lung base posteriorly. Mild patchy mosaic attenuation is noted in  the lungs bilaterally. There are displaced acute appearing fractures  involving the right 10th and 11th ribs posteriorly. Mildly displaced  fractures of the right second rib in two places laterally and  anteriorly. There are also mildly displaced fractures of the right  third through sixth ribs laterally. Nondisplaced fracture of the left  second rib laterally.    Abdomen and Pelvis: No bowel obstruction. There is a moderate amount  of stool in the rectum. No free fluid in the pelvis. No convincing  evidence for colitis or diverticulitis. No intra-abdominal fluid  collections to suggest hematoma. No free intraperitoneal air.  Scattered cortical scarring in the left kidney is not significantly  changed, given differences in technique. The liver, gallbladder,  spleen, adrenal glands, pancreas, and kidneys are otherwise  unremarkable. No hydronephrosis. No evidence for solid organ injury.  Mild atherosclerotic aortoiliac calcification. The uterus is not seen,  and is likely surgically absent. Degenerative changes are noted in the  thoracolumbar spine. Displaced acute appearing fractures are noted  involving the L1 and L2 transverse processes on the right.      Impression    IMPRESSION:   1. Displaced acute appearing fractures are noted involving the right  10th and 11th ribs posteriorly, as well as the right 2nd through 6th  ribs laterally. Nondisplaced fracture of the left second rib laterally  may also be acute. There are also acute-appearing displaced fractures  of the L1 and L2 transverse processes on the right.  2. No evidence for pulmonary embolism.  3. Small to moderate right pleural effusion.  4. Mild patchy mosaic attenuation in the lungs bilaterally may be  related to air trapping.  5. Moderate amount of stool in the rectum.      NUVIA ISLAS MD     Labs  (personally reviewed/interpreted):  See a/p.    D/w Dr. Cabrera of neurology    Billing: This patient is critically ill: Yes. Total critical care time today 40 min.

## 2019-03-05 NOTE — PROGRESS NOTES
[facility] ICU RESPIRATORY NOTE        Date of Admission: 3/2/2019  Date of Intubation (most recent): 3/2/19  Reason for Mechanical Ventilation: Airway protection  Number of Days on Mechanical Ventilation: 4    Met Criteria for Pressure Support Trial:  No  Reason for No Pressure Support Trial: Per MD    ABG Results: No lab results found in last 7 days.    Current Vent Settings: Ventilation Mode: CMV/AC  (Continuous Mandatory Ventilation/ Assist Control)  FiO2 (%): 30 %  Rate Set (breaths/minute): 14 breaths/min  Tidal Volume Set (mL): 400 mL  PEEP (cm H2O): 5 cmH2O  Oxygen Concentration (%): 30 %  Resp: 18    Plan: Continue full ventilatory support.      Genesis Wells, RRT

## 2019-03-05 NOTE — PLAN OF CARE
No change in neuro status- tolerated increase of Seroquel. Remains on propofol gtt for sedation. FiO2 weaned down to 30%, peep 5. Creamy secretions from ETT- awaiting sputum culture.Tolerating tube feeds- no BM. BG wnl. Adequate UOP. Updated daughters at bedside during evening.

## 2019-03-05 NOTE — PROGRESS NOTES
Vascular Neurology Progress Note    ____________________________________________________________    Admission Summary:  Loan Anderson is a 78 year old female admitted for AMS on 3/2/2019. She was found to be coughing and choking on something and lost consciousness. She recovered after 30 seconds or so. She then had similar episode later and became unresponsive. She was seen by RN at the time at the facility and found to have no pulse and CPR performed. She had ROSC. She was agitated in ER and required intubation.    Last 24 hours:      Agitated when Propofol is turned off.    Impression:    Encephalopathy ?anoxic ?post ictal    Recommendations:     -Continue with EEG  -Increased seroquel and added Depakote  -Hopefully she can be extubated safely to have better clinical exam    Please contact the stroke service with any questions: Feel free to call my cellphone.    Gopi Cabrera MD  Vascular Neurology  March 4, 2019    I personally reviewed all relevant labs and neuroimaging.  I spent 35 minutes of critical care time due to encephalopathy management reviewing labs, diagnostic studies, neuroimaging, and evaluating the patient.    ____________________________________________________________________        Medications:      Current Facility-Administered Medications:      acetaminophen (TYLENOL) tablet 650 mg, 650 mg, Oral, Q6H **OR** acetaminophen (TYLENOL) solution 650 mg, 650 mg, Per NG tube, Q6H, Gregg Colorado MD, 650 mg at 03/04/19 1732     albuterol (PROVENTIL) neb solution 2.5 mg, 2.5 mg, Nebulization, Q2H PRN, Gregg Colorado MD     aspirin (ASA) chewable tablet 81 mg, 81 mg, Oral, At Bedtime, Gregg Colorado MD, 81 mg at 03/03/19 0157     carbidopa-levodopa (SINEMET)  MG per tablet 2 tablet, 2 tablet, Oral, 4x Daily, Gregg Colorado MD, 2 tablet at 03/04/19 1613     chlorhexidine (PERIDEX) 0.12 % solution 15 mL, 15 mL, Mouth/Throat, Q12H, Gregg Colorado MD, 15 mL at 03/04/19 0805     dextrose 10 %  1,000 mL infusion, , Intravenous, Continuous PRN, Gregg Colorado MD     glucose gel 15-30 g, 15-30 g, Oral, Q15 Min PRN **OR** dextrose 50 % injection 25-50 mL, 25-50 mL, Intravenous, Q15 Min PRN **OR** glucagon injection 1 mg, 1 mg, Subcutaneous, Q15 Min PRN, Gregg Colorado MD     docusate (COLACE) 50 MG/5ML liquid 100 mg, 100 mg, Oral or Feeding Tube, BID, Gregg Colorado MD, 100 mg at 03/04/19 0805     heparin sodium injection 5,000 Units, 5,000 Units, Subcutaneous, Q8H, Gregg Colorado MD, 5,000 Units at 03/04/19 1401     HYDROmorphone (PF) (DILAUDID) injection 0.2 mg, 0.2 mg, Intravenous, Q2H PRN, Gregg Colorado MD, 0.2 mg at 03/03/19 0629     insulin aspart (NovoLOG) inj (RAPID ACTING), 1-6 Units, Subcutaneous, Q4H, Gregg Colorado MD, 1 Units at 03/02/19 2001     levothyroxine (SYNTHROID/LEVOTHROID) tablet 112 mcg, 112 mcg, Oral, QAM AC, Gregg Colorado MD, 112 mcg at 03/04/19 0805     Lidocaine (LIDOCARE) 4 % Patch 3 patch, 3 patch, Transdermal, Q24h, 3 patch at 03/03/19 2021 **AND** lidocaine patch REMOVAL, , Transdermal, Q24H **AND** lidocaine patch in PLACE, , Transdermal, Q8H, Gregg Colorado MD     magnesium sulfate 2 g in water intermittent infusion, 2 g, Intravenous, Daily PRN, Gregg Colorado MD     magnesium sulfate 4 g in 100 mL sterile water (premade), 4 g, Intravenous, Q4H PRN, Gregg Colorado MD     midazolam (VERSED) injection 2-4 mg, 2-4 mg, Intravenous, Q2H PRN, Gregg Colorado MD     multivitamins w/minerals (CERTAVITE) liquid 15 mL, 15 mL, Per Feeding Tube, Daily, Gregg Colorado MD, 15 mL at 03/04/19 0805     naloxone (NARCAN) injection 0.1-0.4 mg, 0.1-0.4 mg, Intravenous, Q2 Min PRN, Gregg Colorado MD     ondansetron (ZOFRAN-ODT) ODT tab 4 mg, 4 mg, Oral, Q6H PRN **OR** ondansetron (ZOFRAN) injection 4 mg, 4 mg, Intravenous, Q6H PRN, Gregg Colorado MD     oxyCODONE (ROXICODONE) tablet 5-10 mg, 5-10 mg, Oral, Q3H PRN, Gregg Colorado MD, 10 mg at 03/04/19 0417      "pantoprazole (PROTONIX) EC tablet 40 mg, 40 mg, Oral, Daily **OR** pantoprazole (PROTONIX) 2 mg/mL suspension 40 mg, 40 mg, Oral or Feeding Tube, Daily, Gregg Colorado MD, 40 mg at 03/04/19 0805     piperacillin-tazobactam (ZOSYN) 4.5 g vial to attach to  mL bag, 4.5 g, Intravenous, Q6H, Gregg Colorado MD, 4.5 g at 03/04/19 1732     polyethylene glycol (MIRALAX/GLYCOLAX) Packet 17 g, 17 g, Oral, Daily PRN, Gregg Colorado MD     potassium chloride (KLOR-CON) Packet 20-40 mEq, 20-40 mEq, Oral or Feeding Tube, Q2H PRN, Gregg Colorado MD, 20 mEq at 03/04/19 0620     potassium chloride ER (K-DUR/KLOR-CON M) CR tablet 20-40 mEq, 20-40 mEq, Oral, Q2H PRN, Gregg Colorado MD     propofol (DIPRIVAN) infusion, 5-75 mcg/kg/min, Intravenous, Continuous, Last Rate: 24.6 mL/hr at 03/04/19 1426, 45 mcg/kg/min at 03/04/19 1426 **AND** propofol (DIPRIVAN) injection 10 mg/mL vial, 10-20 mg, Intravenous, Q30 Min PRN **AND** CK total, , , CONDITIONAL (SPECIFY) **AND** Triglycerides, , , CONDITIONAL (SPECIFY), Gregg Colorado MD     protein modular (PROSTAT SUGAR FREE) packet 1 packet, 1 packet, Per Feeding Tube, BID, Gregg Colorado MD, 1 packet at 03/04/19 1148     QUEtiapine (SEROquel) tablet 50 mg, 50 mg, Oral or Feeding Tube, BID, Gregg Colorado MD     sodium chloride 0.9% infusion, , Intravenous, Continuous, Gregg Colorado MD, Last Rate: 50 mL/hr at 03/04/19 0329     valproate (DEPACON) 500 mg in sodium chloride 0.9 % 50 mL intermittent infusion, 500 mg, Intravenous, Q12H, Gregg Colorado MD, Last Rate: 50 mL/hr at 03/04/19 1401, 500 mg at 03/04/19 1401     venlafaxine (EFFEXOR) tablet 37.5 mg, 37.5 mg, Oral, TID, Gregg Colorado MD, 37.5 mg at 03/04/19 1401    Vital Signs:  B/P: 162/76, T: 99, P: 63, R: 14    /76   Pulse 63   Temp 99  F (37.2  C)   Resp 14   Ht 1.6 m (5' 3\")   Wt 91.4 kg (201 lb 8 oz)   SpO2 99%   BMI 35.69 kg/m    General: Intubated on propofol  HEENT: Atraumatic, " normocephalic, no scleral icterus or conjunctival pallor   Cardiac: RRR  Chest: Clear to auscultation  Abdomen: Soft, non-tender, non-distended  Extremities: No LE swelling.  Skin: No rash or lesion.      Neuro:  Intubated and sedation  Grimaces to pain, not opening eyes  +VOR/corneal  Not moving much of ext to pain stim    Labs/Studies:  CBC:     Recent Labs   Lab 03/04/19  0530 03/03/19  0630 03/02/19  1449   WBC 8.5 10.1 13.5*   RBC 3.47* 3.76* 3.98   HGB 10.4* 11.4* 12.0   HCT 31.7* 33.9* 36.2    172 195     Basic Metabolic Panel:   Recent Labs   Lab Test 03/04/19  0530 03/03/19  0630 03/02/19  1449   * 137 136   POTASSIUM 3.8 3.5 4.1   CHLORIDE 115* 104 102   CO2 26 27 28   BUN 17 19 26   CR 1.20* 1.03 1.18*   * 98 122*   NIKKIE 7.6* 7.6* 8.4*     Liver panel:  Recent Labs   Lab Test 03/02/19  1449 10/24/17  0700 10/23/17  0653 10/21/17  1751   PROTTOTAL 7.0  --  6.7* 7.7   ALBUMIN 3.2* 2.3* 2.3* 2.9*   BILITOTAL 0.3  --  0.4 0.8   ALKPHOS 137  --  218* 258*   AST 21  --  38 61*   ALT 7  --  10 13     INR:  Recent Labs   Lab Test 03/02/19  1449 02/27/19  0540   INR 1.04 0.93      Lipid Profile:No lab results found.  A1C:   Recent Labs   Lab Test 03/02/19  1449 10/23/17  0653   A1C 6.7* 6.6*     Troponin I:   Recent Labs   Lab Test 03/02/19 1449   TROPI <0.015         Imaging:  Relevant findings as per the Impression above.

## 2019-03-06 NOTE — PROGRESS NOTES
[facility] ICU RESPIRATORY NOTE  Date of Admission:3/2/2019  Date of Intubation (most recent):3/2/2019  Reason for Mechanical Ventilation:Airway protection  Number of Days on Mechanical Ventilation:4  Met Criteria for Pressure Support Trial:No  Reason for No Pressure Support Trial:Per MD    Ventilation Mode: CMV/AC  (Continuous Mandatory Ventilation/ Assist Control)  FiO2 (%): 30 %  Rate Set (breaths/minute): 14 breaths/min  Tidal Volume Set (mL): 400 mL  PEEP (cm H2O): 5 cmH2O  Oxygen Concentration (%): 30 %  Resp: 12        Significant Events Today:None    ABG Results:  No lab results found in last 7 days.      Plan:  Will cont full vent support for now and will assess for weaning readiness daily.  3/5/2019  Nasima Martinez RRT

## 2019-03-06 NOTE — PROGRESS NOTES
CLINICAL NUTRITION SERVICES - REASSESSMENT NOTE      Recommendations Ordered by Registered Dietitian (RD):   Increase TF Replete with Fiber now to 45 mL/hr;  After 12 hrs increase to 60 mL/hr = 1440 kcals (16 kcal/kg), 92 gm pro (1.8 gm/kg), 1195 mL H20, 22 gm fiber, 179 gm CHO    Cancel the Prostat as will no longer be needed   Malnutrition:  (3/6)  % Weight Loss:  None noted  % Intake:  No decreased intake noted  Subcutaneous Fat Loss:  None observed  Muscle Loss:  None observed  Fluid Retention:  Mild - doubt nutrition related    Malnutrition Diagnosis: Patient does not meet two of the above criteria necessary for diagnosing malnutrition       EVALUATION OF PROGRESS TOWARD GOALS   Diet:  NPO on vent    Nutrition Support:  TF was started at 15 mL/hr on 3/3 (1400), increased to 25 mL/hr on 3/4, and increased to goal 35 mL/hr on 3/5 (1400) as below:    Nutrition Support Enteral:  Type of Feeding Tube:  OG  Enteral Frequency:  Continuous  Enteral Regimen:  Replete with Fiber at 35 mL/hr  Total Enteral Provisions:  840 cals, 54 gm pro, 13 gm fiber, 700 mL free water, 104 gm CHO  Free Water Flush:  60 mL every 4 hrs    Prostat BID = 200 kcals, 30 gm pro  Propofol off today.  Total (TF + Prostat):  1040 kcals (12 kcal/kg and 83% energy needs), 84 gm pro (1.6 gm/kg).    Intake/Tolerance:    Last recorded stool 3/5 x1.  Pt on Colace for bowel program.  Labs today acceptable.  BGM:  100, 135, 111, 132, 137, 125 - acceptable  Wt:  93.4 kg (up 3.2 kg since admit).  Pt with 2+ mild dependent edema.      ASSESSED NUTRITION NEEDS PER APPROVED PRACTICE GUIDELINES:     Dosing Weight 90.2 kg (3/3 actual wt for energy); 52.3 kg (IBW for protein)  Estimated Energy Needs: 8483-3244 kcals (14-17 Kcal/Kg)  Justification: hypocaloric feeding guidelines for obesity  Estimated Protein Needs:  grams protein (1.5-2 g pro/Kg)  Justification: obesity      NEW FINDINGS:   Spoke with daughter Kassandra to obtain Nutrition History:  - Pt was on  Low Protein diet during the day due to Sinemet medication.  - She ate 3 meals per day at the memory care unit with good appetite and good oral intake.  - No recent weight loss or fat/muscle loss.    Pt remains intubated.  Monitoring for return of neurological function per rounds today.      Previous Goals (3/3):   EN to meet % est needs  Evaluation: Not met    Previous Nutrition Diagnosis (3/3):   Inadequate protein-energy intake related to NPO status on vent  as evidenced by pt meeting 0% est needs  Evaluation: Improving      MALNUTRITION  % Weight Loss:  None noted  % Intake:  No decreased intake noted  Subcutaneous Fat Loss:  None observed  Muscle Loss:  None observed  Fluid Retention:  Mild - doubt nutrition related    Malnutrition Diagnosis: Patient does not meet two of the above criteria necessary for diagnosing malnutrition    CURRENT NUTRITION DIAGNOSIS  Inadequate energy intake related to Propofol discontinued as evidenced by TF + ProStat meeting only 83% energy needs.    INTERVENTIONS  Recommendations / Nutrition Prescription  Increase TF Replete with Fiber now to 45 mL/hr;  After 12 hrs increase to 60 mL/hr = 1440 kcals (16 kcal/kg), 92 gm pro (1.8 gm/kg), 1195 mL H20, 22 gm fiber, 179 gm CHO    Cancel the Prostat as will no longer be needed.    Implementation  EN Schedule, Medical Food Supplement and Collaboration and Referral of Nutrition care    Goals  TF Replete with Fiber at 60 mL/hr will meet % estimated needs      MONITORING AND EVALUATION:  Progress towards goals will be monitored and evaluated per protocol and Practice Guidelines    Nieves Garza, RD, LD, [facility]

## 2019-03-06 NOTE — PLAN OF CARE
Sedated on Propofol, no overt seizure activity noted, lung sounds wheezy - nebulizer initiated by RT. Wrist restraints on per family request-patient does not make purposeful movements. Tolerates TF, making adequate urine.  EEG to be discharge today. No plan to extubate discussed.

## 2019-03-06 NOTE — PROGRESS NOTES
"Critical Care  Note      03/06/2019    Name: Loan Anderson MRN#: [id number]   Age: 78 year old YOB: 1940     [facility]    MV DAY #4             Problem List:   Acute encephalopathy  Rib fractures  YASMIN  leukocytosis         Summary/Hospital Course:     78F with parkinsons disease who lives in a memory center now presents from [facility] after being found acutely encephalopathic and mildly agitated/combative by her  around 2 pm on day of admission.  Apparently there had been an episode earlier in the day where she had turned red and coughed 3-4 min after taking pills and family member did the heimlich maneuver.  She returned to her normal state after this but no source of asphyxiation was found.  Later in the day her  went to get the mail and when he returned to their home (~10 min) he found staff doing cpr on her.  Apparently she became unresponsive at some point and cpr was started, but on EMS arrival she did have a perfusing rhythm.     A couple days prior to admit she had a mechanical fall resulting in R hip pain.  No injury noted at that time, but today has several rib fx on CT scan (may be due to cpr?) and transverse process L1L2 fx's--suspect these may be from fall.      Assessment and plan :     Loan Anderson IS a 78 year old female admitted on 3/2/2019 for acute encephalopathy.   I have personally reviewed the daily labs, imaging studies, cultures and discussed the case with referring physician and consulting physicians.   My assessment and plan by system for this patient is as follows:    Neurology/Psychiatry:   1. Acute encephalopathy:  Etiology unclear; ?seizure activity (episodic unresponsiveness)? -- video eeg \"consistent with the interictal state of partial epilepsy\". May have been taking more tramadol than usual after fall--this would lower seizure threshold.  Appreciate neurocrit input.  2. Analgesia:  lidoderm patches on chest for rib fx.  PO " tylenol standing.  Prn oxycodone/dilaudid.  3. Sedation:  Propofol drip for now--attempting transition to precedex at request of neuro crit team for improved eeg sensitivity (masking of seizures by propofol).  Midazolam pushes prn.  Seroquel and valproate being given with good effect.  4. H/o parkinsons dz:  Appreciate neuro crit input.  Will continue short acting carbo/levodopa.  Holding long-acting and patch forms for now.    Cardiovascular:    1.  Htn:  Restart home lisinopril (actually starting at 10, home dose is 5), prn IV labetalol and hydralazine.    Pulmonary/Ventilator Management:   1. Loss of protective airway reflexes:  Due to encephalopathy noted above.  Not neurologically appropriate for extubation today.    GI and Nutrition :   1.  Tube feeds  2.  PPI for PUD prophy    Renal/Fluids/Electrolytes:   1. YASMIN on CKD:  Resolved. Creat 0.86 today.  UOP adequate.  Continue to watch closely.    Infectious Disease:   1. Procal ok <0.05 and no clear source of infection.  Cultures remain ngtd.  Stopped all abx 3/5/19.    Endocrine:   1. FSG ok for now (137 this AM).    2.  Hypothyroid:  Continue home synthroid.    Hematology/Oncology:   1.  Anemia recovering to baseline (11.1 today)  2.  WBC ok 10.6, pltlts ok 216.    ICU Prophylaxis:   1. DVT: Hep Subq/ mechanical  2. VAP: HOB 30 degrees, chlorhexidine rinse  3. Stress Ulcer: PPI  4. Restraints: Nonviolent soft two point restraints required and necessary for patient safety and continued cares and good effect as patient continues to pull at necessary lines, tubes despite education and distraction. Will readdress daily.   5. Wound care  - per unit routine  6. Feeding -  tf  7. Family Update: daughter and  at bedside today.  8. Disposition - critically ill           Medical History:     Past Medical History:   Diagnosis Date     Constipation 7/15/2011     Convergence insufficiency 7/15/2011     Depression 7/15/2011     Diplopia 7/18/2011     Hot flashes,  menopausal 7/18/2011     Hypercholesterolemia 7/18/2011     Hypothyroid 7/15/2011     Neurogenic bladder 7/15/2011     Parkinson disease (H) 7/15/2011     REM sleep behavior disorder 7/18/2011     Sebaceous cyst 7/18/2011     Wears glasses 7/18/2011     Past Surgical History:   Procedure Laterality Date     ------------OTHER-------------  november ? 2015    lithrotripsy for renal stone     ------------OTHER-------------  november 2015    had sepsis from uti and hospitalized     BRAIN SURGERY  12 or 14 oct 2011    gene therapy enrolled study at [place]     Social History     Socioeconomic History     Marital status:      Spouse name: Not on file     Number of children: Not on file     Years of education: Not on file     Highest education level: Not on file   Occupational History     Not on file   Social Needs     Financial resource strain: Not on file     Food insecurity:     Worry: Not on file     Inability: Not on file     Transportation needs:     Medical: Not on file     Non-medical: Not on file   Tobacco Use     Smoking status: Never Smoker     Smokeless tobacco: Never Used   Substance and Sexual Activity     Alcohol use: Yes     Comment: rare     Drug use: Not on file     Sexual activity: Not on file   Lifestyle     Physical activity:     Days per week: Not on file     Minutes per session: Not on file     Stress: Not on file   Relationships     Social connections:     Talks on phone: Not on file     Gets together: Not on file     Attends [employer] service: Not on file     Active member of club or organization: Not on file     Attends meetings of clubs or organizations: Not on file     Relationship status: Not on file     Intimate partner violence:     Fear of current or ex partner: Not on file     Emotionally abused: Not on file     Physically abused: Not on file     Forced sexual activity: Not on file   Other Topics Concern     Parent/sibling w/ CABG, MI or angioplasty before 65F 55M? Not Asked   Social  History Narrative    Spouse is serenity        Allergies   Allergen Reactions     Atorvastatin      Concern that it was contributing to confusion     Mirapex [Pramipexole Dihydrochloride Monohydrate]      Leg swelling     Potassium Chloride      IV infusion only. She tolerates oral potassium chloride     Requip [Ropinirole Hydrochloride]      Leg swelling     Epinephrine Palpitations              Key Medications:       acetaminophen  650 mg Oral Q6H    Or     acetaminophen  650 mg Per NG tube Q6H     aspirin  81 mg Oral At Bedtime     carbidopa-levodopa  2 tablet Oral 4x Daily     chlorhexidine  15 mL Mouth/Throat Q12H     docusate  100 mg Oral or Feeding Tube BID     heparin  5,000 Units Subcutaneous Q8H     insulin aspart  1-6 Units Subcutaneous Q4H     lacosamide (VIMPAT) intermittent infusion  100 mg Intravenous BID     levothyroxine  112 mcg Oral QAM AC     lidocaine  3 patch Transdermal Q24h    And     lidocaine   Transdermal Q24H    And     lidocaine   Transdermal Q8H     multivitamins w/minerals  15 mL Per Feeding Tube Daily     pantoprazole  40 mg Oral Daily    Or     pantoprazole  40 mg Oral or Feeding Tube Daily     protein modular  1 packet Per Feeding Tube BID     QUEtiapine  50 mg Oral or Feeding Tube BID     valproate (DEPACON) intermittent infusion  350 mg Intravenous Q6H     venlafaxine  37.5 mg Oral TID       dexmedetomidine       IV fluid REPLACEMENT ONLY       propofol (DIPRIVAN) infusion 40 mcg/kg/min (03/06/19 0835)     sodium chloride 10 mL/hr at 03/06/19 0739        Home Meds    No current facility-administered medications on file prior to encounter.   Current Outpatient Medications on File Prior to Encounter:  aspirin 81 MG tablet Take 81 mg by mouth At Bedtime @ midnight   carbidopa-levodopa (SINEMET CR)  MG CR tablet Take 1 tablet by mouth At Bedtime At 8 pm   carbidopa-levodopa (SINEMET)  MG tablet Take 2 tablets 4 times daily at 7 am, 10 am, 1 pm, and 4 pm   cholecalciferol  5000 units CAPS Take 1 capsule by mouth every morning @ 6am   docusate sodium (COLACE) 100 MG capsule Take 300 mg by mouth daily 3 x 100mg capsule @ noon   ibuprofen (ADVIL/MOTRIN) 200 MG tablet Take 400 mg by mouth 3 times daily 2 x 200mg tabs by mouth 3/day @ 6am, 12pm and 6pm and 3 x 200mg tabs by mouth every 8 hours as needed   levothyroxine (SYNTHROID/LEVOTHROID) 112 MCG tablet Take 112 mcg by mouth daily @ 6am   lisinopril (PRINIVIL/ZESTRIL) 5 MG tablet Take 5 mg by mouth daily @ 6am   mirabegron (MYRBETRIQ) 25 MG 24 hr tablet Take 25 mg by mouth daily @ 6am   Multiple Vitamins-Minerals (EYE VITAMINS) CAPS Take 1 capsule by mouth daily 1 capsule (macu-health) by mouth daily @ midnight   omeprazole (PRILOSEC) 20 MG DR capsule Take 20 mg by mouth daily @ 6am   polyethylene glycol (MIRALAX) powder Take 17 g by mouth daily as needed @0600   rotigotine (NEUPRO) 4 MG/24HR 24 hr patch Apply patch daily @ bedtime/8 pm (Patient taking differently: Place 1 patch onto the skin daily @ bedtime/8 pm)   tamsulosin (FLOMAX) 0.4 MG capsule Take 0.4 mg by mouth daily @ 6pm   traMADol (ULTRAM) 50 MG tablet Take 1 tablet (50 mg) by mouth every 6 hours as needed for severe pain   trospium (SANCTURA XR) 60 MG CP24 24 hr capsule Take 60 mg by mouth every morning (before breakfast) @ 6am   venlafaxine (EFFEXOR) 100 MG tablet Take 100 mg by mouth daily @ 6am              Physical Examination:   Temp:  [98.2  F (36.8  C)-99.1  F (37.3  C)] 98.8  F (37.1  C)  Pulse:  [53-65] 61  Heart Rate:  [53-66] 65  Resp:  [10-19] 17  BP: (121-187)/() 157/69  FiO2 (%):  [30 %] 30 %  SpO2:  [93 %-99 %] 93 %  No intake or output data in the 24 hours ending 03/02/19 1732  Wt Readings from Last 4 Encounters:   03/06/19 93.4 kg (205 lb 14.6 oz)   02/27/19 86.2 kg (190 lb)   02/11/19 88 kg (194 lb)   11/14/18 86.2 kg (190 lb)     BP - Mean:  [] 108  Ventilation Mode: CMV/AC  (Continuous Mandatory Ventilation/ Assist Control)  FiO2 (%): 30  %  Rate Set (breaths/minute): 14 breaths/min  Tidal Volume Set (mL): 400 mL  PEEP (cm H2O): 5 cmH2O  Oxygen Concentration (%): 30 %  Resp: 17    No lab results found in last 7 days.      Intake/Output Summary (Last 24 hours) at 3/6/2019 1035  Last data filed at 3/6/2019 1000  Gross per 24 hour   Intake 2531.01 ml   Output 1655 ml   Net 876.01 ml       GEN: no acute distress, sedated   HEENT: head ncat, sclera anicteric, OP patent, trachea midline   PULM: unlabored synchronous with vent, clear anteriorly    CV/COR: RRR S1S2 no gallop,  No rub, no murmur  ABD: soft nontender, hypoactive bowel sounds, no mass  EXT:  Minimal Edema x4,  Warm x4  NEURO: sedated.  Moves all 4 when sedation weaned.  L pupil slightly larger than R but both reactive. Doesn't follow.  SKIN: no obvious rash  LINES: clean, dry intact         Data:   All data and imaging reviewed     ROUTINE ICU LABS (Last four results)  CMP  Recent Labs   Lab 03/06/19  0605 03/05/19  0520 03/04/19  0530 03/03/19  0630 03/02/19  1449    143 145* 137 136   POTASSIUM 3.9 4.0 3.8 3.5 4.1   CHLORIDE 109 112* 115* 104 102   CO2 28 26 26 27 28   ANIONGAP 4 5 4 6 6   * 120* 101* 98 122*   BUN 23 19 17 19 26   CR 0.86 0.98 1.20* 1.03 1.18*   GFRESTIMATED 66 56* 44* 53* 45*   GFRESTBLACK 76 65 51* 61 52*   NIKKIE 8.0* 7.7* 7.6* 7.6* 8.4*   MAG 2.2 2.2 2.3 2.2  --    PHOS 3.1 2.7 3.3 3.1  --    PROTTOTAL  --   --   --   --  7.0   ALBUMIN  --   --   --   --  3.2*   BILITOTAL  --   --   --   --  0.3   ALKPHOS  --   --   --   --  137   AST  --   --   --   --  21   ALT  --   --   --   --  7     CBC  Recent Labs   Lab 03/06/19  0605 03/05/19  0520 03/04/19  0530 03/03/19  0630   WBC 10.6 9.6 8.5 10.1   RBC 3.67* 3.60* 3.47* 3.76*   HGB 11.1* 10.8* 10.4* 11.4*   HCT 33.7* 33.1* 31.7* 33.9*   MCV 92 92 91 90   MCH 30.2 30.0 30.0 30.3   MCHC 32.9 32.6 32.8 33.6   RDW 15.4* 15.5* 15.2* 14.6    205 182 172     INR  Recent Labs   Lab 03/02/19  1449   INR 1.04      Arterial Blood GasNo lab results found in last 7 days.    All cultures:  Recent Labs   Lab 03/03/19  0530 03/02/19  1554 03/02/19  1543   CULT Moderate growth  Normal abhijeet   No growth after 4 days No growth after 4 days     Recent Results (from the past 24 hour(s))   XR Chest Port 1 View    Narrative    CHEST PORTABLE ONE VIEW     3/2/2019 3:00 PM     HISTORY: Tube placement.    COMPARISON: 2/27/2019.      Impression    IMPRESSION: Endotracheal tube in place in mid trachea. New area of  atelectasis or infiltrate in the right lung base. Left lung is clear.  Heart size is upper normal. Pulmonary vasculature does not appear  distended.      KELLIE BANKS MD   CT Head w/o Contrast    Narrative    CT SCAN OF THE HEAD WITHOUT CONTRAST   3/2/2019 3:10 PM     HISTORY: Altered level of consciousness (LOC), unexplained.    TECHNIQUE:  Axial images of the head and coronal reformations without  IV contrast material.  Radiation dose for this scan was reduced using  automated exposure control, adjustment of the mA and/or kV according  to patient size, or iterative reconstruction technique.    COMPARISON: None.    FINDINGS: There is some moderate cerebral atrophy. There is an old  infarct in the left basal ganglia region. There are some patchy white  matter changes in both hemispheres without mass effect. The patient is  intubated. The visualized paranasal sinuses and mastoid air cells are  clear. There is no evidence for intracranial hemorrhage, acute  infarct, mass effect, or skull fracture. Bilateral angi hole  procedures are noted.      Impression    IMPRESSION: Chronic changes. No evidence for intracranial hemorrhage  or any acute process.       SHAGUFTA MOORE MD   CT Head Neck Angio w/o & w Contrast    Narrative    CTA HEAD NECK WITH CONTRAST 3/2/2019 3:18 PM     HISTORY: Headache, sudden, carotid/vertebral dissection suspected/    TECHNIQUE: Multiplanar multisequence images were obtained through the  neck without and with  intravenous contrast. 70 mL of Isovue-370 was  given. Multiplanar reconstructions were performed. 3-D reconstructions  off a remote workstation for CT angiography were also acquired.  Carotid stenoses were evaluated by comparing the caliber of the  proximal internal carotid artery to the caliber of the distal internal  carotid artery. Radiation dose for this scan was reduced using  automated exposure control, adjustment of the mA and/or kV according  to patient size, or iterative reconstruction technique.    FINDINGS:    Brachiocephalic vessels: Normal.    Right carotid system: Normal.    Left carotid system: Trace amount of plaque. No stenosis or  dissection.    Right vertebral artery: Normal.    Left vertebral artery: Normal.    [place]: Normal.    Other findings: The patient is intubated. The tip of the ET tube is  above the sánchez. Trachea has slight deviation to the right. There is  a moderate size right pleural effusion. Degenerative changes are seen  in the spine. There is some minimal degenerative spondylolisthesis of  C3 and C4.      Impression    IMPRESSION:  1. Negative CT angiography head and neck.  2. Moderate size right pleural effusion.     SHAGUFTA MOORE MD   CT Chest (PE) Abdomen Pelvis w Contrast    Narrative    CT CHEST PE, ABDOMEN PELVIS WITH CONTRAST   3/2/2019 3:26 PM     HISTORY: Trauma. Altered mental status. Hypotension.    TECHNIQUE: 75 mL Isovue-370 IV were administered. After contrast  administration, volumetric helical sections were acquired from the  thoracic inlet to the ischial tuberosities. Pulmonary embolism  protocol was performed through the chest. Coronal images were also  reconstructed. Radiation dose for this scan was reduced using  automated exposure control, adjustment of the mA and/or kV according  to patient size, or iterative reconstruction technique.    COMPARISON: CT of the abdomen and pelvis performed 10/24/2017.    FINDINGS:    Chest: No evidence for pulmonary  embolism or thoracic aortic  dissection. Atherosclerotic calcification of the thoracic aorta.  Endotracheal tube is in place, with tip approximately 2.5 cm above the  sánchez. Small to moderate right pleural effusion, with mild associated  compressive atelectasis at the right lung base posteriorly. No  pericardial or left pleural effusion. There is mild atelectasis at the  left lung base posteriorly. Mild patchy mosaic attenuation is noted in  the lungs bilaterally. There are displaced acute appearing fractures  involving the right 10th and 11th ribs posteriorly. Mildly displaced  fractures of the right second rib in two places laterally and  anteriorly. There are also mildly displaced fractures of the right  third through sixth ribs laterally. Nondisplaced fracture of the left  second rib laterally.    Abdomen and Pelvis: No bowel obstruction. There is a moderate amount  of stool in the rectum. No free fluid in the pelvis. No convincing  evidence for colitis or diverticulitis. No intra-abdominal fluid  collections to suggest hematoma. No free intraperitoneal air.  Scattered cortical scarring in the left kidney is not significantly  changed, given differences in technique. The liver, gallbladder,  spleen, adrenal glands, pancreas, and kidneys are otherwise  unremarkable. No hydronephrosis. No evidence for solid organ injury.  Mild atherosclerotic aortoiliac calcification. The uterus is not seen,  and is likely surgically absent. Degenerative changes are noted in the  thoracolumbar spine. Displaced acute appearing fractures are noted  involving the L1 and L2 transverse processes on the right.      Impression    IMPRESSION:   1. Displaced acute appearing fractures are noted involving the right  10th and 11th ribs posteriorly, as well as the right 2nd through 6th  ribs laterally. Nondisplaced fracture of the left second rib laterally  may also be acute. There are also acute-appearing displaced fractures  of the L1 and  L2 transverse processes on the right.  2. No evidence for pulmonary embolism.  3. Small to moderate right pleural effusion.  4. Mild patchy mosaic attenuation in the lungs bilaterally may be  related to air trapping.  5. Moderate amount of stool in the rectum.      NUVIA ISLAS MD     Labs (personally reviewed/interpreted):  See a/p.  CXR (personally reviewed):  r effusion again seen.  ETT and feeding tube remain in good position.    Billing: This patient is critically ill: Yes. Total critical care time today 35 min.

## 2019-03-06 NOTE — PROGRESS NOTES
[facility] ICU RESPIRATORY NOTE        Date of Admission: 3/2/2019    Date of Intubation (most recent): 03/02/19  Reason for Mechanical Ventilation: Airway Protection    Number of Days on Mechanical Ventilation: 5    Significant Events Today: none overnight    ABG Results: No lab results found in last 7 days.    Current Vent Settings: Ventilation Mode: CMV/AC  (Continuous Mandatory Ventilation/ Assist Control)  FiO2 (%): 30 %  Rate Set (breaths/minute): 14 breaths/min  Tidal Volume Set (mL): 400 mL  PEEP (cm H2O): 5 cmH2O  Oxygen Concentration (%): 30 %  Resp: 14      Skin Assessment: no skin issues    Plan: continue vent support    Tamika Love

## 2019-03-06 NOTE — PROGRESS NOTES
SPIRITUAL HEALTH SERVICES  Spiritual Assessment Progress Note  [facility] ICU   visited with pt's daughter.  Pt appeared to hear my voice and tried to open her eyes.  Daughter tearful as she talked about pt's condition and the stress it is putting on pt's  .      Daughter thought that pt would like a prayer.       provided a prayer and words of assurance.      SH will continue to follow.                                                                                                                                          Kalya Mcmillan M.Div., [facility]  Staff    Pager [phone]

## 2019-03-06 NOTE — PROGRESS NOTES
Patient's family verbalizes intense desire for wrist restraints to be applied to patient for fear of her dislodging the ET tube. Pros and cons discussed with family as patient makes no effort to remove lines/tubes while sedated. Family wishes restraints to be used. Daughter tearful throughout discussion. Restraints re-applied. See charting for RN assessment.

## 2019-03-06 NOTE — CONSULTS
[facility]    Neurosurgery Consultation     Date of Admission:  3/2/2019  Date of Consult (When I saw the patient): 03/06/19    Assessment & Plan   Loan Anderson is a 78 year old female who was admitted on 3/2/2019. I was asked to see the patient for L1/L2 transverse process fracture.    Active Problems:    Fall    Assessment: L1/L2 transverse process fracture    Plan:   -Lumbar CT  -Orthotics consult for brace      I have discussed the following assessment and plan with Dr. Dukes who is in agreement with initial plan and will follow up with further consultation recommendations.    Nasima Siegel CNP  [facility]  [facility]  [address] [place] [address]  [address]  [place] [address]    Tel [phone]  Pager [phone]        Code Status    DNR    Reason for Consult   Reason for consult: I was asked by Dr. Gregg Colorado to evaluate this patient for L1/L2 transverse process fracture.    Primary Care Physician   Physician No Ref-Primary    Chief Complaint   Fall    History obtained from family and EMR. Unable to obtain a history from the patient due to intubation and sedation    History of Present Illness   Loan Anderson is a 78 year old female who was admitted on 3/2/2019 with altered mental status at her facility. On the day of admission she experienced a coughing spell  Where she turned red. Her daughter felt she was choking at that time and performed the Heimlich maneuver which resolved the coughing spell. Later that day she became acutely unresponsive. She was lowered to the floor and was noted to not have a pulse. CPR was initiated and EMS was called. She has had a history of falls over the past couple of weeks. On 2/27/2019 she was seen in the ED for fall with back pain. A lumbar XR was done and revealed no acute fractures. On 3/2/2019 a CT Chest PE revealed an acute-appearing displaced fracture of the L1 and L2 transverse processes on the right.  Exam limited due to sedation. No purposeful movement per nursing.    Past Medical History   I have reviewed this patient's medical history and updated it with pertinent information if needed.   Past Medical History:   Diagnosis Date     Constipation 7/15/2011     Convergence insufficiency 7/15/2011     Depression 7/15/2011     Diplopia 7/18/2011     Hot flashes, menopausal 7/18/2011     Hypercholesterolemia 7/18/2011     Hypothyroid 7/15/2011     Neurogenic bladder 7/15/2011     Parkinson disease (H) 7/15/2011     REM sleep behavior disorder 7/18/2011     Sebaceous cyst 7/18/2011     Wears glasses 7/18/2011       Past Surgical History   I have reviewed this patient's surgical history and updated it with pertinent information if needed.  Past Surgical History:   Procedure Laterality Date     ------------OTHER-------------  november ? 2015    lithrotripsy for renal stone     ------------OTHER-------------  november 2015    had sepsis from uti and hospitalized     BRAIN SURGERY  12 or 14 oct 2011    gene therapy enrolled study at [facility]       Prior to Admission Medications   Prior to Admission Medications   Prescriptions Last Dose Informant Patient Reported? Taking?   Multiple Vitamins-Minerals (EYE VITAMINS) CAPS   Yes Yes   Sig: Take 1 capsule by mouth daily 1 capsule (macu-health) by mouth daily @ midnight   aspirin 81 MG tablet 3/1/2019 at HS  Yes Yes   Sig: Take 81 mg by mouth At Bedtime @ midnight   carbidopa-levodopa (SINEMET CR)  MG CR tablet 3/1/2019 at 2000  No Yes   Sig: Take 1 tablet by mouth At Bedtime At 8 pm   carbidopa-levodopa (SINEMET)  MG tablet 3/2/2019 at 1300  No Yes   Sig: Take 2 tablets 4 times daily at 7 am, 10 am, 1 pm, and 4 pm   cholecalciferol 5000 units CAPS   Yes Yes   Sig: Take 1 capsule by mouth every morning @ 6am   docusate sodium (COLACE) 100 MG capsule 3/2/2019 at 1200  Yes Yes   Sig: Take 300 mg by mouth daily 3 x 100mg capsule @ noon   ibuprofen (ADVIL/MOTRIN) 200 MG  tablet 3/2/2019 at 1200  Yes Yes   Sig: Take 400 mg by mouth 3 times daily 2 x 200mg tabs by mouth 3/day @ 6am, 12pm and 6pm and 3 x 200mg tabs by mouth every 8 hours as needed   levothyroxine (SYNTHROID/LEVOTHROID) 112 MCG tablet 3/2/2019 at AM  Yes Yes   Sig: Take 112 mcg by mouth daily @ 6am   lisinopril (PRINIVIL/ZESTRIL) 5 MG tablet 3/2/2019 at 0600  Yes Yes   Sig: Take 5 mg by mouth daily @ 6am   mirabegron (MYRBETRIQ) 25 MG 24 hr tablet 3/2/2019 at 0600  Yes Yes   Sig: Take 25 mg by mouth daily @ 6am   omeprazole (PRILOSEC) 20 MG DR capsule 3/2/2019 at 0600  Yes Yes   Sig: Take 20 mg by mouth daily @ 6am   polyethylene glycol (MIRALAX) powder PRN  Yes Yes   Sig: Take 17 g by mouth daily as needed @0600   rotigotine (NEUPRO) 4 MG/24HR 24 hr patch 3/1/2019 at 2000  No Yes   Sig: Apply patch daily @ bedtime/8 pm   Patient taking differently: Place 1 patch onto the skin daily @ bedtime/8 pm   tamsulosin (FLOMAX) 0.4 MG capsule 3/1/2019 at 1800  Yes Yes   Sig: Take 0.4 mg by mouth daily @ 6pm   traMADol (ULTRAM) 50 MG tablet PRN  Yes Yes   Sig: Take 1 tablet (50 mg) by mouth every 6 hours as needed for severe pain   trospium (SANCTURA XR) 60 MG CP24 24 hr capsule 3/2/2019 at 0600  Yes Yes   Sig: Take 60 mg by mouth every morning (before breakfast) @ 6am   venlafaxine (EFFEXOR) 100 MG tablet 3/2/2019 at 0600  Yes Yes   Sig: Take 100 mg by mouth daily @ 6am      Facility-Administered Medications: None     Allergies   Allergies   Allergen Reactions     Atorvastatin      Concern that it was contributing to confusion     Mirapex [Pramipexole Dihydrochloride Monohydrate]      Leg swelling     Potassium Chloride      IV infusion only. She tolerates oral potassium chloride     Requip [Ropinirole Hydrochloride]      Leg swelling     Epinephrine Palpitations       Social History   I have reviewed this patient's social history and updated it with pertinent information if needed. Loan Anderson  reports that  has never  "smoked. she has never used smokeless tobacco. She reports that she drinks alcohol.    Family History   I have reviewed this patient's family history and updated it with pertinent information if needed.   Family History   Problem Relation Age of Onset     Neurologic Disorder Mother         parkinson disease ? was on sinemet. She had micrographia     Eye Disorder Mother      Cerebrovascular Disease Father         stroke age 47     Heart Disease Father         heart attack age 57       Review of Systems   10 point ROS negative except noted above.    Physical Exam   Temp: 99.1  F (37.3  C) Temp src: Bladder BP: 153/76 Pulse: 61 Heart Rate: 58 Resp: 17 SpO2: 97 % O2 Device: Mechanical Ventilator    Vital Signs with Ranges  Temp:  [98.2  F (36.8  C)-99.1  F (37.3  C)] 99.1  F (37.3  C)  Pulse:  [53-65] 61  Heart Rate:  [53-66] 58  Resp:  [12-19] 17  BP: (121-187)/() 153/76  FiO2 (%):  [30 %] 30 %  SpO2:  [93 %-99 %] 97 %  205 lbs 14.55 oz    Heart Rate: 58, Blood pressure 153/76, pulse 61, temperature 99.1  F (37.3  C), resp. rate 17, height 5' 3\" (1.6 m), weight 205 lb 14.6 oz (93.4 kg), SpO2 97 %.  205 lbs 14.55 oz  HEENT:  Normocephalic, atraumatic. PERRL.   Heart:  No peripheral edema  Lungs:  Intubated.  Skin:  Warm and dry, good capillary refill.    NEUROLOGICAL EXAMINATION:   Mental status:  Intubated and sedated.  Motor:   HU while agitated, sedated on exam.  Sensation:  SHELLIE  Coordination:  SHELLIE  Gait:  Deferred.    Data     CBC RESULTS:   Recent Labs   Lab Test 03/06/19  0605   WBC 10.6   RBC 3.67*   HGB 11.1*   HCT 33.7*   MCV 92   MCH 30.2   MCHC 32.9   RDW 15.4*        Basic Metabolic Panel:  Lab Results   Component Value Date     03/06/2019      Lab Results   Component Value Date    POTASSIUM 3.9 03/06/2019     Lab Results   Component Value Date    CHLORIDE 109 03/06/2019     Lab Results   Component Value Date    NIKKIE 8.0 03/06/2019     Lab Results   Component Value Date    CO2 28 03/06/2019 "     Lab Results   Component Value Date    BUN 23 03/06/2019     Lab Results   Component Value Date    CR 0.86 03/06/2019     Lab Results   Component Value Date     03/06/2019     INR:  Lab Results   Component Value Date    INR 1.04 03/02/2019    INR 0.93 02/27/2019

## 2019-03-06 NOTE — PROGRESS NOTES
Vascular Neurology Progress Note    ____________________________________________________________    Admission Summary:  Loan Anderson is a 78 year old female admitted for AMS on 3/2/2019. She was found to be coughing and choking on something and lost consciousness. She recovered after 30 seconds or so. She then had similar episode later and became unresponsive. She was seen by RN at the time at the facility and found to have no pulse and CPR performed. She had ROSC. She was agitated in ER and required intubation. cEEG showed left frontocentral focus seizure.    Last 24 hours:      No acute issues.    Impression:    Encephalopathy likely post ictal  Seizure, probably provoked by Tramadol usage along with history of previous bilateral frontal angi holes  Respiratory failure s/p intubated    Recommendations:     -Continue with EEG as Propofol is being weaned off; EEG readings show improvement of left frontocentral sharps - rare now  -Cont VPA and LAC; Check VPA level in AM  -Wean off Propofol as able  -Hopefully she can be extubated safely soon to have better clinical exam    Please contact the stroke service with any questions: Feel free to call my cellphone.    Gopi Cabrera MD  Vascular Neurology  March 6, 2019    I personally reviewed all relevant labs and neuroimaging.  I spent 35 minutes of critical care time due to encephalopathy management reviewing labs, diagnostic studies, neuroimaging, and evaluating the patient.    ____________________________________________________________________        Medications:      Current Facility-Administered Medications:      acetaminophen (TYLENOL) tablet 650 mg, 650 mg, Oral, Q6H, 650 mg at 03/05/19 1155 **OR** acetaminophen (TYLENOL) solution 650 mg, 650 mg, Per NG tube, Q6H, Gregg Colorado MD, 650 mg at 03/06/19 1200     albuterol (PROVENTIL) neb solution 2.5 mg, 2.5 mg, Nebulization, Q2H PRN, Gregg Colorado MD, 2.5 mg at 03/06/19 0259     aspirin (ASA) chewable tablet  81 mg, 81 mg, Oral, At Bedtime, Gregg Colorado MD, 81 mg at 03/05/19 2222     carbidopa-levodopa (SINEMET)  MG per tablet 2 tablet, 2 tablet, Oral, 4x Daily, Gregg Colorado MD, 2 tablet at 03/06/19 1609     chlorhexidine (PERIDEX) 0.12 % solution 15 mL, 15 mL, Mouth/Throat, Q12H, Gregg Colorado MD, 15 mL at 03/06/19 0829     dexmedetomidine (PRECEDEX) 4 mcg/mL in sodium chloride 0.9 % 100 mL infusion, 0.2-0.7 mcg/kg/hr, Intravenous, Continuous, Gregg Colorado MD, Last Rate: 11.7 mL/hr at 03/06/19 1618, 0.5 mcg/kg/hr at 03/06/19 1618     dextrose 10 % 1,000 mL infusion, , Intravenous, Continuous PRN, Gregg Colorado MD     glucose gel 15-30 g, 15-30 g, Oral, Q15 Min PRN **OR** dextrose 50 % injection 25-50 mL, 25-50 mL, Intravenous, Q15 Min PRN **OR** glucagon injection 1 mg, 1 mg, Subcutaneous, Q15 Min PRN, Gregg Colorado MD     docusate (COLACE) 50 MG/5ML liquid 100 mg, 100 mg, Oral or Feeding Tube, BID, Gregg Colorado MD, 100 mg at 03/06/19 0813     heparin sodium injection 5,000 Units, 5,000 Units, Subcutaneous, Q8H, Gregg Colorado MD, 5,000 Units at 03/06/19 1414     hydrALAZINE (APRESOLINE) injection 10-20 mg, 10-20 mg, Intravenous, Q4H PRN, Gregg Colorado MD     HYDROmorphone (PF) (DILAUDID) injection 0.2 mg, 0.2 mg, Intravenous, Q2H PRN, Gregg Colorado MD, 0.2 mg at 03/03/19 0629     hypromellose-dextran (ARTIFICAL TEARS) 0.1-0.3 % ophthalmic solution 1 drop, 1 drop, Both Eyes, Q1H PRN, Gregg Colorado MD, 1 drop at 03/06/19 0403     insulin aspart (NovoLOG) inj (RAPID ACTING), 1-6 Units, Subcutaneous, Q4H, Gregg Colorado MD, 1 Units at 03/06/19 1633     labetalol (NORMODYNE/TRANDATE) syringe 10-20 mg, 10-20 mg, Intravenous, Q4H PRN, Gregg Colorado MD     lacosamide (VIMPAT) 100 mg in sodium chloride 0.9 % 100 mL intermittent infusion, 100 mg, Intravenous, BID, Karolyn Diop MD, Last Rate: 100 mL/hr at 03/05/19 2032, 100 mg at 03/06/19 0904     levothyroxine  (SYNTHROID/LEVOTHROID) tablet 112 mcg, 112 mcg, Oral, QAM AC, Gregg Colorado MD, 112 mcg at 03/06/19 0814     Lidocaine (LIDOCARE) 4 % Patch 3 patch, 3 patch, Transdermal, Q24h, 3 patch at 03/05/19 2008 **AND** lidocaine patch REMOVAL, , Transdermal, Q24H **AND** lidocaine patch in PLACE, , Transdermal, Q8H, Gregg Colorado MD     lisinopril (PRINIVIL/ZESTRIL) tablet 10 mg, 10 mg, Oral, Daily, Gregg Colorado MD, 10 mg at 03/06/19 1159     magnesium sulfate 2 g in water intermittent infusion, 2 g, Intravenous, Daily PRN, Gregg Colorado MD     magnesium sulfate 4 g in 100 mL sterile water (premade), 4 g, Intravenous, Q4H PRN, Gregg Colorado MD     midazolam (VERSED) injection 2-4 mg, 2-4 mg, Intravenous, Q2H PRN, Gregg Colorado MD, 2 mg at 03/06/19 1608     naloxone (NARCAN) injection 0.1-0.4 mg, 0.1-0.4 mg, Intravenous, Q2 Min PRN, Gregg Colorado MD     ondansetron (ZOFRAN-ODT) ODT tab 4 mg, 4 mg, Oral, Q6H PRN **OR** ondansetron (ZOFRAN) injection 4 mg, 4 mg, Intravenous, Q6H PRN, Gregg Colorado MD     oxyCODONE (ROXICODONE) tablet 5-10 mg, 5-10 mg, Oral, Q3H PRN, Gregg Colorado MD, 10 mg at 03/06/19 1609     pantoprazole (PROTONIX) EC tablet 40 mg, 40 mg, Oral, Daily **OR** pantoprazole (PROTONIX) 2 mg/mL suspension 40 mg, 40 mg, Oral or Feeding Tube, Daily, Gregg Colorado MD, 40 mg at 03/06/19 0815     polyethylene glycol (MIRALAX/GLYCOLAX) Packet 17 g, 17 g, Oral, Daily PRN, Gregg Colorado MD     potassium chloride (KLOR-CON) Packet 20-40 mEq, 20-40 mEq, Oral or Feeding Tube, Q2H PRN, Gregg Colorado MD, 20 mEq at 03/06/19 0640     potassium chloride ER (K-DUR/KLOR-CON M) CR tablet 20-40 mEq, 20-40 mEq, Oral, Q2H PRN, Gregg Colorado MD     propofol (DIPRIVAN) infusion, 5-75 mcg/kg/min, Intravenous, Continuous, Stopped at 03/06/19 1256 **AND** propofol (DIPRIVAN) injection 10 mg/mL vial, 10-20 mg, Intravenous, Q30 Min PRN **AND** CK total, , , CONDITIONAL (SPECIFY) **AND** Triglycerides, ,  ", CONDITIONAL (SPECIFY), Gregg Colorado MD     QUEtiapine (SEROquel) tablet 50 mg, 50 mg, Oral or Feeding Tube, BID, Gregg Colorado MD, 50 mg at 03/06/19 0814     sodium chloride 0.9% infusion, , Intravenous, Continuous, Gregg Colorado MD, Last Rate: 10 mL/hr at 03/06/19 0739     valproate (DEPACON) 350 mg in sodium chloride 0.9 % 50 mL intermittent infusion, 350 mg, Intravenous, Q6H, Gopi Cabrera MD, Last Rate: 50 mL/hr at 03/06/19 1216, 350 mg at 03/06/19 1216     venlafaxine (EFFEXOR) tablet 37.5 mg, 37.5 mg, Oral, TID, Gregg Colorado MD, 37.5 mg at 03/06/19 1413    Vital Signs:  B/P: 162/76, T: 99, P: 63, R: 14    /76   Pulse 61   Temp 99.1  F (37.3  C)   Resp 17   Ht 1.6 m (5' 3\")   Wt 93.4 kg (205 lb 14.6 oz)   SpO2 97%   BMI 36.48 kg/m    General: Intubated on propofol - being weaned  HEENT: Atraumatic, normocephalic, no scleral icterus or conjunctival pallor   Cardiac: RRR  Chest: Clear to auscultation  Abdomen: Soft, non-tender, non-distended  Extremities: No LE swelling.  Skin: No rash or lesion.      Neuro:  Intubated and sedated  Grimaces to pain, not opening eyes  +VOR/corneal  Not moving much of ext to pain stim    Labs/Studies:  CBC:     Recent Labs   Lab 03/06/19  0605 03/05/19  0520 03/04/19  0530   WBC 10.6 9.6 8.5   RBC 3.67* 3.60* 3.47*   HGB 11.1* 10.8* 10.4*   HCT 33.7* 33.1* 31.7*    205 182     Basic Metabolic Panel:   Recent Labs   Lab Test 03/06/19  0605 03/05/19  0520 03/04/19  0530    143 145*   POTASSIUM 3.9 4.0 3.8   CHLORIDE 109 112* 115*   CO2 28 26 26   BUN 23 19 17   CR 0.86 0.98 1.20*   * 120* 101*   NIKKIE 8.0* 7.7* 7.6*     Liver panel:  Recent Labs   Lab Test 03/02/19  1449 10/24/17  0700 10/23/17  0653 10/21/17  1751   PROTTOTAL 7.0  --  6.7* 7.7   ALBUMIN 3.2* 2.3* 2.3* 2.9*   BILITOTAL 0.3  --  0.4 0.8   ALKPHOS 137  --  218* 258*   AST 21  --  38 61*   ALT 7  --  10 13     INR:  Recent Labs   Lab Test 03/02/19  1449 02/27/19  0540 "   INR 1.04 0.93      Lipid Profile:No lab results found.  A1C:   Recent Labs   Lab Test 03/02/19  1449 10/23/17  0653   A1C 6.7* 6.6*     Troponin I:   Recent Labs   Lab Test 03/02/19  1449   TROPI <0.015         Imaging:  Relevant findings as per the Impression above.

## 2019-03-06 NOTE — PLAN OF CARE
Cared for patient 3p-7p.  Adequately sedated on Propofol, no overt seizure activity noted, lung sounds clear,adequate urine. Wrist restraints discontinued at 4 pm despite family's objections as patient sedated, making no efforts to disrupt tubes, lines or airway.  Will re apply if patient condition warrants.

## 2019-03-06 NOTE — PROCEDURES
[facility] EEG #-2 (Day 2 of Long-Term Video-EEG Monitoring)    START DATE AND TIME:  2019 at 13:20.      END DATE AND TIME:  2019 at 13:42:34.      DURATION OF RECORDIN hours, 21 minutes.        ORDERING PROVIDER:  Gopi Cabrera MD     CLINICAL SUMMARY:  This video-EEG monitoring procedure is performed in diagnostic evaluation of encephalopathy in Loan Anderson, who was intubated and mechanically ventilated.  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  She was reported to be receiving a continuous infusion of propofol and intermittent dosing of valproate, quetiapine and carbidopa/levodopa during this day of monitoring.      TECHNICAL SUMMARY:  This continuous EEG monitoring procedure was performed with 23 scalp electrodes in 10-20 system placements, and additional scalp, precordial and other surface electrodes used for electrical referencing and artifact detection.  Video monitoring was utilized and periodically reviewed by EEG technologists and the physician for electroclinical correlation.     EEG ACTIVITIES DURING COMA:  During ongoing sedated coma, there was no evidence of wake-sleep cycling.  During much of the recording, there was continuous generalized 1-8 Hz delta-theta slowing, but frequently there were periods of frequent occurrence of brief generalized electrodecrements lasting 1-2 seconds.      During some periods of monitoring with electrodecrements there were frequent left frontocentral sharp waves with maxima at the F3 electrode.  During periods of prolonged continuous background delta-theta activity, these left frontal sharp waves became much more frequent.  For approximately 1 hour following 0730, there were particularly frequent left frontal sharp waves which repeated semi-rhythmically at 0.5-1 Hz.      During one of these epochs, at 0815, there was a  period of rhythmic evolving left frontal sharp wave activity of high amplitude with drop-off in fields over both hemispheres with evolution in the 1-2 Hz range, or occasionally faster prior to slowing.  This event constituted a subclinical electrographic seizure of approximately 2 minutes duration.  No electroclinical seizures were recorded.      SUMMARY OF DAY 2 OF LONG-TERM VIDEO-EEG MONITORING:    The interictal EEG recording during sedated coma was abnormal due to ongoing generalized delta-theta slowing, at times with frequent brief generalized electrodecrements, a left frontocentral breach effect, and frequent high amplitude left frontocentral sharp waves.    No electroclinical seizures were recorded, but a single subclinical electrographic seizure featured more rapid and rhythmic evolving left frontocentral sharp waves.    These findings indicate moderate electrographic encephalopathy, are consistent with the known left frontocentral skull defect, and provide evidence of partial epilepsy.  Clinical correlation is recommended.   Nathan Stanford M.D., Professor of Neurology       D: 2019   T: 2019   MT: MIGUEL      Name:     PIYUSH MONTENEGRO   MRN:      [id number]        Account:        [id number]   :      1940           Procedure Date: 2019      Document: [id number]

## 2019-03-07 NOTE — PLAN OF CARE
Neuro:  Patient transitioned from Proprofol to Precedex today.   Versed PRN x 1 for agitation.   Withdraws to pain, otherwise does not follow commands.  Daughter states r/t to her Parkinson's she normally has problems with executive functioning. So following even simple commands may be an issue.  Of note patient is part of a research cohort at [facility] and her brain is to be donated upon death for further study, see sticky note.    Pulm: LS exp wheezed R lobes.  Weaned today without issue.   Continue current vent settings overnight with hopes to extubate tomorrow.   Cardiac: SD, 1 Degree AVB   GI: TF increased, new goal of 60ml/hr see notes.   :  Silva with adequate UOP  Integumentary: Areas of bruising r/t fall.       Patient needs CT scan this evening and will plan for brace r/t lumbar fracture per neurosurgery.  Spouse and daughters updated on plan of care at the bedside.

## 2019-03-07 NOTE — PROGRESS NOTES
Vascular Neurology Progress Note    ____________________________________________________________    Admission Summary:  Loan Anderson is a 78 year old female admitted for AMS on 3/2/2019. She was found to be coughing and choking on something and lost consciousness. She recovered after 30 seconds or so. She then had similar episode later and became unresponsive. She was seen by RN at the time at the facility and found to have no pulse and CPR performed. She had ROSC. She was agitated in ER and required intubation. cEEG showed left frontocentral focus seizure. It was treated after LAC addition.    Last 24 hours:      Propofol is turned off. Opening eyes.    Impression:    Encephalopathy likely post ictal  Seizure, probably provoked by Tramadol usage along with history of previous bilateral frontal angi holes  Respiratory failure s/p intubated    Recommendations:     -Discontinue EEG; No seizure noted over the last 1.5 days  -Cont VPA and LAC  -Hopefully she can be extubated safely soon to have better clinical exam    Please contact the stroke service with any questions: Feel free to call my cellphone.    Gopi Cabrera MD  Vascular Neurology  March 7, 2019    I personally reviewed all relevant labs and neuroimaging.  I spent 35 minutes of critical care time due to encephalopathy management reviewing labs, diagnostic studies, neuroimaging, and evaluating the patient.    ____________________________________________________________________        Medications:      Current Facility-Administered Medications:      acetaminophen (TYLENOL) tablet 650 mg, 650 mg, Oral, Q6H, 650 mg at 03/07/19 1139 **OR** acetaminophen (TYLENOL) solution 650 mg, 650 mg, Per NG tube, Q6H, Gregg Colorado MD, 650 mg at 03/07/19 0532     albuterol (PROVENTIL) neb solution 2.5 mg, 2.5 mg, Nebulization, Q2H PRN, Gregg Colorado MD, 2.5 mg at 03/06/19 0259     aspirin (ASA) chewable tablet 81 mg, 81 mg, Oral, At Bedtime, Gregg Colorado MD, 81 mg  at 03/06/19 2227     carbidopa-levodopa (SINEMET)  MG per tablet 2 tablet, 2 tablet, Oral, 4x Daily, Gregg Colorado MD, 2 tablet at 03/07/19 1139     chlorhexidine (PERIDEX) 0.12 % solution 15 mL, 15 mL, Mouth/Throat, Q12H, Gregg Colorado MD, 15 mL at 03/07/19 0821     dexmedetomidine (PRECEDEX) 4 mcg/mL in sodium chloride 0.9 % 100 mL infusion, 0.2-0.7 mcg/kg/hr, Intravenous, Continuous, Gregg Colorado MD, Last Rate: 11.7 mL/hr at 03/07/19 0542, 0.5 mcg/kg/hr at 03/07/19 0542     dextrose 10 % 1,000 mL infusion, , Intravenous, Continuous PRN, Gregg Colorado MD     glucose gel 15-30 g, 15-30 g, Oral, Q15 Min PRN **OR** dextrose 50 % injection 25-50 mL, 25-50 mL, Intravenous, Q15 Min PRN **OR** glucagon injection 1 mg, 1 mg, Subcutaneous, Q15 Min PRN, Gregg Colorado MD     docusate (COLACE) 50 MG/5ML liquid 100 mg, 100 mg, Oral or Feeding Tube, BID, Gregg Colorado MD, 100 mg at 03/06/19 2228     furosemide (LASIX) injection 10 mg, 10 mg, Intravenous, Once, Gregg Colorado MD     heparin sodium injection 5,000 Units, 5,000 Units, Subcutaneous, Q8H, Gregg Colorado MD, 5,000 Units at 03/07/19 0544     hydrALAZINE (APRESOLINE) injection 10-20 mg, 10-20 mg, Intravenous, Q4H PRN, Gregg Colorado MD     HYDROmorphone (PF) (DILAUDID) injection 0.2 mg, 0.2 mg, Intravenous, Q2H PRN, Gregg Colorado MD, 0.2 mg at 03/03/19 0629     hypromellose-dextran (ARTIFICAL TEARS) 0.1-0.3 % ophthalmic solution 1 drop, 1 drop, Both Eyes, Q1H PRN, Gregg Colorado MD, 1 drop at 03/07/19 0352     insulin aspart (NovoLOG) inj (RAPID ACTING), 1-6 Units, Subcutaneous, Q4H, Gregg Colorado MD, 1 Units at 03/07/19 1149     labetalol (NORMODYNE/TRANDATE) syringe 10-20 mg, 10-20 mg, Intravenous, Q4H PRN, Gregg Colorado MD     lacosamide (VIMPAT) 100 mg in sodium chloride 0.9 % 100 mL intermittent infusion, 100 mg, Intravenous, BID, Karolyn Diop MD, Last Rate: 100 mL/hr at 03/05/19 2032, 100 mg at 03/07/19 1139      levothyroxine (SYNTHROID/LEVOTHROID) tablet 112 mcg, 112 mcg, Oral, QAM AC, Gregg Colorado MD, 112 mcg at 03/07/19 0819     Lidocaine (LIDOCARE) 4 % Patch 3 patch, 3 patch, Transdermal, Q24h, 3 patch at 03/06/19 1927 **AND** lidocaine patch REMOVAL, , Transdermal, Q24H **AND** lidocaine patch in PLACE, , Transdermal, Q8H, Gregg Colorado MD     [START ON 3/8/2019] lisinopril (PRINIVIL/ZESTRIL) tablet 20 mg, 20 mg, Oral, Daily, Gregg Colorado MD     magnesium sulfate 2 g in water intermittent infusion, 2 g, Intravenous, Daily PRN, Gregg Colorado MD     magnesium sulfate 4 g in 100 mL sterile water (premade), 4 g, Intravenous, Q4H PRN, Gregg Colorado MD     midazolam (VERSED) injection 2-4 mg, 2-4 mg, Intravenous, Q2H PRN, Gregg Colorado MD, 2 mg at 03/06/19 1608     naloxone (NARCAN) injection 0.1-0.4 mg, 0.1-0.4 mg, Intravenous, Q2 Min PRN, Gregg Colorado MD     ondansetron (ZOFRAN-ODT) ODT tab 4 mg, 4 mg, Oral, Q6H PRN **OR** ondansetron (ZOFRAN) injection 4 mg, 4 mg, Intravenous, Q6H PRN, Gregg Colorado MD     oxyCODONE (ROXICODONE) tablet 5-10 mg, 5-10 mg, Oral, Q3H PRN, Gregg Colorado MD, 10 mg at 03/07/19 0357     pantoprazole (PROTONIX) EC tablet 40 mg, 40 mg, Oral, Daily **OR** pantoprazole (PROTONIX) 2 mg/mL suspension 40 mg, 40 mg, Oral or Feeding Tube, Daily, Gregg Colorado MD, 40 mg at 03/07/19 0819     polyethylene glycol (MIRALAX/GLYCOLAX) Packet 17 g, 17 g, Oral, Daily PRN, Gregg Colorado MD     potassium chloride (KLOR-CON) Packet 20-40 mEq, 20-40 mEq, Oral or Feeding Tube, Q2H PRN, Gregg Colorado MD, 20 mEq at 03/07/19 0545     potassium chloride ER (K-DUR/KLOR-CON M) CR tablet 20-40 mEq, 20-40 mEq, Oral, Q2H PRN, Gregg Colorado MD     propofol (DIPRIVAN) infusion, 5-75 mcg/kg/min, Intravenous, Continuous, Stopped at 03/06/19 1256 **AND** propofol (DIPRIVAN) injection 10 mg/mL vial, 10-20 mg, Intravenous, Q30 Min PRN **AND** CK total, , , CONDITIONAL (SPECIFY) **AND**  "Triglycerides, , , CONDITIONAL (SPECIFY), Gregg Colorado MD     QUEtiapine (SEROquel) tablet 50 mg, 50 mg, Oral or Feeding Tube, At Bedtime, Gregg Colorado MD     sodium chloride 0.9% infusion, , Intravenous, Continuous, Gregg Colorado MD, Last Rate: 10 mL/hr at 03/06/19 1915     valproate (DEPACON) 350 mg in sodium chloride 0.9 % 50 mL intermittent infusion, 350 mg, Intravenous, Q6H, Gopi Cabrera MD, Last Rate: 50 mL/hr at 03/07/19 1139, 350 mg at 03/07/19 1139     venlafaxine (EFFEXOR) tablet 37.5 mg, 37.5 mg, Oral, TID, Gregg Colorado MD, 37.5 mg at 03/07/19 0819    Vital Signs:  B/P: 162/76, T: 99, P: 63, R: 14    /67   Pulse 59   Temp 99.9  F (37.7  C) (Bladder)   Resp 15   Ht 1.6 m (5' 3\")   Wt 93.8 kg (206 lb 12.7 oz)   SpO2 98%   BMI 36.63 kg/m    General: Intubated not on propofol; On dex  HEENT: Atraumatic, normocephalic, no scleral icterus or conjunctival pallor   Cardiac: RRR  Chest: Clear to auscultation  Abdomen: Soft, non-tender, non-distended  Extremities: No LE swelling.  Skin: No rash or lesion.      Neuro:  Intubated   Opening eyes spontaneously; Not following commands, blinks to threat both eyes  Not moving much of ext to pain stim    Labs/Studies:  CBC:     Recent Labs   Lab 03/07/19  0500 03/06/19  0605 03/05/19  0520   WBC 11.0 10.6 9.6   RBC 3.69* 3.67* 3.60*   HGB 10.9* 11.1* 10.8*   HCT 34.0* 33.7* 33.1*    216 205     Basic Metabolic Panel:   Recent Labs   Lab Test 03/07/19  0500 03/06/19  0605 03/05/19  0520    141 143   POTASSIUM 4.0 3.9 4.0   CHLORIDE 107 109 112*   CO2 26 28 26   BUN 22 23 19   CR 0.76 0.86 0.98   * 169* 120*   NIKKIE 7.7* 8.0* 7.7*     Liver panel:  Recent Labs   Lab Test 03/02/19  1449 10/24/17  0700 10/23/17  0653 10/21/17  1751   PROTTOTAL 7.0  --  6.7* 7.7   ALBUMIN 3.2* 2.3* 2.3* 2.9*   BILITOTAL 0.3  --  0.4 0.8   ALKPHOS 137  --  218* 258*   AST 21  --  38 61*   ALT 7  --  10 13     INR:  Recent Labs   Lab Test " 03/02/19  1449 02/27/19  0540   INR 1.04 0.93      Lipid Profile:No lab results found.  A1C:   Recent Labs   Lab Test 03/02/19  1449 10/23/17  0653   A1C 6.7* 6.6*     Troponin I:   Recent Labs   Lab Test 03/02/19 1449   TROPI <0.015         Imaging:  Relevant findings as per the Impression above.

## 2019-03-07 NOTE — PROGRESS NOTES
[facility] ICU RESPIRATORY NOTE        Date of Admission: 3/2/2019    Date of Intubation (most recent):  3/2/19    Reason for Mechanical Ventilation:  AW protection    Number of Days on Mechanical Ventilation: 6    Met Criteria for Pressure Support Trial: No    Length of Pressure Support Trial:     Reason for Stopping Pressure Support Trial:     Reason for No Pressure Support Trial: Per MD    Significant Events Today: None    ABG Results: No lab results found in last 7 days.    Current Vent Settings: Ventilation Mode: CMV/AC  (Continuous Mandatory Ventilation/ Assist Control)  FiO2 (%): 30 %  Rate Set (breaths/minute): 14 breaths/min  Tidal Volume Set (mL): 400 mL  PEEP (cm H2O): 5 cmH2O  Oxygen Concentration (%): 300 %  Resp: 20      Skin Assessment:     Plan:  Pt to remain on full vent support overnight    Nolan Doan

## 2019-03-07 NOTE — PROGRESS NOTES
[facility] ICU RESPIRATORY NOTE           Date of Admission: 3/2/2019     Date of Intubation (most recent): 03/02/19  Reason for Mechanical Ventilation: Airway Protection     Number of Days on Mechanical Ventilation: 6     Significant Events Today: none overnight     ABG Results: No lab results found in last 7 days.    Ventilation Mode: CMV/AC  (Continuous Mandatory Ventilation/ Assist Control)  FiO2 (%): 30 %  Rate Set (breaths/minute): 14 breaths/min  Tidal Volume Set (mL): 400 mL  PEEP (cm H2O): 5 cmH2O  Oxygen Concentration (%): 30 %  Resp: 14    Will continue to follow.     Nathan Vanessa RT

## 2019-03-07 NOTE — PROCEDURES
[facility] EEG #-3 (Day 3 of Long-Term Video-EEG Monitoring)    START DATE AND TIME:  2019 at 13:45.      END DATE AND TIME:  2019 at 13:46.      DURATION OF RECORDIN hours, 58 minutes.      ORDERING PROVIDER:  Gopi Cabrera MD     CLINICAL SUMMARY:  This video-EEG monitoring procedure is performed in evaluation of encephalopathy in Loan Anderson, who was intubated and mechanically ventilated.  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  She was reported to be sedated with continuous infusion of propofol and treated with intermittent dosing of quetiapine and carbidopa/levodopa on this day of monitoring.      TECHNICAL SUMMARY:  This continuous EEG monitoring procedure was performed with 23 scalp electrodes in 10-20 system placements, and additional scalp, precordial and other surface electrodes used for electrical referencing and artifact detection.  Video monitoring was utilized and periodically reviewed by EEG technologists and the physician for electroclinical correlation.     EEG ACTIVITIES DURING COMA:  During ongoing sedated coma, there was no evidence of wake-sleep cycling.  During much of the recording, there was continuous generalized 1-8 Hz delta-theta slowing, but at times there were very frequent brief generalized electrodecrements lasting 1-2 seconds.  There was overall amplitude enhancement over the left frontocentral area consistent with breach effect.      Throughout the recording there were frequent high amplitude left frontocentral sharp waves with maxima at the F3 electrode.  At times these sharp waves were associated with lower amplitude bursts of focal left frontocentral 4-8 Hz theta activities and faster frequencies.      No electrographic seizures were recorded.      SUMMARY OF DAY 3 OF LONG-TERM VIDEO-EEG MONITORING:    The interictal EEG  recording during sedated coma was abnormal due to ongoing generalized delta-theta slowing, at times with frequent brief generalized electrodecrements, a left frontocentral breach effect, and frequent high amplitude left frontocentral sharp waves.    No electrographic seizures were recorded on this day of monitoring.    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  Clinical correlation is recommended.   Nathan Stanford M.D., Professor of Neurology       D: 2019   T: 2019   MT: KRISTINE      Name:     PIYUSH MONTENEGRO   MRN:      [id number]        Account:        [id number]   :      1940           Procedure Date: 2019      Document: [id number]       cc: Gopi Cabrera MD

## 2019-03-07 NOTE — PROGRESS NOTES
Critical Care  Note      03/07/2019    Name: Loan Anderson MRN#: [id number]   Age: 78 year old YOB: 1940     Hsptl Day# 5  ICU DAY #5    MV DAY #5             Problem List:   Acute encephalopathy  Rib fractures  YASMIN  leukocytosis         Summary/Hospital Course:     78F with parkinsons disease who lives in a memory center now presents from [facility] after being found acutely encephalopathic and mildly agitated/combative by her  around 2 pm on day of admission.  Apparently there had been an episode earlier in the day where she had turned red and coughed 3-4 min after taking pills and family member did the heimlich maneuver.  She returned to her normal state after this but no source of asphyxiation was found.  Later in the day her  went to get the mail and when he returned to their home (~10 min) he found staff doing cpr on her.  Apparently she became unresponsive at some point and cpr was started, but on EMS arrival she did have a perfusing rhythm.     A couple days prior to admit she had a mechanical fall resulting in R hip pain.  No injury noted at that time, but today has several rib fx on CT scan (may be due to cpr?) and transverse process L1L2 fx's--suspect these may be from fall.      Assessment and plan :     Loan Anderson IS a 78 year old female admitted on 3/2/2019 for acute encephalopathy.   I have personally reviewed the daily labs, imaging studies, cultures and discussed the case with referring physician and consulting physicians.   My assessment and plan by system for this patient is as follows:    Neurology/Psychiatry:   1. Acute encephalopathy:  Seizure activtity on EEG.  See subclinial status epilepticus below. Now starting to wake up more.    2.  Subclinical status epilepticus:  May have been taking more tramadol than usual after fall--this would lower seizure threshold.  No further seizures on EEG now with vimpat and valproate.  Appreciate neurocrit  input.  2. Analgesia:  lidoderm patches on chest for rib fx.  PO tylenol standing.  Prn oxycodone/dilaudid.  3. Sedation:  Precedex drip.  Midazolam pushes prn.  Seroquel and valproate being given with good effect, decreasing seroquel dose today to 50 qhs.  4. H/o parkinsons dz:  Appreciate neuro crit input.  Will continue short acting carbo/levodopa.  Holding long-acting and patch forms for now.    Cardiovascular:    1.  Htn:  Increasing lisinopril to 20, prn IV labetalol and hydralazine.    Pulmonary/Ventilator Management:   1. Loss of protective airway reflexes:  Due to encephalopathy noted above.  Not neurologically appropriate for extubation today.    GI and Nutrition :   1.  Tube feeds  2.  PPI for PUD prophy    Renal/Fluids/Electrolytes:   1. YASMIN on CKD:  Resolved. Creat 0.76 today.  UOP adequate.  Continue to watch closely.  2.  Hypervolemia:  Now 5L up.  Low dose lasix today to equilibrate ins and outs.    Infectious Disease:   1. Procal ok <0.05 and no clear source of infection.  Cultures remain ngtd.  Stopped all abx 3/5/19.    Endocrine:   1. Hyperglycemia: SSI  2.  Hypothyroid:  Continue home synthroid.    Hematology/Oncology:   1.  Anemia recovered to baseline (10.9 today--stable)  2.  WBC ok 11.0, pltlts ok 231.    ICU Prophylaxis:   1. DVT: Hep Subq/ mechanical  2. VAP: HOB 30 degrees, chlorhexidine rinse  3. Stress Ulcer: PPI  4. Restraints: Nonviolent soft two point restraints required and necessary for patient safety and continued cares and good effect as patient continues to pull at necessary lines, tubes despite education and distraction. Will readdress daily.   5. Wound care  - per unit routine  6. Feeding -  tf  7. Family Update: daughter and  at bedside today.  8. Disposition - critically ill           Medical History:     Past Medical History:   Diagnosis Date     Constipation 7/15/2011     Convergence insufficiency 7/15/2011     Depression 7/15/2011     Diplopia 7/18/2011     Hot  flashes, menopausal 7/18/2011     Hypercholesterolemia 7/18/2011     Hypothyroid 7/15/2011     Neurogenic bladder 7/15/2011     Parkinson disease (H) 7/15/2011     REM sleep behavior disorder 7/18/2011     Sebaceous cyst 7/18/2011     Wears glasses 7/18/2011     Past Surgical History:   Procedure Laterality Date     ------------OTHER-------------  november ? 2015    lithrotripsy for renal stone     ------------OTHER-------------  november 2015    had sepsis from uti and hospitalized     BRAIN SURGERY  12 or 14 oct 2011    gene therapy enrolled study at [facility]     Social History     Socioeconomic History     Marital status:      Spouse name: Not on file     Number of children: Not on file     Years of education: Not on file     Highest education level: Not on file   Occupational History     Not on file   Social Needs     Financial resource strain: Not on file     Food insecurity:     Worry: Not on file     Inability: Not on file     Transportation needs:     Medical: Not on file     Non-medical: Not on file   Tobacco Use     Smoking status: Never Smoker     Smokeless tobacco: Never Used   Substance and Sexual Activity     Alcohol use: Yes     Comment: rare     Drug use: Not on file     Sexual activity: Not on file   Lifestyle     Physical activity:     Days per week: Not on file     Minutes per session: Not on file     Stress: Not on file   Relationships     Social connections:     Talks on phone: Not on file     Gets together: Not on file     Attends [employer] service: Not on file     Active member of club or organization: Not on file     Attends meetings of clubs or organizations: Not on file     Relationship status: Not on file     Intimate partner violence:     Fear of current or ex partner: Not on file     Emotionally abused: Not on file     Physically abused: Not on file     Forced sexual activity: Not on file   Other Topics Concern     Parent/sibling w/ CABG, MI or angioplasty before 65F 55M? Not Asked    Social History Narrative    Spouse is serenity        Allergies   Allergen Reactions     Atorvastatin      Concern that it was contributing to confusion     Mirapex [Pramipexole Dihydrochloride Monohydrate]      Leg swelling     Potassium Chloride      IV infusion only. She tolerates oral potassium chloride     Requip [Ropinirole Hydrochloride]      Leg swelling     Epinephrine Palpitations              Key Medications:       acetaminophen  650 mg Oral Q6H    Or     acetaminophen  650 mg Per NG tube Q6H     aspirin  81 mg Oral At Bedtime     carbidopa-levodopa  2 tablet Oral 4x Daily     chlorhexidine  15 mL Mouth/Throat Q12H     docusate  100 mg Oral or Feeding Tube BID     heparin  5,000 Units Subcutaneous Q8H     insulin aspart  1-6 Units Subcutaneous Q4H     lacosamide (VIMPAT) intermittent infusion  100 mg Intravenous BID     levothyroxine  112 mcg Oral QAM AC     lidocaine  3 patch Transdermal Q24h    And     lidocaine   Transdermal Q24H    And     lidocaine   Transdermal Q8H     lisinopril  10 mg Oral Daily     pantoprazole  40 mg Oral Daily    Or     pantoprazole  40 mg Oral or Feeding Tube Daily     QUEtiapine  50 mg Oral or Feeding Tube BID     valproate (DEPACON) intermittent infusion  350 mg Intravenous Q6H     venlafaxine  37.5 mg Oral TID       dexmedetomidine 0.5 mcg/kg/hr (03/07/19 9042)     IV fluid REPLACEMENT ONLY       propofol (DIPRIVAN) infusion Stopped (03/06/19 1256)     sodium chloride 10 mL/hr at 03/06/19 1915        Home Meds    No current facility-administered medications on file prior to encounter.   Current Outpatient Medications on File Prior to Encounter:  aspirin 81 MG tablet Take 81 mg by mouth At Bedtime @ midnight   carbidopa-levodopa (SINEMET CR)  MG CR tablet Take 1 tablet by mouth At Bedtime At 8 pm   carbidopa-levodopa (SINEMET)  MG tablet Take 2 tablets 4 times daily at 7 am, 10 am, 1 pm, and 4 pm   cholecalciferol 5000 units CAPS Take 1 capsule by mouth every  morning @ 6am   docusate sodium (COLACE) 100 MG capsule Take 300 mg by mouth daily 3 x 100mg capsule @ noon   ibuprofen (ADVIL/MOTRIN) 200 MG tablet Take 400 mg by mouth 3 times daily 2 x 200mg tabs by mouth 3/day @ 6am, 12pm and 6pm and 3 x 200mg tabs by mouth every 8 hours as needed   levothyroxine (SYNTHROID/LEVOTHROID) 112 MCG tablet Take 112 mcg by mouth daily @ 6am   lisinopril (PRINIVIL/ZESTRIL) 5 MG tablet Take 5 mg by mouth daily @ 6am   mirabegron (MYRBETRIQ) 25 MG 24 hr tablet Take 25 mg by mouth daily @ 6am   Multiple Vitamins-Minerals (EYE VITAMINS) CAPS Take 1 capsule by mouth daily 1 capsule (macu-health) by mouth daily @ midnight   omeprazole (PRILOSEC) 20 MG DR capsule Take 20 mg by mouth daily @ 6am   polyethylene glycol (MIRALAX) powder Take 17 g by mouth daily as needed @0600   rotigotine (NEUPRO) 4 MG/24HR 24 hr patch Apply patch daily @ bedtime/8 pm (Patient taking differently: Place 1 patch onto the skin daily @ bedtime/8 pm)   tamsulosin (FLOMAX) 0.4 MG capsule Take 0.4 mg by mouth daily @ 6pm   traMADol (ULTRAM) 50 MG tablet Take 1 tablet (50 mg) by mouth every 6 hours as needed for severe pain   trospium (SANCTURA XR) 60 MG CP24 24 hr capsule Take 60 mg by mouth every morning (before breakfast) @ 6am   venlafaxine (EFFEXOR) 100 MG tablet Take 100 mg by mouth daily @ 6am              Physical Examination:   Temp:  [98.8  F (37.1  C)-100  F (37.8  C)] 99.9  F (37.7  C)  Pulse:  [50-63] 59  Heart Rate:  [50-64] 59  Resp:  [9-24] 15  BP: (112-168)/() 151/67  FiO2 (%):  [30 %] 30 %  SpO2:  [94 %-100 %] 98 %  No intake or output data in the 24 hours ending 03/02/19 1732  Wt Readings from Last 4 Encounters:   03/07/19 93.8 kg (206 lb 12.7 oz)   02/27/19 86.2 kg (190 lb)   02/11/19 88 kg (194 lb)   11/14/18 86.2 kg (190 lb)     BP - Mean:  [] 90  Ventilation Mode: CMV/AC  (Continuous Mandatory Ventilation/ Assist Control)  FiO2 (%): 30 %  Rate Set (breaths/minute): 14 breaths/min  Tidal  Volume Set (mL): 400 mL  PEEP (cm H2O): 5 cmH2O  Oxygen Concentration (%): 30 %  Resp: 15    No lab results found in last 7 days.      Intake/Output Summary (Last 24 hours) at 3/7/2019 1145  Last data filed at 3/7/2019 1000  Gross per 24 hour   Intake 2165.91 ml   Output 1065 ml   Net 1100.91 ml     GEN: no acute distress, sedated   HEENT: head ncat, sclera anicteric, OP patent, trachea midline   PULM: unlabored synchronous with vent, clear anteriorly    CV/COR: RRR S1S2 no gallop,  No rub, no murmur  ABD: soft nontender, hypoactive bowel sounds, no mass  EXT:  Minimal Edema x4,  Warm x4  NEURO: sedated.  Moves all 4 when sedation weaned.  L pupil slightly larger than R but both reactive. Opening eyes to voice now, but still doesn't follow.  SKIN: no obvious rash  LINES: clean, dry intact         Data:   All data and imaging reviewed     ROUTINE ICU LABS (Last four results)  CMP  Recent Labs   Lab 03/07/19  0500 03/06/19  0605 03/05/19  0520 03/04/19  0530  03/02/19  1449    141 143 145*   < > 136   POTASSIUM 4.0 3.9 4.0 3.8   < > 4.1   CHLORIDE 107 109 112* 115*   < > 102   CO2 26 28 26 26   < > 28   ANIONGAP 7 4 5 4   < > 6   * 169* 120* 101*   < > 122*   BUN 22 23 19 17   < > 26   CR 0.76 0.86 0.98 1.20*   < > 1.18*   GFRESTIMATED 76 66 56* 44*   < > 45*   GFRESTBLACK 88 76 65 51*   < > 52*   NIKKIE 7.7* 8.0* 7.7* 7.6*   < > 8.4*   MAG 2.0 2.2 2.2 2.3   < >  --    PHOS 2.5 3.1 2.7 3.3   < >  --    PROTTOTAL  --   --   --   --   --  7.0   ALBUMIN  --   --   --   --   --  3.2*   BILITOTAL  --   --   --   --   --  0.3   ALKPHOS  --   --   --   --   --  137   AST  --   --   --   --   --  21   ALT  --   --   --   --   --  7    < > = values in this interval not displayed.     CBC  Recent Labs   Lab 03/07/19  0500 03/06/19  0605 03/05/19  0520 03/04/19  0530   WBC 11.0 10.6 9.6 8.5   RBC 3.69* 3.67* 3.60* 3.47*   HGB 10.9* 11.1* 10.8* 10.4*   HCT 34.0* 33.7* 33.1* 31.7*   MCV 92 92 92 91   MCH 29.5 30.2 30.0  30.0   MCHC 32.1 32.9 32.6 32.8   RDW 15.3* 15.4* 15.5* 15.2*    216 205 182     INR  Recent Labs   Lab 03/02/19  1449   INR 1.04     Arterial Blood GasNo lab results found in last 7 days.    All cultures:  Recent Labs   Lab 03/03/19  0530 03/02/19  1554 03/02/19  1543   CULT Moderate growth  Normal abhijeet   No growth after 5 days No growth after 5 days     Recent Results (from the past 24 hour(s))   XR Chest Port 1 View    Narrative    CHEST PORTABLE ONE VIEW     3/2/2019 3:00 PM     HISTORY: Tube placement.    COMPARISON: 2/27/2019.      Impression    IMPRESSION: Endotracheal tube in place in mid trachea. New area of  atelectasis or infiltrate in the right lung base. Left lung is clear.  Heart size is upper normal. Pulmonary vasculature does not appear  distended.      KELLIE BANKS MD   CT Head w/o Contrast    Narrative    CT SCAN OF THE HEAD WITHOUT CONTRAST   3/2/2019 3:10 PM     HISTORY: Altered level of consciousness (LOC), unexplained.    TECHNIQUE:  Axial images of the head and coronal reformations without  IV contrast material.  Radiation dose for this scan was reduced using  automated exposure control, adjustment of the mA and/or kV according  to patient size, or iterative reconstruction technique.    COMPARISON: None.    FINDINGS: There is some moderate cerebral atrophy. There is an old  infarct in the left basal ganglia region. There are some patchy white  matter changes in both hemispheres without mass effect. The patient is  intubated. The visualized paranasal sinuses and mastoid air cells are  clear. There is no evidence for intracranial hemorrhage, acute  infarct, mass effect, or skull fracture. Bilateral angi hole  procedures are noted.      Impression    IMPRESSION: Chronic changes. No evidence for intracranial hemorrhage  or any acute process.       SHAGUFTA MOORE MD   CT Head Neck Angio w/o & w Contrast    Narrative    CTA HEAD NECK WITH CONTRAST 3/2/2019 3:18 PM     HISTORY: Headache,  sudden, carotid/vertebral dissection suspected/    TECHNIQUE: Multiplanar multisequence images were obtained through the  neck without and with intravenous contrast. 70 mL of Isovue-370 was  given. Multiplanar reconstructions were performed. 3-D reconstructions  off a remote workstation for CT angiography were also acquired.  Carotid stenoses were evaluated by comparing the caliber of the  proximal internal carotid artery to the caliber of the distal internal  carotid artery. Radiation dose for this scan was reduced using  automated exposure control, adjustment of the mA and/or kV according  to patient size, or iterative reconstruction technique.    FINDINGS:    Brachiocephalic vessels: Normal.    Right carotid system: Normal.    Left carotid system: Trace amount of plaque. No stenosis or  dissection.    Right vertebral artery: Normal.    Left vertebral artery: Normal.    [place]: Normal.    Other findings: The patient is intubated. The tip of the ET tube is  above the sánchez. Trachea has slight deviation to the right. There is  a moderate size right pleural effusion. Degenerative changes are seen  in the spine. There is some minimal degenerative spondylolisthesis of  C3 and C4.      Impression    IMPRESSION:  1. Negative CT angiography head and neck.  2. Moderate size right pleural effusion.     SHAGUFTA MOORE MD   CT Chest (PE) Abdomen Pelvis w Contrast    Narrative    CT CHEST PE, ABDOMEN PELVIS WITH CONTRAST   3/2/2019 3:26 PM     HISTORY: Trauma. Altered mental status. Hypotension.    TECHNIQUE: 75 mL Isovue-370 IV were administered. After contrast  administration, volumetric helical sections were acquired from the  thoracic inlet to the ischial tuberosities. Pulmonary embolism  protocol was performed through the chest. Coronal images were also  reconstructed. Radiation dose for this scan was reduced using  automated exposure control, adjustment of the mA and/or kV according  to patient size, or  iterative reconstruction technique.    COMPARISON: CT of the abdomen and pelvis performed 10/24/2017.    FINDINGS:    Chest: No evidence for pulmonary embolism or thoracic aortic  dissection. Atherosclerotic calcification of the thoracic aorta.  Endotracheal tube is in place, with tip approximately 2.5 cm above the  sánchez. Small to moderate right pleural effusion, with mild associated  compressive atelectasis at the right lung base posteriorly. No  pericardial or left pleural effusion. There is mild atelectasis at the  left lung base posteriorly. Mild patchy mosaic attenuation is noted in  the lungs bilaterally. There are displaced acute appearing fractures  involving the right 10th and 11th ribs posteriorly. Mildly displaced  fractures of the right second rib in two places laterally and  anteriorly. There are also mildly displaced fractures of the right  third through sixth ribs laterally. Nondisplaced fracture of the left  second rib laterally.    Abdomen and Pelvis: No bowel obstruction. There is a moderate amount  of stool in the rectum. No free fluid in the pelvis. No convincing  evidence for colitis or diverticulitis. No intra-abdominal fluid  collections to suggest hematoma. No free intraperitoneal air.  Scattered cortical scarring in the left kidney is not significantly  changed, given differences in technique. The liver, gallbladder,  spleen, adrenal glands, pancreas, and kidneys are otherwise  unremarkable. No hydronephrosis. No evidence for solid organ injury.  Mild atherosclerotic aortoiliac calcification. The uterus is not seen,  and is likely surgically absent. Degenerative changes are noted in the  thoracolumbar spine. Displaced acute appearing fractures are noted  involving the L1 and L2 transverse processes on the right.      Impression    IMPRESSION:   1. Displaced acute appearing fractures are noted involving the right  10th and 11th ribs posteriorly, as well as the right 2nd through 6th  ribs  laterally. Nondisplaced fracture of the left second rib laterally  may also be acute. There are also acute-appearing displaced fractures  of the L1 and L2 transverse processes on the right.  2. No evidence for pulmonary embolism.  3. Small to moderate right pleural effusion.  4. Mild patchy mosaic attenuation in the lungs bilaterally may be  related to air trapping.  5. Moderate amount of stool in the rectum.      NUVIA ISLAS MD     Labs (personally reviewed/interpreted):  See a/p.  CT Lspine:  Confirms L1/L2 transverse process fx's    Billing: This patient is critically ill: Yes. Total critical care time today 40 min.

## 2019-03-07 NOTE — PLAN OF CARE
Sedated on Precedex-occasionally opens eyes spontaneously or moves feet and withdraws from pain -- does not follow commands. Oxycodone or pain prn. Per daughter, this is somewhat baseline behavior.  Vented overnight - plan to extubate today with no intention for intubation.  Sinus dysrhythmia, 1st Degree AVB   TF increased to 60/hour at 0500 this AM.  Silva in place and plans to stay in.  Pt went to CT yesterday evening brace needed for lumbar fracture.     Family has indicated that staff from the patient's housing facility are welcome to visit.    Patient is part of a research cohort at [facility] and her brain is to be donated upon death for further study, see sticky note.

## 2019-03-08 NOTE — PLAN OF CARE
Patient sedated on Precedex. VSS on vent support. Pupils not equal but reactive. Does not follow commands. Withdraws from pain. Mild fever. Tolerating tube feed. Oxycodone and scheduled tylenol for pain overnight. Silva patent. Potassium replaced. Will continue to monitor.

## 2019-03-08 NOTE — PROGRESS NOTES
CLINICAL NUTRITION SERVICES - REASSESSMENT NOTE      RECOMMENDATIONS FOR MD/PROVIDER TO ORDER: Consider increasing sliding scale insulin to High resistance   Malnutrition: (3/6)  % Weight Loss:  None noted  % Intake:  No decreased intake noted  Subcutaneous Fat Loss:  None observed  Muscle Loss:  None observed  Fluid Retention:  Mild - doubt nutrition related     Malnutrition Diagnosis: Patient does not meet two of the above criteria necessary for diagnosing malnutrition       EVALUATION OF PROGRESS TOWARD GOALS   Diet:  NPO     Nutrition Support:  Patient's TF was increased to goal on 3/7 and continues as follows ~    Nutrition Support Enteral:  Type of Feeding Tube: NG vs ND  Enteral Frequency:  Continuous  Enteral Regimen: Replete with Fiber at 60 mL/hr  Total Enteral Provisions: 1440 kcal (16 kcal/kg), 92 g protein (1.8 g/kg), 1195 mL H2O, 22 g fiber, 179 g CHO   Free Water Flush: 60 mL every 4 hours       Intake/Tolerance:    Labs noted and acceptable  , 142, 162, 153, 148, 128 - Med sliding scale insulin   BM x 1 on 3/7 - Getting Colace   Weight 93.7 kg (up 3.5 kg from admit), I/O 3135/1780 - On IV Lasix       ASSESSED NUTRITION NEEDS:  Dosing Weight 90.2 kg (3/3 actual wt for energy); 52.3 kg (IBW for protein)  Estimated Energy Needs: 2423-0740 kcals (14-17 Kcal/Kg)  Justification: hypocaloric feeding guidelines for obesity  Estimated Protein Needs:  grams protein (1.5-2 g pro/Kg)  Justification: obesity      NEW FINDINGS:   Feeding tube placed yesterday with tip probably coiled in the stomach versus less likely tip minimally into the duodenum     Previous Goals (3/6):   TF Replete with Fiber at 60 mL/hr will meet % estimated needs  Evaluation: Met    Previous Nutrition Diagnosis (3/6):   Inadequate energy intake related to Propofol discontinued as evidenced by TF + ProStat meeting only 83% energy needs  Evaluation: Resolved       CURRENT NUTRITION DIAGNOSIS  Altered nutrition-related lab  value (glucoses) related to stress as evidenced by BGM > 150    INTERVENTIONS  Recommendations / Nutrition Prescription  Continue Replete with Fiber at 60 mL/hr as above  Consider increasing sliding scale insulin to High resistance     Implementation  None     Goals  TF will continue to meet % needs  BGM < 150    MONITORING AND EVALUATION:  Progress towards goals will be monitored and evaluated per protocol and Practice Guidelines    Renee Singleton RD, LD, CNSC   Clinical Dietitian - [facility]

## 2019-03-08 NOTE — PROGRESS NOTES
[facility] ICU RESPIRATORY NOTE        Date of Admission: 3/2/2019    Date of Intubation (most recent): 3/2/2019    Reason for Mechanical Ventilation: AW protection    Number of Days on Mechanical Ventilation: 7    Met Criteria for Pressure Support Trial: Yes    Length of Pressure Support Trial: PS 10/5 30% for 1 hr, switch to CMV for tachypnea       ABG Results: No lab results found in last 7 days.    Current Vent Settings: Ventilation Mode: PS  (Pressure Support)  FiO2 (%): 30 %  Rate Set (breaths/minute): 14 breaths/min  Tidal Volume Set (mL): 400 mL  PEEP (cm H2O): 5 cmH2O  Pressure Support (cm H2O): 10 cmH2O  Oxygen Concentration (%): 30 %  Resp: 11      Plan: Continue full ventilator support and assess this evening for second weaning trial if pt tolerates.    Shannan Ricci  3/8/2019

## 2019-03-08 NOTE — PROCEDURES
[facility] EEG #-4 (Day 4 of Long-Term Video-EEG Monitoring)    START DATE AND TIME:  2019 at 13:52.      END DATE AND TIME:  2019 at 11:14.      DURATION OF RECORDIN hours, 43 minutes.      ORDERING PROVIDER:  Gopi Cabrera MD     CLINICAL SUMMARY:  This diagnostic video-EEG monitoring procedure is performed in evaluation of encephalopathy in Loan Anderson, who was intubated and mechanically ventilated.  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  She was reported to be receiving a continuous infusion of propofol and intermittent dosing of lacosamide, valproate, quetiapine and carbidopa/levodopa during this day of monitoring.      TECHNICAL SUMMARY:  This continuous EEG monitoring procedure was performed with 23 scalp electrodes in 10-20 system placements, and additional scalp, precordial and other surface electrodes used for electrical referencing and artifact detection.  Video monitoring was utilized and periodically reviewed by EEG technologists and the physician for electroclinical correlation.     EEG ACTIVITIES DURING COMA:  During ongoing sedated coma, there was no evidence of wake-sleep cycling.  During much of the recording, there was continuous generalized 1-8 Hz delta-theta slowing, but at times there were very frequent brief generalized electrodecrements lasting 1-2 seconds.  There was overall amplitude enhancement over the left frontocentral area consistent with breach effect.      Throughout the recording there were frequent high amplitude left frontocentral sharp waves with maxima at the F3 electrode.  At times these sharp waves were associated with lower amplitude bursts of focal left frontocentral 4-8 Hz theta activities and faster frequencies.      No electrographic seizures were recorded.      SUMMARY OF DAY 4 OF LONG-TERM VIDEO-EEG MONITORING:    The  interictal EEG recording during sedated coma was abnormal due to absence of wake-sleep cycling, ongoing generalized delta-theta slowing, with occasional brief generalized electrodecrements, a left frontocentral breach effect, and frequent high amplitude left frontocentral sharp waves.    No electrographic seizures were recorded during this day of monitoring.      SUMMARY OF 4 DAYS OF LONG-TERM VIDEO-EEG MONITORING:         Over the course of 4 days of monitoring, the patient was in sedated coma, persistently with absence of wake-sleep cycling, with ongoing generalized delta-theta slowing and brief generalized electrodecrements with a left frontocentral and left frontocentral breach effect, and with frequent high amplitude left frontocentral sharp waves.         A single subclinical electrographic seizure was recorded over the course of monitoring, which occurred on day #2, as a focal discharge consisting of rapid and rhythmic evolving left frontocentral sharp waves.         These findings indicate moderate electrographic encephalopathy, are consistent with the known left frontocentral skull defect, and provide evidence of partial epilepsy.  Clinical correlation is recommended.   Nathan Stanford M.D., Professor of Neurology       D: 2019   T: 2019   MT: MIGUEL      Name:     PIYUSH MONTENEGRO   MRN:      [id number]        Account:        [id number]   :      1940           Procedure Date: 2019      Document: [id number]

## 2019-03-08 NOTE — PROGRESS NOTES
S:  We have received an order for Assess for brace L1/L2 FX  O:  I met with the patient and family in room I371.  The patient is not alert.  The patient is intubated.   A:  Based on the shape of the patient's body, and the extra adipose tissue a custom TLSO would be protect the spine best.  The TLSO would only be worn when up out of bed unless a Physician specified otherwise.    P:  The family has verbalized they are not confident a back brace is something she can tolerate due to her cognitive deficits and it is something they will need to think about more.  The family is refusing moving forward with a back brace at this time.    Guilherme CLEANING

## 2019-03-08 NOTE — PLAN OF CARE
Pt remains intubated. Low dose precedex. Not following commands, opens eyes spontaneously, does not track, blinks to threat. Pt hypertensive when in pain. Silva patent with adequate UO. TF at goal per OG, tolerating well, no residuals. Small BM x1. NG placed for enteral access per Dr. Colorado should pt be extubated in the near future.Family at bedside. Will cont to monitor.

## 2019-03-08 NOTE — PROGRESS NOTES
Vascular Neurology Progress Note    ____________________________________________________________    Admission Summary:  Loan Anderson is a 78 year old female admitted for AMS on 3/2/2019. She was found to be coughing and choking on something and lost consciousness. She recovered after 30 seconds or so. She then had similar episode later and became unresponsive. She was seen by RN at the time at the facility and found to have no pulse and CPR performed. She had ROSC. She was agitated in ER and required intubation. cEEG showed left frontocentral focus seizure. It was treated after LAC addition.    Last 24 hours:      Propofol and dex off.     Impression:    Encephalopathy likely post ictal  Seizure, probably provoked by Tramadol usage along with history of previous bilateral frontal angi holes  Respiratory failure s/p intubated    Recommendations:     -Cont VPA and LAC  -Will decrease Seroquel to 25 mg to see if she becomes more awake/active  -Hopefully she can be extubated safely soon to have better clinical exam    Please contact the stroke service with any questions: Feel free to call my cellphone.    Gopi Cabrera MD  Vascular Neurology  March 8, 2019    I personally reviewed all relevant labs and neuroimaging.  I spent 35 minutes of critical care time due to encephalopathy management reviewing labs, diagnostic studies, neuroimaging, and evaluating the patient.    ____________________________________________________________________        Medications:      Current Facility-Administered Medications:      acetaminophen (TYLENOL) tablet 650 mg, 650 mg, Oral, Q6H, 650 mg at 03/08/19 1221 **OR** acetaminophen (TYLENOL) solution 650 mg, 650 mg, Per NG tube, Q6H, Gregg Colorado MD, 650 mg at 03/07/19 0545     albuterol (PROVENTIL) neb solution 2.5 mg, 2.5 mg, Nebulization, Q2H PRN, Gregg Colorado MD, 2.5 mg at 03/08/19 1355     aspirin (ASA) chewable tablet 81 mg, 81 mg, Oral, At Bedtime, Gregg Colorado MD, 81 mg  at 03/07/19 2211     carbidopa-levodopa (SINEMET)  MG per tablet 2 tablet, 2 tablet, Oral, 4x Daily, Gregg Colorado MD, 2 tablet at 03/08/19 1221     chlorhexidine (PERIDEX) 0.12 % solution 15 mL, 15 mL, Mouth/Throat, Q12H, Gregg Colorado MD, 15 mL at 03/08/19 0728     dexmedetomidine (PRECEDEX) 4 mcg/mL in sodium chloride 0.9 % 100 mL infusion, 0.2-0.7 mcg/kg/hr, Intravenous, Continuous, Gregg Colorado MD, Stopped at 03/08/19 0800     dextrose 10 % 1,000 mL infusion, , Intravenous, Continuous PRN, Gregg Colorado MD     glucose gel 15-30 g, 15-30 g, Oral, Q15 Min PRN **OR** dextrose 50 % injection 25-50 mL, 25-50 mL, Intravenous, Q15 Min PRN **OR** glucagon injection 1 mg, 1 mg, Subcutaneous, Q15 Min PRN, Gregg Colorado MD     docusate (COLACE) 50 MG/5ML liquid 100 mg, 100 mg, Oral or Feeding Tube, BID, Gregg Colorado MD, 100 mg at 03/08/19 0930     furosemide (LASIX) injection 20 mg, 20 mg, Intravenous, Q12H, Gregg Colorado MD     heparin sodium injection 5,000 Units, 5,000 Units, Subcutaneous, Q8H, Gregg Colorado MD, 5,000 Units at 03/08/19 1400     hydrALAZINE (APRESOLINE) injection 10-20 mg, 10-20 mg, Intravenous, Q4H PRN, Gregg Colorado MD     HYDROmorphone (PF) (DILAUDID) injection 0.2 mg, 0.2 mg, Intravenous, Q2H PRN, Gregg Colorado MD, 0.2 mg at 03/08/19 1358     hypromellose-dextran (ARTIFICAL TEARS) 0.1-0.3 % ophthalmic solution 1 drop, 1 drop, Both Eyes, Q1H PRN, Gregg Colorado MD, 1 drop at 03/07/19 0352     insulin aspart (NovoLOG) inj (RAPID ACTING), 1-6 Units, Subcutaneous, Q4H, Gregg Colorado MD, 1 Units at 03/08/19 1234     labetalol (NORMODYNE/TRANDATE) syringe 10-20 mg, 10-20 mg, Intravenous, Q4H PRN, Gregg Colorado MD     lacosamide (VIMPAT) 100 mg in sodium chloride 0.9 % 100 mL intermittent infusion, 100 mg, Intravenous, BID, Karolyn Diop MD, Last Rate: 100 mL/hr at 03/05/19 2032, 100 mg at 03/08/19 0918     levothyroxine (SYNTHROID/LEVOTHROID) tablet 112  mcg, 112 mcg, Oral, QAM AC, Gregg Colorado MD, 112 mcg at 03/08/19 0654     Lidocaine (LIDOCARE) 4 % Patch 3 patch, 3 patch, Transdermal, Q24h, 3 patch at 03/08/19 0824 **AND** lidocaine patch REMOVAL, , Transdermal, Q24H **AND** lidocaine patch in PLACE, , Transdermal, Q8H, Gregg Colorado MD     lisinopril (PRINIVIL/ZESTRIL) tablet 20 mg, 20 mg, Oral, Daily, Gregg Colorado MD, 20 mg at 03/08/19 0930     magnesium sulfate 2 g in water intermittent infusion, 2 g, Intravenous, Daily PRN, Gregg Colorado MD     magnesium sulfate 4 g in 100 mL sterile water (premade), 4 g, Intravenous, Q4H PRN, Gregg Colorado MD     midazolam (VERSED) injection 2-4 mg, 2-4 mg, Intravenous, Q2H PRN, Gregg Colorado MD, 2 mg at 03/07/19 1432     naloxone (NARCAN) injection 0.1-0.4 mg, 0.1-0.4 mg, Intravenous, Q2 Min PRN, Gregg Colorado MD     ondansetron (ZOFRAN-ODT) ODT tab 4 mg, 4 mg, Oral, Q6H PRN **OR** ondansetron (ZOFRAN) injection 4 mg, 4 mg, Intravenous, Q6H PRN, Gregg Colorado MD     oxyCODONE (ROXICODONE) tablet 5-10 mg, 5-10 mg, Oral, Q3H PRN, Gregg Colorado MD, 5 mg at 03/08/19 1220     pantoprazole (PROTONIX) EC tablet 40 mg, 40 mg, Oral, Daily, 40 mg at 03/08/19 0727 **OR** pantoprazole (PROTONIX) 2 mg/mL suspension 40 mg, 40 mg, Oral or Feeding Tube, Daily, Gregg Colorado MD, 40 mg at 03/07/19 0819     polyethylene glycol (MIRALAX/GLYCOLAX) Packet 17 g, 17 g, Oral, Daily PRN, Gregg Colorado MD     potassium chloride (KLOR-CON) Packet 20-40 mEq, 20-40 mEq, Oral or Feeding Tube, Q2H PRN, Gregg Colorado MD, 20 mEq at 03/08/19 0654     potassium chloride ER (K-DUR/KLOR-CON M) CR tablet 20-40 mEq, 20-40 mEq, Oral, Q2H PRN, Gregg Colorado MD     QUEtiapine (SEROquel) tablet 25 mg, 25 mg, Oral or Feeding Tube, At Bedtime, Gopi Cabrera MD     sodium chloride 0.9% infusion, , Intravenous, Continuous, Gregg Colorado MD, Last Rate: 10 mL/hr at 03/06/19 1915     valproate (DEPACON) 350 mg in sodium chloride  "0.9 % 50 mL intermittent infusion, 350 mg, Intravenous, Q6H, Gopi Cabrera MD, Last Rate: 50 mL/hr at 03/08/19 1223, 350 mg at 03/08/19 1223     venlafaxine (EFFEXOR) tablet 37.5 mg, 37.5 mg, Oral, TID, Gregg Colorado MD, 37.5 mg at 03/08/19 1400    Vital Signs:  B/P: 162/76, T: 99, P: 63, R: 14    /55   Pulse 72   Temp 99  F (37.2  C)   Resp 11   Ht 1.6 m (5' 3\")   Wt 93.7 kg (206 lb 9.1 oz)   SpO2 96%   BMI 36.59 kg/m    General: Intubated not on sedation  HEENT: Atraumatic, normocephalic, no scleral icterus or conjunctival pallor   Cardiac: RRR  Chest: Clear to auscultation  Abdomen: Soft, non-tender, non-distended  Extremities: No LE swelling.  Skin: No rash or lesion.      Neuro:  Intubated   Opening eyes spontaneously; Not following commands, blinks to threat both eyes  Moves arm and leg bilateral minimally    Labs/Studies:  CBC:     Recent Labs   Lab 03/08/19  0459 03/07/19  0500 03/06/19  0605   WBC 12.0* 11.0 10.6   RBC 3.83 3.69* 3.67*   HGB 11.4* 10.9* 11.1*   HCT 35.4 34.0* 33.7*    231 216     Basic Metabolic Panel:   Recent Labs   Lab Test 03/08/19  0459 03/07/19  0500 03/06/19  0605    140 141   POTASSIUM 3.7 4.0 3.9   CHLORIDE 103 107 109   CO2 31 26 28   BUN 26 22 23   CR 0.78 0.76 0.86   * 152* 169*   NIKKIE 8.1* 7.7* 8.0*     Liver panel:  Recent Labs   Lab Test 03/02/19  1449 10/24/17  0700 10/23/17  0653 10/21/17  1751   PROTTOTAL 7.0  --  6.7* 7.7   ALBUMIN 3.2* 2.3* 2.3* 2.9*   BILITOTAL 0.3  --  0.4 0.8   ALKPHOS 137  --  218* 258*   AST 21  --  38 61*   ALT 7  --  10 13     INR:  Recent Labs   Lab Test 03/02/19  1449 02/27/19  0540   INR 1.04 0.93      Lipid Profile:No lab results found.  A1C:   Recent Labs   Lab Test 03/02/19  1449 10/23/17  0653   A1C 6.7* 6.6*     Troponin I:   Recent Labs   Lab Test 03/02/19  1449   TROPI <0.015         Imaging:  Relevant findings as per the Impression above.    "

## 2019-03-08 NOTE — PROGRESS NOTES
Critical Care  Note      03/08/2019    Name: Loan Anderson MRN#: [id number]   Age: 78 year old YOB: 1940     Hsptl Day# 6  ICU DAY #6    MV DAY #6             Problem List:   Acute encephalopathy  Rib fractures  YASMIN  leukocytosis         Summary/Hospital Course:     78F with parkinsons disease who lives in a memory center now presents from [facility] after being found acutely encephalopathic and mildly agitated/combative by her  around 2 pm on day of admission.  Apparently there had been an episode earlier in the day where she had turned red and coughed 3-4 min after taking pills and family member did the heimlich maneuver.  She returned to her normal state after this but no source of asphyxiation was found.  Later in the day her  went to get the mail and when he returned to their home (~10 min) he found staff doing cpr on her.  Apparently she became unresponsive at some point and cpr was started, but on EMS arrival she did have a perfusing rhythm.     A couple days prior to admit she had a mechanical fall resulting in R hip pain.  No injury noted at that time, but today has several rib fx on CT scan (may be due to cpr?) and transverse process L1L2 fx's--suspect these may be from fall.    No overnight events.  EEG now off. Opens eyes to voice, but still not following.      Assessment and plan :     Loan Anderson IS a 78 year old female admitted on 3/2/2019 for acute encephalopathy.   I have personally reviewed the daily labs, imaging studies, cultures and discussed the case with referring physician and consulting physicians.   My assessment and plan by system for this patient is as follows:    Neurology/Psychiatry:   1. Acute encephalopathy:  Seizure activtity on EEG now resolved, though probably still post-ictal.  See subclinial status epilepticus below. Now starting to wake up more.    2.  Subclinical status epilepticus:  May have been taking more tramadol than usual  after fall earlier in the week--this would lower seizure threshold.  No further seizures on EEG now with vimpat and valproate.  Appreciate neurocrit input.  2. Analgesia:  lidoderm patches on chest for rib fx.  PO tylenol standing.  Prn oxycodone/dilaudid.  3. Sedation:  Precedex drip.  Midazolam pushes prn.  Seroquel and valproate being given with good effect, decreased seroquel dose today to 50 qhs.  4. H/o parkinsons dz:  Appreciate neuro crit input.  Will continue short acting carbo/levodopa.  Holding long-acting and patch forms for now.    Cardiovascular:    1.  Htn:  Increased lisinopril to 20, prn IV labetalol and hydralazine. bp now much better controlled with this.    Pulmonary/Ventilator Management:   1. Loss of protective airway reflexes:  Due to encephalopathy noted above.  Not neurologically appropriate for extubation today.    GI and Nutrition :   1.  Tube feeds  2.  PPI for PUD prophy    Renal/Fluids/Electrolytes:   1. YASMIN on CKD:  Resolved. Creat 0.78 today.  UOP adequate.  Continue to watch closely.  2.  Hypervolemia:  Now 6-7L up.  Minimal response to lasix 10 iv yesterday.  More aggressive diuresis today.    Infectious Disease:   1. Procal ok <0.05 and no clear source of infection.  Cultures remain ngtd.  Stopped all abx 3/5/19.    Endocrine:   1. Hyperglycemia: SSI  2.  Hypothyroid:  Continue home synthroid.    Hematology/Oncology:   1.  Anemia recovered to baseline (11.4 today--stable)  2.  WBC ok 12.0, pltlts ok 246.    ICU Prophylaxis:   1. DVT: Hep Subq/ mechanical  2. VAP: HOB 30 degrees, chlorhexidine rinse  3. Stress Ulcer: PPI  4. Restraints: Nonviolent soft two point restraints required and necessary for patient safety and continued cares and good effect as patient continues to pull at necessary lines, tubes despite education and distraction. Will readdress daily.   5. Wound care  - per unit routine  6. Feeding -  tf  7. Family Update: daughter and  at bedside today.  8.  Disposition - critically ill           Medical History:     Past Medical History:   Diagnosis Date     Constipation 7/15/2011     Convergence insufficiency 7/15/2011     Depression 7/15/2011     Diplopia 7/18/2011     Hot flashes, menopausal 7/18/2011     Hypercholesterolemia 7/18/2011     Hypothyroid 7/15/2011     Neurogenic bladder 7/15/2011     Parkinson disease (H) 7/15/2011     REM sleep behavior disorder 7/18/2011     Sebaceous cyst 7/18/2011     Wears glasses 7/18/2011     Past Surgical History:   Procedure Laterality Date     ------------OTHER-------------  november ? 2015    lithrotripsy for renal stone     ------------OTHER-------------  november 2015    had sepsis from uti and hospitalized     BRAIN SURGERY  12 or 14 oct 2011    gene therapy enrolled study at [facility]     Social History     Socioeconomic History     Marital status:      Spouse name: Not on file     Number of children: Not on file     Years of education: Not on file     Highest education level: Not on file   Occupational History     Not on file   Social Needs     Financial resource strain: Not on file     Food insecurity:     Worry: Not on file     Inability: Not on file     Transportation needs:     Medical: Not on file     Non-medical: Not on file   Tobacco Use     Smoking status: Never Smoker     Smokeless tobacco: Never Used   Substance and Sexual Activity     Alcohol use: Yes     Comment: rare     Drug use: Not on file     Sexual activity: Not on file   Lifestyle     Physical activity:     Days per week: Not on file     Minutes per session: Not on file     Stress: Not on file   Relationships     Social connections:     Talks on phone: Not on file     Gets together: Not on file     Attends Gnosticism service: Not on file     Active member of club or organization: Not on file     Attends meetings of clubs or organizations: Not on file     Relationship status: Not on file     Intimate partner violence:     Fear of current or ex  partner: Not on file     Emotionally abused: Not on file     Physically abused: Not on file     Forced sexual activity: Not on file   Other Topics Concern     Parent/sibling w/ CABG, MI or angioplasty before 65F 55M? Not Asked   Social History Narrative    Spouse is serenity        Allergies   Allergen Reactions     Atorvastatin      Concern that it was contributing to confusion     Mirapex [Pramipexole Dihydrochloride Monohydrate]      Leg swelling     Potassium Chloride      IV infusion only. She tolerates oral potassium chloride     Requip [Ropinirole Hydrochloride]      Leg swelling     Epinephrine Palpitations              Key Medications:       acetaminophen  650 mg Oral Q6H    Or     acetaminophen  650 mg Per NG tube Q6H     aspirin  81 mg Oral At Bedtime     carbidopa-levodopa  2 tablet Oral 4x Daily     chlorhexidine  15 mL Mouth/Throat Q12H     docusate  100 mg Oral or Feeding Tube BID     heparin  5,000 Units Subcutaneous Q8H     insulin aspart  1-6 Units Subcutaneous Q4H     lacosamide (VIMPAT) intermittent infusion  100 mg Intravenous BID     levothyroxine  112 mcg Oral QAM AC     lidocaine  3 patch Transdermal Q24h    And     lidocaine   Transdermal Q24H    And     lidocaine   Transdermal Q8H     lisinopril  20 mg Oral Daily     pantoprazole  40 mg Oral Daily    Or     pantoprazole  40 mg Oral or Feeding Tube Daily     QUEtiapine  50 mg Oral or Feeding Tube At Bedtime     valproate (DEPACON) intermittent infusion  350 mg Intravenous Q6H     venlafaxine  37.5 mg Oral TID       dexmedetomidine 0.2 mcg/kg/hr (03/07/19 1951)     IV fluid REPLACEMENT ONLY       propofol (DIPRIVAN) infusion Stopped (03/06/19 1256)     sodium chloride 10 mL/hr at 03/06/19 1915        Home Meds    No current facility-administered medications on file prior to encounter.   Current Outpatient Medications on File Prior to Encounter:  aspirin 81 MG tablet Take 81 mg by mouth At Bedtime @ midnight   carbidopa-levodopa (SINEMET CR)   MG CR tablet Take 1 tablet by mouth At Bedtime At 8 pm   carbidopa-levodopa (SINEMET)  MG tablet Take 2 tablets 4 times daily at 7 am, 10 am, 1 pm, and 4 pm   cholecalciferol 5000 units CAPS Take 1 capsule by mouth every morning @ 6am   docusate sodium (COLACE) 100 MG capsule Take 300 mg by mouth daily 3 x 100mg capsule @ noon   ibuprofen (ADVIL/MOTRIN) 200 MG tablet Take 400 mg by mouth 3 times daily 2 x 200mg tabs by mouth 3/day @ 6am, 12pm and 6pm and 3 x 200mg tabs by mouth every 8 hours as needed   levothyroxine (SYNTHROID/LEVOTHROID) 112 MCG tablet Take 112 mcg by mouth daily @ 6am   lisinopril (PRINIVIL/ZESTRIL) 5 MG tablet Take 5 mg by mouth daily @ 6am   mirabegron (MYRBETRIQ) 25 MG 24 hr tablet Take 25 mg by mouth daily @ 6am   Multiple Vitamins-Minerals (EYE VITAMINS) CAPS Take 1 capsule by mouth daily 1 capsule ([employer]) by mouth daily @ midnight   omeprazole (PRILOSEC) 20 MG DR capsule Take 20 mg by mouth daily @ 6am   polyethylene glycol (MIRALAX) powder Take 17 g by mouth daily as needed @0600   rotigotine (NEUPRO) 4 MG/24HR 24 hr patch Apply patch daily @ bedtime/8 pm (Patient taking differently: Place 1 patch onto the skin daily @ bedtime/8 pm)   tamsulosin (FLOMAX) 0.4 MG capsule Take 0.4 mg by mouth daily @ 6pm   traMADol (ULTRAM) 50 MG tablet Take 1 tablet (50 mg) by mouth every 6 hours as needed for severe pain   trospium (SANCTURA XR) 60 MG CP24 24 hr capsule Take 60 mg by mouth every morning (before breakfast) @ 6am   venlafaxine (EFFEXOR) 100 MG tablet Take 100 mg by mouth daily @ 6am              Physical Examination:   Temp:  [99  F (37.2  C)-100  F (37.8  C)] 99.3  F (37.4  C)  Pulse:  [56-69] 60  Heart Rate:  [52-67] 61  Resp:  [7-20] 7  BP: (114-170)/(49-85) 114/49  FiO2 (%):  [3 %-30 %] 3 %  SpO2:  [96 %-99 %] 97 %  No intake or output data in the 24 hours ending 03/02/19 1732  Wt Readings from Last 4 Encounters:   03/08/19 93.7 kg (206 lb 9.1 oz)   02/27/19 86.2 kg  (190 lb)   02/11/19 88 kg (194 lb)   11/14/18 86.2 kg (190 lb)     BP - Mean:  [] 76  Ventilation Mode: CMV/AC  (Continuous Mandatory Ventilation/ Assist Control)  FiO2 (%): 3 %  Rate Set (breaths/minute): 14 breaths/min  Tidal Volume Set (mL): 400 mL  PEEP (cm H2O): 5 cmH2O  Oxygen Concentration (%): 30 %  Resp: (!) 7    No lab results found in last 7 days.      Intake/Output Summary (Last 24 hours) at 3/8/2019 0823  Last data filed at 3/8/2019 0600  Gross per 24 hour   Intake 2875.1 ml   Output 1660 ml   Net 1215.1 ml         GEN: no acute distress, sedated   HEENT: head ncat, sclera anicteric, OP patent, trachea midline   PULM: unlabored synchronous with vent, clear anteriorly    CV/COR: RRR S1S2 no gallop,  No rub, no murmur  ABD: soft nontender, hypoactive bowel sounds, no mass  EXT:  Minimal Edema x4,  Warm x4  NEURO: sedated.  Moves all 4 when sedation weaned.  L pupil slightly larger than R but both reactive. Opening eyes to voice now, but still doesn't follow.  SKIN: no obvious rash  LINES: clean, dry intact         Data:   All data and imaging reviewed     ROUTINE ICU LABS (Last four results)  CMP  Recent Labs   Lab 03/08/19  0459 03/07/19  0500 03/06/19  0605 03/05/19  0520  03/02/19  1449    140 141 143   < > 136   POTASSIUM 3.7 4.0 3.9 4.0   < > 4.1   CHLORIDE 103 107 109 112*   < > 102   CO2 31 26 28 26   < > 28   ANIONGAP 6 7 4 5   < > 6   * 152* 169* 120*   < > 122*   BUN 26 22 23 19   < > 26   CR 0.78 0.76 0.86 0.98   < > 1.18*   GFRESTIMATED 74 76 66 56*   < > 45*   GFRESTBLACK 86 88 76 65   < > 52*   NIKKIE 8.1* 7.7* 8.0* 7.7*   < > 8.4*   MAG 2.0 2.0 2.2 2.2   < >  --    PHOS 2.4* 2.5 3.1 2.7   < >  --    PROTTOTAL  --   --   --   --   --  7.0   ALBUMIN  --   --   --   --   --  3.2*   BILITOTAL  --   --   --   --   --  0.3   ALKPHOS  --   --   --   --   --  137   AST  --   --   --   --   --  21   ALT  --   --   --   --   --  7    < > = values in this interval not displayed.      CBC  Recent Labs   Lab 03/08/19  0459 03/07/19  0500 03/06/19  0605 03/05/19  0520   WBC 12.0* 11.0 10.6 9.6   RBC 3.83 3.69* 3.67* 3.60*   HGB 11.4* 10.9* 11.1* 10.8*   HCT 35.4 34.0* 33.7* 33.1*   MCV 92 92 92 92   MCH 29.8 29.5 30.2 30.0   MCHC 32.2 32.1 32.9 32.6   RDW 15.4* 15.3* 15.4* 15.5*    231 216 205     INR  Recent Labs   Lab 03/02/19  1449   INR 1.04     Arterial Blood GasNo lab results found in last 7 days.    All cultures:  Recent Labs   Lab 03/03/19  0530 03/02/19  1554 03/02/19  1543   CULT Moderate growth  Normal abhijeet   No growth No growth     Recent Results (from the past 24 hour(s))   XR Chest Port 1 View    Narrative    CHEST PORTABLE ONE VIEW     3/2/2019 3:00 PM     HISTORY: Tube placement.    COMPARISON: 2/27/2019.      Impression    IMPRESSION: Endotracheal tube in place in mid trachea. New area of  atelectasis or infiltrate in the right lung base. Left lung is clear.  Heart size is upper normal. Pulmonary vasculature does not appear  distended.      KELLIE BANKS MD   CT Head w/o Contrast    Narrative    CT SCAN OF THE HEAD WITHOUT CONTRAST   3/2/2019 3:10 PM     HISTORY: Altered level of consciousness (LOC), unexplained.    TECHNIQUE:  Axial images of the head and coronal reformations without  IV contrast material.  Radiation dose for this scan was reduced using  automated exposure control, adjustment of the mA and/or kV according  to patient size, or iterative reconstruction technique.    COMPARISON: None.    FINDINGS: There is some moderate cerebral atrophy. There is an old  infarct in the left basal ganglia region. There are some patchy white  matter changes in both hemispheres without mass effect. The patient is  intubated. The visualized paranasal sinuses and mastoid air cells are  clear. There is no evidence for intracranial hemorrhage, acute  infarct, mass effect, or skull fracture. Bilateral angi hole  procedures are noted.      Impression    IMPRESSION: Chronic changes.  No evidence for intracranial hemorrhage  or any acute process.       SHAGUFTA MOORE MD   CT Head Neck Angio w/o & w Contrast    Narrative    CTA HEAD NECK WITH CONTRAST 3/2/2019 3:18 PM     HISTORY: Headache, sudden, carotid/vertebral dissection suspected/    TECHNIQUE: Multiplanar multisequence images were obtained through the  neck without and with intravenous contrast. 70 mL of Isovue-370 was  given. Multiplanar reconstructions were performed. 3-D reconstructions  off a remote workstation for CT angiography were also acquired.  Carotid stenoses were evaluated by comparing the caliber of the  proximal internal carotid artery to the caliber of the distal internal  carotid artery. Radiation dose for this scan was reduced using  automated exposure control, adjustment of the mA and/or kV according  to patient size, or iterative reconstruction technique.    FINDINGS:    Brachiocephalic vessels: Normal.    Right carotid system: Normal.    Left carotid system: Trace amount of plaque. No stenosis or  dissection.    Right vertebral artery: Normal.    Left vertebral artery: Normal.    [place]: Normal.    Other findings: The patient is intubated. The tip of the ET tube is  above the sánchez. Trachea has slight deviation to the right. There is  a moderate size right pleural effusion. Degenerative changes are seen  in the spine. There is some minimal degenerative spondylolisthesis of  C3 and C4.      Impression    IMPRESSION:  1. Negative CT angiography head and neck.  2. Moderate size right pleural effusion.     SHAGUFTA MOORE MD   CT Chest (PE) Abdomen Pelvis w Contrast    Narrative    CT CHEST PE, ABDOMEN PELVIS WITH CONTRAST   3/2/2019 3:26 PM     HISTORY: Trauma. Altered mental status. Hypotension.    TECHNIQUE: 75 mL Isovue-370 IV were administered. After contrast  administration, volumetric helical sections were acquired from the  thoracic inlet to the ischial tuberosities. Pulmonary embolism  protocol was performed  through the chest. Coronal images were also  reconstructed. Radiation dose for this scan was reduced using  automated exposure control, adjustment of the mA and/or kV according  to patient size, or iterative reconstruction technique.    COMPARISON: CT of the abdomen and pelvis performed 10/24/2017.    FINDINGS:    Chest: No evidence for pulmonary embolism or thoracic aortic  dissection. Atherosclerotic calcification of the thoracic aorta.  Endotracheal tube is in place, with tip approximately 2.5 cm above the  sánchez. Small to moderate right pleural effusion, with mild associated  compressive atelectasis at the right lung base posteriorly. No  pericardial or left pleural effusion. There is mild atelectasis at the  left lung base posteriorly. Mild patchy mosaic attenuation is noted in  the lungs bilaterally. There are displaced acute appearing fractures  involving the right 10th and 11th ribs posteriorly. Mildly displaced  fractures of the right second rib in two places laterally and  anteriorly. There are also mildly displaced fractures of the right  third through sixth ribs laterally. Nondisplaced fracture of the left  second rib laterally.    Abdomen and Pelvis: No bowel obstruction. There is a moderate amount  of stool in the rectum. No free fluid in the pelvis. No convincing  evidence for colitis or diverticulitis. No intra-abdominal fluid  collections to suggest hematoma. No free intraperitoneal air.  Scattered cortical scarring in the left kidney is not significantly  changed, given differences in technique. The liver, gallbladder,  spleen, adrenal glands, pancreas, and kidneys are otherwise  unremarkable. No hydronephrosis. No evidence for solid organ injury.  Mild atherosclerotic aortoiliac calcification. The uterus is not seen,  and is likely surgically absent. Degenerative changes are noted in the  thoracolumbar spine. Displaced acute appearing fractures are noted  involving the L1 and L2 transverse  processes on the right.      Impression    IMPRESSION:   1. Displaced acute appearing fractures are noted involving the right  10th and 11th ribs posteriorly, as well as the right 2nd through 6th  ribs laterally. Nondisplaced fracture of the left second rib laterally  may also be acute. There are also acute-appearing displaced fractures  of the L1 and L2 transverse processes on the right.  2. No evidence for pulmonary embolism.  3. Small to moderate right pleural effusion.  4. Mild patchy mosaic attenuation in the lungs bilaterally may be  related to air trapping.  5. Moderate amount of stool in the rectum.      NUVIA ISLAS MD     Labs (personally reviewed/interpreted):  See a/p.    Billing: This patient is critically ill: Yes. Total critical care time today 35 min.

## 2019-03-09 NOTE — PLAN OF CARE
Remains sedated on precedex gtt overnight. Prn oxycodone given x2 for pain. HR 50-60's. Hypertensive at times. Max temp 99.5 via bladder probe. TF @ goal. Glucose 124-183. [employer] patent, good UOP (diuresing). Down 1.9kg from yesterday. Potassium replaced this AM.

## 2019-03-09 NOTE — PROGRESS NOTES
Pt remains intubated and sedation restarted at 1800 2/2 sv agitation.   Neuro: eyes open spont, one witnessed assessment of visual tracking. Left pupil greater than R ( baseline). Pt moves all ext purposefully and inconsistently to command.   Cardiac: SR. VSS, afebrile. Generalized edema bilateral arms.  Resp: CMV 30% RR14  P 5. LS clearing this evening following persistent wz this AM.  GI: OG to continuous TF. Tolerating well. BM today. NG coiled in stomach and clamped ( to remain in place to use after extubation )  : mata in place and draining WNL  Skin: unchanged.  Family: at bedside throughout the day. Updated as to all current findings, family involved in directing goals of care.  Per nursing supervisor: consent for brain donation incomplete, being followed by [facility]. [facility]

## 2019-03-09 NOTE — PROGRESS NOTES
Vascular Neurology Progress Note    ____________________________________________________________    Admission Summary:  Loan Anderson is a 78 year old female admitted for AMS on 3/2/2019. She was found to be coughing and choking on something and lost consciousness. She recovered after 30 seconds or so. She then had similar episode later and became unresponsive. She was seen by RN at the time at the facility and found to have no pulse and CPR performed. She had ROSC. She was agitated in ER and required intubation. cEEG showed left frontocentral focus seizure. It was treated after LAC addition.    Last 24 hours:      Propofol and dex off. Still awake. Planning for extubation.     Impression:    Encephalopathy likely post ictal  Seizure, probably provoked by Tramadol usage along with history of previous bilateral frontal angi holes  Respiratory failure s/p intubated  PD    Recommendations:     -Cont VPA and LAC  -Discontinue Seroquel to avoid polypharmacy  -She appears to be bradykinesic - probably need to restart long acting Sinemet once extubated  -Hopefully she can be extubated safely soon    Please contact the stroke service with any questions: Feel free to call my cellphone.    Gopi Cabrera MD  Vascular Neurology  March 9, 2019    I personally reviewed all relevant labs and neuroimaging.  I spent 35 minutes of critical care time due to encephalopathy management reviewing labs, diagnostic studies, neuroimaging, and evaluating the patient.    ____________________________________________________________________        Medications:      Current Facility-Administered Medications:      acetaminophen (TYLENOL) tablet 650 mg, 650 mg, Oral, Q6H, 650 mg at 03/09/19 1159 **OR** acetaminophen (TYLENOL) solution 650 mg, 650 mg, Per NG tube, Q6H, Gregg Colorado MD, 650 mg at 03/09/19 0610     albuterol (PROVENTIL) neb solution 2.5 mg, 2.5 mg, Nebulization, Q2H PRN, Gregg Colorado MD, 2.5 mg at 03/08/19 1355     aspirin  (ASA) chewable tablet 81 mg, 81 mg, Oral, At Bedtime, Gregg Colorado MD, 81 mg at 03/08/19 2203     carbidopa-levodopa (SINEMET)  MG per tablet 2 tablet, 2 tablet, Oral, 4x Daily, Gregg Colorado MD, 2 tablet at 03/09/19 1159     chlorhexidine (PERIDEX) 0.12 % solution 15 mL, 15 mL, Mouth/Throat, Q12H, Gregg Colorado MD, 15 mL at 03/09/19 0744     dexmedetomidine (PRECEDEX) 4 mcg/mL in sodium chloride 0.9 % 100 mL infusion, 0.2-0.7 mcg/kg/hr, Intravenous, Continuous, Gregg Colorado MD, Stopped at 03/09/19 0800     dextrose 10 % 1,000 mL infusion, , Intravenous, Continuous PRN, Gregg Colorado MD     glucose gel 15-30 g, 15-30 g, Oral, Q15 Min PRN **OR** dextrose 50 % injection 25-50 mL, 25-50 mL, Intravenous, Q15 Min PRN **OR** glucagon injection 1 mg, 1 mg, Subcutaneous, Q15 Min PRN, Gregg Colorado MD     docusate (COLACE) 50 MG/5ML liquid 100 mg, 100 mg, Oral or Feeding Tube, BID, Gregg Colorado MD, 100 mg at 03/09/19 0921     furosemide (LASIX) injection 40 mg, 40 mg, Intravenous, Q12H, Perlman, David Morris, MD     heparin sodium injection 5,000 Units, 5,000 Units, Subcutaneous, Q8H, Gregg Colorado MD, 5,000 Units at 03/09/19 0609     hydrALAZINE (APRESOLINE) injection 10-20 mg, 10-20 mg, Intravenous, Q4H PRN, Gregg Colorado MD     HYDROmorphone (PF) (DILAUDID) injection 0.2 mg, 0.2 mg, Intravenous, Q2H PRN, Gregg Colorado MD, 0.2 mg at 03/08/19 1358     hypromellose-dextran (ARTIFICAL TEARS) 0.1-0.3 % ophthalmic solution 1 drop, 1 drop, Both Eyes, Q1H PRN, Gregg Colorado MD, 1 drop at 03/07/19 0352     insulin aspart (NovoLOG) inj (RAPID ACTING), 1-6 Units, Subcutaneous, Q4H, Gregg Colorado MD, 1 Units at 03/09/19 0742     labetalol (NORMODYNE/TRANDATE) syringe 10-20 mg, 10-20 mg, Intravenous, Q4H PRN, Gregg Colorado MD     lacosamide (VIMPAT) 100 mg in sodium chloride 0.9 % 100 mL intermittent infusion, 100 mg, Intravenous, BID, Karolyn Diop MD, Last Rate: 100 mL/hr at  03/08/19 2103, 100 mg at 03/09/19 0919     levothyroxine (SYNTHROID/LEVOTHROID) tablet 112 mcg, 112 mcg, Oral, QAM AC, Gregg Colorado MD, 112 mcg at 03/09/19 0703     Lidocaine (LIDOCARE) 4 % Patch 3 patch, 3 patch, Transdermal, Q24h, 3 patch at 03/08/19 0824 **AND** lidocaine patch REMOVAL, , Transdermal, Q24H **AND** lidocaine patch in PLACE, , Transdermal, Q8H, Gregg Colorado MD     lisinopril (PRINIVIL/ZESTRIL) tablet 20 mg, 20 mg, Oral, Daily, Gregg Colorado MD, 20 mg at 03/09/19 0921     magnesium sulfate 2 g in water intermittent infusion, 2 g, Intravenous, Daily PRN, Gregg Colorado MD     magnesium sulfate 4 g in 100 mL sterile water (premade), 4 g, Intravenous, Q4H PRN, Gregg Colorado MD     midazolam (VERSED) injection 2-4 mg, 2-4 mg, Intravenous, Q2H PRN, Gregg Colorado MD, 2 mg at 03/07/19 1432     naloxone (NARCAN) injection 0.1-0.4 mg, 0.1-0.4 mg, Intravenous, Q2 Min PRN, Gregg Colorado MD     ondansetron (ZOFRAN-ODT) ODT tab 4 mg, 4 mg, Oral, Q6H PRN **OR** ondansetron (ZOFRAN) injection 4 mg, 4 mg, Intravenous, Q6H PRN, Gregg Colorado MD     oxyCODONE (ROXICODONE) tablet 5-10 mg, 5-10 mg, Oral, Q3H PRN, Gregg Colorado MD, 10 mg at 03/09/19 0722     pantoprazole (PROTONIX) EC tablet 40 mg, 40 mg, Oral, Daily, 40 mg at 03/09/19 0721 **OR** pantoprazole (PROTONIX) 2 mg/mL suspension 40 mg, 40 mg, Oral or Feeding Tube, Daily, Gregg Colorado MD, 40 mg at 03/07/19 0819     polyethylene glycol (MIRALAX/GLYCOLAX) Packet 17 g, 17 g, Oral, Daily PRN, Gregg Colorado MD     potassium chloride (KLOR-CON) Packet 20-40 mEq, 20-40 mEq, Oral or Feeding Tube, Q2H PRN, Gregg Colorado MD, 20 mEq at 03/09/19 0616     potassium chloride ER (K-DUR/KLOR-CON M) CR tablet 20-40 mEq, 20-40 mEq, Oral, Q2H PRN, Gregg Colorado MD     sodium chloride 0.9% infusion, , Intravenous, Continuous, Gregg Colorado MD, Last Rate: 10 mL/hr at 03/08/19 1900     valproate (DEPACON) 350 mg in sodium chloride 0.9 %  "50 mL intermittent infusion, 350 mg, Intravenous, Q6H, Gopi Cabrera MD, Last Rate: 50 mL/hr at 03/09/19 1202, 350 mg at 03/09/19 1202     venlafaxine (EFFEXOR) tablet 37.5 mg, 37.5 mg, Oral, TID, Gregg Colorado MD, 37.5 mg at 03/09/19 0721    Vital Signs:  B/P: 162/76, T: 99, P: 63, R: 14    /66   Pulse 61   Temp 98.1  F (36.7  C)   Resp 15   Ht 1.6 m (5' 3\")   Wt 91.8 kg (202 lb 6.1 oz)   SpO2 96%   BMI 35.85 kg/m    General: Intubated not on sedation  HEENT: Atraumatic, normocephalic, no scleral icterus or conjunctival pallor   Cardiac: RRR  Chest: Clear to auscultation  Abdomen: Soft, non-tender, non-distended  Extremities: No LE swelling.  Skin: No rash or lesion.      Neuro:  Intubated, +bradykinesia  Opening eyes spontaneously; Follows simple commands, blinks to threat both eyes  Moves arm and leg bilateral minimally    Labs/Studies:  CBC:     Recent Labs   Lab 03/09/19  0530 03/08/19  0459 03/07/19  0500   WBC 11.3* 12.0* 11.0   RBC 3.70* 3.83 3.69*   HGB 11.1* 11.4* 10.9*   HCT 34.1* 35.4 34.0*    246 231     Basic Metabolic Panel:   Recent Labs   Lab Test 03/09/19  0530 03/08/19  0459 03/07/19  0500    140 140   POTASSIUM 3.5 3.7 4.0   CHLORIDE 98 103 107   CO2 34* 31 26   BUN 26 26 22   CR 0.76 0.78 0.76   * 148* 152*   NIKKIE 8.0* 8.1* 7.7*     Liver panel:  Recent Labs   Lab Test 03/02/19  1449 10/24/17  0700 10/23/17  0653 10/21/17  1751   PROTTOTAL 7.0  --  6.7* 7.7   ALBUMIN 3.2* 2.3* 2.3* 2.9*   BILITOTAL 0.3  --  0.4 0.8   ALKPHOS 137  --  218* 258*   AST 21  --  38 61*   ALT 7  --  10 13     INR:  Recent Labs   Lab Test 03/02/19  1449 02/27/19  0540   INR 1.04 0.93      Lipid Profile:No lab results found.  A1C:   Recent Labs   Lab Test 03/02/19  1449 10/23/17  0653   A1C 6.7* 6.6*     Troponin I:   Recent Labs   Lab Test 03/02/19  1449   TROPI <0.015         Imaging:  Relevant findings as per the Impression above.    "

## 2019-03-09 NOTE — PROGRESS NOTES
Date of Admission: 3/2/2019     Date of Intubation (most recent):  3/2/19     Reason for Mechanical Ventilation:  AW protection     Number of Days on Mechanical Ventilation: 8     Met Criteria for Pressure Support Trial: No     Length of Pressure Support Trial:      Reason for Stopping Pressure Support Trial:      Reason for No Pressure Support Trial: Per MD  Ventilation Mode: CMV/AC  (Continuous Mandatory Ventilation/ Assist Control)  FiO2 (%): 30 %  Rate Set (breaths/minute): 14 breaths/min  Tidal Volume Set (mL): 400 mL  PEEP (cm H2O): 5 cmH2O  Pressure Support (cm H2O): 10 cmH2O  Oxygen Concentration (%): 30 %  Resp: 16    No lab results found in last 7 days.   Plan: continue full vent support tonight

## 2019-03-10 NOTE — PROGRESS NOTES
Vascular Neurology Progress Note    ____________________________________________________________    Admission Summary:  Loan Anderson is a 78 year old female admitted for AMS on 3/2/2019. She was found to be coughing and choking on something and lost consciousness. She recovered after 30 seconds or so. She then had similar episode later and became unresponsive. She was seen by RN at the time at the facility and found to have no pulse and CPR performed. She had ROSC. She was agitated in ER and required intubation. cEEG showed left frontocentral focus seizure. It was treated after LAC addition.    Last 24 hours:      Back on Dex. No significant improvement in mental status. Apparently with Dex off will move all 4 ext without issue but does have agitation. Found to have no cuff leak today ?tongue swelling.    Impression:    Encephalopathy likely post ictal  Seizure, probably provoked by Tramadol usage along with history of previous bilateral frontal angi holes  Respiratory failure s/p intubated  PD    Recommendations:     -Cont VPA and LAC  -Restart back on low dose 12.g Seroquel tonight and hopefully get Dex off  -She appears to be bradykinesic - probably need to restart long acting Sinemet once extubated  -Hopefully she can be extubated safely soon once tongue swelling is better controlled (steroid)    Please contact the stroke service with any questions: Feel free to call my cellphone.    Gopi Cabrera MD  Vascular Neurology  March 10, 2019    I personally reviewed all relevant labs and neuroimaging.  I spent 35 minutes of critical care time due to encephalopathy management reviewing labs, diagnostic studies, neuroimaging, and evaluating the patient.    ____________________________________________________________________        Medications:      Current Facility-Administered Medications:      acetaminophen (TYLENOL) tablet 650 mg, 650 mg, Oral, Q6H, 650 mg at 03/10/19 0626 **OR** acetaminophen (TYLENOL) solution  650 mg, 650 mg, Per NG tube, Q6H, Gregg Colorado MD, 650 mg at 03/10/19 1140     albuterol (PROVENTIL) neb solution 2.5 mg, 2.5 mg, Nebulization, Q2H PRN, Gregg Colorado MD, 2.5 mg at 03/09/19 1519     aspirin (ASA) chewable tablet 81 mg, 81 mg, Oral, At Bedtime, Gregg Colorado MD, 81 mg at 03/09/19 2149     carbidopa-levodopa (SINEMET)  MG per tablet 2 tablet, 2 tablet, Oral, 4x Daily, Gregg Colorado MD, 2 tablet at 03/10/19 1140     chlorhexidine (PERIDEX) 0.12 % solution 15 mL, 15 mL, Mouth/Throat, Q12H, Gregg Colorado MD, 15 mL at 03/10/19 0831     dexamethasone (DECADRON) injection 4 mg, 4 mg, Intravenous, Q6H, Perlman, David Morris, MD, 4 mg at 03/10/19 1140     dexmedetomidine (PRECEDEX) 4 mcg/mL in sodium chloride 0.9 % 100 mL infusion, 0.2-0.7 mcg/kg/hr, Intravenous, Continuous, Gregg Colorado MD, Last Rate: 7 mL/hr at 03/10/19 0945, 0.3 mcg/kg/hr at 03/10/19 0945     dextrose 10 % 1,000 mL infusion, , Intravenous, Continuous PRN, Gregg Colorado MD     glucose gel 15-30 g, 15-30 g, Oral, Q15 Min PRN **OR** dextrose 50 % injection 25-50 mL, 25-50 mL, Intravenous, Q15 Min PRN **OR** glucagon injection 1 mg, 1 mg, Subcutaneous, Q15 Min PRN, Gregg Colorado MD     docusate (COLACE) 50 MG/5ML liquid 100 mg, 100 mg, Oral or Feeding Tube, BID, Gregg Colorado MD, 100 mg at 03/10/19 0832     furosemide (LASIX) injection 40 mg, 40 mg, Intravenous, Q12H, Perlman, David Morris, MD, 40 mg at 03/10/19 0832     heparin sodium injection 5,000 Units, 5,000 Units, Subcutaneous, Q8H, Gregg Colorado MD, 5,000 Units at 03/10/19 0605     hydrALAZINE (APRESOLINE) injection 10-20 mg, 10-20 mg, Intravenous, Q4H PRN, Gregg Colorado MD     HYDROmorphone (PF) (DILAUDID) injection 0.2 mg, 0.2 mg, Intravenous, Q2H PRN, Gregg Colorado MD, 0.2 mg at 03/09/19 1521     hypromellose-dextran (ARTIFICAL TEARS) 0.1-0.3 % ophthalmic solution 1 drop, 1 drop, Both Eyes, Q1H PRN, Gregg Colorado MD, 1 drop at 03/07/19  0352     insulin aspart (NovoLOG) inj (RAPID ACTING), 1-6 Units, Subcutaneous, Q4H, Gregg Colorado MD, 1 Units at 03/10/19 1205     labetalol (NORMODYNE/TRANDATE) syringe 10-20 mg, 10-20 mg, Intravenous, Q4H PRN, Gregg Colorado MD     lacosamide (VIMPAT) 100 mg in sodium chloride 0.9 % 100 mL intermittent infusion, 100 mg, Intravenous, BID, Karolyn Diop MD, Last Rate: 100 mL/hr at 03/09/19 2219, 100 mg at 03/10/19 0832     levothyroxine (SYNTHROID/LEVOTHROID) tablet 112 mcg, 112 mcg, Oral, QAM AC, Gregg Colorado MD, 112 mcg at 03/10/19 0630     Lidocaine (LIDOCARE) 4 % Patch 3 patch, 3 patch, Transdermal, Q24h, 3 patch at 03/08/19 0824 **AND** lidocaine patch REMOVAL, , Transdermal, Q24H **AND** lidocaine patch in PLACE, , Transdermal, Q8H, Gregg Colorado MD     lisinopril (PRINIVIL/ZESTRIL) tablet 20 mg, 20 mg, Oral, Daily, Gregg Colorado MD, 20 mg at 03/10/19 0833     magnesium sulfate 2 g in water intermittent infusion, 2 g, Intravenous, Daily PRN, Gregg Colorado MD     magnesium sulfate 4 g in 100 mL sterile water (premade), 4 g, Intravenous, Q4H PRN, Gregg Colorado MD     midazolam (VERSED) injection 2-4 mg, 2-4 mg, Intravenous, Q2H PRN, Gregg Colorado MD, 2 mg at 03/07/19 1432     naloxone (NARCAN) injection 0.1-0.4 mg, 0.1-0.4 mg, Intravenous, Q2 Min PRN, Gregg Colorado MD     ondansetron (ZOFRAN-ODT) ODT tab 4 mg, 4 mg, Oral, Q6H PRN **OR** ondansetron (ZOFRAN) injection 4 mg, 4 mg, Intravenous, Q6H PRN, Gregg Colorado MD     oxyCODONE (ROXICODONE) tablet 5-10 mg, 5-10 mg, Oral, Q3H PRN, Gregg Colorado MD, 5 mg at 03/10/19 1305     pantoprazole (PROTONIX) EC tablet 40 mg, 40 mg, Oral, Daily, 40 mg at 03/09/19 0721 **OR** pantoprazole (PROTONIX) 2 mg/mL suspension 40 mg, 40 mg, Oral or Feeding Tube, Daily, Gregg Colorado MD, 40 mg at 03/10/19 0833     polyethylene glycol (MIRALAX/GLYCOLAX) Packet 17 g, 17 g, Oral, Daily PRN, Gregg Colorado MD     potassium chloride (KLOR-CON)  "Packet 20-40 mEq, 20-40 mEq, Oral or Feeding Tube, Q2H PRN, Gregg Colorado MD, 20 mEq at 03/10/19 0635     potassium chloride ER (K-DUR/KLOR-CON M) CR tablet 20-40 mEq, 20-40 mEq, Oral, Q2H PRN, Gregg Colorado MD     QUEtiapine (SEROquel) half-tab 12.5 mg, 12.5 mg, Oral, At Bedtime, Gopi Cabrera MD     sodium chloride 0.9% infusion, , Intravenous, Continuous, Gregg Colorado MD, Last Rate: 10 mL/hr at 03/08/19 1900     valproate (DEPACON) 350 mg in sodium chloride 0.9 % 50 mL intermittent infusion, 350 mg, Intravenous, Q6H, Gopi Cabrera MD, Last Rate: 50 mL/hr at 03/10/19 1212, 350 mg at 03/10/19 1212     venlafaxine (EFFEXOR) tablet 37.5 mg, 37.5 mg, Oral, TID, Gregg Colorado MD, 37.5 mg at 03/10/19 0833    Vital Signs:  B/P: 162/76, T: 99, P: 63, R: 14    /72   Pulse 62   Temp 98.8  F (37.1  C)   Resp 14   Ht 1.6 m (5' 3\")   Wt 93.4 kg (205 lb 14.6 oz)   SpO2 96%   BMI 36.48 kg/m    General: Intubated not on sedation  HEENT: Atraumatic, normocephalic, no scleral icterus or conjunctival pallor   Cardiac: RRR  Chest: Clear to auscultation  Abdomen: Soft, non-tender, non-distended  Extremities: No LE swelling.  Skin: No rash or lesion.      Neuro:  Intubated, +bradykinesia  Opening eyes spontaneously; Follows simple commands, blinks to threat both eyes  Moves arm and leg bilateral minimally    Labs/Studies:  CBC:     Recent Labs   Lab 03/10/19  0530 03/09/19  0530 03/08/19  0459   WBC 13.0* 11.3* 12.0*   RBC 3.79* 3.70* 3.83   HGB 11.3* 11.1* 11.4*   HCT 34.9* 34.1* 35.4    255 246     Basic Metabolic Panel:   Recent Labs   Lab Test 03/10/19  0530 03/09/19  1549 03/09/19  0530 03/08/19  0459     --  138 140   POTASSIUM 3.9 3.6 3.5 3.7   CHLORIDE 97  --  98 103   CO2 38*  --  34* 31   BUN 28  --  26 26   CR 0.77  --  0.76 0.78   *  --  128* 148*   NIKKIE 8.6  --  8.0* 8.1*     Liver panel:  Recent Labs   Lab Test 03/10/19  0530 03/02/19  1449 10/24/17  0700 10/23/17  0653 " 10/21/17  1751   PROTTOTAL 6.7* 7.0  --  6.7* 7.7   ALBUMIN 2.3* 3.2* 2.3* 2.3* 2.9*   BILITOTAL 0.3 0.3  --  0.4 0.8   ALKPHOS 127 137  --  218* 258*   AST 23 21  --  38 61*   ALT 10 7  --  10 13     INR:  Recent Labs   Lab Test 03/02/19  1449 02/27/19  0540   INR 1.04 0.93      Lipid Profile:No lab results found.  A1C:   Recent Labs   Lab Test 03/02/19  1449 10/23/17  0653   A1C 6.7* 6.6*     Troponin I:   Recent Labs   Lab Test 03/02/19  1449   TROPI <0.015         Imaging:  Relevant findings as per the Impression above.

## 2019-03-10 NOTE — PROGRESS NOTES
Critical Care  Note      03/10/2019    Name: Loan Anderson MRN#: [id number]   Age: 78 year old YOB: 1940     Hsptl Day# 8  ICU DAY #6    MV DAY #6             Problem List:   Acute encephalopathy  Rib fractures  YASMIN  leukocytosis         Summary/Hospital Course:     78F with parkinsons disease who lives in a memory center now presents from [facility] after being found acutely encephalopathic and mildly agitated/combative by her  around 2 pm on day of admission.  Apparently there had been an episode earlier in the day where she had turned red and coughed 3-4 min after taking pills and family member did the heimlich maneuver.  She returned to her normal state after this but no source of asphyxiation was found.  Later in the day her  went to get the mail and when he returned to their home (~10 min) he found staff doing cpr on her.  Apparently she became unresponsive at some point and cpr was started, but on EMS arrival she did have a perfusing rhythm.     A couple days prior to admit she had a mechanical fall resulting in R hip pain.  No injury noted at that time, but today has several rib fx on CT scan (may be due to cpr?) and transverse process L1L2 fx's--suspect these may be from fall.    No overnight events.  EEG now off. Opens eyes to voice and is now following intermittent commands. Doing well on PS trials.      Assessment and plan :     Loan Anderson IS a 78 year old female admitted on 3/2/2019 for acute encephalopathy.   I have personally reviewed the daily labs, imaging studies, cultures and discussed the case with referring physician and consulting physicians.   My assessment and plan by system for this patient is as follows:    Neurology/Psychiatry:   1. Acute encephalopathy:  Seizure activtity on EEG now resolved, though probably still post-ictal.  See subclinial status epilepticus below. 3/9 was more awake, today off sedation she is agitated and thrashing,  non-purposeful and not tracking. Sedation with dexmetatomidine re-started.  2.  Subclinical status epilepticus:  May have been taking more tramadol than usual after fall earlier in the week--this would lower seizure threshold.  No further seizures on EEG now with vimpat and valproate.  Appreciate neurocrit input.  2. Analgesia:  lidoderm patches on chest for rib fx.  PO tylenol standing.  Prn oxycodone/dilaudid.  3. Sedation:  Precedex drip.  Midazolam pushes prn.  Seroquel and valproate being given with good effect, decreased seroquel dose today to 50 qhs.  4. H/o parkinsons dz:  Appreciate neuro crit input.  Will continue short acting carbo/levodopa.  Holding long-acting and patch forms for now.    Cardiovascular:    1.  Htn:  Increased lisinopril to 20, prn IV labetalol and hydralazine. bp now much better controlled with this.    Pulmonary/Ventilator Management:   1. Loss of protective airway reflexes:  Due to encephalopathy noted above.  2. Did well on PS trial, CXR shows moderate R pleural effusion, which is stable.  3. Edema: Some tongue swelling and no cuff leak today - will start steroids.    GI and Nutrition :   1.  Tube feeds  2.  PPI for PUD prophy    Renal/Fluids/Electrolytes:   1. YASMIN on CKD:  Resolved. Creat 0.78 today.  UOP adequate.  Continue to watch closely.  2.  Hypervolemia:  Improving, net -1.4L yesterday on 40 bid of lasix    Infectious Disease:   1. Procal ok <0.05 and no clear source of infection.  Cultures remain ngtd.  Stopped all abx 3/5/19. WBC slightly up today but no fevers, will continue to follow, culture if she has fever.    Endocrine:   1. Hyperglycemia: SSI  2.  Hypothyroid:  Continue home synthroid.    Hematology/Oncology:   1.  Anemia recovered to baseline (11.4 today--stable)  2.  WBC ok 12.0, pltlts ok 246.    ICU Prophylaxis:   1. DVT: Hep Subq/ mechanical  2. VAP: HOB 30 degrees, chlorhexidine rinse  3. Stress Ulcer: PPI  4. Restraints: Nonviolent soft two point restraints  required and necessary for patient safety and continued cares and good effect as patient continues to pull at necessary lines, tubes despite education and distraction. Will readdress daily.   5. Wound care  - per unit routine  6. Feeding -  tf  7. Family Update:   at bedside today.  8. Disposition - critically ill    Discussed with patient's family an Neurocrit service.         Medical History:     Past Medical History:   Diagnosis Date     Constipation 7/15/2011     Convergence insufficiency 7/15/2011     Depression 7/15/2011     Diplopia 7/18/2011     Hot flashes, menopausal 7/18/2011     Hypercholesterolemia 7/18/2011     Hypothyroid 7/15/2011     Neurogenic bladder 7/15/2011     Parkinson disease (H) 7/15/2011     REM sleep behavior disorder 7/18/2011     Sebaceous cyst 7/18/2011     Wears glasses 7/18/2011     Past Surgical History:   Procedure Laterality Date     ------------OTHER-------------  november ? 2015    lithrotripsy for renal stone     ------------OTHER-------------  november 2015    had sepsis from uti and hospitalized     BRAIN SURGERY  12 or 14 oct 2011    gene therapy enrolled study at [place]     Social History     Socioeconomic History     Marital status:      Spouse name: Not on file     Number of children: Not on file     Years of education: Not on file     Highest education level: Not on file   Occupational History     Not on file   Social Needs     Financial resource strain: Not on file     Food insecurity:     Worry: Not on file     Inability: Not on file     Transportation needs:     Medical: Not on file     Non-medical: Not on file   Tobacco Use     Smoking status: Never Smoker     Smokeless tobacco: Never Used   Substance and Sexual Activity     Alcohol use: Yes     Comment: rare     Drug use: Not on file     Sexual activity: Not on file   Lifestyle     Physical activity:     Days per week: Not on file     Minutes per session: Not on file     Stress: Not on file    Relationships     Social connections:     Talks on phone: Not on file     Gets together: Not on file     Attends Judaism service: Not on file     Active member of club or organization: Not on file     Attends meetings of clubs or organizations: Not on file     Relationship status: Not on file     Intimate partner violence:     Fear of current or ex partner: Not on file     Emotionally abused: Not on file     Physically abused: Not on file     Forced sexual activity: Not on file   Other Topics Concern     Parent/sibling w/ CABG, MI or angioplasty before 65F 55M? Not Asked   Social History Narrative    Spouse is serenity        Allergies   Allergen Reactions     Atorvastatin      Concern that it was contributing to confusion     Mirapex [Pramipexole Dihydrochloride Monohydrate]      Leg swelling     Potassium Chloride      IV infusion only. She tolerates oral potassium chloride     Requip [Ropinirole Hydrochloride]      Leg swelling     Epinephrine Palpitations              Key Medications:       acetaminophen  650 mg Oral Q6H    Or     acetaminophen  650 mg Per NG tube Q6H     aspirin  81 mg Oral At Bedtime     carbidopa-levodopa  2 tablet Oral 4x Daily     chlorhexidine  15 mL Mouth/Throat Q12H     docusate  100 mg Oral or Feeding Tube BID     furosemide  40 mg Intravenous Q12H     heparin  5,000 Units Subcutaneous Q8H     insulin aspart  1-6 Units Subcutaneous Q4H     lacosamide (VIMPAT) intermittent infusion  100 mg Intravenous BID     levothyroxine  112 mcg Oral QAM AC     lidocaine  3 patch Transdermal Q24h    And     lidocaine   Transdermal Q24H    And     lidocaine   Transdermal Q8H     lisinopril  20 mg Oral Daily     pantoprazole  40 mg Oral Daily    Or     pantoprazole  40 mg Oral or Feeding Tube Daily     valproate (DEPACON) intermittent infusion  350 mg Intravenous Q6H     venlafaxine  37.5 mg Oral TID       dexmedetomidine 0.2 mcg/kg/hr (03/10/19 0600)     IV fluid REPLACEMENT ONLY       sodium  chloride 10 mL/hr at 03/08/19 1900        Home Meds    No current facility-administered medications on file prior to encounter.   Current Outpatient Medications on File Prior to Encounter:  aspirin 81 MG tablet Take 81 mg by mouth At Bedtime @ midnight   carbidopa-levodopa (SINEMET CR)  MG CR tablet Take 1 tablet by mouth At Bedtime At 8 pm   carbidopa-levodopa (SINEMET)  MG tablet Take 2 tablets 4 times daily at 7 am, 10 am, 1 pm, and 4 pm   cholecalciferol 5000 units CAPS Take 1 capsule by mouth every morning @ 6am   docusate sodium (COLACE) 100 MG capsule Take 300 mg by mouth daily 3 x 100mg capsule @ noon   ibuprofen (ADVIL/MOTRIN) 200 MG tablet Take 400 mg by mouth 3 times daily 2 x 200mg tabs by mouth 3/day @ 6am, 12pm and 6pm and 3 x 200mg tabs by mouth every 8 hours as needed   levothyroxine (SYNTHROID/LEVOTHROID) 112 MCG tablet Take 112 mcg by mouth daily @ 6am   lisinopril (PRINIVIL/ZESTRIL) 5 MG tablet Take 5 mg by mouth daily @ 6am   mirabegron (MYRBETRIQ) 25 MG 24 hr tablet Take 25 mg by mouth daily @ 6am   Multiple Vitamins-Minerals (EYE VITAMINS) CAPS Take 1 capsule by mouth daily 1 capsule (macu-health) by mouth daily @ midnight   omeprazole (PRILOSEC) 20 MG DR capsule Take 20 mg by mouth daily @ 6am   polyethylene glycol (MIRALAX) powder Take 17 g by mouth daily as needed @0600   rotigotine (NEUPRO) 4 MG/24HR 24 hr patch Apply patch daily @ bedtime/8 pm (Patient taking differently: Place 1 patch onto the skin daily @ bedtime/8 pm)   tamsulosin (FLOMAX) 0.4 MG capsule Take 0.4 mg by mouth daily @ 6pm   traMADol (ULTRAM) 50 MG tablet Take 1 tablet (50 mg) by mouth every 6 hours as needed for severe pain   trospium (SANCTURA XR) 60 MG CP24 24 hr capsule Take 60 mg by mouth every morning (before breakfast) @ 6am   venlafaxine (EFFEXOR) 100 MG tablet Take 100 mg by mouth daily @ 6am              Physical Examination:   Temp:  [98.1  F (36.7  C)-98.4  F (36.9  C)] 98.4  F (36.9  C)  Pulse:   [50-71] 57  Heart Rate:  [49-73] 57  Resp:  [13-23] 18  BP: (104-144)/(47-73) 144/70  FiO2 (%):  [30 %] 30 %  SpO2:  [94 %-98 %] 98 %  No intake or output data in the 24 hours ending 03/02/19 1732  Wt Readings from Last 4 Encounters:   03/10/19 93.4 kg (205 lb 14.6 oz)   02/27/19 86.2 kg (190 lb)   02/11/19 88 kg (194 lb)   11/14/18 86.2 kg (190 lb)     BP - Mean:  [] 108  Ventilation Mode: CMV/AC  (Continuous Mandatory Ventilation/ Assist Control)  FiO2 (%): 30 %  Rate Set (breaths/minute): 14 breaths/min  Tidal Volume Set (mL): 400 mL  PEEP (cm H2O): 5 cmH2O  Pressure Support (cm H2O): 5 cmH2O  Oxygen Concentration (%): 30 %  Resp: 18    No lab results found in last 7 days.      Intake/Output Summary (Last 24 hours) at 3/9/2019 1220  Last data filed at 3/9/2019 0800  Gross per 24 hour   Intake 2156.07 ml   Output 1935 ml   Net 221.07 ml             GEN: no acute distress, sedated   HEENT: head ncat, sclera anicteric, OP patent, trachea midline   PULM: unlabored synchronous with vent, clear anteriorly    CV/COR: RRR S1S2 no gallop,  No rub, no murmur  ABD: soft nontender, hypoactive bowel sounds, no mass  EXT:  Minimal Edema x4,  Warm x4  NEURO: sedated.  Moves all 4 when sedation weaned.  L pupil slightly larger than R but both reactive. Opening eyes to voice now, but still doesn't follow.  SKIN: no obvious rash  LINES: clean, dry intact         Data:   All data and imaging reviewed     ROUTINE ICU LABS (Last four results)  CMP  Recent Labs   Lab 03/10/19  0530 03/09/19  1549 03/09/19  0530 03/08/19  0459 03/07/19  0500     --  138 140 140   POTASSIUM 3.9 3.6 3.5 3.7 4.0   CHLORIDE 97  --  98 103 107   CO2 38*  --  34* 31 26   ANIONGAP 3  --  6 6 7   *  --  128* 148* 152*   BUN 28  --  26 26 22   CR 0.77  --  0.76 0.78 0.76   GFRESTIMATED 74  --  76 74 76   GFRESTBLACK 86  --  88 86 88   NIKKIE 8.6  --  8.0* 8.1* 7.7*   MAG 2.3  --  2.1 2.0 2.0   PHOS 3.1  --  2.9 2.4* 2.5   PROTTOTAL 6.7*  --   --    --   --    ALBUMIN 2.3*  --   --   --   --    BILITOTAL 0.3  --   --   --   --    ALKPHOS 127  --   --   --   --    AST 23  --   --   --   --    ALT 10  --   --   --   --      CBC  Recent Labs   Lab 03/10/19  0530 03/09/19  0530 03/08/19  0459 03/07/19  0500   WBC 13.0* 11.3* 12.0* 11.0   RBC 3.79* 3.70* 3.83 3.69*   HGB 11.3* 11.1* 11.4* 10.9*   HCT 34.9* 34.1* 35.4 34.0*   MCV 92 92 92 92   MCH 29.8 30.0 29.8 29.5   MCHC 32.4 32.6 32.2 32.1   RDW 15.1* 15.2* 15.4* 15.3*    255 246 231     INR  No lab results found in last 7 days.  Arterial Blood GasNo lab results found in last 7 days.    All cultures:  No results for input(s): CULT in the last 168 hours.  Recent Results (from the past 24 hour(s))   XR Chest Port 1 View    Narrative    CHEST PORTABLE ONE VIEW     3/2/2019 3:00 PM     HISTORY: Tube placement.    COMPARISON: 2/27/2019.      Impression    IMPRESSION: Endotracheal tube in place in mid trachea. New area of  atelectasis or infiltrate in the right lung base. Left lung is clear.  Heart size is upper normal. Pulmonary vasculature does not appear  distended.      KELLIE BANKS MD   CT Head w/o Contrast    Narrative    CT SCAN OF THE HEAD WITHOUT CONTRAST   3/2/2019 3:10 PM     HISTORY: Altered level of consciousness (LOC), unexplained.    TECHNIQUE:  Axial images of the head and coronal reformations without  IV contrast material.  Radiation dose for this scan was reduced using  automated exposure control, adjustment of the mA and/or kV according  to patient size, or iterative reconstruction technique.    COMPARISON: None.    FINDINGS: There is some moderate cerebral atrophy. There is an old  infarct in the left basal ganglia region. There are some patchy white  matter changes in both hemispheres without mass effect. The patient is  intubated. The visualized paranasal sinuses and mastoid air cells are  clear. There is no evidence for intracranial hemorrhage, acute  infarct, mass effect, or skull  fracture. Bilateral angi hole  procedures are noted.      Impression    IMPRESSION: Chronic changes. No evidence for intracranial hemorrhage  or any acute process.       SHAGUFTA MOORE MD   CT Head Neck Angio w/o & w Contrast    Narrative    CTA HEAD NECK WITH CONTRAST 3/2/2019 3:18 PM     HISTORY: Headache, sudden, carotid/vertebral dissection suspected/    TECHNIQUE: Multiplanar multisequence images were obtained through the  neck without and with intravenous contrast. 70 mL of Isovue-370 was  given. Multiplanar reconstructions were performed. 3-D reconstructions  off a remote workstation for CT angiography were also acquired.  Carotid stenoses were evaluated by comparing the caliber of the  proximal internal carotid artery to the caliber of the distal internal  carotid artery. Radiation dose for this scan was reduced using  automated exposure control, adjustment of the mA and/or kV according  to patient size, or iterative reconstruction technique.    FINDINGS:    Brachiocephalic vessels: Normal.    Right carotid system: Normal.    Left carotid system: Trace amount of plaque. No stenosis or  dissection.    Right vertebral artery: Normal.    Left vertebral artery: Normal.    [place]: Normal.    Other findings: The patient is intubated. The tip of the ET tube is  above the sánchez. Trachea has slight deviation to the right. There is  a moderate size right pleural effusion. Degenerative changes are seen  in the spine. There is some minimal degenerative spondylolisthesis of  C3 and C4.      Impression    IMPRESSION:  1. Negative CT angiography head and neck.  2. Moderate size right pleural effusion.     SHAGUFTA MOORE MD   CT Chest (PE) Abdomen Pelvis w Contrast    Narrative    CT CHEST PE, ABDOMEN PELVIS WITH CONTRAST   3/2/2019 3:26 PM     HISTORY: Trauma. Altered mental status. Hypotension.    TECHNIQUE: 75 mL Isovue-370 IV were administered. After contrast  administration, volumetric helical sections were  acquired from the  thoracic inlet to the ischial tuberosities. Pulmonary embolism  protocol was performed through the chest. Coronal images were also  reconstructed. Radiation dose for this scan was reduced using  automated exposure control, adjustment of the mA and/or kV according  to patient size, or iterative reconstruction technique.    COMPARISON: CT of the abdomen and pelvis performed 10/24/2017.    FINDINGS:    Chest: No evidence for pulmonary embolism or thoracic aortic  dissection. Atherosclerotic calcification of the thoracic aorta.  Endotracheal tube is in place, with tip approximately 2.5 cm above the  sánchez. Small to moderate right pleural effusion, with mild associated  compressive atelectasis at the right lung base posteriorly. No  pericardial or left pleural effusion. There is mild atelectasis at the  left lung base posteriorly. Mild patchy mosaic attenuation is noted in  the lungs bilaterally. There are displaced acute appearing fractures  involving the right 10th and 11th ribs posteriorly. Mildly displaced  fractures of the right second rib in two places laterally and  anteriorly. There are also mildly displaced fractures of the right  third through sixth ribs laterally. Nondisplaced fracture of the left  second rib laterally.    Abdomen and Pelvis: No bowel obstruction. There is a moderate amount  of stool in the rectum. No free fluid in the pelvis. No convincing  evidence for colitis or diverticulitis. No intra-abdominal fluid  collections to suggest hematoma. No free intraperitoneal air.  Scattered cortical scarring in the left kidney is not significantly  changed, given differences in technique. The liver, gallbladder,  spleen, adrenal glands, pancreas, and kidneys are otherwise  unremarkable. No hydronephrosis. No evidence for solid organ injury.  Mild atherosclerotic aortoiliac calcification. The uterus is not seen,  and is likely surgically absent. Degenerative changes are noted in  the  thoracolumbar spine. Displaced acute appearing fractures are noted  involving the L1 and L2 transverse processes on the right.      Impression    IMPRESSION:   1. Displaced acute appearing fractures are noted involving the right  10th and 11th ribs posteriorly, as well as the right 2nd through 6th  ribs laterally. Nondisplaced fracture of the left second rib laterally  may also be acute. There are also acute-appearing displaced fractures  of the L1 and L2 transverse processes on the right.  2. No evidence for pulmonary embolism.  3. Small to moderate right pleural effusion.  4. Mild patchy mosaic attenuation in the lungs bilaterally may be  related to air trapping.  5. Moderate amount of stool in the rectum.      NUVIA ISLAS MD     Labs (personally reviewed/interpreted):  See a/p.    Billing: This patient is critically ill: Yes. Total critical care time today 35 min.

## 2019-03-10 NOTE — PROGRESS NOTES
Date of Admission: 3/2/2019     Date of Intubation (most recent): 3/2/19     Reason for Mechanical Ventilation: airway protection     Number of Days on Mechanical Ventilation: 9     Met Criteria for Pressure Support Trial:Yes     Length of Pressure Support Trial: 2 hours.      Reason for Stopping Pressure Support Trial: End of designated trial per MD     significant: pt on full vent support overnight no issues.      ABG Results: No lab results found in last 7 days.    Current Vent Settings: Ventilation Mode: CMV/AC  (Continuous Mandatory Ventilation/ Assist Control)  FiO2 (%): 30 %  Rate Set (breaths/minute): 14 breaths/min  Tidal Volume Set (mL): 400 mL  PEEP (cm H2O): 5 cmH2O  Pressure Support (cm H2O): 5 cmH2O  Oxygen Concentration (%): 30 %  Resp: 15        Plan: Will continue full ventilatory support for now and assess for weaning readiness daily

## 2019-03-10 NOTE — PROGRESS NOTES
[facility] ICU RESPIRATORY NOTE        Date of Admission: 3/2/2019    Date of Intubation (most recent): 3/2/19    Reason for Mechanical Ventilation: airway protection    Number of Days on Mechanical Ventilation: 8    Met Criteria for Pressure Support Trial:Yes    Length of Pressure Support Trial: 2 hours.     Reason for Stopping Pressure Support Trial: End of designated trial per MD      Significant Events Today: Patient started her PS trial at 10/5 at 1010 am. She was then put down to 5/5 at 11:27. She went for another hour with those settings. Her trial ended per MD order    ABG Results: No lab results found in last 7 days.    Current Vent Settings: Ventilation Mode: CMV/AC  (Continuous Mandatory Ventilation/ Assist Control)  FiO2 (%): 30 %  Rate Set (breaths/minute): 14 breaths/min  Tidal Volume Set (mL): 400 mL  PEEP (cm H2O): 5 cmH2O  Pressure Support (cm H2O): 5 cmH2O  Oxygen Concentration (%): 30 %  Resp: 15      Plan: Will continue full ventilatory support for now and assess for weaning readiness daily.         Lucina Castro, RRT  3/9/2019 6:04 PM

## 2019-03-10 NOTE — PROGRESS NOTES
[facility] ICU RESPIRATORY NOTE  Date of Admission: 3/2/2019  Date of Intubation (most recent): 3/2/2019  Reason for Mechanical Ventilation: Airway protection  Number of Days on Mechanical Ventilation: 9  Met Criteria for Pressure Support Trial: No  Reason for No Pressure Support Trial: No per MD    Ventilation Mode: CMV/AC  (Continuous Mandatory Ventilation/ Assist Control)  FiO2 (%): 30 %  Rate Set (breaths/minute): 14 breaths/min  Tidal Volume Set (mL): 400 mL  PEEP (cm H2O): 5 cmH2O  Pressure Support (cm H2O): 5 cmH2O  Oxygen Concentration (%): 30 %  Resp: 12      Skin Assessment: skin intact.     Plan:  Will continue full ventilatory support for now and assess for weaning readiness daily.         Lucina Castro, RRT  3/10/2019 5:15 PM

## 2019-03-10 NOTE — PROGRESS NOTES
Pt weaned for 2 hours today, first 10/5 then 5/5. Following family discussion with Dr. Perlman, potential extubation goal was moved to 3/10/19 2/2 optimization of successful extubation.   LS coarse with occ exp wz exacerbated by agitation and unresponsive to nebs. Dr. Perlman consulted and ordered to give 2030 dose of lasix early. Dex gtt was restarted and triturated to effect.Pt diuresed a total of 3200 by 1900. K was 3.5 and replaced via OG per protocol.  Cardiac: SR with BBB, VSS. CMS adequate with dependant generalized +2 edema ( arms and flanks).  Neuro: pt will intermittently blink eyes to command, tracking is better today with more consistent eye contact noted. Pt moves all extremities purposefully and with moderate to full strength. Turning causes periods of restless agitation occuring more consistently as the evening approached.   GI: abd xray = [place] feed is post pyloric, guide wire dced, flushed. OG with continuous  feeds. BM x 2 today.   : maat, diuresis as above.  Skin: oral area ( lips and tongue) is ulcerated and swollen. ecchymosis right flank and bilateral forearms.   Famlily: daughters and  at bedside throughout the day. Updated of all current findings and assessments. csccrn

## 2019-03-11 NOTE — PROGRESS NOTES
Critical Care  Note      03/11/2019    Name: Loan Anderson MRN#: [id number]   Age: 78 year old YOB: 1940     Hsptl Day# 9  ICU DAY #9    MV DAY #9             Problem List:   Acute encephalopathy  Rib fractures  YASMIN  leukocytosis         Summary/Hospital Course:     78F with parkinsons disease who lives in a memory center now presents from [facility] after being found acutely encephalopathic and mildly agitated/combative by her  around 2 pm on day of admission.  Apparently there had been an episode earlier in the day where she had turned red and coughed 3-4 min after taking pills and family member did the heimlich maneuver.  She returned to her normal state after this but no source of asphyxiation was found.  Later in the day her  went to get the mail and when he returned to their home (~10 min) he found staff doing cpr on her.  Apparently she became unresponsive at some point and cpr was started, but on EMS arrival she did have a perfusing rhythm.     A couple days prior to admit she had a mechanical fall resulting in R hip pain.  No injury noted at that time, but today has several rib fx on CT scan (may be due to cpr?) and transverse process L1L2 fx's--suspect these may be from fall.    No overnight events.  Does well on PS trials but still agitated when sedation weaned.  Unable to obtain history directly due to intubated/sedated.      Assessment and plan :     Loan Anderson IS a 78 year old female admitted on 3/2/2019 for acute encephalopathy.   I have personally reviewed the daily labs, imaging studies, cultures and discussed the case with referring physician and consulting physicians.   My assessment and plan by system for this patient is as follows:    Neurology/Psychiatry:   1. Acute encephalopathy:  Seizure activtity on EEG now resolved.  See subclinial status epilepticus below. Briefly more responsive on 3/9, but overall has been either very sedated or agitated.  Sedation with dexmetatomidine re-started.  2.  Subclinical status epilepticus:  Was taking more tramadol than usual after fall week before admission--this would lower seizure threshold.  No further seizures on EEG now with vimpat and valproate.  Appreciate neurocrit input.  2. Analgesia:  lidoderm patches on chest for rib fx.  PO tylenol standing.  Prn oxycodone/dilaudid.  3. Sedation:  Precedex drip.  Midazolam pushes prn.  Seroquel and valproate being given with good effect, decreased seroquel dose to 12.5 at bedtime.  4. H/o parkinsons dz:  Appreciate neuro crit input.  Will continue short acting carbo/levodopa.  Holding long-acting and patch forms for now.    Cardiovascular:    1.  Htn:  Transitioned lisinopril to amlodipine in light of tongue/lip swelling yesterday.  Apparently has not been on lisinopril for long.  Uncertain whether this is true angioedema or not, but will add lisinopril to allergy list.  Discussed avoidance of ACE-I's with family.    Pulmonary/Ventilator Management:   1. Loss of protective airway reflexes:  Due to encephalopathy noted above.  2. Did well on PS trial, CXR shows moderate R pleural effusion, which is stable.  3. Edema: Some tongue swelling on 3/9-10.  Improved 3/11 on steroids.  Continue steroids for now.    GI and Nutrition :   1.  Tube feeds  2.  PPI for PUD prophy    Renal/Fluids/Electrolytes:   1. YASMIN on CKD:  Resolved. Creat 0.71 today.  UOP adequate.  Continue to watch closely.  2.  Hypervolemia:  Improving, diuresing well with 40 IV lasix bid--continue this today.  Lytes ok.    Infectious Disease:   1. Procal ok <0.05 and no clear source of infection.  Cultures remain ngtd.  Stopped all abx 3/5/19. WBC slightly up today but no fevers, will continue to follow, culture if she has fever.    Endocrine:   1. Hyperglycemia: likely due to steroids. SSI  2.  Hypothyroid:  Continue home synthroid.    Hematology/Oncology:   1.  Anemia recovered to baseline (11.7 today--stable)  2.   WBC mildly elevated to 13.8 in setting of steroids, pltlts ok 308.    ICU Prophylaxis:   1. DVT: Hep Subq/ mechanical  2. VAP: HOB 30 degrees, chlorhexidine rinse  3. Stress Ulcer: PPI  4. Restraints: Nonviolent soft two point restraints required and necessary for patient safety and continued cares and good effect as patient continues to pull at necessary lines, tubes despite education and distraction. Will readdress daily.   5. Wound care  - per unit routine  6. Feeding -  tf  7. Family Update:  /daughter at bedside today.  8. Disposition - critically ill           Medical History:     Past Medical History:   Diagnosis Date     Constipation 7/15/2011     Convergence insufficiency 7/15/2011     Depression 7/15/2011     Diplopia 7/18/2011     Hot flashes, menopausal 7/18/2011     Hypercholesterolemia 7/18/2011     Hypothyroid 7/15/2011     Neurogenic bladder 7/15/2011     Parkinson disease (H) 7/15/2011     REM sleep behavior disorder 7/18/2011     Sebaceous cyst 7/18/2011     Wears glasses 7/18/2011     Past Surgical History:   Procedure Laterality Date     ------------OTHER-------------  november ? 2015    lithrotripsy for renal stone     ------------OTHER-------------  november 2015    had sepsis from uti and hospitalized     BRAIN SURGERY  12 or 14 oct 2011    gene therapy enrolled study at [facility]     Social History     Socioeconomic History     Marital status:      Spouse name: Not on file     Number of children: Not on file     Years of education: Not on file     Highest education level: Not on file   Occupational History     Not on file   Social Needs     Financial resource strain: Not on file     Food insecurity:     Worry: Not on file     Inability: Not on file     Transportation needs:     Medical: Not on file     Non-medical: Not on file   Tobacco Use     Smoking status: Never Smoker     Smokeless tobacco: Never Used   Substance and Sexual Activity     Alcohol use: Yes     Comment: rare      Drug use: Not on file     Sexual activity: Not on file   Lifestyle     Physical activity:     Days per week: Not on file     Minutes per session: Not on file     Stress: Not on file   Relationships     Social connections:     Talks on phone: Not on file     Gets together: Not on file     Attends Jainism service: Not on file     Active member of club or organization: Not on file     Attends meetings of clubs or organizations: Not on file     Relationship status: Not on file     Intimate partner violence:     Fear of current or ex partner: Not on file     Emotionally abused: Not on file     Physically abused: Not on file     Forced sexual activity: Not on file   Other Topics Concern     Parent/sibling w/ CABG, MI or angioplasty before 65F 55M? Not Asked   Social History Narrative    Spouse is serenity        Allergies   Allergen Reactions     Atorvastatin      Concern that it was contributing to confusion     Mirapex [Pramipexole Dihydrochloride Monohydrate]      Leg swelling     Potassium Chloride      IV infusion only. She tolerates oral potassium chloride     Requip [Ropinirole Hydrochloride]      Leg swelling     Epinephrine Palpitations              Key Medications:       acetaminophen  650 mg Oral Q6H    Or     acetaminophen  650 mg Per NG tube Q6H     amLODIPine  5 mg Oral Daily     aspirin  81 mg Oral At Bedtime     carbidopa-levodopa  2 tablet Oral 4x Daily     chlorhexidine  15 mL Mouth/Throat Q12H     dexamethasone  4 mg Intravenous Q6H     docusate  100 mg Oral or Feeding Tube BID     furosemide  20 mg Intravenous Q12H     heparin  5,000 Units Subcutaneous Q8H     insulin aspart  1-12 Units Subcutaneous Q4H     lacosamide (VIMPAT) intermittent infusion  100 mg Intravenous BID     levothyroxine  112 mcg Oral QAM AC     lidocaine  3 patch Transdermal Q24h    And     lidocaine   Transdermal Q24H    And     lidocaine   Transdermal Q8H     pantoprazole  40 mg Oral Daily    Or     pantoprazole  40 mg Oral or  Feeding Tube Daily     QUEtiapine  12.5 mg Oral At Bedtime     valproate (DEPACON) intermittent infusion  350 mg Intravenous Q6H     venlafaxine  37.5 mg Oral TID       dexmedetomidine 0.3 mcg/kg/hr (03/11/19 0915)     IV fluid REPLACEMENT ONLY       sodium chloride 10 mL/hr at 03/11/19 0439        Home Meds    No current facility-administered medications on file prior to encounter.   Current Outpatient Medications on File Prior to Encounter:  aspirin 81 MG tablet Take 81 mg by mouth At Bedtime @ midnight   carbidopa-levodopa (SINEMET CR)  MG CR tablet Take 1 tablet by mouth At Bedtime At 8 pm   carbidopa-levodopa (SINEMET)  MG tablet Take 2 tablets 4 times daily at 7 am, 10 am, 1 pm, and 4 pm   cholecalciferol 5000 units CAPS Take 1 capsule by mouth every morning @ 6am   docusate sodium (COLACE) 100 MG capsule Take 300 mg by mouth daily 3 x 100mg capsule @ noon   ibuprofen (ADVIL/MOTRIN) 200 MG tablet Take 400 mg by mouth 3 times daily 2 x 200mg tabs by mouth 3/day @ 6am, 12pm and 6pm and 3 x 200mg tabs by mouth every 8 hours as needed   levothyroxine (SYNTHROID/LEVOTHROID) 112 MCG tablet Take 112 mcg by mouth daily @ 6am   lisinopril (PRINIVIL/ZESTRIL) 5 MG tablet Take 5 mg by mouth daily @ 6am   mirabegron (MYRBETRIQ) 25 MG 24 hr tablet Take 25 mg by mouth daily @ 6am   Multiple Vitamins-Minerals (EYE VITAMINS) CAPS Take 1 capsule by mouth daily 1 capsule ([employer]) by mouth daily @ midnight   omeprazole (PRILOSEC) 20 MG DR capsule Take 20 mg by mouth daily @ 6am   polyethylene glycol (MIRALAX) powder Take 17 g by mouth daily as needed @0600   rotigotine (NEUPRO) 4 MG/24HR 24 hr patch Apply patch daily @ bedtime/8 pm (Patient taking differently: Place 1 patch onto the skin daily @ bedtime/8 pm)   tamsulosin (FLOMAX) 0.4 MG capsule Take 0.4 mg by mouth daily @ 6pm   traMADol (ULTRAM) 50 MG tablet Take 1 tablet (50 mg) by mouth every 6 hours as needed for severe pain   trospium (SANCTURA XR) 60 MG  CP24 24 hr capsule Take 60 mg by mouth every morning (before breakfast) @ 6am   venlafaxine (EFFEXOR) 100 MG tablet Take 100 mg by mouth daily @ 6am              Physical Examination:   Temp:  [92.3  F (33.5  C)-99  F (37.2  C)] 97.5  F (36.4  C)  Pulse:  [48-73] 62  Heart Rate:  [51-70] 62  Resp:  [9-20] 16  BP: (112-178)/(56-87) 130/61  FiO2 (%):  [30 %] 30 %  SpO2:  [93 %-100 %] 93 %  No intake or output data in the 24 hours ending 03/02/19 1732  Wt Readings from Last 4 Encounters:   03/11/19 91 kg (200 lb 9.9 oz)   02/27/19 86.2 kg (190 lb)   02/11/19 88 kg (194 lb)   11/14/18 86.2 kg (190 lb)     BP - Mean:  [] 91  Ventilation Mode: CMV/AC  (Continuous Mandatory Ventilation/ Assist Control)  FiO2 (%): 30 %  Rate Set (breaths/minute): 14 breaths/min  Tidal Volume Set (mL): 400 mL  PEEP (cm H2O): 5 cmH2O  Oxygen Concentration (%): 30 %  Resp: 16    No lab results found in last 7 days.      Intake/Output Summary (Last 24 hours) at 3/11/2019 1240  Last data filed at 3/11/2019 1200  Gross per 24 hour   Intake 2779.33 ml   Output 3080 ml   Net -300.67 ml           GEN: no acute distress, sedated   HEENT: head ncat, sclera anicteric, OP patent, trachea midline   PULM: unlabored synchronous with vent, clear anteriorly    CV/COR: RRR S1S2 no gallop,  No rub, no murmur  ABD: soft nontender, hypoactive bowel sounds, no mass  EXT:  Minimal Edema x4,  Warm x4  NEURO: sedated.  Moves all 4 when sedation weaned.  L pupil slightly larger than R but both reactive. Opening eyes to voice now, but still doesn't follow.  SKIN: no obvious rash  LINES: clean, dry intact         Data:   All data and imaging reviewed     ROUTINE ICU LABS (Last four results)  CMP  Recent Labs   Lab 03/11/19  0505 03/10/19  0530 03/09/19  1549 03/09/19 0530 03/08/19  0459    138  --  138 140   POTASSIUM 3.8 3.9 3.6 3.5 3.7   CHLORIDE 96 97  --  98 103   CO2 32 38*  --  34* 31   ANIONGAP 7 3  --  6 6   * 153*  --  128* 148*   BUN 31*  28  --  26 26   CR 0.71 0.77  --  0.76 0.78   GFRESTIMATED 82 74  --  76 74   GFRESTBLACK >90 86  --  88 86   NIKKIE 8.8 8.6  --  8.0* 8.1*   MAG 2.2 2.3  --  2.1 2.0   PHOS 2.8 3.1  --  2.9 2.4*   PROTTOTAL 7.0 6.7*  --   --   --    ALBUMIN 2.3* 2.3*  --   --   --    BILITOTAL 0.4 0.3  --   --   --    ALKPHOS 138 127  --   --   --    AST 21 23  --   --   --    ALT 13 10  --   --   --      CBC  Recent Labs   Lab 03/11/19  0505 03/10/19  0530 03/09/19  0530 03/08/19  0459   WBC 13.8* 13.0* 11.3* 12.0*   RBC 3.92 3.79* 3.70* 3.83   HGB 11.7 11.3* 11.1* 11.4*   HCT 35.6 34.9* 34.1* 35.4   MCV 91 92 92 92   MCH 29.8 29.8 30.0 29.8   MCHC 32.9 32.4 32.6 32.2   RDW 14.9 15.1* 15.2* 15.4*    301 255 246     INR  No lab results found in last 7 days.  Arterial Blood GasNo lab results found in last 7 days.    All cultures:  No results for input(s): CULT in the last 168 hours.  Recent Results (from the past 24 hour(s))   XR Chest Port 1 View    Narrative    CHEST PORTABLE ONE VIEW     3/2/2019 3:00 PM     HISTORY: Tube placement.    COMPARISON: 2/27/2019.      Impression    IMPRESSION: Endotracheal tube in place in mid trachea. New area of  atelectasis or infiltrate in the right lung base. Left lung is clear.  Heart size is upper normal. Pulmonary vasculature does not appear  distended.      KELLIE BANKS MD   CT Head w/o Contrast    Narrative    CT SCAN OF THE HEAD WITHOUT CONTRAST   3/2/2019 3:10 PM     HISTORY: Altered level of consciousness (LOC), unexplained.    TECHNIQUE:  Axial images of the head and coronal reformations without  IV contrast material.  Radiation dose for this scan was reduced using  automated exposure control, adjustment of the mA and/or kV according  to patient size, or iterative reconstruction technique.    COMPARISON: None.    FINDINGS: There is some moderate cerebral atrophy. There is an old  infarct in the left basal ganglia region. There are some patchy white  matter changes in both hemispheres  without mass effect. The patient is  intubated. The visualized paranasal sinuses and mastoid air cells are  clear. There is no evidence for intracranial hemorrhage, acute  infarct, mass effect, or skull fracture. Bilateral angi hole  procedures are noted.      Impression    IMPRESSION: Chronic changes. No evidence for intracranial hemorrhage  or any acute process.       SHAGUFTA MOORE MD   CT Head Neck Angio w/o & w Contrast    Narrative    CTA HEAD NECK WITH CONTRAST 3/2/2019 3:18 PM     HISTORY: Headache, sudden, carotid/vertebral dissection suspected/    TECHNIQUE: Multiplanar multisequence images were obtained through the  neck without and with intravenous contrast. 70 mL of Isovue-370 was  given. Multiplanar reconstructions were performed. 3-D reconstructions  off a remote workstation for CT angiography were also acquired.  Carotid stenoses were evaluated by comparing the caliber of the  proximal internal carotid artery to the caliber of the distal internal  carotid artery. Radiation dose for this scan was reduced using  automated exposure control, adjustment of the mA and/or kV according  to patient size, or iterative reconstruction technique.    FINDINGS:    Brachiocephalic vessels: Normal.    Right carotid system: Normal.    Left carotid system: Trace amount of plaque. No stenosis or  dissection.    Right vertebral artery: Normal.    Left vertebral artery: Normal.    [place]: Normal.    Other findings: The patient is intubated. The tip of the ET tube is  above the sánchez. Trachea has slight deviation to the right. There is  a moderate size right pleural effusion. Degenerative changes are seen  in the spine. There is some minimal degenerative spondylolisthesis of  C3 and C4.      Impression    IMPRESSION:  1. Negative CT angiography head and neck.  2. Moderate size right pleural effusion.     SHAGUFTA MOORE MD   CT Chest (PE) Abdomen Pelvis w Contrast    Narrative    CT CHEST PE, ABDOMEN PELVIS WITH  CONTRAST   3/2/2019 3:26 PM     HISTORY: Trauma. Altered mental status. Hypotension.    TECHNIQUE: 75 mL Isovue-370 IV were administered. After contrast  administration, volumetric helical sections were acquired from the  thoracic inlet to the ischial tuberosities. Pulmonary embolism  protocol was performed through the chest. Coronal images were also  reconstructed. Radiation dose for this scan was reduced using  automated exposure control, adjustment of the mA and/or kV according  to patient size, or iterative reconstruction technique.    COMPARISON: CT of the abdomen and pelvis performed 10/24/2017.    FINDINGS:    Chest: No evidence for pulmonary embolism or thoracic aortic  dissection. Atherosclerotic calcification of the thoracic aorta.  Endotracheal tube is in place, with tip approximately 2.5 cm above the  sánchez. Small to moderate right pleural effusion, with mild associated  compressive atelectasis at the right lung base posteriorly. No  pericardial or left pleural effusion. There is mild atelectasis at the  left lung base posteriorly. Mild patchy mosaic attenuation is noted in  the lungs bilaterally. There are displaced acute appearing fractures  involving the right 10th and 11th ribs posteriorly. Mildly displaced  fractures of the right second rib in two places laterally and  anteriorly. There are also mildly displaced fractures of the right  third through sixth ribs laterally. Nondisplaced fracture of the left  second rib laterally.    Abdomen and Pelvis: No bowel obstruction. There is a moderate amount  of stool in the rectum. No free fluid in the pelvis. No convincing  evidence for colitis or diverticulitis. No intra-abdominal fluid  collections to suggest hematoma. No free intraperitoneal air.  Scattered cortical scarring in the left kidney is not significantly  changed, given differences in technique. The liver, gallbladder,  spleen, adrenal glands, pancreas, and kidneys are otherwise  unremarkable.  No hydronephrosis. No evidence for solid organ injury.  Mild atherosclerotic aortoiliac calcification. The uterus is not seen,  and is likely surgically absent. Degenerative changes are noted in the  thoracolumbar spine. Displaced acute appearing fractures are noted  involving the L1 and L2 transverse processes on the right.      Impression    IMPRESSION:   1. Displaced acute appearing fractures are noted involving the right  10th and 11th ribs posteriorly, as well as the right 2nd through 6th  ribs laterally. Nondisplaced fracture of the left second rib laterally  may also be acute. There are also acute-appearing displaced fractures  of the L1 and L2 transverse processes on the right.  2. No evidence for pulmonary embolism.  3. Small to moderate right pleural effusion.  4. Mild patchy mosaic attenuation in the lungs bilaterally may be  related to air trapping.  5. Moderate amount of stool in the rectum.      NUVIA ISLAS MD     Labs (personally reviewed/interpreted):  See a/p.    Billing: This patient is critically ill: Yes. Total critical care time today 35 min.

## 2019-03-11 NOTE — PROGRESS NOTES
SPIRITUAL HEALTH SERVICES  Spiritual Assessment Progress Note  [facility] ICU   visited with pt's .   reviewed the plan for pt to have an MRI and the hope is to extubated tomorrow.   is guardedly hopeful that all will go well.      Pt's  will be visiting pt later today.      Family appears to be coping well and family is very supportive.     listened and provided emotional support.      SH will continue to follow.                                                                                                                                            Kayla Mcmillan M.Div., [facility]  Staff    Pager [phone]

## 2019-03-11 NOTE — PROGRESS NOTES
Date of Admission: 3/2/2019  Date of Intubation (most recent): 3/2/2019  Reason for Mechanical Ventilation: Airway protection  Number of Days on Mechanical Ventilation: 9  Met Criteria for Pressure Support Trial: No  Reason for No Pressure Support Trial: No per MD     Ventilation Mode: CMV/AC  (Continuous Mandatory Ventilation/ Assist Control)  FiO2 (%): 30 %  Rate Set (breaths/minute): 14 breaths/min  Tidal Volume Set (mL): 400 mL  PEEP (cm H2O): 5 cmH2O  Pressure Support (cm H2O): 5 cmH2O  Oxygen Concentration (%): 30 %  Resp: 12     No lab results found in last 7 days.       Skin Assessment: skin intact.      Plan:  Will continue full ventilatory support for now and assess for weaning readiness daily.        Paulob SILVIA Ricci, RRT

## 2019-03-11 NOTE — PROGRESS NOTES
Intensivist addendum:  Pt is calm, alert, doing well on sbt and now has resolution of facial swelling and has regained cuff leak.  Overall seems likely as optimal as possible for extubation.  I held a brief family meeting with her  and daugthers and we discussed what her goals of care would be if things did not go well following extubation.  They are agreeable with proceeding with extubation, but she will be DNR/DNI following this.  Plan to pivot to comfort measures only if things do not go well following extubation.

## 2019-03-11 NOTE — PROGRESS NOTES
Vascular Neurology Progress Note    ____________________________________________________________    Admission Summary:  Loan Anderson is a 78 year old female admitted for AMS on 3/2/2019. She was found to be coughing and choking on something and lost consciousness. She recovered after 30 seconds or so. She then had similar episode later and became unresponsive. She was seen by RN at the time at the facility and found to have no pulse and CPR performed. She had ROSC. She was agitated in ER and required intubation. cEEG showed left frontocentral focus seizure. It was treated after LAC addition.    Last 24 hours:      Continues on precedex at 0.3. No significant improvement in mental status. Tongue swelling improved per family. When Precedex lowered patient has lots of coughing and pulling at all restraints. VPA level today (3/11/19) is 55, free level pending.    Impression:    Encephalopathy   Seizure, probably provoked by Tramadol usage along with history of previous bilateral frontal angi holes  Respiratory failure s/p intubated  PD  Rem sleep behavior disorder    Recommendations:     - Continue lacosamide 100mg q12 hrs, consider changing depakote or decreasing to see if any effect on mental status  - Continue seroquel, dexamethasone for agitation  - Resume home long acting Sinemet once extubated      Will follow    Zunilda Torrez PA-C  Neurology  03/11/2019 1:59 PM  Pager: [phone]      ____________________________________________________________________        Medications:      Current Facility-Administered Medications:      acetaminophen (TYLENOL) tablet 650 mg, 650 mg, Oral, Q6H, 650 mg at 03/11/19 4172 **OR** acetaminophen (TYLENOL) solution 650 mg, 650 mg, Per NG tube, Q6H, Gregg Colorado MD, 650 mg at 03/11/19 1216     albuterol (PROVENTIL) neb solution 2.5 mg, 2.5 mg, Nebulization, Q2H PRN, Gregg Colorado MD, 2.5 mg at 03/09/19 1519     amLODIPine (NORVASC) tablet 5 mg, 5 mg, Oral, Daily,  Gregg Colorado MD, 5 mg at 03/11/19 1216     aspirin (ASA) chewable tablet 81 mg, 81 mg, Oral, At Bedtime, Gregg Colorado MD, 81 mg at 03/10/19 2247     carbidopa-levodopa (SINEMET)  MG per tablet 2 tablet, 2 tablet, Oral, 4x Daily, Gregg Colorado MD, 2 tablet at 03/11/19 1216     chlorhexidine (PERIDEX) 0.12 % solution 15 mL, 15 mL, Mouth/Throat, Q12H, Gregg Colorado MD, 15 mL at 03/11/19 0825     dexamethasone (DECADRON) injection 4 mg, 4 mg, Intravenous, Q6H, Perlman, David Morris, MD, 4 mg at 03/11/19 1216     dexmedetomidine (PRECEDEX) 4 mcg/mL in sodium chloride 0.9 % 100 mL infusion, 0.2-0.7 mcg/kg/hr, Intravenous, Continuous, Gregg Colorado MD, Last Rate: 7 mL/hr at 03/11/19 0915, 0.3 mcg/kg/hr at 03/11/19 0915     dextrose 10 % 1,000 mL infusion, , Intravenous, Continuous PRN, Gregg Colorado MD     glucose gel 15-30 g, 15-30 g, Oral, Q15 Min PRN **OR** dextrose 50 % injection 25-50 mL, 25-50 mL, Intravenous, Q15 Min PRN **OR** glucagon injection 1 mg, 1 mg, Subcutaneous, Q15 Min PRN, Gregg Colorado MD     docusate (COLACE) 50 MG/5ML liquid 100 mg, 100 mg, Oral or Feeding Tube, BID, Gregg Colorado MD, 100 mg at 03/11/19 0826     [START ON 3/12/2019] furosemide (LASIX) injection 40 mg, 40 mg, Intravenous, Q12H, Gregg Colorado MD     heparin sodium injection 5,000 Units, 5,000 Units, Subcutaneous, Q8H, Gregg Colorado MD, 5,000 Units at 03/11/19 1331     hydrALAZINE (APRESOLINE) injection 10-20 mg, 10-20 mg, Intravenous, Q4H PRN, Gregg Colorado MD, 10 mg at 03/11/19 0449     HYDROmorphone (PF) (DILAUDID) injection 0.2 mg, 0.2 mg, Intravenous, Q2H PRN, Gregg Colorado MD, 0.2 mg at 03/11/19 0405     hypromellose-dextran (ARTIFICAL TEARS) 0.1-0.3 % ophthalmic solution 1 drop, 1 drop, Both Eyes, Q1H PRN, Gregg Colorado MD, 1 drop at 03/07/19 0352     insulin aspart (NovoLOG) inj (RAPID ACTING), 1-12 Units, Subcutaneous, Q4H, Gregg Colorado MD, 3 Units at 03/11/19 1211     labetalol  (NORMODYNE/TRANDATE) syringe 10-20 mg, 10-20 mg, Intravenous, Q4H PRN, Gregg Colorado MD     lacosamide (VIMPAT) 100 mg in sodium chloride 0.9 % 100 mL intermittent infusion, 100 mg, Intravenous, BID, Karolyn Diop MD, Last Rate: 100 mL/hr at 03/09/19 2219, 100 mg at 03/11/19 0945     levothyroxine (SYNTHROID/LEVOTHROID) tablet 112 mcg, 112 mcg, Oral, QAM AC, Gregg Colorado MD, 112 mcg at 03/11/19 0633     Lidocaine (LIDOCARE) 4 % Patch 3 patch, 3 patch, Transdermal, Q24h, 3 patch at 03/10/19 2119 **AND** lidocaine patch REMOVAL, , Transdermal, Q24H **AND** lidocaine patch in PLACE, , Transdermal, Q8H, Gregg Colorado MD     magnesium sulfate 2 g in water intermittent infusion, 2 g, Intravenous, Daily PRN, Gregg Colorado MD     magnesium sulfate 4 g in 100 mL sterile water (premade), 4 g, Intravenous, Q4H PRN, Gregg Colorado MD     midazolam (VERSED) injection 2-4 mg, 2-4 mg, Intravenous, Q2H PRN, Gregg Colorado MD, 2 mg at 03/11/19 0957     naloxone (NARCAN) injection 0.1-0.4 mg, 0.1-0.4 mg, Intravenous, Q2 Min PRN, Gregg Colorado MD     ondansetron (ZOFRAN-ODT) ODT tab 4 mg, 4 mg, Oral, Q6H PRN **OR** ondansetron (ZOFRAN) injection 4 mg, 4 mg, Intravenous, Q6H PRN, Gregg Colorado MD     oxyCODONE (ROXICODONE) tablet 5-10 mg, 5-10 mg, Oral, Q3H PRN, Gregg Colorado MD, 10 mg at 03/11/19 1216     pantoprazole (PROTONIX) EC tablet 40 mg, 40 mg, Oral, Daily, 40 mg at 03/09/19 0721 **OR** pantoprazole (PROTONIX) 2 mg/mL suspension 40 mg, 40 mg, Oral or Feeding Tube, Daily, Gregg Colorado MD, 40 mg at 03/11/19 0826     polyethylene glycol (MIRALAX/GLYCOLAX) Packet 17 g, 17 g, Oral, Daily PRN, Gregg Colorado MD     potassium chloride (KLOR-CON) Packet 20-40 mEq, 20-40 mEq, Oral or Feeding Tube, Q2H PRN, Gregg Colorado MD, 20 mEq at 03/11/19 0549     potassium chloride ER (K-DUR/KLOR-CON M) CR tablet 20-40 mEq, 20-40 mEq, Oral, Q2H PRN, Gregg Colorado MD     QUEtiapine (SEROquel) half-tab 12.5  mg, 12.5 mg, Oral, At Bedtime, oGpi Cabrera MD, 12.5 mg at 03/10/19 2247     sodium chloride 0.9% infusion, , Intravenous, Continuous, Gregg Colorado MD, Last Rate: 10 mL/hr at 03/11/19 0439     valproate (DEPACON) 350 mg in sodium chloride 0.9 % 50 mL intermittent infusion, 350 mg, Intravenous, Q6H, Gopi Cabrera MD, Last Rate: 50 mL/hr at 03/11/19 1226, 350 mg at 03/11/19 1226     venlafaxine (EFFEXOR) tablet 37.5 mg, 37.5 mg, Oral, TID, Gregg Colorado MD, 37.5 mg at 03/11/19 1332    Vital Signs:    Temp:  [92.3  F (33.5  C)-99  F (37.2  C)] 97.8  F (36.6  C)  Pulse:  [48-73] 60  Heart Rate:  [51-70] 61  Resp:  [9-20] 12  BP: (112-178)/(56-87) 130/61  FiO2 (%):  [30 %] 30 %  SpO2:  [92 %-100 %] 92 %      General: Intubated on precedex 0.3     Neuro:  Mental status: does not follow commands reliably, questionably closes eyes on command and wiggles toes on command but these are not very convincing, cannot rule out coincidental  CN: R pupil 2mm, L 3mm and equally reactive, face appears grossely symmetric, does blink to threat bilaterally. Does not protrude tongue. Eyes open spontaneously  Moves arm and leg bilateral minimally    Labs/Studies:  CBC:     Recent Labs   Lab 03/11/19  0505 03/10/19  0530 03/09/19  0530   WBC 13.8* 13.0* 11.3*   RBC 3.92 3.79* 3.70*   HGB 11.7 11.3* 11.1*   HCT 35.6 34.9* 34.1*    301 255     Basic Metabolic Panel:   Recent Labs   Lab Test 03/11/19  0505 03/10/19  0530 03/09/19  1549 03/09/19  0530    138  --  138   POTASSIUM 3.8 3.9 3.6 3.5   CHLORIDE 96 97  --  98   CO2 32 38*  --  34*   BUN 31* 28  --  26   CR 0.71 0.77  --  0.76   * 153*  --  128*   NIKKIE 8.8 8.6  --  8.0*     Liver panel:  Recent Labs   Lab Test 03/11/19  0505 03/10/19  0530 03/02/19  1449 10/24/17  0700 10/23/17  0653 10/21/17  1751   PROTTOTAL 7.0 6.7* 7.0  --  6.7* 7.7   ALBUMIN 2.3* 2.3* 3.2* 2.3* 2.3* 2.9*   BILITOTAL 0.4 0.3 0.3  --  0.4 0.8   ALKPHOS 138 127 137  --  218* 258*   AST 21  23 21  --  38 61*   ALT 13 10 7  --  10 13     INR:  Recent Labs   Lab Test 03/02/19  1449 02/27/19  0540   INR 1.04 0.93      Lipid Profile:No lab results found.  A1C:   Recent Labs   Lab Test 03/02/19  1449 10/23/17  0653   A1C 6.7* 6.6*     Troponin I:   Recent Labs   Lab Test 03/02/19  1449   TROPI <0.015         Imaging:  Relevant findings as per the Impression above.

## 2019-03-11 NOTE — PROGRESS NOTES
RN informed this writer that Neurology would like TF held for Sinemet administration.  Hospital protocol is to hold TF 1 hour before and 1 hour after for medications which require administration on an empty stomach.    Based on this, TF rate has been adjusted accordingly as patient receives Sinemet 4x per day (and TF will be held 8 hours per day total).  Orders modified as follows ~  Replete with Fiber at 90 mL/hr x 16 hours per day to provide 1440 kcal (16 kcal/kg), 92 g protein (1.8 g/kg), 1195 mL H2O, 22 g fiber, 179 g CHO.    Above discussed with Pharmacist.    Renee Singleton, RD, LD, CNSC   Clinical Dietitian - [facility]

## 2019-03-11 NOTE — PROGRESS NOTES
CLINICAL NUTRITION SERVICES - REASSESSMENT NOTE      RECOMMENDATIONS FOR MD/PROVIDER TO ORDER: Recommend increase patient to High sliding scale insulin for improved blood sugar control.   Future/Additional Recommendations: If Neurology determines that TF should not be infusing while Sinemet is given, would change patient to a nocturnal infusion.    Malnutrition: (3/6)  % Weight Loss:  None noted  % Intake:  No decreased intake noted  Subcutaneous Fat Loss:  None observed  Muscle Loss:  None observed  Fluid Retention:  Mild - doubt nutrition related     Malnutrition Diagnosis: Patient does not meet two of the above criteria necessary for diagnosing malnutrition       EVALUATION OF PROGRESS TOWARD GOALS   Diet:  NPO on vent     Nutrition Support:  Patient continues on goal TF regimen as follows ~    Nutrition Support Enteral:  Type of Feeding Tube: NG vs ND    Enteral Frequency:  Continuous  Enteral Regimen: Replete with Fiber at 60 mL/hr  Total Enteral Provisions: 1440 kcal (16 kcal/kg), 92 g protein (1.8 g/kg), 1195 mL H2O, 22 g fiber, 179 g CHO   Free Water Flush: 60 mL every 4 hours     Intake/Tolerance:    BUN 31 (H) - YASMIN on CKD   , 195, 193, 191, 196, 210 - Medium sliding scale insulin, also receiving Decadron   BM x 2 yesterday - Patient receiving Colace       ASSESSED NUTRITION NEEDS:  Dosing Weight 90.2 kg (3/3 actual wt for energy); 52.3 kg (IBW for protein)  Estimated Energy Needs: 5064-0233 kcals (14-17 Kcal/Kg)  Justification: hypocaloric feeding guidelines for obesity  Estimated Protein Needs:  grams protein (1.5-2 g pro/Kg)  Justification: obesity      NEW FINDINGS:   Weight 91 kg (stable)    I/O 2902/4485    Patient is receiving Sinemet via FT 4x per day - RN asked in rounds if absorption was being affected due to the protein content of the TF formula (daughter questioned this).  We typically do not hold TF for Sinemet administration - per Intensivist, RN should ask Neurology.    Previous  Goals (3/8):   TF will continue to meet % needs - Met.  BGM < 150 - Not met.    Previous Nutrition Diagnosis (3/8):   Altered nutrition-related lab value (glucoses) related to stress as evidenced by BGM > 150.  Evaluation: No change      CURRENT NUTRITION DIAGNOSIS  Altered nutrition-related lab value (glucoses) related to stress, patient on steroids as evidenced by BGM > 150     INTERVENTIONS  Recommendations / Nutrition Prescription  If Neurology determines that TF should not be infusing while Sinemet is given, would change patient to a nocturnal infusion.     Recommend increase patient to High sliding scale insulin for improved blood sugar control.    Implementation  Collaboration and Referral of Nutrition care:  Patient discussed today during interdisciplinary bedside rounds.     Goals  TF will continue to meet % needs  BGM < 150     MONITORING AND EVALUATION:  Progress towards goals will be monitored and evaluated per protocol and Practice Guidelines    Renee Singleton RD, LD, CNSC   Clinical Dietitian - [facility]

## 2019-03-11 NOTE — PLAN OF CARE
Extubated at 1715 and code status changed to DNR/DNI.  Patient's bilateral wrist restraints removed.  TF continues.  BMx2. Silva in place with good UO.  Family at bedside.

## 2019-03-12 NOTE — PROGRESS NOTES
"S: Pt was seen today at [facility] for eval/fitting for an LSO corset as ordered.  DX transverse process Fx  O:  Patient is not able to roll on her side without assistance.  RN from the floor assisted with rolling patient onto her L side for corset fitting.  A: At this time I have fit pt with a size 36-48\" panel LSO corset with Velcro closures.  An extension panel was attached to increase the size as needed. The patients daughters were instructed on donning/doffing  The brace instructions were left in the box with the brace. The brace was not left on at this time because the nurse does not want it on her while she is laying down for an extended period of time.   P: The daughters were instructed to call if any problems or questions arise.   G:  Reduce pain and motion of the lumbar spine, provide mild compression.  Guilherme SUMMERS LO        "

## 2019-03-12 NOTE — PROGRESS NOTES
Vascular Neurology Progress Note    ____________________________________________________________    Admission Summary:  Loan Anderson is 78 year old female who presents with AMS on 3/2/19.  She was coughing and choking on food and lost consciousness.  She had a second similar episode at her facility.  She was then found down without a pulse and CPR was initiated with rapid ROSC.  CEEG showed left frontalcentral seizures.  Etiology is though to be her underlying neurodegenerative disease and high doses of tramadol, which she was taking for a recent fall.    Last 24 hours:      Extubated yesterday, talking today at times    Impression:    Encephalopathy   Status epilepticus- resolved, probably provoked by Tramadol usage along with history of previous bilateral frontal angi holes  Respiratory failure s/p intubated  PD  Rem sleep behavior disorder    Recommendations:     - Continue lacosamide 150mg q12 hrs  - Decrease depakote further to 250mg q12 hours, will likely stop in 24-48 hrs  - Continue seroquel, dexamethasone for agitation as needed  - Resume home long acting Sinemet once able, also make timing of seroquel as close to home schedule as possible, although tube feed timing will limit this      Will follow    Zunilda Torrez PA-C  Neurology  03/12/2019 5:05 PM  Pager: [phone]      ____________________________________________________________________        Medications:      Current Facility-Administered Medications:      acetaminophen (TYLENOL) tablet 650 mg, 650 mg, Oral, Q6H, 650 mg at 03/11/19 0549 **OR** acetaminophen (TYLENOL) solution 650 mg, 650 mg, Per NG tube, Q6H, Gregg Colorado MD, 650 mg at 03/12/19 1224     albuterol (PROVENTIL) neb solution 2.5 mg, 2.5 mg, Nebulization, Q2H PRN, Gregg Colorado MD, 2.5 mg at 03/12/19 1256     [START ON 3/13/2019] amLODIPine (NORVASC) tablet 10 mg, 10 mg, Oral, Daily, Gregg Colorado MD     aspirin (ASA) chewable tablet 81 mg, 81 mg, Oral, At Bedtime,  Gregg Colorado MD, 81 mg at 03/11/19 2113     carbidopa-levodopa (SINEMET)  MG per tablet 2 tablet, 2 tablet, Oral, 4x Daily, Streib, Rigoberto Escobedo MD, 2 tablet at 03/12/19 1609     chlorhexidine (PERIDEX) 0.12 % solution 15 mL, 15 mL, Mouth/Throat, Q12H, Gregg Colorado MD, 15 mL at 03/11/19 0825     dextrose 10 % 1,000 mL infusion, , Intravenous, Continuous PRN, Gregg Colorado MD     glucose gel 15-30 g, 15-30 g, Oral, Q15 Min PRN **OR** dextrose 50 % injection 25-50 mL, 25-50 mL, Intravenous, Q15 Min PRN **OR** glucagon injection 1 mg, 1 mg, Subcutaneous, Q15 Min PRN, Gregg Colorado MD     docusate (COLACE) 50 MG/5ML liquid 100 mg, 100 mg, Oral or Feeding Tube, BID, Gregg Colorado MD, 100 mg at 03/12/19 0807     furosemide (LASIX) injection 40 mg, 40 mg, Intravenous, Q12H, Gregg Colorado MD, 40 mg at 03/12/19 0931     heparin sodium injection 5,000 Units, 5,000 Units, Subcutaneous, Q8H, Gregg Colorado MD, 5,000 Units at 03/12/19 1457     hydrALAZINE (APRESOLINE) injection 10-20 mg, 10-20 mg, Intravenous, Q4H PRN, Gregg Colorado MD, 10 mg at 03/11/19 0449     HYDROmorphone (PF) (DILAUDID) injection 0.2 mg, 0.2 mg, Intravenous, Q2H PRN, Gregg Colorado MD, 0.2 mg at 03/12/19 1019     hypromellose-dextran (ARTIFICAL TEARS) 0.1-0.3 % ophthalmic solution 1 drop, 1 drop, Both Eyes, Q1H PRN, Gregg Colorado MD, 1 drop at 03/07/19 0352     insulin aspart (NovoLOG) inj (RAPID ACTING), 1-12 Units, Subcutaneous, Q4H, Gregg Colorado MD, 2 Units at 03/12/19 1604     labetalol (NORMODYNE/TRANDATE) syringe 10-20 mg, 10-20 mg, Intravenous, Q4H PRN, Gregg Colorado MD     lacosamide (VIMPAT) solution 150 mg, 150 mg, Oral or Feeding Tube, BID, Leticia Avilez MD, 150 mg at 03/12/19 0930     levothyroxine (SYNTHROID/LEVOTHROID) tablet 112 mcg, 112 mcg, Oral, QAM AC, Gregg Colorado MD, 112 mcg at 03/12/19 0807     Lidocaine (LIDOCARE) 4 % Patch 3 patch, 3 patch, Transdermal, Q24h, 3 patch at  03/11/19 2001 **AND** lidocaine patch REMOVAL, , Transdermal, Q24H **AND** lidocaine patch in PLACE, , Transdermal, Q8H, Gregg Colorado MD     magnesium sulfate 2 g in water intermittent infusion, 2 g, Intravenous, Daily PRN, Gregg Colorado MD     magnesium sulfate 4 g in 100 mL sterile water (premade), 4 g, Intravenous, Q4H PRN, Gregg Colorado MD     naloxone (NARCAN) injection 0.1-0.4 mg, 0.1-0.4 mg, Intravenous, Q2 Min PRN, Gregg Colorado MD     ondansetron (ZOFRAN-ODT) ODT tab 4 mg, 4 mg, Oral, Q6H PRN **OR** ondansetron (ZOFRAN) injection 4 mg, 4 mg, Intravenous, Q6H PRN, Gregg Colorado MD     oxyCODONE (ROXICODONE) tablet 5-10 mg, 5-10 mg, Oral, Q3H PRN, Gregg Colorado MD, 10 mg at 03/12/19 0319     [START ON 3/13/2019] pantoprazole (PROTONIX) 2 mg/mL suspension 40 mg, 40 mg, Oral, QAM AC, Gregg Colorado MD     polyethylene glycol (MIRALAX/GLYCOLAX) Packet 17 g, 17 g, Oral, Daily PRN, Gregg Colorado MD     potassium chloride (KLOR-CON) Packet 20-40 mEq, 20-40 mEq, Oral or Feeding Tube, Q2H PRN, Gregg Colorado MD, 20 mEq at 03/11/19 0549     potassium chloride ER (K-DUR/KLOR-CON M) CR tablet 20-40 mEq, 20-40 mEq, Oral, Q2H PRN, Gregg Colorado MD     QUEtiapine (SEROquel) half-tab 12.5 mg, 12.5 mg, Oral, At Bedtime, Gopi Cabrera MD, 12.5 mg at 03/11/19 2113     sodium chloride 0.9% infusion, , Intravenous, Continuous, Gregg Colorado MD, Stopped at 03/11/19 1400     valproic acid (DEPAKENE) solution 250 mg, 250 mg, Oral or Feeding Tube, Q12H, Zunilda Torrez PA-C     venlafaxine (EFFEXOR) tablet 37.5 mg, 37.5 mg, Oral, TID, Gregg Colorado MD, 37.5 mg at 03/12/19 1457    Vital Signs:    Temp:  [97.7  F (36.5  C)-99.1  F (37.3  C)] 99  F (37.2  C)  Pulse:  [61-79] 68  Heart Rate:  [61-89] 68  Resp:  [11-37] 17  BP: (104-178)/(56-88) 138/75  SpO2:  [93 %-98 %] 95 %      General: Extubated, restless     Neuro:  Mental status: answers yes/no questions, wiggles toes on command  CN: R pupil  2mm, L 3mm and equally reactive, face appears grossely symmetric, does blink to threat bilaterally. Briefly makes eye contact. Protrudes tongue minimally. Eyes open spontaneously  Moves arm and leg bilateral minimally, not antigravity off the bed    Labs/Studies:  CBC:     Recent Labs   Lab 03/12/19  0600 03/11/19  0505 03/10/19  0530   WBC 16.5* 13.8* 13.0*   RBC 3.90 3.92 3.79*   HGB 11.8 11.7 11.3*   HCT 35.7 35.6 34.9*    308 301     Basic Metabolic Panel:   Recent Labs   Lab Test 03/12/19  0600 03/11/19  0505 03/10/19  0530    135 138   POTASSIUM 4.0 3.8 3.9   CHLORIDE 94 96 97   CO2 31 32 38*   BUN 41* 31* 28   CR 0.86 0.71 0.77   * 196* 153*   NIKKIE 8.8 8.8 8.6     Liver panel:  Recent Labs   Lab Test 03/11/19  0505 03/10/19  0530 03/02/19  1449 10/24/17  0700 10/23/17  0653 10/21/17  1751   PROTTOTAL 7.0 6.7* 7.0  --  6.7* 7.7   ALBUMIN 2.3* 2.3* 3.2* 2.3* 2.3* 2.9*   BILITOTAL 0.4 0.3 0.3  --  0.4 0.8   ALKPHOS 138 127 137  --  218* 258*   AST 21 23 21  --  38 61*   ALT 13 10 7  --  10 13     INR:  Recent Labs   Lab Test 03/02/19  1449 02/27/19  0540   INR 1.04 0.93      Lipid Profile:No lab results found.  A1C:   Recent Labs   Lab Test 03/02/19  1449 10/23/17  0653   A1C 6.7* 6.6*     Troponin I:   Recent Labs   Lab Test 03/02/19  1449   TROPI <0.015         Imaging:    MRI rich  IMPRESSION:  1. No acute abnormality.  2. Brain atrophy and white matter changes consistent with sequelae of  small vessel ischemic disease.  3. 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  4. Fluid in the mastoid air cells likely related to endotracheal  intubation.

## 2019-03-12 NOTE — PROGRESS NOTES
03/12/19 1518   General Information   Onset Date 03/02/19   Start of Care Date 03/12/19   Referring Physician Chucky Robles PA-C   Patient Profile Review/OT: Additional Occupational Profile Info See Profile for full history and prior level of function   Patient/Family Goals Statement Water   Swallowing Evaluation Bedside swallow evaluation   Behaviorial Observations Distractible;Confused   Mode of current nutrition NPO;NG   Respiratory Status O2 Supply   Type of O2 supply Nasal cannula  (Stable; Extubated 3/11 pm)   Comments Per MD note: 78F with parkinsons disease who lives in a memory center now presents from [facility] after being found acutely encephalopathic and mildly agitated/combative by her  around 2 pm on day of admission.  Apparently there had been an episode earlier in the day where she had turned red and coughed 3-4 min after taking pills and family member did the heimlich maneuver.  She returned to her normal state after this but no source of asphyxiation was found.  Later in the day her  went to get the mail and when he returned to their home (~10 min) he found staff doing cpr on her.  Apparently she became unresponsive at some point and cpr was started, but on EMS arrival she did have a perfusing rhythm.  A couple days prior to admit she had a mechanical fall resulting in R hip pain.  No injury noted at that time, but today has several rib fx on CT scan (may be due to cpr?) and transverse process L1L2 fx's--suspect these may be from fall.    Hx of mild-moderate dysphagia at bedside at [facility] in 10/2017.  Regular/soft moist food and thin liquids recommended with strategies including a chin tuck with thin liquids at that time.  Daughter reports patient on a regular diet and thin liquids with strategies/precautions at baseline prior to admit.  Occasional cough noted with po intake.      Patient produced delayed swallows with mod-max cues after swabbing/thermal stimulation provided.      Clinical Swallow Evaluation   Oral Musculature unable to assess due to poor participation/comprehension  (followed some commands with decreased ROM noted)   Dentition present and adequate   Laryngeal Function Cough;Throat clear;Swallow;Voicing initiated  (Weak voicing and cough)   Clinical Swallow Eval: Thin Liquid Texture Trial   Mode of Presentation, Thin Liquids spoon   Volume of Liquid or Food Presented ice chips x 3   Oral Phase of Swallow Premature pharyngeal entry   Pharyngeal Phase of Swallow (delay)   Diagnostic Statement throat clearing x 1, slight increased SOB after swallows, unable to rule out silent aspiration   Swallow Compensations   Swallow Compensations Reduce amounts;Pacing   Esophageal Phase of Swallow   Patient reports or presents with symptoms of esophageal dysphagia No   Swallow Eval: Clinical Impressions   Skilled Criteria for Therapy Intervention Skilled criteria met.  Treatment indicated.   Functional Assessment Scale (FAS) 2   Treatment Diagnosis moderate-severe oral-pharyngeal dysphagia   Diet texture recommendations NPO   Therapy Frequency daily   Predicted Duration of Therapy Intervention (days/wks) 1 week   Anticipated Discharge Disposition inpatient rehabilitation facility   Risks and Benefits of Treatment have been explained. Yes   Clinical Impression Comments A bedside swallow evaluation was completed this pm.  Patient presents with moderate-severe oral-pharyngeal dysphagia at bedside.  Deficits/risk factors include recent prolonged intubation, weak voicing and cough, decreased awareness, hx of dysphagia due to Parkinson's Disease, decreased oral motor function, delayed swallows, and throat clearing with ice chip trials.  Unable to rule out silent aspiration at bedside.  Recommend NPO status and frequent oral cares.  Plan to provide swallow Tx with ongoing assessment to determine safety for po intake.  A video swallow study may be indicated prior to return to a regular diet  pending progress in Tx.  Will update recommendations as indicated.   Total Evaluation Time   Total Evaluation Time (Minutes) 15

## 2019-03-12 NOTE — CONSULTS
[facility]    Palliative Care Consultation     Loan Anderson  MRN# [id number]  Date of Admission:  3/2/2019  Date of Service (when I saw the patient): 03/12/19  Reason for consult: Consulted by Dr. Colorado for Goals of care    Assessment & Plan   Loan Anderson is a 78 year old female with PMH significant for advanced parkinson's disease, HTN, CKD, and recent fall with resultant pain on therapy with tramadol, who was found unresponsive at her memory care unit, CPR was initiated at that time. A perfusing rhythm was noted upon EMS arrival. She was found to be post-ictal, neurology involved. She was intubated in the ICU for 10 days, now extubated. She remains encephalopathic. We are consulted for goals of care.     Symptoms/Recommendations   -Pt is transferring to the floor today. Awaiting SLP evaluation.  hoping pt will be able to return to the [facility] where she was previously living. He is open to the possibility of needing hospice support upon discharge. Will continue conversation this week, as she transfers to the floor and further evaluations are completed. My sense is that Surjit will be very receptive to recommendations given by the hospitalist team, and our team, as best way to care for Erin moving forward   -Of note, pt under research study for Parkinsons by Dr Kenneth Nguyễn at [facility] in [place]. If pt dies, her brain is supposed to go to research. Please call Dr. Cooper at [phone] or cell [phone] with any questions    Support/Coping  -Well supported by  Surjit of 56 years. They have 2 adult daughters who live local and are very involved   -Appreciate involvement of spiritual health   -Will involve palliative LICSW Keisha Abraham for additional support     Decisional Support, Goals of Care, Counseling & Coordination  Decisional Capacity Intact?  -No  Health Care Directive on File?  -Yes, reviewed   Code Status/Resuscitation  Preferences?  -DNR/DNI     Discussion  Visited pt and  Surjit this AM. Erin is alert, yet not verbal and not tracking well. She appears confused.     Spoke with  in the maria. Introduced the scope of our practice to Surjit. Discussed our potential roles for symptom management, support/coping, and decisional support (aka goals of care).     Surjit is well versed in pt's history of parkinson's disease and told me about the research study she is apart of. She has lived with her disease for 17 years, and had many good years with no disease progression. It was recently becoming too challenging for him to care for her at home, so she moved to the [facility] about 7 months ago. She has been very happy and engaged there. Surjit very much hopes she can return there after discharge.     Surjit wonders about palliative's ability to go to the [facility]. I share with him that our availability in the outpatient setting is through clinic, but hospice would be a team that could care for her at end of life at the [facility]. He expressed interest in that.     We make note that time will ultimately tell how well Erin wakes up from here, and if she can safely swallow. Surjit is ok with our ongoing involvement for further plan of care discussions as time progresses.     Case reviewed with Dr. Colorado and bedside RN Alvina.     Thank you for involving us in the care of this patient and family. We will continue to follow. Please do not hesitate to contact me with questions or concerns or the on-call provider for our team if evening or weekend.    Saniya BARAKAT, [facility]  Palliative Medicine   Pager [phone]    Attestation:  Total time on the floor involved in the patient's care: 60 minutes  Total time spent in counseling/care coordination: >50%    Chief Complaint   AMS    History is obtained from the staff, family and extensive chart review.     Past Medical History    I have reviewed this patient's medical history and updated it  with pertinent information if needed.   Past Medical History:   Diagnosis Date     Constipation 7/15/2011     Convergence insufficiency 7/15/2011     Depression 7/15/2011     Diplopia 7/18/2011     Hot flashes, menopausal 7/18/2011     Hypercholesterolemia 7/18/2011     Hypothyroid 7/15/2011     Neurogenic bladder 7/15/2011     Parkinson disease (H) 7/15/2011     REM sleep behavior disorder 7/18/2011     Sebaceous cyst 7/18/2011     Wears glasses 7/18/2011       Past Surgical History   I have reviewed this patient's surgical history and updated it with pertinent information if needed.  Past Surgical History:   Procedure Laterality Date     ------------OTHER-------------  november ? 2015    lithrotripsy for renal stone     ------------OTHER-------------  november 2015    had sepsis from uti and hospitalized     BRAIN SURGERY  12 or 14 oct 2011    gene therapy enrolled study at [place]       Social History   Living situation: [facility]    Family system: Well supported by  Surjit of 56 years. They have 2 adult daughters who live local and are very involved     Self-identified support system: As above     Employment/education: Research     Activities/interests: Active in activities at the [facility]     Use of community resources: None     Buddhist affiliation: Faith     Involvement in rodrigo community: Yes, their [place]  has been following    Impact of illness on patient: Pt has advanced PD; she has been living with it for 17 years. She has had a difficult hospitalization and ICU stay. Hospice may be indicated with discharge planning     Family History   I have reviewed this patient's family history and updated it with pertinent information if needed.   Family History   Problem Relation Age of Onset     Neurologic Disorder Mother         parkinson disease ? was on sinemet. She had micrographia     Eye Disorder Mother      Cerebrovascular Disease Father         stroke age 47     Heart Disease  Father         heart attack age 57       Allergies   Allergies   Allergen Reactions     Ace Inhibitors      Tongue and lip swelling just after restarting lisinopril on 3/9/19 at higher dose while intubated.  Unclear if actually angioedema or not as swelling resolved with steroids.     Atorvastatin      Concern that it was contributing to confusion     Mirapex [Pramipexole Dihydrochloride Monohydrate]      Leg swelling     Potassium Chloride      IV infusion only. She tolerates oral potassium chloride     Requip [Ropinirole Hydrochloride]      Leg swelling     Epinephrine Palpitations       Medications   Current Facility-Administered Medications Ordered in Epic   Medication Dose Route Frequency Last Rate Last Dose     acetaminophen (TYLENOL) tablet 650 mg  650 mg Oral Q6H   650 mg at 03/11/19 0549    Or     acetaminophen (TYLENOL) solution 650 mg  650 mg Per NG tube Q6H   650 mg at 03/12/19 0536     albuterol (PROVENTIL) neb solution 2.5 mg  2.5 mg Nebulization Q2H PRN   2.5 mg at 03/11/19 1806     [START ON 3/13/2019] amLODIPine (NORVASC) tablet 10 mg  10 mg Oral Daily         aspirin (ASA) chewable tablet 81 mg  81 mg Oral At Bedtime   81 mg at 03/11/19 2113     carbidopa-levodopa (SINEMET)  MG per tablet 2 tablet  2 tablet Oral 4x Daily   2 tablet at 03/12/19 0807     chlorhexidine (PERIDEX) 0.12 % solution 15 mL  15 mL Mouth/Throat Q12H   15 mL at 03/11/19 0825     dextrose 10 % 1,000 mL infusion   Intravenous Continuous PRN         glucose gel 15-30 g  15-30 g Oral Q15 Min PRN        Or     dextrose 50 % injection 25-50 mL  25-50 mL Intravenous Q15 Min PRN        Or     glucagon injection 1 mg  1 mg Subcutaneous Q15 Min PRN         docusate (COLACE) 50 MG/5ML liquid 100 mg  100 mg Oral or Feeding Tube BID   100 mg at 03/12/19 0807     furosemide (LASIX) injection 40 mg  40 mg Intravenous Q12H   40 mg at 03/12/19 0931     heparin sodium injection 5,000 Units  5,000 Units Subcutaneous Q8H   5,000 Units at  03/12/19 0536     hydrALAZINE (APRESOLINE) injection 10-20 mg  10-20 mg Intravenous Q4H PRN   10 mg at 03/11/19 0449     HYDROmorphone (PF) (DILAUDID) injection 0.2 mg  0.2 mg Intravenous Q2H PRN   0.2 mg at 03/12/19 1019     hypromellose-dextran (ARTIFICAL TEARS) 0.1-0.3 % ophthalmic solution 1 drop  1 drop Both Eyes Q1H PRN   1 drop at 03/07/19 0352     insulin aspart (NovoLOG) inj (RAPID ACTING)  1-12 Units Subcutaneous Q4H   3 Units at 03/12/19 0802     labetalol (NORMODYNE/TRANDATE) syringe 10-20 mg  10-20 mg Intravenous Q4H PRN         lacosamide (VIMPAT) solution 150 mg  150 mg Oral or Feeding Tube BID   150 mg at 03/12/19 0930     levothyroxine (SYNTHROID/LEVOTHROID) tablet 112 mcg  112 mcg Oral QAM AC   112 mcg at 03/12/19 0807     Lidocaine (LIDOCARE) 4 % Patch 3 patch  3 patch Transdermal Q24h   3 patch at 03/11/19 2001    And     lidocaine patch REMOVAL   Transdermal Q24H        And     lidocaine patch in PLACE   Transdermal Q8H         magnesium sulfate 2 g in water intermittent infusion  2 g Intravenous Daily PRN         magnesium sulfate 4 g in 100 mL sterile water (premade)  4 g Intravenous Q4H PRN         naloxone (NARCAN) injection 0.1-0.4 mg  0.1-0.4 mg Intravenous Q2 Min PRN         ondansetron (ZOFRAN-ODT) ODT tab 4 mg  4 mg Oral Q6H PRN        Or     ondansetron (ZOFRAN) injection 4 mg  4 mg Intravenous Q6H PRN         oxyCODONE (ROXICODONE) tablet 5-10 mg  5-10 mg Oral Q3H PRN   10 mg at 03/12/19 0319     polyethylene glycol (MIRALAX/GLYCOLAX) Packet 17 g  17 g Oral Daily PRN         potassium chloride (KLOR-CON) Packet 20-40 mEq  20-40 mEq Oral or Feeding Tube Q2H PRN   20 mEq at 03/11/19 0549     potassium chloride ER (K-DUR/KLOR-CON M) CR tablet 20-40 mEq  20-40 mEq Oral Q2H PRN         QUEtiapine (SEROquel) half-tab 12.5 mg  12.5 mg Oral At Bedtime   12.5 mg at 03/11/19 2113     sodium chloride 0.9% infusion   Intravenous Continuous   Stopped at 03/11/19 1400     valproic acid (DEPAKENE)  solution 250 mg  250 mg Oral or Feeding Tube Q8H   250 mg at 03/12/19 0536     venlafaxine (EFFEXOR) tablet 37.5 mg  37.5 mg Oral TID   37.5 mg at 03/12/19 0807     No current [facility]-ordered outpatient medications on file.       Review of Systems   The comprehensive review of systems is not able to be completed at this time     Physical Exam   Temp: 98.2  F (36.8  C) Temp src: Axillary BP: 164/79 Pulse: 71 Heart Rate: 73 Resp: 15 SpO2: 96 % O2 Device: Nasal cannula Oxygen Delivery: 2 LPM  Vitals:    03/10/19 0400 03/11/19 0500 03/12/19 0600   Weight: 93.4 kg (205 lb 14.6 oz) 91 kg (200 lb 9.9 oz) 91 kg (200 lb 9.9 oz)     CONSTITUTIONAL: Chronically ill elderly woman seen lying in ICU bed in NAD, alert, yet appearing confused and disoriented. Not speaking. She appears fairly calm. Ability to follow commands not assessed. Family present    RESPIRATORY: NL respiratory effort on NC  GASTROINTESTINAL: NG    Data   Results for orders placed or performed during the hospital encounter of 03/02/19 (from the past 24 hour(s))   Glucose by meter   Result Value Ref Range    Glucose 208 (H) 70 - 99 mg/dL   MR Brain w/o & w Contrast    Narrative    MRI BRAIN WITHOUT AND WITH CONTRAST  3/11/2019 3:53 PM    HISTORY: Encephalopathy. Advanced Parkinson's disease, presented with  partial seizures, question of cardiac arrest prior to admit.     TECHNIQUE: Multiplanar, multisequence MRI of the brain without and  with 9 mL Gadavist.    COMPARISON: CT angiogram 3/2/2019    FINDINGS: There is generalized atrophy of the brain. White matter T2  hyperintensities are seen in the cerebral hemispheres consistent with  sequelae of small vessel ischemic disease. There is no evidence of  hemorrhage, mass or acute infarct. There is a developmental venous  anomaly in the right postcentral gyrus medially showing diffuse  gadolinium enhancement. No other gadolinium enhancing lesions are  seen. Old angi holes are seen in the frontal bones.    There is  scattered mucosal thickening in the paranasal sinuses. There  are bilateral intraocular lens implants. The arteries at the base of  the brain and the dural venous sinuses appear patent. Fluid is seen in  mastoid air cells bilaterally, new since the previous CT scan. This is  likely related to the intubation.      Impression    IMPRESSION:  1. No acute abnormality.  2. Brain atrophy and white matter changes consistent with sequelae of  small vessel ischemic disease.  3. 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  4. Fluid in the mastoid air cells likely related to endotracheal  intubation.      JOSE JUAN MARINELLI MD   Glucose by meter   Result Value Ref Range    Glucose 185 (H) 70 - 99 mg/dL   Glucose by meter   Result Value Ref Range    Glucose 187 (H) 70 - 99 mg/dL   Glucose by meter   Result Value Ref Range    Glucose 204 (H) 70 - 99 mg/dL   Glucose by meter   Result Value Ref Range    Glucose 273 (H) 70 - 99 mg/dL   CBC with platelets   Result Value Ref Range    WBC 16.5 (H) 4.0 - 11.0 10e9/L    RBC Count 3.90 3.8 - 5.2 10e12/L    Hemoglobin 11.8 11.7 - 15.7 g/dL    Hematocrit 35.7 35.0 - 47.0 %    MCV 92 78 - 100 fl    MCH 30.3 26.5 - 33.0 pg    MCHC 33.1 31.5 - 36.5 g/dL    RDW 15.2 (H) 10.0 - 15.0 %    Platelet Count 388 150 - 450 10e9/L   Basic metabolic panel   Result Value Ref Range    Sodium 134 133 - 144 mmol/L    Potassium 4.0 3.4 - 5.3 mmol/L    Chloride 94 94 - 109 mmol/L    Carbon Dioxide 31 20 - 32 mmol/L    Anion Gap 9 3 - 14 mmol/L    Glucose 301 (H) 70 - 99 mg/dL    Urea Nitrogen 41 (H) 7 - 30 mg/dL    Creatinine 0.86 0.52 - 1.04 mg/dL    GFR Estimate 65 >60 mL/min/[1.73_m2]    GFR Estimate If Black 75 >60 mL/min/[1.73_m2]    Calcium 8.8 8.5 - 10.1 mg/dL   Magnesium   Result Value Ref Range    Magnesium 2.3 1.6 - 2.3 mg/dL   Phosphorus   Result Value Ref Range    Phosphorus 3.2 2.5 - 4.5 mg/dL   Glucose by meter   Result Value Ref Range    Glucose 203 (H) 70 - 99 mg/dL

## 2019-03-12 NOTE — PROGRESS NOTES
Critical Care  Note      03/12/2019    Name: Loan Anderson MRN#: [id number]   Age: 78 year old YOB: 1940     [facility] Day# 10  ICU DAY #10                 Problem List:   Acute encephalopathy  Rib fractures  L1/L2 transverse process fx         Summary/Hospital Course:     78F with parkinsons disease who lives in a memory center now presents from [facility] after being found acutely encephalopathic and mildly agitated/combative by her  around 2 pm on day of admission.  Apparently there had been an episode earlier in the day where she had turned red and coughed 3-4 min after taking pills and family member did the heimlich maneuver.  She returned to her normal state after this but no source of asphyxiation was found.  Later in the day her  went to get the mail and when he returned to their home (~10 min) he found staff doing cpr on her.  Apparently she became unresponsive at some point and cpr was started, but on EMS arrival she did have a perfusing rhythm.     A couple days prior to admit she had a mechanical fall resulting in R hip pain.  No injury noted at that time, but today has several rib fx on CT scan (may be due to cpr?) and transverse process L1L2 fx's--suspect these may be from fall.    Extubated yesterday to DNR/DNI status.  On my visit she is wakeful, but communications are somewhat limited by parkinsons dementia.  She denies shortness of breath, denies pain anywhere, denies dizzyness/lightheadedness, nausea and vomiting.      Assessment and plan :     Loan Anderson IS a 78 year old female admitted on 3/2/2019 for acute encephalopathy.   I have personally reviewed the daily labs, imaging studies, cultures and discussed the case with referring physician and consulting physicians.   My assessment and plan by system for this patient is as follows:    Neurology/Psychiatry:   1. Acute encephalopathy:  Overall improved.  Still not fully clear, but suspecting this may be  more underlying parkinsons dz.  2.  Subclinical status epilepticus:  Was taking more tramadol than usual after fall week before admission--this would lower seizure threshold.  Now resolved on vimpat and valproate.  Appreciate neurocrit input.  2. Analgesia:  lidoderm patches on chest for rib fx.  PO tylenol standing.  Prn oxycodone/dilaudid.  3. Sedation:  Precedex drip.  Midazolam pushes prn.  Seroquel and valproate being given with good effect, decreased seroquel dose to 12.5 at bedtime.  4. H/o parkinsons dz:  Appreciate neuro crit input.  Will continue short acting carbo/levodopa.  Holding long-acting and patch forms for now.    Cardiovascular:    1.  Htn:  Transitioned lisinopril to amlodipine in light of tongue/lip swelling 3/9-10.  Apparently has not been on lisinopril for long.  Uncertain whether this is true angioedema or not, but will add lisinopril to allergy list.  Discussed avoidance of ACE-I's with family.  Increased amlodipine     Pulmonary/Ventilator Management:   1. Loss of protective airway reflexes:  Due to encephalopathy noted above.  2. Did well on PS trial, CXR shows moderate R pleural effusion, which is stable.  3. Edema: Some tongue swelling on 3/9-10.  Improved 3/11 on steroids, no increase in swelling today.  Stop steroids.    GI and Nutrition :   1.  Tube feeds    Renal/Fluids/Electrolytes:   1. YASMIN on CKD:  Resolved. Creat 0.86 today.  UOP adequate.  Continue to watch closely.  2.  Hypervolemia:  Improving, diuresing well with 40 IV lasix bid--continue this today.  Lytes ok.    Infectious Disease:   1. Procal ok <0.05 and no clear source of infection.  Cultures remain ngtd.  Stopped all abx 3/5/19. WBC slightly up today but no fevers, will continue to follow, culture if she has fever.    Endocrine:   1. Hyperglycemia: likely due to steroids. sliding scale insulin, stopping roids today.  2.  Hypothyroid:  Continue home synthroid.    Hematology/Oncology:   1.  Anemia recovered to baseline  (11.8 today--stable).  2.  WBC mildly elevated to 16.5 in setting of steroids, pltlts ok 388.    ICU Prophylaxis:   1. DVT: Hep Subq/ mechanical  2. Wound care  - per unit routine  3. Feeding -  tf  4. Family Update:  daughter at bedside today.  5. Disposition - may leave ICU           Medical History:     Past Medical History:   Diagnosis Date     Constipation 7/15/2011     Convergence insufficiency 7/15/2011     Depression 7/15/2011     Diplopia 7/18/2011     Hot flashes, menopausal 7/18/2011     Hypercholesterolemia 7/18/2011     Hypothyroid 7/15/2011     Neurogenic bladder 7/15/2011     Parkinson disease (H) 7/15/2011     REM sleep behavior disorder 7/18/2011     Sebaceous cyst 7/18/2011     Wears glasses 7/18/2011     Past Surgical History:   Procedure Laterality Date     ------------OTHER-------------  november ? 2015    lithrotripsy for renal stone     ------------OTHER-------------  november 2015    had sepsis from uti and hospitalized     BRAIN SURGERY  12 or 14 oct 2011    gene therapy enrolled study at [place]     Social History     Socioeconomic History     Marital status:      Spouse name: Not on file     Number of children: Not on file     Years of education: Not on file     Highest education level: Not on file   Occupational History     Not on file   Social Needs     Financial resource strain: Not on file     Food insecurity:     Worry: Not on file     Inability: Not on file     Transportation needs:     Medical: Not on file     Non-medical: Not on file   Tobacco Use     Smoking status: Never Smoker     Smokeless tobacco: Never Used   Substance and Sexual Activity     Alcohol use: Yes     Comment: rare     Drug use: Not on file     Sexual activity: Not on file   Lifestyle     Physical activity:     Days per week: Not on file     Minutes per session: Not on file     Stress: Not on file   Relationships     Social connections:     Talks on phone: Not on file     Gets together: Not on file      Attends Caodaism service: Not on file     Active member of club or organization: Not on file     Attends meetings of clubs or organizations: Not on file     Relationship status: Not on file     Intimate partner violence:     Fear of current or ex partner: Not on file     Emotionally abused: Not on file     Physically abused: Not on file     Forced sexual activity: Not on file   Other Topics Concern     Parent/sibling w/ CABG, MI or angioplasty before 65F 55M? Not Asked   Social History Narrative    Spouse is serenity        Allergies   Allergen Reactions     Ace Inhibitors      Tongue and lip swelling just after restarting lisinopril on 3/9/19 at higher dose while intubated.  Unclear if actually angioedema or not as swelling resolved with steroids.     Atorvastatin      Concern that it was contributing to confusion     Mirapex [Pramipexole Dihydrochloride Monohydrate]      Leg swelling     Potassium Chloride      IV infusion only. She tolerates oral potassium chloride     Requip [Ropinirole Hydrochloride]      Leg swelling     Epinephrine Palpitations              Key Medications:       acetaminophen  650 mg Oral Q6H    Or     acetaminophen  650 mg Per NG tube Q6H     amLODIPine  5 mg Oral Daily     aspirin  81 mg Oral At Bedtime     carbidopa-levodopa  2 tablet Oral 4x Daily     chlorhexidine  15 mL Mouth/Throat Q12H     docusate  100 mg Oral or Feeding Tube BID     furosemide  40 mg Intravenous Q12H     heparin  5,000 Units Subcutaneous Q8H     insulin aspart  1-12 Units Subcutaneous Q4H     lacosamide  150 mg Oral or Feeding Tube BID     levothyroxine  112 mcg Oral QAM AC     lidocaine  3 patch Transdermal Q24h    And     lidocaine   Transdermal Q24H    And     lidocaine   Transdermal Q8H     pantoprazole  40 mg Oral Daily    Or     pantoprazole  40 mg Oral or Feeding Tube Daily     QUEtiapine  12.5 mg Oral At Bedtime     valproic acid  250 mg Oral or Feeding Tube Q8H     venlafaxine  37.5 mg Oral TID        dexmedetomidine Stopped (03/11/19 1400)     IV fluid REPLACEMENT ONLY       sodium chloride Stopped (03/11/19 1400)        Home Meds    No current facility-administered medications on file prior to encounter.   Current Outpatient Medications on File Prior to Encounter:  aspirin 81 MG tablet Take 81 mg by mouth At Bedtime @ midnight   carbidopa-levodopa (SINEMET CR)  MG CR tablet Take 1 tablet by mouth At Bedtime At 8 pm   carbidopa-levodopa (SINEMET)  MG tablet Take 2 tablets 4 times daily at 7 am, 10 am, 1 pm, and 4 pm   cholecalciferol 5000 units CAPS Take 1 capsule by mouth every morning @ 6am   docusate sodium (COLACE) 100 MG capsule Take 300 mg by mouth daily 3 x 100mg capsule @ noon   ibuprofen (ADVIL/MOTRIN) 200 MG tablet Take 400 mg by mouth 3 times daily 2 x 200mg tabs by mouth 3/day @ 6am, 12pm and 6pm and 3 x 200mg tabs by mouth every 8 hours as needed   levothyroxine (SYNTHROID/LEVOTHROID) 112 MCG tablet Take 112 mcg by mouth daily @ 6am   lisinopril (PRINIVIL/ZESTRIL) 5 MG tablet Take 5 mg by mouth daily @ 6am   mirabegron (MYRBETRIQ) 25 MG 24 hr tablet Take 25 mg by mouth daily @ 6am   Multiple Vitamins-Minerals (EYE VITAMINS) CAPS Take 1 capsule by mouth daily 1 capsule (macu-health) by mouth daily @ midnight   omeprazole (PRILOSEC) 20 MG DR capsule Take 20 mg by mouth daily @ 6am   polyethylene glycol (MIRALAX) powder Take 17 g by mouth daily as needed @0600   rotigotine (NEUPRO) 4 MG/24HR 24 hr patch Apply patch daily @ bedtime/8 pm (Patient taking differently: Place 1 patch onto the skin daily @ bedtime/8 pm)   tamsulosin (FLOMAX) 0.4 MG capsule Take 0.4 mg by mouth daily @ 6pm   traMADol (ULTRAM) 50 MG tablet Take 1 tablet (50 mg) by mouth every 6 hours as needed for severe pain   trospium (SANCTURA XR) 60 MG CP24 24 hr capsule Take 60 mg by mouth every morning (before breakfast) @ 6am   venlafaxine (EFFEXOR) 100 MG tablet Take 100 mg by mouth daily @ 6am              Physical  Examination:   Temp:  [97.7  F (36.5  C)-98.8  F (37.1  C)] 98.2  F (36.8  C)  Pulse:  [60-79] 71  Heart Rate:  [58-89] 73  Resp:  [10-37] 15  BP: (120-178)/(57-88) 164/79  FiO2 (%):  [30 %-50 %] 30 %  SpO2:  [91 %-97 %] 96 %  No intake or output data in the 24 hours ending 03/02/19 1732  Wt Readings from Last 4 Encounters:   03/12/19 91 kg (200 lb 9.9 oz)   02/27/19 86.2 kg (190 lb)   02/11/19 88 kg (194 lb)   11/14/18 86.2 kg (190 lb)     BP - Mean:  [] 105  Ventilation Mode: CMV/AC  (Continuous Mandatory Ventilation/ Assist Control)  FiO2 (%): 30 %  Rate Set (breaths/minute): 14 breaths/min  Tidal Volume Set (mL): 400 mL  PEEP (cm H2O): 5 cmH2O  Oxygen Concentration (%): 30 %  Resp: 15    No lab results found in last 7 days.    Intake/Output Summary (Last 24 hours) at 3/12/2019 0923  Last data filed at 3/12/2019 0800  Gross per 24 hour   Intake 1451.08 ml   Output 1815 ml   Net -363.92 ml     GEN: no acute distress, pleasant  HEENT: head ncat, sclera anicteric, OP patent, trachea midline   PULM: mild accessory use, but not tachypnic, clear anteriorly  CV/COR: RRR S1S2 no gallop,  No rub, no murmur  ABD: soft nontender, hypoactive bowel sounds, no mass  EXT:  Minimal edema x4,  Warm x4  NEURO: awake, alert, answers questions.  Moves all 4.  L pupil slightly larger than R but both reactive.   SKIN: no obvious rash  LINES: clean, dry intact         Data:   All data and imaging reviewed     ROUTINE ICU LABS (Last four results)  CMP  Recent Labs   Lab 03/12/19  0600 03/11/19  0505 03/10/19  0530 03/09/19  1549 03/09/19  0530    135 138  --  138   POTASSIUM 4.0 3.8 3.9 3.6 3.5   CHLORIDE 94 96 97  --  98   CO2 31 32 38*  --  34*   ANIONGAP 9 7 3  --  6   * 196* 153*  --  128*   BUN 41* 31* 28  --  26   CR 0.86 0.71 0.77  --  0.76   GFRESTIMATED 65 82 74  --  76   GFRESTBLACK 75 >90 86  --  88   NIKKIE 8.8 8.8 8.6  --  8.0*   MAG 2.3 2.2 2.3  --  2.1   PHOS 3.2 2.8 3.1  --  2.9   PROTTOTAL  --  7.0 6.7*   --   --    ALBUMIN  --  2.3* 2.3*  --   --    BILITOTAL  --  0.4 0.3  --   --    ALKPHOS  --  138 127  --   --    AST  --  21 23  --   --    ALT  --  13 10  --   --      CBC  Recent Labs   Lab 03/12/19  0600 03/11/19  0505 03/10/19  0530 03/09/19  0530   WBC 16.5* 13.8* 13.0* 11.3*   RBC 3.90 3.92 3.79* 3.70*   HGB 11.8 11.7 11.3* 11.1*   HCT 35.7 35.6 34.9* 34.1*   MCV 92 91 92 92   MCH 30.3 29.8 29.8 30.0   MCHC 33.1 32.9 32.4 32.6   RDW 15.2* 14.9 15.1* 15.2*    308 301 255     INR  No lab results found in last 7 days.  Arterial Blood GasNo lab results found in last 7 days.    All cultures:  No results for input(s): CULT in the last 168 hours.  Recent Results (from the past 24 hour(s))   XR Chest Port 1 View    Narrative    CHEST PORTABLE ONE VIEW     3/2/2019 3:00 PM     HISTORY: Tube placement.    COMPARISON: 2/27/2019.      Impression    IMPRESSION: Endotracheal tube in place in mid trachea. New area of  atelectasis or infiltrate in the right lung base. Left lung is clear.  Heart size is upper normal. Pulmonary vasculature does not appear  distended.      KELLIE BANKS MD   CT Head w/o Contrast    Narrative    CT SCAN OF THE HEAD WITHOUT CONTRAST   3/2/2019 3:10 PM     HISTORY: Altered level of consciousness (LOC), unexplained.    TECHNIQUE:  Axial images of the head and coronal reformations without  IV contrast material.  Radiation dose for this scan was reduced using  automated exposure control, adjustment of the mA and/or kV according  to patient size, or iterative reconstruction technique.    COMPARISON: None.    FINDINGS: There is some moderate cerebral atrophy. There is an old  infarct in the left basal ganglia region. There are some patchy white  matter changes in both hemispheres without mass effect. The patient is  intubated. The visualized paranasal sinuses and mastoid air cells are  clear. There is no evidence for intracranial hemorrhage, acute  infarct, mass effect, or skull fracture.  Bilateral angi hole  procedures are noted.      Impression    IMPRESSION: Chronic changes. No evidence for intracranial hemorrhage  or any acute process.       SHAGUFTA MOORE MD   CT Head Neck Angio w/o & w Contrast    Narrative    CTA HEAD NECK WITH CONTRAST 3/2/2019 3:18 PM     HISTORY: Headache, sudden, carotid/vertebral dissection suspected/    TECHNIQUE: Multiplanar multisequence images were obtained through the  neck without and with intravenous contrast. 70 mL of Isovue-370 was  given. Multiplanar reconstructions were performed. 3-D reconstructions  off a remote workstation for CT angiography were also acquired.  Carotid stenoses were evaluated by comparing the caliber of the  proximal internal carotid artery to the caliber of the distal internal  carotid artery. Radiation dose for this scan was reduced using  automated exposure control, adjustment of the mA and/or kV according  to patient size, or iterative reconstruction technique.    FINDINGS:    Brachiocephalic vessels: Normal.    Right carotid system: Normal.    Left carotid system: Trace amount of plaque. No stenosis or  dissection.    Right vertebral artery: Normal.    Left vertebral artery: Normal.    [place]: Normal.    Other findings: The patient is intubated. The tip of the ET tube is  above the sánchez. Trachea has slight deviation to the right. There is  a moderate size right pleural effusion. Degenerative changes are seen  in the spine. There is some minimal degenerative spondylolisthesis of  C3 and C4.      Impression    IMPRESSION:  1. Negative CT angiography head and neck.  2. Moderate size right pleural effusion.     SHAGUFTA MOORE MD   CT Chest (PE) Abdomen Pelvis w Contrast    Narrative    CT CHEST PE, ABDOMEN PELVIS WITH CONTRAST   3/2/2019 3:26 PM     HISTORY: Trauma. Altered mental status. Hypotension.    TECHNIQUE: 75 mL Isovue-370 IV were administered. After contrast  administration, volumetric helical sections were acquired from  the  thoracic inlet to the ischial tuberosities. Pulmonary embolism  protocol was performed through the chest. Coronal images were also  reconstructed. Radiation dose for this scan was reduced using  automated exposure control, adjustment of the mA and/or kV according  to patient size, or iterative reconstruction technique.    COMPARISON: CT of the abdomen and pelvis performed 10/24/2017.    FINDINGS:    Chest: No evidence for pulmonary embolism or thoracic aortic  dissection. Atherosclerotic calcification of the thoracic aorta.  Endotracheal tube is in place, with tip approximately 2.5 cm above the  sánchez. Small to moderate right pleural effusion, with mild associated  compressive atelectasis at the right lung base posteriorly. No  pericardial or left pleural effusion. There is mild atelectasis at the  left lung base posteriorly. Mild patchy mosaic attenuation is noted in  the lungs bilaterally. There are displaced acute appearing fractures  involving the right 10th and 11th ribs posteriorly. Mildly displaced  fractures of the right second rib in two places laterally and  anteriorly. There are also mildly displaced fractures of the right  third through sixth ribs laterally. Nondisplaced fracture of the left  second rib laterally.    Abdomen and Pelvis: No bowel obstruction. There is a moderate amount  of stool in the rectum. No free fluid in the pelvis. No convincing  evidence for colitis or diverticulitis. No intra-abdominal fluid  collections to suggest hematoma. No free intraperitoneal air.  Scattered cortical scarring in the left kidney is not significantly  changed, given differences in technique. The liver, gallbladder,  spleen, adrenal glands, pancreas, and kidneys are otherwise  unremarkable. No hydronephrosis. No evidence for solid organ injury.  Mild atherosclerotic aortoiliac calcification. The uterus is not seen,  and is likely surgically absent. Degenerative changes are noted in the  thoracolumbar  spine. Displaced acute appearing fractures are noted  involving the L1 and L2 transverse processes on the right.      Impression    IMPRESSION:   1. Displaced acute appearing fractures are noted involving the right  10th and 11th ribs posteriorly, as well as the right 2nd through 6th  ribs laterally. Nondisplaced fracture of the left second rib laterally  may also be acute. There are also acute-appearing displaced fractures  of the L1 and L2 transverse processes on the right.  2. No evidence for pulmonary embolism.  3. Small to moderate right pleural effusion.  4. Mild patchy mosaic attenuation in the lungs bilaterally may be  related to air trapping.  5. Moderate amount of stool in the rectum.      NUVIA ISLAS MD     Labs (personally reviewed/interpreted):  See a/p.  D/w BARI Hawkins of palliative care service.

## 2019-03-12 NOTE — PROGRESS NOTES
"Palliative Care Inpatient Clinical Social Work Assessment    Patient Information:  Erin Anderson is a 78 year old woman with diagnosis of advanced parkinson's.  She was admitted on March 2nd after being found unresponsive at her memory care unit. She had a fall 2 weeks ago, the restultant pain therapy likely lowered her seizure threshold.  She was found to be post-ictal, intubated for 10 days.  Now extubated and plan is for transfer out of ICU.     Palliative Care consulted to help with ongoing goals of care discussions.     I met with Erin's , Surjit and daughters Therese and Kayleigh.        Relevant Symptoms/Concerns     Physical:  Erin was resting when I visited today, I met with her family outside of the room.  Her daughters and  shared that Erin has told them, and has a health care directive, that states that she would not want any aggressive interventions that would not allow for meaningful recovery, but she also wrote that she fears that \"others might jump too quickly in designating my health condition as terminally ill\".  Family were able to reflect that \"meaningful and recovery\" are objective ideas that can change when faced with serious illness.  They are very committed to advocating for Erin to give her the best possible chance at recovery.  Asked appropriate questions about swallowing, feeding tubes, and hospice cares.  They express a strong sense of trust in her treatment team.      Family shared that they are cautiously hopeful that she will have more recovery than they initially thought from this event and that she might be able to return to her memory care unit where she has lived for approximately 6 months and where family report she has been very happy.       Psychological/Emotional/Existential:  Unable to assess today.  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  They are cautiously hopeful that she might recover, but are also open to discussing how they hope to care for her if she does start to decline again.          Developmental:   Mental Health:     End of Life:     Cultural/Muslim/Spiritual:     Grief/Loss:     Concurrent Stressors:       Comments:        Goals/Decision Making/Advance Care Planning   Preferences:  Goals are evolving as we wait to see how much recovery Erin is able to make.  Family do agree with plan to not re intubate and to not attempt CPR if her condition deteriorates     Concerns:     Documents:  Has a POLST and a health care directive on file   Decision Making Issues:  Family serving as surrogate decision makers.      Comments:      Resource Needs     Discharge Planning:  Per Unit/Program  and/or Care Coordinator   Other:     Comments:      Sources of Information   Patient:     Family:  x   Staff:  x   Chart Review:  x   Other:      Intervention (Check all that apply)    x   Assessment of palliative specific issues      x   Introduction of Palliative clinical social work interventions    x   Adjustment to illness counseling       Advanced care planning       Attended/participated in care conference       Behavioral interventions for symptom management    x   Facilitation of processing of thoughts/feelings    x   Family communication facilitated       Grief counseling    x   Goals of care discussion/facilitation       Life legacy work    x   Life review facilitation       Psychoeducation       Re-framing       Resource referral       Resources Provided        Other:       Comments:      Plan:  At this time " Palliative will be available for support as needed.  Family feel well prepared to advocate for Erin as her treatment team supports her recovery.  They understand our involvement will likely be more active if there are significant care decisions to make, goals conversations, or a drastic change in her condition.  Family did indicate that a care conference might be helpful at some point as Erin's condition is complicated and involves numerous specialists.

## 2019-03-12 NOTE — PROGRESS NOTES
Brief update:  Question arose of appropriate activity orders in the setting of neurosurgery previously recommending a brace. Orthotics had recommended a TLSO, but the family expresses concern of her tolerance of that. Discussed with neurosurgery, okay with trying lumbar corset brace instead. If patient is unable to tolerate that, okay for her to be up without a brace. Lumbar corset brace ordered by neurosurgery. Discussed plan with family, they are in agreement.  Natacha Busby, JIEP

## 2019-03-12 NOTE — PLAN OF CARE
OT:Orders received and chart reviewed. Will plan to hold OT evaluation today, as per chart/PT, pt. having difficulty/intermittently following commands, also waiting to hear about need for back brace w/ mobility, due to pt.'s L1-2 transverse fx.'s.

## 2019-03-12 NOTE — PLAN OF CARE
Discharge Planner SLP   Patient plan for discharge: Did not state  Current status: A bedside swallow evaluation was completed this pm.  Patient presents with moderate-severe oral-pharyngeal dysphagia at bedside.  Deficits/risk factors include recent prolonged intubation, weak voicing and cough, decreased awareness, hx of dysphagia due to Parkinson's Disease, decreased oral motor function, delayed swallows, and throat clearing with ice chip trials.  Unable to rule out silent aspiration at bedside.  Recommend NPO status and frequent oral cares.  Plan to provide swallow Tx with ongoing assessment to determine safety for po intake.  A video swallow study may be indicated prior to return to a regular diet pending progress in Tx.  Will update recommendations as indicated.  Barriers to return to prior living situation: Level of assist, weakness  Recommendations for discharge: TCU vs return to memory care with SLP; full needs to be determined  Rationale for recommendations: SLP swallow Tx to maximize swallow safety for a least restrictive diet       Entered by: Yoli Urrutia 03/12/2019 3:13 PM

## 2019-03-12 NOTE — PLAN OF CARE
"PT: Evaluation orders received, states pt with L1-L2 transverse fracture. Initial notes indicate orthotics consult for brace. Orthotist note from 3/8 indicates the pt would need a custom TLSO, per note family had declined to have the brace made. Today upon PT entry, pt's daughter is enquiring about a back brace for mobility. Dicussed with RN, RN to follow up with ICU MD.    Per family pt resides at the [facility]. The pt's mobility can go from just a hand to hold to needing 3 people to mobilize. \"It depends on the day and who the caregiver is.\" Per Daughter \"Sometimes she just gets up and goes to the bathroom,\" but for the most part she uses a wheelchair for locomotion. Daughter also states the pt dislocated her R shoulder a while back, states she was \"sliding out of bed on purpose\" to \"Practice how she would get back up onto the bed.\"     Holding PT at this time until there is a more clear picture of weather or not the pt should have a brace on with mobility. Also, per RN pt only follows commands intermittently.   "

## 2019-03-12 NOTE — PLAN OF CARE
Pt remained on oxymizer mask throughout night. Minimal secretions suctioned Not follow following commands at this time. Per family request pt turned q4h. U/O adaquate. VSS. Will continue to monitor

## 2019-03-12 NOTE — PLAN OF CARE
Pt is lethargic but able to follow simple commands intermittently. Pt able to answer yes/no questions.   Tele. NSR 1st degree BBB. BP stable. Goal sbp<170.   Able to titrate oxygen to 2 L NC. Dyspneic with activity.   TF at goal. Steroids d/arik blood sugars more controlled with sliding scale.   Orthotics consulted for brace.   Palliative care consulted.   Family at bedside and updated.

## 2019-03-13 NOTE — PROGRESS NOTES
While returning patient to bed feeding tube marker at 58 originally 98. Reinserted to 95. Notified provider. Abdominal xray to confirm placement.

## 2019-03-13 NOTE — PLAN OF CARE
Discharge Planner PT   Patient plan for discharge: Pt's daughter Kassandra hoping return to memory care unit  Current status: PT consult received. Chart reviewed. PT evaluation and treatment initiated. 79y/o F presents with AMS on 3/2/19.  She was coughing and choking on food and lost consciousness.  She had a second similar episode at her facility.  She was then found down without a pulse and CPR was initiated with rapid ROSC.  CEEG showed left frontalcentral seizures. Also found to have L1/L2 transverse process fx and rib fx. Recommended LSO brace. History of Parkinsons disease. Daughter Kassandra also reports hx of right shoulder dislocation and reports pt recommended to wear a sling to promote immobilization in hope of developing a frozen shoulder-daughter currently declining sling on pt to not confuse pt due to limited mobility at this time. Pt lives at memory care unit and is assisted with all ADL/IADL's by staff. Per daughter, pt was able to transfer herself wheelchair <> toilet with mod IND. Per daughter, pt inconsistent with her transfers, may need 1-3 person A depending on day.  Daughter reports they were private paying for a PT to assist with continued strengthening for pt to assist with transfers.     Pt very lethargic, has been up in commbolizer chair since OT session this AM. Falling asleep at end of session. Minimal command following with repeated cues. pt grossly demos antigravity to partial gravity strength bilateral LE, unable to follow commands for formal MMT. Required ceiling lift for chair to bed transfer, max A x 2 with rolling left and right. dyskinesia noted bilateral UE/LE in bed with dependent rolling.   Barriers to return to prior living situation: level of assist, spinal precautions  Recommendations for discharge: return to memory care unit with 27/7 assist with mobility, may recommend rayray lift for transfers dependent on level of progress  Rationale for recommendations: Per discussion with daughter  Kassandra, hope is to return to memory care unit, Kassandra reports facility has A x 2 staff available and if pt needs rayray lift may need to transfer floors at facility, which she would prefer rather than TCU  for pt to stay in a familiar environment. Recommend home PT at discharge.       Entered by: Gila Jo 03/13/2019 1:08 PM

## 2019-03-13 NOTE — PLAN OF CARE
Pt is lethargic but able to follow simple commands intermittently. Pt able to answer yes/no questions.   Tele. NSR 1st degree BBB. BP stable. Goal sbp<170.   Dyspneic with activity/agitation. PRN nebs  TF at goal. Cont to monitor CBG's insulin coverage with sliding scale. Pt NPO. Speech following.   Corset brace on for activity. Pt up in chair today x 2 hours. Tolerated well. PT/OT following.   Report called to RN and pt to transfer to station [id number]   Family at bedside and updated.

## 2019-03-13 NOTE — PLAN OF CARE
Discharge Planner SLP   Patient plan for discharge: Patient not able to state.   Current status: Patient seen for swallow treatment bedside with her daughter present. Able to complete protrusion and lateralization with max verbal and visual/tactile cues. Limited ROM and strength. Congested breathing with cough x1 without PO intake but unable to clear. Decreased oral awareness, poor bolus control and propulsion posteriorly with spilling orver the tongue base with absent and incomplete swallow. Increased wetness noted. No further trials given of nectar thick water by the spoon. Continues to present with severe oral and pharyngeal dysphagia. Recommend: 1. Continue NPO with TF and SLP will follow daily for PO readiness and video swallow study if indicated.   Barriers to return to prior living situation: Acuity of her illness.   Recommendations for discharge: TCU  Rationale for recommendations: Will likely need ST needs at next level of care for dysphagia to return to PO intake as tolerated. Patient is well below her baseline.        Entered by: Nasima Thakur 03/13/2019 9:43 AM

## 2019-03-13 NOTE — PLAN OF CARE
Discharge Planner OT   Patient plan for discharge: family hoping she can return to memory care unit    Current status: order received, chart reviewed, eval completed, tx initiated. Pt presents with AMS on 3/2/19.  She was coughing and choking on food and lost consciousness.  She had a second similar episode at her facility.  She was then found down without a pulse and CPR was initiated with rapid ROSC.  CEEG showed left frontalcentral seizures. History of Parkinsons diseae. Pt lives at memory care unit and is assisted with all ADL/IADL's by staff. Per daughter, pt was able to transfer herself wheelchair <> toilet with mod IND. Per daughter, pt inconsistent with her transfers, may need 1-3 person A depending on day.     Pt very lethargic this date with max cues needed for alertness. Max A x 2 for rolling in bed to don LSO brace. LUE PROM WNL. pt able to squeeze fingers on command. Dependent with A x 2 for supine > sit. Pt sat EOB with max A for sitting balance. Ceiling lift used for bed > commobilizer chair transfer. Pt positioned up in commobilzer chair. Per discussion with daughter- hopes that pt can return to baseline. Pt inconsistent with levels of A needed at baseline. Per daughter, she was able to transfer herself to/from toilet IND. Pt mostly uses wheelchair for mobility due to high frequency of falls.     Barriers to return to prior living situation: current level of A, spinal precautions    Recommendations for discharge: pending pt progress and level of A facility can provide- return to memory care unit with 24/7 A for mobility and all ADL/IADL's     Rationale for recommendations: per family, they hope pt will improve with alertness and mobility and be safe to return to memory care unit with continued A for mobility and ADL's. Will continue to assess and update discharge recs as pt able to fully participate in therapy session.         Entered by: Delilah Gleason 03/13/2019 8:16 AM

## 2019-03-13 NOTE — PROGRESS NOTES
Vascular Neurology Progress Note    ____________________________________________________________    Admission Summary:  Loan Anderson is 78 year old female who presents with AMS on 3/2/19.  She was coughing and choking on food and lost consciousness.  She had a second similar episode at her facility.  She was then found down without a pulse and CPR was initiated with rapid ROSC.  CEEG showed left frontalcentral seizures.  Etiology is though to be her underlying neurodegenerative disease and high doses of tramadol, which she was taking for a recent fall.    Last 24 hours:      Slept well, was up to chair today. Speech still not intelligible    Impression:    Encephalopathy   Status epilepticus- resolved, probably provoked by Tramadol usage along with history of previous bilateral frontal angi holes  Respiratory failure s/p intubated  PD  Rem sleep behavior disorder    Recommendations:     - Continue lacosamide 150mg q12 hrs  - Stop depakote after tonight's dose (ordered as such)  - Continue seroquel at bedtime, consider discontinuing or making prn if continues to do well  - Resume home long acting Sinemet once able, also make timing of seroquel as close to home schedule as possible, although tube feed timing will limit this      Will sign off, please call with any questions    Zunilda Torrez PA-C  Neurology  03/13/2019 1:29 PM  Pager: [phone]      ____________________________________________________________________        Medications:      Current Facility-Administered Medications:      acetaminophen (TYLENOL) tablet 650 mg, 650 mg, Oral, Q6H, 650 mg at 03/11/19 0549 **OR** acetaminophen (TYLENOL) solution 650 mg, 650 mg, Per NG tube, Q6H, Gregg Colorado MD, 650 mg at 03/13/19 1217     albuterol (PROVENTIL) neb solution 2.5 mg, 2.5 mg, Nebulization, Q2H PRN, Gregg Colorado MD, 2.5 mg at 03/13/19 1228     amLODIPine (NORVASC) tablet 10 mg, 10 mg, Oral, Daily, Gregg Colorado MD, 10 mg at 03/13/19  0817     aspirin (ASA) chewable tablet 81 mg, 81 mg, Oral, At Bedtime, Gregg Colorado MD, 81 mg at 03/12/19 1958     carbidopa-levodopa (SINEMET)  MG per tablet 2 tablet, 2 tablet, Oral, 4x Daily, Streib, Rigoberto Escobedo MD, 2 tablet at 03/13/19 1217     dextrose 10 % 1,000 mL infusion, , Intravenous, Continuous PRN, Gregg Colorado MD     glucose gel 15-30 g, 15-30 g, Oral, Q15 Min PRN **OR** dextrose 50 % injection 25-50 mL, 25-50 mL, Intravenous, Q15 Min PRN **OR** glucagon injection 1 mg, 1 mg, Subcutaneous, Q15 Min PRN, Gregg Colorado MD     docusate (COLACE) 50 MG/5ML liquid 100 mg, 100 mg, Oral or Feeding Tube, BID, Gregg Colorado MD, 100 mg at 03/12/19 2022     heparin sodium injection 5,000 Units, 5,000 Units, Subcutaneous, Q8H, Gregg Colorado MD, 5,000 Units at 03/13/19 0547     hydrALAZINE (APRESOLINE) injection 10-20 mg, 10-20 mg, Intravenous, Q4H PRN, Gregg Colorado MD, 10 mg at 03/11/19 0449     hypromellose-dextran (ARTIFICAL TEARS) 0.1-0.3 % ophthalmic solution 1 drop, 1 drop, Both Eyes, Q1H PRN, Gregg Colorado MD, 1 drop at 03/07/19 0352     insulin aspart (NovoLOG) inj (RAPID ACTING), 1-12 Units, Subcutaneous, Q4H, Gregg Colorado MD, 2 Units at 03/13/19 1223     labetalol (NORMODYNE/TRANDATE) syringe 10-20 mg, 10-20 mg, Intravenous, Q4H PRN, Gregg Colorado MD     lacosamide (VIMPAT) solution 150 mg, 150 mg, Oral or Feeding Tube, BID, Leticia Avilez MD, 150 mg at 03/13/19 0851     levothyroxine (SYNTHROID/LEVOTHROID) tablet 112 mcg, 112 mcg, Oral, QAM AC, Gregg Colorado MD, 112 mcg at 03/13/19 0817     Lidocaine (LIDOCARE) 4 % Patch 3 patch, 3 patch, Transdermal, Q24h, 3 patch at 03/12/19 1959 **AND** lidocaine patch REMOVAL, , Transdermal, Q24H **AND** lidocaine patch in PLACE, , Transdermal, Q8H, Gregg Colorado MD     magnesium sulfate 2 g in water intermittent infusion, 2 g, Intravenous, Daily PRN, Gregg Colorado MD     magnesium sulfate 4 g in 100 mL  sterile water (premade), 4 g, Intravenous, Q4H PRN, Gregg Colorado MD     naloxone (NARCAN) injection 0.1-0.4 mg, 0.1-0.4 mg, Intravenous, Q2 Min PRN, Gregg Colorado MD     ondansetron (ZOFRAN-ODT) ODT tab 4 mg, 4 mg, Oral, Q6H PRN **OR** ondansetron (ZOFRAN) injection 4 mg, 4 mg, Intravenous, Q6H PRN, Gregg Colorado MD     oxyCODONE (ROXICODONE) tablet 5-10 mg, 5-10 mg, Oral, Q3H PRN, Gregg Colorado MD, 10 mg at 03/12/19 0319     pantoprazole (PROTONIX) 2 mg/mL suspension 40 mg, 40 mg, Oral, QAM AC, Gregg Colorado MD, 40 mg at 03/13/19 0817     polyethylene glycol (MIRALAX/GLYCOLAX) Packet 17 g, 17 g, Oral, Daily PRN, Gregg Colorado MD     potassium chloride (KLOR-CON) Packet 20-40 mEq, 20-40 mEq, Oral or Feeding Tube, Q2H PRN, Gregg Colorado MD, 40 mEq at 03/13/19 0547     potassium chloride ER (K-DUR/KLOR-CON M) CR tablet 20-40 mEq, 20-40 mEq, Oral, Q2H PRN, Gregg Colorado MD     QUEtiapine (SEROquel) half-tab 12.5 mg, 12.5 mg, Oral, At Bedtime, Gopi Cabrera MD, 12.5 mg at 03/12/19 1958     sodium chloride 0.9% infusion, , Intravenous, Continuous, Gregg Colorado MD, Stopped at 03/11/19 1400     valproic acid (DEPAKENE) solution 250 mg, 250 mg, Oral or Feeding Tube, Q12H, Zunilda Torrez PA-C, 250 mg at 03/13/19 0817     venlafaxine (EFFEXOR) tablet 37.5 mg, 37.5 mg, Oral, TID, Gregg Colorado MD, 37.5 mg at 03/13/19 0817    Vital Signs:    Temp:  [98.4  F (36.9  C)-99.3  F (37.4  C)] 99  F (37.2  C)  Pulse:  [56-73] 73  Heart Rate:  [56-73] 73  Resp:  [12-23] 18  BP: ()/(42-79) 118/63  SpO2:  [92 %-99 %] 97 %      General: Extubated, restless     Neuro:  Mental status: answers some yes/no questions, wiggles toes on command, shows 2 fingers on R hand. Speech unintelligible  CN: R pupil 2mm, L 3mm and equally reactive, face appears grossely symmetric, does blink to threat bilaterally. Briefly makes eye contact. Protrudes tongue minimally. Eyes open spontaneously  Moves arm and leg  bilateral minimally, not antigravity off the bed    Labs/Studies:  CBC:     Recent Labs   Lab 03/13/19  0526 03/12/19  0600 03/11/19  0505   WBC 15.8* 16.5* 13.8*   RBC 4.14 3.90 3.92   HGB 12.5 11.8 11.7   HCT 38.3 35.7 35.6    388 308     Basic Metabolic Panel:   Recent Labs   Lab Test 03/13/19  0526 03/12/19  0600 03/11/19  0505    134 135   POTASSIUM 3.3* 4.0 3.8   CHLORIDE 94 94 96   CO2 35* 31 32   BUN 52* 41* 31*   CR 1.08* 0.86 0.71   * 301* 196*   NIKKIE 9.0 8.8 8.8     Liver panel:  Recent Labs   Lab Test 03/11/19  0505 03/10/19  0530 03/02/19  1449 10/24/17  0700 10/23/17  0653 10/21/17  1751   PROTTOTAL 7.0 6.7* 7.0  --  6.7* 7.7   ALBUMIN 2.3* 2.3* 3.2* 2.3* 2.3* 2.9*   BILITOTAL 0.4 0.3 0.3  --  0.4 0.8   ALKPHOS 138 127 137  --  218* 258*   AST 21 23 21  --  38 61*   ALT 13 10 7  --  10 13     INR:  Recent Labs   Lab Test 03/02/19  1449 02/27/19  0540   INR 1.04 0.93      Lipid Profile:No lab results found.  A1C:   Recent Labs   Lab Test 03/02/19  1449 10/23/17  0653   A1C 6.7* 6.6*     Troponin I:   Recent Labs   Lab Test 03/02/19  1449   TROPI <0.015         Imaging:    No new 3/13

## 2019-03-13 NOTE — PROGRESS NOTES
03/13/19 1040   Quick Adds   Type of Visit Initial PT Evaluation   Living Environment   Lives With facility resident   Living Arrangements assisted living   Home Accessibility no concerns   Living Environment Comment memory care unit. is alone at some points. Daughter reports multiple staff can assist pt if needed for transfers, daughter Kassandra reports may need to transition to another floor if a rayray lift and increased cares are recommended at d/c   Self-Care   Usual Activity Tolerance moderate   Current Activity Tolerance poor   Equipment Currently Used at Home wheelchair, manual   Functional Level Prior   Ambulation 3-->assistive equipment and person   Transferring 3-->assistive equipment and person   Toileting 3-->assistive equipment and person   Bathing 3-->assistive equipment and person   Fall history within last six months yes   Number of times patient has fallen within last six months 20  (per chart)   Which of the above functional risks had a recent onset or change? transferring   Prior Functional Level Comment Pt is assisted with all ADL's and transfers as needed. Per daughter, pt is able to transfer herself to toilet at times. Daughter reports pt varies with level of assist from IND to A x 3 depending on the day   General Information   Onset of Illness/Injury or Date of Surgery - Date 03/02/19   Referring Physician Gregg Colorado MD   Patient/Family Goals Statement none stated   Pertinent History of Current Problem (include personal factors and/or comorbidities that impact the POC) 77y/o F presents with AMS on 3/2/19.  She was coughing and choking on food and lost consciousness.  She had a second similar episode at her facility.  She was then found down without a pulse and CPR was initiated with rapid ROSC.  CEEG showed left frontalcentral seizures. Also found to have L1/L2 transverse process fx and rib fx. Recommended LSO brace. History of Parkinsons disease. Daughter Kassandra also reports hx of right  shoulder dislocation and reports pt recommended to wear a sling to promote immobilization in hope of developing a frozen shoulder-daughter currently declining sling on pt to not confuse pt due to limited mobility at this time.   Precautions/Limitations fall precautions;spinal precautions  (LSO)   Weight-Bearing Status - RUE (pt's daughter reports immobility in sling rec by Ortho MD)   General Observations sitting in commbolizer chair in room, Daughter Kassandra present during session.    General Info Comments ICU monitoring   Cognitive Status Examination   Orientation place;person   Level of Consciousness lethargic/somnolent   Follows Commands and Answers Questions able to follow single-step instructions;25% of the time  (with repetition)   Personal Safety and Judgment impaired   Memory (pt in memory care unit)   Pain Assessment   Patient Currently in Pain Yes, see Vital Sign flowsheet  (reports increased LBP with hip movement when asked)   Range of Motion (ROM)   ROM Comment bilateral LE WFL with AAROM, right shoulder not tested(pt's daughter Kassandra reports hx of shoulder dislocations and recommended to wear sling to promote immobilization of shoulder with hope to lead to frozen shoulder to assist with decreased risk of [id number] declining applying sling on pt now as pt not as mobile and gets confused with it on)   Strength   Strength Comments pt grossly demos antigravity to partial gravity strength bilateral LE, unable to follow commands for formal MMT, partial gravity strength noted in bilateral biceps (difficutly with command following)   Bed Mobility   Bed Mobility Comments max A x 2 with rolling left and right and boosting up in the bed at end of session, pt with eyes closed at end of session. Noted dyskinesia bilateral UE/LE with rolling in bed   Transfer Skills   Transfer Comments transfered from commbolizer chair to bed with A x 2 and ceiling lift.    Gait   Gait Comments non-ambulatory at baseline  "  Balance   Balance Comments noted leaning to the right in the commbolizer chair, not tested without back support during session, however OT note stating MAX assist required for sitting balance   Sensory Examination   Sensory Perception Comments pt nods yes to feeling of light touch bilateral LE, however difficutly with formal testing    Coordination   Coordination Comments dyskinesia noted bilateral UE/LE in bed with dependent rolling    General Therapy Interventions   Planned Therapy Interventions balance training;bed mobility training;motor coordination training;neuromuscular re-education;strengthening;transfer training   Clinical Impression   Criteria for Skilled Therapeutic Intervention yes, treatment indicated   PT Diagnosis impaired transfers   Influenced by the following impairments impaired functional strength, impaired cognition, impaired motor planning, pain, activity tolerance   Functional limitations due to impairments impaired safety and independence with transfers   Clinical Presentation Evolving/Changing   Clinical Presentation Rationale based on clinical presentation in ICU, PMHx, baseline level of mobility, cognition   Clinical Decision Making (Complexity) High complexity   Therapy Frequency` daily   Predicted Duration of Therapy Intervention (days/wks) 1 week   Anticipated Equipment Needs at Discharge (TBD, may recommend rayray lift at d/c dependent on progress)   Anticipated Discharge Disposition Home with Assist;Home with Home Therapy   Risk & Benefits of therapy have been explained Yes   Patient, Family & other staff in agreement with plan of care Yes   [facility] [employer] TM \"6 Clicks\"   2016, Trustees of [facility], under license to [employer].  All rights reserved.   6 Clicks Short Forms Basic Mobility Inpatient Short Form   [facility] AM-PAC  \"6 Clicks\" V.2 Basic Mobility Inpatient Short Form   1. Turning from your back to your side while in a flat bed without using " bedrails? 1 - Total   2. Moving from lying on your back to sitting on the side of a flat bed without using bedrails? 1 - Total   3. Moving to and from a bed to a chair (including a wheelchair)? 1 - Total   4. Standing up from a chair using your arms (e.g., wheelchair, or bedside chair)? 1 - Total   5. To walk in hospital room? 1 - Total   6. Climbing 3-5 steps with a railing? 1 - Total   Basic Mobility Raw Score (Score out of 24.Lower scores equate to lower levels of function) 6   Total Evaluation Time   Total Evaluation Time (Minutes) 35

## 2019-03-13 NOTE — PROGRESS NOTES
Critical Care  Note      03/13/2019    Name: Loan Anderson MRN#: [id number]   Age: 78 year old YOB: 1940     [facility] Day# 11  ICU DAY #11                 Problem List:   Acute encephalopathy--resolved  Partial status epilepticus -- resolved  Rib fractures  L1/L2 transverse process fx         Summary/Hospital Course:     78F with parkinsons disease who lives in a memory center now presents from [facility] after being found acutely encephalopathic and mildly agitated/combative by her  around 2 pm on day of admission.  Apparently there had been an episode earlier in the day where she had turned red and coughed 3-4 min after taking pills and family member did the heimlich maneuver.  She returned to her normal state after this but no source of asphyxiation was found.  Later in the day her  went to get the mail and when he returned to their home (~10 min) he found staff doing cpr on her.  Apparently she became unresponsive at some point and cpr was started, but on EMS arrival she did have a perfusing rhythm.     A couple days prior to admit she had a mechanical fall resulting in R hip pain.  No injury noted at that time, but today has several rib fx on CT scan (may be due to cpr?) and transverse process L1L2 fx's--suspect these may be from fall.    Continues to do well today--more awake/alert/responsive, breathing more comfortable.  Denies dyspnea, chest pain, abd pain, nausea, vomiting.      Assessment and plan :     Loan Anderson IS a 78 year old female admitted on 3/2/2019 for acute encephalopathy.   I have personally reviewed the daily labs, imaging studies, cultures and discussed the case with referring physician and consulting physicians.   My assessment and plan by system for this patient is as follows:    Neurology/Psychiatry:   1. Acute encephalopathy:  Overall improved.  Still not fully clear, but suspecting this may be more underlying parkinsons dz.  2.  Subclinical  status epilepticus:  Was taking more tramadol than usual after fall week before admission--this would lower seizure threshold.  Now resolved on vimpat and valproate.  Appreciate neurocrit input--they are weaning down valproate.  3. Analgesia:  lidoderm patches on chest for rib fx.  PO tylenol standing.  Prn oxycodone  4. H/o parkinsons dz:  Appreciate neuro crit input.  Will continue short acting carbo/levodopa.  Holding long-acting and patch forms for now.    Cardiovascular:    1.  Htn:  Transitioned lisinopril to amlodipine in light of tongue/lip swelling 3/9-10.  Apparently has not been on lisinopril for long.  Uncertain whether this is true angioedema or not, but will add lisinopril to allergy list.  Discussed avoidance of ACE-I's with family.  Increased amlodipine to 10 on 3/12.    Pulmonary/Ventilator Management:   1. Hypoxemia:  Improving.  S/p diuresis.  Intermittent wheezing which responds well to nebs.  2. Edema: Some tongue swelling on 3/9-10.  Improved 3/11 on steroids, has not recurred.  Stopped steroids 3/13.    GI and Nutrition :   1.  Tube feeds.    Renal/Fluids/Electrolytes:   1. YASMIN on CKD: Mild--creat up to 1.08 today.  Suspect becoming dry with diuresis.  Stop diuresis today.  2.  Hypervolemia:  Seems to have resolved.  Diuretic stopped today.    Infectious Disease:   1. Procal ok <0.05 and no clear source of infection.  Cultures remain ngtd.  Stopped all abx 3/5/19. WBC slightly up today but no fevers, will continue to follow, culture if she has fever.    Endocrine:   1. Hyperglycemia: likely due to steroids. sliding scale insulin, stopped steroids 3/13.  2.  Hypothyroid:  Continue home synthroid.    Hematology/Oncology:   1.  Anemia recovered to baseline (12.5 today--stable).  2.  WBC mildly elevated to 15.8 in setting of recent steroids but now downtrending; pltlts ok 442.    ICU Prophylaxis:   1. DVT: Hep Subq/ mechanical  2. Wound care  - per unit routine  3. Feeding -  tf  4. Family Update:   at bedside today.  5. Disposition - may leave ICU           Medical History:     Past Medical History:   Diagnosis Date     Constipation 7/15/2011     Convergence insufficiency 7/15/2011     Depression 7/15/2011     Diplopia 7/18/2011     Hot flashes, menopausal 7/18/2011     Hypercholesterolemia 7/18/2011     Hypothyroid 7/15/2011     Neurogenic bladder 7/15/2011     Parkinson disease (H) 7/15/2011     REM sleep behavior disorder 7/18/2011     Sebaceous cyst 7/18/2011     Wears glasses 7/18/2011     Past Surgical History:   Procedure Laterality Date     ------------OTHER-------------  november ? 2015    lithrotripsy for renal stone     ------------OTHER-------------  november 2015    had sepsis from uti and hospitalized     BRAIN SURGERY  12 or 14 oct 2011    gene therapy enrolled study at [place]     Social History     Socioeconomic History     Marital status:      Spouse name: Not on file     Number of children: Not on file     Years of education: Not on file     Highest education level: Not on file   Occupational History     Not on file   Social Needs     Financial resource strain: Not on file     Food insecurity:     Worry: Not on file     Inability: Not on file     Transportation needs:     Medical: Not on file     Non-medical: Not on file   Tobacco Use     Smoking status: Never Smoker     Smokeless tobacco: Never Used   Substance and Sexual Activity     Alcohol use: Yes     Comment: rare     Drug use: Not on file     Sexual activity: Not on file   Lifestyle     Physical activity:     Days per week: Not on file     Minutes per session: Not on file     Stress: Not on file   Relationships     Social connections:     Talks on phone: Not on file     Gets together: Not on file     Attends Orthodox service: Not on file     Active member of club or organization: Not on file     Attends meetings of clubs or organizations: Not on file     Relationship status: Not on file     Intimate partner violence:     Fear  of current or ex partner: Not on file     Emotionally abused: Not on file     Physically abused: Not on file     Forced sexual activity: Not on file   Other Topics Concern     Parent/sibling w/ CABG, MI or angioplasty before 65F 55M? Not Asked   Social History Narrative    Spouse is serenity        Allergies   Allergen Reactions     Ace Inhibitors      Tongue and lip swelling just after restarting lisinopril on 3/9/19 at higher dose while intubated.  Unclear if actually angioedema or not as swelling resolved with steroids.     Atorvastatin      Concern that it was contributing to confusion     Mirapex [Pramipexole Dihydrochloride Monohydrate]      Leg swelling     Potassium Chloride      IV infusion only. She tolerates oral potassium chloride     Requip [Ropinirole Hydrochloride]      Leg swelling     Epinephrine Palpitations              Key Medications:       acetaminophen  650 mg Oral Q6H    Or     acetaminophen  650 mg Per NG tube Q6H     amLODIPine  10 mg Oral Daily     aspirin  81 mg Oral At Bedtime     carbidopa-levodopa  2 tablet Oral 4x Daily     docusate  100 mg Oral or Feeding Tube BID     heparin  5,000 Units Subcutaneous Q8H     insulin aspart  1-12 Units Subcutaneous Q4H     lacosamide  150 mg Oral or Feeding Tube BID     levothyroxine  112 mcg Oral QAM AC     lidocaine  3 patch Transdermal Q24h    And     lidocaine   Transdermal Q24H    And     lidocaine   Transdermal Q8H     pantoprazole  40 mg Oral QAM AC     QUEtiapine  12.5 mg Oral At Bedtime     valproic acid  250 mg Oral or Feeding Tube Q12H     venlafaxine  37.5 mg Oral TID       IV fluid REPLACEMENT ONLY       sodium chloride Stopped (03/11/19 1400)        Home Meds    No current facility-administered medications on file prior to encounter.   Current Outpatient Medications on File Prior to Encounter:  aspirin 81 MG tablet Take 81 mg by mouth At Bedtime @ midnight   carbidopa-levodopa (SINEMET CR)  MG CR tablet Take 1 tablet by mouth At  Bedtime At 8 pm   carbidopa-levodopa (SINEMET)  MG tablet Take 2 tablets 4 times daily at 7 am, 10 am, 1 pm, and 4 pm   cholecalciferol 5000 units CAPS Take 1 capsule by mouth every morning @ 6am   docusate sodium (COLACE) 100 MG capsule Take 300 mg by mouth daily 3 x 100mg capsule @ noon   ibuprofen (ADVIL/MOTRIN) 200 MG tablet Take 400 mg by mouth 3 times daily 2 x 200mg tabs by mouth 3/day @ 6am, 12pm and 6pm and 3 x 200mg tabs by mouth every 8 hours as needed   levothyroxine (SYNTHROID/LEVOTHROID) 112 MCG tablet Take 112 mcg by mouth daily @ 6am   lisinopril (PRINIVIL/ZESTRIL) 5 MG tablet Take 5 mg by mouth daily @ 6am   mirabegron (MYRBETRIQ) 25 MG 24 hr tablet Take 25 mg by mouth daily @ 6am   Multiple Vitamins-Minerals (EYE VITAMINS) CAPS Take 1 capsule by mouth daily 1 capsule (macuXLeranthealth) by mouth daily @ midnight   omeprazole (PRILOSEC) 20 MG DR capsule Take 20 mg by mouth daily @ 6am   polyethylene glycol (MIRALAX) powder Take 17 g by mouth daily as needed @0600   rotigotine (NEUPRO) 4 MG/24HR 24 hr patch Apply patch daily @ bedtime/8 pm (Patient taking differently: Place 1 patch onto the skin daily @ bedtime/8 pm)   tamsulosin (FLOMAX) 0.4 MG capsule Take 0.4 mg by mouth daily @ 6pm   traMADol (ULTRAM) 50 MG tablet Take 1 tablet (50 mg) by mouth every 6 hours as needed for severe pain   trospium (SANCTURA XR) 60 MG CP24 24 hr capsule Take 60 mg by mouth every morning (before breakfast) @ 6am   venlafaxine (EFFEXOR) 100 MG tablet Take 100 mg by mouth daily @ 6am              Physical Examination:   Temp:  [98.4  F (36.9  C)-99.3  F (37.4  C)] 98.4  F (36.9  C)  Pulse:  [56-72] 63  Heart Rate:  [56-72] 63  Resp:  [12-23] 20  BP: ()/(42-82) 104/54  SpO2:  [92 %-99 %] 97 %  No intake or output data in the 24 hours ending 03/02/19 1732  Wt Readings from Last 4 Encounters:   03/13/19 90.3 kg (199 lb 1.2 oz)   02/27/19 86.2 kg (190 lb)   02/11/19 88 kg (194 lb)   11/14/18 86.2 kg (190 lb)     BP -  Mean:  [] 75  Resp: 20    No lab results found in last 7 days.      Intake/Output Summary (Last 24 hours) at 3/13/2019 1211  Last data filed at 3/13/2019 1200  Gross per 24 hour   Intake 2130 ml   Output 1845 ml   Net 285 ml       GEN: no acute distress, pleasant  HEENT: head ncat, sclera anicteric, OP patent, trachea midline   PULM: mild accessory use, but not tachypnic, clear anteriorly  NECK:  supple  CV/COR: RRR S1S2 no gallop,  No rub, no murmur  ABD: soft nontender, hypoactive bowel sounds, no mass  EXT:  Minimal edema x4,  Warm x4  NEURO: awake, alert, answers questions.  Moves all 4.  L pupil slightly larger than R but both reactive.   SKIN: no obvious rash  LINES: clean, dry intact         Data:   All data and imaging reviewed     ROUTINE ICU LABS (Last four results)  CMP  Recent Labs   Lab 03/13/19 0526 03/12/19  0600 03/11/19  0505 03/10/19  0530    134 135 138   POTASSIUM 3.3* 4.0 3.8 3.9   CHLORIDE 94 94 96 97   CO2 35* 31 32 38*   ANIONGAP 8 9 7 3   * 301* 196* 153*   BUN 52* 41* 31* 28   CR 1.08* 0.86 0.71 0.77   GFRESTIMATED 50* 65 82 74   GFRESTBLACK 57* 75 >90 86   NIKKIE 9.0 8.8 8.8 8.6   MAG 2.4* 2.3 2.2 2.3   PHOS 3.8 3.2 2.8 3.1   PROTTOTAL  --   --  7.0 6.7*   ALBUMIN  --   --  2.3* 2.3*   BILITOTAL  --   --  0.4 0.3   ALKPHOS  --   --  138 127   AST  --   --  21 23   ALT  --   --  13 10     CBC  Recent Labs   Lab 03/13/19 0526 03/12/19  0600 03/11/19  0505 03/10/19  0530   WBC 15.8* 16.5* 13.8* 13.0*   RBC 4.14 3.90 3.92 3.79*   HGB 12.5 11.8 11.7 11.3*   HCT 38.3 35.7 35.6 34.9*   MCV 93 92 91 92   MCH 30.2 30.3 29.8 29.8   MCHC 32.6 33.1 32.9 32.4   RDW 15.5* 15.2* 14.9 15.1*    388 308 301     INR  No lab results found in last 7 days.  Arterial Blood GasNo lab results found in last 7 days.    All cultures:  No results for input(s): CULT in the last 168 hours.  Recent Results (from the past 24 hour(s))   XR Chest Port 1 View    Narrative    CHEST PORTABLE ONE VIEW      3/2/2019 3:00 PM     HISTORY: Tube placement.    COMPARISON: 2/27/2019.      Impression    IMPRESSION: Endotracheal tube in place in mid trachea. New area of  atelectasis or infiltrate in the right lung base. Left lung is clear.  Heart size is upper normal. Pulmonary vasculature does not appear  distended.      KELLIE BANKS MD   CT Head w/o Contrast    Narrative    CT SCAN OF THE HEAD WITHOUT CONTRAST   3/2/2019 3:10 PM     HISTORY: Altered level of consciousness (LOC), unexplained.    TECHNIQUE:  Axial images of the head and coronal reformations without  IV contrast material.  Radiation dose for this scan was reduced using  automated exposure control, adjustment of the mA and/or kV according  to patient size, or iterative reconstruction technique.    COMPARISON: None.    FINDINGS: There is some moderate cerebral atrophy. There is an old  infarct in the left basal ganglia region. There are some patchy white  matter changes in both hemispheres without mass effect. The patient is  intubated. The visualized paranasal sinuses and mastoid air cells are  clear. There is no evidence for intracranial hemorrhage, acute  infarct, mass effect, or skull fracture. Bilateral angi hole  procedures are noted.      Impression    IMPRESSION: Chronic changes. No evidence for intracranial hemorrhage  or any acute process.       SHAGUFTA MOORE MD   CT Head Neck Angio w/o & w Contrast    Narrative    CTA HEAD NECK WITH CONTRAST 3/2/2019 3:18 PM     HISTORY: Headache, sudden, carotid/vertebral dissection suspected/    TECHNIQUE: Multiplanar multisequence images were obtained through the  neck without and with intravenous contrast. 70 mL of Isovue-370 was  given. Multiplanar reconstructions were performed. 3-D reconstructions  off a remote workstation for CT angiography were also acquired.  Carotid stenoses were evaluated by comparing the caliber of the  proximal internal carotid artery to the caliber of the distal internal  carotid  artery. Radiation dose for this scan was reduced using  automated exposure control, adjustment of the mA and/or kV according  to patient size, or iterative reconstruction technique.    FINDINGS:    Brachiocephalic vessels: Normal.    Right carotid system: Normal.    Left carotid system: Trace amount of plaque. No stenosis or  dissection.    Right vertebral artery: Normal.    Left vertebral artery: Normal.    [place]: Normal.    Other findings: The patient is intubated. The tip of the ET tube is  above the sánchez. Trachea has slight deviation to the right. There is  a moderate size right pleural effusion. Degenerative changes are seen  in the spine. There is some minimal degenerative spondylolisthesis of  C3 and C4.      Impression    IMPRESSION:  1. Negative CT angiography head and neck.  2. Moderate size right pleural effusion.     SHAGUFTA MOORE MD   CT Chest (PE) Abdomen Pelvis w Contrast    Narrative    CT CHEST PE, ABDOMEN PELVIS WITH CONTRAST   3/2/2019 3:26 PM     HISTORY: Trauma. Altered mental status. Hypotension.    TECHNIQUE: 75 mL Isovue-370 IV were administered. After contrast  administration, volumetric helical sections were acquired from the  thoracic inlet to the ischial tuberosities. Pulmonary embolism  protocol was performed through the chest. Coronal images were also  reconstructed. Radiation dose for this scan was reduced using  automated exposure control, adjustment of the mA and/or kV according  to patient size, or iterative reconstruction technique.    COMPARISON: CT of the abdomen and pelvis performed 10/24/2017.    FINDINGS:    Chest: No evidence for pulmonary embolism or thoracic aortic  dissection. Atherosclerotic calcification of the thoracic aorta.  Endotracheal tube is in place, with tip approximately 2.5 cm above the  sánchez. Small to moderate right pleural effusion, with mild associated  compressive atelectasis at the right lung base posteriorly. No  pericardial or left pleural  effusion. There is mild atelectasis at the  left lung base posteriorly. Mild patchy mosaic attenuation is noted in  the lungs bilaterally. There are displaced acute appearing fractures  involving the right 10th and 11th ribs posteriorly. Mildly displaced  fractures of the right second rib in two places laterally and  anteriorly. There are also mildly displaced fractures of the right  third through sixth ribs laterally. Nondisplaced fracture of the left  second rib laterally.    Abdomen and Pelvis: No bowel obstruction. There is a moderate amount  of stool in the rectum. No free fluid in the pelvis. No convincing  evidence for colitis or diverticulitis. No intra-abdominal fluid  collections to suggest hematoma. No free intraperitoneal air.  Scattered cortical scarring in the left kidney is not significantly  changed, given differences in technique. The liver, gallbladder,  spleen, adrenal glands, pancreas, and kidneys are otherwise  unremarkable. No hydronephrosis. No evidence for solid organ injury.  Mild atherosclerotic aortoiliac calcification. The uterus is not seen,  and is likely surgically absent. Degenerative changes are noted in the  thoracolumbar spine. Displaced acute appearing fractures are noted  involving the L1 and L2 transverse processes on the right.      Impression    IMPRESSION:   1. Displaced acute appearing fractures are noted involving the right  10th and 11th ribs posteriorly, as well as the right 2nd through 6th  ribs laterally. Nondisplaced fracture of the left second rib laterally  may also be acute. There are also acute-appearing displaced fractures  of the L1 and L2 transverse processes on the right.  2. No evidence for pulmonary embolism.  3. Small to moderate right pleural effusion.  4. Mild patchy mosaic attenuation in the lungs bilaterally may be  related to air trapping.  5. Moderate amount of stool in the rectum.      NUVIA ISLAS MD     Labs (personally reviewed/interpreted):   See a/p.  D/w Dr. Tapia of hospitalist service.

## 2019-03-13 NOTE — PROGRESS NOTES
03/13/19 0738   Quick Adds   Type of Visit Initial Occupational Therapy Evaluation   Living Environment   Lives With facility resident   Living Arrangements assisted living   Home Accessibility no concerns   Living Environment Comment memory care unit. is alone at some points.    Self-Care   Usual Activity Tolerance moderate   Current Activity Tolerance poor   Equipment Currently Used at Home wheelchair, manual   Functional Level   Ambulation 3-->assistive equipment and person   Transferring 3-->assistive equipment and person   Toileting 3-->assistive equipment and person   Bathing 3-->assistive equipment and person   Dressing 2-->assistive person   Eating 2-->assistive person   Communication 0-->understands/communicates without difficulty   Swallowing 2-->difficulty swallowing liquids   Cognition 1 - attention or memory deficits   Fall history within last six months yes   Which of the above functional risks had a recent onset or change? ambulation;transferring;toileting;bathing;dressing;cognition;eating   Prior Functional Level Comment Pt is assisted with all ADL's and transfers as needed. Per daughter, pt is able to transfer herself to toilet at times.    General Information   Onset of Illness/Injury or Date of Surgery - Date 03/02/19   Patient/Family Goals Statement hopeful pt can return to memory care   Additional Occupational Profile Info/Pertinent History of Current Problem presents with AMS on 3/2/19.  She was coughing and choking on food and lost consciousness.  She had a second similar episode at her facility.  She was then found down without a pulse and CPR was initiated with rapid ROSC.  CEEG showed left frontalcentral seizures   Precautions/Limitations fall precautions;oxygen therapy device and L/min;spinal precautions   Cognitive Status Examination   Level of Consciousness lethargic/somnolent   Follows Commands (Cognition) 50-74% accuracy;follows one step commands   Memory impaired   Cognitive Comment  able to follow one step commands 80% of time. Very slow to respond. Very lethargic this date   Visual Perception   Visual Perception No deficits were identified   Pain Assessment   Patient Currently in Pain No   Range of Motion (ROM)   ROM Comment PROM LUE WFL. Very little AROM. RUE not tested due to torn rotator cuff- per family not mobilize R shoudler    Strength   Strength Comments 2/5 strength    Coordination   Upper Extremity Coordination Left UE impaired;Right UE impaired   Mobility   Bed Mobility Bed mobility skill: Rolling/Turning;Bed mobility skill: Supine to sit   Bed Mobility Skill: Rolling/Turning   Level of [place] - Bed Mobility Skill Rolling Turning maximum assist (25% patients effort)   Physical Assist/Nonphysical Assist 2 persons   Bed Mobility Skill: Supine to Sit   Level of [place]: Supine/Sit dependent (less than 25% patients effort)   Physical Assist/Nonphysical Assist: Supine/Sit 2 persons   Transfer Skill: Bed to Chair/Chair to Bed   Level of [place]: Bed to Chair dependent (less than 25% patients effort)   Physical Assist/Nonphysical Assist: Bed to Chair 2 persons   Assistive Device - Transfer Skill Bed to Chair Chair to Bed Rehab Eval (lift)   Instrumental Activities of Daily Living (IADL)   IADL Comments Facility A with all IADLS   Activities of Daily Living Analysis   Impairments Contributing to Impaired Activities of Daily Living balance impaired;cognition impaired;strength decreased;ROM decreased;pain   General Therapy Interventions   Planned Therapy Interventions ADL retraining;transfer training;strengthening;ROM   Clinical Impression   Criteria for Skilled Therapeutic Interventions Met yes, treatment indicated   OT Diagnosis dec IND with functional transfers   Influenced by the following impairments dec strength, balance, cognition   Assessment of Occupational Performance 5 or more Performance Deficits   Identified Performance Deficits LE dressing, toilet transfer,  "toileting, g/h, UE dressing   Clinical Decision Making (Complexity) Low complexity   Therapy Frequency daily   Predicted Duration of Therapy Intervention (days/wks) 5 days   Anticipated Discharge Disposition Other (see comments)  (return to memory care unit)   Risks and Benefits of Treatment have been explained. Yes   Patient, Family & other staff in agreement with plan of care Yes   [facility] TM \"6 Clicks\"   2016, Trustees of [facility], under license to [employer].  All rights reserved.   6 Clicks Short Forms Daily Activity Inpatient Short Form   [facility]  \"6 Clicks\" Daily Activity Inpatient Short Form   1. Putting on and taking off regular lower body clothing? 1 - Total   2. Bathing (including washing, rinsing, drying)? 1 - Total   3. Toileting, which includes using toilet, bedpan or urinal? 1 - Total   4. Putting on and taking off regular upper body clothing? 1 - Total   5. Taking care of personal grooming such as brushing teeth? 2 - A Lot   6. Eating meals? 2 - A Lot   Daily Activity Raw Score (Score out of 24.Lower scores equate to lower levels of function) 8   Total Evaluation Time   Total Evaluation Time (Minutes) 8     "

## 2019-03-13 NOTE — CONSULTS
Consult Date:  03/13/2019      HOSPITALIST CONSULTATION      PRIMARY CARE PROVIDER:  Not listed.      REQUESTING PROVIDER:  ICU.      REASON FOR CONSULTATION:  Take over primary service.      History is provided by the daughter and the patient's spouse due to patient's altered mental status.      HISTORY OF PRESENT ILLNESS:  Erin Anderson is a 78-year-old female with history of depression, hypothyroidism, Parkinson's disease with neurogenic bladder, who was admitted to the ICU on 03/02/2019 after found unresponsive at her memory care unit at [facility].  The patient had received CPR, but there was no documented pulselessness.  She was ultimately intubated in the Emergency Department due to agitation and concern for losing her airway.  She apparently had had a similar episode on the day prior to admission in which she was thought to be choking on a piece of food, and a family member performed the Heimlich maneuver.  She has subsequently been found to have sustained multiple rib fractures, though I do not see imaging documentation of this.      Prior to admission, the patient had suffered a mechanical fall, sustaining ecchymosis and contusions on her right hip and flank area.  She was evaluated in the Emergency Department, and no acute pathology was identified other than right flank/hip contusion.  CT imaging this admission showed transverse process fractures of L1 and L2 and rib fracture T11 and T12.  She is normally on tramadol as needed for pain related to a rotator cuff tear.  With the falls prior to admission, her tramadol dose was increased from daily PRN to every 6 hours and then every 4 hours PRN.  During this admission, the patient had EEG monitoring which did reveal partial status epilepticus, which has resolved with valproic acid and Vimpat and felt likely to be related to tramadol lowering her seizure threshold.      The patient was intubated on 03/02/2019 and extubated on 03/11/2019.  She has  not yet passed a swallow study and has an NG tube in place for feeding.  With this, her long-acting Parkinson's medications have not been used due to inability to crush.  Her daughter reports at baseline she is conversant, though would typically be somewhat confused during conversation.  She would generally know the month and year.  She is primarily wheelchair bound due to her high fall risk, but forgets that she should stay in a wheelchair and is impulsive and will walk.      Lastly, the patient was noted to have tongue swelling during her hospitalization and appears to have been initiated on lisinopril prior to admission.  It is unclear whether this was true angioedema related to intubation, but the lisinopril was discontinued and she was started on Norvasc.      PAST MEDICAL HISTORY:   1.  Parkinson's disease with neurogenic bladder.  The patient is a part of a research study with [facility], and if she dies her brain is supposed to go to research.   2.  Hypothyroidism.   3.  Dyslipidemia.   4.  Depression.      PAST SURGICAL HISTORY:     1.  Gene therapy enrolled in study at [place] in 2011.   2.  Lithotripsy for renal stone, 2015.      FAMILY HISTORY:     Father:  Colon cancer, heart disease, abdominal aortic aneurysm, stroke.   Mother:  High blood pressure.      SOCIAL HISTORY:  The patient resides in memory care at [facility].  She is .  She is essentially wheelchair bound at baseline, primarily due to high fall risk.  She is a never smoker.      PRIOR TO ADMISSION MEDICATIONS:  Medications Prior to Admission   Medication Sig Dispense Refill Last Dose     aspirin 81 MG tablet Take 81 mg by mouth At Bedtime @ midnight 30 tablet  3/1/2019 at HS     carbidopa-levodopa (SINEMET CR)  MG CR tablet Take 1 tablet by mouth At Bedtime At 8 pm 90 tablet 3 3/1/2019 at 2000     carbidopa-levodopa (SINEMET)  MG tablet Take 2 tablets 4 times daily at 7 am, 10 am, 1 pm, and 4 pm 270 tablet  3 3/2/2019 at 1300     cholecalciferol 5000 units CAPS Take 1 capsule by mouth every morning @ 6am 30 capsule       docusate sodium (COLACE) 100 MG capsule Take 300 mg by mouth daily 3 x 100mg capsule @ noon 60 capsule  3/2/2019 at 1200     ibuprofen (ADVIL/MOTRIN) 200 MG tablet Take 400 mg by mouth 3 times daily 2 x 200mg tabs by mouth 3/day @ 6am, 12pm and 6pm and 3 x 200mg tabs by mouth every 8 hours as needed 100 tablet  3/2/2019 at 1200     levothyroxine (SYNTHROID/LEVOTHROID) 112 MCG tablet Take 112 mcg by mouth daily @ 6am   3/2/2019 at AM     lisinopril (PRINIVIL/ZESTRIL) 5 MG tablet Take 5 mg by mouth daily @ 6am 30 tablet 1 3/2/2019 at 0600     mirabegron (MYRBETRIQ) 25 MG 24 hr tablet Take 25 mg by mouth daily @ 6am   3/2/2019 at 0600     Multiple Vitamins-Minerals (EYE VITAMINS) CAPS Take 1 capsule by mouth daily 1 capsule (macuStepssshealth) by mouth daily @ midnight 30 capsule       omeprazole (PRILOSEC) 20 MG DR capsule Take 20 mg by mouth daily @ 6am 30 capsule 1 3/2/2019 at 0600     polyethylene glycol (MIRALAX) powder Take 17 g by mouth daily as needed @0600 119 g  PRN     rotigotine (NEUPRO) 4 MG/24HR 24 hr patch Apply patch daily @ bedtime/8 pm (Patient taking differently: Place 1 patch onto the skin daily @ bedtime/8 pm) 90 patch 1 3/1/2019 at 2000     tamsulosin (FLOMAX) 0.4 MG capsule Take 0.4 mg by mouth daily @ 6pm 30 capsule 11 3/1/2019 at 1800     traMADol (ULTRAM) 50 MG tablet Take 1 tablet (50 mg) by mouth every 6 hours as needed for severe pain 1 tablet 0 PRN     trospium (SANCTURA XR) 60 MG CP24 24 hr capsule Take 60 mg by mouth every morning (before breakfast) @ 6am 90 each 3 3/2/2019 at 0600     venlafaxine (EFFEXOR) 100 MG tablet Take 100 mg by mouth daily @ 6am 180 tablet 3 3/2/2019 at 0600      ALLERGIES:   1.  ACE INHIBITOR POSSIBLY CAUSE ANGIOEDEMA, THOUGH THIS OCCURRED IN THE SETTING OF INTUBATION AND IMPROVED WITH STEROIDS.   2.  ATORVASTATIN.   3.  MIRAPEX.   4.  POTASSIUM  CHLORIDE, TOLERATES ORAL POTASSIUM.   5.  REQUIP CAUSES LEG SWELLING.   6.  EPINEPHRINE CAUSES PALPITATIONS.      REVIEW OF SYSTEMS:  A complete review of systems was unable to be performed secondary to the patient's altered mental status.  She does deny pain.      PHYSICAL EXAMINATION:   VITAL SIGNS:  Blood pressure 118/63, heart rate 73, temperature 99, respirations 16, oxygen saturation 97%.   GENERAL:  Elderly female who appears stated age.  She looks comfortable, but appears confused and disoriented.  Her speech is garbled and incoherent.   SKIN:  Warm, dry.  No rash or lesions on exposed skin.   HEENT:  Normocephalic, atraumatic.  EOMs grossly intact and PERRLA.  Moist mucous membranes.  NG tube in place.  A gurgling vocal quality.   NECK:  Supple.  No cervical lymphadenopathy or JVD.   CHEST:  Breath sounds with coarse adventitious sounds throughout all fields.  No increased work of breathing on 5 liters via nasal cannula.   CARDIOVASCULAR:  Mildly tachycardic.  No rub or murmur appreciated.  No peripheral edema.   ABDOMEN:  Soft, overweight, nontender, nondistended.   GENITOURINARY:  Silva catheter in place with clear yellow urine.   MUSCULOSKELETAL:  Moves all 4 extremities.  Diffuse muscle atrophy is present.   NEUROLOGIC:  Cranial nerves II-XII grossly intact.      LABORATORY AND IMAGING:  Reviewed in Epic.       ASSESSMENT AND PLAN:  Loan Anderson is a 78-year-old female with history hypothyroidism, Parkinson's disease with neurogenic bladder who presented with acute encephalopathy and was intubated in the Emergency Department due to concerns for loss of protecting her airway.  She was intubated on 03/02 and extubated on 03/11.  Prolonged intubation and ICU stay related to ongoing delirium. Hospital course complicated by a partial status epilepticus and possible angioedema reaction.     Acute encephalopathy. - improving.  Likely multifactorial related to ICU delirium, postictal state related to  partial epileptic seizure, and pain related to fractures below.  -- Continues on Seroquel at bedtime.   -- Failed swallow study today and remains n.p.o. with NG tube in place.  Continue short-acting Sinemet and resume long-acting once able.   -- Note, tube feedings need to be held  1  hour before and 1 hour after Sinemet administration.     Partial status epilepticus.  Recently increased dose/frequency PTA Tramadol for acute pain after mechanical fall. EEG showed left frontocentral seizures thought to be related to her underlying neurodegenerative disease and recent increased use of tramadol, as well as history of previous bilateral frontal angi holes.   -- Appreciate Neurology input -- last dose Depakote tonight and to continue on lacosamide. Signed off 3/13.    Right T11 and T12 rib fractures with associated posterior right-sided pleural effusion.  Right L1 and L2 transverse process fractures.  Multifactorial related to mechanical fall prior to admission, as well as CPR (no documented pulselessness) and Heimlich maneuvers performed for recurrent episodes of unresponsiveness.   -- Monitor on telemetry.   -- Pain control currently with scheduled Tylenol and lidocaine patches.  P.r.n. low dose oxycodone is available for severe pain.   -- PT, OT.   -- P.r.n. bowel regimen in place.    Leukocytosis. Likely acute phase reactant and steroid related. NGTD in cultures. Procal <0.05. No focal signs/syptoms infection. Afebrile.    YASMIN on CKD.  Attributed to prerenal from diuresis.  Creatinine 1.08 on day of transfer.  BUN up-trending.  She has low muscle mass.   -- Diuresis discontinued.  Continue to follow.     Possible angioedema.  Tongue and lip swelling, 03/09-3/10, after restarted on lisinopril, which was recently started in outpatient setting.  Uncertain whether this was true angioedema and lisinopril added to allergy list and transitioned to amlodipine.  Improved with steroids.     Hypertension.    -- Continues on  Norvasc.     Parkinson's disease with neurogenic bladder.  Lives in memory care at the [facility] and essentially wheelchair bound d/t high falls risk.  Dtr states normally able to carry on conversation and oriented to situation.  -- Silva catheter currently in place.   -- Continues on short-acting Sinemet through tube feeds and instructed to resume long-acting once tolerating p.o.     Deep venous thrombosis prophylaxis:  Subcutaneous heparin.      CODE STATUS:  Patient code status changed from full to DNR/DNI during this admission, and this was confirmed at time of takeover of care.      DISPOSITION:  Anticipate discharge in greater than 2 days pending return to baseline mental status and long-term plan for nutrition.      ATTESTATION:  This patient was discussed with Dr. Kalli Stockton of the Hospitalist Service who is in agreement with my assessment and plan of care as outlined above.         JOANNA ULMEN BARTHELL, PA-C             D: 2019   T: 2019   MT: TRISTA      Name:     PIYUSH MONTENEGRO   MRN:      [id number]        Account:       [id number]   :      1940           Consult Date:  2019      Document: [id number]

## 2019-03-13 NOTE — PROGRESS NOTES
Hospitalist admission note dictated. Confirmation #: [id number].    JoAnna Barthell, PA-C  3/13/2019   2:10 PM

## 2019-03-14 NOTE — PLAN OF CARE
Patient transferred from ICU, arrived to station 88 at about 1730. Patient appears alert, but unable to assess orientation. Answers most yes or no questions but speech is garbled/difficult to understand. Tremulous otherwise appears content without signs of pain. VSS on 3L O2 via oxymask. Continuous pulse ox in place. LS coarse/diminished. Nonproductive, weak cough. Silva catheter patent. NPO. NG clamped. To be reconnected to TF at 2100 at 90mL/hr. T&RQ2H or up with lift. Discharge plan pending. Anticipate greater than 2 days pending return to baseline mental status and long-term plan for nutrition.

## 2019-03-14 NOTE — PROGRESS NOTES
SPIRITUAL HEALTH SERVICES Progress Note  [facility]    Visit with pt's family, including  Surjit and daughter Lamberto, in family carol.    They talked about pt's situation, noting the roller coast they're on as pt seems to be waxing and waning.  Pt's daughter noted that they seem to take turns feeling optimistic and hopeful and then less hopeful, depending upon the day (or time of day).    They are planning to meet with Saniya Hawkins, palliative NP, within the hour to talk about next steps.  They are currently thinking that pt may be able to go to TCU for rehab, and asked me questions about how that process will happen.  Pt was a [facility] for rehab once in the past, and they would not like her to go back there.  They felt that her placement on a memory care unit wasn't appropriate for her relatively high level of functioning. However, now they feel that she has more cognitive deficits than at that time, so they are willing to consider various options (if she is rehab appropriate).    They also said that pt does better in a known environment, and/or with people she knows.  At present, I will not plan to visit with the pt, but will be available should they request support.  Their own  from the [facility] in [place] will be visiting sometime this week as well.    Provided conversation and support, and will gladly respond to request or need.                                                                                                                                                 Loan Carter M.A.  Staff   Pager [phone]  Phone [phone]

## 2019-03-14 NOTE — PROGRESS NOTES
[facility]    Palliative Care Progress Note    Loan Anderson  MRN# [id number]  Date of Admission:  3/2/2019  Date of Service (when I saw the patient): 03/14/2019    Assessment & Plan   Loan Anderson is a 78 year old female with PMH significant for advanced parkinson's disease, HTN, CKD, and recent fall with resultant pain on therapy with tramadol, who was found unresponsive at her memory care unit, CPR was initiated at that time. A perfusing rhythm was noted upon EMS arrival. She was found to be post-ictal, neurology involved. She was intubated in the ICU for 10 days, now extubated. She remains encephalopathic. We are following for goals of care.      Symptoms/Recommendations   -Family remains hopeful that pt will pull out of this and recover, as she has done in the past. They would like to continue ongoing SLP evaluations in the coming days to assess her swallowing capability. They do ultimately want to avoid a PEG, if her swallowing does not greatly improve in the coming days. They are interested in pursuing TCU for rehabilitation post-discharge, and thus would be interested in exploring facilities that accept [facility] for nutrition support   -Our team will plan another check in with family early next week, Monday/Tuesday, date/time ultimately to be determined      Support/Coping  -Well supported by  Surjit of 56 years. They have 2 adult daughters, Mckenzie and Kassandra, who live local and are very involved   -Appreciate involvement of spiritual health   -Appreciate involvement of palliative LICSW Keisha Abraham for additional support      Decisional Support, Goals of Care, Counseling & Coordination  Decisional Capacity Intact?  -No  Health Care Directive on File?  -Yes, reviewed   Code Status/Resuscitation Preferences?  -DNR/DNI     Discussion  Visited [facility] this AM. She is alert, yet somewhat lethargic. She continues to not respond to questions or communicate effectively with me. She  has a loud, snoring respiratory effort, which does not appear uncomfortable for her.    Visited with  Surjit, along with daughters Mckenzie and Kassandra in private. Overall, they do not think that Erin looks much better after leaving the ICU. They felt her progress was pretty steady in the ICU, but since coming to the floor, she has plateaued, if not maybe fallen back a bit.     They make note that Erin is incredibly bright (first in her high school and college classes), and should not be given up on too soon. Currently, they feel she is overwhelmed, confused, and concerned for her environment. This is actually common behavior for Erin, and they shared stories of her during her early years of parkinson's and having notable disorientation and confusion when in unfamiliar places. They have seen her in this condition before (back in 2017 after a UTI, they showed me a video of her behavior which actually does not look too dissimilar to how she appears now). They still have hope that she will recover close to her baseline.     They are clearly hoping for rehabilitation moving forward. We talk about her nutrition likely being one of our biggest obstacles (along with allowing time for her mentation to recover as best as it can).     We talk about ongoing SLP evaluations for the next several days, and likely needing to check in next week to decide on a plan, if she ultimately does not improve her swallowing to the point of being started on a PO diet. Options include:    1). Ongoing NG support for another few weeks at TCU, although limited TCU options available, and most certainly cannot be a memory care unit. SW can provide a list of places that do accept NP    2). Place PEG which she may need temporarily or longer term for nutrition support. I, along with family, are not in favor of this plan.     3). Transition to comfort measures, allow her to go home and enroll [facility] hosp[facility].    Family hopes for ongoing  SLP evaluations in the coming days. They will look at TCUs that do accept NGs. They hope therapy visits can be adequately spaced throughout the day, and they hope for a private room, all of which has been communicated to her bedside RN.    Case reviewed with bedside RN Su, unit YAMILET Saavedra, and Dr. Stockton.     Thank you for involving us in the care of this patient and family. We will continue to follow. Please do not hesitate to contact me with questions or concerns or the on-call provider for our team if evening or weekend.    Saniya BARAKAT, [facility]  Palliative Medicine   Pager [phone]    Attestation:  Total time on the floor involved in the patient's care: 60 minutes  Total time spent in counseling/care coordination: >50%    Interval History   Pt transferred out of the ICU last evening. Continues to be somewhat alert, yet lethargic, and unable to follow many commands, or communicate effectively. Attempts at that therapy have been challenged by her ongoing encephalopathy. She continues with NG, as she has not been cleared for a PO diet.     Medications   Current Facility-Administered Medications Ordered in Epic   Medication Dose Route Frequency Last Rate Last Dose     acetaminophen (TYLENOL) tablet 650 mg  650 mg Oral Q6H   650 mg at 03/11/19 0549    Or     acetaminophen (TYLENOL) solution 650 mg  650 mg Per NG tube Q6H   650 mg at 03/14/19 1217     albuterol (PROVENTIL) neb solution 2.5 mg  2.5 mg Nebulization Q2H PRN   2.5 mg at 03/14/19 0024     amLODIPine (NORVASC) tablet 10 mg  10 mg Oral Daily   10 mg at 03/14/19 0932     aspirin (ASA) chewable tablet 81 mg  81 mg Oral At Bedtime   81 mg at 03/13/19 2113     carbidopa-levodopa (SINEMET)  MG per tablet 2 tablet  2 tablet Oral 4x Daily   2 tablet at 03/14/19 1217     dextrose 10 % 1,000 mL infusion   Intravenous Continuous PRN         glucose gel 15-30 g  15-30 g Oral Q15 Min PRN        Or     dextrose 50 % injection 25-50 mL  25-50 mL Intravenous Q15  Min PRN        Or     glucagon injection 1 mg  1 mg Subcutaneous Q15 Min PRN         docusate (COLACE) 50 MG/5ML liquid 100 mg  100 mg Oral or Feeding Tube BID   100 mg at 03/14/19 0929     heparin sodium injection 5,000 Units  5,000 Units Subcutaneous Q8H   5,000 Units at 03/14/19 0551     hydrALAZINE (APRESOLINE) injection 10-20 mg  10-20 mg Intravenous Q4H PRN   10 mg at 03/11/19 0449     hypromellose-dextran (ARTIFICAL TEARS) 0.1-0.3 % ophthalmic solution 1 drop  1 drop Both Eyes Q1H PRN   1 drop at 03/07/19 0352     insulin aspart (NovoLOG) inj (RAPID ACTING)  1-12 Units Subcutaneous Q4H   4 Units at 03/14/19 1217     labetalol (NORMODYNE/TRANDATE) syringe 10-20 mg  10-20 mg Intravenous Q4H PRN         lacosamide (VIMPAT) solution 150 mg  150 mg Oral or Feeding Tube BID   150 mg at 03/14/19 0929     levothyroxine (SYNTHROID/LEVOTHROID) tablet 112 mcg  112 mcg Oral QAM AC   112 mcg at 03/14/19 0928     Lidocaine (LIDOCARE) 4 % Patch 3 patch  3 patch Transdermal Q24h   3 patch at 03/13/19 2016    And     lidocaine patch REMOVAL   Transdermal Q24H        And     lidocaine patch in PLACE   Transdermal Q8H         magnesium sulfate 2 g in water intermittent infusion  2 g Intravenous Daily PRN         magnesium sulfate 4 g in 100 mL sterile water (premade)  4 g Intravenous Q4H PRN         naloxone (NARCAN) injection 0.1-0.4 mg  0.1-0.4 mg Intravenous Q2 Min PRN         ondansetron (ZOFRAN-ODT) ODT tab 4 mg  4 mg Oral Q6H PRN        Or     ondansetron (ZOFRAN) injection 4 mg  4 mg Intravenous Q6H PRN         oxyCODONE IR (ROXICODONE) half-tab 2.5-5 mg  2.5-5 mg Oral Q3H PRN         pantoprazole (PROTONIX) 2 mg/mL suspension 40 mg  40 mg Oral QAM AC   40 mg at 03/14/19 0658     polyethylene glycol (MIRALAX/GLYCOLAX) Packet 17 g  17 g Oral Daily PRN         potassium chloride (KLOR-CON) Packet 20-40 mEq  20-40 mEq Oral or Feeding Tube Q2H PRN   20 mEq at 03/14/19 0121     potassium chloride ER (K-DUR/KLOR-CON M) CR  tablet 20-40 mEq  20-40 mEq Oral Q2H PRN         QUEtiapine (SEROquel) half-tab 12.5 mg  12.5 mg Oral At Bedtime   12.5 mg at 03/13/19 2110     sodium chloride (PF) 0.9% PF flush 3 mL  3 mL Intracatheter Q8H   3 mL at 03/14/19 1232     venlafaxine (EFFEXOR) tablet 37.5 mg  37.5 mg Oral TID   37.5 mg at 03/14/19 0928     No current [facility]-ordered outpatient medications on file.       Physical Exam   Temp: 97.9  F (36.6  C) Temp src: Axillary BP: 158/85 Pulse: 69 Heart Rate: 68 Resp: 18 SpO2: 94 % O2 Device: Nasal cannula Oxygen Delivery: 4 LPM  Vitals:    03/12/19 0600 03/13/19 0400 03/14/19 0500   Weight: 91 kg (200 lb 9.9 oz) 90.3 kg (199 lb 1.2 oz) 92.2 kg (203 lb 3.2 oz)     CONSTITUTIONAL: Chronically ill elderly woman seen lying in bed in NAD, alert yet also mildly lethargic, unable to answer questions or communicate effectively. She appears confused, disoriented and a bit overwhelmed. She appears fairly calm  RESPIRATORY: NL respiratory effort on NC, yet loud snoring sound with respirations   GASTROINTESTINAL: NG    Data   Results for orders placed or performed during the hospital encounter of 03/02/19 (from the past 24 hour(s))   Glucose by meter   Result Value Ref Range    Glucose 189 (H) 70 - 99 mg/dL   Glucose by meter   Result Value Ref Range    Glucose 145 (H) 70 - 99 mg/dL   Potassium   Result Value Ref Range    Potassium 3.7 3.4 - 5.3 mmol/L   Lactic acid level STAT for sepsis protocol   Result Value Ref Range    Lactate for Sepsis Protocol 2.3 (H) 0.7 - 2.0 mmol/L   Blood culture   Result Value Ref Range    Specimen Description Blood Right Arm     Special Requests Aerobic and anaerobic bottles received     Culture Micro No growth after 11 hours    Procalcitonin   Result Value Ref Range    Procalcitonin 0.07 ng/ml   Blood gas venous and oxyhgb   Result Value Ref Range    Ph Venous 7.49 (H) 7.32 - 7.43 pH    PCO2 Venous 53 (H) 40 - 50 mm Hg    PO2 Venous 68 (H) 25 - 47 mm Hg    Bicarbonate Venous 40 (H)  21 - 28 mmol/L    Oxyhemoglobin Venous 92 %    Base Excess Venous 14.3 mmol/L   CBC with platelets   Result Value Ref Range    WBC 17.6 (H) 4.0 - 11.0 10e9/L    RBC Count 3.96 3.8 - 5.2 10e12/L    Hemoglobin 11.9 11.7 - 15.7 g/dL    Hematocrit 36.6 35.0 - 47.0 %    MCV 92 78 - 100 fl    MCH 30.1 26.5 - 33.0 pg    MCHC 32.5 31.5 - 36.5 g/dL    RDW 15.5 (H) 10.0 - 15.0 %    Platelet Count 461 (H) 150 - 450 10e9/L   Comprehensive metabolic panel   Result Value Ref Range    Sodium 137 133 - 144 mmol/L    Potassium 3.5 3.4 - 5.3 mmol/L    Chloride 96 94 - 109 mmol/L    Carbon Dioxide 35 (H) 20 - 32 mmol/L    Anion Gap 6 3 - 14 mmol/L    Glucose 188 (H) 70 - 99 mg/dL    Urea Nitrogen 51 (H) 7 - 30 mg/dL    Creatinine 0.97 0.52 - 1.04 mg/dL    GFR Estimate 56 (L) >60 mL/min/[1.73_m2]    GFR Estimate If Black 65 >60 mL/min/[1.73_m2]    Calcium 8.3 (L) 8.5 - 10.1 mg/dL    Bilirubin Total 0.5 0.2 - 1.3 mg/dL    Albumin 2.6 (L) 3.4 - 5.0 g/dL    Protein Total 6.9 6.8 - 8.8 g/dL    Alkaline Phosphatase 168 (H) 40 - 150 U/L    ALT 7 0 - 50 U/L    AST 20 0 - 45 U/L   NT proBNP inpatient and ED   Result Value Ref Range    N-Terminal Pro BNP Inpatient 160 0 - 1,800 pg/mL   Troponin I   Result Value Ref Range    Troponin I ES <0.015 0.000 - 0.045 ug/L   XR Chest Port 1 View    Narrative    XR CHEST PORT 1 VW  3/13/2019 10:54 PM     HISTORY:  Elevated lactic acid, recent prolonged intubation, any  infectious signs?    COMPARISON: Film dated 3/9/2018    FINDINGS:  The ET tube has been removed. An NG tube is in place. There  is opacity at the right lung base. This is consistent with a right  pleural effusion and associated infiltrate/atelectasis. The left lung  remains clear.      Impression    IMPRESSION: Persistent right pleural effusion and associated right  basilar infiltrate or atelectasis. The patient had similar findings on  the chest x-ray dated 3/9/2018.    ALE COX MD   Blood culture   Result Value Ref Range    Specimen  Description Blood Left Arm     Special Requests Aerobic and anaerobic bottles received     Culture Micro No growth after 11 hours    Glucose by meter   Result Value Ref Range    Glucose 197 (H) 70 - 99 mg/dL   Glucose by meter   Result Value Ref Range    Glucose 193 (H) 70 - 99 mg/dL   UA with Microscopic reflex to Culture   Result Value Ref Range    Color Urine Yellow     Appearance Urine Clear     Glucose Urine Negative NEG^Negative mg/dL    Bilirubin Urine Negative NEG^Negative    Ketones Urine Negative NEG^Negative mg/dL    Specific Gravity Urine 1.019 1.003 - 1.035    Blood Urine Moderate (A) NEG^Negative    pH Urine 7.0 5.0 - 7.0 pH    Protein Albumin Urine 10 (A) NEG^Negative mg/dL    Urobilinogen mg/dL 2.0 0.0 - 2.0 mg/dL    Nitrite Urine Negative NEG^Negative    Leukocyte Esterase Urine Negative NEG^Negative    Source Catheterized Urine     WBC Urine 5 0 - 5 /HPF    RBC Urine 87 (H) 0 - 2 /HPF    Mucous Urine Present (A) NEG^Negative /LPF    Calcium Oxalate Few (A) NEG^Negative /HPF    Amorphous Crystals Few (A) NEG^Negative /HPF   CBC with platelets   Result Value Ref Range    WBC 17.5 (H) 4.0 - 11.0 10e9/L    RBC Count 4.06 3.8 - 5.2 10e12/L    Hemoglobin 12.2 11.7 - 15.7 g/dL    Hematocrit 37.7 35.0 - 47.0 %    MCV 93 78 - 100 fl    MCH 30.0 26.5 - 33.0 pg    MCHC 32.4 31.5 - 36.5 g/dL    RDW 15.6 (H) 10.0 - 15.0 %    Platelet Count 456 (H) 150 - 450 10e9/L   Basic metabolic panel   Result Value Ref Range    Sodium 137 133 - 144 mmol/L    Potassium 3.7 3.4 - 5.3 mmol/L    Chloride 96 94 - 109 mmol/L    Carbon Dioxide 35 (H) 20 - 32 mmol/L    Anion Gap 6 3 - 14 mmol/L    Glucose 274 (H) 70 - 99 mg/dL    Urea Nitrogen 51 (H) 7 - 30 mg/dL    Creatinine 0.90 0.52 - 1.04 mg/dL    GFR Estimate 62 >60 mL/min/[1.73_m2]    GFR Estimate If Black 71 >60 mL/min/[1.73_m2]    Calcium 9.1 8.5 - 10.1 mg/dL   Magnesium   Result Value Ref Range    Magnesium 2.5 (H) 1.6 - 2.3 mg/dL   Phosphorus   Result Value Ref Range     Phosphorus 3.4 2.5 - 4.5 mg/dL   Lactic acid whole blood   Result Value Ref Range    Lactic Acid 1.8 0.7 - 2.0 mmol/L   Glucose by meter   Result Value Ref Range    Glucose 240 (H) 70 - 99 mg/dL   Glucose by meter   Result Value Ref Range    Glucose 228 (H) 70 - 99 mg/dL

## 2019-03-14 NOTE — PROGRESS NOTES
"CLINICAL NUTRITION SERVICES - REASSESSMENT NOTE      Malnutrition: (3/6)  % Weight Loss:  None noted  % Intake:  No decreased intake noted  Subcutaneous Fat Loss:  None observed  Muscle Loss:  None observed  Fluid Retention:  Mild - doubt nutrition related     Malnutrition Diagnosis: Patient does not meet two of the above criteria necessary for diagnosing malnutrition       EVALUATION OF PROGRESS TOWARD GOALS   Diet: NPO    Nutrition Support:   Type of Feeding Tube: ND  Enteral Frequency:  Continuous x 16 hrs  Enteral Regimen: Replete with fiber at 90mL/hr x 16hr (EN held throughout the day for Sinemet x 4 times; total of 8hrs)   Total Enteral Provisions: 1440 kcal (16 kcal/kg), 92 g protein (1.8 g/kg), 1195 mL H2O, 22 g fiber, 179 g CHO.  Free Water Flush: 60mL Q 4hrs       Intake/Tolerance:    -Enteral nutrition running at goal rate- meeting 100% of assessed needs   -BGM range 145-274 (HSSI)   -BM x 2 -  Colace  -wt stable this admission - Lasix        ASSESSED NUTRITION NEEDS:  Dosing Weight 90.2 kg (3/3 actual wt for energy); 52.3 kg (IBW for protein)  Estimated Energy Needs: 9177-1894 kcals (14-17 Kcal/Kg)  Justification: hypocaloric feeding guidelines for obesity  Estimated Protein Needs:  grams protein (1.5-2 g pro/Kg)  Justification: obesity      NEW FINDINGS:   -3/13: FT movement and repositioned by RN, imaging confirms ND: \"Tip of the feeding tube is projected over the junction of  the third and fourth portions of the duodenum.\"  -3/13: SLP: \"Continues to present with severe oral and pharyngeal dysphagia\" - recommend continued NPO  -3/12: Medium sliding scale insulin changed to High sliding scale insulin   -3/12: IVF stopped   -3/11: Pt extubated     Previous Goals:   TF will continue to meet % needs  Evaluation: Met    BGM < 150   Evaluation: Not met    Previous Nutrition Diagnosis:   Altered nutrition-related lab value (glucoses) related to stress, patient on steroids as evidenced by BGM > " 150   Evaluation: Improving      CURRENT NUTRITION DIAGNOSIS  Altered nutrition-related lab value (glucoses) related to stress, patient on steroids as evidenced by BGM > 150     INTERVENTIONS  Recommendations / Nutrition Prescription  Diet per MD and SLP  TF at 90mL/hr x 16 hrs   Free water flushes 60mL Q 4hrs      Implementation  None at this time     Goals  Enteral nutrition to meet 90%-110% of assessed needs   BGM range <150      MONITORING AND EVALUATION:  Progress towards goals will be monitored and evaluated per protocol and Practice Guidelines      Suyapa Anderson RD, LD

## 2019-03-14 NOTE — PLAN OF CARE
Lethargic and sleepy most of this shift. Family at bedside. VSS. Denies pain. Tele: SR. NPO. TF at 90 ml/hr, NG clamped 1 hr prior and after sinemet and synthroid. BG checks Q4H, sliding scale insulin given. BS active, passing flatus, incontinent soft/formed stools x2 this shift. Turn/repo Q2H. Silva patent with adequate ange colored output. Blanchable redness on coccyx, cleaned with soap and water and Meplix applied, CDI. Palliative met with pt and family, see note. Will continue to monitor.

## 2019-03-14 NOTE — PLAN OF CARE
Alert, lethargic but SHELLIE orientation. Answers most yes or no questions but speech is garbled/difficult to understand. Tremulous otherwise appears content, without signs of pain. Baseline Parkinson's. Patient will open eyes up wide and open eyes spontaneously, but cannot make needs known. Does not answer questions all the time. Seems very weak. Unable to assess neuros as patient does not follow commands. VSS ex RR. Seems to be using ABD muscles to breathe and RR was high in the 20's. Increased O2 from 3L to 4L O2 via oxymask. Sats in mid 90's now.Continuous pulse ox in place. Tele: SR with BBB. Denies pain when asked. PRN Oxycodone available and Scheduled Tylenol. LS coarse/diminished. Infrequent, weak nonproductive cough. CXR 3/13-see results. Scattered bruising. Protective mepilex in place on coccyx. Silva catheter. Incontinent of bowel. NPO. TF at 90mL/hr with free water flushes. NG infusing TF. L PIV SL. BG being checked q4 hours d/t being NPO. K replaced on shift. Recheck this AM, 3/14. UA collected on shift. LA fired on shift= 2.3. See NP's note. T&R q2 hours. Daughter at bedside t/o night.  Continue to monitor. Discharge plan pending. Anticipate greater than 2 days pending return to baseline mental status and long-term plan for nutrition.

## 2019-03-14 NOTE — PROGRESS NOTES
[facility]    Medicine Progress Note - Hospitalist Service       Date of Admission:  3/2/2019  Assessment & Plan      Loan Anderson is a 78-year-old female with history hypothyroidism, Parkinson's disease with neurogenic bladder who presented with acute encephalopathy and was intubated in the Emergency Department due to concerns for loss of protecting her airway.  She was intubated on 03/02 and extubated on 03/11.  Prolonged intubation and ICU stay related to ongoing delirium. Hospital course complicated by a partial status epilepticus and possible angioedema reaction.      Acute encephalopathy. - improving.  Likely multifactorial related to ICU delirium, postictal state related to partial epileptic seizure, and pain related to fractures below.  Gradual improvement however still not back to baseline per daughter and continues to fail swallow evaluation   -speech therapy following and continues on N.p.o. with NG tube in place  -- Continues on Seroquel at bedtime.   Palliative care following, family wants to wait for her recovery as she has done in the past and wants to continue rehabilitation post discharge  -- Note, tube feedings need to be held  1  hour before and 1 hour after Sinemet administration for better absorption.      Partial status epilepticus.   Following and recommending TCU Recently increased dose/frequency PTA Tramadol for acute pain after mechanical fall. EEG showed left frontocentral seizures thought to be related to her underlying neurodegenerative disease and recent increased use of tramadol, as well as history of previous bilateral frontal angi holes.   -- Appreciate Neurology input -- last dose Depakote  3/13/2019 and to continue on lacosamide. Signed off 3/13.     Right T11 and T12 rib fractures with associated posterior right-sided pleural effusion.  Right L1 and L2 transverse process fractures.  Multifactorial related to mechanical fall prior to admission, as well as CPR  (no documented pulselessness) and Heimlich maneuvers performed for recurrent episodes of unresponsiveness.   -- Monitor on telemetry.   -- Pain control currently with scheduled Tylenol and lidocaine patches.  P.r.n. low dose oxycodone is available for severe pain.   -- PT, OT.  following  and recommending TCU  -- P.r.n. bowel regimen in place.     Leukocytosis. Likely acute phase reactant and steroid related. NGTD in cultures. Procal <0.05. No focal signs/syptoms infection. Afebrile.  Monitor off antibiotics    YASMIN on CKD.  Attributed to prerenal from diuresis.  Creatinine 1.08 on day of transfer.  BUN up-trending.  She has low muscle mass.   -- Diuresis discontinued.  Continue to follow.      Possible angioedema.  Tongue and lip swelling, 03/09-3/10, after restarted on lisinopril, which was recently started in outpatient setting.  Uncertain whether this was true angioedema and lisinopril added to allergy list and transitioned to amlodipine.  Improved with steroids.      Hypertension.    --Patient was initially on lisinopril prior to admission which was recently started and she had tolerated, however had tongue and lip swelling and lisinopril was discontinued and started on Norvasc.   -Blood pressure slightly on the higher side intermittently but controlled most of the time.  Continue Norvasc, monitor and adjust medication as needed       Parkinson's disease with neurogenic bladder.  Lives in memory care at the [facility] and essentially wheelchair bound d/t high falls risk.  Dtr states normally able to carry on conversation and oriented to situation.  -- Silva catheter currently in place.   -- Continues on short-acting Sinemet through tube feeds and instructed to resume long-acting once tolerating p.o.       Diet: NPO for Medical/Clinical Reasons Except for: Meds  Adult Formula Drip Feeding: Continuous Replete with Fiber; Orogastric tube; Goal Rate: 90; mL/hr; Medication - Feeding Tube Flush Frequency: At least 15-30  mL water before and after medication administration and with tube clogging; Amount to Send ...    DVT Prophylaxis: Heparin SQ  Silva Catheter: in place, indication: Strict 1-2 Hour I&O  Code Status: DNR/DNI      Disposition Plan   Expected discharge: When TCU bed available, recommended to transitional care unit once safe disposition plan/ TCU bed available.  Entered: Kalli Stockton MD 03/14/2019, 4:36 PM       The patient's care was discussed with the Bedside Nurse, Patient and Patient's Family.    Kalli Stockton MD  Hospitalist Service  [facility]    ______________________________________________________________________    Interval History   Patient continues to be confused however per daughter improved compared to last several days.  Gradual improvement.  No new nursing concerns.    Data reviewed today: I reviewed all medications, new labs and imaging results over the last 24 hours. I personally reviewed no images or EKG's today.    Physical Exam   Vital Signs: Temp: 98.6  F (37  C) Temp src: Axillary BP: 161/58 Pulse: 70 Heart Rate: 68 Resp: 20 SpO2: 94 % O2 Device: Nasal cannula Oxygen Delivery: 4 LPM  Weight: 203 lbs 3.2 oz  Exam:  Constitutional: Awake, alert and no distress. Appears comfortable.  Communicates and follows command  intermittently  Head: Normocephalic. No masses, lesions, tenderness or abnormalities.  NG tube in place  ENT: ENT exam normal, no neck nodes or sinus tenderness  Cardiovascular: RRR.  no murmurs, no rubs or JVD  Respiratory:normal WOB,b/l equal air entry, no wheezes or crackles   Gastrointestinal: Abdomen soft, non-tender. BS normal. No masses, organomegaly  : Deferred   extremities :1-2+ edema , no clubbing or cyanosis   Skin: Warm and dry, no rash      Data   Recent Labs   Lab 03/14/19  0753 03/13/19  2251 03/13/19  2109 03/13/19  0526  03/11/19  0505   WBC 17.5* 17.6*  --  15.8*   < > 13.8*   HGB 12.2 11.9  --  12.5   < > 11.7   MCV 93 92  --  93   < > 91   PLT  456* 461*  --  442   < > 308    137  --  137   < > 135   POTASSIUM 3.7 3.5 3.7 3.3*   < > 3.8   CHLORIDE 96 96  --  94   < > 96   CO2 35* 35*  --  35*   < > 32   BUN 51* 51*  --  52*   < > 31*   CR 0.90 0.97  --  1.08*   < > 0.71   ANIONGAP 6 6  --  8   < > 7   NIKKIE 9.1 8.3*  --  9.0   < > 8.8   * 188*  --  202*   < > 196*   ALBUMIN  --  2.6*  --   --   --  2.3*   PROTTOTAL  --  6.9  --   --   --  7.0   BILITOTAL  --  0.5  --   --   --  0.4   ALKPHOS  --  168*  --   --   --  138   ALT  --  7  --   --   --  13   AST  --  20  --   --   --  21   TROPI  --  <0.015  --   --   --   --     < > = values in this interval not displayed.     Recent Results (from the past 24 hour(s))   XR Chest Port 1 View    Narrative    XR CHEST PORT 1 VW  3/13/2019 10:54 PM     HISTORY:  Elevated lactic acid, recent prolonged intubation, any  infectious signs?    COMPARISON: Film dated 3/9/2018    FINDINGS:  The ET tube has been removed. An NG tube is in place. There  is opacity at the right lung base. This is consistent with a right  pleural effusion and associated infiltrate/atelectasis. The left lung  remains clear.      Impression    IMPRESSION: Persistent right pleural effusion and associated right  basilar infiltrate or atelectasis. The patient had similar findings on  the chest x-ray dated 3/9/2018.    ALE COX MD     Medications       acetaminophen  650 mg Oral Q6H    Or     acetaminophen  650 mg Per NG tube Q6H     amLODIPine  10 mg Oral Daily     aspirin  81 mg Oral At Bedtime     carbidopa-levodopa  2 tablet Oral 4x Daily     docusate  100 mg Oral or Feeding Tube BID     heparin  5,000 Units Subcutaneous Q8H     insulin aspart  1-12 Units Subcutaneous Q4H     lacosamide  150 mg Oral or Feeding Tube BID     levothyroxine  112 mcg Oral QAM AC     lidocaine  3 patch Transdermal Q24h    And     lidocaine   Transdermal Q24H    And     lidocaine   Transdermal Q8H     pantoprazole  40 mg Oral QAM AC     QUEtiapine  12.5 mg  Oral At Bedtime     sodium chloride (PF)  3 mL Intracatheter Q8H     venlafaxine  37.5 mg Oral TID

## 2019-03-14 NOTE — PLAN OF CARE
Discharge Planner SLP   Patient plan for discharge: Patient not able to state.   Current status: Patient continues to present with severe oral and pharyngeal dysphagia. No swallow response to swabs or 1 teaspoon of nectar thick liquid. No verbal output during the session. Daughter present and encouraging her. Recommend: 1. NPO with TF.   Barriers to return to prior living situation: Acuity of her illness.   Recommendations for discharge: TCU  Rationale for recommendations: Will need on going ST needs for dysphagia to return to PO intake as tolerated. Due to prolonged intubation a video swallow study should be completed when able to swallow.       Entered by: Nasima Thakur 03/14/2019 3:06 PM

## 2019-03-14 NOTE — PLAN OF CARE
Discharge Planner OT   Patient plan for discharge: ? Return to memory care unit versus TCU  Current status: Pt. sleeping upon arrival, family present;pt. initially did not respond to therapist voice, did open eyes to family's voice;Pt. able to keep eyes open, however, limited direction-following;pt. was able to follow command given by spouse to intiate LE movement toward EOB;pt. did need max. A to get LEs over EOB, rolling to side;pt. needed assist to place LUE on handrail, max. A w/ HOB elevated to sitting position, maintaining spinal precs.; Pt. sat EOB close to 10 min. w/ mod. A, tends to lean forward, does good w/family interaction/involvement;Pt. fatigued w/ sitting, returned to supine w/ max. A X 2, some pain noted w/ transition, pt. unable to state pain location(has h/o R shoulder dislocation, + rib. fxs., L1-2 fx.);Pt.w.soiled incont. pad/had BM--nursing in room to complete cares.  Barriers to return to prior living situation: Current level of assist, impaired cognition/direction-following, spinal precs.  Recommendations for discharge:  pending pt progress and level of A facility can provide- return to memory care unit with 24/7 A for mobility and all ADL/IADL's, otherwise TCU.    Rationale for recommendations:  per chart--family hopes pt. will improve with alertness and mobility and be safe to return to memory care unit/be in familiar environment with continued A for mobility and ADL's.           Entered by: Dahlia Villela 03/14/2019 11:44 AM

## 2019-03-14 NOTE — PROGRESS NOTES
[facility]    Sepsis Evaluation Progress Note    Date of Service: 03/13/2019    I was called to see Loan Anderson due to abnormal vital signs triggering the Sepsis SIRS screening alert. She is not known to have an infection.     Physical Exam    Vital Signs:  Temp: 96.8  F (36  C) Temp src: Axillary BP: 124/46 Pulse: 69 Heart Rate: 65 Resp: 22 SpO2: 93 % O2 Device: Oxymask Oxygen Delivery: 4 LPM    Lab:  Lactic Acid   Date Value Ref Range Status   03/03/2019 1.6 0.7 - 2.0 mmol/L Final     Lactate for Sepsis Protocol   Date Value Ref Range Status   03/13/2019 2.3 (H) 0.7 - 2.0 mmol/L Final     Comment:     Significant value called to and read back by  ELLIS CRUZ ON 88 AT 2121 EJV       The patient has signs of altered level of consciousness suspicious for acute encephalopathy.    The rest of their physical exam is significant for:    Gen: Pt lying in bed, eyes intermittently open (not to command), rolling head side to side, not interactive  Neuro: Intermittently opens eyes, PERRL, does not follow commands x4 extremities, spontaneously moves all extremities  CV: Distant S1S2, no murmur, rub or gallop noted  Resp: In mild-moderate respiratory distress, use of abdominal/chest wall accessory muscles with respiration, L sided pleural rub?, faint intermittent audible expiratory wheezes  GI: Soft, nondistended, nontender, active bowel sounds, no guarding or rebound tenderness noted  Skin: Warm, dry  Ext: Moves all extremities spontaneously, no BLE peripheral edema noted, trace non-pitting BUE edema noted    Assessment and Plan    The SIRS and exam findings are possibly due to dehydration vs hypoxia, increased work of breathing, nebulizer treatments; alternatively considered sepsis, seizure    SIRS: RR, WBC    QSOFA: RR, AMS    Pt's procalcitonin negative on 3/11.  Ongoing leukocytosis suspected 2/2 stress demargination (most recently received steroids ~ 36 hours prior).  No recent fevers; of note, on  2600 mg APAP daily.  Given recent prolonged intubation with noted now L pleural rub, will obtain stat CXR.  No sputum production or cough noted.  Will repeat blood cultures x2, obtain UA and repeat procalcitonin, CMP, CBC, BNP, trop to help further differentiate lactic acidosis.  Suspect administration of IVF at this time could be detrimental to pt in setting of generalized edema, increased work of breathing, increasing O2 demand, noted R pleural effusion on previous imaging.  Pt's hemodynamics remain stable at this time, SBP 100s-130s, HR 60s-70s.  Given no definitive infectious process at this time, will defer initiation of antibiotics at this time.      Addendum: 0000: Imaging and laboratory studies resulted.  Noted negative procalcitonin, uptrending leukocytosis, moderate R pleural effusion with atelectasis vs infiltrate.  After review of above with Dr. Villafuerte, cross covering hospitalist, will continue to defer antiobiotic initiation at this time.  Will defer fluid administration as well.  Will repeat lactic acid in AM.       Confirmed DNR/DNI code status with pt's daughter, Kassandra.            Disposition: The patient will remain on the current unit. We will continue to monitor this patient closely.    ROSHNI Somers [facility]   House AVEL    I spent 20 min at the pt's bedside managing and coordinating pt's overall plan of care.

## 2019-03-15 NOTE — PLAN OF CARE
Discharge Planner OT   Patient plan for discharge: hopefully return to memory care vs TCU  Current status: pt moaning and grimacing in pain, repositioned with pillows and cords/sheets straightened seems to assist with pain.  Patient opened eyes a few times in response to daughter, but did not follow commands with therapist.  Completed PROM BUE, pt often resisting movement.  Unable to attempt EOB as patient resisting movement.  Barriers to return to prior living situation: current level of assist, impaired cognition/command following, spinal precautions  Recommendations for discharge: pending pt progress and level of A facility can provide- return to memory care unit with 24/7 A for mobility and all ADL/IADL's, otherwise TCU.  Rationale for recommendations: family reports patient has improved in function following UTIs in the past, and they hope she will again so she can return to familiar setting       Entered by: REGINA MONTGOMERY 03/15/2019 10:02 AM

## 2019-03-15 NOTE — CONSULTS
[facility]    Infectious Disease Consultation     Date of Admission:  3/2/2019  Date of Consult (When I saw the patient): 03/15/19    Assessment & Plan   Loan Anderson is a 78 year old female who was admitted on 3/2/2019.     Impression:  1. 78 y.o patient with parkinson`s disease, neurogenic bladder.   2. Admitted with acute encephalopathy, intubated for resp precautions, prolong ICU stay, delirium.   3. Partial status epilepticus and ? Angioedema reaction.   4. Now with WBC going up, ? Steroids, ? Infection.   5. Patient unable to provide any history.     Recommendations:   High risk for LORENZO, aspiration, neurogenic bladder so multiple possibilities.   Get UA and UC.   Repeat blood cultures.   CXR.   Hold antibiotics unless any of the above mentioned positive for infection.         Jeanna Fitzgerald MD    Reason for Consult   Reason for consult: I was asked by Dr. Stockton  to evaluate this patient for Leucocytosis.    Primary Care Physician   Physician No Ref-Primary    Chief Complaint   No complaints     History is obtained from the patient and medical records    History of Present Illness   Loan Anderson is a 78 year old female with hypothyroidism, Parkinson's disease with neurogenic bladder who presented with acute encephalopathy and was intubated in the Emergency Department due to concerns for loss of protecting her airway.  She was intubated on 03/02 and extubated on 03/11.  Prolonged intubation and ICU stay related to ongoing delirium. Hospital course complicated by a partial status epilepticus and possible angioedema reaction. Given steroids for that.           Past Medical History   I have reviewed this patient's medical history and updated it with pertinent information if needed.   Past Medical History:   Diagnosis Date     Constipation 7/15/2011     Convergence insufficiency 7/15/2011     Depression 7/15/2011     Diplopia 7/18/2011     Hot flashes, menopausal 7/18/2011      Hypercholesterolemia 7/18/2011     Hypothyroid 7/15/2011     Neurogenic bladder 7/15/2011     Parkinson disease (H) 7/15/2011     REM sleep behavior disorder 7/18/2011     Sebaceous cyst 7/18/2011     Wears glasses 7/18/2011       Past Surgical History   I have reviewed this patient's surgical history and updated it with pertinent information if needed.  Past Surgical History:   Procedure Laterality Date     ------------OTHER-------------  november ? 2015    lithrotripsy for renal stone     ------------OTHER-------------  november 2015    had sepsis from uti and hospitalized     BRAIN SURGERY  12 or 14 oct 2011    gene therapy enrolled study at [facility]       Prior to Admission Medications   Prior to Admission Medications   Prescriptions Last Dose Informant Patient Reported? Taking?   Multiple Vitamins-Minerals (EYE VITAMINS) CAPS   Yes Yes   Sig: Take 1 capsule by mouth daily 1 capsule (macu-health) by mouth daily @ midnight   aspirin 81 MG tablet 3/1/2019 at HS  Yes Yes   Sig: Take 81 mg by mouth At Bedtime @ midnight   carbidopa-levodopa (SINEMET CR)  MG CR tablet 3/1/2019 at 2000  No Yes   Sig: Take 1 tablet by mouth At Bedtime At 8 pm   carbidopa-levodopa (SINEMET)  MG tablet 3/2/2019 at 1300  No Yes   Sig: Take 2 tablets 4 times daily at 7 am, 10 am, 1 pm, and 4 pm   cholecalciferol 5000 units CAPS   Yes Yes   Sig: Take 1 capsule by mouth every morning @ 6am   docusate sodium (COLACE) 100 MG capsule 3/2/2019 at 1200  Yes Yes   Sig: Take 300 mg by mouth daily 3 x 100mg capsule @ noon   ibuprofen (ADVIL/MOTRIN) 200 MG tablet 3/2/2019 at 1200  Yes Yes   Sig: Take 400 mg by mouth 3 times daily 2 x 200mg tabs by mouth 3/day @ 6am, 12pm and 6pm and 3 x 200mg tabs by mouth every 8 hours as needed   levothyroxine (SYNTHROID/LEVOTHROID) 112 MCG tablet 3/2/2019 at AM  Yes Yes   Sig: Take 112 mcg by mouth daily @ 6am   lisinopril (PRINIVIL/ZESTRIL) 5 MG tablet 3/2/2019 at 0600  Yes Yes   Sig: Take 5 mg by mouth  daily @ 6am   mirabegron (MYRBETRIQ) 25 MG 24 hr tablet 3/2/2019 at 0600  Yes Yes   Sig: Take 25 mg by mouth daily @ 6am   omeprazole (PRILOSEC) 20 MG DR capsule 3/2/2019 at 0600  Yes Yes   Sig: Take 20 mg by mouth daily @ 6am   polyethylene glycol (MIRALAX) powder PRN  Yes Yes   Sig: Take 17 g by mouth daily as needed @0600   rotigotine (NEUPRO) 4 MG/24HR 24 hr patch 3/1/2019 at 2000  No Yes   Sig: Apply patch daily @ bedtime/8 pm   Patient taking differently: Place 1 patch onto the skin daily @ bedtime/8 pm   tamsulosin (FLOMAX) 0.4 MG capsule 3/1/2019 at 1800  Yes Yes   Sig: Take 0.4 mg by mouth daily @ 6pm   traMADol (ULTRAM) 50 MG tablet PRN  Yes Yes   Sig: Take 1 tablet (50 mg) by mouth every 6 hours as needed for severe pain   trospium (SANCTURA XR) 60 MG CP24 24 hr capsule 3/2/2019 at 0600  Yes Yes   Sig: Take 60 mg by mouth every morning (before breakfast) @ 6am   venlafaxine (EFFEXOR) 100 MG tablet 3/2/2019 at 0600  Yes Yes   Sig: Take 100 mg by mouth daily @ 6am      Facility-Administered Medications: None     Allergies   Allergies   Allergen Reactions     Ace Inhibitors      Tongue and lip swelling just after restarting lisinopril on 3/9/19 at higher dose while intubated.  Unclear if actually angioedema or not as swelling resolved with steroids.     Atorvastatin      Concern that it was contributing to confusion     Mirapex [Pramipexole Dihydrochloride Monohydrate]      Leg swelling     Potassium Chloride      IV infusion only. She tolerates oral potassium chloride     Requip [Ropinirole Hydrochloride]      Leg swelling     Epinephrine Palpitations       Immunization History   Immunization History   Administered Date(s) Administered     FLU 6-35 months 12/04/2001, 10/31/2002, 11/06/2003, 11/15/2006, 10/10/2007, 10/27/2008, 09/16/2010     Influenza (High Dose) 3 valent vaccine 10/02/2013, 09/26/2014, 10/15/2015, 08/31/2016, 10/05/2017     Influenza (IIV3) PF 09/25/2012     Pneumo Conj 13-V (2010&after)  06/30/2015     Pneumococcal 23 valent 10/19/1999, 06/02/2009     Td (Adult), Adsorbed 11/04/2004       Social History   I have reviewed this patient's social history and updated it with pertinent information if needed. Loan Anderson  reports that  has never smoked. she has never used smokeless tobacco. She reports that she drinks alcohol.    Family History   I have reviewed this patient's family history and updated it with pertinent information if needed.   Family History   Problem Relation Age of Onset     Cerebrovascular Disease Father         stroke age 47     Heart Disease Father         heart attack age 57     Neurologic Disorder Mother         parkinson disease ? was on sinemet. She had micrographia     Eye Disorder Mother        Review of Systems   Cannot provide history    Physical Exam   Temp: 98.6  F (37  C) Temp src: Axillary BP: 163/59 Pulse: 77   Resp: 20 SpO2: 93 % O2 Device: Oxymask Oxygen Delivery: 5 LPM  Vital Signs with Ranges  Temp:  [98.3  F (36.8  C)-98.6  F (37  C)] 98.6  F (37  C)  Pulse:  [66-77] 77  Resp:  [18-20] 20  BP: (161-165)/(58-59) 163/59  SpO2:  [93 %-94 %] 93 %  203 lbs 3.2 oz  Body mass index is 36 kg/m .    GENERAL APPEARANCE:  Non verbal on O2  EYES: Eyes grossly normal to inspection, PERRL and conjunctivae and sclerae normal  HENT: ear canals and TM's normal and nose and mouth without ulcers or lesions  NECK: no adenopathy, no asymmetry, masses, or scars and thyroid normal to palpation  RESP: lungs clear to auscultation - no rales, rhonchi or wheezes  CV: regular rates and rhythm, normal S1 S2, no S3 or S4 and no murmur, click or rub  LYMPHATICS: normal ant/post cervical and supraclavicular nodes  ABDOMEN: soft, nontender, without hepatosplenomegaly or masses and bowel sounds normal  MS: extremities normal- no gross deformities noted  SKIN: no suspicious lesions or rashes      Data   Lab Results   Component Value Date    WBC 20.0 (H) 03/15/2019    HGB 12.4 03/15/2019     HCT 38.2 03/15/2019     (H) 03/15/2019     03/15/2019    POTASSIUM 3.5 03/15/2019    CHLORIDE 99 03/15/2019    CO2 33 (H) 03/15/2019    BUN 40 (H) 03/15/2019    CR 0.85 03/15/2019     (H) 03/15/2019    NTBNPI 160 03/13/2019    TROPI <0.015 03/13/2019    AST 20 03/13/2019    ALT 7 03/13/2019    ALKPHOS 168 (H) 03/13/2019    BILITOTAL 0.5 03/13/2019    INR 1.04 03/02/2019     Recent Labs   Lab 03/13/19  2259 03/13/19  2250   CULT No growth after 1 day No growth after 1 day     Recent Labs   Lab Test 03/13/19  2259 03/13/19  2250 03/03/19  0530 03/02/19  1554 03/02/19  1543 10/23/17  0745 10/23/17  0653 10/21/17  2245 10/21/17  2225   CULT No growth after 1 day No growth after 1 day Moderate growth  Normal abhijeet   No growth No growth No growth No growth Cultured on the 1st day of incubation:  Escherichia coli  *  Critical Value/Significant Value, preliminary result only, called to and read back by  Eloisa LOOMIS @ 1132. 10/22/17    (Note)  POSITIVE for E. COLI by [employer] multiplex nucleic acid test. Final  identification and antimicrobial susceptibility testing will be  verified by standard methods.    Specimen tested with [employer] multiplex, gram-negative blood culture  nucleic acid test for the following targets: Acinetobacter sp.,  Citrobacter sp., Enterobacter sp., Proteus sp., E. coli, K.  pneumoniae/oxytoca, P. aeruginosa, and the following resistance  markers: CTXM, KPC, NDM, VIM, IMP and OXA.    Critical Value/Significant Value called to and read back by Eloisa LOOMIS @ 1350. 10/22/17     Cultured on the 1st day of incubation:  Escherichia coli  Susceptibility testing done on previous specimen  *  Critical Value/Significant Value, preliminary result only, called to and read back by  Eloisa LOOMIS @ 1226. 10/22/17

## 2019-03-15 NOTE — PROGRESS NOTES
[facility]    Sepsis Evaluation Progress Note    Date of Service: 03/15/2019    I was called to see Loan Anderson due to abnormal vital signs triggering the Sepsis SIRS screening alert. She is not known to have an infection. I had just seen this patient for an RRT for hypoxia.  She was tachypneic during the RRT which likely triggered the LA draw.    Physical Exam    Vital Signs:  Temp: 98.6  F (37  C) Temp src: Axillary BP: 110/57 Pulse: 69 Heart Rate: 72 Resp: 22 SpO2: 94 % O2 Device: Oxymask Oxygen Delivery: 7 LPM    Lab:  Lactic Acid   Date Value Ref Range Status   03/14/2019 1.8 0.7 - 2.0 mmol/L Final     Lactate for Sepsis Protocol   Date Value Ref Range Status   03/15/2019 2.3 (H) 0.7 - 2.0 mmol/L Final     Comment:     Significant value called to and read back by  ADRIÁN Hall 13:28 ML         The patient has signs of altered level of consciousness suspicious for Possible seizures or delirium related to prolonged illness, underlying neurodegenerative disorder.    The rest of their physical exam is significant for   Neuro: groaning, not following commands  Neck: supple  Heart: S1S2 NSR, normotensive  Lungs: tachypneic w/ abnl breath sounds mostly over neck/upper airway, poor secretion mgmt  Abd:normoactive bowel sounds, soft, nontender, nondisteded  Ext: min edema    Assessment and Plan    SIRS 2 for RR and WBC  qsofa 2  for mentation and RR    Patient was already seen by infectious disease today agree with their recommendation that patient is at very high risk for hospital-acquired infection of numerous varieties including aspiration pneumonia, healthcare associated pneumonia, UTI, given her prolonged hospital stay and the upward trajectory of her leukocytosis noting though the patient was recently on steroids..  Patient just had an RRT for hypoxia.  -Chest x-ray obtained in the RRT reviewed perhaps more atelectasis and/or edema but no specific new infiltrate in the right lower lobe.   Left lung is clear with sharp CP angle.  -We will order blood cultures stat  -We will obtain UA and urine culture  -At the recommendation of infectious disease we will hold off on antimicrobials until cultures are positive or she spikes a fever.  -She is not beta blocked and has a normal heart rate and blood pressure so I think we can use those as a guide to further intervention.  -In light of her recent respiratory distress/hypoxia, difficulty w/ secretion mgmt and DNI status will hold off on vigorous volume resuscitation at present.  -recheck LA in 2h at 1500     The SIRS and exam findings are likely due to Tachypnea associated with poor secretion clearance setting of dysphagia neurodegenerative disorder., there is no sign of sepsis at this time.    Disposition: The patient will remain on the current unit. We will continue to monitor this patient closely.    Glory Jones CNP  Hospitalist - [facility]  [phone]

## 2019-03-15 NOTE — PROGRESS NOTES
Hospitalist House NP follow up note    Patient's daughter was at bedside when I stop by to follow-up on her respiratory status.,  Respiratory rate is still 20-24.    Patient's  had updated daughter on the events of RRT.  She did not necessarily have questions about that but was inquiring about options to kind of de- intensify care.  She is having difficulty teasing apart what is facility related delirium versus what is actually occurring.  She mentioned a recent trial off of Seroquel but that had to be re-added.  She daughter generally sees the patient is getting better every day.  She feels that if the feeding tube is out and she could swallow and take her meds on her own that she would be safe to leave hospital and return to the [facility] where she had been doing well PTA to see if symptoms are getting better.  She also was questioning if current activities seizure activity.  I did share with her the potential for hospital-acquired infection given her prolonged hospitalization Silva catheter NG tube etc.    She is inquiring about palliative care and hospice.  Shortly thereafter BARI Kothari from palliative care joined the discussion and was certainly supportive that hospice care at the time of discharge could allow patient to return back to her prior living arrangement to support her if she is nearing the end of life for support or if her symptoms are improving.  Patient's daughter verbalized that she did not necessarily see her RRT, lactic acidosis albeit mild and increased FiO2 requirements as a setback.    I then excused myself from the conversation and allowed Flora and daughter to continue talking.  I have also informed patient's rounding hospitalist the daughters at bedside.    Glory Jones, JOANNE  Hospitalist - [place] [facility]  [phone]

## 2019-03-15 NOTE — PROGRESS NOTES
[facility]    Medicine Progress Note - Hospitalist Service       Date of Admission:  3/2/2019  Assessment & Plan      Loan Anderson is a 78-year-old female with history hypothyroidism, Parkinson's disease with neurogenic bladder who presented with acute encephalopathy and was intubated in the Emergency Department due to concerns for loss of protecting her airway.  She was intubated on 03/02 and extubated on 03/11.  Prolonged intubation and ICU stay related to ongoing delirium. Hospital course complicated by a partial status epilepticus and possible angioedema reaction.      Acute encephalopathy.   Seizure  Likely multifactorial related to ICU delirium, postictal state related to partial epileptic seizure, and pain related to fractures below.  Gradual improvement however still not back to baseline per daughter and continues to fail swallow evaluation   -speech therapy following and continues on N.p.o. with NG tube in place  -- Continues on Seroquel at bedtime.   Was initiated on Depakote and lacosamide, Depakote weaned off on 3/13/2019, lacosamide continued  Neurology signed off 3/13.  However family concerned about her abnormal movements and requesting reevaluation by neurology.  Neurology reconsulted and patient currently getting EEG, await further recommendation  Palliative care following, family wants to wait for her recovery as she has done in the past and wants to continue rehabilitation post discharge  -- Note, tube feedings need to be held  1  hour before and 1 hour after Sinemet administration for better absorption.      Acute hypoxic respiratory failure  -RRT was called earlier today because of increased oxygen need.  Respiratory therapist was consulted for increased secretion however patient's weak to cough out all the secretions  -Suctioning tried however patient unable to tolerate.  -Unable to do vest due to multiple rib fractures, patient not able to cooperate for flutter valve.   Mucomyst with breathing treatment tried however patient's O2 sats dropped and so RRT had to be called  -X-ray showed no new changes.  Patient's breathing requirement has however increased again  -Palliative care had discussion with family after RRT, family do not want BiPAP or escalation of care and no pressors and no transfer to unit for aggressive care.  However at this point still would like to continue with minimally invasive treatments that do not be too traumatic for her.  See palliative care note for full details      Right T11 and T12 rib fractures with associated posterior right-sided pleural effusion.  Right L1 and L2 transverse process fractures.  Multifactorial related to mechanical fall prior to admission, as well as CPR (no documented pulselessness) and Heimlich maneuvers performed for recurrent episodes of unresponsiveness.   -- Monitor on telemetry.   -- Pain control currently with scheduled Tylenol and lidocaine patches.  P.r.n. low dose oxycodone is available for severe pain.   -- PT, OT.  following  and recommending TCU  -- P.r.n. bowel regimen in place.     Leukocytosis.   Likely acute phase reactant and steroid related. Patient had received steroids when she had angioedema   -NGTD in cultures. Procal <0.05. No focal signs/syptoms infection. Afebrile.  Monitor off antibiotics, however despite stopping steroids her WBC count gradually worsening ,consult ID     YASMIN on CKD.    Attributed to prerenal from diuresis.  Gradually improving . She has low muscle mass.   -- Diuresis discontinued.  Continue to follow.      Possible angioedema.  Tongue and lip swelling, 03/09-3/10, after restarted on lisinopril, which was recently started in outpatient setting.  Uncertain whether this was true angioedema and lisinopril added to allergy list and transitioned to amlodipine.  Improved with steroids.      Hypertension.    --Patient was initially on lisinopril prior to admission which was recently started and she  had tolerated, however had tongue and lip swelling and lisinopril was discontinued and started on Norvasc.   -Blood pressure slightly on the higher side intermittently but controlled most of the time.  Continue Norvasc, monitor and adjust medication as needed    Parkinson's disease with neurogenic bladder.  Lives in memory care at the [facility] and essentially wheelchair bound d/t high falls risk.  Dtr states normally able to carry on conversation and oriented to situation.  -- Silva catheter currently in place.   -- Continues on short-acting Sinemet through tube feeds and instructed to resume long-acting once tolerating p.o.       Diet: NPO for Medical/Clinical Reasons Except for: Meds  Adult Formula Drip Feeding: Continuous Replete with Fiber; Orogastric tube; Goal Rate: 90; mL/hr; Medication - Feeding Tube Flush Frequency: At least 15-30 mL water before and after medication administration and with tube clogging; Amount to Send ...    DVT Prophylaxis: Heparin SQ  Silva Catheter: in place, indication: Strict 1-2 Hour I&O  Code Status: DNR/DNI      Disposition Plan   Expected discharge: Due to  RRT today and new changes in her health condition with possible seizure, pending disposition until goal of care determined, clinical improvement are cleared by all consultants  .  Entered: Kalli Stockton MD 03/15/2019, 9:31 AM       The patient's care was discussed with the Bedside Nurse, Patient and Patient's Family.  Palliative care    Kalli Stockton MD  Hospitalist Service  [facility]    ______________________________________________________________________    Interval History   Patient was doing okay until this morning when he took this afternoon patient went into RRT.    Data reviewed today: I reviewed all medications, new labs and imaging results over the last 24 hours. I personally reviewed no images or EKG's today.    Physical Exam   Vital Signs: Temp: 98.3  F (36.8  C) Temp src: Axillary BP: 163/59 Pulse:  66   Resp: 18 SpO2: 94 % O2 Device: Oxymask Oxygen Delivery: 5 LPM  Weight: 203 lbs 3.2 oz  Exam:  Constitutional: Was more confused earlier today and only following commands intermittently.  Later when I went to evaluate her after RRT she was more somnolent and unresponsive, agitated  Head: Normocephalic. No masses, lesions, tenderness or abnormalities.  NG tube in place  ENT: ENT exam normal, no neck nodes or sinus tenderness  Cardiovascular: RRR.  no murmurs, no rubs or JVD  Respiratory slightly increased work of breathing with use of accessory muscles of respiration b/l equal air entry, bibasilar crackles  Gastrointestinal: Abdomen soft, non-tender. BS normal. No masses, organomegaly  : Deferred   extremities :1-2+ edema , no clubbing or cyanosis   Skin: Warm and dry, no rash      Data   Recent Labs   Lab 03/15/19  0821 03/14/19  0753 03/13/19  2251  03/11/19  0505   WBC 20.0* 17.5* 17.6*   < > 13.8*   HGB 12.4 12.2 11.9   < > 11.7   MCV 93 93 92   < > 91   * 456* 461*   < > 308    137 137   < > 135   POTASSIUM 3.5 3.7 3.5   < > 3.8   CHLORIDE 99 96 96   < > 96   CO2 33* 35* 35*   < > 32   BUN 40* 51* 51*   < > 31*   CR 0.85 0.90 0.97   < > 0.71   ANIONGAP 8 6 6   < > 7   NIKKIE 9.3 9.1 8.3*   < > 8.8   * 274* 188*   < > 196*   ALBUMIN  --   --  2.6*  --  2.3*   PROTTOTAL  --   --  6.9  --  7.0   BILITOTAL  --   --  0.5  --  0.4   ALKPHOS  --   --  168*  --  138   ALT  --   --  7  --  13   AST  --   --  20  --  21   TROPI  --   --  <0.015  --   --     < > = values in this interval not displayed.     No results found for this or any previous visit (from the past 24 hour(s)).  Medications       acetaminophen  650 mg Oral Q6H    Or     acetaminophen  650 mg Per NG tube Q6H     amLODIPine  10 mg Oral Daily     aspirin  81 mg Oral At Bedtime     carbidopa-levodopa  2 tablet Oral 4x Daily     docusate  100 mg Oral or Feeding Tube BID     heparin  5,000 Units Subcutaneous Q8H     insulin aspart  1-12  Units Subcutaneous Q4H     lacosamide  150 mg Oral or Feeding Tube BID     levothyroxine  112 mcg Oral QAM AC     lidocaine  3 patch Transdermal Q24h    And     lidocaine   Transdermal Q24H    And     lidocaine   Transdermal Q8H     pantoprazole  40 mg Oral QAM AC     QUEtiapine  12.5 mg Oral At Bedtime     sodium chloride (PF)  3 mL Intracatheter Q8H     venlafaxine  37.5 mg Oral TID

## 2019-03-15 NOTE — CODE/RAPID RESPONSE
[facility]    RRT Note  3/15/2019   Time Called: 1156    RRT called for: Acute hypoxia    Assessment & Plan   IMPRESSION & PLAN:  Respiratory therapist was giving patient albuterol neb followed by Mucomyst when patient acutely desaturated to the 70s.  She previously had been in the 90's% range on 5 L nasal cannula.  The nebulizer was stopped and oxy mask was replaced at 15 L with resulting SPO2 of 95%.  Nasopharyngeal airway placement have been tried earlier today which was poorly tolerated as well as yankaur oral pharyngeal suctioning.  Staff report that her tongue frequently will obstruct her posterior oropharynx with any suctioning attempt.    On exam patient is tachypneic with respiratory rate of 32 saturations are 92-93% on 15 L mask C mask.  Blood pressure is 120s /80s heart rate 68.  Most adventitious airway sounds are from her upper airway, her breathing is very noisy.  She has a poor cough.  There also are a few inspiratory wheezes in the right upper lobe.  Patient somewhat flailing in the bed not able to answer questions    Differential diagnosis:  Bronchospasm from mucomyst (was preceeded by albuterol), pntx from rib fx, possible upper airway obstruction from poor secretion management , possible H CAP and/or aspiration pneumonia in the setting of increasing leukocytosis, ACS, cardiogenic pulmonary edema possible or possible negative pressure pulmonary edema if there is airway obstruction.  I considered PE but she has been on DVT prophylaxis with subcu heparin and this would be an unusual presentation so much lower pretest probability.  No evidence of acidemia with a bicarb of 33 this morning, phosphorus is normal.    INTERVENTIONS:  -stat ekg  -stat pCXR  -stat vbg  -nasalpharyngeal airway attempted again which pt didn't tolerate, thrashing her head putting her tongue in the back of her oropharynx, and ultimately unsuccessful.  --oropharyngeal suctioning attempted w/o success because of  "same problems above  -We will check proBNP to evaluate if there is some pulmonary edema possibly negative pressure type  -Chest physiotherapy was considered but likely contraindicated in the setting of her rib fractures  -Confirmed with the patient's  DNR/DNI status.  -Briefly discussed with patient's  how things are going.  He generally thinks that because she was able to get off the ventilator that things are going well.  I shared with her my concern about her breathing.  This does concern him as well.  He describes her as being \"shock\" about not wanting to be in hospital.    At the end of the RRT respiratory rate had decreased to 24 still very noisy upper airway breathing    Discussed with and defer further cares to rounding hospitalist Dr. Stockton.  I recommend ongoing discussions regarding goals of treatment I think hospice would likely be very reasonable.    Interval History     Loan Anderson is a 78 year old female who was admitted on 3/2/2019 for CNS failure possible status epilepticus for which she required prolonged mechanical ventilatory support for airway protection.    Medical history significant for:   Past Medical History:   Diagnosis Date     Constipation 7/15/2011     Convergence insufficiency 7/15/2011     Depression 7/15/2011     Diplopia 7/18/2011     Hot flashes, menopausal 7/18/2011     Hypercholesterolemia 7/18/2011     Hypothyroid 7/15/2011     Neurogenic bladder 7/15/2011     Parkinson disease (H) 7/15/2011     REM sleep behavior disorder 7/18/2011     Sebaceous cyst 7/18/2011     Wears glasses 7/18/2011         Code Status: DNR/DNI    Allergies   Allergies   Allergen Reactions     Ace Inhibitors      Tongue and lip swelling just after restarting lisinopril on 3/9/19 at higher dose while intubated.  Unclear if actually angioedema or not as swelling resolved with steroids.     Atorvastatin      Concern that it was contributing to confusion     Mirapex [Pramipexole " Dihydrochloride Monohydrate]      Leg swelling     Potassium Chloride      IV infusion only. She tolerates oral potassium chloride     Requip [Ropinirole Hydrochloride]      Leg swelling     Epinephrine Palpitations       Physical Exam   Vital Signs with Ranges:  Temp:  [98.3  F (36.8  C)-98.6  F (37  C)] 98.6  F (37  C)  Pulse:  [66-77] 69  Heart Rate:  [72] 72  Resp:  [18-32] 22  BP: (110-165)/(57-63) 110/57  SpO2:  [93 %-94 %] 94 %  I/O last 3 completed shifts:  In: 240 [NG/GT:240]  Out: 1475 [Urine:1475]      Constitutional: Moderate acute distress  ENT:    Neck: supple very noisy breathing over her neck no stridor per se  Pulmonary: Tachypneic with rhonchi in bilateral upper and lower lobes  Cardiovascular: S1S2 normal sinus rhythm normotensive deep T wave inversion on telemetry  GI: NABS/s/NT/ND  Skin/Integumen: No edema  Neuro: Awake but not following commands  Psych:  agitated  Extremities: defer    Data     EKG:  Interpreted by Gloyr Jones  Time reviewed: 1221  Symptoms at time of EKG: hypoxia   Rhythm: normal sinus   Rate: Normal  Axis: Normal  Ectopy: none  Conduction: normal  ST Segments/ T Waves: T wave inversion V1, V2, V3, V4, V5 and V6  Q Waves: none  Comparison to prior: Unchanged from 3/5/19      ABG:  -No lab results found in last 7 days.    Troponin:    Recent Labs   Lab Test 03/13/19  2251   TROPI <0.015       IMAGING: (X-ray/CT/MRI)   No results found for this or any previous visit (from the past 24 hour(s)).  CXR is pending    CBC with Diff:  Recent Labs   Lab Test 03/15/19  0821  03/02/19  1449   WBC 20.0*   < > 13.5*   HGB 12.4   < > 12.0   MCV 93   < > 91   *   < > 195   INR  --   --  1.04    < > = values in this interval not displayed.        Lactic Acid:    Lab Results   Component Value Date    LACT 1.8 03/14/2019           Comprehensive Metabolic Panel:  Recent Labs   Lab 03/15/19  0821  03/13/19  2251      < > 137   POTASSIUM 3.5   < > 3.5   CHLORIDE 99   < > 96   CO2 33*    < > 35*   ANIONGAP 8   < > 6   *   < > 188*   BUN 40*   < > 51*   CR 0.85   < > 0.97   GFRESTIMATED 66   < > 56*   GFRESTBLACK 77   < > 65   NIKKIE 9.3   < > 8.3*   MAG 2.3   < >  --    PHOS 2.9   < >  --    PROTTOTAL  --   --  6.9   ALBUMIN  --   --  2.6*   BILITOTAL  --   --  0.5   ALKPHOS  --   --  168*   AST  --   --  20   ALT  --   --  7    < > = values in this interval not displayed.     UA:  Recent Labs   Lab 03/14/19  0657   COLOR Yellow   APPEARANCE Clear   URINEGLC Negative   URINEBILI Negative   URINEKETONE Negative   SG 1.019   UBLD Moderate*   URINEPH 7.0   PROTEIN 10*   NITRITE Negative   LEUKEST Negative   RBCU 87*   WBCU 5       Time Spent on this Encounter   I spent 35 ([phone]) minutes on the unit/floor managing the care of Loan Anderson. Over 50% of my time was spent counseling the patient and/or coordinating care regarding services listed in this note.    Glory Jones, [facility]  Hospitalist [facility]  [phone]

## 2019-03-15 NOTE — PROGRESS NOTES
Routine EEG  completed at patient's bedside. Procedure explained to patient and daughter prior to testing.   Ordered by Dr Lauren

## 2019-03-15 NOTE — PROGRESS NOTES
Family refused lactic acid redraw.  Educated on the importance of the lactic acid, but family still would not like her to get the lactic acid drawn.

## 2019-03-15 NOTE — CONSULTS
Consult Date:  03/15/2019      CHIEF COMPLAINT:  Evaluate for confusion, rule out seizures.       HISTORY OF PRESENT ILLNESS:  Loan Anderson is 78 years old who is unable to give history.  She presented to the hospital on 03/02/2019 with episode of unresponsiveness.  She was coughing and choking and lost consciousness.  This was a second episode of her doing this.  She was given cardiopulmonary resuscitation.  She came to the hospital and actually showed some seizures on the electroencephalogram.  Etiology of the seizures thought to be taking tramadol and underlying Parkinson's disease with dementia.  She had been intubated from 03/02/2019 through 03/11/2019 and transferred to station 88.  She continues to be less responsive and lethargic, confused, having movements of her arms and rolling out movements of her eyes.  This seizure was questioned.  She was seen by [facility].  She had been last seen by Neurology, Zunilda Torrez, physician assistant for [facility] on 03/12/2019 when they signed off.  They did recommend Lacosamide 150 mg q.12h.  and decrease her Depakote.      Loan has underlying Parkinson's disease with dementia and she has seeing Dr. Pitts at the [facility].      She had been bedbound.  She has not been able to participate in physical therapy.  Her daughter is very concerned.  They did talk to Palliative Care Service.      Previously, patient lives at home.      MEDICATIONS:  The patient's medications now are carbidopa/levodopa, which is Sinemet 25/100 two tablets 4 times a day, docusate, albuterol, amlodipine, aspirin, Tylenol, insulin, Lacosamide 150 mg IV twice a day, levothyroxine, lidocaine IV fluids and nutrition, Seroquel, sodium, Effexor.      ALLERGIES:  ACE INHIBITORS, ATORVASTATIN, MIRAPEX, PRAMIPEXOLE AND ADDITIONAL ALLERGIES TO EPINEPHRINE, REQUIP, POTASSIUM CHLORIDE.      SOCIAL HISTORY:  Shows that she lives in assisted living with  her .  Her family is very involved.  She is a nondrinker, nonsmoker.      PAST MEDICAL HISTORY:  Consistent with Parkinson's disease with dementia, recent acute kidney failure, constipation, hypothyroidism, neurogenic bladder.  History of diplopia, hypercholesterolemia, hypothyroidism, obstructive sleep apnea, Parkinson's disease.      PHYSICAL EXAMINATION:  Currently, patient is less responsive.  She was in discomfort because the EEG was in the process of being done.  Overall, she is not in any acute respiratory distress.   VITAL SIGNS:  Blood pressure 110/60, heart rate 68, respiratory rate 20, temperature 98.2.     LUNGS:  Clear to auscultation bilaterally but distant sounds.  Respiratory rate shows S1, S2 cardiac sounds.   ABDOMEN:  Obese.   EXTREMITIES:  Mild pedal edema is present.   NEUROLOGIC:  The patient opens her eyes and looks at the examiner, tries to talk occasionally but does not maintain her gaze.  She is definitely not able to speak fluently and follow commands fully.  She does have a profound sweatiness.  She does appear to be generally weak.  She moves arms against gravity.  She withdraws her legs to painful stimulation.  She has actually loose muscle tone at this point.  Reflexes are hypoactive.  Toes are spontaneously upgoing.      Electroencephalogram is done at the bedside.  The record show quite significant slowing consistent with toxic metabolic encephalopathy.  I do not see any evidence of seizures.      IMPRESSION:  This lady presents with toxic metabolic encephalopathy due to underlying general condition as well as neurodegenerative disease and recent intubation.      At this point, I do not see any evidence of active seizures.  I had a lengthy discussion with the patient's daughter.  I do recommend to continue current care.  Full prognosis is deferred at this point as patient may improve, although it will take a very long time, but she also would be at risk of infection or a  stroke because she is bedbound.  At this point, I do not see any evidence of seizures.  I discussed with patient's daughter that we should continue with current plan of care.      At this point, I do not recommend any changes in the medications.  I reassured the patient's daughter that there is no evidence of seizures and at this point, I do recommend glycosamide.    I do not think that the current symptoms are related to glycosamide.  I will plan to follow.  Again, full prognosis is deferred at this point.         RAJANI MEJIA MD             D: 03/15/2019   T: 03/15/2019   MT: RON      Name:     PIYUSH MONTENEGRO   MRN:      [id number]        Account:       [id number]   :      1940           Consult Date:  03/15/2019      Document: [id number]

## 2019-03-15 NOTE — PLAN OF CARE
"  Adult Inpatient Plan of Care  Readiness for Transition of Care  3/14/2019 2300 - Declining by Alice Diaz RN  Somnolent, arouses to voice. Blinks to threat (inconsitant).Weak, unable to follow commands. IVSL. Tele. NSR. HTN did not meet prns'. 4L NC. Pt needs mask with t/r due to mouth breathing. (Daughter does not want mask on if able) Courses lungs, appears SOB with t/r. Family wants t/r every 4. Educated on reasons behind q2 and skin assessments may need to do q/2 if skin assessment changes.  Wide eyes at time with body movements restless daughter help pt calm down. Daughter states this is her REM sleep and \"Teaches when she sleeps\" by moving. NG tube to goal rate, q4 flushes. [place] 95 unchanged. BG coverage given her protocol. Educated daughter on hospice care and future goals, daughter and family still wants to give pt a chance to come back from this as she was similar to this state before in 2015. Palliative saw. Speech still unable to evaluate.      "

## 2019-03-15 NOTE — PLAN OF CARE
Pt somnolent, restless at times, arouses to voice. VSS on 7 L O2 via oxymask ex RRR- increased from 4 L this AM. LS coarse w/ inspiratory wheezing. Tele SR. NPO- TF at 90 ml/hr. BG checks 160, 237. BM x2- bowel meds held this AM. Bottom red/blanchable- Turned and repositioned q4h per family request. PIV SL. Silva adequate output. RRT by respiratory therapy called this shift r/t hypoxia w/ little relief- pt cannot clear own airway, chest xray- see results, LA 2.3, 1.8. Neurology consulted for abnormal movement- EEG pending. Plan to continue cares, palliative consulted.

## 2019-03-15 NOTE — PLAN OF CARE
Pt unable to communicate Somnolent, arouses to voice only .Unable to do neuro, unable to follow commands. VSS gave tylenol for comfort. Pt on NG tube to goal rate, q4 flushes. [place] 95 unchanged. BG covera  Tele. NSR. On'. 4L NC. BS active, Ls Courses, appears SOB at times shows sign of anxiety  daughter at bedside, Wide eyes at times with body movements restless daughter help pt calm pt. Per report pt has history of REM sleep and plan continue to monitor.

## 2019-03-15 NOTE — PLAN OF CARE
PT: Pt seen by OT this AM, pt still not following commands, per discussion with OT pt grimacing in pain during session. Pt not appropriate for PT intervention this day

## 2019-03-15 NOTE — PROGRESS NOTES
[facility]    Palliative Care Progress Note    Loan Anderson  MRN# [id number]  Date of Admission:  3/2/2019  Date of Service (when I saw the patient): 03/15/2019    Assessment & Plan   Loan Anderson is a 78 year old female with PMH significant for advanced parkinson's disease, HTN, CKD, and recent fall with resultant pain on therapy with tramadol, who was found unresponsive at her memory care unit, CPR was initiated at that time. A perfusing rhythm was noted upon EMS arrival. She was found to be post-ictal, neurology involved. She was intubated in the ICU for 10 days, now extubated. She remains encephalopathic. She developed respiratory distress with hypoxia today, RRT was called. We are following for goals of care.      Symptoms/Recommendations   -Family requests no escalation of care to bipap, ICU, pressors   -Goals of care remain somewhat restorative, although family is starting to lean toward comfort cares and hospice. We talked about the likely need for more testing into this evening (serial lactic acid levels, blood gases, etc). Family is not keen on ongoing testing, as this makes patient very agitated. More discussions warranted as time progresses. Family is also not completely sure they would want to advance to antibiotics, if she did develop an infection   -Our team will remain involved, Dr. Goodman is in the hospital for our team tomorrow, Saturday  -Of note, pt under research study for Parkinsons by Dr Kenneth Nguyễn at [facility] in [place]. If pt dies, her brain is supposed to go to research. Please call Dr. Cooper at [phone] or cell [phone] with any questions     Support/Coping  -Well supported by  Surjit of 56 years. They have 2 adult daughters, Lamberto and Kassandra, who live local and are very involved   -Appreciate involvement of spiritual health   -Appreciate involvement of palliative LICSW Keisha Abraham for additional support       Decisional Support, Goals of Care, Counseling & Coordination  Decisional Capacity Intact?  -No  Health Care Directive on File?  -Yes, reviewed   Code Status/Resuscitation Preferences?  -DNR/DNI     Discussion  Called by the RN to come to the bedside as a RRT was just called and daughter is quite emotional. Apparently pt developed respiratory distress with hypoxia. Attempts were made to deep suction, this made pt significantly more agitated. She required up to 15L oxygen, now down to about 8L. She has triggered the sepsis protocol, lactate >2. WBC is climbing each day, 20K today.     In assessing pt, she is diaphoretic and obtunded, with noisy and congested respirations. Her noisy breathing is not significantly different from yesterday, and her baseline per daughter, but she is breathing much more labored today, which I pointed out to the daughter.     Visited with daughter Kassandra in person, with daughter Lamberto available by phone. I expressed concern for today's event in regard to Erin's overall condition and recovery trajectory. Although family felt like she has been making improvements in the course of this week, she has in fact looked no better to me each day. I express worry that this respiratory event is likely a sign that Erin is too weak to manage her own secretions. It is unlikely she will be able to eat and drink effectively and independently moving forward. In our meeting yesterday, family was not keen on the idea of a PEG (that was essentially out, and I agree), and today's potential aspiration event is an example of what could happen in a TCU with an NG, which was what family was previously planning and hoping for, despite my encouragement to consider otherwise.    I expressed to family that I feel Erin is dying. After extensive conversation, I think the daughters started to listen and come around to this. We thus talked about what comfort measures in the hospital would look like, and I prepared them  that Erin is ill enough that it is possible she could die in the hospital. Should she stabilize, we could consider transferring her back to the [facility] with hospice. We talked about focus on keeping her comfortable for her dying process, and allowing a natural dying process.     It was very evident that the daughters were very emotional and unprepared to make big decisions about this today. We ultimately agreed that we would not escalate care to the ICU, bipap, or pressors. We will maintain her current level of care and take it moment by moment. I prepared them for ongoing testing, as warranted, which they were not happy about, as this makes the patient very agitated. They would like further discussion as a family, and possibly medical providers about whether they would want further testing to be done. I did offer to start a comfort care plan, or modified plan of that nature. They told me they are not ready for that at this time.      Surjit is currently driving back home, and thus unaware of today's event. Family will contact him once he is home and update him on Erin's condition.    Extensive emotional support provided to family.     After our conversation, I visited with pt. She was opening her eyes and appearing scared and anxious. Per RN, pt is agitated with any cares they provide.     Case reviewed extensively with bedside RN Brittany, Glory LOPES, and Dr. Stockton.     Thank you for involving us in the care of this patient and family. We will continue to follow. Please do not hesitate to contact me with questions or concerns or the on-call provider for our team if evening or weekend.    Saniya BARAKAT, [facility]  Palliative Medicine   Pager [phone]    Attestation:  Total time on the floor involved in the patient's care: 60 minutes  Total time spent in counseling/care coordination: >50%    Interval History   RRT called this afternoon for acute respiratory decompensation with hypoxia and tachypnea. She has  TF running. WBC is climbing, lactate >2. Concern for aspiration event. I was called to the bedside to discuss with family.     Medications   Current Facility-Administered Medications Ordered in Epic   Medication Dose Route Frequency Last Rate Last Dose     acetaminophen (TYLENOL) tablet 650 mg  650 mg Oral Q6H   650 mg at 03/15/19 0541    Or     acetaminophen (TYLENOL) solution 650 mg  650 mg Per NG tube Q6H   650 mg at 03/15/19 1230     acetylcysteine (MUCOMYST) 20 % nebulizer solution 2 mL  2 mL Nebulization BID   2 mL at 03/15/19 1143     albuterol (PROVENTIL) neb solution 2.5 mg  2.5 mg Nebulization BID         albuterol (PROVENTIL) neb solution 2.5 mg  2.5 mg Nebulization Q2H PRN   2.5 mg at 03/15/19 1143     amLODIPine (NORVASC) tablet 10 mg  10 mg Oral Daily   10 mg at 03/15/19 0852     aspirin (ASA) chewable tablet 81 mg  81 mg Oral At Bedtime   81 mg at 03/14/19 2211     carbidopa-levodopa (SINEMET)  MG per tablet 2 tablet  2 tablet Oral 4x Daily   2 tablet at 03/15/19 1229     glucose gel 15-30 g  15-30 g Oral Q15 Min PRN        Or     dextrose 50 % injection 25-50 mL  25-50 mL Intravenous Q15 Min PRN        Or     glucagon injection 1 mg  1 mg Subcutaneous Q15 Min PRN         docusate (COLACE) 50 MG/5ML liquid 100 mg  100 mg Oral or Feeding Tube BID   100 mg at 03/14/19 0929     heparin sodium injection 5,000 Units  5,000 Units Subcutaneous Q8H   5,000 Units at 03/15/19 0541     hydrALAZINE (APRESOLINE) injection 10-20 mg  10-20 mg Intravenous Q4H PRN   10 mg at 03/11/19 0449     hypromellose-dextran (ARTIFICAL TEARS) 0.1-0.3 % ophthalmic solution 1 drop  1 drop Both Eyes Q1H PRN   1 drop at 03/07/19 0352     insulin aspart (NovoLOG) inj (RAPID ACTING)  1-12 Units Subcutaneous Q4H   4 Units at 03/15/19 1230     labetalol (NORMODYNE/TRANDATE) syringe 10-20 mg  10-20 mg Intravenous Q4H PRN         lacosamide (VIMPAT) solution 150 mg  150 mg Oral or Feeding Tube BID   150 mg at 03/15/19 0852      levothyroxine (SYNTHROID/LEVOTHROID) tablet 112 mcg  112 mcg Oral QAM AC   112 mcg at 03/15/19 0852     Lidocaine (LIDOCARE) 4 % Patch 3 patch  3 patch Transdermal Q24h   3 patch at 03/14/19 2024    And     lidocaine patch REMOVAL   Transdermal Q24H        And     lidocaine patch in PLACE   Transdermal Q8H         magnesium sulfate 2 g in water intermittent infusion  2 g Intravenous Daily PRN         magnesium sulfate 4 g in 100 mL sterile water (premade)  4 g Intravenous Q4H PRN         naloxone (NARCAN) injection 0.1-0.4 mg  0.1-0.4 mg Intravenous Q2 Min PRN         ondansetron (ZOFRAN-ODT) ODT tab 4 mg  4 mg Oral Q6H PRN        Or     ondansetron (ZOFRAN) injection 4 mg  4 mg Intravenous Q6H PRN         oxyCODONE IR (ROXICODONE) half-tab 2.5-5 mg  2.5-5 mg Oral Q3H PRN   2.5 mg at 03/14/19 1524     pantoprazole (PROTONIX) 2 mg/mL suspension 40 mg  40 mg Oral QAM AC   40 mg at 03/14/19 0658     polyethylene glycol (MIRALAX/GLYCOLAX) Packet 17 g  17 g Oral Daily PRN         potassium chloride (KLOR-CON) Packet 20-40 mEq  20-40 mEq Oral or Feeding Tube Q2H PRN   20 mEq at 03/14/19 0121     potassium chloride ER (K-DUR/KLOR-CON M) CR tablet 20-40 mEq  20-40 mEq Oral Q2H PRN         QUEtiapine (SEROquel) half-tab 12.5 mg  12.5 mg Oral At Bedtime   12.5 mg at 03/15/19 0415     sodium chloride (PF) 0.9% PF flush 3 mL  3 mL Intracatheter Q8H   3 mL at 03/15/19 1410     venlafaxine (EFFEXOR) tablet 37.5 mg  37.5 mg Oral TID   37.5 mg at 03/15/19 1454     No current Epic-ordered outpatient medications on file.       Physical Exam   Temp: 98.2  F (36.8  C) Temp src: Axillary BP: 110/60 Pulse: 69 Heart Rate: 68 Resp: 20 SpO2: 95 % O2 Device: Oxymask Oxygen Delivery: 7 LPM  Vitals:    03/12/19 0600 03/13/19 0400 03/14/19 0500   Weight: 91 kg (200 lb 9.9 oz) 90.3 kg (199 lb 1.2 oz) 92.2 kg (203 lb 3.2 oz)     CONSTITUTIONAL: Chronically ill elderly woman seen lying in bed in moderate distress. She appears somnolent,  essentially obtunded. She is unresponsive to gentle touch. Family present   RESPIRATORY: Labored and tachypneic respiratory effort on 8L facemask. accessory muscle use noted, loud snoring sound with respirations   GASTROINTESTINAL: NG    Data   Results for orders placed or performed during the hospital encounter of 03/02/19 (from the past 24 hour(s))   Glucose by meter   Result Value Ref Range    Glucose 169 (H) 70 - 99 mg/dL   Glucose by meter   Result Value Ref Range    Glucose 207 (H) 70 - 99 mg/dL   Glucose by meter   Result Value Ref Range    Glucose 217 (H) 70 - 99 mg/dL   Basic metabolic panel   Result Value Ref Range    Sodium 140 133 - 144 mmol/L    Potassium 3.5 3.4 - 5.3 mmol/L    Chloride 99 94 - 109 mmol/L    Carbon Dioxide 33 (H) 20 - 32 mmol/L    Anion Gap 8 3 - 14 mmol/L    Glucose 191 (H) 70 - 99 mg/dL    Urea Nitrogen 40 (H) 7 - 30 mg/dL    Creatinine 0.85 0.52 - 1.04 mg/dL    GFR Estimate 66 >60 mL/min/[1.73_m2]    GFR Estimate If Black 77 >60 mL/min/[1.73_m2]    Calcium 9.3 8.5 - 10.1 mg/dL   Magnesium   Result Value Ref Range    Magnesium 2.3 1.6 - 2.3 mg/dL   Phosphorus   Result Value Ref Range    Phosphorus 2.9 2.5 - 4.5 mg/dL   CBC Differential (AM Draw)   Result Value Ref Range    WBC 20.0 (H) 4.0 - 11.0 10e9/L    RBC Count 4.11 3.8 - 5.2 10e12/L    Hemoglobin 12.4 11.7 - 15.7 g/dL    Hematocrit 38.2 35.0 - 47.0 %    MCV 93 78 - 100 fl    MCH 30.2 26.5 - 33.0 pg    MCHC 32.5 31.5 - 36.5 g/dL    RDW 15.6 (H) 10.0 - 15.0 %    Platelet Count 529 (H) 150 - 450 10e9/L    Diff Method Automated Method     % Neutrophils 70.8 %    % Lymphocytes 13.3 %    % Monocytes 9.5 %    % Eosinophils 4.0 %    % Basophils 0.4 %    % Immature Granulocytes 2.0 %    Nucleated RBCs 0 0 /100    Absolute Neutrophil 14.2 (H) 1.6 - 8.3 10e9/L    Absolute Lymphocytes 2.7 0.8 - 5.3 10e9/L    Absolute Monocytes 1.9 (H) 0.0 - 1.3 10e9/L    Absolute Eosinophils 0.8 (H) 0.0 - 0.7 10e9/L    Absolute Basophils 0.1 0.0 - 0.2  10e9/L    Abs Immature Granulocytes 0.4 0 - 0.4 10e9/L    Absolute Nucleated RBC 0.0    Glucose by meter   Result Value Ref Range    Glucose 175 (H) 70 - 99 mg/dL   Infectious Diseases IP Consult: Patient to be seen: Routine - within 24 hours; worsening leukocytosis; Consultant may enter orders: Yes; Requesting provider? Attending physician    Jeanna Gee MD     3/15/2019 12:32 PM  [facility]    Infectious Disease Consultation     Date of Admission:  3/2/2019  Date of Consult (When I saw the patient): 03/15/19    Assessment & Plan   Loan Anderson is a 78 year old female who was admitted on   3/2/2019.     Impression:  1. 78 y.o patient with parkinson`s disease, neurogenic bladder.   2. Admitted with acute encephalopathy, intubated for resp   precautions, prolong ICU stay, delirium.   3. Partial status epilepticus and ? Angioedema reaction.   4. Now with WBC going up, ? Steroids, ? Infection.   5. Patient unable to provide any history.     Recommendations:   High risk for LORENZO, aspiration, neurogenic bladder so multiple   possibilities.   Get UA and UC.   Repeat blood cultures.   CXR.   Hold antibiotics unless any of the above mentioned positive for   infection.         Jeanna Fitzgerald MD    Reason for Consult   Reason for consult: I was asked by Dr. Stockton  to evaluate this   patient for Leucocytosis.    Primary Care Physician   Physician No Ref-Primary    Chief Complaint   No complaints     History is obtained from the patient and medical records    History of Present Illness   Loan Anderson is a 78 year old female with hypothyroidism,   Parkinson's disease with neurogenic bladder who presented with   acute encephalopathy and was intubated in the Emergency   Department due to concerns for loss of protecting her airway.    She was intubated on 03/02 and extubated on 03/11.  Prolonged   intubation and ICU stay related to ongoing delirium. Hospital   course complicated by a  partial status epilepticus and possible   angioedema reaction. Given steroids for that.           Past Medical History   I have reviewed this patient's medical history and updated it   with pertinent information if needed.   Past Medical History:   Diagnosis Date     Constipation 7/15/2011     Convergence insufficiency 7/15/2011     Depression 7/15/2011     Diplopia 7/18/2011     Hot flashes, menopausal 7/18/2011     Hypercholesterolemia 7/18/2011     Hypothyroid 7/15/2011     Neurogenic bladder 7/15/2011     Parkinson disease (H) 7/15/2011     REM sleep behavior disorder 7/18/2011     Sebaceous cyst 7/18/2011     Wears glasses 7/18/2011       Past Surgical History   I have reviewed this patient's surgical history and updated it   with pertinent information if needed.  Past Surgical History:   Procedure Laterality Date     ------------OTHER-------------  november ? 2015    lithrotripsy for renal stone     ------------OTHER-------------  november 2015    had sepsis from uti and hospitalized     BRAIN SURGERY  12 or 14 oct 2011    gene therapy enrolled study at [facility]       Prior to Admission Medications   Prior to Admission Medications   Prescriptions Last Dose Informant Patient Reported? Taking?   Multiple Vitamins-Minerals (EYE VITAMINS) CAPS   Yes Yes   Sig: Take 1 capsule by mouth daily 1 capsule (macu-health) by   mouth daily @ midnight   aspirin 81 MG tablet 3/1/2019 at HS  Yes Yes   Sig: Take 81 mg by mouth At Bedtime @ midnight   carbidopa-levodopa (SINEMET CR)  MG CR tablet 3/1/2019 at   2000  No Yes   Sig: Take 1 tablet by mouth At Bedtime At 8 pm   carbidopa-levodopa (SINEMET)  MG tablet 3/2/2019 at 1300    No Yes   Sig: Take 2 tablets 4 times daily at 7 am, 10 am, 1 pm, and 4 pm   cholecalciferol 5000 units CAPS   Yes Yes   Sig: Take 1 capsule by mouth every morning @ 6am   docusate sodium (COLACE) 100 MG capsule 3/2/2019 at 1200  Yes Yes     Sig: Take 300 mg by mouth daily 3 x 100mg capsule @  noon   ibuprofen (ADVIL/MOTRIN) 200 MG tablet 3/2/2019 at 1200  Yes Yes   Sig: Take 400 mg by mouth 3 times daily 2 x 200mg tabs by mouth   3/day @ 6am, 12pm and 6pm and 3 x 200mg tabs by mouth every 8   hours as needed   levothyroxine (SYNTHROID/LEVOTHROID) 112 MCG tablet 3/2/2019 at   AM  Yes Yes   Sig: Take 112 mcg by mouth daily @ 6am   lisinopril (PRINIVIL/ZESTRIL) 5 MG tablet 3/2/2019 at 0600  Yes   Yes   Sig: Take 5 mg by mouth daily @ 6am   mirabegron (MYRBETRIQ) 25 MG 24 hr tablet 3/2/2019 at 0600  Yes   Yes   Sig: Take 25 mg by mouth daily @ 6am   omeprazole (PRILOSEC) 20 MG DR capsule 3/2/2019 at 0600  Yes Yes   Sig: Take 20 mg by mouth daily @ 6am   polyethylene glycol (MIRALAX) powder PRN  Yes Yes   Sig: Take 17 g by mouth daily as needed @0600   rotigotine (NEUPRO) 4 MG/24HR 24 hr patch 3/1/2019 at 2000  No   Yes   Sig: Apply patch daily @ bedtime/8 pm   Patient taking differently: Place 1 patch onto the skin daily @   bedtime/8 pm   tamsulosin (FLOMAX) 0.4 MG capsule 3/1/2019 at 1800  Yes Yes   Sig: Take 0.4 mg by mouth daily @ 6pm   traMADol (ULTRAM) 50 MG tablet PRN  Yes Yes   Sig: Take 1 tablet (50 mg) by mouth every 6 hours as needed for   severe pain   trospium (SANCTURA XR) 60 MG CP24 24 hr capsule 3/2/2019 at 0600    Yes Yes   Sig: Take 60 mg by mouth every morning (before breakfast) @ 6am   venlafaxine (EFFEXOR) 100 MG tablet 3/2/2019 at 0600  Yes Yes   Sig: Take 100 mg by mouth daily @ 6am      Facility-Administered Medications: None     Allergies   Allergies   Allergen Reactions     Ace Inhibitors      Tongue and lip swelling just after restarting lisinopril on   3/9/19 at higher dose while intubated.  Unclear if actually   angioedema or not as swelling resolved with steroids.     Atorvastatin      Concern that it was contributing to confusion     Mirapex [Pramipexole Dihydrochloride Monohydrate]      Leg swelling     Potassium Chloride      IV infusion only. She tolerates oral potassium  chloride     Requip [Ropinirole Hydrochloride]      Leg swelling     Epinephrine Palpitations       Immunization History   Immunization History   Administered Date(s) Administered     FLU 6-35 months 12/04/2001, 10/31/2002, 11/06/2003, 11/15/2006,   10/10/2007, 10/27/2008, 09/16/2010     Influenza (High Dose) 3 valent vaccine 10/02/2013, 09/26/2014,   10/15/2015, 08/31/2016, 10/05/2017     Influenza (IIV3) PF 09/25/2012     Pneumo Conj 13-V (2010&after) 06/30/2015     Pneumococcal 23 valent 10/19/1999, 06/02/2009     Td (Adult), Adsorbed 11/04/2004       Social History   I have reviewed this patient's social history and updated it with   pertinent information if needed. Loan Anderson  reports   that  has never smoked. she has never used smokeless tobacco. She   reports that she drinks alcohol.    Family History   I have reviewed this patient's family history and updated it with   pertinent information if needed.   Family History   Problem Relation Age of Onset     Cerebrovascular Disease Father         stroke age 47     Heart Disease Father         heart attack age 57     Neurologic Disorder Mother         parkinson disease ? was on sinemet. She had micrographia     Eye Disorder Mother        Review of Systems   Cannot provide history    Physical Exam   Temp: 98.6  F (37  C) Temp src: Axillary BP: 163/59 Pulse: 77     Resp: 20 SpO2: 93 % O2 Device: Oxymask Oxygen Delivery: 5 LPM  Vital Signs with Ranges  Temp:  [98.3  F (36.8  C)-98.6  F (37  C)] 98.6  F (37  C)  Pulse:  [66-77] 77  Resp:  [18-20] 20  BP: (161-165)/(58-59) 163/59  SpO2:  [93 %-94 %] 93 %  203 lbs 3.2 oz  Body mass index is 36 kg/m .    GENERAL APPEARANCE:  Non verbal on O2  EYES: Eyes grossly normal to inspection, PERRL and conjunctivae   and sclerae normal  HENT: ear canals and TM's normal and nose and mouth without   ulcers or lesions  NECK: no adenopathy, no asymmetry, masses, or scars and thyroid   normal to palpation  RESP: lungs clear to  auscultation - no rales, rhonchi or wheezes  CV: regular rates and rhythm, normal S1 S2, no S3 or S4 and no   murmur, click or rub  LYMPHATICS: normal ant/post cervical and supraclavicular nodes  ABDOMEN: soft, nontender, without hepatosplenomegaly or masses   and bowel sounds normal  MS: extremities normal- no gross deformities noted  SKIN: no suspicious lesions or rashes      Data   Lab Results   Component Value Date    WBC 20.0 (H) 03/15/2019    HGB 12.4 03/15/2019    HCT 38.2 03/15/2019     (H) 03/15/2019     03/15/2019    POTASSIUM 3.5 03/15/2019    CHLORIDE 99 03/15/2019    CO2 33 (H) 03/15/2019    BUN 40 (H) 03/15/2019    CR 0.85 03/15/2019     (H) 03/15/2019    NTBNPI 160 03/13/2019    TROPI <0.015 03/13/2019    AST 20 03/13/2019    ALT 7 03/13/2019    ALKPHOS 168 (H) 03/13/2019    BILITOTAL 0.5 03/13/2019    INR 1.04 03/02/2019     Recent Labs   Lab 03/13/19  2259 03/13/19  2250   CULT No growth after 1 day No growth after 1 day     Recent Labs   Lab Test 03/13/19  2259 03/13/19  2250 03/03/19  0530 03/02/19  1554 03/02/19  1543 10/23/17  0745 10/23/17  0653 10/21/17  2245 10/21/17  2225   CULT No growth after 1 day No growth after 1 day Moderate growth  Normal abhijeet   No growth No growth No growth No growth Cultured on the 1st day   of incubation:  Escherichia coli  *  Critical Value/Significant Value, preliminary result only,   called to and read back by  Eloisa Cheng RN SH88 @ 1132.cg 10/22/17    (Note)  POSITIVE for E. COLI by Livemochaigene multiplex nucleic acid test.   Final  identification and antimicrobial susceptibility testing will be  verified by standard methods.    Specimen tested with Verigene multiplex, gram-negative blood   culture  nucleic acid test for the following targets: Acinetobacter sp.,  Citrobacter sp., Enterobacter sp., Proteus sp., E. coli, K.  pneumoniae/oxytoca, P. aeruginosa, and the following resistance  markers: CTXM, KPC, NDM, VIM, IMP and OXA.    Critical  Value/Significant Value called to and read back by Eloisa Cheng RN SH88 @ 1350. 10/22/17     Cultured on the 1st day of incubation:  Escherichia coli  Susceptibility testing done on previous specimen  *  Critical Value/Significant Value, preliminary result only,   called to and read back by  Eloisa Cheng RN SH88 @ 1226. 10/22/17                EKG 12-lead, tracing only   Result Value Ref Range    Interpretation ECG Click View Image link to view waveform and result    Glucose by meter   Result Value Ref Range    Glucose 236 (H) 70 - 99 mg/dL   Blood gas venous with oxyhemoglobin   Result Value Ref Range    Ph Venous 7.50 (H) 7.32 - 7.43 pH    PCO2 Venous 50 40 - 50 mm Hg    PO2 Venous 51 (H) 25 - 47 mm Hg    Bicarbonate Venous 39 (H) 21 - 28 mmol/L    FIO2 1     Oxyhemoglobin Venous 86 %    Base Excess Venous 13.1 mmol/L   Nt probnp inpatient   Result Value Ref Range    N-Terminal Pro BNP Inpatient 241 0 - 1,800 pg/mL   Lactic acid level STAT for sepsis protocol   Result Value Ref Range    Lactate for Sepsis Protocol 2.3 (H) 0.7 - 2.0 mmol/L   XR Chest Port 1 View    Narrative    CHEST ONE VIEW PORTABLE   3/15/2019 1:19 PM     HISTORY:  Hypoxia.    COMPARISON: Chest x-ray 3/13/2019.      Impression    IMPRESSION: Portable view of the chest is performed. Feeding tube  extends below the left hemidiaphragm off the inferior aspect of the  image. Probable small right pleural effusion is unchanged. Heart size  appears mildly prominent but unchanged. No obvious left pleural fluid.  No evidence of pneumothorax. No new infiltrate or lung consolidation  is appreciated.    LILLIAN PEARSON MD   UA with Microscopic reflex to Culture   Result Value Ref Range    Color Urine Yellow     Appearance Urine Cloudy     Glucose Urine Negative NEG^Negative mg/dL    Bilirubin Urine Negative NEG^Negative    Ketones Urine Negative NEG^Negative mg/dL    Specific Gravity Urine 1.015 1.003 - 1.035    Blood Urine Moderate (A) NEG^Negative     pH Urine 7.5 (H) 5.0 - 7.0 pH    Protein Albumin Urine 10 (A) NEG^Negative mg/dL    Urobilinogen mg/dL 2.0 0.0 - 2.0 mg/dL    Nitrite Urine Negative NEG^Negative    Leukocyte Esterase Urine Small (A) NEG^Negative    Source Catheterized Urine     WBC Urine 5 0 - 5 /HPF    RBC Urine >182 (H) 0 - 2 /HPF    Amorphous Crystals Few (A) NEG^Negative /HPF   Lactic acid whole blood   Result Value Ref Range    Lactic Acid 1.8 0.7 - 2.0 mmol/L

## 2019-03-15 NOTE — PLAN OF CARE
Discharge Planner SLP   Patient plan for discharge: Did not state  Current status: SLP: Pt was able to be aroused with assist from daughter. Pt unable to follow commands. Wet vocal quality noted and pt unable to cough on command. Oxymask switched to nasal cannula to target oral cares and oral motor/pharyngeal exercises. Pt was able to complete effortful swallow x3 with max cues. Oxygen noted to dip to the mid-60s and Oxymask replaced with quick recovery. RN notifed. Daughter requesting PO trials at this time, however, pt is not able to tolerate being off oxymask. RN reported plan for RT to provide nebulizer to mobilize secretions. Will plan to follow this pm if pt is appropriate.   Barriers to return to prior living situation: Dysphagia requiring TF; below baseline cognition/speech/voice  Recommendations for discharge: TCU that can accommodate TF  Rationale for recommendations: Continue daily for swallowing goals; will defer speech/voice/cognitive intervention to the next level of care       Entered by: Mayte Rm 03/15/2019 11:52 AM      Note: RRT this pm. Pt is not appropriate for BID session today. Will continue to follow.

## 2019-03-16 NOTE — PROCEDURES
Procedure Date: 03/15/2019      ELECTROENCEPHALOGRAM       ORDERING PROVIDER:  Rajani Mejia MD      DATE OF RECORDIN/15/2019       [facility] EEG #       This EEG is recorded in a confused individual.  Background activity is represented by a combination of theta and delta frequencies, which are present in both hemispheres symmetrically.      The patient is confused and there is a lot of movement artifact that is seen.  There is no abnormal movement during the recording.  Photic stimulation and hyperventilations are not performed.  I do not see any evidence of epileptiform patterns or epileptiform discharges.      IMPRESSION:  This is abnormal EEG.  It shows severe encephalopathy.  This correlates with toxic metabolic encephalopathy in underlying neurodegenerative condition.  No evidence of active epileptiform process is seen.  Clinical correlation is advised.            RAJANI MEJIA MD             D: 03/15/2019   T: 03/15/2019   MT: SARA      Name:     PIYUSH MONTENEGRO   MRN:      [id number]        Account:        [id number]   :      1940           Procedure Date: 03/15/2019      Document: [id number]       cc: Rajani Mejia MD

## 2019-03-16 NOTE — PLAN OF CARE
Discharge Planner SLP   Patient plan for discharge: Unable to state  Current status: Swallow Tx was provided this am.  Patient only maintained alertness for brief periods with audible congested breathing noted throughout session.  Patient had difficulty following commands consistently even with max cues.  Patient produced a delayed inconsistent swallow after thermal stimulation.  Patient continues to be at high risk for aspiration for all po intake and on secretions.  Recommend strict NPO, frequent oral cares, encourage coughing, suction as needed.  Anticipate multiple days of swallow Tx prior to patient appropriate for po trials and/or a video swallow study.  A video swallow study (or FEES) is needed if full aspiration risk evaluation is desired given patient's poor vocal quality and cough reflex.  Unable to rule out silent aspiration at bedside.  Video swallow study can be completed as an OP.  Plan to continue Tx as able.  Barriers to return to prior living situation: Level of assist, weakness  Recommendations for discharge: TCU; SLP swallow Tx  Rationale for recommendations: SLP swallow Tx to maximize safety and function for safe return to po intake       Entered by: Yoli Urrutia 03/16/2019 11:54 AM

## 2019-03-16 NOTE — PROGRESS NOTES
"[facility]  [facility] [place]  Neurology Daily Note     10/6/11 2:09 PM        Assessment and Plan:     Encephalopathy due to recent SZs, intubation and PD - stable.    I met with family and discussed current state, risk of aspiration and uncertain prognosis for good recovery at this point.    Major issues is swallowing - Sp is at bedside.    We will plan to assess feeding and see if pt, can start oral feeding. Otherwise, she will need G-tube which pt. Did not wish to have before.    I will plan to follow from neurology. At this point - no need to change PD meds.            Interval History:   No new changes           Review of Systems:   Continues to need FT, unable to do ST due to MS          Medications:          acetaminophen  650 mg Oral Q6H    Or     acetaminophen  650 mg Per NG tube Q6H     acetylcysteine  2 mL Nebulization BID     albuterol  2.5 mg Nebulization BID     amLODIPine  10 mg Oral Daily     aspirin  81 mg Oral At Bedtime     carbidopa-levodopa  2 tablet Oral 4x Daily     docusate  100 mg Oral or Feeding Tube BID     heparin  5,000 Units Subcutaneous Q8H     insulin aspart  1-12 Units Subcutaneous Q4H     lacosamide  150 mg Oral or Feeding Tube BID     levothyroxine  112 mcg Oral QAM AC     lidocaine  3 patch Transdermal Q24h    And     lidocaine   Transdermal Q24H    And     lidocaine   Transdermal Q8H     pantoprazole  40 mg Oral QAM AC     QUEtiapine  12.5 mg Oral At Bedtime     sodium chloride (PF)  3 mL Intracatheter Q8H     venlafaxine  37.5 mg Oral TID          Physical Exam:   Vitals: Blood pressure (!) 123/106, pulse 67, temperature 98.1  F (36.7  C), temperature source Oral, resp. rate 22, height 1.6 m (5' 3\"), weight 91.7 kg (202 lb 3.2 oz), SpO2 97 %.  General Appearance:  Pt. Was drowsy but then she opened eyes and said OK to family.  She really does not follow commands.    No Sz activity is seen.  Muscle tone is loose, speech is slurred..    CHACHA, " EOMI.              Data:     Results for orders placed or performed during the hospital encounter of 03/02/19 (from the past 24 hour(s))   EKG 12-lead, tracing only   Result Value Ref Range    Interpretation ECG Click View Image link to view waveform and result    Glucose by meter   Result Value Ref Range    Glucose 236 (H) 70 - 99 mg/dL   Blood gas venous with oxyhemoglobin   Result Value Ref Range    Ph Venous 7.50 (H) 7.32 - 7.43 pH    PCO2 Venous 50 40 - 50 mm Hg    PO2 Venous 51 (H) 25 - 47 mm Hg    Bicarbonate Venous 39 (H) 21 - 28 mmol/L    FIO2 1     Oxyhemoglobin Venous 86 %    Base Excess Venous 13.1 mmol/L   Nt probnp inpatient   Result Value Ref Range    N-Terminal Pro BNP Inpatient 241 0 - 1,800 pg/mL   Lactic acid level STAT for sepsis protocol   Result Value Ref Range    Lactate for Sepsis Protocol 2.3 (H) 0.7 - 2.0 mmol/L   XR Chest Port 1 View    Narrative    CHEST ONE VIEW PORTABLE   3/15/2019 1:19 PM     HISTORY:  Hypoxia.    COMPARISON: Chest x-ray 3/13/2019.      Impression    IMPRESSION: Portable view of the chest is performed. Feeding tube  extends below the left hemidiaphragm off the inferior aspect of the  image. Probable small right pleural effusion is unchanged. Heart size  appears mildly prominent but unchanged. No obvious left pleural fluid.  No evidence of pneumothorax. No new infiltrate or lung consolidation  is appreciated.    LILLIAN PEARSON MD   UA with Microscopic reflex to Culture   Result Value Ref Range    Color Urine Yellow     Appearance Urine Cloudy     Glucose Urine Negative NEG^Negative mg/dL    Bilirubin Urine Negative NEG^Negative    Ketones Urine Negative NEG^Negative mg/dL    Specific Gravity Urine 1.015 1.003 - 1.035    Blood Urine Moderate (A) NEG^Negative    pH Urine 7.5 (H) 5.0 - 7.0 pH    Protein Albumin Urine 10 (A) NEG^Negative mg/dL    Urobilinogen mg/dL 2.0 0.0 - 2.0 mg/dL    Nitrite Urine Negative NEG^Negative    Leukocyte Esterase Urine Small (A) NEG^Negative     Source Catheterized Urine     WBC Urine 5 0 - 5 /HPF    RBC Urine >182 (H) 0 - 2 /HPF    Amorphous Crystals Few (A) NEG^Negative /HPF   Lactic acid whole blood   Result Value Ref Range    Lactic Acid 1.8 0.7 - 2.0 mmol/L   Blood culture   Result Value Ref Range    Specimen Description Blood Right Hand     Special Requests Aerobic and anaerobic bottles received     Culture Micro No growth after 11 hours    Blood culture   Result Value Ref Range    Specimen Description Blood Left Arm     Special Requests Aerobic and anaerobic bottles received     Culture Micro No growth after 11 hours    Glucose by meter   Result Value Ref Range    Glucose 173 (H) 70 - 99 mg/dL   Glucose by meter   Result Value Ref Range    Glucose 185 (H) 70 - 99 mg/dL   Glucose by meter   Result Value Ref Range    Glucose 173 (H) 70 - 99 mg/dL   Glucose by meter   Result Value Ref Range    Glucose 206 (H) 70 - 99 mg/dL   Lactic acid whole blood   Result Value Ref Range    Lactic Acid 1.6 0.7 - 2.0 mmol/L   Basic metabolic panel   Result Value Ref Range    Sodium 141 133 - 144 mmol/L    Potassium 3.5 3.4 - 5.3 mmol/L    Chloride 103 94 - 109 mmol/L    Carbon Dioxide 34 (H) 20 - 32 mmol/L    Anion Gap 4 3 - 14 mmol/L    Glucose 187 (H) 70 - 99 mg/dL    Urea Nitrogen 39 (H) 7 - 30 mg/dL    Creatinine 0.90 0.52 - 1.04 mg/dL    GFR Estimate 62 >60 mL/min/[1.73_m2]    GFR Estimate If Black 72 >60 mL/min/[1.73_m2]    Calcium 8.7 8.5 - 10.1 mg/dL   Magnesium   Result Value Ref Range    Magnesium 2.6 (H) 1.6 - 2.3 mg/dL   Phosphorus   Result Value Ref Range    Phosphorus 2.9 2.5 - 4.5 mg/dL   CBC Differential (AM Draw)   Result Value Ref Range    WBC 20.8 (H) 4.0 - 11.0 10e9/L    RBC Count 4.00 3.8 - 5.2 10e12/L    Hemoglobin 12.0 11.7 - 15.7 g/dL    Hematocrit 37.7 35.0 - 47.0 %    MCV 94 78 - 100 fl    MCH 30.0 26.5 - 33.0 pg    MCHC 31.8 31.5 - 36.5 g/dL    RDW 16.0 (H) 10.0 - 15.0 %    Platelet Count 530 (H) 150 - 450 10e9/L    Diff Method Automated  Method     % Neutrophils 75.1 %    % Lymphocytes 10.3 %    % Monocytes 8.9 %    % Eosinophils 3.7 %    % Basophils 0.3 %    % Immature Granulocytes 1.7 %    Nucleated RBCs 0 0 /100    Absolute Neutrophil 15.6 (H) 1.6 - 8.3 10e9/L    Absolute Lymphocytes 2.2 0.8 - 5.3 10e9/L    Absolute Monocytes 1.9 (H) 0.0 - 1.3 10e9/L    Absolute Eosinophils 0.8 (H) 0.0 - 0.7 10e9/L    Absolute Basophils 0.1 0.0 - 0.2 10e9/L    Abs Immature Granulocytes 0.4 0 - 0.4 10e9/L    Absolute Nucleated RBC 0.0    Glucose by meter   Result Value Ref Range    Glucose 158 (H) 70 - 99 mg/dL

## 2019-03-16 NOTE — CONSULTS
Care Transition Initial Assessment - SW     Met with:  Patient's spouse and two daughters.   Active Problems:    Encephalopathy       DATA  Lives With: facility resident   Living Arrangements: assisted living  Quality of Family Relationships: involved, supportive  Description of Support System: Supportive, Involved  Who is your support system?: , Children  Support Assessment: Adequate family and caregiver support.   Identified issues/concerns regarding health management: SW following for d.c needs  Quality of Family Relationships: involved, supportive     ASSESSMENT  Cognitive Status:  SHELLIE-working with SLP.   Concerns to be addressed: Patient is a 78 year old female who was admitted to the hospital for encephalopathy. Prior to hospitalization, patient was living at [facility] where she was managing well. Patient's spouse found patient unresponsive, where CPR was then given. Patient arrived to [facility] and admitted to ICU. Patient transferred to [facility] and is Patient is currently on 6L O2. Patient's family has not decided if they are ready for hospice, as patient has been in this condition in the past and has recovered. They would like to give patient a couple more days to see if she can recover. Family also expressed concern that patient does better in her own environment, and this could be a cause of her current presentation. SW explained to family that SW will continue to follow and assist in any d.c planning needs they decide on.     SW called [facility] and spoke with staff. Staff confirmed they do most cares for patient. RN stated that it would depend on level of care patient requires. RN requested SW call back Monday to discuss further with DON.        PLAN  Financial costs for the patient includes   Patient given options and choices for discharge   Patient/family is agreeable to the plan?  YES  Patient Goals and Preferences: ANAT  Patient anticipates discharging to: [facility]    *[phone]

## 2019-03-16 NOTE — PROGRESS NOTES
[facility]    Palliative Care Progress Note    Loan Anderson  MRN# [id number]  Date of Admission:  3/2/2019  Date of Service (when I saw the patient): 03/16/2019    Assessment & Plan   Loan Anderson is a 78 year old female with PMH significant for advanced parkinson's disease, HTN, CKD, and recent fall with resultant pain on therapy with tramadol, who was found unresponsive at her memory care unit, CPR was initiated at that time. A perfusing rhythm was noted upon EMS arrival. She was found to be post-ictal, neurology involved. She was intubated in the ICU for 10 days, now extubated. She remains encephalopathic. We are following for goals of care.     Symptoms/Recommendations   -Goals are as discussed with Dr. Stone this AM, confirmed with Lamberto today that the family wants to give Erin a few more days to see if she can improve with goal of eating independently.    -Lamberto is not optimistic that this will happen and shares that her wish for her mother at that point would be to go back to the [facility] with Hospice, and to allow her to improve from there if she will or if she won't to have her safe and comfortable outside of the hospital, which has always been a stressful place for her.   -Family decision is as noted above    -Of note, pt under research study for Parkinsons by Dr Kenneth Nguyễn at [facility] in [place]. If pt dies, her brain is supposed to go to research. Please call Dr. Cooper at [phone] or cell [phone] with any questions     Support/Coping  -Well supported by  Surjit of 56 years. They have 2 adult daughters, Lamberto and Kassandra, who live local and are very involved   -Appreciate involvement of spiritual health   -Appreciate involvement of palliative LICSW Keihsa Abraham for additional support      Decisional Support, Goals of Care, Counseling & Coordination  Decisional Capacity Intact?  -No  Health Care Directive on File?  -Yes, reviewed   Code  "Status/Resuscitation Preferences?  -DNR/DNI     Discussion  Lamberto shares with me that she has an understanding that her mother is not likely to improve. However, she is trying to honor her mother's wishes as stated in her healthcare directive to \"not give up too soon.\" She reports that Kassandra and Surjit may not be as ready for Hospice and are even more hopeful that a few more days will improve her mentation.     Lamberto mentions that if she is going to improve and need a TCU, she wonders which would allow an NG.  I shared that most don't, and that sometimes patients have to go to an LTAC with these.  Lamberto does not think that Erin would have any improvement in that type of setting.     She denied any further questions about the possible care plans when asked today (had reportedly been asking Dr. Stone questions about Hospice this AM).     Case reviewed with team per this note, as well as by phone prior to visit.     Thank you for involving us in the care of this patient and family. We will continue to follow. Please do not hesitate to contact me with questions or concerns or the on-call provider for our team if evening or weekend.    Delilah Goodman MD   Palliative Medicine   Pager [phone]    Attestation:  Total time on the floor involved in the patient's care: 35 minutes   Total time spent in counseling/care coordination: >50%    Interval History   Has been on 6L oxymask overnight.  Said \"goodbye\" to her son in law yesterday per Lamberto (used clear words to verbalize).     Medications   APAP 650MG q6h  Lidocaine patch  Quetiapine 12.5mg at bedtime  Effexor 37.5mg TID  Oxycodone 2.5-5mg q3h PRN x 2    Physical Exam   Temp: 98.1  F (36.7  C) Temp src: Oral BP: (!) 123/106(nurse notified) Pulse: 67 Heart Rate: 67 Resp: 22 SpO2: 97 % O2 Device: Oxymask Oxygen Delivery: 6 LPM  Vitals:    03/13/19 0400 03/14/19 0500 03/16/19 0224   Weight: 90.3 kg (199 lb 1.2 oz) 92.2 kg (203 lb 3.2 oz) 91.7 kg (202 lb 3.2 oz) "     CONSTITUTIONAL: Chronically ill elderly woman seen lying in bed in moderate distress. She appears somnolent, alternating with agitation with cares.   RESPIRATORY: Labored and tachypneic on current support, but it is unclear if this is due to cares being performed during visit.   NEURO: As above.  Does not make eye contact with me. Agitated, moving head from side to side.     Data   Cr 0.9, GFR 62  WBC 20.8Plts 530

## 2019-03-16 NOTE — PLAN OF CARE
Discharge Planner OT   Patient plan for discharge: Family hopeful for pt's return to memory care    Current status: Minimal command following throughout session. Use of ceiling lift for transfer into chair per pt's daughter request. Max A for LE dressing and brace mgmt.     Barriers to return to prior living situation: Pain level; Baseline cognitive impairment; Fall risk with history; Current level of A required    Recommendations for discharge: Memory care TCU    Rationale for recommendations: Pt continues to be limited by impaired cognition and safety, decreased balance and activity tolerance, spinal precautions, and pain. Pt demonstrates limited tolerance and participation at this time; thus, decreased frequency to 3x/week. OT will continue to maximize I and safety with ADLs.       Entered by: Eldon Yo 03/16/2019 9:40 AM

## 2019-03-16 NOTE — PLAN OF CARE
Somnolent, unable to assess orientation.  Tmax 99.3.  RR20, 5L oxymask.  LS coarse, infrequent productive cough.  No signs of pain.  Silva patent with ange colored output.  TF at 90 ml/hr, held at 0700.  BG q4h, sliding scale insulin per orders.  T/R q4h per family request.  Daughter at bedside, supportive.  Continue to monitor blood cultures.

## 2019-03-16 NOTE — PROGRESS NOTES
" INFECTIOUS DISEASE Progress Note  March 16, 2019  [id number]  Loan Anderson    78 year old female who was admitted on 3/2/2019 with parkinson`s disease, neurogenic bladder.    Admitted with acute encephalopathy, intubated for resp precautions, prolong ICU stay, delirium. Partial status epilepticus and ? Angioedema reaction. Now with WBC going up, ? Steroids, ? Infection.      Recommendations:   High risk for LORENZO, aspiration, neurogenic bladder so multiple possibilities.   Get UA and UC.   Repeat blood cultures.   CXR.   Hold antibiotics unless any of the above mentioned positive for infection.     ANTIBIOTICS:  none    SUBJECTIVE: afebrile, notes reviewed, Mild intermittent NP cough, dtrs here say she is a fighter, has had Silva x 2 weeks    OBJECTIVE:  /57 (BP Location: Right arm)   Pulse 118   Temp 95.8  F (35.4  C) (Axillary)   Resp 21   Ht 1.6 m (5' 3\")   Wt 91.7 kg (202 lb 3.2 oz)   SpO2 96%   BMI 35.82 kg/m    Somnolent, comfortable  Lung coarse, few upper airway gurgles but o/w good BS  Sinus tach  abd soft, NT, ND Obese, mild ecchymoses R flank  Silva-yellow slightly cloudy urine  No other rash  1+ edema    LAB Data:  Wbc 20 NO CHANGE, Range 15-20    MICROBIOLOGY:  Sputum 3/3 URF  BC 3/13 x 2 nG  BC 3/15 x 2 NG  UA > 182 WBC  BC 3/2 NG    IMAGING:  CXR 3/15 IMPRESSION: Portable view of the chest is performed. Feeding tube  extends below the left hemidiaphragm off the inferior aspect of the  image. Probable small right pleural effusion is unchanged. Heart size  appears mildly prominent but unchanged. No obvious left pleural fluid.  No evidence of pneumothorax. No new infiltrate or lung consolidation  is appreciated.    Attestation:  I have reviewed today's vital signs, notes, medications, labs and imaging.    ASSESSMENT:  1. LEUKOCYTOSIS-Cxs neg, UA + no UC,   2. PARKINSON'S   3. DEBILITY  4. PARTIAL STATUS EPILEPTICUS  5. DELIRIUM    REC  1. Add UC to urine  2. For now hold off on " antibiotics  3. Follow clinically, reassess  4. Palliative care following    BRITNEY MATT M.D.  O:[phone]   B:[phone]

## 2019-03-16 NOTE — PLAN OF CARE
VSS.  Somnolent at times, and attempting to communicate with family at other times.  Opens eyes to voice.  Up to chair today with lift.  RR 21-22, 6L oxymask.  Coarse lungs, infrequent cough.  Silva patent.  TF at 90/hour, held before and after Sinemet as ordered.  Repositioned every 2-4 hours depending on family request.  Blood sugars monitored.  Daughters at bedside.  Will continue to monitor.

## 2019-03-16 NOTE — PROGRESS NOTES
[facility]    Medicine Progress Note - Hospitalist Service       Date of Admission:  3/2/2019  Assessment & Plan   Loan Anderson is a 78-year-old female with history hypothyroidism, Parkinson's disease with neurogenic bladder who presented with acute encephalopathy and was intubated in the Emergency Department due to concerns for loss of protecting her airway.  She was intubated on 03/02 and extubated on 03/11.  Prolonged intubation and ICU stay related to ongoing delirium. Hospital course complicated by a partial status epilepticus and possible angioedema reaction.      Acute metabolic encephalopathy  Seizure disorder with partial status epilepticus   Likely multifactorial related to ICU delirium, postictal state related to partial epileptic seizure, and pain related to fractures below. Gradual improvement however still not back to baseline per daughter and continues to fail swallow evaluation.  - Neurology re-consulted 3/15/19 due to family concerns regarding abnormal movements, EEG with diffuse slowing, no signs of seizure  - Continue lacosamide   - Re-orient as needed  - Maintain normal day/night, sleep/wake cycles  - Minimize sedating / altering medications as able  - Continue quetiapine at bedtime  - Anticipate prolonged recovery    Dysphagia  Secondary to encephalopathy, prolonged intubation.   - Continue TF per NG   - Per palliative notes, family not interested in PEG  - Continue SLP assessments as respiratory status allows    Advanced care planning  Palliative care following  - Per family discussion with palliative care 3/15/19, no escalation of care to BiPAP, ICU, pressors. Remains DNR/DNI.   - As above, family is not interested in PEG  - Will re-visit with family when available 3/16/19     ADDENDUM: Met with  and two daughters again 3/16/19, discussed current medical concerns and barriers to discharge. Discussed options of comfort measures only and hospice care and what those  transitions would look like. At this point, plan unchanged with continued restorative care plan with no escalation of cares, with continued assessments over the next few days of progress or lack-thereof and revisit goals of care as needed.      Acute hypoxic respiratory failure  RRT 3/15/19 due to increased oxygen need.  Respiratory therapist was consulted for increased secretion however patient's weak to cough out all the secretions  - Suctioning tried however patient unable to tolerate.  - Unable to do vest due to multiple rib fractures, patient not able to cooperate for flutter valve.  Mucomyst with breathing treatment tried however patient's O2 sats dropped resulting in RRT  - No new infiltrate on CXR 3/15/19   - See advanced care planning above     Right T11 and T12 rib fractures with associated posterior right-sided pleural effusion.  Right L1 and L2 transverse process fractures.  Multifactorial related to mechanical fall prior to admission, as well as CPR (no documented pulselessness) and Heimlich maneuvers performed for recurrent episodes of unresponsiveness.   - Monitor on telemetry.   - Pain control currently with scheduled acetaminophen and lidocaine patches.  P.r.n. low dose oxycodone is available for severe pain.   - PT/OT following and recommending TCU  - PRN bowel regimen     Leukocytosis  Suspected acute phase reactant and steroid related. Patient had received steroids when she had angioedema. No growth noted on cultures. Procal <0.05. No focal signs/syptoms infection. Afebrile.  - ID following, monitoring off of antibiotics  - CXR unchanged 3/15/19   - UA with hematuria, no signs of infection   - Afebrile      YASMIN on chronic kidney disease stage II  Baseline creatinine 0.8-0.9. Attributed to prerenal from diuresis.  Gradually improving. She has low muscle mass.   - Monitor periodically     Microscopic hematuria  RBC >182 on UA 3/15/19 on catheterized specimen. Tiffany colored urine. Potentially  secondary to Silva trauma in context of prophylactic heparin and aspirin use.    - Recheck in 2-4 weeks as outpatient once Silva removed, pending goals of care     Possible angioedema  Tongue and lip swelling, 03/09-3/10, after restarted on lisinopril, which was recently started in outpatient setting.  Uncertain whether this was true angioedema and lisinopril added to allergy list and transitioned to amlodipine.  Improved with steroids.      Hypertension   Recently started on lisinopril, developed tongue and lip swelling concerning for angioedema, with lisinopril discontinued and started on amlodipine.   - Continue amlodipine      Parkinson's disease with neurogenic bladder  Lives in memory care at the [facility] and essentially wheelchair bound d/t high falls risk.  Dtr states normally able to carry on conversation and oriented to situation.  - Silva catheter currently in place.   - Continues on short-acting Sinemet through tube feeds and instructed to resume long-acting once tolerating p.o.   - Note, tube feedings need to be held  1  hour before and 1 hour after Sinemet administration for better absorption.     Diet: NPO for Medical/Clinical Reasons Except for: Meds  Adult Formula Drip Feeding: Continuous Replete with Fiber; Orogastric tube; Goal Rate: 90; mL/hr; Medication - Feeding Tube Flush Frequency: At least 15-30 mL water before and after medication administration and with tube clogging; Amount to Send ...    DVT Prophylaxis: Heparin SQ  Silva Catheter: in place, indication: Strict 1-2 Hour I&O  Code Status: DNR/DNI      Disposition Plan   Expected discharge: Unclear, likely 2+ days and likely to TCU if goals of care remain restorative.   Entered: Dustin Stone MD 03/16/2019, 8:46 AM       The patient's care was discussed with the Bedside Nurse, Patient and Patient's Family.    Dustin Stone MD  Hospitalist Service  [facility]  Hospital    ______________________________________________________________________    Interval History   No acute events overnight. Unable to get reliable history for patient due to encephalopathy. Discussed with daughter Lara at the bedside as well as RN. Daughter feels she is more alert today than previous, still not back to baseline.     Data reviewed today: I reviewed all medications, new labs and imaging results over the last 24 hours. I personally reviewed no images or EKG's today.    Physical Exam   Vital Signs: Temp: 98.1  F (36.7  C) Temp src: Oral BP: (!) 123/106(nurse notified) Pulse: 67 Heart Rate: 67 Resp: 22 SpO2: 97 % O2 Device: Oxymask Oxygen Delivery: 6 LPM  Weight: 202 lbs 3.2 oz    Constitutional: Well-appearing, NAD  Respiratory: Clear to auscultation bilaterally, good air movement bilaterally  Cardiovascular: RRR, no m/r/g. No peripheral edema.  GI: Soft, non-tender, non-distended. BS normoactive.   Skin/Integumen: Warm, dry  Neuro: Alert. Flat affect. Slow responses. Eventually able to state daughter's name. Not able to state location.     Data   Recent Labs   Lab 03/16/19  0738 03/15/19  0821 03/14/19  0753 03/13/19  2251  03/11/19  0505   WBC 20.8* 20.0* 17.5* 17.6*   < > 13.8*   HGB 12.0 12.4 12.2 11.9   < > 11.7   MCV 94 93 93 92   < > 91   * 529* 456* 461*   < > 308    140 137 137   < > 135   POTASSIUM 3.5 3.5 3.7 3.5   < > 3.8   CHLORIDE 103 99 96 96   < > 96   CO2 34* 33* 35* 35*   < > 32   BUN 39* 40* 51* 51*   < > 31*   CR 0.90 0.85 0.90 0.97   < > 0.71   ANIONGAP 4 8 6 6   < > 7   NIKKIE 8.7 9.3 9.1 8.3*   < > 8.8   * 191* 274* 188*   < > 196*   ALBUMIN  --   --   --  2.6*  --  2.3*   PROTTOTAL  --   --   --  6.9  --  7.0   BILITOTAL  --   --   --  0.5  --  0.4   ALKPHOS  --   --   --  168*  --  138   ALT  --   --   --  7  --  13   AST  --   --   --  20  --  21   TROPI  --   --   --  <0.015  --   --     < > = values in this interval not displayed.       Recent  Results (from the past 24 hour(s))   XR Chest Port 1 View    Narrative    CHEST ONE VIEW PORTABLE   3/15/2019 1:19 PM     HISTORY:  Hypoxia.    COMPARISON: Chest x-ray 3/13/2019.      Impression    IMPRESSION: Portable view of the chest is performed. Feeding tube  extends below the left hemidiaphragm off the inferior aspect of the  image. Probable small right pleural effusion is unchanged. Heart size  appears mildly prominent but unchanged. No obvious left pleural fluid.  No evidence of pneumothorax. No new infiltrate or lung consolidation  is appreciated.    LILLIAN PEARSON MD     Medications       acetaminophen  650 mg Oral Q6H    Or     acetaminophen  650 mg Per NG tube Q6H     acetylcysteine  2 mL Nebulization BID     albuterol  2.5 mg Nebulization BID     amLODIPine  10 mg Oral Daily     aspirin  81 mg Oral At Bedtime     carbidopa-levodopa  2 tablet Oral 4x Daily     docusate  100 mg Oral or Feeding Tube BID     heparin  5,000 Units Subcutaneous Q8H     insulin aspart  1-12 Units Subcutaneous Q4H     lacosamide  150 mg Oral or Feeding Tube BID     levothyroxine  112 mcg Oral QAM AC     lidocaine  3 patch Transdermal Q24h    And     lidocaine   Transdermal Q24H    And     lidocaine   Transdermal Q8H     pantoprazole  40 mg Oral QAM AC     QUEtiapine  12.5 mg Oral At Bedtime     sodium chloride (PF)  3 mL Intracatheter Q8H     venlafaxine  37.5 mg Oral TID

## 2019-03-17 NOTE — PROGRESS NOTES
[facility]    Medicine Progress Note - Hospitalist Service       Date of Admission:  3/2/2019  Assessment & Plan   Loan Anderson is a 78-year-old female with history hypothyroidism, Parkinson's disease with neurogenic bladder who presented with acute encephalopathy and was intubated in the Emergency Department due to concerns for loss of protecting her airway.  She was intubated on 03/02 and extubated on 03/11.  Prolonged intubation and ICU stay related to ongoing delirium. Hospital course complicated by a partial status epilepticus and possible angioedema reaction.      Acute metabolic encephalopathy  Seizure disorder with partial status epilepticus   Likely multifactorial related to ICU delirium, postictal state related to partial epileptic seizure, and pain related to fractures below. Gradual improvement however still not back to baseline per daughter and continues to fail swallow evaluation. EEG 3/15 with diffuse slowing, no signs of seizure  - Neurology following for advanced PD as below  - Continue lacosamide   - Re-orient as needed  - Maintain normal day / night, sleep / wake cycles  - Minimize sedating / altering medications as able  - Continue quetiapine at bedtime, okay to move up timing of dose at family request   - Anticipate prolonged recovery    Dysphagia  Secondary to encephalopathy, prolonged intubation.   - Continue TF per NG   - Family not interested in PEG  - Continue SLP assessments as respiratory status allows, per SLP note 3/17/19 anticipate multiple days of swallow treatment prior to appropriateness for PO trials or video swallow study   - Neurology recommending hospice if no improvement in swallowing by Tuesday (3/19/19)    Advanced care planning  Palliative care following. Have addressed goals of care at length with family, see note from 3/16/19.   - No escalation of cares (to BiPAP, ICU, pressors, PEG tube. Remains DNR/DNI)  - Plan to observe for any improvement over  the next 2-3 days, then re-address possible transition to comfort cares / hospice    Parkinson's disease with neurogenic bladder  Lives in memory care at the [facility] and essentially wheelchair bound d/t high falls risk.  Dtr states normally able to carry on conversation and oriented to situation.  - Continue Silva catheter   - Neurology increasing Sinemet to five times a day.   - Note, tube feedings need to be held  1  hour before and 1 hour after Sinemet administration for better absorption.     Acute hypoxic respiratory failure  RRT 3/15/19 due to increased oxygen need.  Respiratory therapist was consulted for increased secretion however patient's weak to cough out all the secretions. Suctioning tried however patient unable to tolerate. Unable to do vest due to multiple rib fractures, patient not able to cooperate for flutter valve.  Mucomyst with breathing treatment tried however patient's O2 sats dropped resulting in RRT. No new infiltrate on CXR 3/15/19   - See advanced care planning above     Right T11 and T12 rib fractures with associated posterior right-sided pleural effusion  Right L1 and L2 transverse process fractures  Multifactorial related to mechanical fall prior to admission, as well as CPR (no documented pulselessness) and Heimlich maneuvers performed for recurrent episodes of unresponsiveness.   - Monitor on telemetry.   - Adjusting pain regimen at family request to try to minimize oxycodone use. Changing lidocaine patches to morning use, changed scheduled acetaminophen to 500 mg TID with option for additional doses either in between or with scheduled doses  - PT/OT following and recommending TCU  - PRN bowel regimen     Leukocytosis  Suspected acute phase reactant and steroid related. Patient had received steroids when she had angioedema. No growth noted on cultures. Procal <0.05. No focal signs/syptoms infection. Afebrile. CXR unchanged 3/15/19. UA with hematuria, no signs of infection   - ID  following, monitoring off of antibiotics  - Afebrile   - Monitor WBC     YASMIN on chronic kidney disease stage II  Baseline creatinine 0.8-0.9. Attributed to prerenal from diuresis.  Gradually improving. She has low muscle mass.   - Monitor periodically     Microscopic hematuria  RBC >182 on UA 3/15/19 on catheterized specimen. Tiffany colored urine. Potentially secondary to Silva trauma in context of prophylactic heparin and aspirin use.    - Recheck in 2-4 weeks as outpatient once Silva removed, pending goals of care     Possible angioedema  Tongue and lip swelling, 03/09-3/10, after restarted on lisinopril, which was recently started in outpatient setting.  Uncertain whether this was true angioedema and lisinopril added to allergy list and transitioned to amlodipine.  Improved with steroids.      Hypertension   Recently started on lisinopril, developed tongue and lip swelling concerning for angioedema, with lisinopril discontinued and started on amlodipine.   - Continue amlodipine      Diet: Adult Formula Drip Feeding: Continuous Replete with Fiber; Orogastric tube; Goal Rate: 90; mL/hr; Medication - Feeding Tube Flush Frequency: At least 15-30 mL water before and after medication administration and with tube clogging; Amount to Send ...  NPO for Medical/Clinical Reasons Except for: NPO but receiving Tube Feeding    DVT Prophylaxis: Heparin SQ  Silva Catheter: in place, indication: Strict 1-2 Hour I&O  Code Status: DNR/DNI      Disposition Plan   Expected discharge: Unclear, likely 2+ days and likely to TCU if goals of care remain restorative.   Entered: Dustin Stone MD 03/17/2019, 2:06 PM       The patient's care was discussed with the Bedside Nurse, Patient and Patient's Family.    Time Spent on this Encounter   I spent 35 minutes on the unit/floor managing the care of Loan Anderson. Over 50% of my time was spent counseling the patient and/or coordinating care regarding services listed in this  note.      Dustin Stone MD  Hospitalist Service  [facility]    ______________________________________________________________________    Interval History   No acute events overnight. Patient seems more alert today. She denies any pain or shortness of breath at present. Discussed care at length with family at bedside.     Data reviewed today: I reviewed all medications, new labs and imaging results over the last 24 hours. I personally reviewed no images or EKG's today.    Physical Exam   Vital Signs: Temp: 99  F (37.2  C) Temp src: Axillary BP: 123/55 Pulse: 118 Heart Rate: 64 Resp: 20 SpO2: 94 % O2 Device: Oxymask Oxygen Delivery: 6 LPM  Weight: 202 lbs 3.2 oz    Constitutional: Well-appearing, NAD  Respiratory: Clear to auscultation bilaterally, good air movement bilaterally  Cardiovascular: RRR, no m/r/g. No peripheral edema.  GI: Soft, non-tender, non-distended. BS normoactive.   Skin/Integumen: Warm, dry  Neuro: Alert. Flat affect. Slow responses. Oriented to person, place, family at bedside.     Data   Recent Labs   Lab 03/17/19  0709 03/16/19  0738 03/15/19  0821  03/13/19  2251  03/11/19  0505   WBC 18.9* 20.8* 20.0*   < > 17.6*   < > 13.8*   HGB 11.6* 12.0 12.4   < > 11.9   < > 11.7   MCV 95 94 93   < > 92   < > 91   * 530* 529*   < > 461*   < > 308    141 140   < > 137   < > 135   POTASSIUM 3.4 3.5 3.5   < > 3.5   < > 3.8   CHLORIDE 102 103 99   < > 96   < > 96   CO2 33* 34* 33*   < > 35*   < > 32   BUN 42* 39* 40*   < > 51*   < > 31*   CR 0.88 0.90 0.85   < > 0.97   < > 0.71   ANIONGAP 7 4 8   < > 6   < > 7   NIKKIE 8.7 8.7 9.3   < > 8.3*   < > 8.8   * 187* 191*   < > 188*   < > 196*   ALBUMIN  --   --   --   --  2.6*  --  2.3*   PROTTOTAL  --   --   --   --  6.9  --  7.0   BILITOTAL  --   --   --   --  0.5  --  0.4   ALKPHOS  --   --   --   --  168*  --  138   ALT  --   --   --   --  7  --  13   AST  --   --   --   --  20  --  21   TROPI  --   --   --   --  <0.015  --    --     < > = values in this interval not displayed.       No results found for this or any previous visit (from the past 24 hour(s)).  Medications       acetaminophen  650 mg Oral Q6H    Or     acetaminophen  650 mg Per NG tube Q6H     acetylcysteine  2 mL Nebulization BID     albuterol  2.5 mg Nebulization BID     amLODIPine  10 mg Oral Daily     aspirin  81 mg Oral At Bedtime     carbidopa-levodopa  2 tablet Oral 5x Daily     docusate  100 mg Oral or Feeding Tube BID     heparin  5,000 Units Subcutaneous Q8H     insulin aspart  1-12 Units Subcutaneous Q4H     lacosamide  150 mg Oral or Feeding Tube BID     levothyroxine  112 mcg Oral QAM AC     lidocaine  3 patch Transdermal Q24h    And     lidocaine   Transdermal Q24H    And     lidocaine   Transdermal Q8H     pantoprazole  40 mg Oral QAM AC     QUEtiapine  12.5 mg Oral At Bedtime     sodium chloride (PF)  3 mL Intracatheter Q8H     venlafaxine  37.5 mg Oral TID

## 2019-03-17 NOTE — PROGRESS NOTES
[facility]  [facility]  Neurology Daily Note     10/6/11 2:09 PM        Assessment and Plan:     Encephalopathy, recent status epilepticus, advanced PD, dysphagia and PD-dementia., REM-sleep disorder.    Pt. Is mildly improved in alertness. She still is not at baseline and she has very high risk of aspiration.  I would like to increase Sinemet to 5 times per day to see if this will help with swallowing. Family agrees with this plan.    I discussed that I do not recommend to stop Lacosomide as there has been recent status epilepticus and there is chance of SZ reoccurring. If pt. Is getting more stable and has another EEG in 2 mo - Lacosomide can be tapered off.    Pt. Has advanced PD - this hospitalization is a major set back in her  conditions. I explained that it will take 2-4 month to see gradual improvement. It is not clear if pt. Will return to baseline. There is risk of aspiration and infection.    I rec to monitor condition and swallowing - is not better by Tuesday and family does not wish for G-tube - I would recommend Hospice care.    Neurology will continue to follow.              Interval History:   Pt. Is a little bit more alert today. There are a lot of gurgling sounds from poor swallowing. She jokes a little bit.  Family is at bedside - they have many questions and would like to see if AED Lacosonide is nneded.           Review of Systems:   Pt. Continues to have dysphagia.           Medications:          acetaminophen  650 mg Oral Q6H    Or     acetaminophen  650 mg Per NG tube Q6H     acetylcysteine  2 mL Nebulization BID     albuterol  2.5 mg Nebulization BID     amLODIPine  10 mg Oral Daily     aspirin  81 mg Oral At Bedtime     carbidopa-levodopa  2 tablet Oral 5x Daily     docusate  100 mg Oral or Feeding Tube BID     heparin  5,000 Units Subcutaneous Q8H     insulin aspart  1-12 Units Subcutaneous Q4H     lacosamide  150 mg Oral or Feeding Tube BID      "levothyroxine  112 mcg Oral QAM AC     lidocaine  3 patch Transdermal Q24h    And     lidocaine   Transdermal Q24H    And     lidocaine   Transdermal Q8H     pantoprazole  40 mg Oral QAM AC     QUEtiapine  12.5 mg Oral At Bedtime     sodium chloride (PF)  3 mL Intracatheter Q8H     venlafaxine  37.5 mg Oral TID          Physical Exam:   Vitals: Blood pressure 123/55, pulse 118, temperature 98.8  F (37.1  C), temperature source Axillary, resp. rate 20, height 1.6 m (5' 3\"), weight 91.7 kg (202 lb 3.2 oz), SpO2 95 %.  General Appearance:  Pt. Was more alert and she she fell asleep. Speech is very slurred, mild flicker movements in hands, no movements in LEs.    There is rigidity in UE today, no tremors,.    EOMI, symmetrical face.             Data:     Results for orders placed or performed during the hospital encounter of 03/02/19 (from the past 24 hour(s))   Glucose by meter   Result Value Ref Range    Glucose 229 (H) 70 - 99 mg/dL   Glucose by meter   Result Value Ref Range    Glucose 178 (H) 70 - 99 mg/dL   Glucose by meter   Result Value Ref Range    Glucose 153 (H) 70 - 99 mg/dL   UA reflex to Microscopic and Culture   Result Value Ref Range    Color Urine Yellow     Appearance Urine Cloudy     Glucose Urine Negative NEG^Negative mg/dL    Bilirubin Urine Negative NEG^Negative    Ketones Urine Negative NEG^Negative mg/dL    Specific Gravity Urine 1.016 1.003 - 1.035    Blood Urine Moderate (A) NEG^Negative    pH Urine 8.0 (H) 5.0 - 7.0 pH    Protein Albumin Urine 10 (A) NEG^Negative mg/dL    Urobilinogen mg/dL 2.0 0.0 - 2.0 mg/dL    Nitrite Urine Negative NEG^Negative    Leukocyte Esterase Urine Negative NEG^Negative    Source Catheterized Urine     RBC Urine 177 (H) 0 - 2 /HPF    WBC Urine 0 0 - 5 /HPF    Squamous Epithelial /HPF Urine 1 0 - 1 /HPF    Mucous Urine Present (A) NEG^Negative /LPF    Amorphous Crystals Moderate (A) NEG^Negative /HPF   Glucose by meter   Result Value Ref Range    Glucose 253 (H) 70 - " 99 mg/dL   Glucose by meter   Result Value Ref Range    Glucose 219 (H) 70 - 99 mg/dL   CBC with platelets   Result Value Ref Range    WBC 18.9 (H) 4.0 - 11.0 10e9/L    RBC Count 3.93 3.8 - 5.2 10e12/L    Hemoglobin 11.6 (L) 11.7 - 15.7 g/dL    Hematocrit 37.4 35.0 - 47.0 %    MCV 95 78 - 100 fl    MCH 29.5 26.5 - 33.0 pg    MCHC 31.0 (L) 31.5 - 36.5 g/dL    RDW 16.1 (H) 10.0 - 15.0 %    Platelet Count 565 (H) 150 - 450 10e9/L   Basic metabolic panel   Result Value Ref Range    Sodium 142 133 - 144 mmol/L    Potassium 3.4 3.4 - 5.3 mmol/L    Chloride 102 94 - 109 mmol/L    Carbon Dioxide 33 (H) 20 - 32 mmol/L    Anion Gap 7 3 - 14 mmol/L    Glucose 240 (H) 70 - 99 mg/dL    Urea Nitrogen 42 (H) 7 - 30 mg/dL    Creatinine 0.88 0.52 - 1.04 mg/dL    GFR Estimate 64 >60 mL/min/[1.73_m2]    GFR Estimate If Black 74 >60 mL/min/[1.73_m2]    Calcium 8.7 8.5 - 10.1 mg/dL   Magnesium   Result Value Ref Range    Magnesium 2.5 (H) 1.6 - 2.3 mg/dL   Phosphorus   Result Value Ref Range    Phosphorus 2.7 2.5 - 4.5 mg/dL   Glucose by meter   Result Value Ref Range    Glucose 174 (H) 70 - 99 mg/dL

## 2019-03-17 NOTE — PLAN OF CARE
Repositioned every 2-4 hours depending on family request.  Pt responds to voice and touch, somnolent through cares. Tele: SR with BBB. VSS with exception of  RR 21-22, 6L oxymask.  Coarse lungs, infrequent cough.  Silva patent.  TF at 90/hour, held before and after Sinemet as ordered. Blood sugars monitored every 4 hours; BG-253/ 219; insulin coverage give. Daughters at bedside. Discharge pending pt progress. Palliative care consult placed. Will continue to observe.

## 2019-03-17 NOTE — PLAN OF CARE
Alert at times, opens eyes spontaneously. Breathing shallow, congested. LS course. NG tube patent feeding 90ml/hour. Tolerating well. UA sent. Turned and positioned q2. BS monitored. Daughter at bedside, requesting cares clustered.

## 2019-03-17 NOTE — PLAN OF CARE
Discharge Planner PT   Patient plan for discharge: Pt's daughter Kassandra hoping return to memory care unit  Current status: Pt on 5.5L O2 with sat in low 90s at rest. Total-A of 2 supine>sit with log roll to L. Sits EOB x 12 minutes max to SBA depending on spastic trunk activity. O2 desats in 70s, pt not responsive to multiple attempts of deep breathing cues, O2 increased to 10 then 15L. Total-A of 2 to return supine, pt has coughing episodes and O2 in 60s, total-A to get to HOB and elevate. Pt then Total-A of 3 to don brace and lift to bedside chair. Total-A for positioning protecting R lean. Monitor on 15L until O2 sats back into 90s, then able to ween back to 5.5L and remain stable.   Barriers to return to prior living situation: level of assist, spinal precautions, poor activity tolerance.  Recommendations for discharge: return to memory care unit with 27/7 assist with mobility  Rationale for recommendations: Pt continues to be limited by impaired cognition and safety, decreased balance and activity tolerance, spinal precautions, and pain. Pt demonstrates limited improvement in tolerance and participation at this time; thus, decreased frequency to 3x/week. Will require facility with lift at discharge.       Entered by: Ulises Kirk 03/17/2019 9:57 AM

## 2019-03-17 NOTE — PLAN OF CARE
Discharge Planner SLP   Patient plan for discharge: Unable to state  Current status: Swallow Tx was provided this am per family/RN request due to brief period of patient being awake.  Variable alertness/responsiveness and audible congested breathing noted throughout session.  Patient had difficulty following commands consistently even with max cues.  Patient produced a delayed inconsistent swallow after thermal stimulation (1/4 after swab with min ice water on sponge and 2/3 after swab with min orange juice on sponge; increased congested breathing sound noted x 1 after trials).    Patient continues to be at high risk for silent aspiration for all po intake and on secretions due to fluctuating alertness, inconsistent swallow reflex, and continued congested breathing.  Recommend strict NPO, frequent oral cares, encourage coughing, suction as needed.  Anticipate multiple days of swallow Tx prior to patient appropriate for po trials and/or a video swallow study.  A video swallow study (or FEES) is needed if full aspiration risk evaluation is desired given patient's poor vocal quality and cough reflex.  Patient is not able to complete a study at this time due to inability to maintain alertness for 1-2 hours, audible congested breathing, and inconsistent swallow reflex.  Unable to rule out silent aspiration at bedside.  Video swallow study can be completed as an OP.  Plan to continue Tx as able.  Extensive education was provided to family on patient's current high silent aspiration risk and anticipated need for ongoing Tx attempts (likely prolonged period) prior to safety for po or swallow study.  Will defer to MD/family regarding overall POC for continued NPO/TF (?PEG although family previously stated wishes for no PEG) vs po intake despite high aspiration and respiratory compromise risk.  Barriers to return to prior living situation: Level of assist, weakness  Recommendations for discharge: TCU, SLP swallow Tx,  possible OP video swallow study  Rationale for recommendations: SLP swallow Tx to maximize swallow function for return to safe po intake       Entered by: Yoli Urrutia 03/17/2019 12:21 PM

## 2019-03-18 NOTE — PLAN OF CARE
Discharge Planner OT   Patient plan for discharge: Family hopeful pt. can return to memory care  Current status: Pt. alert, able to respond yes or no to questions, following simple commands inconsistently;Dtr. present, wanting to get pt. up to chair;pt. on 5L O2, VSS;Pt. does not report pain at rest;Pt. needed max. A X 2 for log-roll to katelyn corset brace, grimaces in pain, however respond no to pain questions/location;Pt. needed total assist for bathing, washing face, pericare/change of incont. pad(nursing-assisted);Used lift to transfer bed to chair, pt. reports no pain when up;Completed BUE PROM(except R shoulder--no ROM due to rotator cuff tear per dtr.), minimal assisted ROM R elbow.   Barriers to return to prior living situation: Pain level;spinal precs., baseline cognitive impairment; fall risk with history; current level of A required, decreased activity tolerance.    Recommendations for discharge: Back to memory care w/ 24/7 assist for ADL's/mobility(needs to be assist X 2 to go back to memory care, per dtr.)versus TCU/LTC pending progress.  Rationale for recommendations: Family hopes pt. will continue to improve to be safe to return to memory care unit/be in familiar environment with continued A for mobility and ADL's.           Entered by: Dahlia Villela 03/18/2019 1:57 PM

## 2019-03-18 NOTE — PLAN OF CARE
Discharge Planner SLP   Patient plan for discharge: Did not state  Current status: Swallow Tx was provided this pm.  Patient was alert and sitting up in a chair for the session.  Daughter reports no oxy given today.  No congested breathing was observed during the session.  Patient was able to complete 2 lingual exercises with max cues.  Improved ROM noted.  Patient demonstrated tongue pumping and delayed swallows across nectar and honey thick liquid trials with decreased oral awareness.  Cues needed to swallow at times.  No overt signs of aspiration were observed; however, unable to rule out silent aspiration given length of delay and high risk factors.  Recommend continued NPO status, frequent oral cares, and proceeding with a video swallow study 3/19 pm if patient able to remain alert.  Further results and recommendations to follow as able.  Barriers to return to prior living situation: Level of assist, weakness  Recommendations for discharge: Memory care TCU with SLP  Rationale for recommendations: SLP swallow Tx to maximize swallow function/safety for return to po intake       Entered by: Yoli Urrutia 03/18/2019 1:28 PM

## 2019-03-18 NOTE — PROGRESS NOTES
[facility]    Medicine Progress Note - Hospitalist Service       Date of Admission:  3/2/2019  Assessment & Plan   Loan Anderson is a 78-year-old female with history hypothyroidism, Parkinson's disease with neurogenic bladder who presented with acute encephalopathy and was intubated in the Emergency Department due to concerns for loss of protecting her airway.  She was intubated on 03/02 and extubated on 03/11.  Prolonged intubation and ICU stay related to ongoing delirium. Hospital course complicated by a partial status epilepticus and possible angioedema reaction.      Acute metabolic encephalopathy  Seizure disorder with partial status epilepticus   Likely multifactorial related to ICU delirium, postictal state related to partial epileptic seizure, and pain related to fractures below. Gradual improvement however still not back to baseline per daughter and continues to fail swallow evaluation. EEG 3/15 with diffuse slowing, no signs of seizure  - Neurology following for advanced PD as below  - Continue lacosamide   - Re-orient as needed  - Maintain normal day / night, sleep / wake cycles as able  - Minimize sedating / altering medications as able  - Continue quetiapine at bedtime   - Anticipate prolonged recovery    Dysphagia  Secondary to encephalopathy, prolonged intubation.   - Continue TF per NG   - Family not interested in PEG  - Continue SLP assessments as respiratory status allows, per SLP note 3/17/19 anticipate multiple days of swallow treatment prior to appropriateness for PO trials or video swallow study   - Neurology recommending hospice if no improvement in swallowing by Tuesday (3/19/19). Will re-visit with family then    Advanced care planning  Palliative care following. Have addressed goals of care at length with family, see note from 3/16/19.   - No escalation of cares (to BiPAP, ICU, pressors, PEG tube. Remains DNR/DNI)  - Plan to observe for any improvement by 3/19/19, then  re-address possible transition to comfort cares / hospice  - Palliative care following     Parkinson's disease with neurogenic bladder  Lives in memory care at the [facility] and essentially wheelchair bound d/t high falls risk.  Dtr states normally able to carry on conversation and oriented to situation.  - Continue Silva catheter   - Neurology increased Sinemet to five times a day 3/17/19 to assess for effect on swallowing  - Note, tube feedings need to be held  1 hour before and 1 hour after Sinemet administration for better absorption.     Acute hypoxic respiratory failure  RRT 3/15/19 due to increased oxygen need.  Respiratory therapist was consulted for increased secretion however patient's weak to cough out all the secretions. Suctioning tried however patient unable to tolerate. Unable to do vest due to multiple rib fractures, patient not able to cooperate for flutter valve.  Mucomyst with breathing treatment tried however patient's O2 sats dropped resulting in RRT. No new infiltrate on CXR 3/15/19   - Continues to have significant oxygen needs with activity.   - See advanced care planning above     Right T11 and T12 rib fractures with associated posterior right-sided pleural effusion  Right L1 and L2 transverse process fractures  Multifactorial related to mechanical fall prior to admission, as well as CPR (no documented pulselessness) and Heimlich maneuvers performed for recurrent episodes of unresponsiveness.   - Continue lidocaine patches, scheduled acetaminophen with additional doses PRN  - PT/OT following and recommending TCU  - PRN bowel regimen    Possible bladder spasms   Noted 3/17/19 by RN to have possible bladder spasms associated with apparent Silva dysfunction with leaking. Seemed to improve with irrigation and troubleshooting of Silva.  - Continue B&O suppository     Leukocytosis  Suspected acute phase reactant and steroid related. Patient had received steroids when she had angioedema. No growth  noted on cultures. Procal <0.05. No focal signs/syptoms infection. Afebrile. CXR unchanged 3/15/19. UA with hematuria, no signs of infection   - ID following, monitoring off of antibiotics  - Afebrile   - WBC decreasing      YASMIN on chronic kidney disease stage II  Baseline creatinine 0.8-0.9. Attributed to prerenal from diuresis.  Gradually improving. She has low muscle mass.   - Monitor periodically     Microscopic hematuria  RBC >182 on UA 3/15/19 on catheterized specimen. Tiffany colored urine. Potentially secondary to Silva trauma in context of prophylactic heparin and aspirin use.    - Recheck in 2-4 weeks as outpatient once Silva removed, pending goals of care     Possible angioedema  Tongue and lip swelling, 03/09-3/10, after restarted on lisinopril, which was recently started in outpatient setting.  Uncertain whether this was true angioedema and lisinopril added to allergy list and transitioned to amlodipine.  Improved with steroids.      Hypertension   Recently started on lisinopril, developed tongue and lip swelling concerning for angioedema, with lisinopril discontinued and started on amlodipine.   - Continue amlodipine      Diet: Adult Formula Drip Feeding: Continuous Replete with Fiber; Orogastric tube; Goal Rate: 90; mL/hr; Medication - Feeding Tube Flush Frequency: At least 15-30 mL water before and after medication administration and with tube clogging; Amount to Send ...  NPO for Medical/Clinical Reasons Except for: NPO but receiving Tube Feeding    DVT Prophylaxis: Heparin SQ  Silva Catheter: in place, indication: Neurogenic Bladder  Code Status: DNR/DNI      Disposition Plan   Expected discharge: Unclear, likely 2+ days and likely to TCU if goals of care remain restorative.   Entered: Dustin Stone MD 03/18/2019, 9:17 AM       The patient's care was discussed with the Bedside Nurse, Patient and Patient's Family.    Dustin Stone MD  Hospitalist Service  [facility]  Hospital    ______________________________________________________________________    Interval History   Yesterday evening had questioned episode of bladder spasms with Silva leakage noted, improved with troubleshooting of Silva. Episode of increased oxygen needs related to activity. Patient reported some lower right-sided chest pain with coughing earlier. Otherwise no complaints from patient.     Data reviewed today: I reviewed all medications, new labs and imaging results over the last 24 hours. I personally reviewed no images or EKG's today.    Physical Exam   Vital Signs: Temp: 98.5  F (36.9  C) Temp src: Axillary BP: 155/60   Heart Rate: 68 Resp: 24 SpO2: 98 % O2 Device: Nasal cannula Oxygen Delivery: 6 LPM  Weight: 193 lbs 4.8 oz    Constitutional:  NAD  Respiratory: Scattered coarse breath sounds, normal effort at rest   Cardiovascular: RRR, no m/r/g. No peripheral edema.  GI: Soft, non-tender, non-distended.    Skin/Integumen: Warm, dry  Neuro: Alert. Flat affect. Slow responses. Not able to state location.     Data   Recent Labs   Lab 03/18/19  0725 03/17/19  0709 03/16/19  0738  03/13/19  2251   WBC 16.4* 18.9* 20.8*   < > 17.6*   HGB 12.5 11.6* 12.0   < > 11.9   MCV 95 95 94   < > 92   * 565* 530*   < > 461*   * 142 141   < > 137   POTASSIUM 3.4 3.4 3.5   < > 3.5   CHLORIDE 107 102 103   < > 96   CO2 33* 33* 34*   < > 35*   BUN 38* 42* 39*   < > 51*   CR 0.89 0.88 0.90   < > 0.97   ANIONGAP 5 7 4   < > 6   NIKKIE 8.8 8.7 8.7   < > 8.3*   * 240* 187*   < > 188*   ALBUMIN  --   --   --   --  2.6*   PROTTOTAL  --   --   --   --  6.9   BILITOTAL  --   --   --   --  0.5   ALKPHOS  --   --   --   --  168*   ALT  --   --   --   --  7   AST  --   --   --   --  20   TROPI  --   --   --   --  <0.015    < > = values in this interval not displayed.       No results found for this or any previous visit (from the past 24 hour(s)).  Medications       acetaminophen  500 mg Oral or NG Tube TID      acetylcysteine  2 mL Nebulization BID     albuterol  2.5 mg Nebulization BID     amLODIPine  10 mg Oral Daily     aspirin  81 mg Oral At Bedtime     carbidopa-levodopa  2 tablet Oral 5x Daily     docusate  100 mg Oral or Feeding Tube BID     heparin  5,000 Units Subcutaneous Q8H     insulin aspart  1-12 Units Subcutaneous Q4H     lacosamide  150 mg Oral or Feeding Tube BID     levothyroxine  112 mcg Oral QAM AC     lidocaine  1-3 patch Transdermal Q24H     lidocaine   Transdermal Q8H     lidocaine   Transdermal Q24h     pantoprazole  40 mg Oral QAM AC     QUEtiapine  12.5 mg Oral At Bedtime     sodium chloride (PF)  3 mL Intracatheter Q8H     venlafaxine  37.5 mg Oral TID

## 2019-03-18 NOTE — PROGRESS NOTES
CLINICAL NUTRITION SERVICES - REASSESSMENT NOTE      Recommendations Ordered by Registered Dietitian (RD):     Ordered daily multivitamin, as TF provides 84% RDI     Malnutrition: (3/6)  % Weight Loss:  None noted  % Intake:  No decreased intake noted  Subcutaneous Fat Loss:  None observed  Muscle Loss:  None observed  Fluid Retention:  Mild - doubt nutrition related     Malnutrition Diagnosis: Patient does not meet two of the above criteria necessary for diagnosing malnutrition        EVALUATION OF PROGRESS TOWARD GOALS   Diet:    NPO    Nutrition Support:    Type of Feeding Tube: ND  Enteral Frequency:  Continuous x 14 hrs  Enteral Regimen: Replete with fiber at 90mL/hr x 14hr (EN held throughout the day for Sinemet x 5 times; total of 10 hrs)   Total Enteral Provisions: 1260 kcal (14 kcal/kg), 81 g protein (1.5 g/kg), 1046 mL H2O, 19 g fiber, 156 g CHO, 84% RDI.  Free Water Flush: 60mL Q 4hrs     TOTAL FLUID (TF + flushes) = 1406 mL free water/day      Intake/Tolerance:    Pt tolerating TF at goal rate  +BM 3/16 (on colace)  Sinemet increased to 5 times per day - TF being held 10 hrs/day (1 hr before and 1 hr after each dose)  Na 145 (H)  BGM: 173, 160 (on high sliding scale insulin)      ASSESSED NUTRITION NEEDS:  Dosing Weight 87.7 kg (3/18 actual wt for energy); 52.3 kg (IBW for protein)  Estimated Energy Needs: 4453-0221 kcals (14-17 Kcal/Kg)  Justification: hypocaloric feeding guidelines for obesity  Estimated Protein Needs:  grams protein (1.5-2 g pro/Kg)  Justification: obesity      NEW FINDINGS:     Vitals:    03/02/19 1452 03/03/19 0600 03/04/19 0000 03/05/19 0412   Weight: 91 kg (200 lb 9.9 oz) 90.2 kg (198 lb 13.7 oz) 91.4 kg (201 lb 8 oz) 93.5 kg (206 lb 2.1 oz)    03/06/19 0559 03/07/19 0354 03/08/19 0200 03/09/19 0400   Weight: 93.4 kg (205 lb 14.6 oz) 93.8 kg (206 lb 12.7 oz) 93.7 kg (206 lb 9.1 oz) 91.8 kg (202 lb 6.1 oz)    03/10/19 0400 03/11/19 0500 03/12/19 0600 03/13/19 0400   Weight:  "93.4 kg (205 lb 14.6 oz) 91 kg (200 lb 9.9 oz) 91 kg (200 lb 9.9 oz) 90.3 kg (199 lb 1.2 oz)    03/14/19 0500 03/16/19 0224 03/18/19 0641   Weight: 92.2 kg (203 lb 3.2 oz) 91.7 kg (202 lb 3.2 oz) 87.7 kg (193 lb 4.8 oz)     3/17: SLP ---> continues to be high asp risk, NPO    3/17: Per Neuro ---> \"I rec to monitor condition and swallowing - if not better by Tuesday and family does not wish for G-tube - I would recommend Hospice care.\"      Previous Goals (3/14):   Enteral nutrition to meet 90%-110% of assessed needs   Evaluation: Met    BGM range <150  Evaluation: Not met    Previous Nutrition Diagnosis (3/14):   Altered nutrition-related lab value (glucoses) related to stress, patient on steroids as evidenced by BGM > 150   Evaluation: No change, modified below        CURRENT NUTRITION DIAGNOSIS  No nutrition diagnosis identified at this time       INTERVENTIONS  Recommendations / Nutrition Prescription  NPO (per SLP)    Continue TF as above  Daily multivitamin as TF meeting 84% RDI    Implementation  Ordered Certavite (15 mL/day)    Goals  EN to meet % est needs      MONITORING AND EVALUATION:  Progress towards goals will be monitored and evaluated per protocol and Practice Guidelines        "

## 2019-03-18 NOTE — PLAN OF CARE
VSS.  Oxy x1 for pain.  Tylenol adjusted by MD per family request.  Patient more alert today, starting to answer simple questions.  Sleeping between cares.  Weaned to 4L on oxymask.  Coarse lungs.  Bladder spasms, mata catheter irrigated x1, patent.  TF at 90/hour, feedings held for 1 hour before and 1 hour after Sinemet (increased to 5x/day).  Daughters and  at bedside.  Repositioned every 2-4 hours depending on family request.  Will continue to monitor.

## 2019-03-18 NOTE — PLAN OF CARE
Pt is lethargic/occasionally alert with daughter. VSS on 3-5L O2 at rest, when anxious or moving O2 needs increase to 10-15L d/t SaO2 decrease to 70s. Pt turn/reposition every 3-4 hours per family request. TF running at goal rate, tolerating well. TF off from 2820-4940 for medication due at 2200. Lactic fired during evening, family refused lab draw until AM as well as repeat vitals. Glucose checks completed every 4 hours, coverage given as needed. Silva patent/secured. Plan for palliative consult.

## 2019-03-18 NOTE — PLAN OF CARE
Patient alert to self, able to answer yes/no questions this morning.  VSS on 6 L O2, lungs diminished, she gets anxious and tense and times, needs oxymask applied to catch her breath.  Follows some commands, inconsistent.  Lidocaine patch applied to back and right side, ecchymosis on back and side.  TF held for medications, will restart at 90 ml/hr in between medications.  Glucose 160 with coverage. +2 generalized edema.  Mepilex on coccyx.  Turn and reposition every 2 hours or per family request.   Surjit at bedside and supportive, plan of care reviewed.

## 2019-03-18 NOTE — PROGRESS NOTES
"Neurology Progress Note  Loan Anderson  [id number]  March 18, 2019      Assessment/Recommendations:  Ms. Anderson is a 78-year-old female with a history of long-standing progressive Parkinson's disease presents with encephalopathy.  Her encephalopathy is likely multifactorial related to her seizure, progressive Parkinson's prolonged hospitalization including intubation and ICU course.   Her current major issue is her swallow family is indicated that they would not be interested in a PEG tube.  I agree with recent up titration of Sinemet to see if it helps her swallow.  No other changes recommended to her PD regimen.  Needs continued speech therapy to assess her swallow.   Recommend delirium precautions including melatonin to help with sleep wake cycle.  When able to take p.o. would recommend patient be put on previous Parkinson's regimen including controlled-release Sinemet.  If continues to improve can likely restart rotigotine patch that is not ordered.    - Delirium precautions including scheduled melatonin  - Continue current PD regimen  - SLP to assess swallow daily.    - Will update primary neurologist Dr. Pitts   - Continue vimpat    Subjective: Patient's mental status continues to improve, she is more alert overnight.    Physical Exam:  /60 (BP Location: Left arm)   Pulse 118   Temp 98.5  F (36.9  C) (Axillary)   Resp 24   Ht 1.6 m (5' 3\")   Wt 87.7 kg (193 lb 4.8 oz)   SpO2 98%   BMI 34.24 kg/m    GEN: awake and alert, no acute distress but nasal cannula.  HEENT: NCAT, mucous membranes appear dry.  NECK: Supple  RESP: Non-labored breathing, mild rales noted on auscultation.  Heart: mildly tachycardic, no murmur noted.    GI: Nondistended   SKIN/MSK: Warm and dry.   NEURO:   Awake, alert, able to tell me her name.  States month is February.  Unable to tell me year.  Able to do simple commands with her hands.  Unable to follow three-step cross commands.  We will toes to command.  Pupils " react bilaterally.  Tracks me in all quadrants.  Face grossly symmetric.  Tongue midline.  Strength difficult to assess due to patient effort but appears antigravity and symmetric in all limbs.  Unable to accurately test coordination and gait.  Left toe naturally upgoing.  Right toe upgoing with stimulation.  Deep tendon reflexes hypoactive.  No clonus.    Pertinent Imaging and Labs:  Labs and imaging personally reviewed.    Brain MRI and head CT showed generalized atrophy, with probable DVA.  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    CBC with mild leukocytosis downtrending.  UA unremarkable.      Rg Granados DO   of Neurology      This note was transcribed with dragon dictation software.      Total time: 35 minutes, greater than half of which was counseling and coordination of care.

## 2019-03-18 NOTE — PLAN OF CARE
Discharge Planner PT   Patient plan for discharge: Pt's family is hopeful pt can return to [facility].   Current status: Pt in supine upon arrival of therapist. Per discussion with pt's spouse, pt recently returned to supine after sitting up in chair. Pt participated in supine strengthening/AAROM of B LEs. Deferred OOB mobility d/t pt recently returning to supine.   Barriers to return to prior living situation: Spinal precautions, Fall risk, Level of assist-currently requiring use of a lift  Recommendations for discharge: Return to [facility] with continued PT services.   Rationale for recommendations: If [facility] is able to provide assist of 2 with use of lift, pt would benefit from returning to [facility] with continued PT services. If [facility] is unable to provide appropriate level of support to would benefit from LTC placement with continued PT services.        Entered by: Karolyn Marsh 03/18/2019 3:35 PM

## 2019-03-18 NOTE — PROGRESS NOTES
[facility]    Infectious Disease Progress Note    Date of Service (when I saw the patient): 03/18/2019     Assessment & Plan   Loan Anderson is a 78 year old female who was admitted on 3/2/2019.       ASSESSMENT:  1. LEUKOCYTOSIS-Cxs neg, UA + no UC,   2. PARKINSON'S   3. DEBILITY  4. PARTIAL STATUS EPILEPTICUS  5. DELIRIUM     REC  1. UC negative   2. For now hold off on antibiotics  3. Follow clinically, reassess  4. Palliative care following    Significant other and daughter updated bedside     Jeanna Fitzgerald MD    Interval History   Wbc coming down   Still on O2   More awake and interacting     Physical Exam   Temp: 98.5  F (36.9  C) Temp src: Axillary BP: 155/60   Heart Rate: 68 Resp: 24 SpO2: 98 % O2 Device: Nasal cannula Oxygen Delivery: 6 LPM  Vitals:    03/14/19 0500 03/16/19 0224 03/18/19 0641   Weight: 92.2 kg (203 lb 3.2 oz) 91.7 kg (202 lb 3.2 oz) 87.7 kg (193 lb 4.8 oz)     Vital Signs with Ranges  Temp:  [97.9  F (36.6  C)-99  F (37.2  C)] 98.5  F (36.9  C)  Heart Rate:  [62-68] 68  Resp:  [20-28] 24  BP: (123-155)/(55-87) 155/60  SpO2:  [92 %-99 %] 98 %    Constitutional: awake and interactive    Lungs: BILATERAL diminished breath sounds   Cardiovascular: Regular rate and rhythm, normal S1 and S2, and no murmur noted  Abdomen: Normal bowel sounds, soft, non-distended, non-tender  Skin: No rashes, no cyanosis, no edema  Other:    Medications       acetaminophen  500 mg Oral or NG Tube TID     acetylcysteine  2 mL Nebulization BID     albuterol  2.5 mg Nebulization BID     amLODIPine  10 mg Oral Daily     aspirin  81 mg Oral At Bedtime     carbidopa-levodopa  2 tablet Oral 5x Daily     docusate  100 mg Oral or Feeding Tube BID     heparin  5,000 Units Subcutaneous Q8H     insulin aspart  1-12 Units Subcutaneous Q4H     lacosamide  150 mg Oral or Feeding Tube BID     levothyroxine  112 mcg Oral QAM AC     lidocaine  1-3 patch Transdermal Q24H     lidocaine   Transdermal Q8H      lidocaine   Transdermal Q24h     pantoprazole  40 mg Oral QAM AC     QUEtiapine  12.5 mg Oral At Bedtime     sodium chloride (PF)  3 mL Intracatheter Q8H     venlafaxine  37.5 mg Oral TID       Data   All microbiology laboratory data reviewed.  Recent Labs   Lab Test 03/18/19  0725 03/17/19  0709 03/16/19  0738   WBC 16.4* 18.9* 20.8*   HGB 12.5 11.6* 12.0   HCT 39.5 37.4 37.7   MCV 95 95 94   * 565* 530*     Recent Labs   Lab Test 03/18/19  0725 03/17/19  0709 03/16/19  0738   CR 0.89 0.88 0.90     No lab results found.  Recent Labs   Lab Test 03/16/19  2200 03/15/19  1414 03/15/19  1409 03/13/19  2259 03/13/19  2250 03/03/19  0530 03/02/19  1554 03/02/19  1543 10/23/17  0745   CULT No growth No growth after 3 days No growth after 3 days No growth after 4 days No growth after 4 days Moderate growth  Normal abhijeet   No growth No growth No growth

## 2019-03-18 NOTE — PROGRESS NOTES
[facility]    Palliative Care Progress Note    Patient: Loan Anderson  Date of Admission:  3/2/2019    Recommendations:  Continue to follow for ongoing GOC discussion as course evolves.  Possible need for TCU placement prior to return to memory care unit if continues to improve.    Assessment & Plan   1)  Encephalopathy/delirium in setting of advanced Parkinson's Disease with cognitive impairment - pt significantly improved over weekend cognitively & able to participate in conversation/cares today.  Neuro added melatonin & increased Sinemet dose  2)  Oropharyngeal dysphagia/ nutriition:  Plans for swallow test tomorrow with SLP.    3)  Seizure: controlled on Vimpat  4)  GOC - still ongoing discussion but  appears to feel pt improving enough, restorative goals appropriate. Likely will need TCU prior to return to MCU if pt appropriate candidate cognitively. Continued reassessment with course evolution.    Coping: Family appears well-supported & coping adequately currently    These recommendations have been discussed with     Josey Valencia MD, Pall [facility]  Cell:  [phone]  [facility] Inpatient Team Consult pager [phone] (M-F 8-4:30)  After-hours Answering Service [phone]   Main Palliative Clinic - [facility] 1C: [phone]     History of Present Illness   77 yo F with prolonged & complicated hospital course from persistent encephalopathy/delirium in setting of advanced PD after period of intubation & seizures.  Encephalopathy finally beginning to clear.  Still swallow issues preventing adequate nutrition & discharge plans.  Further evaluation with SLP tomorrow.  No PEG per family/known wishes.  Outcome of same will affect GOC discussion.  Current GOC appear to be restorative if continues to improve.    Medications   I have reviewed this patient's medication profile and medications given in the past 24 hours with changes as noted above.    Review of Systems   Palliative  Symptom Review (0=no symptom/no concern, 1=mild, 2=moderate, 3=severe):  Pain:0  Fatigue: 2-3  Nausea: 0  Constipation: 0  Diarrhea: 0  Depressive Symptoms: 0  Anxiety: 0  Drowsiness: 1-2  Poor Appetite: 0, NPO   Shortness of Breath: 0  Insomnia: 0  Delirium: 0-1  Other: debility - 3  Overall (0 good/no concerns, 3 very poor):2    Physical Exam   Vital Signs: Temp: 97.3  F (36.3  C) Temp src: Oral BP: 145/69 Pulse: 98 Heart Rate: 68 Resp: 16 SpO2: 93 % O2 Device: None (Room air) Oxygen Delivery: 6 LPM  Weight: 193 lbs 4.8 oz    Physical Exam:  Alert, oriented to self/other with limited but appropriate verbalizations & eye contact.  Follows simple commands.  NAD  Lungs:  CTPA  Cor:  RRR  Abd:  Soft, normal BS  Ext: mild edema    Data reviewed today:   Appropriate labs        Total time spent was 15minutes,  >50% of time was spent counseling and/or coordination of care regarding goals of care.  *.

## 2019-03-18 NOTE — PROGRESS NOTES
More alert and awake today than the previous days, per family. Disoriented to place, time and situation. No agitations. Up in recliner chair for about 3 hours. Used BSC for #2 with a good result. Non-ambulatory. Used a ceiling lift. Denies pain. Turned and repositioned. Oral cares performed. Oxygen titrated down to 2L. Tolerating the TF well. Free from N/V/D. Silva patent. Adequate UOP. No fever. AVSS.

## 2019-03-19 NOTE — PLAN OF CARE
VSS, excep[t breathing due to REM sleeping disorder. No pain. Neuros at baseline. Bedrest. A+Ox1. Feeding Replete at 90ml/hr. Flush 60ml q4.

## 2019-03-19 NOTE — PROGRESS NOTES
"SPIRITUAL HEALTH SERVICES Progress Note  [facility]    Visit with pt, , and daughter Lamberto.  Pt was alert and able to participate in conversation, despite some level of confusion.    She said she was frustrated about her inability to spell, which her daughter clarified was because in a previous visit today she'd been asked to spell some words forward and backward.  Pt's family said she was a  and an excellent speller, but was struggling to spell the word \"world\" backwards.    Pt and family expressed cautious hope that pt is slowly improving, and shared words of encouragement with her.    No other needs at present.  Pt's  is concerned about a brother or brother-in-law in [place], who is struggling with health issues.     team available per need or request.                                                                                                                                                 Loan Carter M.A.  Staff   Pager [phone]  Phone [phone]      "

## 2019-03-19 NOTE — PROGRESS NOTES
"Neurology Progress Note  Loan Anderson  [id number]  March 19, 2019      Assessment/Recommendations:  78-year-old female with long-standing history of progressive Parkinson's presenting with encephalopathy.  Encephalopathy is likely multifactorial related to seizure, hospital delirium, and a progressive PD.  Her mental status continues to improve and I expect slow improvement over weeks to possibly even months given her prolonged hospitalization.  Her major current issue is her swallow of which she seems to be improving and will have video swallow done later today.  Would continue delirium precautions.  Given improvement in her mental status would consider restarting her rotigotine patch at her home dose which is now ordered.    -Okay to restart rotigotine patch in the evening per home regimen  -Continue current Sinemet and Vimpat dosing.  -Delirium precautions and melatonin as you are  -We will follow swallow study pending      Subjective: Mental status continues to improve.  Seen by speech yesterday with improvement noted, but still n.p.o.  Plan is for video swallow later today.    Physical Exam:  /78   Pulse 98   Temp 98.2  F (36.8  C) (Axillary)   Resp 20   Ht 1.6 m (5' 3\")   Wt 90.4 kg (199 lb 3.2 oz)   SpO2 97%   BMI 35.29 kg/m    GEN: awake and alert, NAD   HEENT: NCAT,   NECK: Supple, AROM  RESP: Non-labored breathing, rales noted on auscultation.  GI: Nondistended   SKIN/MSK: Warm and dry.   NEURO:   Awake, alert, able to state name, 's name, daughter's name.  Able to follow simple commands with her extremities.  Unable to do multistep cross commands.  Able to spell forward but not backward.  Able to do simple calculations.  Extraocular muscles track in all quadrants.  Face grossly symmetric.  Strength antigravity in all limbs.  Left toe naturally upgoing, right toe upgoing with stimulation.  Mild to moderate dysarthria present.  No aphasia.    Pertinent Imaging and " Labs:  Personally reviewed.      Brain MRI and head CT showed generalized atrophy, with probable DVA.  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          Rg Granados DO   of Neurology      This note was transcribed with dragon dictation software.

## 2019-03-19 NOTE — PLAN OF CARE
Pt is lethargic, SHELLIE orientation - responds minimally. No changes in neuros. VSS on 3-5L O2, increased O2 need when restless. Weak infrequent cough. Silva patent, adequate output, pink/ange urine. Incontinent of bowel. NPO. NG with tube feedings @ 90mL/hr with free water flushes. NG clamped at 0700 for sinemet. Reposition q2-4 hours, depending on daughters request, daughter supportive at bedside. Mepilex on coccyx, CDI. PIV SL. BG checks q4h, insulin per orders. Plan for swallow evaluation today.

## 2019-03-19 NOTE — PROGRESS NOTES
[facility]    Medicine Progress Note - Hospitalist Service       Date of Admission:  3/2/2019    Assessment & Plan   Loan Anderson is a 78-year-old female with history of hypothyroidism, Parkinson's disease with neurogenic bladder who presented with acute encephalopathy and was intubated in the Emergency Department due to concerns for loss of protecting her airway.  She was intubated on 03/02 and extubated on 03/11.  Prolonged intubation and ICU stay related to ongoing delirium. Hospital course complicated by a partial status epilepticus and possible angioedema reaction.      Acute metabolic encephalopathy  Seizure disorder with partial status epilepticus   Likely multifactorial related to ICU delirium, postictal state related to partial epileptic seizure, and pain related to fractures below. Gradual improvement however still not back to baseline per daughter and continues to fail swallow evaluation. EEG 3/15 with diffuse slowing, no signs of seizure  - Neurology following for advanced PD as below  - Continue lacosamide   - Re-orient as needed  - Maintain normal day / night, sleep / wake cycles as able  - Minimize sedating / altering medications as able. Use verbal re-direction or sitter if needed.   - Continue quetiapine at bedtime   - Anticipate prolonged recovery, though has been improving    Dysphagia  Secondary to encephalopathy, prolonged intubation.   - Continue TF per NG   - Family not interested in PEG  - SLP notes improvement, plan for video swallow 3/19/19 to rule out silent aspiration   - Advance diet per speech     Advanced care planning  Palliative care following. Have addressed goals of care at length with family, see note from 3/16/19.   - No escalation of cares (to BiPAP, ICU, pressors, PEG tube. Remains DNR/DNI)  - Appears to be improving, goals remain restorative at this time   - Palliative care following     Parkinson's disease with neurogenic bladder  Lives in memory care at  the [facility] and essentially wheelchair bound d/t high falls risk.  Dtr states normally able to carry on conversation and oriented to situation. Has history of neurogenic bladder but is not Silva dependent  - Continue Silva catheter. Consider TOV in next few days if ongoing improvement.   - Neurology increased Sinemet to five times a day 3/17/19 to assess for effect on swallowing  - Note, tube feedings need to be held  1 hour before and 1 hour after Sinemet administration for better absorption.     Acute hypoxic respiratory failure  RRT 3/15/19 due to increased oxygen need.  Respiratory therapist was consulted for increased secretion however patient's weak to cough out all the secretions. Suctioning tried however patient unable to tolerate. Unable to do vest due to multiple rib fractures, patient not able to cooperate for flutter valve.  Mucomyst with breathing treatment tried however patient's O2 sats dropped resulting in RRT. No new infiltrate on CXR 3/15/19   - Wean oxygen, has been reduced to 2L  - See advanced care planning above     Right T11 and T12 rib fractures with associated posterior right-sided pleural effusion  Right L1 and L2 transverse process fractures  Multifactorial related to mechanical fall prior to admission, as well as CPR (no documented pulselessness) and Heimlich maneuvers performed for recurrent episodes of unresponsiveness.   - Continue lidocaine patches, scheduled acetaminophen with additional doses PRN  - PT/OT following and recommending TCU  - PRN bowel regimen    Possible bladder spasms   Noted 3/17/19 by RN to have possible bladder spasms associated with apparent Silva dysfunction with leaking. Seemed to improve with irrigation and troubleshooting of Silva.  - Resume alpha blocker (terazosin, as can not crush tamsulosin and liquid not stocked by pharmacy)  - Holding mirabegron as can not be crushed  - Continue B&O suppository     Leukocytosis  Suspected acute phase reactant and steroid  related. Patient had received steroids when she had angioedema. No growth noted on cultures. Procal <0.05. No focal signs/syptoms infection. Afebrile. CXR unchanged 3/15/19. UA with hematuria, no signs of infection   - ID following, monitoring off of antibiotics  - Afebrile   - WBC decreasing      YASMIN on chronic kidney disease stage II  Baseline creatinine 0.8-0.9. Attributed to prerenal from diuresis.  Gradually improving. She has low muscle mass.   - Monitor periodically     Microscopic hematuria  RBC >182 on UA 3/15/19 on catheterized specimen. Tiffany colored urine. Potentially secondary to Silva trauma in context of prophylactic heparin and aspirin use.    - Recheck in 2-4 weeks as outpatient once Silva removed, pending goals of care   - Noted to have more pink-tinged urine, heparin subcutaneous temporarily held. No gross blood seen in catheter bag at this time.     Possible angioedema  Tongue and lip swelling, 03/09-3/10, after restarted on lisinopril, which was recently started in outpatient setting.  Uncertain whether this was true angioedema and lisinopril added to allergy list and transitioned to amlodipine.  Improved with steroids.      Hypertension   Recently started on lisinopril, developed tongue and lip swelling concerning for angioedema, with lisinopril discontinued and started on amlodipine.   - Continue amlodipine   - Blood pressures elevated, consider adding second agent if continues. Hydralazine PRN available.      Diet: Adult Formula Drip Feeding: Continuous Replete with Fiber; Orogastric tube; Goal Rate: 90; mL/hr; Medication - Feeding Tube Flush Frequency: At least 15-30 mL water before and after medication administration and with tube clogging; Amount to Send ...  NPO for Medical/Clinical Reasons Except for: NPO but receiving Tube Feeding    DVT Prophylaxis: Heparin subcutaneous temporarily held as above, consider resuming if hematuria remains resolved  Silva Catheter: in place, indication:  Neurogenic Bladder  Code Status: DNR/DNI      Disposition Plan   Expected discharge: Goals remain restorative with improvement. Discharge to TCU versus [facility]U once feeding advanced and tube feeds out if ongoing improvement, likely 2+ days for adequate diet advancement    Entered: Dustin Stone MD 03/19/2019, 11:04 AM       The patient's care was discussed with the Bedside Nurse, Patient and Patient's Family.    Dustin Stone MD  Hospitalist Service  [facility]    ______________________________________________________________________    Interval History   Overnight had episode of agitation, but did not require medication. Patient primarily reports feeling frustrated that she is not improving more quickly, otherwise denies pain, shortness of breath. Participation with speech improving and anticipate video swallow study today. Discussed with daughter at bedside.     Data reviewed today: I reviewed all medications, new labs and imaging results over the last 24 hours. I personally reviewed no images or EKG's today.    Physical Exam   Vital Signs: Temp: 98.2  F (36.8  C) Temp src: Axillary BP: 189/78 Pulse: 98 Heart Rate: 98 Resp: 20 SpO2: 97 % O2 Device: Nasal cannula Oxygen Delivery: 2 LPM  Weight: 199 lbs 3.2 oz    Constitutional:  NAD  Respiratory: Scattered coarse breath sounds, normal effort at rest   Cardiovascular: RRR, no m/r/g. No peripheral edema.  GI: Soft, non-tender, non-distended.    Skin/Integumen: Warm, dry  Neuro: Alert. Flat affect. Slow responses. Able to state location. Level of alertness and degree of conversation continue to improve.     Data   Recent Labs   Lab 03/19/19  0725 03/18/19  0725 03/17/19  0709  03/13/19  2251   WBC 15.9* 16.4* 18.9*   < > 17.6*   HGB 12.2 12.5 11.6*   < > 11.9   MCV 94 95 95   < > 92   * 605* 565*   < > 461*    145* 142   < > 137   POTASSIUM 3.3* 3.4 3.4   < > 3.5   CHLORIDE 105 107 102   < > 96   CO2 33* 33* 33*   < > 35*   BUN 40* 38*  42*   < > 51*   CR 0.88 0.89 0.88   < > 0.97   ANIONGAP 5 5 7   < > 6   NIKKIE 8.6 8.8 8.7   < > 8.3*   * 169* 240*   < > 188*   ALBUMIN  --   --   --   --  2.6*   PROTTOTAL  --   --   --   --  6.9   BILITOTAL  --   --   --   --  0.5   ALKPHOS  --   --   --   --  168*   ALT  --   --   --   --  7   AST  --   --   --   --  20   TROPI  --   --   --   --  <0.015    < > = values in this interval not displayed.       No results found for this or any previous visit (from the past 24 hour(s)).  Medications       acetaminophen  500 mg Oral or NG Tube TID     amLODIPine  10 mg Oral Daily     aspirin  81 mg Oral At Bedtime     carbidopa-levodopa  2 tablet Oral 5x Daily     docusate  100 mg Oral or Feeding Tube BID     insulin aspart  1-12 Units Subcutaneous Q4H     lacosamide  150 mg Oral or Feeding Tube BID     levothyroxine  112 mcg Oral QAM AC     lidocaine  1-3 patch Transdermal Q24H     lidocaine   Transdermal Q8H     lidocaine   Transdermal Q24h     melatonin  3 mg Oral or Feeding Tube Q24H     multivitamins w/minerals  15 mL Per Feeding Tube Daily     pantoprazole  40 mg Oral QAM AC     QUEtiapine  12.5 mg Oral At Bedtime     sodium chloride (PF)  3 mL Intracatheter Q8H     terazosin  1 mg Oral Daily     venlafaxine  37.5 mg Oral TID

## 2019-03-19 NOTE — PROVIDER NOTIFICATION
Brief update    Paged regarding agitation    Here with delirium and partial seizures.  Has advanced Parkinson disease and significant symptoms related to this.    In the setting of Parkinson's disease and delirium, increased agitation and confusion is difficult to treat.  Benzodiazepines would be treatment of choice, though would likely worsen patient's encephalopathy, which is the primary reason she is currently hospitalized.  Antipsychotics would likely worsen Parkinson symptoms.    Continue to attempt nonmedical interventions such as redirection, sitter if needed.  Can consider low-dose benzodiazepine administration if patient becomes a danger to self or others.    Paul Bass MD  12:50 AM

## 2019-03-19 NOTE — PLAN OF CARE
Patient oriented to self, disoriented to time, intermittently oriented to place/situation. VSS. O2 sats 94% on RA. Infrequent, non-productive cough, pt denies SOB. Silva in place, light yellow urine draining. Incontinent of bowl. PIV SL. Potassium replaced today - rechecks scheduled. Swallow study done today - advanced to dysphasia diet 1 w/nectar thick liquids. NG with tube feedings @ 90mL/hr with free water flushes. NG feeding stopped 1 hour before and 1 hour after sinemet. Turned and repositioned q2-q4 hours per family. Daughters and  present and supportive. Mepilex on coccyx.

## 2019-03-19 NOTE — PROVIDER NOTIFICATION
MD Notification    Notified Person: MD    Notified Person Name: Dr Bass    Notification Date/Time: 3/19/19 0040    Notification Interaction: Phone    Purpose of Notification: Increased agitation, daughter asking about medication    Orders Received: Try to continue non pharmacological interventions but re-page if medication is necessary    Comments:

## 2019-03-19 NOTE — PROGRESS NOTES
03/19/19 1428   General Information   Onset Date 03/02/19   Start of Care Date 03/12/19   Referring Physician Dr. Stone   Patient Profile Review/OT: Additional Occupational Profile Info See Profile for full history and prior level of function   Patient/Family Goals Statement To eat and drink a least restrictive diet/po intake and remove TF   Swallowing Evaluation Videofluoroscopic evaluation   Behaviorial Observations Alert  (Poor recall noted)   Mode of current nutrition NPO;NG   Respiratory Status O2 Supply   Comments Per MD note: 78F with parkinsons disease who lives in a memory center now presents from [facility] after being found acutely encephalopathic and mildly agitated/combative by her  around 2 pm on day of admission.  Apparently there had been an episode earlier in the day where she had turned red and coughed 3-4 min after taking pills and family member did the heimlich maneuver.  She returned to her normal state after this but no source of asphyxiation was found.  Later in the day her  went to get the mail and when he returned to their home (~10 min) he found staff doing cpr on her.  Apparently she became unresponsive at some point and cpr was started, but on EMS arrival she did have a perfusing rhythm.  A couple days prior to admit she had a mechanical fall resulting in R hip pain.  No injury noted at that time, but today has several rib fx on CT scan (may be due to cpr?) and transverse process L1L2 fx's--suspect these may be from fall.    [facility]  Regular/soft moist food and thin liquids recommended with strategies including a chin tuck with thin liquids at that time.  Daughter reports patient on a regular diet and thin liquids with strategies/precautions at baseline prior to admit.  Occasional cough noted with po intake.  Patient produced delayed swallows with mod-max cues after swabbing/thermal stimulation provided   VFSS  Eval: Radiology   Radiologist Dr. Everett   Views Taken left lateral   Physical Location of Procedure [facility]   VFSS Eval: Thin Liquid Texture Trial   Mode of Presentation, Thin Liquid cup;spoon   Order of Presentation 4, 5, 6, 7   Preparatory Phase Poor bolus control   Oral Phase, Thin Liquid Premature pharyngeal entry;Poor AP movement   Pharyngeal Phase, Thin Liquid Delayed swallow reflex   Rosenbek's Penetration Aspiration Scale: Thin Liquid Trial Results 2 - contrast enters airway, remains above the vocal cords, no residue remains (penetration)   Diagnostic Statement Poor AP movement, weak base of tongue function, delayed swallows, mild decreased epiglottic inversion, min-mild residue at times that clears with 2nd swallow, deep penetration with thin by cup, no penetration with thin by cup with slight chin tuck, but poor bolus control noted   VFSS Eval: Nectar Thick Liquid Texture Trial   Mode of Presentation, Nectar spoon;cup   Order of Presentation 1, 2, 3   Preparatory Phase Poor bolus control   Oral Phase, Nectar Premature pharyngeal entry;Residue in oral cavity;Poor AP movement   Pharyngeal Phase, Nectar Delayed swallow reflex   Rosenbek's Penetration Aspiration Scale: Nectar-Thick Liquid Trial Results 1 - no aspiration, contrast does not enter airway   Diagnostic Statement Poor AP movement, weak base of tongue function, delayed swallows, around tip of epiglottis   VFSS Eval: Puree Solid Texture Trial   Mode of Presentation, Puree spoon   Order of Presentation 8, 10   Preparatory Phase WFL   Oral Phase, Puree Residue in oral cavity;Poor AP movement   Pharyngeal Phase, Puree (no delay)   Rosenbek's Penetration Aspiration Scale: Puree Food Trial Results 1 - no aspiration, contrast does not enter airway   Diagnostic Statement Poor AP movement, mild base of tongue residue   VFSS Eval: Semisolid Texture Trial   Mode of Presentation, Semisolid spoon   Order of Presentation 9   Preparatory Phase Insufficient  mastication;Poor bolus control   Oral Phase, Semisolid Residue in oral cavity;Poor AP movement;Premature pharyngeal entry   Pharyngeal Phase, Semisolid Delayed swallow reflex   Rosenbek's Penetration Aspiration Scale: Semisolid Food Trial Results 1 - no aspiration, contrast does not enter airway   Diagnostic Statement Poor AP movement, increased oral phase, cue needed to swallow due to decreased awareness, mild base of tongue residue   VFSS Eval: Solid Food Texture Trial   Order of Presentation Did not test given length of time with semi-solid trial   Swallow Compensations   Swallow Compensations Pacing;Reduce amounts   Esophageal Phase of Swallow   Patient reports or presents with symptoms of esophageal dysphagia No   Swallow Eval: Clinical Impressions   Skilled Criteria for Therapy Intervention Skilled criteria met.  Treatment indicated.   Functional Assessment Scale (FAS) 4   Dysphagia Outcome Severity Scale (INDIRA) Level 4 - INDIRA   Treatment Diagnosis mild-moderate oral-pharyngeal dysphagia   Diet texture recommendations Dysphagia diet level 1;Nectar thick liquids   Recommended Feeding/Eating Techniques (see below)   Therapy Frequency daily   Predicted Duration of Therapy Intervention (days/wks) 1 week   Anticipated Discharge Disposition inpatient rehabilitation facility   Risks and Benefits of Treatment have been explained. Yes   Patient, family and/or staff in agreement with Plan of Care No   Clinical Impression Comments Patient presents with mild-moderate oral-pharyngeal dysphagia per study results.  Deficits include decreased AP movement, decreased oral control and mastication, weak base of tongue, delayed swallow reflex, and mild decreased laryngeal elevation.  Deficits resulted in mild base of tongue residue, oral residue, and penetration with thin liquids.  Recommend a dysphagia diet level 1 and nectar thick liquids by spoon only with 1:1 supervision/assist and the following safe swallow strategies: sit at  90 degrees, by spoon only, only when alert, verify/cue swallows, slow rate, crush meds and give with puree, hold po if aspiration signs are noted/respiratory status declines.  Recommend consideration of holding/decreasing TF and possible removal of TF if good tolerance of po observed during meals.  SLP to continue Tx with assessment of diet tolerance, strategy training, and trials of upgraded textures as indicated.  A repeat OP video swallow study may be indicated in 2-4 weeks prior to liquid upgrade pending progress in Tx.   Total Evaluation Time   Total Evaluation Time (Minutes) 15

## 2019-03-19 NOTE — PLAN OF CARE
Discharge Planner SLP   Patient plan for discharge: Did not state  Current status: A video swallow study was completed this pm.  Patient presents with mild-moderate oral-pharyngeal dysphagia per study results.  Deficits include decreased AP movement, decreased oral control and mastication, weak base of tongue, delayed swallow reflex, and mild decreased laryngeal elevation.  Deficits resulted in mild base of tongue residue, oral residue, and penetration with thin liquids.  Recommend a dysphagia diet level 1 and nectar thick liquids by spoon only with 1:1 supervision/assist and the following safe swallow strategies: sit at 90 degrees, by spoon only, only when alert, verify/cue swallows, slow rate, crush meds and give with puree, hold po if aspiration signs are noted/respiratory status declines.  Recommend consideration of holding/decreasing TF and possible removal of TF if good tolerance of po observed during meals.  SLP to continue Tx with assessment of diet tolerance, for strategy training, and to provide trials of upgraded textures as indicated.  A repeat OP video swallow study may be indicated in 2-4 weeks prior to liquid upgrade pending progress in Tx.  Barriers to return to prior living situation: Level of assist, weakness, confusion  Recommendations for discharge: Memory care unit if able to provide 1:1 supervision/assist with meals; SLP Tx  Rationale for recommendations: 1:1 supervision/assist for swallow safety; SLP swallow Tx to maximize safety for a least restrictive diet       Entered by: Yoli Urrutia 03/19/2019 2:42 PM

## 2019-03-20 NOTE — PLAN OF CARE
Patient able to state she's at hospital otherwise is disoriented. Responds well to yes and no questions. Knows person and place on shift. Was a bit agitated @ start of shift, daughter states this is her having vivid dreams. Patient able to calm down well with therapeutic communication. VSS. On 2 liters t/o night sating well in mid 90's. Denied pain on shift. LS diminished/coarse at times. Infrequent, fair non-productive cough. Coccyx intact, pink. Silva catheter draining ange colored output. Incontinent of bowel. TF at 90mL/hr with free water flushes. L PIV SL. BG being checked q4 hours d/t being NPO and being on TF. K replaced 3/19. (on high K protocol). Recheck today, 3/20. Replace if K <4.1 T&RQ2-4H per family request. Daughter @ bedside. Daughter insisting on having 4 side rails up despite education given; see note. Continue to closely monitor.

## 2019-03-20 NOTE — PROGRESS NOTES
Called by primary regarding new episode today seen in rapid response note.   Change in behavior associated with bicycling leg movements and desaturations.   She was also noted to be transiently hypotensive to 70s/20s but quickly resolved.  It is unclear to me if this represents seizures.  She was certainly having dyskinetic movements prior on my exam that were not seizure.  I do think given diagnostic uncertainty we can make small increase in Vimpat to 200mg BID.    If these events continue to persist would like VEEG monitoring to confirm if they are or not epileptic in nature prior to initiating second agent.   Neurology will continue to follow daily instead of peripherally as stated earlier.      Rg Granados, DO  Neurology

## 2019-03-20 NOTE — CODE/RAPID RESPONSE
[facility]    RRT Note  3/20/2019   Time Called: 1224    RRT called for: acute change in behavior, ?seizure    Assessment & Plan   IMPRESSION & PLAN:  Patient had been eating and drinking some juice and applesauce in the 90 degree upright position with spoon  with the  when patient asked the  to go away and had a somewhat acute change in mental status and behavior.  Patient's  denies that there is any acute coughing afterwards and she was talking to him prior to the change in behavior.  Nursing staff describes some bicycle-like movement of the lower extremities worrisome for seizure-like activity followed by sudden flailing.  RNs tried to offer toileting w/o improvement in symptoms.      On my arrival patient was having difficulty breathing seem to be obstructing her upper airway and had very noisy breathing.  She is quite diaphoretic and had a mottled abdomen.  There was no documented hypoxia until patient was supine and somewhat obstructing her airway when she reached a ana m of 80%.  There is a transient hypotensive blood pressure recording 79/28 but on repeat this did not recur and she was anywhere from 150s-130 systolic.    When patient was repositioned in the semi-Peñaloza's position her respiratory distress and air obstruction seem to have resolved.    Acute mental status changes/delirium/possible seizures  Differential diagnosis: Dyskinesia associated with her parkinsonism, possible seizure.  Glycemic extremes were considered but this was in the hyperglycemic range.  Infectious process that was considered but because he just this is decreasing she has been afebrile.    INTERVENTIONS:  -check glucose 186  -check LA (more for diagnostic purposes, if elevated may be supportive of dx of seizure)  -conservative mgmt in light of prior aggressive interventions worsening her delirium and agitation and overall limited goals of treatment not to escalate current treatment  plan.  -continue AED w/ vimpat  -Without significant intervention patient's mental status calm down her dyskinesia decreased and she was speaking fluently at the time of the RRT ended.   -positioning relieved obstructive breathing pattern, did not pursue nasopharyngeal airway because of very poor prior tolerance    At the end of the RRT with mental status was trending back towards baseline and she remained hemodynamically stable with adequate oxygenation.    Discussed with and defer further cares to rounding hospitalist Dr. Stone    Interval History     Loan Anderson is a 78 year old female who was admitted on 3/2/2019 for presented with acute encephalopathy/ loss of airway protection requiring MV until 3/11/19.  Course was c/b ongoing delirium, partial status epilepticus and possible angioedema reaction, dysphagia and dx of right T11 and T12 rib fractures  and Right L1 and L2 transverse process fractures      Medical history significant for:   Past Medical History:   Diagnosis Date     Constipation 7/15/2011     Convergence insufficiency 7/15/2011     Depression 7/15/2011     Diplopia 7/18/2011     Hot flashes, menopausal 7/18/2011     Hypercholesterolemia 7/18/2011     Hypothyroid 7/15/2011     Neurogenic bladder 7/15/2011     Parkinson disease (H) 7/15/2011     REM sleep behavior disorder 7/18/2011     Sebaceous cyst 7/18/2011     Wears glasses 7/18/2011     Code Status: DNR/DNI    Allergies   Allergies   Allergen Reactions     Ace Inhibitors      Tongue and lip swelling just after restarting lisinopril on 3/9/19 at higher dose while intubated.  Unclear if actually angioedema or not as swelling resolved with steroids.     Atorvastatin      Concern that it was contributing to confusion     Mirapex [Pramipexole Dihydrochloride Monohydrate]      Leg swelling     Potassium Chloride      IV infusion only. She tolerates oral potassium chloride     Requip [Ropinirole Hydrochloride]      Leg swelling      Epinephrine Palpitations       Physical Exam   Vital Signs with Ranges:  Temp:  [98  F (36.7  C)-98.9  F (37.2  C)] 98  F (36.7  C)  Heart Rate:  [57-81] 64  Resp:  [16-20] 20  BP: (117-170)/(45-68) 170/68  SpO2:  [92 %-98 %] 92 %  I/O last 3 completed shifts:  In: 210 [NG/GT:210]  Out: 1100 [Urine:1100]    Constitutional:no acute distress  ENT: defer   Neck: supple  Pulmonary: initially noisy upper airway sounds but CTAB upper & lower lobes.  Likely was obstructing airway, improved w/ sitting upright.    Cardiovascular: S1S2  GI: NABS/s/NT/ND  Skin/Integumen: mottled uppper abd, resolved by end of RRT, clammy skin, again resolved by end of event  Neuro: improved from nonverbal moaning to fluent, coherent speech  Psych:  defer  Extremities: minimal edema    Data       IMAGING: (X-ray/CT/MRI)   Recent Results (from the past 24 hour(s))   XR Video Swallow w/o Esophagram    Narrative    VIDEO SWALLOWING EVALUATION March 19, 2019 2:12 PM     HISTORY: Dysphagia.    COMPARISON: None.    FLUOROSCOPY TIME: 1.2 minutes.  SPOT IMAGES OR CINE RUNS: Ten.    FINDINGS:    Thin: Penetration, slightly improved with head tuck.    Nectar: No penetration or aspiration.    Honey: Not administered.    Pudding: No penetration or aspiration.    Semisolid: No penetration or aspiration.    Solid: Not administered.    This study only includes the cervical esophagus.    JOSE JUAN LEAL MD       CBC with Diff:  Recent Labs   Lab Test 03/20/19  0556  03/02/19  1449   WBC 15.5*   < > 13.5*   HGB 11.7   < > 12.0   MCV 94   < > 91   *   < > 195   INR  --   --  1.04    < > = values in this interval not displayed.     Comprehensive Metabolic Panel:  Recent Labs   Lab 03/20/19  0556  03/17/19  0709  03/13/19  2251      < > 142   < > 137   POTASSIUM 3.7   < > 3.4   < > 3.5   CHLORIDE 109   < > 102   < > 96   CO2 28   < > 33*   < > 35*   ANIONGAP 6   < > 7   < > 6   *   < > 240*   < > 188*   BUN 41*   < > 42*   < > 51*   CR 0.93   <  > 0.88   < > 0.97   GFRESTIMATED 60*   < > 64   < > 56*   GFRESTBLACK 69   < > 74   < > 65   NIKKIE 8.6   < > 8.7   < > 8.3*   MAG  --   --  2.5*   < >  --    PHOS  --   --  2.7   < >  --    PROTTOTAL  --   --   --   --  6.9   ALBUMIN  --   --   --   --  2.6*   BILITOTAL  --   --   --   --  0.5   ALKPHOS  --   --   --   --  168*   AST  --   --   --   --  20   ALT  --   --   --   --  7    < > = values in this interval not displayed.       UA:  Recent Labs   Lab 03/16/19  2200   COLOR Yellow   APPEARANCE Cloudy   URINEGLC Negative   URINEBILI Negative   URINEKETONE Negative   SG 1.016   UBLD Moderate*   URINEPH 8.0*   PROTEIN 10*   NITRITE Negative   LEUKEST Negative   RBCU 177*   WBCU 0       Time Spent on this Encounter   I spent 30 minutes [id number] on the unit/floor managing the care of Loan Anderson. Over 50% of my time was spent counseling the patient and/or coordinating care regarding services listed in this note.    Glory Jones, [facility]  Hospitalist [place] [facility]  [phone]

## 2019-03-20 NOTE — PROGRESS NOTES
"Neurology Progress Note  Loan Anderson  [id number]  March 20, 2019      Assessment/Recommendations:  78-year-old female with history of long-standing progressive Parkinson's disease resenting with encephalopathy.  Encephalopathy is likely multifactorial related to delirium, seizures, and long-standing Parkinson's.  Her mental status and swallow continue to improve.  Family is concerned that she is slightly more dyskinetic overnight, but would prefer the dyskinetic symptoms to her Parkinson's being undertreated.  Would recommend she continues her current regimen including rotigotine patch.   If continues to improve in next few days and able to be discharged would discharge on her home regimen including long-acting Sinemet at night.      -Continue delirium precautions  -Continue current short acting Sinemet with rotigotine patch  -SLP following  -Continue Vimpat  -We will need follow-up in 4-6 weeks with neurology on discharge  -neurology to follow peripherally, but could not given continued improvement we will not do daily exams      Subjective: No acute events overnight, patient cleared for diet by speech.  Slightly more dyskinetic overnight.    Physical Exam:  /68 (BP Location: Right arm)   Pulse 98   Temp 98  F (36.7  C)   Resp 20   Ht 1.6 m (5' 3\")   Wt 89.8 kg (198 lb)   SpO2 92%   BMI 35.07 kg/m    GEN: awake and alert, NAD   HEENT: NCAT  NECK: Supple,   RESP: mild labored breathing, improved rales on ausculation  Heart: RRR, no murmur  GI: Nondistended   SKIN/MSK: Warm  NEURO:   Awake, alert, able to tell me her name, able to know she is in the hospital, unable to tell me month.  Able to follow simple commands.  Attempts to follow three-step cross commands but gets confused.  Wiggles toes to command.  Tracks me in all quadrants, strength antigravity in upper extremities.  Wiggles toes to command and lower extremities.  Left toe naturally upgoing, no clonus, right toe upgoing stimulation.  " Speech still dysarthric but improved    Pertinent Imaging and Labs:  Personally reviewed        Rg Granados DO   of Neurology      This note was transcribed with dragon dictation software.      Total time: 25 minutes, greater than half of which was counseling and coordination of care.

## 2019-03-20 NOTE — PROGRESS NOTES
[facility]    Palliative Care Progress Note    Patient: Loan Anderson  Date of Admission:  3/2/2019    Recommendations:  1) Discontinued lidocaine topical & replaced with diclofenac get 1% top QID for rib/LS fracture related pain per family discussion  2) Continued family decisional support & PSS  3)  Sx mgt prn    Assessment & Plan   1)  Oropharyngeal dysphagia - reasonable tolerance of start of spoon-fed oral nutrition over last 3 meals.  NGT feeds still in place with nutrition recommending decrease/discontinue if continued tolerance  2)  Advanced Parkinson's Disease/Seizures - worsened dyskinesia over last 24 hours with discomfort & questionable associated episodic seizure activity despite anticonvulsant medication. Being addressed with neurology/hospitalist team.  Have elected to maintain PD meds the same for now.  3)  Encephalopathy - improving but not baseline per family  4)  GOC - continue to appear restorative for now baring acute events that may change course.  Longer term goals still difficult to discuss with family as wish to focus on more acute issues.  Feel well supported.  5)  Rib/spinal frx pain - per discussion with family, lidocaine not adequately effective.  Will trial diclofenac gel topicall QID with continued scheduled tylenol.  They prefer to avoid opioids due to cognitive issues.    These recommendations have been discussed with family/bedside team.    Josey Valencia MD, [facility]  Cell:  [phone]  [facility] Inpatient Team Consult pager [phone] (M-F 8-4:30)  After-hours Answering Service [phone]   Main Palliative Clinic - [facility] 1C: [phone]     History of Present Illness   79 yo F with prolonged hospitalization due to complications of advanced PD with dsykinesia, seizures & encephalopathy & OPD.  Pt has been improving but nutritional statusprocess still tenuous.  Still significant PD issues with medication adjustments & unclear episodes with mild  hypoxia/rigidity/increased dyskinesia as above. Pain med adjustments as above.    Medications   I have reviewed this patient's medication profile and medications given in the past 24 hours.    Review of Systems   Palliative Symptom Review (0=no symptom/no concern, 1=mild, 2=moderate, 3=severe):  Pain:  , worse with cough  Fatigue: 2  Nausea: 0  Constipation: o  Diarrhea: o  Depressive Symptoms: NA  Anxiety: 1-2  Drowsiness: 1  Poor Appetite: 1-2  Shortness of Breath: 1-2  Insomnia: 0  Delirium: 1  Other: dyskinesia 2-3  Overall (0 good/no concerns, 3 very poor): 2    Physical Exam   Vital Signs: Temp: 98  F (36.7  C) Temp src: Axillary BP: 137/70   Heart Rate: 75 Resp: 20 SpO2: 94 % O2 Device: Nasal cannula Oxygen Delivery: 2.5 LPM  Weight: 198 lbs 0 oz    Physical Exam:  Uncomfortable from cough & dyskinetic movements.  Minimally verbal but will interact intermittently appropriately.  Oriented to self/others  Lungs:  CTPA  Cor: RRR  Abd:  Normal BS, soft  Ext: no edema    Data reviewed today:   labs        Total time spent was 25 minutes,  >50% of time was spent counseling and/or coordination of care regarding pain management & GOC..

## 2019-03-20 NOTE — PROVIDER NOTIFICATION
MD Notification    Notified Person: MD    Notified Person Name: Dr. Stone    Notification Date/Time:3/20/2019 5:44 PM     Notification Interaction: text paged    Purpose of Notification: Family refusing Lacosamide. Wanting to speak to Neuro. Since after 1700 Dr. Stone was made aware for pt's daughter refusing medication.     Orders Received: Dr. Stone stands behind neros recomendations for this medication.     Comments: RN will continue to offer med.

## 2019-03-20 NOTE — PLAN OF CARE
"  SLP - Pt scheduled for swallow tx; receiving nursing cares at time of attempt.    Discharge Planner SLP   Patient plan for discharge: Did not state  Current status: Swallow tx completed after nursing cares. Spouse and dtr present. Another dtr available by phone who assisted pt with eating last PM; reports pt able to take small bites, easily fatigued. Pt upright in bed for tx session, appears uncomfortable but denies pain. PO trials tolerated by tsp including nectar-thick liquids and puree solids. Significantly delayed AP movement and pharyngeal swallow initiation, no immediate overt s/sx of aspiration. + cough after PO trials; \"she's been coughing all morning\" per dtr. Strong cough although pt appears to have increased pain with coughing. Pt/family education re: current diet order, TF as primary nutrition until PO intake increases, room service not appropriate entered in orders. Recommend: 1.Dysphagia diet level 1 and nectar thick liquids by spoon only with 1:1 supervision/assist and the following safe swallow strategies: sit at 90 degrees, by spoon only, only when alert, verify/cue swallows, slow rate, crush meds and give with puree, hold po if aspiration signs are noted/respiratory status declines.  Recommend consideration of holding/decreasing TF and possible removal of TF if good tolerance of po observed during meals 2. SLP cont daily per POC  Barriers to return to prior living situation: Medical status  Recommendations for discharge: Memory care unit if able to provide 1:1 supervision/assist with meals; cont SLP tx  Rationale for recommendations: Dysphagia; below baseline       Entered by: Darek Christensen 03/20/2019 9:54 AM       "

## 2019-03-20 NOTE — PLAN OF CARE
Oriented to self and place.  DNR/DNI.  Blood sugar checks every 4 hours.  Potassium replaced.  PMH parkinson's and does have tremors at times.  Sinemet given as scheduled and NG tube clamped 1 hour before and after administration of sinemet.  IV saline locked.  Tube feeding running at 90 ml/hr.  Pt advanced to DD1 pureed diet with nectar thick liquids and did tolerate a few bites while sitting completely upright in the bed.  PCD on.  Silva catheter.  Coarse lungs.  Placed on 2 liters via nasal cannula at hs.  Lidoderm patch removed.  Family at bedside and supportive of pt.  Nursing will continue to monitor.

## 2019-03-20 NOTE — PROGRESS NOTES
Patient's daughter placed 4 side rails in up position in bed. Daughter and patient educated on safety issue and offered bed alarm on zone 2. Daughter refused bed alarm on zone 2 and insisted on keeping 4 side rails up. Writer put one side rail down, but later found all 4 side rails up again. Daughter insisting on having all 4 side rails up stating that she was concerned mother would fall out of bed. Nursing supervisor notified and reinforced side rail rules. Will continue to educate and reinforce need for zone 2. MD notified.

## 2019-03-20 NOTE — PLAN OF CARE
Patient disoriented to time and situation. VSS on 2L for most of the day. Denied pain this shift. Infrequent cough. LS dimished/coarse. Silva catheter in place draining ange output. TF at 90 mL/hr with free water flushes. Tube feeding stopped one hour before and after doses of sinemet. Blood glucose q4h. RRT called this afternoon for change in responsiveness and respirations.  present at bedside throughout the day, daughter present this morning. Lactic 3.9 this afternoon, house physician aware. Continue to monitor.

## 2019-03-20 NOTE — PROGRESS NOTES
[facility]    Medicine Progress Note - Hospitalist Service       Date of Admission:  3/2/2019    Assessment & Plan   Loan Anderson is a 78-year-old female with history of hypothyroidism, Parkinson's disease with neurogenic bladder who presented with acute encephalopathy and was intubated in the Emergency Department due to concerns for loss of protecting her airway.  She was intubated on 03/02 and extubated on 03/11.  Prolonged intubation and ICU stay related to ongoing delirium. Hospital course complicated by a partial status epilepticus and possible angioedema reaction.      Acute metabolic encephalopathy  Seizure disorder with partial status epilepticus   Likely multifactorial related to ICU delirium, postictal state related to partial epileptic seizure, and pain related to fractures below. Gradual improvement however still not back to baseline per daughter and continues to fail swallow evaluation. EEG 3/15 with diffuse slowing, no signs of seizure.  - Episode 3/20/2019 concerning for seizure (see RRT note for full details), particularly transiently elevated lactic acid.  reports episode was very similar to what she looked like with seizure in October  - Discussed with neurology, recommended additional dose of lacosamide 100 mg IV x1 and increase lacosamide to 200 mg BID, will reassess 3/21/19 (initially planning to follow peripherally prior to episode)  - Re-orient as needed  - Maintain normal day / night, sleep / wake cycles as able  - Minimize sedating / altering medications as able. Use verbal re-direction or sitter if needed.   - Continue quetiapine at bedtime   - Anticipate prolonged recovery, though has been generally improving prior to episode 3/20/19     Dysphagia  Secondary to encephalopathy, prolonged intubation. Family not interested in PEG  - Continue TF per NG   - SLP following, has advanced diet  - Continue to advance diet per speech  - Nutrition re-consulted to follow  for calorie counts and assess for tube feeding    Advanced care planning  Palliative care following. Have addressed goals of care at length with family, see note from 3/16/19.   - No escalation of cares (to BiPAP, ICU, pressors, PEG tube. Remains DNR/DNI)  - Appeared to be generally improving, goals remain restorative at this time   - Palliative care following     Parkinson's disease with neurogenic bladder  Lives in memory care at the [facility] and essentially wheelchair bound d/t high falls risk.  Dtr states normally able to carry on conversation and oriented to situation. Has history of neurogenic bladder but is not Silva dependent  - Continue Silva catheter. Consider TOV in next few days if ongoing improvement.   - Neurology increased Sinemet to five times a day 3/17/19 to assess for effect on swallowing, appears to effective. Also resumed prior to admission patch.  - Note, tube feedings need to be held  1 hour before and 1 hour after Sinemet administration for better absorption.   - Plan per neurology is to resume prior to admission Parkinson's regimen on discharge    Acute hypoxic respiratory failure  RRT 3/15/19 due to increased oxygen need.  Respiratory therapist was consulted for increased secretion however patient's weak to cough out all the secretions. Suctioning tried however patient unable to tolerate. Unable to do vest due to multiple rib fractures, patient not able to cooperate for flutter valve.  Mucomyst with breathing treatment tried however patient's O2 sats dropped resulting in RRT. No new infiltrate on CXR 3/15/19   - Wean oxygen, has been reduced to 2L  - See advanced care planning above     Right T11 and T12 rib fractures with associated posterior right-sided pleural effusion  Right L1 and L2 transverse process fractures  Multifactorial related to mechanical fall prior to admission, as well as CPR (no documented pulselessness) and Heimlich maneuvers performed for recurrent episodes of  unresponsiveness.   - Continue lidocaine patches, scheduled acetaminophen with additional doses PRN  - PT/OT following and recommending TCU  - PRN bowel regimen    Possible bladder spasms   Noted 3/17/19 by RN to have possible bladder spasms associated with apparent Silva dysfunction with leaking. Seemed to improve with irrigation and troubleshooting of Silva.  - Continue alpha blocker (terazosin, as can not crush prior to admission tamsulosin and liquid not stocked by pharmacy)  - Holding mirabegron as can not be crushed, can resume when diet reliably advanced to take pills  - Continue B&O suppository     Leukocytosis  Suspected acute phase reactant and steroid related. Patient had received steroids when she had angioedema. No growth noted on cultures. Procal <0.05. No focal signs/syptoms infection. Afebrile. CXR unchanged 3/15/19. UA with hematuria, no signs of infection   - ID consulted, monitoring off of antibiotics  - Afebrile   - WBC decreasing      YASMIN on chronic kidney disease stage II  Baseline creatinine 0.8-0.9. Attributed to prerenal from diuresis.  Gradually improving. She has low muscle mass.   - Monitor periodically     Microscopic hematuria  RBC >182 on UA 3/15/19 on catheterized specimen. Tiffany colored urine. Potentially secondary to Silva trauma in context of prophylactic heparin and aspirin use.    - Recheck in 2-4 weeks as outpatient once Silva removed, pending goals of care   - Noted to have more pink-tinged urine, heparin subcutaneous temporarily held. No gross blood seen in catheter bag. If remains resolved consider resuming heparin subcutaneous given degree of immobility     Possible angioedema  Tongue and lip swelling, 03/09-3/10, after restarted on lisinopril, which was recently started in outpatient setting.  Uncertain whether this was true angioedema and lisinopril added to allergy list and transitioned to amlodipine.  Improved with steroids.      Hypertension   Recently started on  lisinopril, developed tongue and lip swelling concerning for angioedema, with lisinopril discontinued and started on amlodipine.   - Continue amlodipine   - Blood pressures intermittently elevated, though somewhat labile, consider adding second agent pending trend. Hydralazine PRN available.      Diet: Adult Formula Drip Feeding: Continuous Replete with Fiber; Orogastric tube; Goal Rate: 90; mL/hr; Medication - Feeding Tube Flush Frequency: At least 15-30 mL water before and after medication administration and with tube clogging; Amount to Send ...  Dysphagia Diet Level 1 Pureed Nectar Thickened Liquids (pre-thickened or use instant food thickener) (by spoon); No Straws  Room Service    DVT Prophylaxis: Heparin subcutaneous temporarily held as above, consider resuming if hematuria remains resolved  Silva Catheter: in place, indication: Neurogenic Bladder  Code Status: DNR/DNI      Disposition Plan   Expected discharge: Goals remain restorative with improvement. Discharge to TCU versus [facility] once feeding advanced and tube feeds out if ongoing improvement, likely 2+ days for adequate diet advancement    Entered: Dustin Stone MD 03/20/2019, 4:02 PM       The patient's care was discussed with the Bedside Nurse, Patient and Patient's Family.    Dustin Stone MD  Hospitalist Service  [facility]    ______________________________________________________________________    Interval History   This morning was doing well per , around lunch time had episode (see RRT note for full description, discussed with House NP). Patient has since been more confused from previous. Denies any pain or shortness of breath. Per RN has tolerated small amounts of diet only.     Data reviewed today: I reviewed all medications, new labs and imaging results over the last 24 hours. I personally reviewed no images or EKG's today.    Physical Exam   Vital Signs: Temp: 98.5  F (36.9  C) Temp src: Axillary BP: 137/70   Heart  "Rate: 75 Resp: 20 SpO2: 93 % O2 Device: Nasal cannula Oxygen Delivery: 3 LPM  Weight: 198 lbs 0 oz    Constitutional:  NAD  Respiratory: Scattered coarse breath sounds, normal effort at rest   Cardiovascular: RRR, no m/r/g.  GI: Soft, non-tender, non-distended.    Skin/Integumen: Warm, dry  Neuro: Alert. Flat affect. Slow responses. Able to state location (\"[facility] [place]\"), not specific name. Not oriented to date, states birth date instead.     Data   Recent Labs   Lab 03/20/19  0556 03/19/19  1427 03/19/19  0725 03/18/19  0725  03/13/19  2251   WBC 15.5*  --  15.9* 16.4*   < > 17.6*   HGB 11.7  --  12.2 12.5   < > 11.9   MCV 94  --  94 95   < > 92   *  --  580* 605*   < > 461*     --  143 145*   < > 137   POTASSIUM 3.7 3.9 3.3* 3.4   < > 3.5   CHLORIDE 109  --  105 107   < > 96   CO2 28  --  33* 33*   < > 35*   BUN 41*  --  40* 38*   < > 51*   CR 0.93  --  0.88 0.89   < > 0.97   ANIONGAP 6  --  5 5   < > 6   NIKKIE 8.6  --  8.6 8.8   < > 8.3*   *  --  170* 169*   < > 188*   ALBUMIN  --   --   --   --   --  2.6*   PROTTOTAL  --   --   --   --   --  6.9   BILITOTAL  --   --   --   --   --  0.5   ALKPHOS  --   --   --   --   --  168*   ALT  --   --   --   --   --  7   AST  --   --   --   --   --  20   TROPI  --   --   --   --   --  <0.015    < > = values in this interval not displayed.       No results found for this or any previous visit (from the past 24 hour(s)).  Medications       acetaminophen  500 mg Oral or NG Tube TID     amLODIPine  10 mg Oral Daily     aspirin  81 mg Oral At Bedtime     carbidopa-levodopa  2 tablet Oral 5x Daily     diclofenac  1 g Transdermal 4x Daily     docusate  100 mg Oral or Feeding Tube BID     insulin aspart  1-12 Units Subcutaneous Q4H     lacosamide (VIMPAT) intermittent infusion  100 mg Intravenous Once     lacosamide  150 mg Oral or Feeding Tube BID     lacosamide  200 mg Oral BID     levothyroxine  112 mcg Oral QAM AC     melatonin  3 mg Oral or Feeding " Tube Q24H     multivitamins w/minerals  15 mL Per Feeding Tube Daily     pantoprazole  40 mg Oral QAM AC     QUEtiapine  12.5 mg Oral At Bedtime     rotigotine  1 patch Transdermal Daily     rotigotine   Transdermal Q8H     rotigotine   Transdermal Daily     sodium chloride (PF)  3 mL Intracatheter Q8H     terazosin  1 mg Oral Daily     venlafaxine  37.5 mg Oral TID

## 2019-03-20 NOTE — PROGRESS NOTES
No new recommendations.   Please refer to my note on 3/18.   Signing off please call if ques.   Jeanna Fitzgerald MD

## 2019-03-21 NOTE — PLAN OF CARE
Discharge Planner PT   Patient plan for discharge: Pt's family is hopeful pt can return to [facility].   Current status: Pt very dyskinetic at time of attempt, family reports the crushed up meds are causes side effects and not currently appropriate for transfer trial. Reviewed med times with family and will try to coordinate to catch pt at optimal times for movement. Assisted family with caregiver training regarding transfer equipment. Performed AA exercises with pt supine in chair, good tolerance until dyskinesia ramps up.  Barriers to return to prior living situation: Spinal precautions, Fall risk, Level of assist-currently requiring use of a lift  Recommendations for discharge: Return to [facility] with continued PT services.   Rationale for recommendations: If [facility] is able to provide assist of 2 with use of lift, pt would benefit from returning to [facility] with continued PT services. If [facility] is unable to provide appropriate level of support to would benefit from LTC placement with continued PT services.        Entered by: Ulises Kirk 03/21/2019 1:02 PM

## 2019-03-21 NOTE — PLAN OF CARE
Pt A&O to self only during the night, lethargic.  VSS on 2L oxygen via NC.  Absent of nonverbal indicators of pain.  Sleeping comfortably during the night.  Neuros unchanged.  Up w/A2 and lift.  Turned and repositioned once during the night, otherwise refused repositioning.  Silva patent w/adequate output.  L PIV SL.  Tube feeding at 90mL/hr w/free water flushes.  BG = 126 and 195 during the night.  Potassium recheck in AM.  Lung sounds diminished.  DD1 diet w/nectar thick liquids.  Daughter at bedside.  Nursing to continue to monitor.

## 2019-03-21 NOTE — PLAN OF CARE
Appears pain free. Turn and repo. Silva in place. Small bites of PO intake. Pt with episodes of jerking movements, MD aware and meds were adjusted per neurology. Family refused, MD aware. TF @90. 2L oxygen

## 2019-03-21 NOTE — PROGRESS NOTES
RN contacted Dr Rg Granados(Neurologist) per family request.   Family would like Dr Diez to speak with Tamika Diane, Pharmacist D ([phone]).  Re: Sinemet dosing/options

## 2019-03-21 NOTE — CONSULTS
CLINICAL NUTRITION SERVICES - REASSESSMENT NOTE    Received MD consult - follow for calorie counts and assess for TF weaning      Recommendations Ordered by Registered Dietitian (RD):   Ordered calorie counts 3/21 - 3/23   Malnutrition: (3/6)  % Weight Loss:  None noted  % Intake:  No decreased intake noted  Subcutaneous Fat Loss:  None observed  Muscle Loss:  None observed  Fluid Retention:  Mild - doubt nutrition related     Malnutrition Diagnosis: Patient does not meet two of the above criteria necessary for diagnosing malnutrition        EVALUATION OF PROGRESS TOWARD GOALS   Diet:  DD1, NT with swallowing strategies. Intake has been minimal, will start yg count. Oral intake will likely be poor considering the dysphagia diet restrictions.    Nutrition Support:    Type of Feeding Tube: ND  Enteral Frequency:  Continuous   Enteral Regimen:  Replete with Fiber at 90 mL/hr (holding QID for Sinemet):  1440 kcal (16 kcal/kg), 92 g protein (1.75 g/kg), 1200 mL H2O, 22 g fiber, 178 g CHO, 96% RDI.  Standard water flushes of 60 mL every 4 hrs. Total fluid (TF+flush) = 1560 mL/day    Pt receiving Sinemet QID, TF is held 1 hr before and 1 hr after administration, thus receiving TF for total of 16 hours/day.     Intake/Tolerance:  No TF issues noted. Last documented BM was 3/18, pt is on Colace.  BGM today 126-195 on HSSI.  Pt receives daily Certavite to meet 100% RDI.      ASSESSED NUTRITION NEEDS:  Dosing Weight 87.7 kg (3/18 actual wt for energy); 52.3 kg (IBW for protein)  Estimated Energy Needs: 4437-2022 kcals (14-17 Kcal/Kg)  Justification: hypocaloric feeding guidelines for obesity  Estimated Protein Needs:  grams protein (1.5-2 g pro/Kg)  Justification: obesity      NEW FINDINGS:   Weight has been stable  Vitals:    03/02/19 1452 03/03/19 0600 03/04/19 0000 03/05/19 0412   Weight: 91 kg (200 lb 9.9 oz) 90.2 kg (198 lb 13.7 oz) 91.4 kg (201 lb 8 oz) 93.5 kg (206 lb 2.1 oz)    03/06/19 0559 03/07/19 0354  03/08/19 0200 03/09/19 0400   Weight: 93.4 kg (205 lb 14.6 oz) 93.8 kg (206 lb 12.7 oz) 93.7 kg (206 lb 9.1 oz) 91.8 kg (202 lb 6.1 oz)    03/10/19 0400 03/11/19 0500 03/12/19 0600 03/13/19 0400   Weight: 93.4 kg (205 lb 14.6 oz) 91 kg (200 lb 9.9 oz) 91 kg (200 lb 9.9 oz) 90.3 kg (199 lb 1.2 oz)    03/14/19 0500 03/16/19 0224 03/18/19 0641 03/19/19 0406   Weight: 92.2 kg (203 lb 3.2 oz) 91.7 kg (202 lb 3.2 oz) 87.7 kg (193 lb 4.8 oz) 90.4 kg (199 lb 3.2 oz)    03/20/19 0452   Weight: 89.8 kg (198 lb)         Previous Goals:   EN to meet % est needs  Evaluation: Met    Previous Nutrition Diagnosis:   None identified      CURRENT NUTRITION DIAGNOSIS  Inadequate oral intake related to dysphagia diet as evidenced by minimal intake documented    INTERVENTIONS  Recommendations / Nutrition Prescription  Continue current TF until oral intake quantified  Diet per SLP    Implementation  Ordered calorie counts 3/21 - 3/23    Goals  Pt will meet assessed needs with combination TF and oral intake      MONITORING AND EVALUATION:  Progress towards goals will be monitored and evaluated per protocol and Practice Guidelines

## 2019-03-21 NOTE — PLAN OF CARE
"According to daughter and CNA, pt had \"episode\" of kicking and putting legs out of bed.  CNA states that pt desatted in the 60s, but was 91% when writer entered room.  Pt stared at ceiling and did not respond to questions and was no longer kicking.  Daughter did not want pt to be disturbed.  Respirations were elevated and pt was wheezing.  Daughter refused neb treatment for pt.  Pt did calm down and was breathing normally after a few minutes.  Daughter also placed all four side rails up on the bed and refused suggestion of seizure pads on the bed in case of future episodes.  "

## 2019-03-21 NOTE — Clinical Note
Dr. Granados -- I am the clinical pharmacist here in the neurology clinic and Dr. Pitts and I have been seeing this patient. I talked with her daughter today and we discussed possible options for managing her dyskinesia while inpatient. Dr. Pitts and I would recommend trying Parcopa (ODT carbidopa-levodopa) as it may have less variable effect than crushing her pills and administering via feeding tube. I'm also wondering about holding terazosin in case this is contributing to any of her symptoms. Feel free to call me to discuss further.Tamika Diane, Pharm.D.Medication Therapy Management PharmacistPhone: [phone]

## 2019-03-21 NOTE — TELEPHONE ENCOUNTER
[facility] Call Center    Phone Message    May a detailed message be left on voicemail: yes    Reason for Call: Medication Question or concern regarding medication   Prescription Clarification  Name of Medication: carbidopa-levodopa   Prescribing Provider: Dr. Granados   Pharmacy:     What on the order needs clarification? Family would like Dr. Granados to communicate with the pharamcist- Tamika Diane [phone]- to discuss dosage and type.  Medication is currently being crushed and dispensed through her feeding tube.  Please see progress notes entered today.           Action Taken: Message routed to:  [facility] ([facility] neurology

## 2019-03-21 NOTE — PLAN OF CARE
Discharge Planner OT   Patient plan for discharge: did not state  Current status: pt seen in pm. Daughters requested no transfer activity as pt had had an episode of seizure-like activity just prior to OT arrival. Focused on BUE function and goal of improving self-feeding skills. Issued hand exercisers to improve dominant R hand strength so pt can manage utensils.   Barriers to return to prior living situation: current level of assist with mobility and self-cares  Recommendations for discharge: return to [facility] with home OT  Rationale for recommendations: If [facility] is unable to provide appropriate level of support for mobilities and self-cares would benefit from LTC placement with continued OT services.          Entered by: Jeancarlos Angel 03/21/2019 3:50 PM

## 2019-03-21 NOTE — PLAN OF CARE
Discharge Planner SLP   Patient plan for discharge: not discussed  Current status: Patient seen for dysphagia treatment with 2 daughters present. Daughters would like to determine if patient ready to swallow carbidopa whole. Patient seated upright in chair with near-constant dyskinetic movement and at times attempting to get out of chair to use the bathroom. Patient required frequent cues to remain seated in chair for safety. RN notified. Patient consumed 3-4 bites applesauce, 1 small oyster cracker piece (broken into small size similar in size to carbidopa pill) served with applesauce and 7-8 sips nectar thick water by spoon. Note poor oral bolus control and poor oral clearance of cracker piece. Patient not able to swallow cracker piece despite multiple attempts and not able to readily spit out into napkin. Note one instance of delayed cough after nectar thick liquid trial. Recommend cautiously continue dysphagia diet level 1 with nectar thick liquids given 1:1 assist and swallow precautions (fully upright position, liquids by spoon only, small single sips/bites, verify swallows, check for oral residue). Please continue to serve pills crushed with puree consistency at this time for safety. Monitor for signs of aspiration or decline in respiratory status and hold PO if occur. Discussed with RN. Patient/family in agreement.  Barriers to return to prior living situation: Medical status  Recommendations for discharge: Memory care unit if able to provide 1:1 supervision/assist with meals; cont SLP tx  Rationale for recommendations: Dysphagia; below baseline       Entered by: Shauna Delgado 03/21/2019 11:04 AM

## 2019-03-21 NOTE — PROGRESS NOTES
[facility]    Hospitalist Progress Note      Assessment & Plan   Loan Anderson is a 78-year-old female with history of hypothyroidism, Parkinson's disease with neurogenic bladder who presented with acute encephalopathy and was intubated in the Emergency Department due to concerns for loss of protecting her airway.  She was intubated on 03/02 and extubated on 03/11.  Prolonged intubation and ICU stay related to ongoing delirium. Hospital course complicated by a partial status epilepticus and possible angioedema reaction.     Acute metabolic encephalopathy  Seizure disorder with partial status epilepticus  Dyskinesias  Likely multifactorial related to ICU delirium, postictal state related to partial epileptic seizure, and pain related to fractures below. Gradual improvement however still not back to baseline per daughter and continues to fail swallow evaluation. EEG 3/15 with diffuse slowing, no signs of seizure.  - Episode 3/20/2019 concerning for seizure, lacosamide increased.  - Reassessed by neurology today, not seizures, likely dyskinesias brought on by peak effect of sinemet  - Decreased sinemet to 4x per day, melatonin decreased to 1mg, vimpat down to 150 BID  - Maintain normal day / night, sleep / wake cycles as able  - Minimize sedating / altering medications as able. Use verbal re-direction or sitter if needed.   - Continue quetiapine at bedtime   - Anticipate prolonged recovery, though has been generally improving prior to episode 3/20/19      Dysphagia  Secondary to encephalopathy, prolonged intubation. Family not interested in PEG  - Continue TF per NG   - SLP following, has advanced diet to DD1 via spoon  - Continue to advance diet per speech  - Nutrition re-consulted to follow for calorie counts and assess for tube feeding     Advanced care planning  Palliative care following. Have addressed goals of care at length with family, see note from 3/16/19.   - No escalation of cares (to  BiPAP, ICU, pressors, PEG tube. Remains DNR/DNI)  - Appeared to be generally improving, goals remain restorative at this time   - Palliative care following      Parkinson's disease with neurogenic bladder  Lives in memory care at the [facility] and essentially wheelchair bound d/t high falls risk.  Dtr states normally able to carry on conversation and oriented to situation. Has history of neurogenic bladder but is not Silva dependent  - Continue Silva catheter. Consider TOV on 3/22 if okay with family  - Sinemet to four times a day. Also resumed prior to admission patch.  - Note, tube feedings need to be held 1 hour before and 1 hour after Sinemet administration for better absorption.   - Plan per neurology is to resume prior to admission Parkinson's regimen on discharge     Acute hypoxic respiratory failure  RRT 3/15/19 due to increased oxygen need.  Respiratory therapist was consulted for increased secretion however patient's weak to cough out all the secretions. Suctioning tried however patient unable to tolerate. Unable to do vest due to multiple rib fractures, patient not able to cooperate for flutter valve.  Mucomyst with breathing treatment tried however patient's O2 sats dropped resulting in RRT. No new infiltrate on CXR 3/15/19   - Wean oxygen, maintained on 2-4LPM  - See advanced care planning above     Right T11 and T12 rib fractures with associated posterior right-sided pleural effusion  Right L1 and L2 transverse process fractures  Multifactorial related to mechanical fall prior to admission, as well as CPR (no documented pulselessness) and Heimlich maneuvers performed for recurrent episodes of unresponsiveness.   - Continue lidocaine patches, scheduled acetaminophen with additional doses PRN  - PT/OT following and recommending TCU  - PRN bowel regimen     Possible bladder spasms   Noted 3/17/19 by RN to have possible bladder spasms associated with apparent Silva dysfunction with leaking. Seemed to improve  with irrigation and troubleshooting of Silva.  - Continue alpha blocker (terazosin, as can not crush prior to admission tamsulosin and liquid not stocked by pharmacy)  - Holding mirabegron as can not be crushed, can resume when diet reliably advanced to take pills     Leukocytosis  Suspected acute phase reactant and steroid related. Patient had received steroids when she had angioedema. No growth noted on cultures. Procal <0.05. No focal signs/syptoms infection. Afebrile. CXR unchanged 3/15/19. UA with hematuria, no signs of infection   - ID consulted, monitoring off of antibiotics  - Afebrile   - WBC decreasing      YASMIN on chronic kidney disease stage II  Baseline creatinine 0.8-0.9. Attributed to prerenal from diuresis.  Gradually improving. She has low muscle mass.   - Monitor periodically      Microscopic hematuria  RBC >182 on UA 3/15/19 on catheterized specimen. Tiffany colored urine. Potentially secondary to Silva trauma in context of prophylactic heparin and aspirin use.    - Recheck in 2-4 weeks as outpatient once Silva removed, pending goals of care   - Noted to have more pink-tinged urine, heparin subcutaneous temporarily held. No gross blood seen in catheter bag. If remains resolved consider resuming heparin subcutaneous given degree of immobility      Possible angioedema  Tongue and lip swelling, 03/09-3/10, after restarted on lisinopril, which was recently started in outpatient setting.  Uncertain whether this was true angioedema and lisinopril added to allergy list and transitioned to amlodipine.  Improved with steroids.      Hypertension   Recently started on lisinopril, developed tongue and lip swelling concerning for angioedema, with lisinopril discontinued and started on amlodipine.   - Continue amlodipine   - Blood pressures intermittently elevated, though somewhat labile, consider adding second agent pending trend. Hydralazine PRN available.     DVT Prophylaxis: Anti-embolisim stockings (TEDs),  possible resumption heparin subcutaneous 3/22  Code Status: DNR/DNI  Expected discharge: 2 - 3 days, recommended to prior living arrangement once feeding advanced and able to tolerate PO medications.    Katherine Wells, DO  Text Page (7am - 6pm)    Interval History   Patient seen and examined. Daughter and grand-niece at bedside. Discussed plan of care with them as well as neurology. Patient continues to have dyskinesias. Otherwise she appears comfortable. Tolerating small amounts via spoon. Temp 99.6, lactic acid fired family finally allowed blood draw, 1.3. No plans for antibiotics. Could consider IVF if PO intake remains poor.    -Data reviewed today: I reviewed all new labs and imaging results over the last 24 hours. I personally reviewed no images or EKG's today.    Physical Exam   Temp: 99.6  F (37.6  C) Temp src: Axillary BP: 147/67   Heart Rate: 68 Resp: 20 SpO2: 96 % O2 Device: Nasal cannula Oxygen Delivery: 4 LPM  Vitals:    03/18/19 0641 03/19/19 0406 03/20/19 0452   Weight: 87.7 kg (193 lb 4.8 oz) 90.4 kg (199 lb 3.2 oz) 89.8 kg (198 lb)     Vital Signs with Ranges  Temp:  [98.5  F (36.9  C)-99.6  F (37.6  C)] 99.6  F (37.6  C)  Heart Rate:  [60-68] 68  Resp:  [20-21] 20  BP: (138-148)/(61-67) 147/67  SpO2:  [93 %-96 %] 96 %  I/O last 3 completed shifts:  In: 1040 [NG/GT:180; IV Piggyback:140]  Out: 1250 [Urine:1250]    Constitutional: Awake, alert, cooperative, no apparent distress  Respiratory: Coarse breath sounds, no wheeze  Cardiovascular: Regular rate and rhythm, normal S1 and S2, and no murmur noted  GI: Normal bowel sounds, soft, non-distended, non-tender  Skin/Integumen: No rashes, no cyanosis, no edema  Other: Answers questions of family, simple. Speech dysarthric.    Medications       acetaminophen  500 mg Oral or NG Tube TID     amLODIPine  10 mg Oral Daily     aspirin  81 mg Oral At Bedtime     carbidopa-levodopa  2 tablet Oral 4x Daily     diclofenac  1 g Transdermal 4x Daily     docusate   100 mg Oral or Feeding Tube BID     insulin aspart  1-12 Units Subcutaneous Q4H     lacosamide  150 mg Oral BID     levothyroxine  112 mcg Oral QAM AC     melatonin  1 mg Oral or Feeding Tube Q24H     multivitamins w/minerals  15 mL Per Feeding Tube Daily     pantoprazole  40 mg Oral QAM AC     QUEtiapine  12.5 mg Oral At Bedtime     rotigotine  1 patch Transdermal Daily     rotigotine   Transdermal Q8H     rotigotine   Transdermal Daily     sodium chloride (PF)  3 mL Intracatheter Q8H     terazosin  1 mg Oral Daily     venlafaxine  37.5 mg Oral TID       Data   Recent Labs   Lab 03/21/19  0656 03/20/19  0556 03/19/19  1427 03/19/19  0725   WBC 15.9* 15.5*  --  15.9*   HGB 12.2 11.7  --  12.2   MCV 95 94  --  94   * 564*  --  580*   * 143  --  143   POTASSIUM 4.0 3.7 3.9 3.3*   CHLORIDE 109 109  --  105   CO2 32 28  --  33*   BUN 39* 41*  --  40*   CR 0.95 0.93  --  0.88   ANIONGAP 4 6  --  5   NIKKIE 8.8 8.6  --  8.6   * 203*  --  170*       No results found for this or any previous visit (from the past 24 hour(s)).

## 2019-03-21 NOTE — PROGRESS NOTES
"SUBJECTIVE/OBJECTIVE:                Loan Anderson is a 78 year old female called for a follow-up visit for Medication Therapy Management.  She was referred to me from Dr. Pitts. Today's visit was conducted with patient's daughter, Kassandra (consent to communicate on file). Patient is currently inpatient at [facility].      Chief Complaint: Follow up from our visit on 2/22/19  Tobacco: No tobacco use  Alcohol: Less than 1 beverage / month    Medication Adherence/Access: no issues reported - currently inpatient    Parkinson's Disease:  Current medications include: carbidopa-levodopa  mg 2 tablets 4 times daily at 8 am, 12 pm, 4 pm, and 8 pm. She is currently getting this medication by feeding tube while inpatient. Hospital staff is spacing medication from feeding by one hour before or after. She also restarted rotigotine 4 mg patch today.  Daughter states that patient has been having consistent dyskinesias 45 minutes after taking a dose of levodopa.  The dyskinesias seem to last for about 45 minutes and then subside. These episodes including patient rolling on bed, confusion, eyes roll in her head, and hallucinations (ie: seeing bugs on the ceiling). She is on quetiapine 12.5 mg nightly which was started in the hospital. Daughter states that between the dyskinesias and \"facility-induced dementia\" she is not doing well. Seizures were suspected so she has been on Vimpat but family declined Vimpat today and their goal is to keep her off seizure medications. They would prefer to improve her quality of life without the seizure medications and risk a seizure or death than having her on the seizure medications. Of note, patient is not taking any opioids, just Tylenol currently.     Neurogenic bladder: Currently taking terazosin 1 mg daily. Mirabegron and trospium are being held as they cannot be crushed. Terazosin is replacing tamsulosin. Patient reportedly has been using a mata catheter and had " some leakage. Daughter is concerned about terazosin causing more problems but patient has not been ambulating yet.     ASSESSMENT:              Current medications were reviewed today as discussed above.      Medication Adherence: excellent, no issues identified    Parkinson's Disease:  Needs improvement.  Patient may be having a more variable effect (ie: earlier peak) of carbidopa-levodopa when given by feeding tube.  She may benefit from trying oral disintegrating carbidopa-levodopa (Parcopa) which would be more similar to the medication she was taking PTA. If switching formulations does not improve the dyskinesia, another option would be to decrease the dose to 1 or 1.5 tablets at each dosing time, especially now that the rotigotine patch is being added today. Discussed with Dr. Pitts in clinic and he agreed with recommendation to try Parcopa first.    Neurogenic bladder: Needs improvement.  Terazosin may not be necessary while the patient is inpatient.  Inpatient team could consider holding this medication until discharge.  It is unclear if terazosin is contributing to any of her symptoms.     PLAN:                  1. Consider switching carbidopa-levodopa to Parcopa (ODT carbidopa-levodopa). If this does not improve dyskinesia, consider decreasing dose to 1 or 1.5 tablets at each dosing time.     2. Consider holding terazosin.    Message sent to Dr. Granados with Dr. Pitts cc'ed regarding the above recommendations for inpatient management of PD.    I spent 25 minutes with this patient today. I offer these suggestions for consideration by Dr. Rg Granados and inpatient neurology team. A copy of the visit note was provided to the patient's referring provider and inpatient team.    Will follow up after discharge or sooner if needed.    The patient declined a summary of these recommendations as an after visit summary.    Chart documentation was completed in part with Dragon voice-recognition software. Even  though reviewed, some grammatical, spelling, and word errors may remain.    Tamika Diane, Pharm.D.  Medication Therapy Management Pharmacist  Phone: [phone]

## 2019-03-21 NOTE — PROGRESS NOTES
Neurology Progress Note  Loan Anderson  [id number]  March 21, 2019      Assessment/Recommendations:  78-year-old female with history of long-standing progressive Parkinson's disease who neurology was asked to reassess after having abnormal movements of all extremities.  I was able to view video of the events that family had obtained.  Events seem much more consistent with dyskinesias than seizures.  She is awake and alert during them.  She is not rigid.  Movements are arrhythmic.  Most reassuringly family informs me that patient was having these movements while on her most recent EEG.  There was no EEG correlate.  They report she has had these well prior to admission.  They seem to be correlated with receiving her Sinemet and that also seems to hold true here.  Given that these movements have become more distressing we will make slight reduction in her short acting Sinemet will continuing the long-acting patch.  Also will go back down to previous Vimpat dosing in an attempt to limit medicines will make reduction in melatonin.  If possible patient should be allowed to swallow her Sinemet as breaking it down may cause greater peak effect.  However, understand with her current swallow this may not always be possible.  I had long discussion with family regarding these changes and they are in agreement.    -Decrease Sinemet to 4 times daily (ordered for you) 8-12-16-20  -Continue rotigotine patch  -Decrease melatonin to 1 mg  -Decrease Vimpat to 150 mg twice daily.  Mental status continues to fluctuate may decrease to 100 mg twice daily but would not go lower given recent seizure.  -Neurology will continue to follow inpatient      Subjective: Notes reviewed overnight.  Patient had multiple spells of shaking movements in her lower extremities associated with eye rolling.  Family states patient has had this for many years.  Events seem to be correlated with peak levodopa usage.  Family took video and events are  "arrhythmic with no clear rigidity.  Patient is able to answer questions during.  Family reports she has had these movements on EEG in the past without EEG correlate.    Physical Exam:  /61 (BP Location: Right arm)   Pulse 98   Temp 99  F (37.2  C) (Axillary)   Resp 21   Ht 1.6 m (5' 3\")   Wt 89.8 kg (198 lb)   SpO2 94%   BMI 35.07 kg/m    GEN: awake and alert, dyskinetic movements of all extremities  HEENT: Mucous membranes moist  NECK: Supple,   RESP: Non-labored breathing nasal cannula present,   GI: Nondistended   SKIN/MSK: Warm  NEURO:   Awake, alert, oriented to person, and hospital.  Unable to tell me which hospital.  Unable to tell me month.  Family reports she has had episodes of being able to do this previously.  Able to name daughter.  Able to follow simple commands.  Trouble with more complex commands.  Tracks me in all quadrants.  Strength antigravity in upper extremities.  Speech dysarthric.    Pertinent Imaging and Labs:    Labs and imaging personally reviewed    Rg Granados DO   of Neurology      This note was transcribed with dragon dictation software.      Total Time: 35 minutes, greater than half of which was counseling and coordination of care          "

## 2019-03-21 NOTE — PROGRESS NOTES
"Pts lactic acid fired. Pts family refused lab draw. MD Russell padilla  RN provided education on what lactic acid measured and they again declined stating they \"dont want her poked again\"  "

## 2019-03-22 NOTE — TELEPHONE ENCOUNTER
Discussed with Dr. Granados this morning. This has been addressed.    Tamika Diane, Pharm.D.  Medication Therapy Management Pharmacist  Phone: [phone]

## 2019-03-22 NOTE — PROGRESS NOTES
YAMILET Note:    D/I:  SW is following for discharge planning.  SW placed call to [facility] ([phone]) to discuss patient's current level of care and see if they would be able to take patient back. [facility] stated that they would be able to take patient back with a rayray lift/2 person transfer, accomodate 1:1 feedings, nectar thick and [place] Tube.  Although they have 24/7 nursing, she feels that it would be best to not take patient back over the weekend with her change in care level.  YAMILET can touch base with weekend nurse if patient is getting ready for discharge.    P: SW will continue to assist as needed with discharge planning.     Quentin Munguia, MSW, LGSW

## 2019-03-22 NOTE — PLAN OF CARE
Pt disorientated to time/situation. Very sleepy this shift, SHELLIE pain. Pt looks comfortable. VSS ex 2L O2 and ravi. Up with lift, turn/repo q2-4 hrs. DD1-[place] thick- poor appetite. Tube feedings running @90mL/hr with free water. HOB @30. Daughters refused evening sinemet dosing d/t significant parkinsonian tremors & seems to exacerbate after Sinemet dosing. CAL hose placed but removed for bedtime. Silva placed, no BM this shift. No IV access. LS diminished/occ crackles. Infrequent  non-productive cough. 4 side rails up per family insistence! No apparent seizure activity this shift. , 137 & 168, 2units coverage @0400. Ortho brace in room for spinal fx. K  Recheck in AM. Per daughter requested to stop tube feedings at 0530, to give sinemet at 0630 since family refused evening dose of sinemet. Daughter at bedside. Continue to monitor

## 2019-03-22 NOTE — PROGRESS NOTES
[facility]    Hospitalist Progress Note      Assessment & Plan   Loan Anderson is a 78-year-old female with history of hypothyroidism, Parkinson's disease with neurogenic bladder who presented with acute encephalopathy and was intubated in the Emergency Department due to concerns for loss of protecting her airway.  She was intubated on 03/02 and extubated on 03/11.  Prolonged intubation and ICU stay related to ongoing delirium. Hospital course complicated by a partial status epilepticus and possible angioedema reaction.     Acute metabolic encephalopathy  Seizure disorder with partial status epilepticus  Dyskinesias  Likely multifactorial related to ICU delirium, postictal state related to partial epileptic seizure, and pain related to fractures below. Gradual improvement however still not back to baseline per daughter. EEG 3/15 with diffuse slowing, no signs of seizure.  - Episode 3/20/2019 concerning for seizure, lacosamide increased.  - Reassessed by neurology 3/21, not seizures, likely dyskinesias brought on by peak effect of sinemet  - Decreased sinemet to 4x per day, melatonin decreased to 1mg, vimpat down to 150 BID  - Medication adjusted to parcopa ODT (instead of sinemet), all others stay the same  - Maintain normal day / night, sleep / wake cycles as able  - Minimize sedating / altering medications as able. Use verbal re-direction or sitter if needed.   - Continue quetiapine at bedtime      Dysphagia  Secondary to encephalopathy, prolonged intubation. Family not interested in PEG  - Continue TF per NG (Tolerating some meds PO)  - SLP following, has advanced diet to DD1 via spoon  - Continue to advance diet per speech  - Nutrition re-consulted to follow for calorie counts and assess for tube feeding     Advanced care planning  Palliative care following. Have addressed goals of care at length with family, see note from 3/16/19.   - No escalation of cares (to BiPAP, ICU, pressors, PEG  tube. Remains DNR/DNI)  - Appeared to be generally improving, goals remain restorative at this time   - Palliative care following      Parkinson's disease with neurogenic bladder  Lives in memory care at the [facility] and essentially wheelchair bound d/t high falls risk.  Dtr states normally able to carry on conversation and oriented to situation. Has history of neurogenic bladder but is not Silva dependent  - Continue Silva catheter. TOV on 3/23 or 3/24 if patient slightly more ambulatory  - Parcopa in place of sinemet. Continue rotigotine patch  - Note, tube feedings need to be held 1 hour before and 1 hour after Sinemet administration for better absorption.   - Plan per neurology is to resume prior to admission Parkinson's regimen on discharge     Acute hypoxic respiratory failure  RRT 3/15/19 due to increased oxygen need.  Respiratory therapist was consulted for increased secretion however patient's weak to cough out all the secretions. Suctioning tried however patient unable to tolerate. Unable to do vest due to multiple rib fractures, patient not able to cooperate for flutter valve.  Mucomyst with breathing treatment tried however patient's O2 sats dropped resulting in RRT. No new infiltrate on CXR 3/15/19   - Wean oxygen, maintained on 2LPM  - See advanced care planning above     Right T11 and T12 rib fractures with associated posterior right-sided pleural effusion  Right L1 and L2 transverse process fractures  Multifactorial related to mechanical fall prior to admission, as well as CPR (no documented pulselessness) and Heimlich maneuvers performed for recurrent episodes of unresponsiveness.   - Continue lidocaine patches, scheduled acetaminophen with additional doses PRN  - PT/OT following and recommending TCU  - PRN bowel regimen     Possible bladder spasms   Noted 3/17/19 by RN to have possible bladder spasms associated with apparent Silva dysfunction with leaking. Seemed to improve with irrigation and  troubleshooting of Mata.  - Continue alpha blocker (terazosin, as can not crush prior to admission tamsulosin and liquid not stocked by pharmacy)  - Holding mirabegron as can not be crushed, can resume when diet reliably advanced to take pills and mata catheter removed     Leukocytosis improving  Suspected acute phase reactant and steroid related. Patient had received steroids when she had angioedema. No growth noted on cultures. Procal <0.05. No focal signs/syptoms infection. Afebrile. CXR unchanged 3/15/19. UA with hematuria, no signs of infection   - ID consulted, monitoring off of antibiotics  - Afebrile   - WBC decreasing      YASMIN on chronic kidney disease stage II  Baseline creatinine 0.8-0.9. Attributed to prerenal from diuresis.  Gradually improving. She has low muscle mass.   - Monitor periodically      Microscopic hematuria  RBC >182 on UA 3/15/19 on catheterized specimen. Tiffany colored urine. Potentially secondary to Mata trauma in context of prophylactic heparin and aspirin use.    - Recheck in 2-4 weeks as outpatient once Mata removed, pending goals of care   - Noted to have more pink-tinged urine, heparin subcutaneous temporarily held. No gross blood seen in catheter bag. If remains resolved consider resuming heparin subcutaneous given degree of immobility      Possible angioedema  Tongue and lip swelling, 03/09-3/10, after restarted on lisinopril, which was recently started in outpatient setting.  Uncertain whether this was true angioedema and lisinopril added to allergy list and transitioned to amlodipine.  Improved with steroids.      Hypertension   Recently started on lisinopril, developed tongue and lip swelling concerning for angioedema, with lisinopril discontinued and started on amlodipine.   - Continue amlodipine   - Blood pressures improved on 3/22.    DVT Prophylaxis: Anti-embolisim stockings (TEDs)  Code Status: DNR/DNI  Expected discharge: 3/25, recommended to prior living arrangement  once feeding advanced and able to tolerate PO medications (best to discharge during work week as patient's level of care at her facility will have changed)    aKtherine Wells, DO  Text Page (7am - 6pm)    Interval History   Patient seen and examined. Daughter and  present at bedside. Plan to remove mata and void trial over the weekend. Continue PO intake with subsequent reduction in TF as she tolerates. She has slight cough (chronic clearing of throat), but otherwise no respiratory complaints. No shortness of breath, chest pain, abdominal pain. Dyskinesias improved.    -Data reviewed today: I reviewed all new labs and imaging results over the last 24 hours. I personally reviewed no images or EKG's today.    Physical Exam   Temp: 96.1  F (35.6  C) Temp src: Oral BP: 130/51   Heart Rate: 61 Resp: 16 SpO2: 97 % O2 Device: Nasal cannula Oxygen Delivery: 2 LPM  Vitals:    03/19/19 0406 03/20/19 0452 03/22/19 0500   Weight: 90.4 kg (199 lb 3.2 oz) 89.8 kg (198 lb) 88.9 kg (196 lb)     Vital Signs with Ranges  Temp:  [96.1  F (35.6  C)-98.4  F (36.9  C)] 96.1  F (35.6  C)  Heart Rate:  [56-68] 61  Resp:  [16-20] 16  BP: (130-168)/(51-73) 130/51  SpO2:  [95 %-100 %] 97 %  I/O last 3 completed shifts:  In: 1150 [P.O.:200; NG/GT:950]  Out: 1500 [Urine:1500]    Constitutional: Awake, alert, cooperative, no apparent distress  Respiratory: clear anteriorly, no wheeze  Cardiovascular: Regular rate and rhythm, normal S1 and S2, and no murmur noted  GI: Normal bowel sounds, soft, non-distended, non-tender  Skin/Integumen: No rashes, no cyanosis, trace edema bilaterally  Other: Answers questions, more interactive today    Medications       acetaminophen  500 mg Oral or NG Tube TID     amLODIPine  10 mg Oral Daily     aspirin  81 mg Oral At Bedtime     carbidopa-levodopa  2 tablet Oral 4x Daily     diclofenac  1 g Transdermal 4x Daily     docusate  100 mg Oral or Feeding Tube BID     insulin aspart  1-12 Units Subcutaneous Q4H      lacosamide  150 mg Oral BID     levothyroxine  112 mcg Oral QAM AC     melatonin  1 mg Oral or Feeding Tube Q24H     multivitamins w/minerals  15 mL Per Feeding Tube Daily     pantoprazole  40 mg Oral QAM AC     QUEtiapine  12.5 mg Oral At Bedtime     rotigotine  1 patch Transdermal Daily     rotigotine   Transdermal Q8H     rotigotine   Transdermal Daily     sodium chloride (PF)  3 mL Intracatheter Q8H     terazosin  1 mg Oral Daily     venlafaxine  37.5 mg Oral TID       Data   Recent Labs   Lab 03/22/19  0714 03/21/19  0656 03/20/19  0556   WBC 14.9* 15.9* 15.5*   HGB 12.4 12.2 11.7   MCV 95 95 94   * 546* 564*    145* 143   POTASSIUM 4.0 4.0 3.7   CHLORIDE 109 109 109   CO2 29 32 28   BUN 37* 39* 41*   CR 0.90 0.95 0.93   ANIONGAP 5 4 6   NIKKIE 8.6 8.8 8.6   * 188* 203*       No results found for this or any previous visit (from the past 24 hour(s)).

## 2019-03-22 NOTE — PLAN OF CARE
Pt is A/Ox2-3, answers simple questions. VSS on 2L NC. IV access. BGM (due to tube feeding). LL occ crackles Infrequent, fair infrequent non-productive cough. Scattered bruising. Coccyx intact, pink. Silva catheter draining ange colored output. No BM. TF at 90mL/hr with free water flushes (Clamp NG 1 hr pre/post sinemet). T&RQ2-4H per family request. Daughter @ bedside. Up in chair with lift. Nursing will continue to monitor.

## 2019-03-22 NOTE — PROGRESS NOTES
"Neurology Progress Note  Loan Anderson  [id number]  March 22, 2019      Assessment/Recommendations:    78-year-old female with history of long-standing progressive Parkinson's disease neurology was consulted for encephalopathy.  Fortunately, her encephalopathy is significantly improved and was likely related to her prolonged hospitalization multiple medical comorbidities.  Her abnormal movements in her lower extremities were more likely dyskinesias and seizures.  I discussed with pharmacist alternative agents to limit dyskinesias and will try Parcopa ODT.  Ideally this will be given separate from tube feeds as before, but if remains quite dyskinetic can give with tube feeds.    When able to consistently swallow especially if off tube feeds she may transition back to her home dose of Sinemet.    -Change Sinemet to Parcopa ODT (ordered for you)  -Continue with rotigotine patch  -Continue melatonin and Vimpat at current doses.  -Neurology will follow peripherally at this time.  Please call if further questions or concerns .             Subjective: Family held last evening's dose of Sinemet.  Dyskinesias improved this a.m.  Patient is more alert, and interactive this morning.    Physical Exam:  /55 (BP Location: Left arm)   Pulse 98   Temp 96.1  F (35.6  C) (Oral)   Resp 16   Ht 1.6 m (5' 3\")   Wt 88.9 kg (196 lb)   SpO2 95%   BMI 34.72 kg/m    GEN: awake and alert, NAD   NECK: Supple  RESP: Non-labored breathing  GI: Nondistended   SKIN/MSK: Warm and dry.   NEURO:   Awake, alert, oriented to person and hospital.  Unable to state hospital name.  Unable to tell me month.  Able to tell me past 2 presidents.  Knows her daughter's names but does get them confused.  Able to follow simple commands.  Strength antigravity in upper extremities.  Tracks me in all quadrants.  Speech mildly dysarthric.    Pertinent Imaging and Labs:  Personally reviewed      Rg Granados DO   of " Neurology      This note was transcribed with dragon dictation software.

## 2019-03-22 NOTE — PLAN OF CARE
Pt A/O self, answers questions, but unsure if pt comprehends full context. Intermittently lethargic. Pt has significant parkinsonian tremors-seem to exacerbate after Sinemet dosing. No apparent seizure activity this shift. Up using lift. Ortho brace in place for spinal fx. Had large formed BM on commode, mata draining dark ange. CAL hose measured and ordered. K+ replaced (redraw 3/22 AM) Lactic acid fired- family declined lab draw-education given to family-continued to refuse. Family allowed lactic later in afternoon-1.3 L PIV SL. DD1-[place] thick- poor appetite. TF 90ml-hr. NG needs to be stopped 1 hour pre and 1 hour post sinemet admin. 4 side rails up per family insistence.     3/21 please be aware of dyskinesia shortly after pt receives sinemet in NG.The jerky movements last approx 45 minutes. (see note re: Dr Granados (neuro) and Pharm D)

## 2019-03-22 NOTE — PROGRESS NOTES
"Palliative Care Inpatient Clinical Social Work Follow Up Visit:    Patient Information:  Loan \"Erin\" Justin is a  78 year old woman with medical history that includes advanced parkinson's.  She had a fall in early March, the resultant pain therapy likely lowered her seizure threshold and She was admitted on 3/2/19 after being found unresponsive at her [facility]. Found to be post ictal.  Intubated for 10 days.  Is now off the ICU.  Palliative is following for goals of care support and symptom support.     Reason for Palliative Care Consultation: Patient and family support     Visited With: Patient and Family member(s) Surjit () daughters Lamberto and Kassandra.      Summary of Visit: Visited with family this morning in room.  Lamberto and Kassandra were in the process of getting Erin repositioned in her chair.  Erin was alert and able to answer some questions.  Family expressed hope that Erin is continuing to recover.  They do not expect her to get back to her former baseline, but they are hopeful that she will be able to regain some strength / function and, ultimately, they hope that she can return to the [facility].  THey understand that her current transfer needs (assist of 2 with a lift) is a barrier to going back and they are hoping that if her meds can be adjusted and PT can work with her here she might be able to get to the point where she can transfer without a lift, and thus go back to the [facility].  All family expressed appreciation for the care that staff are giving.      I notified the care coordinator of our conversation.      Assessment: Erin was more alert than when I last saw her (when she was in the ICU) but most of my interaction was with family today.  They remain hopeful, but also acknowledge that Sammi's situation is tenuous and any set backs such as an infection could be catastrophic and lead to an end of life situation.  Family are prepared to transition to hospice if that should " "happen. For now, they continue to advocate for Sammi's recovery.      Relevant Symptoms/Concerns: Sammi has been having dyskinetic episodes, but hopefully her medication regimen is adjusted to help lessen these.  She is still very deconditioned. Unclear how much recovery she will be able to make.        Strengths: Very supportive family, they describe Sammi as a fighter and as someone \"not to be underestimated\"     Goals: Continue working towards recovery, understanding she might not get back to her previous baseline.  Limits of DNR/DNI are in place.      Clinical Social Work Interventions Utilized: Assessment of palliative specific issues, Adjustment to illness counseling, Facilitation of processing of thoughts/feelings, Family communication faciliated and Goals of care discussion/facilitation    Coordinated With: Dr. Valenica, Unit  Quentin Munguia, Joselin Red, RNCC, bedside RN Margo      Plan and Recommendations: Will continue to be available for support.      ELLE Blair, [facility]   Palliative Care    Pgr:[phone]  Ph: [phone]       "

## 2019-03-22 NOTE — PLAN OF CARE
Discharge Planner SLP   Patient plan for discharge: not discussed  Current status: Patient seen for dysphagia treatment with daughter present. Note intermittent throat clearing behavior which patient's daughter reported is baseline habit for patient. No other overt aspiration signs occurred with PO intake (no coughing, vocal quality remained unchanged). Patient able to feed self given steadying assist with spoon and cup. Note slow task initiation and delayed swallow initiation (3-4 seconds). Patient required applesauce to clear whole pill and dissolvable pills (attempts to swallow with nectar thick water did not clear oral cavity). Note lengthy duration for pills to dissolve orally and patient eventually cleared with bites of applesauce. Patient unaware of pill clearance and required assistance/cues. Recommend cautiously continue dysphagia diet level 1 with nectar thick liquids given 1:1 assist and swallow precautions (fully upright position, small single bites/sips by cup okay, verify swallows, check for oral residue). Please serve pills one at a time with applesauce as tolerated for safety. Please consider alternate form of medication from dissolvable for safety given poor oral control. Monitor for signs of aspiration or decline in respiratory status and hold PO if occur. Discussed with RN.  Barriers to return to prior living situation: medical needs  Recommendations for discharge: TCU  Rationale for recommendations: continued SLP for dysphagia for safe return to baseline diet       Entered by: Shauna Delgado 03/22/2019 1:48 PM

## 2019-03-22 NOTE — PROGRESS NOTES
.CALORIE COUNT      Approximate Oral Intake for:     (3/21)  Calories:  42 cals  Protein: 1 gm pro    TF via ND:  Replete with Fiber at 90 mL/hr (holding QID for Sinemet):  1440 kcal (16 kcal/kg), 92 g protein (1.75 g/kg), 1200 mL H2O, 22 g fiber, 178 g CHO, 96% RDI.  Standard water flushes of 60 mL every 4 hrs.   Total fluid (TF+flush) = 1560 mL/day             Estimated Needs:    Calories: 7898-0731 cals  Protein:   gm      Summary:   Diet: DD1, NTL  +BM yesterday  Will continue with calorie count to monitor po intake

## 2019-03-23 NOTE — PLAN OF CARE
Patient disoriented x2. VSS on RA. LS with wheezes at times, PRN neb given x1, dyspneic at times. Intermittent dyskinesia episodes, especially after AM sinemet, PM dose adjusted per MD. Silva removed and purewick catheter in place, voiding adequately. Continuous TF at 90 ml/hr, free water flush Q4 hr, clamped for 1 hr before/after sinemet. Oral intake improved but some coughing noted. Turning and reposition Q2 hrs, up in chair 1x this shift. Family at bedside.

## 2019-03-23 NOTE — PLAN OF CARE
Patient is disoriented to time and situation. VSS on 2 L O2. Intermittent dyskinesia, 1600 carbidopa held per family request. Silva patent but some bladder spasms causing leakage around catheter, catheter cares done. No BM this shift but passing a lot of gas. Continuous TF at 90 ml/hr, free water flush Q4 hr. HS BG corrected with sliding scale insulin. Turning and reposition Q2 hrs, up in chair 1x this shift. Family at bedside. Discharge pending.

## 2019-03-23 NOTE — PROGRESS NOTES
[facility]    Hospitalist Progress Note      Assessment & Plan   Loan Anderson is a 78-year-old female with history of hypothyroidism, Parkinson's disease with neurogenic bladder who presented with acute encephalopathy and was intubated in the Emergency Department due to concerns for loss of protecting her airway.  She was intubated on 03/02 and extubated on 03/11.  Prolonged intubation and ICU stay related to ongoing delirium. Hospital course complicated by a partial status epilepticus and possible angioedema reaction.     Acute metabolic encephalopathy  Seizure disorder with partial status epilepticus  Dyskinesias  Likely multifactorial related to ICU delirium, postictal state related to partial epileptic seizure, and pain related to fractures below. Gradual improvement however still not back to baseline per daughter. EEG 3/15 with diffuse slowing, no signs of seizure.  - Episode 3/20/2019 concerning for seizure, lacosamide increased.  - Reassessed by neurology 3/21, not seizures, likely dyskinesias brought on by peak effect of sinemet  - Decreased sinemet to 4x per day, melatonin decreased to 1mg, vimpat down to 150 BID  - Medication adjusted to parcopa ODT, but she did not tolerate. Trial sinemet 1 tab (instead of 2 tabs) every 4 hours and continue to monitor for improvement.  - Maintain normal day / night, sleep / wake cycles as able  - Minimize sedating / altering medications as able. Use verbal re-direction or sitter if needed.   - Continue quetiapine at bedtime      Dysphagia  Secondary to encephalopathy, prolonged intubation. Family not interested in PEG  - Continue TF per NG (Tolerating some meds PO)  - SLP following, has advanced diet to DD2 via spoon  - Continue to advance diet per speech  - Nutrition re-consulted to follow for calorie counts and assess for tube feeding     Advanced care planning  Palliative care following. Have addressed goals of care at length with family, see note  from 3/16/19.   - No escalation of cares (to BiPAP, ICU, pressors, PEG tube. Remains DNR/DNI)  - Appeared to be generally improving, goals remain restorative at this time   - Palliative care following      Parkinson's disease with neurogenic bladder  Lives in memory care at the [facility] and essentially wheelchair bound d/t high falls risk.  Dtr states normally able to carry on conversation and oriented to situation. Has history of neurogenic bladder but is not Silva dependent  - Continue Silva catheter. TOV on 3/23 or 3/24 if patient slightly more ambulatory  - Parcopa in place of sinemet. Continue rotigotine patch  - Note, tube feedings need to be held 1 hour before and 1 hour after Sinemet administration for better absorption.   - Plan per neurology is to resume prior to admission Parkinson's regimen on discharge     Acute hypoxic respiratory failure  RRT 3/15/19 due to increased oxygen need.  Respiratory therapist was consulted for increased secretion however patient's weak to cough out all the secretions. Suctioning tried however patient unable to tolerate. Unable to do vest due to multiple rib fractures, patient not able to cooperate for flutter valve.  Mucomyst with breathing treatment tried however patient's O2 sats dropped resulting in RRT. No new infiltrate on CXR 3/15/19   - Wean oxygen, maintained on 2LPM  - See advanced care planning above     Right T11 and T12 rib fractures with associated posterior right-sided pleural effusion  Right L1 and L2 transverse process fractures  Multifactorial related to mechanical fall prior to admission, as well as CPR (no documented pulselessness) and Heimlich maneuvers performed for recurrent episodes of unresponsiveness.   - Continue lidocaine patches, scheduled acetaminophen with additional doses PRN  - PT/OT following and recommending TCU  - PRN bowel regimen     Possible bladder spasms   Noted 3/17/19 by RN to have possible bladder spasms associated with apparent Silva  dysfunction with leaking. Seemed to improve with irrigation and troubleshooting of Mata.  - Continue alpha blocker (terazosin here)  - Possible resumption of myrbetriq in AM if tolerated mata removal and able to swallow pill     Leukocytosis  Abnormal UA 3/23  Suspected acute phase reactant and steroid related. Patient had received steroids when she had angioedema. No growth noted on cultures. Procal <0.05. No focal signs/syptoms infection. Afebrile. CXR unchanged 3/15/19 or 3/23  - Repeat UA 3/23 early AM with large leuk esterase, some mucous and moderate amount of blood  - leukocytosis up to 16, started ceftriaxone, Urine cx pending- -if negative will stop ceftriaxone  - Afebrile   - Blood cultures no growth to date. Procalcitonin <0.05, lactic acid resolved without intervention     YASMIN on chronic kidney disease stage II  Baseline creatinine 0.8-0.9. Attributed to prerenal from diuresis.  Gradually improving. She has low muscle mass.   - Monitor periodically      Microscopic hematuria  RBC >182 on UA 3/15/19 on catheterized specimen. Tiffany colored urine. Potentially secondary to Mata trauma in context of prophylactic heparin and aspirin use.    - Recheck in 2-4 weeks as outpatient once Mata removed, pending goals of care   - Noted to have more pink-tinged urine, heparin subcutaneous held.     Possible angioedema  Tongue and lip swelling, 03/09-3/10, after restarted on lisinopril, which was recently started in outpatient setting.  Uncertain whether this was true angioedema and lisinopril added to allergy list and transitioned to amlodipine.  Improved with steroids.      Hypertension   Recently started on lisinopril, developed tongue and lip swelling concerning for angioedema, with lisinopril discontinued and started on amlodipine.   - Continue amlodipine   - Blood pressures improved on 3/22.    DVT Prophylaxis: Anti-embolisim stockings (TEDs)  Code Status: DNR/DNI  Expected discharge: 3/25, recommended to prior  living arrangement once feeding advanced and able to tolerate PO medications (best to discharge during work week as patient's level of care at her facility will have changed)    Katherine Wells, DO  Text Page (7am - 6pm)    Interval History   Patient seen and examined. Daughters,  at bedside. Patient with worsening dyskinesias. Overnight had RRT with some pale color worsening respiratory status. Lactic acid was elevated and her respiratory status improved without intervention. Discussed removing mata with family today, they are agreeable. Planned to decrease sinemet to 1 tablet every 4 hours and monitor for improvement in dyskinesias. Requested neurology to reevaluate patient.    -Data reviewed today: I reviewed all new labs and imaging results over the last 24 hours. I personally reviewed CXR with small right pleural effusion    Physical Exam   Temp: 96.5  F (35.8  C) Temp src: Oral BP: 150/70 Pulse: 62 Heart Rate: 66 Resp: 18 SpO2: 96 % O2 Device: None (Room air) Oxygen Delivery: 2 LPM  Vitals:    03/20/19 0452 03/22/19 0500 03/23/19 0534   Weight: 89.8 kg (198 lb) 88.9 kg (196 lb) 90.3 kg (199 lb)     Vital Signs with Ranges  Temp:  [96.5  F (35.8  C)-97.8  F (36.6  C)] 96.5  F (35.8  C)  Pulse:  [60-62] 62  Heart Rate:  [62-70] 66  Resp:  [18-24] 18  BP: (118-181)/(48-79) 150/70  SpO2:  [95 %-100 %] 96 %  I/O last 3 completed shifts:  In: 4904 [NG/GT:681]  Out: 1855 [Urine:1855]    Constitutional: Awake, alert, cooperative, moderate distress with dyskinesias  Respiratory: clear anteriorly, no wheeze or rhonchi  Cardiovascular: Regular rate and rhythm, normal S1 and S2, and no murmur noted  GI: Normal bowel sounds, soft, non-distended, non-tender  Skin/Integumen: No rashes, no cyanosis, trace edema bilaterally  Other: Answers questions, but still in some distress from dyskinesias.    Medications       acetaminophen  500 mg Oral or NG Tube TID     amLODIPine  10 mg Oral Daily     aspirin  81 mg Oral At  Bedtime     carbidopa-levodopa  1 tablet Oral 4x Daily     [START ON 3/24/2019] cefTRIAXone  1 g Intravenous Q24H     diclofenac  1 g Transdermal 4x Daily     docusate  100 mg Oral or Feeding Tube BID     insulin aspart  1-12 Units Subcutaneous Q4H     lacosamide  150 mg Oral BID     levothyroxine  112 mcg Oral QAM AC     melatonin  1 mg Oral or Feeding Tube Q24H     multivitamins w/minerals  15 mL Per Feeding Tube Daily     pantoprazole  40 mg Oral QAM AC     QUEtiapine  12.5 mg Oral At Bedtime     rotigotine  1 patch Transdermal Daily     rotigotine   Transdermal Q8H     rotigotine   Transdermal Daily     sodium chloride (PF)  3 mL Intracatheter Q8H     terazosin  1 mg Oral Daily     venlafaxine  37.5 mg Oral TID       Data   Recent Labs   Lab 03/23/19  0035 03/22/19  0714 03/21/19  0656   WBC 16.2* 14.9* 15.9*   HGB 12.1 12.4 12.2   MCV 94 95 95   * 538* 546*    143 145*   POTASSIUM 3.6 4.0 4.0   CHLORIDE 104 109 109   CO2 27 29 32   BUN 37* 37* 39*   CR 0.95 0.90 0.95   ANIONGAP 10 5 4   NIKKIE 8.3* 8.6 8.8   * 135* 188*   ALBUMIN 2.7*  --   --    PROTTOTAL 7.0  --   --    BILITOTAL 0.3  --   --    ALKPHOS 259*  --   --    ALT 14  --   --    AST 21  --   --    TROPI <0.015  --   --        Recent Results (from the past 24 hour(s))   XR Chest Port 1 View    Narrative    XR CHEST PORT 1 VW  3/23/2019 12:31 AM     INDICATION: Respiratory distress.    COMPARISON: 3/15/2019.      Impression    IMPRESSION: Shallow inspiration. No new focal air-space disease or  significant change. Small right pleural effusion is again seen. Stable  heart size.    CELIA BOB MD

## 2019-03-23 NOTE — PROGRESS NOTES
CALORIE COUNT          Approximate Oral Intake for:  3/22   Calories:  500 cals  Protein: 15 gm pro     TF via ND:  Replete with Fiber at 90 mL/hr (holding QID for Sinemet):  1440 kcal (16 kcal/kg), 92 g protein (1.75 g/kg), 1200 mL H2O, 22 g fiber, 178 g CHO, 96% RDI.  Standard water flushes of 60 mL every 4 hrs.       Total (TF + Oral):  1940 kcals (22 kcal/kg and 130% energy needs), 107 gm pro (2 gm/kg)        Estimated Needs:    Dosing Weight 87.7 kg (3/18 actual wt for energy); 52.3 kg (IBW for protein)  Calories: 4203-6362 cals - Obesity  Protein:   gm - Obesity      Summary:   Diet upgraded to DD2 with nectar thick liquids today.  Daughter Therese is feeding pt lunch right now.   Will continue with calorie count to monitor po intake.    Nieves Garza, RD, LD, CNSC

## 2019-03-23 NOTE — PLAN OF CARE
RRT this shift at 0015 for respiratory distress. See house provider note for details. UA abnormal, rocephin ordered. VSS on 2 L O2 on con't pulse ox. A&O x2. No signs of pain/nausea. Minimal SOB/grunting w/ dyskinesias after RRT. TF at 90 ml/hr. PIV placed, SL. LA 3.3. Silva ange output w/ few small, red clots. , 187. T/R for pt comfort- using lift for activity. Daughter at bedside.

## 2019-03-23 NOTE — PLAN OF CARE
Discharge Planner SLP   Patient plan for discharge: not stated  Current status: Pt seen for dysphagia treatment.  Pt requested to trial DD2 textures.  She was moving quite a bit in bed due to pain and Parkinson's medicine.  Positioned her as upright as possible to accommodate her pain from falling.  Pt able to take small boluses of moist DD2 with adequate oral management and no oral residue. Laryngeal elevation and no overt s/s aspiration on trials.  Pt became wheezy requiring neb due to pain reaction.  Stopped trial.  Recommend she upgrade to DD2 and remain with nectar texture liquid by spoon at this time.  Dtrs in room well versed in aspiration precautions, safe feeding strategies which were gone over with them in full.  Pt must be upright at least 45 degrees after oral intake for 30 minutes as reflux precaution.  She must be upright with pillows supporting her to eat safely, very small boluses of DD2, moistened food, slow rate of intake.  If any respiratory issues arise or she appears to aspirate stop feeding and hold food until slp can see her again.  Barriers to return to prior living situation: medical needs  Recommendations for discharge: TCU  Rationale for recommendations: Safe return to baseline diet without overt s/s aspiration.       Entered by: Tenzin Chiang 03/23/2019 10:53 AM

## 2019-03-23 NOTE — PROGRESS NOTES
I was asked to see the patient to address further concerns by the family on her  Carbidopa/levodopa.      She's had long-standing Parkinson's disease and apparent dementia as well. She is followed by Dr. Pedro Pitts at the [facility].  In reviewing his notes from February, she was taking 150 mg of immediate release levodopa along with 200 mg controlled release  4 times a day. She is also on a 4 mg Neupro patch. She has also utilized the controlled release at bedtime. At that visit she was apparently changed to 200 mg of immediate release 4 times a day and the controlled release of 200 mg only at bedtime.    Since being in the hospital when having all of these various medical issues, she is now experiencing what sound to be more significant dyskinesias. They affect her entire body. Her daughters note that it starts about an hour after getting the medication and it can go on for several hours.    It appears as though there has just been in order to reduce her levodopa down to 100 mg immediate release, 4 times a day. That will start today.She will continue to get the 200 mg controlled release at bedtime and she remains on the patch.    The patient today was alert and pleasant. She is modestly disoriented.She had no significant tremor or dyskinesia at the time of my visit.    Her daughters reports that her neurological status changes moment to moment.    I indicated that her superimposed medical illness issues are likely influencing her  Levodopa response significantly. Most recently it's been significant dyskinesia.  Since the levodopa has just been reduced  Starting today, I've suggested we see how things go. I did indicate that we also have the option of reducing her Neupro dosage. It probably is not helping as much in terms of her Parkinson's disease as the levodopa, but certainly contributes to side effects including the dyskinesia. We will keep that option open and I will check on her tomorrow.      I  also note from the above her primary neurologist there is a plan to eventually start her on an acetylcholinesterase inhibitor.

## 2019-03-23 NOTE — CODE/RAPID RESPONSE
"[facility]    House AVEL RRT Note  3/23/2019   Time Called: 0015    RRT called for: Respiratory distress (CODE BLUE initially called)    Assessment & Plan     Acute hypoxic respiratory failure suspect 2/2 severe dyskinetic activity vs less likely seizure with suspected oropharyngeal obstruction in the setting of small R pleural effusion; history of CATHIE, not on CPAP.  Alternatively considered PE, aspiration, ACS, hypervolemia/pulmonary edema  History of progressive Parkinson's disease, REM sleep behavior disorder, seizure disorder and dyskinesias.  Acute urinary tract infection in setting of prolonged mata catheter (placed 3/2/19).  Lactic acidosis suspect 2/2 dyskinetic movement vs less likely seizure vs sepsis in setting of UTI.  Advanced care planning.  - Upon arrival, pt sitting forward by assistance of pt's family member.  Per family, pt was having severe dyskinetic movements and subsequently moved down in her bed to near supine position.  At that time, family noted pt to have stopped breathing for a \"few seconds\" with noted \"white appearing face\", noted O2 sats of low 80s on 2L O2 via NC.  Pt's family sat pt forward and at that time, pt with noted return of respirations.  Pt's family and nursing staff present in pt's room noted pt did not loose consciousness, just noted change in facial coloring.  Of note, family held pt's TF when pt progressing down to supine position.  Upon arrival, pt in sitting position with assistance of family member in severe respiratory distress, audible wheezes, eyes rolled back.  Pt's VS at that time noting SB/NSR HR 50s-60s, SBP 100s-130s, RR 30s, O2 sats >99% on 6L O2 via oxymask, afebrile.  Pt assisted to lying position with HOB > 30 degrees.  Pt with ongoing audible upper airway wheezes, tachypneic, with use of abdominal accessory muscles.  Per pt's family, pt with REM sleep behavior disorder, and Parkinson's disease with noted dyskinesias; at those times, pt with " noted respiratory distress which usually resolves without intervention previously.  After ~ 15 min at the pt's bedside, pt's VS remain stable, oxygenation requirements at baseline 2L with O2 sats >96%, no further audible wheezes with noted clear lung sounds throughout.        INTERVENTIONS:  - Place PIV  - Stat VBG, CMP, CBC, lactic acid, BNP, trop, BG, blood culture x2  - Stat UA/UC  - Stat CXR  - Will give pt one time dose ceftriaxone 2g IV now; [facility], will adjust per UC results  - [facility]  - Given hemodynamic stability with noted recent respiratory distress with audible wheezes, will defer fluid resuscitation at this time for noted elevated lactic acid as suspect 2/2 severe dyskinesia.  Will repeat lactic acid to ensure normalization.   - Initially considered lasix dosing for noted audible wheezes with concern for hypervolemia; however, given no vascular congestion noted on imaging, elevated lactic acid and resolved wheezing, will defer at this time    At the end of the RRT pt currently hemodynamically stable on 2L O2 via NC, family requesting for no further escalation of cares including Bipap.    Discussed with and defer further cares to nursing and Dr. Villafuerte, [facility] hospitalist.    Interval History     Loan Anderson is a 78 year old female who was admitted on 3/2/2019 for acute encephalopathy with noted prolonged hospitalization complicated by seizures, advanced Parkinson's disease, acute delirium and acute hypoxia/aspiration.    Medical history significant for: Hypothroidism, Parkinson's disease, neurogenic bladder, REM sleep behavior disorder, HLP    Code Status: DNR/DNI    Allergies   Allergies   Allergen Reactions     Ace Inhibitors      Tongue and lip swelling just after restarting lisinopril on 3/9/19 at higher dose while intubated.  Unclear if actually angioedema or not as  swelling resolved with steroids.     Atorvastatin      Concern that it was contributing to confusion     Mirapex [Pramipexole Dihydrochloride Monohydrate]      Leg swelling     Potassium Chloride      IV infusion only. She tolerates oral potassium chloride     Requip [Ropinirole Hydrochloride]      Leg swelling     Epinephrine Palpitations     Physical Exam   Vital Signs with Ranges:  Temp:  [96.1  F (35.6  C)] 96.1  F (35.6  C)  Heart Rate:  [61-68] 61  Resp:  [16] 16  BP: (130-142)/(51-55) 130/51  SpO2:  [95 %-97 %] 97 %  I/O last 3 completed shifts:  In: 1930 [P.O.:200; NG/GT:830]  Out: 1575 [Urine:1575]    Constitutional: Pt sitting forward by family help, eyes rolled back, in obvious severe respiratory distress, ashen appearing  ENT: No obvious angioedema appreciated; tongue mildly swollen  Neck: Upper airway wheezes noted  Pulmonary: In severe respiratory distress, tachypneic, audible wheezes appreciated with noted BUL wheezes, diminished BLL breath sounds, no obvious coarse lung sounds appreciated, use of abdominal accessory muscles noted  Cardiovascular: Regular rate and rhythm, normal S1S2, no murmur, rub or gallop noted  GI: Soft, nondistended, nontender, no guarding or rebound tenderness noted  Skin/Integumen: Diaphoretic, warm  Neuro: Moaning, nonverbal, not following commands, localizes to pain  Psych:  Calm  Extremities: No dependent pitting edema noted    Data     Troponin:    Recent Labs   Lab Test 03/23/19 0035   TROPI <0.015       IMAGING: (X-ray/CT/MRI)   Recent Results (from the past 24 hour(s))   XR Chest Port 1 View    Narrative    XR CHEST PORT 1 VW  3/23/2019 12:31 AM     INDICATION: Respiratory distress.    COMPARISON: 3/15/2019.      Impression    IMPRESSION: Shallow inspiration. No new focal air-space disease or  significant change. Small right pleural effusion is again seen. Stable  heart size.    CELIA BOB MD       CBC with Diff:  Recent Labs   Lab Test 03/23/19 0035   03/02/19  1449   WBC 16.2*   < > 13.5*   HGB 12.1   < > 12.0   MCV 94   < > 91   *   < > 195   INR  --   --  1.04    < > = values in this interval not displayed.        Comprehensive Metabolic Panel:  Recent Labs   Lab 03/23/19 0035  03/17/19  0709      < > 142   POTASSIUM 3.6   < > 3.4   CHLORIDE 104   < > 102   CO2 27   < > 33*   ANIONGAP 10   < > 7   *   < > 240*   BUN 37*   < > 42*   CR 0.95   < > 0.88   GFRESTIMATED 58*   < > 64   GFRESTBLACK 67   < > 74   NIKKIE 8.3*   < > 8.7   MAG  --   --  2.5*   PHOS  --   --  2.7   PROTTOTAL 7.0  --   --    ALBUMIN 2.7*  --   --    BILITOTAL 0.3  --   --    ALKPHOS 259*  --   --    AST 21  --   --    ALT 14  --   --     < > = values in this interval not displayed.       Recent Labs   Lab 03/23/19 0035   PHV 7.40   PO2V 62*   PCO2V 48   HCO3V 30*       Recent Labs   Lab 03/23/19  0035 03/21/19  1502 03/20/19  1544   LACT 3.3* 1.3 1.0       UA:  Recent Labs   Lab 03/23/19  0030   COLOR Light Brown   APPEARANCE Slightly Cloudy   URINEGLC Negative   URINEBILI Negative   URINEKETONE Negative   SG 1.018   UBLD Moderate*   URINEPH 6.5   PROTEIN 300*   NITRITE Negative   LEUKEST Large*   RBCU >182*   WBCU >182*       Time Spent on this Encounter   I spent 30 minutes of critical care time on the unit/floor managing the care of Loan Anderson. Upon evaluation, this patient had a high probability of imminent or life-threatening deterioration due to acute respiratory failure, which required my direct attention, intervention, and personal management. 100% of my time was spent at the bedside counseling the patient and/or coordinating care regarding services listed in this note.    ROSHNI Somers [facility]

## 2019-03-24 NOTE — PLAN OF CARE
Pt is oriented to self only. VSS on RA, except ravi, and hypertensive at times. Noted to be SOB with activity and repositioning.  Intermittent dyskinesia noted, noted 1 hrs after Sinemet.  Hold TF an hr before and an hr after. Pure wick in place, adequate urine output. On continuous  TF at 90 ml/ht, water flushes  q 4 hrs. DD II diet,with nectar thick. Fair appetite. Takes pills with apple sauce.   Turn and Repo q 2 hrs as family allowed. Pt up in chair at this time.  Family at bedside.  Discharge pending.

## 2019-03-24 NOTE — PROGRESS NOTES
[facility]    Hospitalist Progress Note      Assessment & Plan   Loan Anderson is a 78-year-old female with history of hypothyroidism, Parkinson's disease with neurogenic bladder who presented with acute encephalopathy and was intubated in the Emergency Department due to concerns for loss of protecting her airway.  She was intubated on 03/02 and extubated on 03/11.  Prolonged intubation and ICU stay related to ongoing delirium. Hospital course complicated by a partial status epilepticus and possible angioedema reaction.     Acute metabolic encephalopathy  Seizure disorder with partial status epilepticus  Dyskinesias  Likely multifactorial related to ICU delirium, postictal state related to partial epileptic seizure, and pain related to fractures below. Gradual improvement however still not back to baseline per daughter. EEG 3/15 with diffuse slowing, no signs of seizure.  - Episode 3/20/2019 concerning for seizure, lacosamide increased.  - Reassessed by neurology 3/21, not seizures, likely dyskinesias brought on by peak effect of sinemet. Multiple adjustments to sinemet  - Now on sinemet 1 tab QID, continue neupro patch  - Continue melatonin 1mg, vimpat 150 BID  - Maintain normal day / night, sleep / wake cycles as able.   - Continue quetiapine at bedtime      Dysphagia  Secondary to encephalopathy, prolonged intubation. Family not interested in PEG  - Continue TF per NG (Tolerating some meds PO)  - SLP following, has advanced diet to DD2 via spoon, pills one at a time  - Continue to advance diet per speech  - Nutrition re-consulted to follow for calorie counts and assess for tube feeding adjustments-- much appreciated!     Advanced care planning  Palliative care following. Have addressed goals of care at length with family, see note from 3/16/19.   - No escalation of cares (to BiPAP, ICU, pressors, PEG tube. Remains DNR/DNI)  - Appeared to be generally improving, goals remain restorative at  this time      Parkinson's disease with neurogenic bladder  Lives in memory care at the [facility] and essentially wheelchair bound d/t high falls risk.  Dtr states normally able to carry on conversation and oriented to situation. Has history of neurogenic bladder but is not Mata dependent  - Sinemet 1 tab QID, Continue rotigotine patch  - Note, tube feedings need to be held 1 hour before and 1 hour after Sinemet administration for better absorption.   - Plan per neurology is to resume prior to admission Parkinson's regimen on discharge  - tolerated mata removal, continue purewick     Acute hypoxic respiratory failure  RRT 3/15/19 due to increased oxygen need.  Respiratory therapist was consulted for increased secretion however patient's weak to cough out all the secretions. Suctioning tried however patient unable to tolerate. Unable to do vest due to multiple rib fractures, patient not able to cooperate for flutter valve.  Mucomyst with breathing treatment tried however patient's O2 sats dropped resulting in RRT. No new infiltrate on CXR 3/15/19   - Wean oxygen, maintained on 2LPM  - See advanced care planning above     Right T11 and T12 rib fractures with associated posterior right-sided pleural effusion  Right L1 and L2 transverse process fractures  Multifactorial related to mechanical fall prior to admission, as well as CPR (no documented pulselessness) and Heimlich maneuvers performed for recurrent episodes of unresponsiveness.   - Continue lidocaine patches, scheduled acetaminophen with additional doses PRN  - PT/OT following and recommending TCU  - PRN bowel regimen     Possible bladder spasms   Noted 3/17/19 by RN to have possible bladder spasms associated with apparent Mata dysfunction with leaking. Seemed to improve with irrigation and troubleshooting of Mata.  - Continue alpha blocker (terazosin here)  - Possible resumption of myrbetriq once PO intake improves     Leukocytosis  Candiduria,  symptomatic  Suspected acute phase reactant and steroid related. Patient had received steroids when she had angioedema. No growth noted on cultures. Procal <0.05. No focal signs/syptoms infection. Afebrile. CXR unchanged 3/15/19 or 3/23. Procalcitonin <0.05, lactic acid resolved without intervention.  - Repeat UA 3/23 early AM with large leuk esterase, some mucous and moderate amount of blood. Leukocytosis up to 16, started ceftriaxone, Urine cx with candida  - Remains afebrile. Stop ceftriaxone. Start 2 week course fluconazole with persistent irritation  - Blood cultures no growth to date.      YASMIN on chronic kidney disease stage II  Baseline creatinine 0.8-0.9. Attributed to prerenal from diuresis.  Gradually improving. She has low muscle mass.   - Monitor periodically      Microscopic hematuria  RBC >182 on UA 3/15/19 on catheterized specimen. Tiffany colored urine. Potentially secondary to Silva trauma in context of prophylactic heparin and aspirin use.    - Recheck in 2-4 weeks as outpatient once Silva removed, pending goals of care   - Noted to have more pink-tinged urine, heparin subcutaneous held.     Possible angioedema  Tongue and lip swelling, 03/09-3/10, after restarted on lisinopril, which was recently started in outpatient setting.  Uncertain whether this was true angioedema and lisinopril added to allergy list and transitioned to amlodipine.  Improved with steroids.      Hypertension   Recently started on lisinopril, developed tongue and lip swelling concerning for angioedema, with lisinopril discontinued and started on amlodipine.   - Continue amlodipine, added scheduled hydralazine 10mg Q8h but larger drop in blood pressure, will switch to 5mg q8h scheduled and monitor    DVT Prophylaxis: Anti-embolisim stockings (TEDs)  Code Status: DNR/DNI  Expected discharge: 3/26 or 3/27, recommended to prior living arrangement, patients needs to be at higher level of function before safe to discharge back to  [facility] (includes off of tube feeds).    Katherine Wells, DO  Text Page (7am - 6pm)    Interval History   Patient seen and examined. Daughter at bedside. Discussed improvement in dyskinesias with lower dose sinemet. Higher BPs with hydralazine added have since improved. Adjusted treatment for her urine. Tolerating purewick well. No BM today. Tolerating larger amounts of PO diet, but did not enjoy the pureed meat. No shortness of breath, chest pain.    -Data reviewed today: I reviewed all new labs and imaging results over the last 24 hours. I personally reviewed no new imaging or EKGs    Physical Exam   Temp: 98.2  F (36.8  C) Temp src: Oral BP: 100/71   Heart Rate: 62 Resp: 22 SpO2: 94 % O2 Device: None (Room air)    Vitals:    03/22/19 0500 03/23/19 0534 03/24/19 0535   Weight: 88.9 kg (196 lb) 90.3 kg (199 lb) 91.3 kg (201 lb 3.2 oz)     Vital Signs with Ranges  Temp:  [96.6  F (35.9  C)-98.2  F (36.8  C)] 98.2  F (36.8  C)  Heart Rate:  [62-67] 62  Resp:  [20-24] 22  BP: (100-166)/(61-72) 100/71  SpO2:  [94 %-96 %] 94 %  I/O last 3 completed shifts:  In: 610 [P.O.:400; NG/GT:210]  Out: 1525 [Urine:1525]    Constitutional: Awake, alert, cooperative, no acute distress  Respiratory: clear anteriorly, no wheeze or rhonchi  Cardiovascular: Regular rate and rhythm, normal S1 and S2, and no murmur noted  GI: Normal bowel sounds, soft, non-distended, non-tender  Skin/Integumen: No rashes, no cyanosis, trace edema bilaterally  Other: Answers questions, mild dyskinesias today    Medications       acetaminophen  500 mg Oral or NG Tube TID     amLODIPine  10 mg Oral Daily     aspirin  81 mg Oral At Bedtime     carbidopa-levodopa  1 tablet Oral 4x Daily     diclofenac  1 g Transdermal 4x Daily     docusate  100 mg Oral or Feeding Tube BID     fluconazole  200 mg Oral Daily     hydrALAZINE  10 mg Oral Q8H BURKE     insulin aspart  1-12 Units Subcutaneous Q4H     lacosamide  150 mg Oral BID     levothyroxine  112 mcg Oral QAM AC      melatonin  1 mg Oral or Feeding Tube Q24H     multivitamins w/minerals  15 mL Per Feeding Tube Daily     pantoprazole  40 mg Oral QAM AC     QUEtiapine  12.5 mg Oral At Bedtime     rotigotine  1 patch Transdermal Daily     rotigotine   Transdermal Q8H     rotigotine   Transdermal Daily     sodium chloride (PF)  3 mL Intracatheter Q8H     terazosin  1 mg Oral Daily     venlafaxine  37.5 mg Oral TID       Data   Recent Labs   Lab 03/24/19  0729 03/23/19  0035 03/22/19  0714   WBC 13.2* 16.2* 14.9*   HGB 12.1 12.1 12.4   MCV 93 94 95   * 553* 538*    141 143   POTASSIUM 4.1 3.6 4.0   CHLORIDE 105 104 109   CO2 31 27 29   BUN 34* 37* 37*   CR 0.84 0.95 0.90   ANIONGAP 4 10 5   NIKKIE 8.3* 8.3* 8.6   * 138* 135*   ALBUMIN  --  2.7*  --    PROTTOTAL  --  7.0  --    BILITOTAL  --  0.3  --    ALKPHOS  --  259*  --    ALT  --  14  --    AST  --  21  --    TROPI  --  <0.015  --        No results found for this or any previous visit (from the past 24 hour(s)).

## 2019-03-24 NOTE — PLAN OF CARE
Discharge Planner SLP   Patient plan for discharge: wants to return home  Current status: Swallow tx completed this date with liquid trials. Pt/family wishing for trials via straw as pt typically drinks out of water bottle with straw at home. Pt assessed with 8 oz nectar thick liquids via cup and straw. Min cues for small single sips. x2 delayed throat clears across all. Pt judged appropriate for straw use. Trials with thin liquids also completed. Pt seen with 4 oz thin water via cup/straw with cued chin tuck - required max assist/constant cueing to complete. Difficulty sequencing noted. Not appropriate for upgrades to thin.     Recommend continue dysphagia diet level 2 solids with nectar thick liquids (tsp, cup, straw all ok via small single sips) given 1:1 assist and swallow precautions (fully upright position, small single bites/sips, verify swallows, check for oral residue). Please serve pills one at a time with puree as tolerated for safety. Please consider alternate form of medication from dissolvable for safety given poor oral control. Monitor for signs of aspiration or decline in respiratory status and hold PO if occur.     Barriers to return to prior living situation: medical needs  Recommendations for discharge: TCU  Rationale for recommendations: continued SLP for dysphagia for safe return to baseline diet       Entered by: Myla Pepe 03/24/2019 12:24 PM

## 2019-03-24 NOTE — PLAN OF CARE
Patient disoriented to time, situation. VSS on room air, dyspneic at times during dyskinesia and transferring. LS clear, diminished. Infrequent dry, cough. Scattered bruising through out, redness in groin, powder applied. Purwik in place, adequate urine output. Large, formed BM this shift. Tube feed at 90 ml/ hr, clamped 1 hr before/after sinemet. PIV SL. Poor appetite for oral intake. BGs WNL. Repositioning Q2 hrs, pt was up in chair and up to commode this shift. Discharge plans pending, daughter at bedside.

## 2019-03-24 NOTE — PROVIDER NOTIFICATION
MD Notification    Notified Person: MD    Notified Person Name: Dr. Wells    Notification Date/Time: 3/24/19 at 1428    Notification Interaction: pager    Purpose of Notification:FYI: Urine cultures are back  10,000 to 50,000 colonies/mL   Candida albicans / dubliniensis     Orders Received: antifungal meds prescribed    Comments:

## 2019-03-24 NOTE — PLAN OF CARE
Pt A/O x3. VSS on RA ex tachypneic and hypertensive at times. Inspiratory wheezes noted. Denies pain. TF @ 90 ml/hr. PIV SL. Adequate UOP. Purewick in place- changed at 0500-redness, no breakdown. No BM. Turned/Repositioned q2-4h per family request. , 151. LA 1.6. Less dyskinesia movement overnight.

## 2019-03-24 NOTE — PROGRESS NOTES
[facility]  Neuro[facility] and [facility] [place]  Neurology Daily Note     10/11/11 8:35 AM        Assessment and Plan: 1.   Dyskinesias much better today  2. Continue present Parkinson med regimen  3. As she continues to improve, slight dose modifications may be needed  4. She should see Dr. Pitts after discharge. Memory medication to be considered  5. Please call Neurology if needed further    Attestation:  This patient was seen and evaluated by me, Nick Burr, separately from the house staff team or resident(s).  I have reviewed the note below and agree.          Interval History:   Per daughter, minimal ,brief dyskinesia after carb/levo this AM           Review of Systems:   No new issues          Medications:     Current Facility-Administered Medications   Medication     acetaminophen (TYLENOL) solution 500 mg     acetaminophen (TYLENOL) solution 500 mg     albuterol (PROVENTIL) neb solution 2.5 mg     amLODIPine (NORVASC) tablet 10 mg     aspirin (ASA) chewable tablet 81 mg     carbidopa-levodopa (SINEMET)  MG per tablet 1 tablet     cefTRIAXone (ROCEPHIN) 1 g vial to attach to  mL bag for ADULTS or NS 50 mL bag for PEDS     glucose gel 15-30 g    Or     dextrose 50 % injection 25-50 mL    Or     glucagon injection 1 mg     diclofenac (VOLTAREN) 1 % topical gel 1 g     docusate (COLACE) 50 MG/5ML liquid 100 mg     hydrALAZINE (APRESOLINE) injection 10 mg     hydrALAZINE (APRESOLINE) tablet 10 mg     hypromellose-dextran (ARTIFICAL TEARS) 0.1-0.3 % ophthalmic solution 1 drop     insulin aspart (NovoLOG) inj (RAPID ACTING)     labetalol (NORMODYNE/TRANDATE) syringe 10-20 mg     lacosamide (VIMPAT) solution 150 mg     levothyroxine (SYNTHROID/LEVOTHROID) tablet 112 mcg     magnesium sulfate 2 g in water intermittent infusion     magnesium sulfate 4 g in 100 mL sterile water (premade)     melatonin tablet 1 mg     miconazole (MICATIN/MICRO GUARD) 2 % powder     multivitamins w/minerals  "(CERTAVITE) liquid 15 mL     naloxone (NARCAN) injection 0.1-0.4 mg     ondansetron (ZOFRAN-ODT) ODT tab 4 mg    Or     ondansetron (ZOFRAN) injection 4 mg     oxyCODONE IR (ROXICODONE) half-tab 2.5-5 mg     pantoprazole (PROTONIX) 2 mg/mL suspension 40 mg     polyethylene glycol (MIRALAX/GLYCOLAX) Packet 17 g     potassium chloride (KLOR-CON) Packet 20-40 mEq     potassium chloride ER (K-DUR/KLOR-CON M) CR tablet 20-40 mEq     QUEtiapine (SEROquel) half-tab 12.5 mg     rotigotine (NEUPRO) 4 MG/24HR 24 hr patch 1 patch     rotigotine (NEUPRO) Patch in Place     rotigotine (NEUPRO) patch REMOVAL     sodium chloride (PF) 0.9% PF flush 3 mL     terazosin (HYTRIN) capsule 1 mg     venlafaxine (EFFEXOR) tablet 37.5 mg             Physical Exam:   Vitals: Blood pressure 155/72, pulse 62, temperature 96.6  F (35.9  C), temperature source Oral, resp. rate 20, height 1.6 m (5' 3\"), weight 91.3 kg (201 lb 3.2 oz), SpO2 95 %.  Alert. Knows she is in hospital but can't name it. Not sure of day or month. No dyskinesia or tremor evident at time of visit          Data:   ROUTINE IP LABS (Last 3results)  CBC RESULTS:     Recent Labs   Lab 03/24/19 0729 03/23/19 0035 03/22/19 0714   WBC 13.2* 16.2* 14.9*   RBC 4.05 4.04 4.20   HGB 12.1 12.1 12.4   HCT 37.6 38.1 39.7   * 553* 538*     Basic Metabolic Panel:  Recent Labs   Lab 03/24/19 0729 03/23/19 0035 03/22/19  0714    141 143   POTASSIUM 4.1 3.6 4.0   CHLORIDE 105 104 109   CO2 31 27 29   BUN 34* 37* 37*   CR 0.84 0.95 0.90   * 138* 135*   NIKKIE 8.3* 8.3* 8.6     INR:No lab results found in last 7 days.   Lipid Profile:No results for input(s): CHOL, HDL, LDL, TRIG, CHOLHDLRATIO in the last 91005 hours.  TSH:  TSH   Date Value Ref Range Status   03/03/2019 3.12 0.40 - 4.00 mU/L Final   ,   Vitamin B12: No results found for: B12   A1C:   Lab Results   Component Value Date    A1C 6.7 03/02/2019     .                 "

## 2019-03-24 NOTE — PROGRESS NOTES
CLINICAL NUTRITION SERVICES - REASSESSMENT NOTE      RECOMMENDATIONS FOR MD/PROVIDER TO ORDER:   May wish to hold TF during the day to stimulate appetite.   Recommendations Ordered by Registered Dietitian (RD):   Cancel Calorie Count for now.    Will add thickened supplements as follows:  Pavan Magic Cup at breakfast and dinner  Cherry Gelatein Plus at lunch   Malnutrition:  (3/6)  % Weight Loss:  None noted  % Intake:  No decreased intake noted  Subcutaneous Fat Loss:  None observed  Muscle Loss:  None observed  Fluid Retention:  Mild - doubt nutrition related     Malnutrition Diagnosis: Patient does not meet two of the above criteria necessary for diagnosing malnutrition        EVALUATION OF PROGRESS TOWARD GOALS   Diet:  DD2 with nectar thick liquids (1/2 portions for small, more frequent meals)    Nutrition Support:  TF continues as below:    Nutrition Support:    Type of Feeding Tube: ND  Enteral Frequency:  Continuous   Enteral Regimen:  Replete with Fiber at 90 mL/hr (holding QID for Sinemet):  1440 kcal (16 kcal/kg), 92 g protein (1.75 g/kg), 1200 mL H2O, 22 g fiber, 178 g CHO, 96% RDI.  Standard water flushes of 60 mL every 4 hrs. Total fluid (TF+flush) = 1560 mL/day      Pt receiving Sinemet QID, TF is held 1 hr before and 1 hr after administration, thus receiving TF for total of 16 hours/day.    Intake/Tolerance:  Patient has been on a Calorie Count over the past 3 days.    CALORIE COUNT  Approximate Oral Intake for:   3/23   Calories:  380  Protein:  30      Summary:   Average intake past 3 days:  300 kcals, 15 gm pro (24% energy and 19% protein needs).  Total (TF + avg oral intake):  1740 kcals (20 kcal/kg and 110% energy needs), 107 gm pro (2 gm/kg and 102% pro needs)    ASSESSED NUTRITION NEEDS:  Dosing Weight 87.7 kg (3/18 actual wt for energy); 52.3 kg (IBW for protein)  Estimated Energy Needs: 8415-9693 kcals (14-18 Kcal/Kg)  Justification: hypocaloric feeding guidelines for obesity  Estimated  Protein Needs:  78-94 grams protein (1.5-1.8 g pro/Kg)  Justification: obesity, CKD    TF Tolerance:  Last BM 3/23.  Colace for bowel proggram.  BGM:  148, 151, 123, 168, 151, 132 - Pt on High Res sliding scale insulin for glycemic control.  Wt:  91.3 kg (up 3.6 kg over past 6 days).  Pt with mild +1 dependent edema.    NEW FINDINGS:   Per MD, expected discharge is 3/25 to prior living arrangement.    Previous Goals:   Pt will meet assessed needs with combination TF and oral intake  Evaluation: Met    Previous Nutrition Diagnosis:   Inadequate oral intake related to dysphagia diet as evidenced by minimal intake documented  Evaluation: Improving    CURRENT NUTRITION DIAGNOSIS  Inadequate oral intake related to decreased appetite and dysphagia as evidenced by meeting < 20% estimated needs via oral intake and continued TF reliance.    INTERVENTIONS  Recommendations / Nutrition Prescription  Continue same TF regimen as above.  May wish to hold TF during the day to stimulate appetite.    Cancel Calorie Count for now.    Will add thickened supplements as follows:  Pavan Magic Cup at breakfast and dinner  Cherry Gelatein Plus at lunch      Implementation  Medical Food Supplement - Ordered above supplement in Epic.    Goals  Combination TF + oral will meet needs       MONITORING AND EVALUATION:  Progress towards goals will be monitored and evaluated per protocol and Practice Guidelines    Nieves Garza, RD, LD, CNSC

## 2019-03-25 NOTE — PLAN OF CARE
OT: pt family declined participation for pt with OT secondary to pt just got back into bed and wanting pt to rest, requested OT to attempt back later as able.

## 2019-03-25 NOTE — CODE/RAPID RESPONSE
"[facility]    RRT Note  3/25/2019   Time Called: 1820    Assessment & Plan      Severe respiratory distress 2/2 Acute Upper Airway Edema  Upon entering the room, the patient is in severe respiratory distress, appears dusky, tachpyneic in the 40s, BP elevated 170s/115s, HR 80s, NSR, sats with BVM 95%, she is diaphoretic, daughter at the bedside. Immediately prior to the event, nursing noticed increased wheezing, decreased air movement posteriorly, causing increased agitation which led to the patient obstructing her airway with her tongue (she has known loss of airway protective reflexes d/t her encephalopathy and end stage parkinsons). Following her airway obstruction, nursing noted self limiting sinus bradycardia 30s. Of note, the patient did report \"a sore throat\" earlier today when offered a meal, she has apparently not reported this before.     From reading her ICU progress notes, the patient has issues with airway swelling, resolved with IV steriods dating back to 3/11. While intubated, she developed face/lip swelling, and did not have a cuff leak, had a prolonged intubation (>9 days). At this time, her ACE-inhibitors were discontinued, swelling resolved and patient was extubated on 3/12. Additionally, the patient has had seizures during this admission, but has resolved since 3/11.     DDX: vocal cord dysfunction/edema post extubation or underlying parkinson's; anaphylaxis (however no hypotension, no obvious preceding allergen/med change, skin urticaria, facial swelling); seizure; delayed angioedema 2/2 lisinopril use (stopped on 3/11); although I believe to be less likely, I have also considered pulmonary edema, aspiration, acute neurological event, developing PNA,     INTERVENTIONS:   --albuterol neb x 2  --lorazepam 0.5mg IV x 2 doses  --racemic epi neb x 2, will order PRN for overnight, please have available on floor  --125mcg methylprednisone IV x 1   --scheduled Duonebs overnight, will " "defer to day rounder to determine if this is still needed  --pepcid 20mg BID IV overnight   --diphenhydramine IV available, have not given yet   --dexmethasone 4mg IV q 6 hours for airway edema  --baldder scanned to evaluate for retention issues     I had a long discussion of goals of care with the family following the RRT. they are agreeable to DNR/I, and do not want to de-escalate care at this point. Given the severity of her acute airway swelling and respiratory distress, I discussed the option of transfer to a higher level of care with the concern this airway swelling causing respiratory distress will continue to occur overnight. After long discussion, the patient's family would like to continue the above interventions/treatment overnight with the knowledge of the risk that she could and likely will develop further airway edema/resp distress overnight - and if she does, will likely transition to comfort care overnight. They deferred further imaging, or transfer to higher level of care.       Discussed with and defer further cares to Internal Medicine Hospitalist    Addendum 0000:  Patient developed coarse, upper airway crackles. RR remains ~20s, sats stable, WNL. Distress and dyskinetic movements significantly improved. Deep oropharynx suctioning attempted with RT, but was poorly tolerated by patient. This upset her daughter, and I again had a goals of care discussion regarding transitioning solely to comfort measures, but daughter would like to continue with current care plan without escalating or moving to a higher level of care. Will continue to closely monitor.     Interval History     From Hospitalist Progress Note:  \"Loan Anderson is a 78-year-old female with history of hypothyroidism, Parkinson's disease with neurogenic bladder who presented with acute encephalopathy and was intubated in the Emergency Department due to concerns for loss of protecting her airway.  She was intubated on 03/02 and " "extubated on 03/11.  Prolonged intubation and ICU stay related to ongoing delirium. Hospital course complicated by a partial status epilepticus and possible angioedema reaction.\"      Code Status: DNR/DNI    ROSHNI Vasquez CNP  Hospitalist - House Officer  Pager: [phone] (0q - 6p)      Allergies   Allergies   Allergen Reactions     Ace Inhibitors      Tongue and lip swelling just after restarting lisinopril on 3/9/19 at higher dose while intubated.  Unclear if actually angioedema or not as swelling resolved with steroids.     Atorvastatin      Concern that it was contributing to confusion     Mirapex [Pramipexole Dihydrochloride Monohydrate]      Leg swelling     Potassium Chloride      IV infusion only. She tolerates oral potassium chloride     Requip [Ropinirole Hydrochloride]      Leg swelling     Epinephrine Palpitations       Physical Exam   Vital Signs with Ranges:  Temp:  [96.7  F (35.9  C)] 96.7  F (35.9  C)  Pulse:  [63] 63  Heart Rate:  [55-62] 58  Resp:  [20] 20  BP: (134-166)/(63-67) 140/67  SpO2:  [96 %] 96 %  I/O last 3 completed shifts:  In: 60 [P.O.:30; NG/GT:30]  Out: 650 [Urine:650]      Constitutional: Eyes rolled back, constant movement, severe distress. Unable to follow commands   Eyes: pupils 4mm, round, briskly reactive   HEENT: dry mucous membranes, no tongue/lip swelling, poor control of tongue causing frequent obstruction   Respiratory: Little to no air movement to anterior auscultation, severe high pitched upper airway wheezing, no crackles to posterior auscultation   Cardiovascular: Regular rate and rhythm, normal S1 and S2, and no murmur noted.  GI: Soft, non-distended, non-tender, normal bowel sounds.  Skin: dusky face, chest, arms. Severe diaphoresis   Neurologic: appears dyskinetic, moving all extremities, unable to follow commands initially, slowly improved with time  Psychiatric: appears highly anxious     Data       Troponin:    Recent Labs   Lab Test 03/23/19  0035 "   TROPI <0.015       IMAGING: (X-ray/CT/MRI)   No results found for this or any previous visit (from the past 24 hour(s)).    CBC with Diff:  Recent Labs   Lab Test 03/25/19 0658  03/02/19  1449   WBC 13.0*   < > 13.5*   HGB 12.0   < > 12.0   MCV 94   < > 91   *   < > 195   INR  --   --  1.04    < > = values in this interval not displayed.      No results found for: RETICABSCT  No results found for: RETP    Lactic Acid:    Lab Results   Component Value Date    LACT 1.4 03/23/2019         Lactate for Sepsis Protocol   Date Value Ref Range Status   03/24/2019 1.6 0.7 - 2.0 mmol/L Final         Comprehensive Metabolic Panel:  Recent Labs   Lab 03/25/19 0658  03/23/19  0647 03/23/19  0035      < >  --  141   POTASSIUM 3.9   < >  --  3.6   CHLORIDE 103   < >  --  104   CO2 29   < >  --  27   ANIONGAP 6   < >  --  10   *   < >  --  138*   BUN 31*   < >  --  37*   CR 0.91   < >  --  0.95   GFRESTIMATED 61   < >  --  58*   GFRESTBLACK 71   < >  --  67   NIKKIE 8.3*   < >  --  8.3*   MAG  --   --  2.3  --    PHOS  --   --  3.3  --    PROTTOTAL  --   --   --  7.0   ALBUMIN  --   --   --  2.7*   BILITOTAL  --   --   --  0.3   ALKPHOS  --   --   --  259*   AST  --   --   --  21   ALT  --   --   --  14    < > = values in this interval not displayed.       INR:    Recent Labs   Lab Test 03/02/19  1449   INR 1.04       UA:  Recent Labs   Lab 03/23/19  0030   COLOR Light Brown   APPEARANCE Slightly Cloudy   URINEGLC Negative   URINEBILI Negative   URINEKETONE Negative   SG 1.018   UBLD Moderate*   URINEPH 6.5   PROTEIN 300*   NITRITE Negative   LEUKEST Large*   RBCU >182*   WBCU >182*       Time Spent on this Encounter   I spent 60 minutes (1820 - 1920) of critical care time (plus an additional hour of goals of care conversation and discussing with Hospitalist) on the unit/floor managing the care of Loan Anderson. Upon evaluation, this patient had a high probability of imminent or life-threatening  deterioration due to acute airway obstruction, respiratory distress which required my direct attention, intervention, and personal management. 100% of my time was spent at the bedside counseling the patient and/or coordinating care regarding services listed in this note.

## 2019-03-25 NOTE — PLAN OF CARE
Discharge Planner PT   Patient plan for discharge: Family hopeful for return to memory care unit  Current status: Pt alert and participating in transfer training with daughter and spouse present.  Mod A x 1 for sup->sit.  Mod A x 2 for stand pivot transfer from bed->chair and repeated trials of sit<->stand from chair.  Standing trials 30 secs-1 min for building strength and endurance.  Pt not wearing lumbar brace, daughter reports brace causing more discomfort for patient than support so family has discontinued.  Barriers to return to prior living situation: Level of assist with transfers, spinal precautions, fall risk  Recommendations for discharge: Memory care facility with continued PT  Rationale for recommendations: If facility able to meet patient's needs from transfers (assist of 2) and medical perspective (defer to care transitions note re: [place] tube), rec patient stay in familiar facility with ongoing rehab to advance I with transfers and decrease burden of care.  If facility unable to meet patient's needs, rec LTC with continued PT to maximize independence.       Entered by: Lolis Foster 03/25/2019 2:29 PM

## 2019-03-25 NOTE — PROGRESS NOTES
[facility]    Hospitalist Progress Note      Assessment & Plan   Loan Anderson is a 78-year-old female with history of hypothyroidism, Parkinson's disease with neurogenic bladder who presented with acute encephalopathy and was intubated in the Emergency Department due to concerns for loss of protecting her airway.  She was intubated on 03/02 and extubated on 03/11.  Prolonged intubation and ICU stay related to ongoing delirium. Hospital course complicated by a partial status epilepticus and possible angioedema reaction.     Acute metabolic encephalopathy  Seizure disorder with partial status epilepticus  Dyskinesias  Likely multifactorial related to ICU delirium, postictal state related to partial epileptic seizure, and pain related to fractures below. Gradual improvement however still not back to baseline per daughter. EEG 3/15 with diffuse slowing, no signs of seizure.  - Episode 3/20/2019 concerning for seizure, lacosamide increased.  - Reassessed by neurology 3/21, not seizures, likely dyskinesias brought on by peak effect of sinemet. Multiple adjustments to sinemet (including dose reduction)  - Sinemet 1 tab QID (now spaced at for 6am, 11am, 4pm, 8pm starting 3/26), continue neupro patch  - Continue melatonin 1mg, vimpat 150 BID  - Maintain normal day / night, sleep / wake cycles as able.   - Continue quetiapine at bedtime      Dysphagia  Secondary to encephalopathy, prolonged intubation. Family not interested in PEG  - Continue TF per NG (Tolerating some meds PO)  - SLP following, has advanced diet to DD2 via spoon, pills one at a time  - Continue to advance diet per speech  - Appreciate nutrition recs. Family requested continued calorie count today.     Advanced care planning  Palliative care following. Have addressed goals of care at length with family, see note from 3/16/19.   - No escalation of cares (to BiPAP, ICU, pressors, PEG tube. Remains DNR/DNI)  - Appeared to be generally  improving, goals remain restorative at this time      Parkinson's disease with neurogenic bladder  Lives in memory care at the [facility] and essentially wheelchair bound d/t high falls risk.  Dtr states normally able to carry on conversation and oriented to situation. Has history of neurogenic bladder but is not Mata dependent  - Sinemet 1 tab QID (timing adjusted), Continue rotigotine patch  - Note, tube feedings need to be held 1 hour before and 1 hour after Sinemet administration for better absorption.   - Plan per neurology is to resume prior to admission Parkinson's regimen on discharge  - tolerated mata removal, continue purewick     Acute hypoxic respiratory failure  RRT 3/15/19 due to increased oxygen need.  Respiratory therapist was consulted for increased secretion however patient's weak to cough out all the secretions. Suctioning tried however patient unable to tolerate. Unable to do vest due to multiple rib fractures, patient not able to cooperate for flutter valve.  Mucomyst with breathing treatment tried however patient's O2 sats dropped resulting in RRT. No new infiltrate on CXR 3/15/19   - Wean oxygen, maintained on 2LPM  - See advanced care planning above     Right T11 and T12 rib fractures with associated posterior right-sided pleural effusion  Right L1 and L2 transverse process fractures  Multifactorial related to mechanical fall prior to admission, as well as CPR (no documented pulselessness) and Heimlich maneuvers performed for recurrent episodes of unresponsiveness.   - Continue lidocaine patches, scheduled acetaminophen with additional doses PRN  - PT/OT following and recommending TCU  - PRN bowel regimen     Possible bladder spasms   Noted 3/17/19 by RN to have possible bladder spasms associated with apparent Mata dysfunction with leaking. Seemed to improve with irrigation and troubleshooting of Mata.  - Continue alpha blocker (terazosin here)  - Possible resumption of myrbetriq once PO  intake improves     Leukocytosis  Candiduria, symptomatic  Suspected acute phase reactant and steroid related. Patient had received steroids when she had angioedema. No growth noted on cultures. Procal <0.05. No focal signs/syptoms infection. Afebrile. CXR unchanged 3/15/19 or 3/23. Procalcitonin <0.05, lactic acid resolved without intervention.  - Repeat UA 3/23 early AM with large leuk esterase, some mucous and moderate amount of blood. Leukocytosis up to 16, started ceftriaxone, Urine cx with candida  - Remains afebrile. Stop ceftriaxone. Start 2 week course fluconazole with persistent irritation (first dose 3/24)  - Blood cultures no growth to date.      YASMIN on chronic kidney disease stage II  Baseline creatinine 0.8-0.9. Attributed to prerenal from diuresis.  Gradually improving. She has low muscle mass.   - Monitor periodically      Microscopic hematuria  RBC >182 on UA 3/15/19 on catheterized specimen. Tiffany colored urine. Potentially secondary to Silva trauma in context of prophylactic heparin and aspirin use.    - Recheck in 2-4 weeks as outpatient once Silva removed, pending goals of care   - Noted to have more pink-tinged urine, heparin subcutaneous held.     Possible angioedema  Tongue and lip swelling, 03/09-3/10, after restarted on lisinopril, which was recently started in outpatient setting.  Uncertain whether this was true angioedema and lisinopril added to allergy list and transitioned to amlodipine.  Improved with steroids.      Hypertension   Recently started on lisinopril, developed tongue and lip swelling concerning for angioedema, with lisinopril discontinued and started on amlodipine.   - Continue amlodipine, scheduled hydralazine 5mg Q8h trend BPs    DVT Prophylaxis: Anti-embolisim stockings (TEDs)  Code Status: DNR/DNI  Expected discharge: 3/27, recommended to prior living arrangement, patients needs to be at higher level of function before safe to discharge back to [facility] (includes off of  tube feeds).    Katherine Wells, DO  Text Page (7am - 6pm)    Interval History   Patient seen and examined. Daughter at bedside. Dyskinesias improved but tremors return early in the morning. Discussed adjusting timing of sinemet. Family wants patient to return to [facility] with her previous level of care. Daughter to call and determine if they can manage tube feeds. Goal overall is to get her off tube feeds before discharge.    -Data reviewed today: I reviewed all new labs and imaging results over the last 24 hours. I personally reviewed no new imaging or EKGs    Physical Exam   Temp: 96.7  F (35.9  C) Temp src: Axillary BP: 152/63 Pulse: 63 Heart Rate: 62 Resp: 20 SpO2: 96 % O2 Device: None (Room air)    Vitals:    03/23/19 0534 03/24/19 0535 03/25/19 0100   Weight: 90.3 kg (199 lb) 91.3 kg (201 lb 3.2 oz) 93 kg (205 lb 1.6 oz)     Vital Signs with Ranges  Temp:  [96.7  F (35.9  C)-98.2  F (36.8  C)] 96.7  F (35.9  C)  Pulse:  [63] 63  Heart Rate:  [55-64] 62  Resp:  [18-22] 20  BP: (100-166)/(63-81) 152/63  SpO2:  [94 %-96 %] 96 %  I/O last 3 completed shifts:  In: 580 [P.O.:430; NG/GT:150]  Out: 750 [Urine:750]    Constitutional: Awake, alert, cooperative, no acute distress  Respiratory: clear anteriorly, no wheeze or rhonchi  Cardiovascular: Regular rate and rhythm, normal S1 and S2, and no murmur noted  GI: Normal bowel sounds, soft, non-distended, non-tender  Skin/Integumen: No rashes, no cyanosis, trace edema bilaterally  Other: Answers questions, no dyskinesias early this aM    Medications       acetaminophen  500 mg Oral or NG Tube TID     amLODIPine  10 mg Oral Daily     aspirin  81 mg Oral At Bedtime     [START ON 3/26/2019] carbidopa-levodopa  1 tablet Oral 4x Daily     carbidopa-levodopa  1 tablet Oral 4x Daily     diclofenac  1 g Transdermal 4x Daily     docusate  100 mg Oral or Feeding Tube BID     fluconazole  200 mg Oral Daily     hydrALAZINE  5 mg Oral Q8H [facility]     insulin aspart  1-12 Units  Subcutaneous Q4H     lacosamide  150 mg Oral BID     levothyroxine  112 mcg Oral QAM AC     melatonin  1 mg Oral or Feeding Tube Q24H     multivitamins w/minerals  15 mL Per Feeding Tube Daily     pantoprazole  40 mg Oral QAM AC     QUEtiapine  12.5 mg Oral At Bedtime     rotigotine  1 patch Transdermal Daily     rotigotine   Transdermal Q8H     rotigotine   Transdermal Daily     sodium chloride (PF)  3 mL Intracatheter Q8H     terazosin  1 mg Oral Daily     venlafaxine  37.5 mg Oral TID       Data   Recent Labs   Lab 03/25/19  0658 03/24/19  0729 03/23/19  0035   WBC 13.0* 13.2* 16.2*   HGB 12.0 12.1 12.1   MCV 94 93 94   * 510* 553*    140 141   POTASSIUM 3.9 4.1 3.6   CHLORIDE 103 105 104   CO2 29 31 27   BUN 31* 34* 37*   CR 0.91 0.84 0.95   ANIONGAP 6 4 10   NIKKIE 8.3* 8.3* 8.3*   * 145* 138*   ALBUMIN  --   --  2.7*   PROTTOTAL  --   --  7.0   BILITOTAL  --   --  0.3   ALKPHOS  --   --  259*   ALT  --   --  14   AST  --   --  21   TROPI  --   --  <0.015       No results found for this or any previous visit (from the past 24 hour(s)).

## 2019-03-25 NOTE — PROGRESS NOTES
[facility] Note:    D/I:  [facility] is following for discharge planning.   SW placed call to [facility] ([phone]) to provide update regarding discharge planning.  Per [facility], they are waiting for their corporate office to inform them if they are able to take patient back with the [place] tube.  She will come to do an onsite assessment on 3/26/19 to determine appropriateness.      P: SW will continue to follow for discharge planning.    ELLE Colbert, LGSW

## 2019-03-25 NOTE — PROGRESS NOTES
"SPIRITUAL HE[facility] SERVICES Progress Note  [facility]    Visit with pt and her daughter, Lamberto.  Pt was up in a chair when I arrived.    Pt was able to carry on conversation with me, reflecting on her time here and hopes for continued recovery.  Pt's daughter noted that pt was able to get up into the chair with assist of a belt (rather than a lift), and is also progressing in her eating and swallowing.  Pt said she hopes to be able to leave soon, and said, \"maybe you can visit me in another place some day.\"  Provided reflective listening, support and prayer.    SH team will continue to follow.                                                                                                                                                 Loan Carter M.A.  Staff   Pager [phone]  Phone [phone]      "

## 2019-03-25 NOTE — PLAN OF CARE
Pt oriented to self and place. VSS on RA. RPIV SL. Purewick in place (changed at 0400)- barrier cream applied, adequate UOP. TF at 90 ml/hr. BG checks- 162, 158. Slept between cares- turned/repositioned q3-4h. Plan to continue nutrition goals and increased strength.

## 2019-03-25 NOTE — TELEPHONE ENCOUNTER
"Received voicemail from patient's daughter, Lara, regarding patient's medications while she is inpatient. Lara states that her carbidopa-levodopa  mg dose has been reduced to 1 tablet 4 times/day and she continues to receive the Neupro 4 mg patch \"overnight.\" Lara reports that the patient's dyskinesias have significantly improved since last week. She does still experience some dyskinesia 45 minutes after getting a tablet of levodopa but then they subside over time. The dyskinesias sometimes result in a \"mental fog\" where she becomes more confused. She has not had any change in stiffness or tremor.     Tamika Diane, Pharm.D.  Medication Therapy Management Pharmacist  Phone: [phone]  "

## 2019-03-25 NOTE — PLAN OF CARE
Discharge Planner OT   Patient plan for discharge: did not state  Current status: pt seen in PM, participated well in BUE ROM, strengthening and coordination activities. Family present and also pleased with patient's improvement in B hand strength. Pt requiring verbal, physical and visual/modelling strategies to follow directives for activities. See OT note for full report.  Barriers to return to prior living situation: current level of assist with functional transfers, self-cares  Recommendations for discharge: Memory care  Rationale for recommendations: If facility able to meet patient's needs from transfers (assist of 2) and medical perspective (defer to care transitions note re: [place] tube), rec patient stay in familiar facility with ongoing rehab to advance I with transfers and self-cares to decrease burden of care.  If facility unable to meet patient's needs, rec LTC with continued OT to maximize independence.         Entered by: Jeancarlos Angel 03/25/2019 4:33 PM

## 2019-03-25 NOTE — TELEPHONE ENCOUNTER
Spoke with daughter and she was present in the hospital room with the patient. Lara states that with the carbidopa-levodopa  mg, 1 tablet has been consistently causing dyskinesia after 45-60 minutes of a dose and the dyskinesia lasts for about an hour. The dyskinesias are typically milder than last week but do affect her ability to participate in therapies at times because of the timing. She is sleeping well and has not had many tremors overnight or during the day. Daughter is wondering if other changes should be made to manage the dyskinesias.     Assessment:   MT pharmacist reviewed inpatient neurology notes and it appears as though the patient has had marked improvement in dyskinesia with a dose reduction by one-half of levodopa. I would not recommend more changes at this time since this was only changed about 4 days ago. We could consider reducing the Neupro patch dose overtime, as this medication may be contributing to dyskinesias as well, but again, considering the huge improvement in the last few days, more medication changes do not seem warranted at this time.     Plan:   Provided patient and daughter reassurance that the inpatient team is managing her PD very well and that we can make further medication adjustments after she is discharged.     Tamika Diane, Pharm.D.  Medication Therapy Management Pharmacist  Phone: [phone]

## 2019-03-25 NOTE — PLAN OF CARE
Discharge Planner SLP   Patient plan for discharge: Did not state  Current status: Pt seen for ongoing swallow tx. Pt observed during PO intake of meds. Family reports some difficulty swallowing pills. Pt and family report some coughing with dry/crumbly DD2 solids during breakfast meal. Pt trialed nectar thickened liquid by cup with throat clear (x2), however, family reports throat clearing at baseline. Trials of thin liquid resulted in immediate cough (x1) depsite being given max cues to follow chin tuck strategy. Pt politely denied further trials of DD2 solids. Pt not appropriate for diet upgrade at this time. Recommend continue dysphagia diet level 2 solids with nectar thick liquids (tsp, cup, straw all ok via small single sips) given 1:1 assist and swallow precautions (fully upright position, small single bites/sips, verify swallows, check for oral residue). Please serve pills one at a time with puree as tolerated for safety. Please consider alternate form of medication from dissolvable for safety given poor oral control. Monitor for signs of aspiration or decline in respiratory status and hold PO if occur. Pt and family educated on aspiration risk and swallow excercises per family request. Pt able to demonstrate masaco maneuver in 1 of 2 trials. Pt and family verbalized agreement with POC.   Barriers to return to prior living situation: medical needs  Recommendations for discharge: TCU  Rationale for recommendations: continued SLP for dysphagia for safe return to baseline diet       Entered by: Shauna Condon 03/25/2019 9:21 AM

## 2019-03-25 NOTE — PROGRESS NOTES
Palliative Care Social Work Note--Brief    Attempted to see pt this afternoon.  She was working with PT at time of my attempt, was seen by palliative NP and palliative  earlier in the day.  Will attempt tomorrow as able.    ELLE Blair, [facility]   Palliative Care    Pgr:[phone]  Ph: [phone]

## 2019-03-25 NOTE — PLAN OF CARE
A/ox2, disoriented to time and situation. VSS except elevated blood pressure at times, scheduled morning BP meds given. C/o of R rib cage pain at times, FLACC score used, 0/4, scheduled Tylenol and Voltaren gel applied. LS diminished. LARSON with minimal activity, pivoting to bed/chair/BSC. DD2 w/ nectar thick, poor intake, meds given whole one at a time PO or given through NJ, patient complained of difficulty swallowing meds whole at times with noted coughing following. Blood sugar check q4h. Patient placed back of calorie counts x3 days today per family request.  Coccyx intact, pink, and blanchable. Incontinent of urine, purewick in place. Turn and reposition ordered q2h, family declines at times. Up in chair for lunch. K 3.3, replaced and recheck tomorrow morning. Plan pending nutritional needs and increasing strength. Nursing to continue to monitor.

## 2019-03-25 NOTE — PROGRESS NOTES
[facility]    Palliative Care Progress Note    Loan Anderson  MRN# [id number]  Date of Admission:  3/2/2019  Date of Service (when I saw the patient): 03/25/2019    Assessment & Plan   Loan Anderson is a 78 year old female with PMH significant for advanced parkinson's disease, HTN, CKD, and recent fall with resultant pain on therapy with tramadol, who was found unresponsive at her memory care unit, CPR was initiated at that time. A perfusing rhythm was noted upon EMS arrival. She was found to be post-ictal, neurology involved. She was intubated in the ICU for 10 days, now extubated. Her course has been additionally complicated by encephalopathy, partial status epilepticus, and dysphagia. She has had multiple RRT called this hospitalization. We are following for goals of care.      Symptoms/Recommendations   -Pt and family continue to hope for a restorative pathway, with plan to return to the [facility] with additional support/therapy (ongoing PT/OT/SLP)  -Pt continues on a DD2 diet with nectar thick liquids. The [facility] is determining whether the NJ can stay in upon discharge   -I am worried about pt's long term recovery and trajectory, yet family seems focused on the modest improvements we are seeing at present, and thus remain focused on restorative cares      Support/Coping  -Well supported by  Surjit of 56 years. They have 2 adult daughters, Lamberto and Kassandra, who live local and are very involved   -Appreciate involvement of spiritual health   -Appreciate involvement of palliative LICSW Keisha Abraham for additional support      Decisional Support, Goals of Care, Counseling & Coordination  Decisional Capacity Intact?  -No  Health Care Directive on File?  -Yes, reviewed   Code Status/Resuscitation Preferences?  -DNR/DNI     Discussion  Visited with pt and her daughter this AM. Erin does look remarkably different and improve from when I saw her last (about 10 days ago). 10 days ago,  she was largely nonverbal and minimally responsive, struggling with aspiration. Today, she is sitting up in the chair, communicating rather effectively, although with some tangential thoughts. She is calm and easy to redirect. Her daughter is lovingly at her side caring for her.    They continue to remain hopeful for recovery, as Erin continues to make daily modest improvements (on a DD2 diet and able to stand/pivot today instead of using the lift).     Overall, they have no concerns other than focusing on recovery.     Case reviewed with bedside RN Angela, unit YAMILET Saavedra, and Dr. Wells.     Thank you for involving us in the care of this patient and family. We will continue to follow. Please do not hesitate to contact me with questions or concerns or the on-call provider for our team if evening or weekend.    Saniya BARAKAT, [facility]  Palliative Medicine   Pager [phone]    Attestation:  Total time on the floor involved in the patient's care: 35 minutes  Total time spent in counseling/care coordination: >50%    Interval History   Pt more alert and conversant than when I last saw her 10 days ago. Able to be up in the chair either through lift or pivot with heavy assist. Nutrition and swallowing continue to be the major barriers. She overall feels better and continues to hope for ongoing improvement, as does her family.     Medications   Current Facility-Administered Medications Ordered in Epic   Medication Dose Route Frequency Last Rate Last Dose     acetaminophen (TYLENOL) solution 500 mg  500 mg Oral or NG Tube TID   500 mg at 03/25/19 0807     acetaminophen (TYLENOL) solution 500 mg  500 mg Oral or NG Tube Q8H PRN   500 mg at 03/22/19 1621     albuterol (PROVENTIL) neb solution 2.5 mg  2.5 mg Nebulization Q2H PRN   2.5 mg at 03/23/19 1140     amLODIPine (NORVASC) tablet 10 mg  10 mg Oral Daily   10 mg at 03/25/19 0806     aspirin (ASA) chewable tablet 81 mg  81 mg Oral At Bedtime   81 mg at 03/24/19 2042      [START ON 3/26/2019] carbidopa-levodopa (SINEMET)  MG per tablet 1 tablet  1 tablet Oral 4x Daily         carbidopa-levodopa (SINEMET)  MG per tablet 1 tablet  1 tablet Oral 4x Daily   1 tablet at 03/25/19 0759     glucose gel 15-30 g  15-30 g Oral Q15 Min PRN        Or     dextrose 50 % injection 25-50 mL  25-50 mL Intravenous Q15 Min PRN        Or     glucagon injection 1 mg  1 mg Subcutaneous Q15 Min PRN         diclofenac (VOLTAREN) 1 % topical gel 1 g  1 g Transdermal 4x Daily   1 g at 03/25/19 0839     docusate (COLACE) 50 MG/5ML liquid 100 mg  100 mg Oral or Feeding Tube BID   100 mg at 03/25/19 0807     fluconazole (DIFLUCAN) tablet 200 mg  200 mg Oral Daily   200 mg at 03/25/19 0806     hydrALAZINE (APRESOLINE) half-tab 5 mg  5 mg Oral Q8H BURKE   5 mg at 03/25/19 0553     hydrALAZINE (APRESOLINE) injection 10 mg  10 mg Intravenous Q4H PRN         hypromellose-dextran (ARTIFICAL TEARS) 0.1-0.3 % ophthalmic solution 1 drop  1 drop Both Eyes Q1H PRN   1 drop at 03/07/19 0352     insulin aspart (NovoLOG) inj (RAPID ACTING)  1-12 Units Subcutaneous Q4H   2 Units at 03/25/19 0808     labetalol (NORMODYNE/TRANDATE) syringe 10-20 mg  10-20 mg Intravenous Q4H PRN         lacosamide (VIMPAT) solution 150 mg  150 mg Oral BID   150 mg at 03/25/19 0839     levothyroxine (SYNTHROID/LEVOTHROID) tablet 112 mcg  112 mcg Oral QAM AC   112 mcg at 03/25/19 0553     magnesium sulfate 2 g in water intermittent infusion  2 g Intravenous Daily PRN         magnesium sulfate 4 g in 100 mL sterile water (premade)  4 g Intravenous Q4H PRN         melatonin tablet 1 mg  1 mg Oral or Feeding Tube Q24H   1 mg at 03/24/19 2042     miconazole (MICATIN/MICRO GUARD) 2 % powder   Topical Q1H PRN         multivitamins w/minerals (CERTAVITE) liquid 15 mL  15 mL Per Feeding Tube Daily   15 mL at 03/24/19 1147     naloxone (NARCAN) injection 0.1-0.4 mg  0.1-0.4 mg Intravenous Q2 Min PRN         ondansetron (ZOFRAN-ODT) ODT tab 4 mg  4  mg Oral Q6H PRN        Or     ondansetron (ZOFRAN) injection 4 mg  4 mg Intravenous Q6H PRN         oxyCODONE IR (ROXICODONE) half-tab 2.5-5 mg  2.5-5 mg Oral Q3H PRN   2.5 mg at 03/17/19 0825     pantoprazole (PROTONIX) 2 mg/mL suspension 40 mg  40 mg Oral QAM AC   40 mg at 03/25/19 0553     polyethylene glycol (MIRALAX/GLYCOLAX) Packet 17 g  17 g Oral Daily PRN         potassium chloride (KLOR-CON) Packet 20-40 mEq  20-40 mEq Oral or Feeding Tube Q2H PRN   20 mEq at 03/25/19 0839     potassium chloride ER (K-DUR/KLOR-CON M) CR tablet 20-40 mEq  20-40 mEq Oral Q2H PRN   20 mEq at 03/21/19 0824     QUEtiapine (SEROquel) half-tab 12.5 mg  12.5 mg Oral At Bedtime   12.5 mg at 03/24/19 2042     rotigotine (NEUPRO) 4 MG/24HR 24 hr patch 1 patch  1 patch Transdermal Daily   1 patch at 03/24/19 2044     rotigotine (NEUPRO) Patch in Place   Transdermal Q8H         rotigotine (NEUPRO) patch REMOVAL   Transdermal Daily         sodium chloride (PF) 0.9% PF flush 3 mL  3 mL Intracatheter Q8H   3 mL at 03/25/19 0553     terazosin (HYTRIN) capsule 1 mg  1 mg Oral Daily   1 mg at 03/24/19 2042     venlafaxine (EFFEXOR) tablet 37.5 mg  37.5 mg Oral TID   37.5 mg at 03/25/19 0806     No current [facility]-ordered outpatient medications on file.       Physical Exam   Temp: 96.7  F (35.9  C) Temp src: Axillary BP: 152/63 Pulse: 63 Heart Rate: 62 Resp: 20 SpO2: 96 % O2 Device: None (Room air)    Vitals:    03/23/19 0534 03/24/19 0535 03/25/19 0100   Weight: 90.3 kg (199 lb) 91.3 kg (201 lb 3.2 oz) 93 kg (205 lb 1.6 oz)     CONSTITUTIONAL: Chronically ill elderly woman seen sitting up in the chair in NAD. She is alert and conversant, orientation not assessed. Family present   RESPIRATORY: NL breathing on RA  GASTROINTESTINAL: NJ is clamped     Data   Results for orders placed or performed during the hospital encounter of 03/02/19 (from the past 24 hour(s))   Glucose by meter   Result Value Ref Range    Glucose 206 (H) 70 - 99 mg/dL   Glucose  by meter   Result Value Ref Range    Glucose 107 (H) 70 - 99 mg/dL   Glucose by meter   Result Value Ref Range    Glucose 158 (H) 70 - 99 mg/dL   Glucose by meter   Result Value Ref Range    Glucose 162 (H) 70 - 99 mg/dL   Glucose by meter   Result Value Ref Range    Glucose 157 (H) 70 - 99 mg/dL   Prealbumin   Result Value Ref Range    Prealbumin 29 15 - 45 mg/dL   Basic metabolic panel   Result Value Ref Range    Sodium 138 133 - 144 mmol/L    Potassium 3.9 3.4 - 5.3 mmol/L    Chloride 103 94 - 109 mmol/L    Carbon Dioxide 29 20 - 32 mmol/L    Anion Gap 6 3 - 14 mmol/L    Glucose 172 (H) 70 - 99 mg/dL    Urea Nitrogen 31 (H) 7 - 30 mg/dL    Creatinine 0.91 0.52 - 1.04 mg/dL    GFR Estimate 61 >60 mL/min/[1.73_m2]    GFR Estimate If Black 71 >60 mL/min/[1.73_m2]    Calcium 8.3 (L) 8.5 - 10.1 mg/dL   CBC with platelets   Result Value Ref Range    WBC 13.0 (H) 4.0 - 11.0 10e9/L    RBC Count 4.01 3.8 - 5.2 10e12/L    Hemoglobin 12.0 11.7 - 15.7 g/dL    Hematocrit 37.5 35.0 - 47.0 %    MCV 94 78 - 100 fl    MCH 29.9 26.5 - 33.0 pg    MCHC 32.0 31.5 - 36.5 g/dL    RDW 16.3 (H) 10.0 - 15.0 %    Platelet Count 474 (H) 150 - 450 10e9/L

## 2019-03-25 NOTE — PLAN OF CARE
Turn and Reposition q 2 hrs as family allowed.  Pt is oriented to self only. VSS on RA with exception to hypertensive at times.  SOB noted with repositioning or activity.  Hold TF an hr before Sinemet and an hr after.  Intermittent dyskinesia noted, noted 1 hrs after Sinemet. Pure wick in place, adequate urine output. Pt on continuous  TF at 90 ml/hr, water flushes  q 4 hrs. DD II diet,with nectar thickened liquids. Fair appetite. Takes pills with apple sauce.  and or crush given through NG tube. BG-checks 107 &158 -1 unit insulin coverage given. Family at bedside.  Discharge plan pending pt progress. Will continue to observe.

## 2019-03-26 NOTE — PROGRESS NOTES
Pt. Resting peacefully at this time. Discussed with nurse about prn updraft tx. She wanted pt. To rest and give nebulizer tx. If pt. Is sob. Will continue to monitor pt.

## 2019-03-26 NOTE — PLAN OF CARE
Discharge Planner PT   Patient plan for discharge: Pt's family is hopeful pt can return to [facility].   Current status: Pt in supine upon arrival of therapist. Pt performed supine-sit with modA of 2, pt required 1-step cues for sequencing as pt initially resistant to movement. Pt performed sit <> stand and ambulation of a couple steps towards bedside recliner with B HHA and Richie of 2. Pt unable to motor plan to step backwards towards chair with multiple tactile and verbal cues. Pt sitting up in chair at end of session.   Barriers to return to prior living situation: Level of assist, Spinal Precautions, Fall risk  Recommendations for discharge: Return to memory care unit with continue PT services  Rationale for recommendations: Pt will benefit from continued PT in order to progress activity tolerance, independence and safety with mobility.        Entered by: Karolyn Marsh 03/26/2019 4:37 PM

## 2019-03-26 NOTE — PLAN OF CARE
Pt oriented to self only- pt is anxious and overwhelmed constantly. VSS on 4 L oxymask. LS expiratory wheezes and stridor. Tube feed held (see RRT note from evening). Pt's family refused many cares overnight including repositioning, medications, BG checks, and labs. Sinemet given at 0400 crushed- dose held in evening and daughter requested to give early. More diskinesias this shift- daughter states they occur around bathroom tendencies.

## 2019-03-26 NOTE — PROGRESS NOTES
[facility]    Sepsis Evaluation Progress Note    Date of Service: 03/26/2019    I was called to see Loan Anderson due to unclear trigger as I do not see VS trigger and RN unsure. She is not known to have an infection.     Physical Exam    Vital Signs:  Temp: 96.9  F (36.1  C) Temp src: Oral BP: 143/72 Pulse: 77 Heart Rate: 58 Resp: 18 SpO2: 95 % O2 Device: None (Room air) Oxygen Delivery: 4 LPM    Lab:  Lactic Acid   Date Value Ref Range Status   03/23/2019 1.4 0.7 - 2.0 mmol/L Final     Lactate for Sepsis Protocol   Date Value Ref Range Status   03/26/2019 3.2 (H) 0.7 - 2.0 mmol/L Final     Comment:     Significant value called to and read back by  LUKAS PIMENTEL 88 09:57 ML         The patient is at baseline mental status although family feels slightly more confused today related to steroids and events last night.    The rest of their physical exam is significant for alert, sitting up in chair with family at bedside. NGT in place and appears to be breathing room air in without acute distress.     Assessment and Plan  The SIRS and exam findings are likely due to transient hypoxia with events last night (possible self airway obstruction vs. bronchospasm vs. angioedema type reaction) in addition to albuterol x2, racepic epinephrine x2, steroids, and increase in flailing movements during RRT last night., there is no sign of sepsis at this time.     Discussed with family, they prefer not to have a repeat lactate level and their primary goal is to get Erin back to the [facility] in the next 1-2 days. She is DNR/DNI but continuing with restorative measures. They decline continued steroid use. They decline further workup for sepsis at this time although given prolonged hospitalization and complicated hospital course she is at risk of developing another infection. Her WBC have increased but again also received steroids last night.    Disposition:   The patient will remain on the current unit. We will  continue to monitor this patient closely.    We will not repeat a lactate and we will not start antibiotics or IV fluids at this time per family's request, which I believe is reasonable.     I have discussed the patients cares with ROSHNI Cain, CNP, primary Hospitalist Dr. Moncada, care coordinators, and family at bedside ( and two daughters).     Nasima Diallo PA-C  Hospitalist/House [facility]  Pager: [phone]

## 2019-03-26 NOTE — PROGRESS NOTES
Responded to Rapid Response call. RRT was called due to SHORTNESS OF BREATH, EXPIRATORY WHEEZES, HIGH-PITCHED WHEEZES. Patient was on 5 L oxymask, RR 36/min, SpO2 50-60%. Respiratory interventions included 2 albuterol nebs and 2 racemic epinephrine Patient wheeze has gotten better, work of breathing is better.     RT will continue to monitor and follow.     Lucina Castro, RT  3/25/2019 7:24 PM

## 2019-03-26 NOTE — PROCEDURES
Procedure Date: 03/15/2019      ELECTROENCEPHALOGRAM        ORDERING PROVIDER:  Marilu Mejia MD       DATE OF RECORDIN/15/2019        LT EEG #        This EEG is recorded in a confused individual.  Background activity is represented by a combination of theta and delta frequencies, which are present in both hemispheres symmetrically.       The patient is confused and there is a lot of movement artifact that is seen.  There is no abnormal movement during the recording.  Photic stimulation and hyperventilations are not performed.  I do not see any evidence of epileptiform patterns or epileptiform discharges.       IMPRESSION:  This is abnormal EEG.  It shows severe encephalopathy.  This correlates with toxic metabolic encephalopathy in underlying neurodegenerative condition.  No evidence of active epileptiform process is seen.  Clinical correlation is advised.      Revised:   3/26/2019, carlos MEJIA MD             D: 03/15/2019   T: 03/15/2019   MT: SARA      Name:     PIYUSH MONTENEGRO   MRN:      [id number]        Account:        [id number]   :      1940           Procedure Date: 03/15/2019      Document: [id number]       cc: Marilu Mejia MD

## 2019-03-26 NOTE — PROGRESS NOTES
MD Notification    Person notified: PA    Person Name: Nasima Diallo    Date/Time: 3/26/19@1008    Interaction: Paged    Purpose of Notification: Code sepsis. Lactic acid 3.2.    Orders Received: No new orders. Family does not want anymore blood draws done and did not want to follow up on the lactic acid result.

## 2019-03-26 NOTE — PROGRESS NOTES
[facility]    Hospitalist Progress Note  When I evaluated patient: 03/26/2019     Assessment & Plan   Loan Anderson is a 78-year-old female with history of hypothyroidism, Parkinson's disease with neurogenic bladder who presented with acute encephalopathy and was intubated in the Emergency Department due to concerns for loss of protecting her airway.  She was intubated on 03/02 and extubated on 03/11.  Prolonged intubation and ICU stay related to ongoing delirium. Hospital course complicated by a partial status epilepticus and possible angioedema reaction.     Acute metabolic encephalopathy  Seizure disorder with partial status epilepticus  Dyskinesias  Likely multifactorial related to ICU delirium, postictal state related to partial epileptic seizure, and pain related to fractures below. Gradual improvement however still not back to baseline per daughter. EEG 3/15 with diffuse slowing, no signs of seizure.  - Episode 3/20/2019 concerning for seizure, lacosamide increased.  - Reassessed by neurology 3/21, not seizures, likely dyskinesias brought on by peak effect of sinemet. Multiple adjustments to sinemet (including dose reduction)  - Sinemet 1 tab QID (now spaced at for 6am, 11am, 4pm, 8pm starting 3/26), continue neupro patch  - Continue melatonin 1mg, vimpat 150 BID  - Maintain normal day / night, sleep / wake cycles as able.   - Continue quetiapine at bedtime      Dysphagia  Secondary to encephalopathy, prolonged intubation. Family not interested in PEG  - was on TF per NG, on hold now since yesterday evening given acute respiratory distress. Discussed with family, will continue trial of PO and monitor intake.   - SLP following, has advanced diet to DD2 via spoon, pills one at a time  - Continue to advance diet per speech  - Appreciate nutrition recs. Continued calorie count today.     Advanced care planning  Palliative care following. Have addressed goals of care at length with family, see  note from 3/16/19.   - No escalation of cares (to BiPAP, ICU, pressors, PEG tube. Remains DNR/DNI)  - Appeared to be generally improving, goals remain restorative at this time      Parkinson's disease with neurogenic bladder  Lives in memory care at the [facility] and essentially wheelchair bound d/t high falls risk.  Dtr states normally able to carry on conversation and oriented to situation. Has history of neurogenic bladder but is not Mata dependent  - Sinemet 1 tab QID (timing adjusted), Continue rotigotine patch  - Plan per neurology is to resume prior to admission Parkinson's regimen on discharge  - tolerated mata removal, continue purewick     Acute hypoxic respiratory failure  RRT 3/15/19 due to increased oxygen need,  Respiratory therapist was consulted for increased secretion however patient's weak to cough out all the secretions. Suctioning tried however patient unable to tolerate. Unable to do vest due to multiple rib fractures, patient not able to cooperate for flutter valve.  Mucomyst with breathing treatment tried however patient's O2 sats dropped resulting in RRT. No new infiltrate on CXR 3/15/19   - again, RRT was called 3/25 evening, event noted, suspect episode due to anxiety/CATHIE/aspiration/increased muscle tone/bronchospasm.  - family not wanting recheck of lactic acid, or follow up labs or imaging. If recurrence of such spells, they will consider comfort care/hospice.   - Wean oxygen, maintained on 2LPM  - See advanced care planning above     Right T11 and T12 rib fractures with associated posterior right-sided pleural effusion  Right L1 and L2 transverse process fractures  Multifactorial related to mechanical fall prior to admission, as well as CPR (no documented pulselessness) and Heimlich maneuvers performed for recurrent episodes of unresponsiveness.   - Continue lidocaine patches, scheduled acetaminophen with additional doses PRN  - PT/OT following and recommending TCU  - PRN bowel  regimen     Possible bladder spasms   Noted 3/17/19 by RN to have possible bladder spasms associated with apparent Silva dysfunction with leaking. Seemed to improve with irrigation and troubleshooting of Silva.  - Continue alpha blocker (terazosin here)  - Possible resumption of myrbetriq once PO intake improves     Leukocytosis  Candiduria, symptomatic  Suspected acute phase reactant and steroid related. Patient had received steroids when she had angioedema. No growth noted on cultures. Procal <0.05. No focal signs/syptoms infection. Afebrile. CXR unchanged 3/15/19 or 3/23. Procalcitonin <0.05, lactic acid resolved without intervention.  - Repeat UA 3/23 early AM with large leuk esterase, some mucous and moderate amount of blood. Leukocytosis up to 16, started ceftriaxone, Urine cx with candida  - Remains afebrile. Stop ceftriaxone. Start 2 week course fluconazole with persistent irritation (first dose 3/24)  - Blood cultures no growth to date.      YASMIN on chronic kidney disease stage II  Baseline creatinine 0.8-0.9. Attributed to prerenal from diuresis.  Gradually improving. She has low muscle mass.   - Monitor periodically      Microscopic hematuria  RBC >182 on UA 3/15/19 on catheterized specimen. Tiffany colored urine. Potentially secondary to Silva trauma in context of prophylactic heparin and aspirin use.    - Recheck in 2-4 weeks as outpatient once Silva removed, pending goals of care   - Noted to have more pink-tinged urine, heparin subcutaneous held.     Possible angioedema  Tongue and lip swelling, 03/09-3/10, after restarted on lisinopril, which was recently started in outpatient setting.  Uncertain whether this was true angioedema and lisinopril added to allergy list and transitioned to amlodipine.  Improved with steroids.      Hypertension   Recently started on lisinopril, developed tongue and lip swelling concerning for angioedema, with lisinopril discontinued and started on amlodipine.   - Continue  amlodipine, scheduled hydralazine 5mg Q8h trend BPs    DVT Prophylaxis: Anti-embolisim stockings (TEDs)  Code Status: DNR/DNI  Expected discharge: 3/27, recommended to prior living arrangement- discharge back to [facility] if continues to tolerate PO with reasonable intake with current careplan If no further spells of dyspnea vs hospice if recurrence of spells.   -met with family at length and discussed care plan as above.    Wilfrido Moncada MD  Hospitalist    Interval History   Patient seen and examined. Chart and overnight event reviewed. Patient's  and daughter at bedside. Patient had spell of acute dyspnea yesterday evening and RRT was called, event note reviewed. Patient currently denies dyspnea. Has sore Ribs on Rt.  Dyskinesias better, no tremors currently.      - had at length conversation with family including her  and daughter, they want patient to return to [facility] with her previous level of care. Since they are aware the [facility] will not be able to take her with tube feeding, and patient is remains alert and is now taking PO, requesting tube feeding to continue to hold.     -Data reviewed today: I reviewed all new labs and imaging results over the last 24 hours.      Physical Exam   Temp: 96.5  F (35.8  C) Temp src: Oral BP: 145/62 Pulse: 71   Resp: 18 SpO2: 94 % O2 Device: None (Room air) Oxygen Delivery: 4 LPM  Vitals:    03/23/19 0534 03/24/19 0535 03/25/19 0100   Weight: 90.3 kg (199 lb) 91.3 kg (201 lb 3.2 oz) 93 kg (205 lb 1.6 oz)     Vital Signs with Ranges  Temp:  [96.5  F (35.8  C)-97.3  F (36.3  C)] 96.5  F (35.8  C)  Pulse:  [68-77] 71  Resp:  [18-24] 18  BP: (125-178)/() 145/62  SpO2:  [56 %-97 %] 94 %  I/O last 3 completed shifts:  In: 30 [P.O.:30]  Out: 700 [Urine:700]    Constitutional: Awake, alert, cooperative, no acute distress.  HEENT: PERRLA EOMi, [place] in place in nostril.  Respiratory: clear anteriorly, no wheeze or rhonchi, Rt base is diminished.  Cardiovascular: Regular  rate and rhythm, normal S1 and S2, and no murmur noted  GI: Normal bowel sounds, soft, non-distended, non-tender  Skin/Integumen: No rashes, no cyanosis, trace edema bilaterally  Other: Answers questions slowly and is tangential at times, no dyskinesias this am    Medications       acetaminophen  500 mg Oral or NG Tube TID     amLODIPine  10 mg Oral Daily     aspirin  81 mg Oral At Bedtime     carbidopa-levodopa  1 tablet Oral 4x Daily     dexamethasone  4 mg Intravenous Q6H     diclofenac  1 g Transdermal 4x Daily     docusate  100 mg Oral or Feeding Tube BID     famotidine  20 mg Intravenous Q12H     fluconazole  200 mg Oral Daily     hydrALAZINE  5 mg Oral Q8H BURKE     insulin aspart  1-12 Units Subcutaneous Q4H     ipratropium - albuterol 0.5 mg/2.5 mg/3 mL  3 mL Nebulization 4x daily     lacosamide  150 mg Oral BID     levothyroxine  112 mcg Oral QAM AC     melatonin  1 mg Oral or Feeding Tube Q24H     multivitamins w/minerals  15 mL Per Feeding Tube Daily     pantoprazole  40 mg Oral QAM AC     QUEtiapine  12.5 mg Oral At Bedtime     rotigotine  1 patch Transdermal Daily     rotigotine   Transdermal Q8H     rotigotine   Transdermal Daily     sodium chloride (PF)  3 mL Intracatheter Q8H     terazosin  1 mg Oral Daily     venlafaxine  37.5 mg Oral TID       Data   Recent Labs   Lab 03/26/19  0926 03/25/19  0658 03/24/19  0729 03/23/19  0035   WBC 18.5* 13.0* 13.2* 16.2*   HGB 12.3 12.0 12.1 12.1   MCV 94 94 93 94   * 474* 510* 553*    138 140 141   POTASSIUM 4.5 3.9 4.1 3.6   CHLORIDE 100 103 105 104   CO2 27 29 31 27   BUN 33* 31* 34* 37*   CR 0.94 0.91 0.84 0.95   ANIONGAP 9 6 4 10   NIKKIE 8.6 8.3* 8.3* 8.3*   * 172* 145* 138*   ALBUMIN  --   --   --  2.7*   PROTTOTAL  --   --   --  7.0   BILITOTAL  --   --   --  0.3   ALKPHOS  --   --   --  259*   ALT  --   --   --  14   AST  --   --   --  21   TROPI  --   --   --  <0.015       No results found for this or any previous visit (from the past 24  hour(s)).

## 2019-03-26 NOTE — PLAN OF CARE
Discharge Planner SLP   Patient plan for discharge: Patient did not state  Current status: Patient seen for swallowing treatment during breakfast. Daughter and  present at bedside who report that since the RRT last night, pt is more confused and unlike herself. Pt was confused throughout the session but cooperative with PO intake. Daughter reported that she was refusing breakfast, daughter reported that she has been presenting food and liquids slower due to RRT and confusion. Pt consumed nectar thick liquids via spoon demonstrating throat clear 1x (which daughter reported is baseline) and no overt s/sx of aspiration. Pt was given eggs and demonstrated adequate mastication, mild oral residue, with increased distractions and talking during oral intake. Discussed with daughter and  swallow precautions and both demonstrated understanding. Recommend 1) continue DDL2 with nectar thick liquids. 2) Swallow precautions: liquids by spoon and softer food choices until confusion resolves, slow rate, small bites/sip, alternate consistencies, 1-1 assist with feeds, single pills in puree. SLP to follow daily for diet tolerance and advancement as appropriate.   Barriers to return to prior living situation: medical status, confusion   Recommendations for discharge: TCU  Rationale for recommendations: continued SLP for dysphagia for safe return to baseline diet       Entered by: Saniya Gonzalez 03/26/2019 9:20 AM

## 2019-03-26 NOTE — PROGRESS NOTES
Patient is A&Ox2 (to place and self). VSS on room air. Lung sounds diminished, expiratory wheezes. Coccyx intact, pink/blanchable. Slight pink rash in rosina area - miconazole in room to use. DD2 diet with nectar thickened liquid diet. Takes pills whole with nectar thickened water using a spoon. Tolerating small amounts of food. Ate bites for breakfast and 50% of lunch. Voiding per pure wick catheter. BM yesterday. Tube feeding on hold per verbal order from MD - monitor I & O's to determine patient's intake. R PIV SL. Blood sugar checks with sliding scale insulin. Decadron discontinued. T&R Q2-4H per family request. Daughters and spouse at bedside throughout the day and very involved in patient's care. Plan: May be possible for patient to discharge back to prior living arrangement ([facility]) if continues to tolerate PO and have reasonable intake with no further occurrences of dyspnea. Hospice may be considered if she continues to have recurrence of spells.

## 2019-03-26 NOTE — PROVIDER NOTIFICATION
MD Notification    Notified Person: MD    Notified Person Name: Antwan    Notification Date/Time: 3/26/19 13:44    Notification Interaction: FYI that SW has met with the patients daughter and they are wanting the patient to return to the care home (the [facility]) with possible hospice.  Either way the [facility] is unable to take the patient back with a FT and family is asking to speak to the provider about possibly removing it.  SW to round back with family to further discuss hospice and possible discharge to care home tomorrow.     Purpose of Notification: FYI regarding request from family to discuss removing the FT for anticipation of patient returning to [facility]    Orders Received:    Comments:

## 2019-03-26 NOTE — PLAN OF CARE
Patient disoriented to place, situation. VSS on room air until RRT called around 1920, patient wheezing and in respiratory distress resulting in desaturation. Oxygen increased to 15 L until stable saturation reached (see NP's note). Patient appeared more comfortable for rest of shift, though some dyskinesia still. Urine output low this shift, NP order to continue to monitor, purwick in place.BGs elevated, sliding scale insulin given. Tube feed held tonight.  Daughter at bedside, will continue to monitor for respiratory changes.

## 2019-03-26 NOTE — PLAN OF CARE
Discharge Planner OT   Patient plan for discharge: did not state  Current status: pt max A of 2 sit to stand x 2 trials, pt needing to use BSC, max A of 2 and cues to sequence steps with BSC transfer, dependent for clothing management.   Barriers to return to prior living situation: current level of assist with functional transfers, self-cares  Recommendations for discharge: Memory care per plan established by the Occupational Therapist  Rationale for recommendations: If facility able to meet patient's needs from transfers (assist of 2) and medical perspective (defer to care transitions note re: NJ tube), rec patient stay in familiar facility with ongoing rehab to advance I with transfers and self-cares to decrease burden of care.  If facility unable to meet patient's needs, rec LTC with continued OT to maximize independence         Entered by: Jen Arguelles 03/26/2019 12:43 PM

## 2019-03-26 NOTE — PROGRESS NOTES
CALORIE COUNT      Approximate Oral Intake for:   3/25   Calories: 576 kcal  Protein: 15 grams       Intake from TF/PN:       Of note, TF was held sometime in the afternoon yesterday 3/25, 2/2 RRT called for severe respiratory distress. TF orders are as follows ~  Replete with Fiber at 90 mL/hr (holding QID for Sinemet) = 1440 kcal (16 kcal/kg), 92 g protein (1.75 g/kg), 1200 mL H2O, 22 gm Fiber, 178 g CHO, 96% RDI  Standard water flushes of 60 mL every 4 hours. Total fluid (TF+flush) = 1560 mL/day       Estimated Needs:    Dosing weight: 87.7 kg (3/18 actual wt for energy); 52.3 kg (IBW for protein)  Calories: 6253-4007 kcal (14-18 kcal/kg)  Protein: 78-94 grams (1.5-1.8 g pro/kg)      Summary:   TF currently off. Patient consumed minimal amounts of 3 meals and 1 protein supplement ordered yesterday. Chart reviewed. Goals of care being discussed with family and MD following the RRT, family agreeable to DNR/DNI at this time.       Chinyere Dubois, RD, LD  Clinical Dietitian

## 2019-03-27 NOTE — PLAN OF CARE
Pt alert to self, place. BP high- PRN given x1, otherwise VSS. Exp wheezes with movement, LS diminished. Nebs PRN. O2 available. Carey tube feeding removed todaty patient tolerated well. Pure wik in place with good UOP. Up to BSC w/ flatus but no BM. Continues to have periods of significant dyskinesia. DD2 diet nectar thick- fair appetite. Takes pills whole with water. Turn/Repo Q 2-4 H at family request/acceptance. Up with strong A2-3 + GB or lift if patient is dyskinetic. Plan: Discharge back to [facility] tomorrow.

## 2019-03-27 NOTE — PROGRESS NOTES
[facility]    Hospitalist Progress Note  When I evaluated patient: 03/27/2019     Assessment & Plan   Loan Anderson is a 78-year-old female with history of hypothyroidism, Parkinson's disease with neurogenic bladder who presented with acute encephalopathy and was intubated in the Emergency Department due to concerns for loss of protecting her airway.  She was intubated on 03/02 and extubated on 03/11.  Prolonged intubation and ICU stay related to ongoing delirium. Hospital course complicated by a partial status epilepticus and possible angioedema reaction.     Acute metabolic encephalopathy  Seizure disorder with partial status epilepticus  Dyskinesias  Likely multifactorial related to ICU delirium, postictal state related to partial epileptic seizure, and pain related to fractures below. Gradual improvement however still not back to baseline per daughters. EEG 3/15 with diffuse slowing, no signs of seizure.  - Episode 3/20/2019 concerning for seizure, lacosamide increased.  - Reassessed by neurology 3/21, not seizures, likely dyskinesias brought on by peak effect of sinemet. Multiple adjustments to sinemet (including dose reduction)  - Sinemet 1 tab QID (now spaced at for 6am, 11am, 4pm, 8pm starting 3/26), continue neupro patch  - Continue melatonin 1mg, vimpat 150 BID  - Maintain normal day / night, sleep / wake cycles as able.   - Continue quetiapine at bedtime   - Possibly nebs also causing tremor mimicking dyskinetic movement, will discontinue duoneb, will have PRN albuterol available in case of dyspnea or wheezing.       Dysphagia  Secondary to encephalopathy, prolonged intubation. Family not interested in PEG  - was on TF per NG, on hold now since 3/25 evening given acute respiratory distress. Discussed with family, plan is to take Reynaldo feeding tube out today and continue trial of PO and monitor intake.   - SLP following, has advanced diet to DD2 via spoon, pills one at a time  - Continue  to advance diet per speech  - Appreciate nutrition recs. Continued calorie count today.     Advanced care planning  Palliative care following. Have addressed goals of care at length with family, see note from 3/16/19.   - No escalation of cares (to BiPAP, ICU, pressors, PEG tube. Remains DNR/DNI)  - Appeared to be generally improving, goals remain restorative at this time      Parkinson's disease with neurogenic bladder  Lives in memory care at the [facility] and essentially wheelchair bound d/t high falls risk.  Dtr states normally able to carry on conversation and oriented to situation. Has history of neurogenic bladder but is not Mata dependent  - Sinemet 1 tab QID (timing adjusted), Continue rotigotine patch  - Plan per neurology is to resume prior to admission Parkinson's regimen on discharge  - tolerated mata removal, continue purewick     Acute hypoxic respiratory failure  RRT 3/15/19 due to increased oxygen need,  Respiratory therapist was consulted for increased secretion however patient's weak to cough out all the secretions. Suctioning tried however patient unable to tolerate. Unable to do vest due to multiple rib fractures, patient not able to cooperate for flutter valve.  Mucomyst with breathing treatment tried however patient's O2 sats dropped resulting in RRT. No new infiltrate on CXR 3/15/19   - again, RRT was called 3/25 evening, event noted, suspect episode due to anxiety/CATHIE/aspiration/increased muscle tone/bronchospasm.  - family not wanting recheck of lactic acid, or follow up labs or imaging but concernned about intermittent confusion today. Also have expressed about comfort care/hospice if severely dyspneic spells like prior.   - Wean oxygen, on RA now  - See advanced care planning above     Right T11 and T12 rib fractures with associated posterior right-sided pleural effusion  Right L1 and L2 transverse process fractures  Multifactorial related to mechanical fall prior to admission, as well as  CPR (no documented pulselessness) and Heimlich maneuvers performed for recurrent episodes of unresponsiveness.   - Continue lidocaine patches, scheduled acetaminophen with additional doses PRN  - PT/OT following and recommending TCU  - PRN bowel regimen  - encourage IS.     Possible bladder spasms   Noted 3/17/19 by RN to have possible bladder spasms associated with apparent Silva dysfunction with leaking. Seemed to improve with irrigation and troubleshooting of Silva.  - Continue alpha blocker (terazosin here)  - Possible resumption of myrbetriq once PO intake improves     Leukocytosis  Candiduria, symptomatic  Suspected acute phase reactant and steroid related. Patient had received steroids when she had angioedema. No growth noted on cultures. Procal <0.05. No focal signs/syptoms infection. Afebrile. CXR unchanged 3/15/19 or 3/23. Procalcitonin <0.05, lactic acid resolved without intervention.  - Repeat UA 3/23 early AM with large leuk esterase, some mucous and moderate amount of blood. Leukocytosis up to 16, started ceftriaxone, Urine cx with candida  - Remains afebrile. Stop ceftriaxone. Start 2 week course fluconazole with persistent irritation (first dose 3/24)  - Blood cultures no growth to date.      YASMIN on chronic kidney disease stage II  Baseline creatinine 0.8-0.9. Attributed to prerenal from diuresis.  Gradually improving. She has low muscle mass.   - Monitor periodically      Microscopic hematuria  RBC >182 on UA 3/15/19 on catheterized specimen. Tiffany colored urine. Potentially secondary to Silva trauma in context of prophylactic heparin and aspirin use.    - Recheck in 2-4 weeks as outpatient once Silva removed, pending goals of care   - Noted to have more pink-tinged urine, heparin subcutaneous held.     Possible angioedema  Tongue and lip swelling, 03/09-3/10, after restarted on lisinopril, which was recently started in outpatient setting.  Uncertain whether this was true angioedema and lisinopril  added to allergy list and transitioned to amlodipine.  Improved with steroids.      Hypertension   Recently started on lisinopril, developed tongue and lip swelling concerning for angioedema, with lisinopril discontinued and started on amlodipine.   - Continue amlodipine, scheduled hydralazine 5mg Q8h trend BPs  - family declined hydralazine and BP was high and so had to give IV labetalol per RN.     DVT Prophylaxis: Anti-embolisim stockings (TEDs)  Code Status: DNR/DNI  Expected discharge: taking feeding tube out today, will monitor next 24 hours as her care facility requesting that, discharge likely 3/28 to prior living arrangement- back to [facility] if continues to tolerate PO with reasonable intake with current careplan (If no further spells of dyspnea) vs hospice if recurrence of spells.   -met with family and discussed care plan as above.    Wilfrido Moncada MD  Hospitalist    Interval History   No acute events overnight, daughters at bedside, they feel patient is slightly confused and her answers are off and vague at times. Patient denies any dyspnea. Afebrile. Taking about 50 % PO.     -Data reviewed today: I reviewed all new labs over the last 24 hours.      Physical Exam   Temp: 99.2  F (37.3  C) Temp src: Oral BP: (!) 150/127 Pulse: 71 Heart Rate: 84 Resp: 20 SpO2: 93 % O2 Device: None (Room air)    Vitals:    03/24/19 0535 03/25/19 0100 03/27/19 0649   Weight: 91.3 kg (201 lb 3.2 oz) 93 kg (205 lb 1.6 oz) 90.6 kg (199 lb 11.2 oz)     Vital Signs with Ranges  Temp:  [96.5  F (35.8  C)-99.2  F (37.3  C)] 99.2  F (37.3  C)  Pulse:  [71] 71  Heart Rate:  [66-84] 84  Resp:  [18-20] 20  BP: (145-152)/() 150/127  SpO2:  [93 %-95 %] 93 %  I/O last 3 completed shifts:  In: 30 [P.O.:30]  Out: 1000 [Urine:1000]    Constitutional: Awake, alert, up in recliner, cooperative, no acute distress.  HEENT: PERRLA EOMi, [place] in place in nostril.  Respiratory: clear, no wheezing or rhonchi, on room air  Cardiovascular:  Regular rate and rhythm, normal S1 and S2, and no murmur noted  GI: Normal bowel sounds, soft, non-distended, non-tender  Skin/Integumen: No rashes, no cyanosis, trace edema bilaterally  Other: Answers questions slowly, mild dyskinestic movements in upper extremities in the beginning, which were not noted toward the end of conversation with family/patient.     Medications       acetaminophen  500 mg Oral or NG Tube TID     amLODIPine  10 mg Oral Daily     aspirin  81 mg Oral At Bedtime     carbidopa-levodopa  1 tablet Oral 4x Daily     diclofenac  1 g Transdermal 4x Daily     docusate  100 mg Oral or Feeding Tube BID     famotidine  20 mg Intravenous Q12H     fluconazole  200 mg Oral Daily     hydrALAZINE  5 mg Oral Q8H BURKE     lacosamide  150 mg Oral BID     levothyroxine  112 mcg Oral QAM AC     melatonin  1 mg Oral or Feeding Tube Q24H     multivitamins w/minerals  15 mL Per Feeding Tube Daily     pantoprazole  40 mg Oral QAM AC     QUEtiapine  12.5 mg Oral At Bedtime     rotigotine  1 patch Transdermal Daily     rotigotine   Transdermal Q8H     rotigotine   Transdermal Daily     sodium chloride (PF)  3 mL Intracatheter Q8H     terazosin  1 mg Oral Daily     venlafaxine  37.5 mg Oral TID       Data   Recent Labs   Lab 03/26/19  0926 03/25/19  0658 03/24/19  0729 03/23/19  0035   WBC 18.5* 13.0* 13.2* 16.2*   HGB 12.3 12.0 12.1 12.1   MCV 94 94 93 94   * 474* 510* 553*    138 140 141   POTASSIUM 4.5 3.9 4.1 3.6   CHLORIDE 100 103 105 104   CO2 27 29 31 27   BUN 33* 31* 34* 37*   CR 0.94 0.91 0.84 0.95   ANIONGAP 9 6 4 10   NIKKIE 8.6 8.3* 8.3* 8.3*   * 172* 145* 138*   ALBUMIN  --   --   --  2.7*   PROTTOTAL  --   --   --  7.0   BILITOTAL  --   --   --  0.3   ALKPHOS  --   --   --  259*   ALT  --   --   --  14   AST  --   --   --  21   TROPI  --   --   --  <0.015       No results found for this or any previous visit (from the past 24 hour(s)).

## 2019-03-27 NOTE — PROGRESS NOTES
SW Note (Late Entry from 3/26):    D/I:  SW met with patient's daughter and Vanesa from [facility] regarding patient's discharge plan.  Patient's family would like for patient to return back to [facility].  Per [facility] staff, patient would not be able accept patient back with [place] Tube, Family in understanding of this and requesting to speak with MD about removing tube. SW updated physician. SW discussed Hospice care with family and they are deciding if they would like to do restorative or hospice care for patient.  Vanesa with [facility] requesting that patient would return back to the facility on 3/28.  Per Vanesa, they prefer to use [facility] and she had discussed this with patient's daughter.  SW met with patient and  regarding decision and they are still deciding.     P: SW will continue to follow for discharge.    Quentin Munguia, ELLE, LGSW

## 2019-03-27 NOTE — PLAN OF CARE
"Discharge Planner SLP   Patient plan for discharge: pt wants to \"go home\"   Current status: Swallow tx completed with just liquids per pt request. Pt more alert, upright in chair this date.  present. Trials of nectar thick via tsp and cup completed. Mild increased tremors observed, control via cup mildly impaired given same however no overt signs/sx aspiration noted. Continue current recs, cautiously allow cup drinking only when alert/upright. If increased difficulty, defer back to tsp only.     Recommend 1) continue DDL2 with nectar thick liquids (tsp or cup, defer back to tsp only if any increased difficulty). 2) Swallow precautions: slow rate, small bites/sip, alternate consistencies, 1-1 assist with feeds, single pills in puree.     Barriers to return to prior living situation: no current SLP barriers  Recommendations for discharge: TCU with ongoing SLP services, however per latest SW note, discharge back to baseline facility - with hospice  Rationale for recommendations: continued SLP for dysphagia for safe return to baseline diet       Entered by: Myla Pepe 03/27/2019 4:02 PM      "

## 2019-03-27 NOTE — PLAN OF CARE
Discharge Planner PT   Patient plan for discharge: Pt's family is hopeful pt can return to [facility].   Current status: Mod-A of 2 supine>sit, time taken to encouragement participation. Mod/min-A of 2 sit<>stand x 6. Stands with B HHA x 2 minutes. Pt ambulates 20' within room with B HHA x 3 with rest breaks between. Ambulates with B flexed knee gait, increases with fatigue. Rest breaks provided between bouts. Heavy HHA for balance. Cues to larger steps. Mild freezing. Up in chair at end of tx.  Discharging tomorrow per chart.  Barriers to return to prior living situation: Level of assist, Spinal Precautions, Fall risk  Recommendations for discharge: Return to memory care unit with continue PT services  Rationale for recommendations: Pt will benefit from continued PT in order to progress activity tolerance, independence and safety with mobility.        Entered by: Ulises Kirk 03/27/2019 4:12 PM

## 2019-03-27 NOTE — PROGRESS NOTES
YAMILET Note:    D/I:  YAMILET spoke with patient's spouse and daughter Kassandra regarding discharge planning for patient.  They would like to have patient return to [facility]. Per Kassandra, family would like to have patient sign on with [facility].  YAMILET placed call and spoke with Camille at [facility] ([phone]) and made referral.  Plan is for patient to return to [facility] around 12:30 and [place] will plan to meet with family at the facility around 1:00.  [facility] requesting that patient be sent with medications for one day and they will take care of the rest of the medications.   Updated daughter Kassandra and she is in agreement with the discharge plan and will explain to patient's spouse. YAMILET placed call to Vanesa and she is in agreement with discharge plan for 3/28.  YAMILET Faxed Facesheet, H&P to [facility] at [phone].  Family stated they will transport patient.     P: YAMILET will continue to follow for discharge.     Quentin Munguia, MSW, LGSW

## 2019-03-27 NOTE — PROGRESS NOTES
"SPIRITUAL HEALTH SERVICES Progress Note  [facility]    Visit with pt and daughter Lamberto for follow-up visit.  Pt said, \"I'm crabby today!  I want to leave the hospital today and my daughters won't let me!\"    In conversation, pt moves from present to past, seemingly in and out of confusion and orientation.  Able to be redirected at times, and also in some ways able to notice that what she's saying doesn't always make sense.    Pt's daughter shared plan for pt to discharge tomorrow (Thursday).    Provided empathic listening, supportive conversation and prayer.   team available per additional request.                                                                                                                                                 Loan Carter M.A.  Staff   Pager [phone]  Phone [phone]      "

## 2019-03-27 NOTE — PROGRESS NOTES
CALORIE COUNT      Approximate Oral Intake for:    3/26/29  Calories:  400 kcal  Protein:  8 grams       Intake from TF/PN:     TF has been on hold since 3/25 for RRT (continue to hold while POC determine per progress notes).      Estimated Needs:    Calories:  3488-0223 kcal (14-17 kcal/kg)  Protein:  78-94 grams (1.5-1.8 g/kg)      Summary:   Noted family considering removing the feeding tube as [facility] will not take patient back with a FT in.  Hospice is also being considered.   Three day calorie count summary to follow 3/28/19.    Renee Singleton RD, LD, CNSC   Clinical Dietitian - [facility]

## 2019-03-28 NOTE — PLAN OF CARE
Pt Alert to self. Family deferred vitals and most assessment, refusing pain managment. PIV SL. DD2 w/ thick liquids. Repo when family allows. Purewick in place, change at 0800. Family bedside and active in cares. Plan to discharge back to [facility] w/ hospice.

## 2019-03-28 NOTE — PLAN OF CARE
Occupational Therapy Discharge Summary    Reason for therapy discharge:    Discharged to home with hospice.     Progress towards therapy goal(s). See goals on Care Plan in Epic electronic health record for goal details.  Goals not met.  Barriers to achieving goals:   limited tolerance for therapy.    Therapy recommendation(s):    No further therapy is recommended. Defer further to hospice care.

## 2019-03-28 NOTE — PLAN OF CARE
Physical Therapy Discharge Summary    Reason for therapy discharge:    Discharged to hospice care     Progress towards therapy goal(s). See goals on Care Plan in [facility] electronic health record for goal details.  Goals not met.  Barriers to achieving goals:   limited tolerance for therapy.    Therapy recommendation(s):    No further therapy is recommended.  Defer to hospice care.

## 2019-03-28 NOTE — PLAN OF CARE
"Speech Language Therapy Discharge Summary    Reason for therapy discharge:    Patient/family request discontinuation of services.    Progress towards therapy goal(s). See goals on Care Plan in [facility] electronic health record for goal details.  Pt discharging with hospice care.      Therapy recommendation(s):    Per last SLP note, \"Recommend 1) continue DDL2 with nectar thick liquids (tsp or cup, defer back to tsp only if any increased difficulty). 2) Swallow precautions: slow rate, small bites/sip, alternate consistencies, 1-1 assist with feeds, single pills in puree. \" No further SLP services indicated at this time. Will complete orders.        "

## 2019-03-28 NOTE — PLAN OF CARE
AO to self and place, VSS, LS diminished. Nebs PRN. Incontinent w/Pure wick Continues to have periods of significant dyskinesia. DD2 diet nectar thick- fair appetite. Takes pills whole with water. Turn/Repo Q 2-4 H at family request/acceptance. Discharge to [facility] tomorrow

## 2019-03-28 NOTE — DISCHARGE SUMMARY
[facility]    Discharge Summary  Hospitalist    Date of Admission:  3/2/2019  Date of Discharge:  3/28/2019  Discharging Provider: Wilfrido Moncada MD  Date of Service (when I saw the patient): 03/28/19    Discharge Diagnoses     Acute metabolic encephalopathy    Seizure disorder with partial status epilepticus    Parkinson's disease and Dyskinesias    Dysphagia    Parkinson's disease    Neurogenic bladder    Acute hypoxic respiratory failure    Right T11 and T12 rib fractures with associated posterior right-sided pleural effusion    Right L1 and L2 transverse process fractures    Possible bladder spasms    Candiduria    Micorscopic hematuria    YASMIN on CKD stage 2    Possible angioedema vs bronchospasm      History of Present Illness   Loan Anderson is a 78-year-old female with history of hypothyroidism, Parkinson's disease with neurogenic bladder who presented with acute encephalopathy and was intubated in the Emergency Department due to concerns for loss of protecting her airway. She was intubated on 03/02 and extubated on 03/11. Prolonged intubation and ICU stay related to ongoing delirium. Hospital course complicated by a partial status epilepticus and possible angioedema reaction.     Hospital Course   Loan Anderson was admitted on 3/2/2019.  The following problems were addressed during her hospitalization:     Acute metabolic encephalopathy  Seizure disorder with partial status epilepticus  Dyskinesias  Likely multifactorial related to ICU delirium, postictal state related to partial epileptic seizure, and pain related to fractures below. Gradual improvement however still not back to baseline at discharge per daughters. EEG 3/15 with diffuse slowing, no signs of seizure.  - Episode 3/20/2019 concerning for seizure, lacosamide increased to 150 mg BID.  - Reassessed by neurology 3/21, likely dyskinesias brought on by peak effect of sinemet. Multiple adjustments to sinemet (including  dose reduction)  - Sinemet 1 tab QID (now spaced at for 5am, 10am, 3pm, 8:30pm starting 3/26), continued neupro patch  - Continue melatonin 1mg   - Possibly nebs also causing tremor mimicking dyskinetic movement, discontinued per daughter's request.     Dysphagia  Secondary to encephalopathy, prolonged intubation. Family not interested in PEG  - was on TF per NG, held since 3/25 evening given acute respiratory distress. Discussed with family, and feeding tube removed. SLP followed, has advanced diet to DD2 via spoon, pills one at a time  - Continue to advance diet per speech      Advanced care planning  Palliative care met with family.   - No escalation of cares (to BiPAP, ICU, pressors, labs, PEG tube. Remains DNR/DNI)  - Family meeting with hospice this afternoon after discharge at the [facility]     Parkinson's disease with neurogenic bladder  Lives in memory care at the [facility] and essentially wheelchair bound d/t high falls risk.  Dtr states normally able to carry on conversation and oriented to situation. Has history of neurogenic bladder but is not Mata dependent  - Sinemet 1 tab QID (timing adjusted), Continue rotigotine patch.  - tolerated mata removal      Acute hypoxic respiratory failure  RRT 3/15/19 due to increased oxygen need,  Respiratory therapist was consulted for increased secretion however patient's weak to cough out all the secretions. Suctioning tried however patient unable to tolerate. Unable to do vest due to multiple rib fractures, patient not able to cooperate for flutter valve.  Mucomyst with breathing treatment tried however patient's O2 sats dropped resulting in RRT. No new infiltrate on CXR 3/15/19   - again, RRT was called 3/25 evening, event noted, suspect episode due to anxiety/CATHIE/aspiration/increased muscle tone/bronchospasm.  - family not wanting recheck of lactic acid, or follow up labs or imaging but concernned about intermittent confusion today. Also expressed about comfort  care/hospice if severely dyspneic spells like prior.   - Weaned oxygen, on RA now  - See advanced care planning above     Right T11 and T12 rib fractures with associated posterior right-sided pleural effusion  Right L1 and L2 transverse process fractures  Multifactorial related to mechanical fall prior to admission, as well as CPR (no documented pulselessness) and Heimlich maneuvers performed for recurrent episodes of unresponsiveness.   - Tylenol and ibuprofen for pain at discharge. NO tramadol given seizure.   - PRN bowel regimen  - encourage IS.     Possible bladder spasms   Noted 3/17/19 by RN to have possible bladder spasms associated with apparent Silva dysfunction with leaking. Seemed to improve with irrigation and troubleshooting of Silva.  - Continue alpha blocker (terazosin here)  - PTA myrbetriq resumed     Leukocytosis  Candiduria, symptomatic  Suspected acute phase reactant and steroid related. Patient had received steroids when she had angioedema. No growth noted on cultures. Procal <0.05. No focal signs/syptoms infection. Afebrile. CXR unchanged 3/15/19 or 3/23. Procalcitonin <0.05, lactic acid resolved without intervention.  - Repeat UA 3/23 early AM with large leuk esterase, some mucous and moderate amount of blood. Leukocytosis up to 16, started ceftriaxone, Urine cx with candida. Rocephin stopped and treating with 2 week course fluconazole with persistent irritation       YASMIN on chronic kidney disease stage II  Baseline creatinine 0.8-0.9. Attributed to prerenal from diuresis.  Gradually improving. She has low muscle mass.       Microscopic hematuria  RBC >182 on UA 3/15/19 on catheterized specimen. Tiffany colored urine. Potentially secondary to Silva trauma in context of prophylactic heparin and aspirin use.    - Noted to have more pink-tinged urine, heparin subcutaneous held.     Possible angioedema  Tongue and lip swelling, 03/09-3/10, after restarted on lisinopril, which was recently started in  outpatient setting.  Uncertain whether this was true angioedema and lisinopril added to allergy list and transitioned to amlodipine.  Improved with steroids.      Hypertension   Recently started on lisinopril, developed tongue and lip swelling concerning for angioedema, with lisinopril discontinued and started on amlodipine.   - Continue amlodipine. Daughter did not want other anti HTN medications     Wilfrido Moncada MD  Hospitalist    Significant Results and Procedures   As above per note, imaging studies attached.    Pending Results   These results will be followed up by none    Unresulted Labs Ordered in the Past 30 Days of this Admission     Date and Time Order Name Status Description    3/23/2019 0157 Blood culture Preliminary     3/23/2019 0027 Blood culture Preliminary           Code Status   DNR / DNI       Primary Care Physician   Physician No Ref-Primary    Constitutional:    Awake, alert, up in chair, cooperative, no acute distress.  HEENT: PERRLA EOMi, Reynaldo removed  Respiratory: Rt base dim, crackles (+) , no wheezing, on room air.  Cardiovascular: Regular  S1 and S2, and no murmur noted  GI: Normal bowel sounds, soft, non-distended, non-tender  Skin/Integumen: No rashes, no cyanosis, trace edema bilaterally  Other:   Answers questions slowly, mild dyskinestic movements in upper extremities              Discharge Disposition   Discharged to snf  Condition at discharge: Guarded    Consultations This Hospital Stay   PHARMACY TO DOSE VANCO  NEUROLOGY IP CONSULT  SPINE SURGERY ADULT IP CONSULT  NUTRITION SERVICES ADULT IP CONSULT  PHARMACY IP CONSULT  NEUROSURGERY IP CONSULT  ORTHOSIS BRACE IP CONSULT  PALLIATIVE CARE ADULT IP CONSULT  PHYSICAL THERAPY ADULT IP CONSULT  OCCUPATIONAL THERAPY ADULT IP CONSULT  SWALLOW EVAL SPEECH PATH AT BEDSIDE IP CONSULT  ORTHOSIS BRACE IP CONSULT  RESPIRATORY CARE IP CONSULT  CARE TRANSITION RN/SW IP CONSULT  INFECTIOUS DISEASES IP CONSULT  NEUROLOGY IP CONSULT  NUTRITION  SERVICES ADULT IP CONSULT  RESPIRATORY CARE IP CONSULT    Time Spent on this Encounter   IWilfrido, personally saw the patient today and spent greater than 30 minutes discharging this patient.    Discharge Orders      Reason for your hospital stay    Seizure, encephalopathy.     Follow-up and recommended labs and tests     Follow up with hospice as planned.     Activity    Your activity upon discharge: activity as tolerated     Discharge Instructions    DO NOT give tramadol for pain.     DNR/DNI     Diet    Follow this diet upon discharge:        Dysphagia Diet Level 2 [facility] Altered Nectar Thickened Liquids (pre-thickened or use instant food thickener)     Discharge Medications   Current Discharge Medication List      START taking these medications    Details   acetaminophen (TYLENOL) 325 MG tablet Take 2 tablets (650 mg) by mouth every 4 hours as needed for mild pain    Associated Diagnoses: Primary osteoarthritis of both knees      amLODIPine (NORVASC) 10 MG tablet Take 1 tablet (10 mg) by mouth daily  Qty: 30 tablet, Refills: 0    Associated Diagnoses: Essential hypertension      bisacodyl (DULCOLAX) 10 MG suppository Place 1 suppository (10 mg) rectally daily as needed for constipation  Qty: 10 suppository, Refills: 0    Associated Diagnoses: Constipation, unspecified constipation type      diclofenac (VOLTAREN) 1 % topical gel Place 1 g onto the skin 4 times daily  Qty: 100 g, Refills: 0    Comments: Apply to R upper & lower ant chest wall & L upper later chest wall & upper LS in areas of know fracture pain.  Associated Diagnoses: Closed fracture of multiple ribs of right side, initial encounter      fluconazole (DIFLUCAN) 200 MG tablet Take 1 tablet (200 mg) by mouth daily  Qty: 9 tablet, Refills: 0    Associated Diagnoses: Candida UTI      lacosamide (VIMPAT) 10 MG/ML SOLN solution Take 15 mLs (150 mg) by mouth 2 times daily  Qty: 465 mL, Refills: 0    Associated Diagnoses: Seizure disorder (H)       LORazepam (ATIVAN) 2 MG/ML (HIGH CONC) solution Take 0.13-0.25 mLs (0.26-0.5 mg) by mouth, place under tongue or insert rectally every 4 hours as needed for anxiety (restlessness. Only after patient enrolls in to hospice. Not to use until then.)  Qty: 30 mL, Refills: 1    Associated Diagnoses: Hospice care patient      MEDICATION INSTRUCTION If care facility cannot accept or use ranges, facility is instructed to use lower end of dosing range    Associated Diagnoses: Hospice care patient      melatonin 1 MG TABS tablet Take 1 tablet (1 mg) by mouth At Bedtime  Qty: 30 tablet, Refills: 0    Associated Diagnoses: Insomnia, unspecified type      morphine 2.5 MG solu-tab Place 1 tablet (2.5 mg) under the tongue every 2 hours as needed for shortness of breath / dyspnea or severe pain (and/or shortness of breath. Only after enrolls in to hospice. Not to use until then)  Qty: 30 tablet, Refills: 0    Associated Diagnoses: Hospice care patient      terazosin (HYTRIN) 1 MG capsule Take 1 capsule (1 mg) by mouth daily  Qty: 30 capsule, Refills: 0    Associated Diagnoses: Neurogenic bladder         CONTINUE these medications which have CHANGED    Details   carbidopa-levodopa (SINEMET)  MG tablet Take 1 tablet by mouth 4 times daily  Qty: 120 tablet, Refills: 0    Comments: At 5 am, 10 am, 3 pm and 8:30 pm  Associated Diagnoses: Parkinson disease (H)      ibuprofen (ADVIL/MOTRIN) 200 MG tablet Take 2 tablets (400 mg) by mouth every 8 hours as needed for mild pain  Qty: 100 tablet    Associated Diagnoses: Closed fracture of multiple ribs of right side, initial encounter      omeprazole (PRILOSEC) 20 MG DR capsule Take 1 capsule (20 mg) by mouth daily  Qty: 30 capsule, Refills: 0    Associated Diagnoses: Gastroesophageal reflux disease, esophagitis presence not specified      venlafaxine (EFFEXOR) 37.5 MG tablet Take 1 tablet (37.5 mg) by mouth 3 times daily  Qty: 90 tablet, Refills: 0    Associated Diagnoses: Dysthymic  disorder         CONTINUE these medications which have NOT CHANGED    Details   aspirin 81 MG tablet Take 81 mg by mouth At Bedtime @ midnight  Qty: 30 tablet      cholecalciferol 5000 units CAPS Take 1 capsule by mouth every morning @ 6am  Qty: 30 capsule      docusate sodium (COLACE) 100 MG capsule Take 300 mg by mouth daily 3 x 100mg capsule @ noon  Qty: 60 capsule      levothyroxine (SYNTHROID/LEVOTHROID) 112 MCG tablet Take 112 mcg by mouth daily @ 6am      mirabegron (MYRBETRIQ) 25 MG 24 hr tablet Take 25 mg by mouth daily @ 6am      Multiple Vitamins-Minerals (EYE VITAMINS) CAPS Take 1 capsule by mouth daily 1 capsule (macu-health) by mouth daily @ midnight  Qty: 30 capsule      polyethylene glycol (MIRALAX) powder Take 17 g by mouth daily as needed @0600  Qty: 119 g      rotigotine (NEUPRO) 4 MG/24HR 24 hr patch Apply patch daily @ bedtime/8 pm  Qty: 90 patch, Refills: 1    Comments: NEW REGIMEN  Associated Diagnoses: Paralysis agitans (H)      tamsulosin (FLOMAX) 0.4 MG capsule Take 0.4 mg by mouth daily @ 6pm  Qty: 30 capsule, Refills: 11    Associated Diagnoses: Urinary hesitancy      trospium (SANCTURA XR) 60 MG CP24 24 hr capsule Take 60 mg by mouth every morning (before breakfast) @ 6am  Qty: 90 each, Refills: 3    Associated Diagnoses: Overactive bladder         STOP taking these medications       carbidopa-levodopa (SINEMET CR)  MG CR tablet Comments:   Reason for Stopping:         lisinopril (PRINIVIL/ZESTRIL) 5 MG tablet Comments:   Reason for Stopping:         traMADol (ULTRAM) 50 MG tablet Comments:   Reason for Stopping:             Allergies   Allergies   Allergen Reactions     Ace Inhibitors      Tongue and lip swelling just after restarting lisinopril on 3/9/19 at higher dose while intubated.  Unclear if actually angioedema or not as swelling resolved with steroids.     Atorvastatin      Concern that it was contributing to confusion     Mirapex [Pramipexole Dihydrochloride Monohydrate]       Leg swelling     Potassium Chloride      IV infusion only. She tolerates oral potassium chloride     Requip [Ropinirole Hydrochloride]      Leg swelling     Epinephrine Palpitations     Data   Most Recent 3 CBC's:  Recent Labs   Lab Test 03/26/19  0926 03/25/19  0658 03/24/19  0729   WBC 18.5* 13.0* 13.2*   HGB 12.3 12.0 12.1   MCV 94 94 93   * 474* 510*      Most Recent 3 BMP's:  Recent Labs   Lab Test 03/26/19  0926 03/25/19  0658 03/24/19  0729    138 140   POTASSIUM 4.5 3.9 4.1   CHLORIDE 100 103 105   CO2 27 29 31   BUN 33* 31* 34*   CR 0.94 0.91 0.84   ANIONGAP 9 6 4   NIKKIE 8.6 8.3* 8.3*   * 172* 145*     Most Recent 2 LFT's:  Recent Labs   Lab Test 03/23/19  0035 03/13/19  2251   AST 21 20   ALT 14 7   ALKPHOS 259* 168*   BILITOTAL 0.3 0.5     Most Recent INR's and Anticoagulation Dosing History:  Anticoagulation Dose History     Recent Dosing and Labs Latest Ref Rng & Units 2/27/2019 3/2/2019    INR 0.86 - 1.14 0.93 1.04        Most Recent 3 Troponin's:  Recent Labs   Lab Test 03/23/19  0035 03/13/19  2251 03/02/19  1449   TROPI <0.015 <0.015 <0.015     Most Recent Cholesterol Panel:No lab results found.  Most Recent 6 Bacteria Isolates From Any Culture (See EPIC Reports for Culture Details):  Recent Labs   Lab Test 03/23/19  0220 03/23/19  0035 03/23/19  0030 03/16/19  2200 03/15/19  1414 03/15/19  1409   CULT No growth after 5 days No growth after 5 days 10,000 to 50,000 colonies/mL  Candida albicans / dubliniensis  Candida albicans and Candida dubliniensis are not routinely speciated  * No growth No growth No growth     Most Recent TSH, T4 and A1c Labs:  Recent Labs   Lab Test 03/03/19  0630 03/02/19  1449   TSH 3.12  --    A1C  --  6.7*     Results for orders placed or performed during the hospital encounter of 03/02/19   CT Head w/o Contrast    Narrative    CT SCAN OF THE HEAD WITHOUT CONTRAST   3/2/2019 3:10 PM     HISTORY: Altered level of consciousness (LOC),  unexplained.    TECHNIQUE:  Axial images of the head and coronal reformations without  IV contrast material.  Radiation dose for this scan was reduced using  automated exposure control, adjustment of the mA and/or kV according  to patient size, or iterative reconstruction technique.    COMPARISON: None.    FINDINGS: There is some moderate cerebral atrophy. There is an old  infarct in the left basal ganglia region. There are some patchy white  matter changes in both hemispheres without mass effect. The patient is  intubated. The visualized paranasal sinuses and mastoid air cells are  clear. There is no evidence for intracranial hemorrhage, acute  infarct, mass effect, or skull fracture. Bilateral angi hole  procedures are noted.      Impression    IMPRESSION: Chronic changes. No evidence for intracranial hemorrhage  or any acute process.       SHAGUFTA MOORE MD   CT Head Neck Angio w/o & w Contrast    Narrative    CTA HEAD NECK WITH CONTRAST 3/2/2019 3:18 PM     HISTORY: Headache, sudden, carotid/vertebral dissection suspected/    TECHNIQUE: Multiplanar multisequence images were obtained through the  neck without and with intravenous contrast. 70 mL of Isovue-370 was  given. Multiplanar reconstructions were performed. 3-D reconstructions  off a remote workstation for CT angiography were also acquired.  Carotid stenoses were evaluated by comparing the caliber of the  proximal internal carotid artery to the caliber of the distal internal  carotid artery. Radiation dose for this scan was reduced using  automated exposure control, adjustment of the mA and/or kV according  to patient size, or iterative reconstruction technique.    FINDINGS:    Brachiocephalic vessels: Normal.    Right carotid system: Normal.    Left carotid system: Trace amount of plaque. No stenosis or  dissection.    Right vertebral artery: Normal.    Left vertebral artery: Normal.    [place]: Normal.    Other findings: The patient is intubated.  The tip of the ET tube is  above the sánchez. Trachea has slight deviation to the right. There is  a moderate size right pleural effusion. Degenerative changes are seen  in the spine. There is some minimal degenerative spondylolisthesis of  C3 and C4.      Impression    IMPRESSION:  1. Negative CT angiography head and neck.  2. Moderate size right pleural effusion.     SHAGUFTA MOORE MD   XR Chest Port 1 View    Narrative    CHEST PORTABLE ONE VIEW     3/2/2019 3:00 PM     HISTORY: Tube placement.    COMPARISON: 2/27/2019.      Impression    IMPRESSION: Endotracheal tube in place in mid trachea. New area of  atelectasis or infiltrate in the right lung base. Left lung is clear.  Heart size is upper normal. Pulmonary vasculature does not appear  distended.      KELLIE BANKS MD   CT Chest (PE) Abdomen Pelvis w Contrast    Narrative    CT CHEST PE, ABDOMEN PELVIS WITH CONTRAST   3/2/2019 3:26 PM     HISTORY: Trauma. Altered mental status. Hypotension.    TECHNIQUE: 75 mL Isovue-370 IV were administered. After contrast  administration, volumetric helical sections were acquired from the  thoracic inlet to the ischial tuberosities. Pulmonary embolism  protocol was performed through the chest. Coronal images were also  reconstructed. Radiation dose for this scan was reduced using  automated exposure control, adjustment of the mA and/or kV according  to patient size, or iterative reconstruction technique.    COMPARISON: CT of the abdomen and pelvis performed 10/24/2017.    FINDINGS:    Chest: No evidence for pulmonary embolism or thoracic aortic  dissection. Atherosclerotic calcification of the thoracic aorta.  Endotracheal tube is in place, with tip approximately 2.5 cm above the  sánchez. Small to moderate right pleural effusion, with mild associated  compressive atelectasis at the right lung base posteriorly. No  pericardial or left pleural effusion. There is mild atelectasis at the  left lung base posteriorly. Mild patchy  mosaic attenuation is noted in  the lungs bilaterally. There are displaced acute appearing fractures  involving the right 10th and 11th ribs posteriorly. Mildly displaced  fractures of the right second rib in two places laterally and  anteriorly. There are also mildly displaced fractures of the right  third through sixth ribs laterally. Nondisplaced fracture of the left  second rib laterally.    Abdomen and Pelvis: No bowel obstruction. There is a moderate amount  of stool in the rectum. No free fluid in the pelvis. No convincing  evidence for colitis or diverticulitis. No intra-abdominal fluid  collections to suggest hematoma. No free intraperitoneal air.  Scattered cortical scarring in the left kidney is not significantly  changed, given differences in technique. The liver, gallbladder,  spleen, adrenal glands, pancreas, and kidneys are otherwise  unremarkable. No hydronephrosis. No evidence for solid organ injury.  Mild atherosclerotic aortoiliac calcification. The uterus is not seen,  and is likely surgically absent. Degenerative changes are noted in the  thoracolumbar spine. Displaced acute appearing fractures are noted  involving the L1 and L2 transverse processes on the right.      Impression    IMPRESSION:   1. Displaced acute appearing fractures are noted involving the right  10th and 11th ribs posteriorly, as well as the right 2nd through 6th  ribs laterally. Nondisplaced fracture of the left second rib laterally  may also be acute. There are also acute-appearing displaced fractures  of the L1 and L2 transverse processes on the right.  2. No evidence for pulmonary embolism.  3. Small to moderate right pleural effusion.  4. Mild patchy mosaic attenuation in the lungs bilaterally may be  related to air trapping.  5. Moderate amount of stool in the rectum.      NUVIA ISLAS MD   XR Abdomen Port 1 View    Narrative    ABDOMEN PORTABLE ONE VIEW 3/2/2019 8:32 PM     HISTORY: Confirm gastric tube  placement.    COMPARISON: CT chest abdomen pelvis from earlier the same day.      Impression    IMPRESSION: New enteric tube tip and sidehole project over the mid  abdomen.     ZACHARY SALCEDO MD   XR Chest Port 1 View    Narrative    CHEST ONE VIEW PORTABLE  3/6/2019 8:05 AM     HISTORY:  Assess ETT, assess for infiltrate, infection.    COMPARISON: Chest x-ray 3/2/2019.      Impression    IMPRESSION: Endotracheal tube terminates approximately 3 cm above the  sánchez, unchanged. New probable layering small to moderate right  pleural effusion is noted. This is consistent with recent CT chest,  abdomen and pelvis findings on 3/2/2019. Left lung is grossly clear.  No obvious infiltrate or consolidation. Heart size is mildly enlarged  but unchanged. No obvious pneumothorax based on this supine view.    LILLIAN PEARSON MD   CT Lumbar Spine w/o Contrast    Narrative    CT LUMBAR SPINE WITHOUT CONTRAST 3/6/2019 8:37 PM     HISTORY: L1/L2 fracture    TECHNIQUE: Axial images were obtained through the lumbar spine without  contrast. Coronal and sagittal reconstructions were also acquired.  Radiation dose for this scan was reduced using automated exposure  control, adjustment of the mA and/or kV according to patient size, or  iterative reconstruction technique.    FINDINGS: Sagittal reconstructions demonstrate normal posterior  alignment. There are acute right L1 and L2 transverse process  fractures as well as a right T11 and possibly T12 rib fracture.  Vertebral bodies are intact. There is a Schmorl's no deforming the  inferior endplate of T12. Disc space narrowing is present at L5-S1.    T12-L1: Minimal disc bulge. No stenosis.    L1-L2: Normal.    L2-L3: Normal.    L3-L4: Minimal disc bulging and posterior osteophyte formation as well  as facet hypertrophy is present. There is mild central canal stenosis,  but no neural foraminal stenosis.    L4-L5: Broad-based disc bulging and moderate to severe facet  ligamentum flavum  hypertrophy is present causing some mild to moderate  central canal and mild bilateral neural foraminal stenosis.    L5-S1: Posterior osteophyte formation and facet hypertrophy is  present. There is mild bilateral neural foraminal stenosis, but no  central canal stenosis.      Impression    IMPRESSION:  1. Right L1 and L2 transverse process fractures. There are also  probable right T11 and T12 rib fractures, although there is motion  artifact at this level which is only partially included on the exam.  2. Degenerative changes in spine as described.    SHAGUFTA MOORE MD   XR Abdomen Port 1 View    Narrative    ABDOMEN ONE VIEW PORTABLE  3/7/2019 3:45 PM     HISTORY: TF placement verification.    COMPARISON: March 2, 2019.       Impression    IMPRESSION: Feeding tube probably coiled in stomach versus less likely  the tip minimally into the duodenum.      TAMMI JIMENEZ MD   XR Abdomen Port 1 View    Narrative    ABDOMEN PORTABLE ONE VIEW March 9, 2019 10:31 AM     HISTORY: Assess NJ placement.    COMPARISON: 3/7/2019.      Impression    IMPRESSION: Again seen is a nasogastric tube with its tip in the  expected region of the body of the stomach. There is also a feeding  tube which has progressed into the duodenum with its tip at the  ligament of Treitz. The feeding tube guidewire has been removed. Bowel  gas pattern is unremarkable.    RADHA MOLINA MD   XR Chest Port 1 View    Narrative    CHEST PORTABLE ONE VIEW March 9, 2019 10:40 AM     HISTORY: Intubated.    COMPARISON: 3/6/2019.      Impression    IMPRESSION: Endotracheal tube is unchanged in position. Nasogastric  tube and feeding tube are again seen coursing into the abdomen. Hazy  opacity throughout most of the right hemithorax consistent with a  moderate-sized pleural effusion. The left lung remains clear and the  heart size is normal and unchanged. Overall, no significant change  since the prior exam.    RADHA MOLINA MD   MR Brain w/o & w Contrast     Narrative    MRI BRAIN WITHOUT AND WITH CONTRAST  3/11/2019 3:53 PM    HISTORY: Encephalopathy. Advanced Parkinson's disease, presented with  partial seizures, question of cardiac arrest prior to admit.     TECHNIQUE: Multiplanar, multisequence MRI of the brain without and  with 9 mL Gadavist.    COMPARISON: CT angiogram 3/2/2019    FINDINGS: There is generalized atrophy of the brain. White matter T2  hyperintensities are seen in the cerebral hemispheres consistent with  sequelae of small vessel ischemic disease. There is no evidence of  hemorrhage, mass or acute infarct. There is a developmental venous  anomaly in the right postcentral gyrus medially showing diffuse  gadolinium enhancement. No other gadolinium enhancing lesions are  seen. Old angi holes are seen in the frontal bones.    There is scattered mucosal thickening in the paranasal sinuses. There  are bilateral intraocular lens implants. The arteries at the base of  the brain and the dural venous sinuses appear patent. Fluid is seen in  mastoid air cells bilaterally, new since the previous CT scan. This is  likely related to the intubation.      Impression    IMPRESSION:  1. No acute abnormality.  2. Brain atrophy and white matter changes consistent with sequelae of  small vessel ischemic disease.  3. 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  4. Fluid in the mastoid air cells likely related to endotracheal  intubation.      JOSE JUAN MARINELLI MD   XR Abdomen Port 1 View    Narrative    ABDOMEN ONE VIEW PORTABLE  3/13/2019 12:11 PM     HISTORY: Feeding tube location    COMPARISON: 3/9/2019      Impression    IMPRESSION: Tip of the feeding tube is projected over the junction of  the third and fourth portions of the duodenum. Nonobstructive gas  pattern.    ZACK ESPOSITO MD   XR Chest Port 1 View    Narrative    XR CHEST PORT 1 VW  3/13/2019 10:54 PM     HISTORY:  Elevated lactic acid, recent prolonged intubation, any  infectious  signs?    COMPARISON: Film dated 3/9/2018    FINDINGS:  The ET tube has been removed. An NG tube is in place. There  is opacity at the right lung base. This is consistent with a right  pleural effusion and associated infiltrate/atelectasis. The left lung  remains clear.      Impression    IMPRESSION: Persistent right pleural effusion and associated right  basilar infiltrate or atelectasis. The patient had similar findings on  the chest x-ray dated 3/9/2018.    ALE COX MD   XR Chest Port 1 View    Narrative    CHEST ONE VIEW PORTABLE   3/15/2019 1:19 PM     HISTORY:  Hypoxia.    COMPARISON: Chest x-ray 3/13/2019.      Impression    IMPRESSION: Portable view of the chest is performed. Feeding tube  extends below the left hemidiaphragm off the inferior aspect of the  image. Probable small right pleural effusion is unchanged. Heart size  appears mildly prominent but unchanged. No obvious left pleural fluid.  No evidence of pneumothorax. No new infiltrate or lung consolidation  is appreciated.    LILLIAN PEARSON MD   XR Video Swallow w/o Esophagram    Narrative    VIDEO SWALLOWING EVALUATION March 19, 2019 2:12 PM     HISTORY: Dysphagia.    COMPARISON: None.    FLUOROSCOPY TIME: 1.2 minutes.  SPOT IMAGES OR CINE RUNS: Ten.    FINDINGS:    Thin: Penetration, slightly improved with head tuck.    Nectar: No penetration or aspiration.    Honey: Not administered.    Pudding: No penetration or aspiration.    Semisolid: No penetration or aspiration.    Solid: Not administered.    This study only includes the cervical esophagus.    JOSE JUAN LEAL MD   XR Chest Port 1 View    Narrative    XR CHEST PORT 1 VW  3/23/2019 12:31 AM     INDICATION: Respiratory distress.    COMPARISON: 3/15/2019.      Impression    IMPRESSION: Shallow inspiration. No new focal air-space disease or  significant change. Small right pleural effusion is again seen. Stable  heart size.    CELIA BOB MD

## 2019-03-28 NOTE — PROGRESS NOTES
YAMILET Discharge Note:    D/I:  Received discharge orders for patient.  Patient will return to [facility] with [facility] today at 12:30.  Hospice will meet with family at facility at 1:30 today.  Family declined transport stating they will provide transportation for patient. Updated facility and faxed the orders to [phone] and faxed orders to [facility] at [phone].  Patient's daughter asked YAMILET if she could speak with Donavon, house supervisor, regarding patient's request to donate her brain to the [facility].  YAMILET discussed with unit supervisor who will discuss with ANS.    P: SW will remain available to assist as needed.    Quentin Munguia, ELLE, LGSW

## 2019-03-28 NOTE — DISCHARGE SUMMARY
Patient discharged at 12:23 PM to [facility] [place] IV was discontinued. Pain at time of discharge was 0/10 Belongings returned to patient.  Discharge instructions and medications reviewed with patient and daughters.  Patient/Family verbalized understanding and all questions were answered.  Prescriptions given to patient.  At time of discharge, patient condition was stable and left the unit via w/c escorted by family.

## 2019-03-28 NOTE — PROGRESS NOTES
Chart reviewed  Note plans for discharge back to [facility] today with hospice  FT was pulled yesterday  Diet: DD2, NTL  Svetlana po intake  Calorie count from yesterday - 1675 cals, 64 gm pro  No aggressive nutrition intervention  Will be available if needed    Katelin Maza RD, LD  Clinical Dietitian - [facility]  Pager - [phone]

## 2019-03-29 NOTE — Clinical Note
"Medications have gotten very complicated for this patient since her discharge from [facility]. The most pressing issue is her [facility] physician Dr. Castro is not comfortable prescribing lacosamide for her \"seizure\" that she had in the hospital. I'm not sure why this was continued given that neurology suspected she was really having dyskinesia and EEG was negative for seizure activity. What are your thoughts on the lacosamide? "

## 2019-03-29 NOTE — PATIENT INSTRUCTIONS
Recommendations from today's MTM visit:                                                    MTM (medication therapy management) is a service provided by a clinical pharmacist designed to help you get the most of out of your medicines.     Recommendations for Dr. Castro:   1. Blood pressure management: consider stopping hydralazine if BP's have been at goal. It is unclear to me why this was initiated this week.   2. Reflux heartburn: consider stopping pantoprazole since already on omeprazole.  3. Pain: consider discontinuing acetaminophen liquid order as tablets already ordered and preferred.  4. Possible seizure: consider switching lacosamide to 250 mg tablets instead of liquid until decision is made to continue or not continue this medication.   5. REM behavior disorder/insomnia: consider increasing melatonin to 3 mg if still having active dreams.  6. Urinary urgency: consider stopping terazosin since already on tamsulosin.  7. Depression/anxety: consider switching venlafaxine to once a day dosing since she is on extended-release formulation now.    Dr. Pitts placed a referral for you to see an epilepsy specialist in our neurology clinic to discuss ongoing use of the lacosamide (Vimpat).     No changes to Parkinson's medications right now, but continue to monitor and we will discuss next week.    Next MTM visit: 4/3/19 at 10 am - I will call Kassandra!    To schedule another MTM appointment, please call the clinic directly or you may call the MTM scheduling line at [phone] or toll-free at [phone].     My Clinical Pharmacist's contact information:                                                      It was a pleasure talking with you today!  Please feel free to contact me with any questions or concerns you have.      Tamika Diane, Pharm.D.  Medication Therapy Management Pharmacist  Phone: [phone]    You may receive a survey about the MTM services you received by email and/or US Mail.  I would appreciate your  feedback to help me serve you better in the future. Your comments will be anonymous.

## 2019-03-29 NOTE — TELEPHONE ENCOUNTER
Patient's daughter, Kassandra, called (consent to communicate on file). Patient has been discharged from the hospital and is now back at the [facility]. She would like to review Erin's medications again. Scheduled DANIELA MTM call for today at 10:30 am.    Tamika Diane, Pharm.D.  Medication Therapy Management Pharmacist  Phone: [phone]

## 2019-03-29 NOTE — PROGRESS NOTES
SUBJECTIVE/OBJECTIVE:                Loan Anderson is a 78 year old female called for a transitions of care visit.  She was discharged from [facility] on 3/28/19 for acute encephalopathy. Today's visit was conducted with patient's daughter, Kassandra (consent to communicate on file).    Chief Complaint: Review medication changes; follow up from last MTM visit 2/22/19 PTA and MTM visit by phone 3/21/19 while inpatient    Allergies/ADRs: Reviewed in Epic  Tobacco: No tobacco use   Alcohol: Less than 1 beverage / month  PMH: Reviewed in Epic    Medication Adherence/Access:  Patient has assistance with medication administration: assisted living.  Patient takes medications 8 time(s) per day: 5 am, 7 am, 10 am, 1 pm, 3 pm, 6 pm, 7 pm, and 8:30 pm   Per patient, misses medication 0 times per week.   Medication barriers: none.   The patient fills medications at [facility]: NO, fills medications at [facility].    Parkinson's Disease:  Current medications include: Neupro patch 4 mg daily and Carbidopa-levodopa  mg 1 tablet 4 times daily at 5 am, 10 am, 3 pm, and 8:30 pm. Daughter states that while inpatient, patient required less and less carbidopa-levodopa because she was so susceptible to dyskinesia. Yesterday she only received 3 tablets throughout the whole day and this seemed to work well for her. She denies tremor but does have some difficulty with picking up her feet. Dyskinesias are still prominent one hour after taking a dose and last for 1-2 hours. When she gets dyskinesia she can't function well, so their goal is to reduce dyskinesia. However, daughter does note that her dyskinesias are much improved compared to when she was in the hospital. Of note, patient was on Seroquel while in the hospital, primarily for the purpose of extubation, and she is no longer on this medication. Daughter states she still has some episodes of agitation, which occur primarily at night. She had one episode this week where  "she had difficulty breathing and it appeared she was having a panic attack -- she was given alprazolam, hydralazine, and dexamethasone and daughter states she was \"cantankerous\" for days after taking these medications, so she is hoping they can avoid using more alprazolam. Patient has not had more episodes of SOB and therefore has not needed the albuterol nebs. Alprazolam and morphine are available to the patient per hospice recommendations, but she is not taking these outside of the one recent episode.    Possible seizure: Currently taking lacosamide 10 mg/mL 15 mL (150 mg) twice daily. Family would like for her to be weaned off of this medication. They were told that the patient did not actually have a seizure and that the episode she experienced was actually severe dyskinesia. If she needs to continue on lacosamide, daughter would like this switched to tablets.    Candiduria: Taking fluconazole 200 mg daily x 2 weeks, as prescribed in the hospital. Patient appears to be asymptomatic now.    Shoulder pain: Takes acetaminophen 650 mg 4 times/day, Voltaren gel 4 times/day, and ibuprofen as needed. Tramadol discontinued d/t concern for lowering seizure threshold while inpatient. Daughter states that acetaminophen has been more effective for her than ibuprofen lately.     Insomnia: Takes melatonin 1 mg nightly. She was on a higher dose in the hospital but it was decreased due to concerns for more vivid dreams. Patient continues to have vivid dreams but is sleeping better now that she is in her own bed and she is using a CPAP machine again.     Depression/anxiety:  Current medications include: venlafaxine XR 37.5 mg 3 times daily. She was taking venlafaxine  mg PTA. She was also given lorazepam 2 mg/mL to take as needed but she hasn't needed this outside of the one episode earlier this week.     Hypothyroidism: Patient is taking levothyroxine 112 mcg daily. Patient is having the following symptoms: none.   TSH "   Date Value Ref Range Status   03/03/2019 3.12 0.40 - 4.00 mU/L Final     Hypertension: Current medications include amlodipine 10 mg daily and hydralazine 5 mg 3 times daily.  Patient's BP are monitored at the [facility] but daughter is unsure of how they have been running.  Patient was on lisinopril PTA but when this medication was restarted after being intubated she experienced swelling and it was discontinued. Hydralazine was started with the breathing/anxiety episode a few days ago.     Urinary frequency: Takes mirabegron 25 mg daily, trospium 60 mg ER daily, and tamsulosin 0.4 mg daily. Terazosin is also on her medication list from the discharge papers but this was only used while she was in the hospital in place of tamsulosin.     Constipation: Takes docusate 300 mg daily, Miralax as needed and bisacodyl suppository as needed. Daughter has no concerns regarding constipation today.     ASCVD prevention: Currently taking aspirin 81 mg daily. Per our previous visit, patient has opted to stay on aspirin due to family history of cardiovascular disease. Denies unusual bruising/bleeding.     GERD: Current medications include: Prilosec (omeprazole) 20 mg daily. Medication list also indicates pantoprazole from admission and patient may be taking both currently. Patient feels that current regimen is effective.    Vitamins/supplements: Currently taking Vitamin B complex daily, eye vitamin daily, and Vitamin D3 5000 units daily. These are the same PTA.     ASSESSMENT:                 Current medications were reviewed today.      Medication Adherence: excellent, no issues identified    Parkinson's Disease:  Improving. Although patient is still having bothersome dyskinesias, it seems she has significantly improved and she is now on a much lower dose of levodopa than PTA. Would not recommend more medication changes given that she is adjusting to living in assisted living again, but we could consider reducing Neupro patch dose  in the near future if dyskinesias do not improve.     Possible seizure: Needs improvement. It is unclear whether the patient actually had a seizure in the hospital. EEG did not show seizure activity. Will discuss with Dr. Pitts whether we should be continuing the lacosamide at this time. If we need to continue lacosamide, would recommend switching to tablets.    Candiduria: Improving.    Shoulder pain: Stable.     Insomnia: Improving. Could consider increasing melatonin dose again to 3 mg and monitor closely for changes in dream enactment. 1 mg may be too low of a dose for benefits with REM behavior disorder.    Depression/anxiety:  Needs improvement. Venlafaxine dose/formulation have changed since hospitalization; could consider switching to a once daily formulation of venlafaxine (ie: 75 or 100 mg daily with extended-release).     Hypothyroidism: Stable.     Hypertension: Needs improvement. It is unclear why hydralazine was added during the patient's anxiety episode earlier this week. Will defer to primary care to determine appropriateness going forward.     Urinary frequency: Needs improvement. Duplicate therapy between terazosin and tamsulosin. Patient may benefit from stopping terazosin which was started inpatient to replace tamsulsoin.     Constipation: Appears stable.     ASCVD prevention: Stable.     GERD: Needs improvement. Duplicate PPIs being used. Patient would benefit from discontinuing pantoprazole.     Vitamins/supplements: Stable.     PLAN:                Post Discharge Medication Reconciliation Status: discharge medications reconciled and changed, per note/orders (see AVS).    Medication recommendations to Dr. Castro/Elizabeth:   1. Consider stopping hydralazine if BP's have been at goal; unclear why this was initiated.   2. Consider stopping pantoprazole since patient is on omeprazole.   3. Consider discontinuing acetaminophen liquid order as patient has tablets ordered already.   4. Consider  switching lacosamide to 250 mg tablets instead of liquid until decision is made to continue or not continue this medication.   5. Consider increasing melatonin to 3 mg if still having issues with dream enactment.  6. Consider stopping terazosin as patient is on tamsulosin.  7. Consider switching venlafaxine to once a day dosing since she is on extended-release formulation now.    Talked with Anahi at [facility] ([phone]) and Dr. Gautam Castro called me back at about 12:30 pm on 3/29/19. He has not seen the patient since she was discharged from the hospital so we discussed the above medication changes and he will review the medication changes with her when he sees her next week. Dr. Castro would like for Dr. Pitts to decide whether the patient will be continuing on lacosamide. Dr. Castro's phone number if needed is [phone] and [facility] fax is [phone].    Discussed with Dr. Pitts and he placed a referral for the patient to see an epilepsy specialist at the [facility] for assessment of appropriateness of ongoing lacosamide use. Called and informed daughter, Kassandra, of this and she is agreeable to making an appt.    Will not change PD medications (carbidopa-levodopa and Neupro patch) for now, but may consider reducing Neupro patch dose at next visit next week if patient is still experiencing bothersome dyskinesias.     I spent 45 minutes with this patient today. I offer these suggestions for consideration by Dr. Gautam Castro and Dr. Pedro Pitts. A copy of the visit note was provided to the patient's primary care and referring providers. Copy was faxed to Dr. Castro at [phone].    Will follow up in 5 days: 4/3/19    The patient was sent via [employer] a summary of these recommendations as an after visit summary.    Chart documentation was completed in part with Dragon voice-recognition software. Even though reviewed, some grammatical, spelling, and word errors may remain.    Tamika Diane, Pharm.D.  Medication Therapy  Management Pharmacist  Phone: [phone]

## 2019-04-03 ENCOUNTER — TELEPHONE (OUTPATIENT)
Dept: PHARMACY | Facility: CLINIC | Age: 79
End: 2019-04-03

## 2019-04-03 NOTE — TELEPHONE ENCOUNTER
"Spoke with patient's daughter, Kassandra, today. We had a telemedicine follow up [facility] visit scheduled to review medications and Kassandra informed me that the patient passed away last night due to a \"respiratory attack.\" She was at the [facility] when she passed away.    Kassandra mentioned that the patient participated in stem cell treatment at [place] in 2011 and that her brain is currently at the [facility] to be assessed.     Informed [facility] of the patient's passing and will inform her care team at the [facility] neurology clinic.     Tamika Diane, Pharm.D.  Medication Therapy Management Pharmacist  Phone: [phone]  " Name band;

## 2019-04-24 LAB — COPATH REPORT: NORMAL

## 2019-10-01 NOTE — PROVIDER NOTIFICATION
MD Notification    Notified Person: MD    Notified Person Name: Dr. Stone    Notification Date/Time: 03/17/19 5:21 PM     Notification Interaction: Page    Purpose of Notification: Possible bladder spasms?    Orders Received: Irrigate mata catheter as needed.  MD to enter order for prn b&o suppository also.    Comments:       Cardiac

## 2022-09-16 NOTE — PROGRESS NOTES
[facility] ICU RESPIRATORY NOTE  Date of Admission:3/2/2019  Date of Intubation (most recent):3/2/2019  Reason for Mechanical Ventilation:Airway protection  Number of Days on Mechanical Ventilation:3  Met Criteria for Pressure Support Trial:No  Reason for No Pressure Support Trial:Per MD    Ventilation Mode: CMV/AC  (Continuous Mandatory Ventilation/ Assist Control)  FiO2 (%): 40 %  Rate Set (breaths/minute): 14 breaths/min  Tidal Volume Set (mL): 400 mL  PEEP (cm H2O): 5 cmH2O  Oxygen Concentration (%): 40 %  Resp: 14        Significant Events Today:None    ABG Results:  No lab results found in last 7 days.      Plan:  Will cont full vent support for now and will assess for weaning readiness daily.  3/4/2019  Nasima Martinez RRT         Yes

## 2024-05-15 NOTE — ED AVS SNAPSHOT
Emergency Department  [address] [place] [address]  Phone:  [phone]  Fax:  [phone]                                    Loan Anderson   MRN: [id number]    Department:   Emergency Department   Date of Visit:  2/27/2019           After Visit Summary Signature Page    I have received my discharge instructions, and my questions have been answered. I have discussed any challenges I see with this plan with the nurse or doctor.    ..........................................................................................................................................  Patient/Patient Representative Signature      ..........................................................................................................................................  Patient Representative Print Name and Relationship to Patient    ..................................................               ................................................  Date                                   Time    ..........................................................................................................................................  Reviewed by Signature/Title    ...................................................              ..............................................  Date                                               Time          22EPIC Rev 08/18       
Not applicable

## 2025-05-25 NOTE — PLAN OF CARE
Claire Cortez, RN - Registered Nurse]   PT A&O Vss on RA denies pain slept through out the night, denies any N&V CMS intact neuro stable, CIWA score ok, plan to go home pending continue to monitor   0 (no pain/absence of nonverbal indicators of pain)

## (undated) RX ORDER — FLUMAZENIL 0.1 MG/ML
INJECTION, SOLUTION INTRAVENOUS
Status: DISPENSED
Start: 2019-01-01

## (undated) RX ORDER — NALOXONE HYDROCHLORIDE 0.4 MG/ML
INJECTION, SOLUTION INTRAMUSCULAR; INTRAVENOUS; SUBCUTANEOUS
Status: DISPENSED
Start: 2019-01-01

## (undated) RX ORDER — FENTANYL CITRATE 50 UG/ML
INJECTION, SOLUTION INTRAMUSCULAR; INTRAVENOUS
Status: DISPENSED
Start: 2019-01-01